# Patient Record
Sex: FEMALE | Race: WHITE | NOT HISPANIC OR LATINO | Employment: OTHER | URBAN - METROPOLITAN AREA
[De-identification: names, ages, dates, MRNs, and addresses within clinical notes are randomized per-mention and may not be internally consistent; named-entity substitution may affect disease eponyms.]

---

## 2017-01-03 ENCOUNTER — APPOINTMENT (OUTPATIENT)
Dept: CT IMAGING | Facility: HOSPITAL | Age: 82
End: 2017-01-03

## 2017-01-03 ENCOUNTER — HOSPITAL ENCOUNTER (OUTPATIENT)
Facility: HOSPITAL | Age: 82
Setting detail: OBSERVATION
LOS: 3 days | Discharge: HOME | End: 2017-01-06
Attending: EMERGENCY MEDICINE | Admitting: HOSPITALIST

## 2017-01-03 DIAGNOSIS — R53.1 GENERALIZED WEAKNESS: ICD-10-CM

## 2017-01-03 DIAGNOSIS — M62.81 MUSCLE WEAKNESS (GENERALIZED): ICD-10-CM

## 2017-01-03 DIAGNOSIS — N39.0 URINARY TRACT INFECTION IN FEMALE: Primary | ICD-10-CM

## 2017-01-03 LAB
ALBUMIN SERPL-MCNC: 3.7 G/DL (ref 3.5–5.2)
ALBUMIN/GLOB SERPL: 1.2 G/DL
ALP SERPL-CCNC: 56 U/L (ref 39–117)
ALT SERPL W P-5'-P-CCNC: 11 U/L (ref 1–33)
ANION GAP SERPL CALCULATED.3IONS-SCNC: 10.6 MMOL/L
AST SERPL-CCNC: 17 U/L (ref 1–32)
BACTERIA UR QL AUTO: ABNORMAL /HPF
BASOPHILS # BLD AUTO: 0.03 10*3/MM3 (ref 0–0.2)
BASOPHILS NFR BLD AUTO: 0.5 % (ref 0–1.5)
BILIRUB SERPL-MCNC: 0.3 MG/DL (ref 0.1–1.2)
BILIRUB UR QL STRIP: NEGATIVE
BUN BLD-MCNC: 18 MG/DL (ref 8–23)
BUN/CREAT SERPL: 20 (ref 7–25)
CALCIUM SPEC-SCNC: 9.2 MG/DL (ref 8.6–10.5)
CHLORIDE SERPL-SCNC: 97 MMOL/L (ref 98–107)
CLARITY UR: ABNORMAL
CO2 SERPL-SCNC: 26.4 MMOL/L (ref 22–29)
COLOR UR: YELLOW
CREAT BLD-MCNC: 0.9 MG/DL (ref 0.57–1)
D-LACTATE SERPL-SCNC: 1 MMOL/L (ref 0.5–2)
DEPRECATED RDW RBC AUTO: 47.3 FL (ref 37–54)
EOSINOPHIL # BLD AUTO: 0.18 10*3/MM3 (ref 0–0.7)
EOSINOPHIL NFR BLD AUTO: 2.9 % (ref 0.3–6.2)
ERYTHROCYTE [DISTWIDTH] IN BLOOD BY AUTOMATED COUNT: 12.3 % (ref 11.7–13)
GFR SERPL CREATININE-BSD FRML MDRD: 59 ML/MIN/1.73
GLOBULIN UR ELPH-MCNC: 3 GM/DL
GLUCOSE BLD-MCNC: 89 MG/DL (ref 65–99)
GLUCOSE UR STRIP-MCNC: NEGATIVE MG/DL
HCT VFR BLD AUTO: 37.5 % (ref 35.6–45.5)
HGB BLD-MCNC: 12 G/DL (ref 11.9–15.5)
HGB UR QL STRIP.AUTO: ABNORMAL
HOLD SPECIMEN: NORMAL
HYALINE CASTS UR QL AUTO: ABNORMAL /LPF
IMM GRANULOCYTES # BLD: 0 10*3/MM3 (ref 0–0.03)
IMM GRANULOCYTES NFR BLD: 0 % (ref 0–0.5)
KETONES UR QL STRIP: NEGATIVE
LEUKOCYTE ESTERASE UR QL STRIP.AUTO: ABNORMAL
LIPASE SERPL-CCNC: 43 U/L (ref 13–60)
LYMPHOCYTES # BLD AUTO: 1.9 10*3/MM3 (ref 0.9–4.8)
LYMPHOCYTES NFR BLD AUTO: 30.6 % (ref 19.6–45.3)
MCH RBC QN AUTO: 33.5 PG (ref 26.9–32)
MCHC RBC AUTO-ENTMCNC: 32 G/DL (ref 32.4–36.3)
MCV RBC AUTO: 104.7 FL (ref 80.5–98.2)
MONOCYTES # BLD AUTO: 0.48 10*3/MM3 (ref 0.2–1.2)
MONOCYTES NFR BLD AUTO: 7.7 % (ref 5–12)
NEUTROPHILS # BLD AUTO: 3.61 10*3/MM3 (ref 1.9–8.1)
NEUTROPHILS NFR BLD AUTO: 58.3 % (ref 42.7–76)
NITRITE UR QL STRIP: POSITIVE
PH UR STRIP.AUTO: 5.5 [PH] (ref 5–8)
PLATELET # BLD AUTO: 175 10*3/MM3 (ref 140–500)
PMV BLD AUTO: 9 FL (ref 6–12)
POTASSIUM BLD-SCNC: 4.2 MMOL/L (ref 3.5–5.2)
PROT SERPL-MCNC: 6.7 G/DL (ref 6–8.5)
PROT UR QL STRIP: NEGATIVE
RBC # BLD AUTO: 3.58 10*6/MM3 (ref 3.9–5.2)
RBC # UR: ABNORMAL /HPF
REF LAB TEST METHOD: ABNORMAL
SODIUM BLD-SCNC: 134 MMOL/L (ref 136–145)
SP GR UR STRIP: 1.02 (ref 1–1.03)
SQUAMOUS #/AREA URNS HPF: ABNORMAL /HPF
UROBILINOGEN UR QL STRIP: ABNORMAL
WBC NRBC COR # BLD: 6.2 10*3/MM3 (ref 4.5–10.7)
WBC UR QL AUTO: ABNORMAL /HPF
WHOLE BLOOD HOLD SPECIMEN: NORMAL

## 2017-01-03 PROCEDURE — 87186 SC STD MICRODIL/AGAR DIL: CPT | Performed by: EMERGENCY MEDICINE

## 2017-01-03 PROCEDURE — G0378 HOSPITAL OBSERVATION PER HR: HCPCS

## 2017-01-03 PROCEDURE — 80053 COMPREHEN METABOLIC PANEL: CPT | Performed by: EMERGENCY MEDICINE

## 2017-01-03 PROCEDURE — 25010000003 CEFTRIAXONE PER 250 MG: Performed by: PHYSICIAN ASSISTANT

## 2017-01-03 PROCEDURE — 81001 URINALYSIS AUTO W/SCOPE: CPT | Performed by: EMERGENCY MEDICINE

## 2017-01-03 PROCEDURE — 83690 ASSAY OF LIPASE: CPT | Performed by: EMERGENCY MEDICINE

## 2017-01-03 PROCEDURE — 99284 EMERGENCY DEPT VISIT MOD MDM: CPT

## 2017-01-03 PROCEDURE — 87086 URINE CULTURE/COLONY COUNT: CPT | Performed by: EMERGENCY MEDICINE

## 2017-01-03 PROCEDURE — 36415 COLL VENOUS BLD VENIPUNCTURE: CPT

## 2017-01-03 PROCEDURE — 83605 ASSAY OF LACTIC ACID: CPT | Performed by: PHYSICIAN ASSISTANT

## 2017-01-03 PROCEDURE — 85025 COMPLETE CBC W/AUTO DIFF WBC: CPT | Performed by: EMERGENCY MEDICINE

## 2017-01-03 PROCEDURE — 96365 THER/PROPH/DIAG IV INF INIT: CPT

## 2017-01-03 PROCEDURE — 87040 BLOOD CULTURE FOR BACTERIA: CPT | Performed by: PHYSICIAN ASSISTANT

## 2017-01-03 PROCEDURE — 70450 CT HEAD/BRAIN W/O DYE: CPT

## 2017-01-03 RX ORDER — SODIUM CHLORIDE 0.9 % (FLUSH) 0.9 %
10 SYRINGE (ML) INJECTION AS NEEDED
Status: DISCONTINUED | OUTPATIENT
Start: 2017-01-03 | End: 2017-01-06 | Stop reason: HOSPADM

## 2017-01-03 RX ORDER — LABETALOL HYDROCHLORIDE 5 MG/ML
10 INJECTION, SOLUTION INTRAVENOUS ONCE
Status: DISCONTINUED | OUTPATIENT
Start: 2017-01-03 | End: 2017-01-06 | Stop reason: HOSPADM

## 2017-01-03 RX ORDER — CEFTRIAXONE SODIUM 1 G/50ML
1 INJECTION, SOLUTION INTRAVENOUS ONCE
Status: COMPLETED | OUTPATIENT
Start: 2017-01-03 | End: 2017-01-03

## 2017-01-03 RX ADMIN — SODIUM CHLORIDE 500 ML: 9 INJECTION, SOLUTION INTRAVENOUS at 22:56

## 2017-01-03 RX ADMIN — CEFTRIAXONE SODIUM 1 G: 1 INJECTION, SOLUTION INTRAVENOUS at 22:04

## 2017-01-03 NOTE — IP AVS SNAPSHOT
AFTER VISIT SUMMARY             Kim Feldman           About your hospitalization     You were admitted on:  January 3, 2017 You last received care in the:  [address] [facility]       Procedures & Surgeries         Medications    If you or your caregiver advised us that you are currently taking a medication and that medication is marked below as “Resume”, this simply indicates that we have reviewed those medications to make sure our new therapy recommendations do not interfere.  If you have concerns about medications other than those new ones which we are prescribing today, please consult the physician who prescribed them (or your primary physician).  Our review of your home medications is not meant to indicate that we are directing their use.             Your Medications      START taking these medications     levoFLOXacin 250 MG tablet   Take 1 tablet by mouth Daily for 3 days.   Commonly known as:  LEVAQUIN             CHANGE how you take these medications     carvedilol 3.125 MG tablet   TAKE ONE TABLET BY MOUTH TWICE A DAY WITH MEALS   Last time this was given:  1/6/2017  8:26 AM   Commonly known as:  COREG   What changed:  See the new instructions.             CONTINUE taking these medications     ARTIFICIAL TEARS OP   Apply 2 drops to eye 4 (four) times a day as needed.           clopidogrel 75 MG tablet   TAKE ONE TABLET BY MOUTH DAILY   Last time this was given:  1/6/2017  8:26 AM   Commonly known as:  PLAVIX           clotrimazole-betamethasone 1-0.05 % cream   Apply  topically 2 (Two) Times a Day.   Last time this was given:  1/5/2017  9:30 AM   Commonly known as:  LOTRISONE           diclofenac 1 % gel gel   Apply 4 g topically 4 (four) times a day as needed.   Commonly known as:  VOLTAREN           gabapentin 300 MG capsule   TAKE TWO CAPSULES BY MOUTH THREE TIMES A DAY   Last time this was given:  1/6/2017  8:26 AM   Commonly known as:  NEURONTIN           lansoprazole 30 MG  capsule   TAKE ONE CAPSULE BY MOUTH EVERY MORNING   Commonly known as:  PREVACID           levETIRAcetam 500 MG tablet   Take 500 mg by mouth 2 (two) times a day.   Last time this was given:  1/6/2017  8:26 AM   Commonly known as:  KEPPRA           Loratadine 10 MG capsule   Take 1 capsule by mouth daily as needed.           methylcellulose (Laxative) 500 MG tablet tablet   Take 2 tablets by mouth daily as needed.   Commonly known as:  CITRUCEL           mirtazapine 15 MG tablet   Take 0.5 tablets by mouth Every Night.   Last time this was given:  1/5/2017  8:15 PM   Commonly known as:  REMERON           nystatin 476154 UNIT/GM cream   Apply  topically As Needed.   Commonly known as:  MYCOSTATIN           VIACTIV PO   Take  by mouth Daily With Lunch. HOLD PRIOR TO SURG           VITAMIN B COMPLEX PO   Take 1 tablet by mouth every morning. HOLD PRIOR TO SURG             STOP taking these medications     clindamycin 150 MG capsule   Commonly known as:  CLEOCIN           CRANBERRY PO                Where to Get Your Medications      These medications were sent to [id number] [place] [address] AT [facility] - [phone]  - [phone] [address] [address]     Phone:  [phone]     carvedilol 3.125 MG tablet    levoFLOXacin 250 MG tablet                  Your Medications      Your Medication List           Morning Noon Evening Bedtime As Needed    ARTIFICIAL TEARS OP   Apply 2 drops to eye 4 (four) times a day as needed.                                               carvedilol 3.125 MG tablet   TAKE ONE TABLET BY MOUTH TWICE A DAY WITH MEALS   Commonly known as:  COREG                                      clopidogrel 75 MG tablet   TAKE ONE TABLET BY MOUTH DAILY   Commonly known as:  PLAVIX                                   clotrimazole-betamethasone 1-0.05 % cream   Apply  topically 2 (Two) Times a Day.   Commonly known as:  LOTRISONE                                       diclofenac 1 % gel gel   Apply 4 g topically 4 (four) times a day as needed.   Commonly known as:  VOLTAREN                                               gabapentin 300 MG capsule   TAKE TWO CAPSULES BY MOUTH THREE TIMES A DAY   Commonly known as:  NEURONTIN                                         lansoprazole 30 MG capsule   TAKE ONE CAPSULE BY MOUTH EVERY MORNING   Commonly known as:  PREVACID                                   levETIRAcetam 500 MG tablet   Take 500 mg by mouth 2 (two) times a day.   Commonly known as:  KEPPRA                                      levoFLOXacin 250 MG tablet   Take 1 tablet by mouth Daily for 3 days.   Commonly known as:  LEVAQUIN                                   Loratadine 10 MG capsule   Take 1 capsule by mouth daily as needed.                                      methylcellulose (Laxative) 500 MG tablet tablet   Take 2 tablets by mouth daily as needed.   Commonly known as:  CITRUCEL                                      mirtazapine 15 MG tablet   Take 0.5 tablets by mouth Every Night.   Commonly known as:  REMERON                                   nystatin [id number] UNIT/GM cream   Apply  topically As Needed.   Commonly known as:  MYCOSTATIN                                   VIACTIV PO   Take  by mouth Daily With Lunch. HOLD PRIOR TO SURG                                   VITAMIN B COMPLEX PO   Take 1 tablet by mouth every morning. HOLD PRIOR TO SURG                                            Instructions for After Discharge        Activity Instructions     Activity as Tolerated                 Diet Instructions     Diet:       Diet Texture / Consistency:  Regular   Common Modifiers:   Cardiac  Consistent Carbohydrate                Discharge References/Attachments     URINARY TRACT INFECTION (ENGLISH)    URINARY TRACT INFECTION, EASY-TO-READ (ENGLISH)    LEVOFLOXACIN TABLETS (ENGLISH)    CYANOCOBALAMIN, VITAMIN B12 INJECTION (ENGLISH)        Follow-ups for After Discharge        Follow-up Information     Follow up with Sharad Salinas MD. Go on 1/16/2017.    Specialty:  Family Medicine    Why:  You have an appointment on Monday, January 16th at 2:30 pm. If you can not make this appointment for any reason please call and reschedule for as soon as possible.    Contact information:    [address] [facility] [address]  [facility] [address]  [phone]        Referrals and Follow-ups to Schedule     Call MD for problems / concerns.    As directed        Follow-Up    As directed    Sharad Salinas MD 1-2 weeks   Follow Up Details:  CBC next week follow-up platelets       CBC & Differential    Jan 11, 2017 (Approximate)    Manual Differential:  No             Scheduled Appointments     Jan 16, 2017  2:30 PM [facility]   Hospital Follow Up with Sharad Salinas MD   [facility] FAMILY AND INTERNAL MED (--)    [address]  [facility] [address]   [phone]            Apr 06, 2017 12:30 PM EDT   Follow Up with Arsalan Velez Jr., MD   [facility] NEUROLOGY (--)    [address]  [facility] [address] [phone]           Arrive 15 minutes prior to appointment.              [employer] Signup     Our records indicate that you have an active [facility] [employer] account.    You can view your After Visit Summary by going to [employer] and logging in with your [employer] username and password.  If you don't have a [employer] username and password but a parent or guardian has access to your record, the parent or guardian should login with their own [employer] [place] username and password and access your record to view the After Visit Summary.    If you have questions, you can email [email or web address] or call [phone] to talk to our [employer] staff.  Remember, [employer] is NOT to be used for urgent needs.  For medical emergencies, dial 911.           Summary of Your Hospitalization         Reason for Hospitalization     Your primary diagnosis was:  Urinary Tract Infection In Female    Your diagnoses also included:  High Blood Pressure, Pacemaker, Clonic Seizure Disorder, History Of Aspiration Pneumonitis, Decreased Platelet Count      Care Providers     Provider Service Role Specialty    Zain Crespo MD Internal Medicine Attending Provider Internal Medicine      Your Allergies  Date Reviewed: 1/4/2017    Allergen Reactions    Penicillins Hives      Pending Labs     Order Current Status    Blood Culture Preliminary result    Blood Culture Preliminary result      Patient Belongings Returned     Document Return of Belongings Flowsheet     Were the patient bedside belongings sent home?   Yes   Belongings Retrieved from Security & Sent Home   N/A    Belongings Sent to Safe   --   Medications Retrieved from Pharmacy & Sent Home   N/A              More Information      Urinary Tract Infection  Urinary tract infections (UTIs) can develop anywhere along your urinary tract. Your urinary tract is your body's drainage system for removing wastes and extra water. Your urinary tract includes two kidneys, two ureters, a bladder, and a urethra. Your kidneys are a pair of bean-shaped organs. Each kidney is about the size of your fist. They are located below your ribs, one on each side of your spine.  CAUSES  Infections are caused by microbes, which are microscopic organisms, including fungi, viruses, and bacteria. These organisms are so small that they can only be seen through a microscope. Bacteria are the microbes that most commonly cause UTIs.  SYMPTOMS   Symptoms of UTIs may vary by age and gender of the patient and by the location of the infection. Symptoms in young women typically include a frequent and intense urge to urinate and a painful, burning feeling in the bladder or urethra during urination. Older women and men are more likely to be tired, shaky, and weak and have muscle aches and abdominal pain. A  fever may mean the infection is in your kidneys. Other symptoms of a kidney infection include pain in your back or sides below the ribs, nausea, and vomiting.  DIAGNOSIS  To diagnose a UTI, your caregiver will ask you about your symptoms. Your caregiver will also ask you to provide a urine sample. The urine sample will be tested for bacteria and white blood cells. White blood cells are made by your body to help fight infection.  TREATMENT   Typically, UTIs can be treated with medication. Because most UTIs are caused by a bacterial infection, they usually can be treated with the use of antibiotics. The choice of antibiotic and length of treatment depend on your symptoms and the type of bacteria causing your infection.  HOME CARE INSTRUCTIONS  · If you were prescribed antibiotics, take them exactly as your caregiver instructs you. Finish the medication even if you feel better after you have only taken some of the medication.  · Drink enough water and fluids to keep your urine clear or pale yellow.  · Avoid caffeine, tea, and carbonated beverages. They tend to irritate your bladder.  · Empty your bladder often. Avoid holding urine for long periods of time.  · Empty your bladder before and after sexual intercourse.  · After a bowel movement, women should cleanse from front to back. Use each tissue only once.  SEEK MEDICAL CARE IF:   · You have back pain.  · You develop a fever.  · Your symptoms do not begin to resolve within 3 days.  SEEK IMMEDIATE MEDICAL CARE IF:   · You have severe back pain or lower abdominal pain.  · You develop chills.  · You have nausea or vomiting.  · You have continued burning or discomfort with urination.  MAKE SURE YOU:   · Understand these instructions.  · Will watch your condition.  · Will get help right away if you are not doing well or get worse.     This information is not intended to replace advice given to you by your health care provider. Make sure you discuss any questions you have  with your health care provider.     Document Released: 09/27/2006 Document Revised: 09/07/2016 Document Reviewed: 01/25/2013  [employer] Patient Education ©2016 Else[employer] Inc.          Urinary Tract Infection  A urinary tract infection (UTI) can occur any place along the urinary tract. The tract includes the kidneys, ureters, bladder, and urethra. A type of germ called bacteria often causes a UTI. UTIs are often helped with antibiotic medicine.   HOME CARE   · If given, take antibiotics as told by your doctor. Finish them even if you start to feel better.  · Drink enough fluids to keep your pee (urine) clear or pale yellow.  · Avoid tea, drinks with caffeine, and bubbly (carbonated) drinks.  · Pee often. Avoid holding your pee in for a long time.  · Pee before and after having sex (intercourse).  · Wipe from front to back after you poop (bowel movement) if you are a woman. Use each tissue only once.  GET HELP RIGHT AWAY IF:   · You have back pain.  · You have lower belly (abdominal) pain.  · You have chills.  · You feel sick to your stomach (nauseous).  · You throw up (vomit).  · Your burning or discomfort with peeing does not go away.  · You have a fever.  · Your symptoms are not better in 3 days.  MAKE SURE YOU:   · Understand these instructions.  · Will watch your condition.  · Will get help right away if you are not doing well or get worse.     This information is not intended to replace advice given to you by your health care provider. Make sure you discuss any questions you have with your health care provider.     Document Released: 06/05/2009 Document Revised: 01/08/2016 Document Reviewed: 07/18/2013  [employer] Patient Education ©2016 Else[employer] Inc.          Levofloxacin tablets  What is this medicine?  LEVOFLOXACIN (feng voe FLOX a sin) is a quinolone antibiotic. It is used to treat certain kinds of bacterial infections. It will not work for colds, flu, or other viral infections.  This  medicine may be used for other purposes; ask your health care provider or pharmacist if you have questions.  What should I tell my health care provider before I take this medicine?  They need to know if you have any of these conditions:  -bone problems  -cerebral disease  -history of low levels of potassium in the blood  -irregular heartbeat  -joint problems  -kidney disease  -myasthenia gravis  -seizures  -tendon problems  -tingling of the fingers or toes, or other nerve disorder  -an unusual or allergic reaction to levofloxacin, other quinolone antibiotics, foods, dyes, or preservatives  -pregnant or trying to get pregnant  -breast-feeding  How should I use this medicine?  Take this medicine by mouth with a full glass of water. Follow the directions on the prescription label. This medicine can be taken with or without food. Take your medicine at regular intervals. Do not take your medicine more often than directed. Do not skip doses or stop your medicine early even if you feel better. Do not stop taking except on your doctor's advice.  A special MedGuide will be given to you by the pharmacist with each prescription and refill. Be sure to read this information carefully each time.  Talk to your pediatrician regarding the use of this medicine in children. While this drug may be prescribed for children as young as 6 months for selected conditions, precautions do apply.  Overdosage: If you think you have taken too much of this medicine contact a poison control center or emergency room at once.  NOTE: This medicine is only for you. Do not share this medicine with others.  What if I miss a dose?  If you miss a dose, take it as soon as you remember. If it is almost time for your next dose, take only that dose. Do not take double or extra doses.  What may interact with this medicine?  Do not take this medicine with any of the following medications:  -arsenic trioxide  -chloroquine  -droperidol  -medicines for irregular  heart rhythm like amiodarone, disopyramide, dofetilide, flecainide, quinidine, procainamide, sotalol  -some medicines for depression or mental problems like phenothiazines, pimozide, and ziprasidone  This medicine may also interact with the following medications:  -amoxapine  -antacids  -birth control pills  -cisapride  -dairy products  -didanosine (ddI) buffered tablets or powder  -haloperidol  -multivitamins  -NSAIDS, medicines for pain and inflammation, like ibuprofen or naproxen  -retinoid products like tretinoin or isotretinoin  -risperidone  -some other antibiotics like clarithromycin or erythromycin  -sucralfate  -theophylline  -warfarin  This list may not describe all possible interactions. Give your health care provider a list of all the medicines, herbs, non-prescription drugs, or dietary supplements you use. Also tell them if you smoke, drink alcohol, or use illegal drugs. Some items may interact with your medicine.  What should I watch for while using this medicine?  Tell your doctor or health care professional if your symptoms do not improve or if they get worse. Drink several glasses of water a day and cut down on drinks that contain caffeine. You must not get dehydrated while taking this medicine.  You may get drowsy or dizzy. Do not drive, use machinery, or do anything that needs mental alertness until you know how this medicine affects you. Do not sit or stand up quickly, especially if you are an older patient. This reduces the risk of dizzy or fainting spells.  This medicine can make you more sensitive to the sun. Keep out of the sun. If you cannot avoid being in the sun, wear protective clothing and use a sunscreen. Do not use sun lamps or tanning beds/booths. Contact your doctor if you get a sunburn.  If you are a diabetic monitor your blood glucose carefully. If you get an unusual reading stop taking this medicine and call your doctor right away.  Do not treat diarrhea with over-the-counter  products. Contact your doctor if you have diarrhea that lasts more than 2 days or if the diarrhea is severe and watery.  Avoid antacids, calcium, iron, and zinc products for 2 hours before and 2 hours after taking a dose of this medicine.  What side effects may I notice from receiving this medicine?  Side effects that you should report to your doctor or health care professional as soon as possible:  -allergic reactions like skin rash or hives, swelling of the face, lips, or tongue  -anxious  -confusion  -depressed mood  -diarrhea  -fast, irregular heartbeat  -hallucination, loss of contact with reality  -joint, muscle, or tendon pain or swelling  -pain, tingling, numbness in the hands or feet  -suicidal thoughts or other mood changes  -sunburn  -unusually weak or tired  Side effects that usually do not require medical attention (report to your doctor or health care professional if they continue or are bothersome):  -dry mouth  -headache  -nausea  -trouble sleeping  This list may not describe all possible side effects. Call your doctor for medical advice about side effects. You may report side effects to FDA at 0-244-FDA-2684.  Where should I keep my medicine?  Keep out of the reach of children.  Store at room temperature between 15 and 30 degrees C (59 and 86 degrees F). Keep in a tightly closed container. Throw away any unused medicine after the expiration date.  NOTE: This sheet is a summary. It may not cover all possible information. If you have questions about this medicine, talk to your doctor, pharmacist, or health care provider.     © 2016, Elsevier/[employer]. (2016-07-28 12:40:18)          Cyanocobalamin, Vitamin B12 injection  What is this medicine?  CYANOCOBALAMIN (sye an oh koe BAL a min) is a man made form of vitamin B12. Vitamin B12 is used in the growth of healthy blood cells, nerve cells, and proteins in the body. It also helps with the metabolism of fats and carbohydrates. This medicine is used  to treat people who can not absorb vitamin B12.  This medicine may be used for other purposes; ask your health care provider or pharmacist if you have questions.  What should I tell my health care provider before I take this medicine?  They need to know if you have any of these conditions:  -kidney disease  -Butch's disease  -megaloblastic anemia  -an unusual or allergic reaction to cyanocobalamin, cobalt, other medicines, foods, dyes, or preservatives  -pregnant or trying to get pregnant  -breast-feeding  How should I use this medicine?  This medicine is injected into a muscle or deeply under the skin. It is usually given by a health care professional in a clinic or doctor's office. However, your doctor may teach you how to inject yourself. Follow all instructions.  Talk to your pediatrician regarding the use of this medicine in children. Special care may be needed.  Overdosage: If you think you have taken too much of this medicine contact a poison control center or emergency room at once.  NOTE: This medicine is only for you. Do not share this medicine with others.  What if I miss a dose?  If you are given your dose at a clinic or doctor's office, call to reschedule your appointment. If you give your own injections and you miss a dose, take it as soon as you can. If it is almost time for your next dose, take only that dose. Do not take double or extra doses.  What may interact with this medicine?  -colchicine  -heavy alcohol intake  This list may not describe all possible interactions. Give your health care provider a list of all the medicines, herbs, non-prescription drugs, or dietary supplements you use. Also tell them if you smoke, drink alcohol, or use illegal drugs. Some items may interact with your medicine.  What should I watch for while using this medicine?  Visit your doctor or health care professional regularly. You may need blood work done while you are taking this medicine.  You may need to follow a  special diet. Talk to your doctor. Limit your alcohol intake and avoid smoking to get the best benefit.  What side effects may I notice from receiving this medicine?  Side effects that you should report to your doctor or health care professional as soon as possible:  -allergic reactions like skin rash, itching or hives, swelling of the face, lips, or tongue  -blue tint to skin  -chest tightness, pain  -difficulty breathing, wheezing  -dizziness  -red, swollen painful area on the leg  Side effects that usually do not require medical attention (report to your doctor or health care professional if they continue or are bothersome):  -diarrhea  -headache  This list may not describe all possible side effects. Call your doctor for medical advice about side effects. You may report side effects to FDA at 0-495-FDA-8310.  Where should I keep my medicine?  Keep out of the reach of children.  Store at room temperature between 15 and 30 degrees C (59 and 85 degrees F). Protect from light. Throw away any unused medicine after the expiration date.  NOTE: This sheet is a summary. It may not cover all possible information. If you have questions about this medicine, talk to your doctor, pharmacist, or health care provider.     © 2016, Elsevier/Gold Standard. (2009-03-30 22:10:20)            SYMPTOMS OF A STROKE    Call 911 or have someone take you to the Emergency Department if you have any of the following:    · Sudden numbness or weakness of your face, arm or leg especially on one side of the body  · Sudden confusion, diffiiculty speaking or trouble understanding   · Changes in your vision or loss of sight in one eye  · Sudden severe headache with no known cause  · sudden dizziness, trouble walking, loss of balance or coordination    It is important to seek emergency care right away if you have further stroke symptoms. If you get emergency help quickly, the powerful clot-dissolving medicines can reduce the disabilities caused by a  stroke.     For more information:    American Stroke Association  4-934-4-STROKE  www.strokeassociation.org           IF YOU SMOKE OR USE TOBACCO PLEASE READ THE FOLLOWING:    Why is smoking bad for me?  Smoking increases the risk of heart disease, lung disease, vascular disease, stroke, and cancer.     If you smoke, STOP!    If you would like more information on quitting smoking, please visit the [employer] website: [email or web address]   This link will provide additional resources including the QUIT line and the Beat the Pack support groups.     For more information:    American Cancer Society  [phone]    American Heart Association  [phone]               YOU ARE THE MOST IMPORTANT FACTOR IN YOUR RECOVERY.     Follow all instructions carefully.     I have reviewed my discharge instructions with my nurse, including the following information, if applicable:     Information about my illness and diagnosis   Follow up appointments (including lab draws)   Wound Care   Equipment Needs   Medications (new and continuing) along with side effects   Preventative information such as vaccines and smoking cessations   Diet   Pain   I know when to contact my Doctor's office or seek emergency care      I want my nurse to describe the side effects of my medications: YES NO   If the answer is no, I understand the side effects of my medications: YES NO   My nurse described the side effects of my medications in a way that I could understand: YES NO   I have taken my personal belongings and my own medications with me at discharge: YES NO            I have received this information and my questions have been answered. I have discussed any concerns I see with this plan with the nurse or physician. I understand these instructions.    Signature of Patient or Responsible Person: _____________________________________    Date: _________________  Time: __________________    Signature  of Healthcare Provider: _______________________________________  Date: _________________  Time: __________________

## 2017-01-04 ENCOUNTER — APPOINTMENT (OUTPATIENT)
Dept: GENERAL RADIOLOGY | Facility: HOSPITAL | Age: 82
End: 2017-01-04
Attending: HOSPITALIST

## 2017-01-04 PROBLEM — G40.409: Status: ACTIVE | Noted: 2017-01-04

## 2017-01-04 PROBLEM — Z87.09 HISTORY OF ASPIRATION PNEUMONITIS: Status: ACTIVE | Noted: 2017-01-04

## 2017-01-04 PROCEDURE — 25010000003 CEFTRIAXONE PER 250 MG: Performed by: HOSPITALIST

## 2017-01-04 PROCEDURE — G8979 MOBILITY GOAL STATUS: HCPCS

## 2017-01-04 PROCEDURE — G8997 SWALLOW GOAL STATUS: HCPCS

## 2017-01-04 PROCEDURE — 97161 PT EVAL LOW COMPLEX 20 MIN: CPT

## 2017-01-04 PROCEDURE — 92610 EVALUATE SWALLOWING FUNCTION: CPT

## 2017-01-04 PROCEDURE — 96372 THER/PROPH/DIAG INJ SC/IM: CPT

## 2017-01-04 PROCEDURE — 71010 HC CHEST PA OR AP: CPT

## 2017-01-04 PROCEDURE — G8980 MOBILITY D/C STATUS: HCPCS

## 2017-01-04 PROCEDURE — G8978 MOBILITY CURRENT STATUS: HCPCS

## 2017-01-04 PROCEDURE — 25010000002 ENOXAPARIN PER 10 MG: Performed by: HOSPITALIST

## 2017-01-04 PROCEDURE — G8998 SWALLOW D/C STATUS: HCPCS

## 2017-01-04 PROCEDURE — G8996 SWALLOW CURRENT STATUS: HCPCS

## 2017-01-04 RX ORDER — CARVEDILOL 3.12 MG/1
3.12 TABLET ORAL 2 TIMES DAILY WITH MEALS
Status: DISCONTINUED | OUTPATIENT
Start: 2017-01-04 | End: 2017-01-06 | Stop reason: HOSPADM

## 2017-01-04 RX ORDER — CEFTRIAXONE SODIUM 1 G/50ML
1 INJECTION, SOLUTION INTRAVENOUS EVERY 24 HOURS
Status: DISCONTINUED | OUTPATIENT
Start: 2017-01-04 | End: 2017-01-06 | Stop reason: HOSPADM

## 2017-01-04 RX ORDER — GABAPENTIN 300 MG/1
600 CAPSULE ORAL EVERY 12 HOURS SCHEDULED
Status: DISCONTINUED | OUTPATIENT
Start: 2017-01-04 | End: 2017-01-06 | Stop reason: HOSPADM

## 2017-01-04 RX ORDER — NYSTATIN 100000 U/G
CREAM TOPICAL EVERY 12 HOURS PRN
Status: DISCONTINUED | OUTPATIENT
Start: 2017-01-04 | End: 2017-01-06 | Stop reason: HOSPADM

## 2017-01-04 RX ORDER — MIRTAZAPINE 15 MG/1
7.5 TABLET, FILM COATED ORAL NIGHTLY
Status: DISCONTINUED | OUTPATIENT
Start: 2017-01-04 | End: 2017-01-04

## 2017-01-04 RX ORDER — GABAPENTIN 300 MG/1
300 CAPSULE ORAL EVERY 8 HOURS SCHEDULED
Status: DISCONTINUED | OUTPATIENT
Start: 2017-01-04 | End: 2017-01-04

## 2017-01-04 RX ORDER — SODIUM CHLORIDE 9 MG/ML
75 INJECTION, SOLUTION INTRAVENOUS CONTINUOUS
Status: ACTIVE | OUTPATIENT
Start: 2017-01-04 | End: 2017-01-04

## 2017-01-04 RX ORDER — LEVETIRACETAM 500 MG/1
500 TABLET ORAL 2 TIMES DAILY
Status: DISCONTINUED | OUTPATIENT
Start: 2017-01-04 | End: 2017-01-06 | Stop reason: HOSPADM

## 2017-01-04 RX ORDER — MINERAL OIL AND PETROLATUM 150; 830 MG/G; MG/G
OINTMENT OPHTHALMIC
Status: DISCONTINUED | OUTPATIENT
Start: 2017-01-04 | End: 2017-01-06 | Stop reason: HOSPADM

## 2017-01-04 RX ORDER — CLOTRIMAZOLE AND BETAMETHASONE DIPROPIONATE 10; .64 MG/G; MG/G
CREAM TOPICAL 2 TIMES DAILY
Status: DISCONTINUED | OUTPATIENT
Start: 2017-01-04 | End: 2017-01-06 | Stop reason: HOSPADM

## 2017-01-04 RX ORDER — CLOPIDOGREL BISULFATE 75 MG/1
75 TABLET ORAL DAILY
Status: DISCONTINUED | OUTPATIENT
Start: 2017-01-04 | End: 2017-01-06 | Stop reason: HOSPADM

## 2017-01-04 RX ORDER — PANTOPRAZOLE SODIUM 40 MG/1
40 TABLET, DELAYED RELEASE ORAL
Status: DISCONTINUED | OUTPATIENT
Start: 2017-01-04 | End: 2017-01-06 | Stop reason: HOSPADM

## 2017-01-04 RX ORDER — SODIUM CHLORIDE 0.9 % (FLUSH) 0.9 %
1-10 SYRINGE (ML) INJECTION AS NEEDED
Status: DISCONTINUED | OUTPATIENT
Start: 2017-01-04 | End: 2017-01-06 | Stop reason: HOSPADM

## 2017-01-04 RX ORDER — MIRTAZAPINE 15 MG/1
15 TABLET, FILM COATED ORAL NIGHTLY
Status: DISCONTINUED | OUTPATIENT
Start: 2017-01-04 | End: 2017-01-06 | Stop reason: HOSPADM

## 2017-01-04 RX ADMIN — LEVETIRACETAM 500 MG: 500 TABLET, FILM COATED ORAL at 21:56

## 2017-01-04 RX ADMIN — CARVEDILOL 3.12 MG: 3.12 TABLET, FILM COATED ORAL at 12:05

## 2017-01-04 RX ADMIN — CLOPIDOGREL BISULFATE 75 MG: 75 TABLET, FILM COATED ORAL at 12:11

## 2017-01-04 RX ADMIN — LEVETIRACETAM 500 MG: 500 TABLET, FILM COATED ORAL at 12:05

## 2017-01-04 RX ADMIN — GABAPENTIN 600 MG: 300 CAPSULE ORAL at 13:08

## 2017-01-04 RX ADMIN — CLOTRIMAZOLE AND BETAMETHASONE DIPROPIONATE: 10; .5 CREAM TOPICAL at 12:13

## 2017-01-04 RX ADMIN — CARVEDILOL 3.12 MG: 3.12 TABLET, FILM COATED ORAL at 21:56

## 2017-01-04 RX ADMIN — ENOXAPARIN SODIUM 30 MG: 30 INJECTION SUBCUTANEOUS at 12:11

## 2017-01-04 RX ADMIN — SODIUM CHLORIDE 75 ML/HR: 9 INJECTION, SOLUTION INTRAVENOUS at 11:59

## 2017-01-04 RX ADMIN — PANTOPRAZOLE SODIUM 40 MG: 40 TABLET, DELAYED RELEASE ORAL at 12:11

## 2017-01-04 RX ADMIN — GABAPENTIN 600 MG: 300 CAPSULE ORAL at 21:56

## 2017-01-04 RX ADMIN — CEFTRIAXONE SODIUM 1 G: 1 INJECTION, SOLUTION INTRAVENOUS at 21:55

## 2017-01-04 RX ADMIN — MIRTAZAPINE 15 MG: 15 TABLET, FILM COATED ORAL at 21:56

## 2017-01-04 NOTE — H&P
[facility]    Patient Identification:  Name: Kim Feldman  Age: 86 y.o.  Sex: female  :  3/13/1930  MRN: [id number]         Primary Care Physician: Sharad Salinas MD    Chief complaint:  Shaking chills    Subjective   Patient is a 86 y.o. female was in her usual state of health until last night developed a couple episodes of shaking chills. She felt very cold and had a headache. She also reported some dysuria. The episode of chills last between 1-1/2 hours. She saw her primary care physician and was sent to the emergency room. She states she's been eating okay. Sometimes has occasional hard stools. No diarrhea. No chest pain, shortness of breath, fever. She states after her episodes of shaking chills she feels very weak. In the emergency room she was given Rocephin. She is asking if she will be able to go home later today. She does tell me that she has had a history of aspiration pneumonia and also urinary tract infections. She denies being on a modified diet.    Past Medical History   Diagnosis Date   • Anemia    • Bulging lumbar disc    • Chronic cough    • Chronic UTI    • Chronic venous insufficiency    • Clonic seizure disorder    • Dementia without behavioral disturbance      moderate   • Depression, endogenous    • Disc degeneration, lumbar    • Dysphagia    • GERD (gastroesophageal reflux disease)    • Hiatal hernia    • History of anxiety    • History of aspiration pneumonitis    • History of cerebral artery occlusion      CVA (following TIA), 10/10, treated with tPA   • History of herpes zoster    • History of ingrowing nail    • History of osteoporosis    • History of poliomyelitis      child   • History of sciatica    • History of sick sinus syndrome      s/p PPM   • History of transient cerebral ischemia      followed by stroke in 10/2010. BIBI was normal.   • HX: long term anticoagulant use    • Hyperlipidemia    • Hypertension    •  Hyponatremia    • Intertrigo    • Lumbar radiculopathy    • Morbid obesity    • Neuralgia      with diplopia secondary to facial shingles   • Nonepileptic episode    • Osteoarthritis of right knee    • Pacemaker    • Rash      ON ABDOMEN   • Seeing double      secondary to shingles on the face also with neuralgia   • Serum potassium elevated    • SIADH (syndrome of inappropriate ADH production)    • Vitamin D deficiency      Past Surgical History   Procedure Laterality Date   • Hand surgery Right    • Lumbar laminectomy       L-3 L4 L4 L5   • Cardiac pacemaker placement       secondary to SSS, placed in 2004, with generator change in 2014   • Tonsillectomy     • Laminectomy for implantation / placement neurostimulator electrodes     • Knee arthroplasty Left    • Cataract extraction     • Zenkers diverticulectomy N/A 9/27/2016     Procedure: ENDOSCOPIC REMOVAL OF ZENKERS DIVERTICULUM W/ WERDASCOPE;  Surgeon: Oswald Barth MD;  Location: [facility];  Service:      Current medications reviewed.    History reviewed. No pertinent family history.  Social History   Substance Use Topics   • Smoking status: Former Smoker     Years: 40.00     Types: Cigarettes   • Smokeless tobacco: Never Used      Comment: QUIT 1992   • Alcohol use No      Comment: VERY RARE     Review of Systems   Constitutional: Positive for chills and fatigue. Negative for fever.   HENT: Negative.    Eyes: Negative.    Respiratory: Negative.  Negative for chest tightness and shortness of breath.    Cardiovascular: Negative.  Negative for chest pain.   Gastrointestinal: Negative.  Negative for diarrhea, nausea and vomiting.   Endocrine: Negative.    Genitourinary: Positive for dysuria.   Musculoskeletal: Negative.    Skin: Negative.    Allergic/Immunologic: Negative.    Neurological: Negative.    Hematological: Negative.    Psychiatric/Behavioral: Negative.       Objective      Vital Signs  Temp:  [97.2 °F (36.2 °C)-97.6 °F (36.4 °C)] 97.3 °F (36.3  °C)  Heart Rate:  [70-83] 83  Resp:  [16-18] 18  BP: (129-189)/() 179/104  Body mass index is 36.77 kg/(m^2).    Physical Exam   Constitutional: She is oriented to person, place, and time. She appears well-developed and well-nourished.   HENT:   Head: Normocephalic and atraumatic.   Eyes: Conjunctivae and EOM are normal.   Neck: Neck supple. No tracheal deviation present.   Cardiovascular: Normal rate, regular rhythm and intact distal pulses.    Pulmonary/Chest: Effort normal and breath sounds normal. No respiratory distress. She has no wheezes. She has no rales.   Abdominal: Soft. She exhibits no distension. There is no tenderness. There is no rebound and no guarding.   Musculoskeletal: She exhibits no edema.   Lymphadenopathy:     She has no cervical adenopathy.   Neurological: She is alert and oriented to person, place, and time.   Oriented to self, year, situation, location. Appropriate   Skin: Skin is warm and dry.   Psychiatric: She has a normal mood and affect. Her behavior is normal.         Lab Results   Component Value Date    WBC 6.20 01/03/2017    HGB 12.0 01/03/2017    HCT 37.5 01/03/2017     01/03/2017     Lab Results   Component Value Date     (L) 01/03/2017    K 4.2 01/03/2017    CL 97 (L) 01/03/2017    CO2 26.4 01/03/2017    BUN 18 01/03/2017    CREATININE 0.90 01/03/2017    GLUCOSE 89 01/03/2017     Lab Results   Component Value Date    CALCIUM 9.2 01/03/2017     Lab Results   Component Value Date    AST 17 01/03/2017    ALT 11 01/03/2017    ALKPHOS 56 01/03/2017     No results found for: APTT, INR  Lab Results   Component Value Date    COLORU Yellow 01/03/2017    CLARITYU Cloudy (A) 01/03/2017    PHUR 5.5 01/03/2017    GLUCOSEU Negative 01/03/2017    KETONESU Negative 01/03/2017    BLOODU Trace (A) 01/03/2017    LEUKOCYTESUR Large (3+) (A) 01/03/2017    BILIRUBINUR Negative 01/03/2017    UROBILINOGEN 0.2 E.U./dL 01/03/2017    RBCUA 6-12 (A) 01/03/2017    WBCUA Too Numerous to  Count (A) 01/03/2017    BACTERIA 4+ (A) 01/03/2017     Lab Results   Component Value Date    URINECX Culture in progress 01/03/2017     CT head: No acute intracranial pathology    Assessment/Plan   Active Hospital Problems (** Indicates Principal Problem)    Diagnosis Date Noted   • **Urinary tract infection in female [N39.0] 01/03/2017   • 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     · 86 y.o. female admitted with urinary tract infection  · Continue Rocephin, await culture results  · Resume her blood pressure medication and monitor BP. She states she did not take it last night  · Continue seizure medication  · PT  · In July she saw speech and was recommended regular consistency with thin liquids and some speech recommendations. Check chest x-ray to rule out pneumonia as cause of chills  · Patient states her daughter Leandra would be her medical decision maker. She states she is DNR status  · Further management as patient's clinical course unfolds    Zain Crespo MD  01/04/17  8:52 AM

## 2017-01-04 NOTE — ED PROVIDER NOTES
" EMERGENCY DEPARTMENT ENCOUNTER    CHIEF COMPLAINT  Chief Complaint: Tremors  History given by: Patient, Family  History limited by:   Room Number: 2213/1  PMD: Sharad Salinas MD      HPI:  Pt is a 86 y.o. female who presents complaining of tremors that have been taking approximately 1/week over the past month, but have become more frequent and up to 2x today. She states the tremors are similar to being chilled, but denies feeling chilled. Pt also reports a posterior headache. She states some mild lower abd pain. Pt denies any CP, SOA, N/V/D, focal weakness, AMS. Family reports that pt has acted with more generalized weakness.     Duration: 1 month  Onset: gradual  Timing: intermittent  Location: generalized  Quality: \"shaking like I'm cold, but I'm not chilled.\"  Intensity/Severity: moderate  Progression: worsening  Associated Symptoms: headache, abd pain, generalized weakness  Aggravating Factors: none  Alleviating Factors: none  Previous Episodes: similar tremor episodes over the past month  Treatment before arrival: none    PAST MEDICAL HISTORY  Active Ambulatory Problems     Diagnosis Date Noted   • Anemia 02/05/2016   • Bulging lumbar disc 02/05/2016   • Depression, 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   • Dysphagia 02/05/2016   • Edema 02/05/2016   • Gastroesophageal reflux disease 02/05/2016   • Hyperlipidemia 02/05/2016   • Hypertension 02/05/2016   • Intermittent claudication 02/05/2016   • Neuralgia 02/05/2016   • Osteoarthritis of knee 02/05/2016   • Syndrome of inappropriate secretion of antidiuretic hormone 02/05/2016   • Vitamin D deficiency 02/05/2016   • History of sick sinus syndrome    • Intertrigo 03/25/2016   • 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   • Urine frequency 05/20/2016   • Change in bowel habits 05/20/2016   • Urinary tract infection 07/08/2016   • Pneumonia 07/08/2016   • Pacemaker 07/10/2016   • Zenker's diverticulum 07/11/2016   • Aspiration pneumonia 07/17/2016   • Acute URI 09/02/2016   • " 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   • History of anxiety 10/18/2016     Resolved Ambulatory Problems     Diagnosis Date Noted   • Hyponatremia 02/05/2016   • Pneumonia of right lower lobe due to infectious organism 02/05/2016   • Acute URI 05/27/2016     Past Medical History   Diagnosis Date   • Chronic cough    • Chronic UTI    • Chronic venous insufficiency    • Clonic seizure disorder    • Dementia without behavioral disturbance    • Disc degeneration, lumbar    • GERD (gastroesophageal reflux disease)    • Hiatal hernia    • History of aspiration pneumonitis    • History of cerebral artery occlusion    • History of herpes zoster    • History of ingrowing nail    • History of osteoporosis    • History of poliomyelitis    • History of sciatica    • History of transient cerebral ischemia    • HX: long term anticoagulant use    • Lumbar radiculopathy    • Morbid obesity    • Nonepileptic episode    • Osteoarthritis of right knee    • Rash    • Seeing double    • Serum potassium elevated    • SIADH (syndrome of inappropriate ADH production)        PAST SURGICAL HISTORY  Past Surgical History   Procedure Laterality Date   • Hand surgery Right    • Lumbar laminectomy       L-3 L4 L4 L5   • Cardiac pacemaker placement       secondary to SSS, placed in 2004, with generator change in 2014   • Tonsillectomy     • Laminectomy for implantation / placement neurostimulator electrodes     • Knee arthroplasty Left    • Cataract extraction     • Zenkers diverticulectomy N/A 9/27/2016     Procedure: ENDOSCOPIC REMOVAL OF ZENKERS DIVERTICULUM W/ WERDASCOPE;  Surgeon: Oswald Barth MD;  Location: [facility];  Service:        FAMILY HISTORY  History reviewed. No pertinent family history.    SOCIAL HISTORY  Social History     Social History   • Marital status:      Spouse name: N/A   • Number of children: 3   • Years of education: N/A     Occupational History   • Retired      Social History Main Topics   • Smoking status:  Former Smoker     Years: 40.00     Types: Cigarettes   • Smokeless tobacco: Never Used      Comment: QUIT 1992   • Alcohol use No      Comment: VERY RARE   • Drug use: No   • Sexual activity: Defer     Other Topics Concern   • Not on file     Social History Narrative       ALLERGIES  Penicillins    REVIEW OF SYSTEMS  Review of Systems   Constitutional: Negative for fever.   HENT: Negative for sore throat.    Eyes: Negative.    Respiratory: Negative for cough and shortness of breath.    Cardiovascular: Negative for chest pain.   Gastrointestinal: Positive for abdominal pain. Negative for diarrhea and vomiting.   Genitourinary: Negative for dysuria.   Musculoskeletal: Negative for neck pain.   Skin: Negative for rash.   Allergic/Immunologic: Negative.    Neurological: Positive for tremors, weakness ( generalized) and headaches. Negative for numbness.   Hematological: Negative.    Psychiatric/Behavioral: Negative.    All other systems reviewed and are negative.      PHYSICAL EXAM  ED Triage Vitals   Temp Heart Rate Resp BP SpO2   01/03/17 1707 01/03/17 1707 01/03/17 1707 01/03/17 1734 01/03/17 1707   97.6 °F (36.4 °C) 73 16 159/85 89 %      Temp src Heart Rate Source Patient Position BP Location FiO2 (%)   -- 01/03/17 2122 01/03/17 1734 01/03/17 1734 --    Monitor Sitting Right arm        Physical Exam   Constitutional: She is oriented to person, place, and time and well-developed, well-nourished, and in no distress. No distress.   HENT:   Head: Normocephalic and atraumatic.   Eyes: EOM are normal. Pupils are equal, round, and reactive to light.   Neck: Normal range of motion. Neck supple.   Cardiovascular: Normal rate, regular rhythm and normal heart sounds.    Pulmonary/Chest: Effort normal and breath sounds normal. No respiratory distress.   Abdominal: Soft. There is no tenderness. There is no rebound and no guarding.   Musculoskeletal: Normal range of motion. She exhibits no edema.   Neurological: She is alert and  oriented to person, place, and time. She has normal sensation and normal strength.   Skin: Skin is warm and dry. No rash noted.   Psychiatric: Mood and affect normal.   Nursing note and vitals reviewed.      LAB RESULTS  Lab Results (last 24 hours)     Procedure Component Value Units Date/Time    CBC & Differential [[id number]] Collected:  01/03/17 1752    Specimen:  Blood Updated:  01/03/17 1823    Narrative:       The following orders were created for panel order CBC & Differential.  Procedure                               Abnormality         Status                     ---------                               -----------         ------                     CBC Auto Differential[[id number]]         Abnormal            Final result                 Please view results for these tests on the individual orders.    Comprehensive Metabolic Panel [[id number]]  (Abnormal) Collected:  01/03/17 1752    Specimen:  Blood Updated:  01/03/17 1840     Glucose 89 mg/dL      BUN 18 mg/dL      Creatinine 0.90 mg/dL      Sodium 134 (L) mmol/L      Potassium 4.2 mmol/L      Chloride 97 (L) mmol/L      CO2 26.4 mmol/L      Calcium 9.2 mg/dL      Total Protein 6.7 g/dL      Albumin 3.70 g/dL      ALT (SGPT) 11 U/L      AST (SGOT) 17 U/L      Alkaline Phosphatase 56 U/L      Total Bilirubin 0.3 mg/dL      eGFR Non African Amer 59 (L) mL/min/1.73      Globulin 3.0 gm/dL      A/G Ratio 1.2 g/dL      BUN/Creatinine Ratio 20.0      Anion Gap 10.6 mmol/L     Narrative:       The MDRD GFR formula is only valid for adults with stable renal function between ages 18 and 70.    Lipase [[id number]]  (Normal) Collected:  01/03/17 1752    Specimen:  Blood Updated:  01/03/17 1840     Lipase 43 U/L     CBC Auto Differential [[id number]]  (Abnormal) Collected:  01/03/17 1752    Specimen:  Blood Updated:  01/03/17 1823     WBC 6.20 10*3/mm3      RBC 3.58 (L) 10*6/mm3      Hemoglobin 12.0 g/dL      Hematocrit 37.5 %      .7 (H) fL      MCH 33.5 (H) pg       MCHC 32.0 (L) g/dL      RDW 12.3 %      RDW-SD 47.3 fl      MPV 9.0 fL      Platelets 175 10*3/mm3      Neutrophil % 58.3 %      Lymphocyte % 30.6 %      Monocyte % 7.7 %      Eosinophil % 2.9 %      Basophil % 0.5 %      Immature Grans % 0.0 %      Neutrophils, Absolute 3.61 10*3/mm3      Lymphocytes, Absolute 1.90 10*3/mm3      Monocytes, Absolute 0.48 10*3/mm3      Eosinophils, Absolute 0.18 10*3/mm3      Basophils, Absolute 0.03 10*3/mm3      Immature Grans, Absolute 0.00 10*3/mm3     Urinalysis With / Culture If Indicated [[id number]]  (Abnormal) Collected:  01/03/17 2115    Specimen:  Urine from Urine, Clean Catch Updated:  01/03/17 2136     Color, UA Yellow      Appearance, UA Cloudy (A)      pH, UA 5.5      Specific Gravity, UA 1.017      Glucose, UA Negative      Ketones, UA Negative      Bilirubin, UA Negative      Blood, UA Trace (A)      Protein, UA Negative      Leuk Esterase, UA Large (3+) (A)      Nitrite, UA Positive (A)      Urobilinogen, UA 0.2 E.U./dL     Urinalysis, Microscopic Only [[id number]]  (Abnormal) Collected:  01/03/17 2115    Specimen:  Urine from Urine, Clean Catch Updated:  01/03/17 2137     RBC, UA 6-12 (A) /HPF      WBC, UA Too Numerous to Count (A) /HPF      Bacteria, UA 4+ (A) /HPF      Squamous Epithelial Cells, UA 0-2 /HPF      Hyaline Casts, UA 3-6 /LPF      Methodology Automated Microscopy     Urine Culture [[id number]] Collected:  01/03/17 2115    Specimen:  Urine from Urine, Clean Catch Updated:  01/03/17 2135    Blood Culture [[id number]] Collected:  01/03/17 2202    Specimen:  Blood from Arm, Right Updated:  01/03/17 2207    Lactic Acid, Plasma [[id number]]  (Normal) Collected:  01/03/17 2202    Specimen:  Blood from Arm, Right Updated:  01/03/17 2221     Lactate 1.0 mmol/L     Blood Culture [[id number]] Collected:  01/03/17 2316    Specimen:  Blood from Arm, Left Updated:  01/03/17 6223          I ordered the above labs and reviewed the results    RADIOLOGY  CT Head Without Contrast    Preliminary Result   No acute intracranial pathology.  Overall no significant change.                       I ordered the above noted radiological studies. Interpreted by radiologist. Reviewed by me in PACS.       PROCEDURES  Procedures      PROGRESS AND CONSULTS  ED Course   9:45 PM  Discussed with pt and family about plan to admit for further workup/evaluation. Pt/family understands and agrees with plan. All concerns addressed. Discussed pt with Dr. Tran. Dr. Tran has seen and evaluated pt and agrees with tx plan. Ordered Rocephin IV, Lopressor and Labetalol. Ordered CT head  Time 11:12 PM  Discussed case with Dr Bangura  Reviewed history, exam, results and treatments.  Discussed concerns and plan of care. Dr Bangura accepts pt to be admitted to telemetry.        MEDICAL DECISION MAKING      MDM  Number of Diagnoses or Management Options  Urinary tract infection in female:      Amount and/or Complexity of Data Reviewed  Clinical lab tests: ordered and reviewed (Blood work, UA)  Tests in the radiology section of CPT®: reviewed and ordered (CT head)           DIAGNOSIS  Final diagnoses:   Urinary tract infection in female   Generalized weakness       DISPOSITION  ADMISSION    Discussed treatment plan and reason for admission with pt/family and admitting physician.  Pt/family voiced understanding of the plan for admission for further testing/treatment as needed.         Latest Documented Vital Signs:  As of 2:30 AM  BP- 129/91 HR- 71 Temp- 97.3 °F (36.3 °C) (Oral) O2 sat- 96%    --  Documentation assistance provided by smita Ibrahim for Bertin Degroot PA-C.  Information recorded by the scribe was done at my direction and has been verified and validated by me.       Blaise Ibrahim  01/03/17 0960       Blaise Ibrahim  01/03/17 5900       LEVON Watts III  01/04/17 [id number]

## 2017-01-04 NOTE — PLAN OF CARE
Problem: Patient Care Overview (Adult)  Goal: Plan of Care Review  Outcome: Ongoing (interventions implemented as appropriate)    01/04/17 1450   Outcome Evaluation   Outcome Summary/Follow up Plan Alert and oriented x4. Voiding per BRP/BSC. OOB with assist of 1 and walker - tolerating well. Activity encouraged. Fluids encouraged.   Coping/Psychosocial Response Interventions   Plan Of Care Reviewed With patient;daughter   Patient Care Overview   Progress improving       Goal: Adult Individualization and Mutuality  Outcome: Ongoing (interventions implemented as appropriate)  Goal: Discharge Needs Assessment  Outcome: Ongoing (interventions implemented as appropriate)    Problem: Fall Risk (Adult)  Goal: Identify Related Risk Factors and Signs and Symptoms  Outcome: Ongoing (interventions implemented as appropriate)  Goal: Absence of Falls  Outcome: Ongoing (interventions implemented as appropriate)    Problem: Infection, Risk/Actual (Adult)  Goal: Identify Related Risk Factors and Signs and Symptoms  Outcome: Ongoing (interventions implemented as appropriate)  Goal: Infection Prevention/Resolution  Outcome: Ongoing (interventions implemented as appropriate)

## 2017-01-04 NOTE — THERAPY DISCHARGE NOTE
Acute Care - Physical Therapy Initial Eval/Discharge  [facility]     Patient Name: Kim Feldman  : 3/13/1930  MRN: [id number]  Today's Date: 2017   Onset of Illness/Injury or Date of Surgery Date: 17  Date of Referral to PT: 17  Referring Physician: Dr. Crespo      Admit Date: 1/3/2017    Visit Dx:    ICD-10-CM ICD-9-CM   1. Urinary tract infection in female N39.0 599.0   2. Generalized weakness R53.1 780.79   3. Muscle weakness (generalized) M62.81 728.87     Patient Active Problem List   Diagnosis   • Anemia   • Bulging lumbar disc   • Depression, endogenous   • Dysphagia   • Edema   • Gastroesophageal reflux disease   • Hyperlipidemia   • Hypertension   • Intermittent claudication   • Neuralgia   • Osteoarthritis of knee   • Syndrome of inappropriate secretion of antidiuretic hormone   • Vitamin D deficiency   • History of sick sinus syndrome   • Intertrigo   • Generalized convulsive seizures   • Urine frequency   • Change in bowel habits   • Urinary tract infection   • Pneumonia   • Pacemaker   • Zenker's diverticulum   • Aspiration pneumonia   • Acute URI   • Zenker diverticulum   • History of anxiety   • Urinary tract infection in female   • Clonic seizure disorder   • History of aspiration pneumonitis     Past Medical History   Diagnosis Date   • Anemia    • Bulging lumbar disc    • Chronic cough    • Chronic UTI    • Chronic venous insufficiency    • Clonic seizure disorder    • Dementia without behavioral disturbance      moderate   • Depression, endogenous    • Disc degeneration, lumbar    • Dysphagia    • GERD (gastroesophageal reflux disease)    • Hiatal hernia    • History of anxiety    • History of aspiration pneumonitis    • History of cerebral artery occlusion      CVA (following TIA), 10/10, treated with tPA   • History of herpes zoster    • History of ingrowing nail    • History of osteoporosis    • History of poliomyelitis      child   • History of sciatica    • History  of sick sinus syndrome      s/p PPM   • History of transient cerebral ischemia      followed by stroke in 10/2010. BIBI was normal.   • HX: long term anticoagulant use    • Hyperlipidemia    • Hypertension    • Hyponatremia    • Intertrigo    • Lumbar radiculopathy    • Morbid obesity    • Neuralgia      with diplopia secondary to facial shingles   • Nonepileptic episode    • Osteoarthritis of right knee    • Pacemaker    • Rash      ON ABDOMEN   • Seeing double      secondary to shingles on the face also with neuralgia   • Serum potassium elevated    • SIADH (syndrome of inappropriate ADH production)    • Vitamin D deficiency      Past Surgical History   Procedure Laterality Date   • Hand surgery Right    • Lumbar laminectomy       L-3 L4 L4 L5   • Cardiac pacemaker placement       secondary to SSS, placed in 2004, with generator change in 2014   • Tonsillectomy     • Laminectomy for implantation / placement neurostimulator electrodes     • Knee arthroplasty Left    • Cataract extraction     • Zenkers diverticulectomy N/A 9/27/2016     Procedure: ENDOSCOPIC REMOVAL OF ZENKERS DIVERTICULUM W/ WERDASCOPE;  Surgeon: Oswald Barth MD;  Location: [facility];  Service:           PT ASSESSMENT (last 72 hours)      PT Evaluation       01/04/17 1433 01/04/17 1356    Rehab Evaluation    Document Type evaluation  -LW evaluation  -NB    Subjective Information no complaints;agree to therapy  -LW     Patient Effort, Rehab Treatment good  -LW     Symptoms Noted During/After Treatment none  -LW none  -NB    General Information    Patient Profile Review yes  -LW     Onset of Illness/Injury or Date of Surgery Date 01/03/17  -LW     Referring Physician Dr. Crespo  -LW     General Observations seated EOB with nsg assist, no acute distress. daughter bedside  -LW     Pertinent History Of Current Problem UTI  -LW     Precautions/Limitations fall precautions  -LW     Prior Level of Function independent:  -LW     Equipment Currently  Used at Home walker, rolling   rollator  -LW     Plans/Goals Discussed With patient  -LW     Benefits Reviewed patient:  -LW     Living Environment    Lives With child(mary), adult   lives w. daughter  -LW     Living Arrangements condominium  -LW     Home Accessibility --   pt reports does not have to use stairs  -LW     Stair Railings at Home none  -LW     Clinical Impression    Date of Referral to PT 01/04/17  -LW     Functional Level At Time Of Evaluation baseline functional mobility  -LW     Criteria for Skilled Therapeutic Interventions Met no problems identified which require skilled intervention;current level of function same as previous level of function  -LW     Pain Assessment    Pain Assessment No/denies pain  -LW No/denies pain  -NB    Vision Assessment/Intervention    Visual Impairment WNL  -LW     Cognitive Assessment/Intervention    Current Cognitive/Communication Assessment functional  -LW functional  -NB    Orientation Status oriented x 4  -LW oriented x 4  -NB    Follows Commands/Answers Questions 100% of the time  -% of the time  -NB    Personal Safety WNL/WFL  -LW     ROM (Range of Motion)    General ROM no range of motion deficits identified  -LW     MMT (Manual Muscle Testing)    General MMT Assessment no strength deficits identified  -LW     Bed Mobility, Assessment/Treatment    Bed Mobility, Comment seated EOB  -LW     Transfer Assessment/Treatment    Transfers, Sit-Stand [place] stand by assist  -LW     Transfers, Stand-Sit [place] stand by assist  -LW     Transfers, Sit-Stand-Sit, Assist Device rolling walker   rollator  -LW     Gait Assessment/Treatment    Gait, [place] Level stand by assist  -LW     Gait, Assistive Device rolling walker   rollator  -LW     Gait, Distance (Feet) 40  -LW     Gait, Comment assisted pt to bathroom, ambulated in room  -LW     Therapy Exercises    Bilateral Lower Extremities 10 reps;AROM:;sitting;hip flexion;LAQ;ankle pumps/circles  -LW      BLE Resistance --   5x sit<->stand CGA  -LW     Positioning and Restraints    Pre-Treatment Position in bed  -LW     Post Treatment Position chair  -LW     In Chair reclined;notified nsg;call light within reach;encouraged to call for assist;with family/caregiver  -       01/04/17 0100       General Information    Equipment Currently Used at Home walker, rolling;shower chair;cane, quad;wheelchair  -LD     Living Environment    Lives With child(mary), adult  -LD     Living Arrangements condominium  -LD     Home Accessibility stairs to enter home  -LD     Number of Stairs to Enter Home 3  -LD     Stair Railings at Home inside, present on left side  -LD     Type of Financial/Environmental Concern none  -LD     Transportation Available car  -LD       User Key  (r) = Recorded By, (t) = Taken By, (c) = Cosigned By    Initials Name Provider Type    ZAID Sterling MS CCC-SLP Speech and Language Pathologist    ANISH Stiles, PT Physical Therapist    JOHNNIE Reyna, RN Registered Nurse          Physical Therapy Education     Title: PT OT SLP Therapies (Resolved)     Topic: Physical Therapy (Resolved)     Point: Mobility training (Resolved)    Learning Progress Summary    Learner Readiness Method Response Comment Documented by Status   Patient Acceptance E SNOW NAGY pt educated to cont ambulation with family/staff, pt and daughter agreeable. also educated to cont strengthening therex, agreeable.  01/04/17 1458 Done   Family Acceptance E LILO,SNOW pt educated to cont ambulation with family/staff, pt and daughter agreeable. also educated to cont strengthening therex, agreeable.  01/04/17 1458 Done               Point: Home exercise program (Resolved)    Learning Progress Summary    Learner Readiness Method Response Comment Documented by Status   Patient Acceptance E LILO,SNOW pt educated to cont ambulation with family/staff, pt and daughter agreeable. also educated to cont strengthening therex, agreeable.  01/04/17 1458 Done    Family Acceptance E VU,DU pt educated to cont ambulation with family/staff, pt and daughter agreeable. also educated to cont strengthening therex, agreeable. LW 01/04/17 1458 Done                      User Key     Initials Effective Dates Name Provider Type Discipline    LW 10/06/15 -  Jessica Stiles PT Physical Therapist PT                PT Recommendation and Plan  PT Frequency: evaluation only  Plan of Care Review  Plan Of Care Reviewed With: patient  Progress: improving  Outcome Summary/Follow up Plan: pt presents baseline w. functional mobility, safe household ambulator w. rollator and assist (rollator and daughter bedside). educated to cont ambulation and strengthening therex acutely, agreeable. pt reports probable d/c home tomorrow. will sign off.               Outcome Measures       01/04/17 1500          How much help from another person do you currently need...    Turning from your back to your side while in flat bed without using bedrails? 3  -LW      Moving from lying on back to sitting on the side of a flat bed without bedrails? 3  -LW      Moving to and from a bed to a chair (including a wheelchair)? 3  -LW      Standing up from a chair using your arms (e.g., wheelchair, bedside chair)? 3  -LW      Climbing 3-5 steps with a railing? 3  -LW      To walk in hospital room? 3  -LW      AM-PAC 6 Clicks Score 18  -LW      Functional Assessment    Outcome Measure Options AM-PAC 6 Clicks Basic Mobility (PT)  -LW        User Key  (r) = Recorded By, (t) = Taken By, (c) = Cosigned By    Initials Name Provider Type    LW Jessica Stiles PT Physical Therapist           Time Calculation:         PT Charges       01/04/17 1500          Time Calculation    Start Time 1245  -LW      Stop Time 1255  -LW      Time Calculation (min) 10 min  -LW      PT Received On 01/04/17  -LW        User Key  (r) = Recorded By, (t) = Taken By, (c) = Cosigned By    Initials Name Provider Type    ANISH Stiles PT Physical  Therapist          Therapy Charges for Today     Code Description Service Date Service Provider Modifiers Qty    87831258467 HC PT EVAL LOW COMPLEXITY 2 1/4/2017 Jessica Stiles, PT GP 1          PT G-Codes  Outcome Measure Options: AM-PAC 6 Clicks Basic Mobility (PT)         Jessica Stiles, PT  1/4/2017

## 2017-01-04 NOTE — PLAN OF CARE
Problem: Patient Care Overview (Adult)  Goal: Plan of Care Review    01/04/17 1451   Outcome Evaluation   Outcome Summary/Follow up Plan pt presents baseline w. functional mobility, safe household ambulator w. rollator and assist (rollator and daughter bedside). educated to cont ambulation and strengthening therex acutely, agreeable. pt reports probable d/c home tomorrow. will sign off.    Coping/Psychosocial Response Interventions   Plan Of Care Reviewed With patient

## 2017-01-04 NOTE — PROGRESS NOTES
Acute Care - Speech Language Pathology   Swallow Initial Evaluation [facility]     Patient Name: Kim Feldman  : 3/13/1930  MRN: [id number]  Today's Date: 2017               Admit Date: 1/3/2017    SPEECH-LANGUAGE PATHOLOGY EVALUATION - SWALLOW  Subjective: The patient was seen on this date for a Clinical Swallow evaluation.  Patient was alert and cooperative.    The patient was admitted w/ UTI.  Hx of dysphagia w/ asp pna greatly impacted by a diverticulum since .  Pt underwent surgical repair of the diverticulum in  and no longer coughs w/ PO or has suffered from pna. Most recent VFSS 16 showed transient shallow silent penetration of thin liquids which was consistent w/ VFSS in .  Pt had backflow on the VFSS due to the diverticulum.  The pt was recommended for regular & thin at that time.   Objective: Textures given included thin liquid, puree consistency, mechanical soft consistency and regular consistency.  Assessment: No overt s/s of aspiration w/ any PO intake. Mastication appeared WFL. Lungs are clear on CXR.   SLP Findings:  Patient presents with functional swallow, without esophageal component.   Recommendations: Diet Textures: thin liquid, regular consistency food.  Medications should be taken whole with thin liquids.   Recommended Strategies: Upright for PO and small bites and sips. Oral care before breakfast, after all meals and PRN.  Dysphagia therapy is not recommended. Rationale: swallow is at baseline.    Visit Dx:     ICD-10-CM ICD-9-CM   1. Urinary tract infection in female N39.0 599.0   2. Generalized weakness R53.1 780.79     Patient Active Problem List   Diagnosis   • Anemia   • Bulging lumbar disc   • Depression, endogenous   • Dysphagia   • Edema   • Gastroesophageal reflux disease   • Hyperlipidemia   • Hypertension   • Intermittent claudication   • Neuralgia   • Osteoarthritis of knee   • Syndrome of inappropriate secretion of antidiuretic hormone   • Vitamin  D deficiency   • History of sick sinus syndrome   • Intertrigo   • Generalized convulsive seizures   • Urine frequency   • Change in bowel habits   • Urinary tract infection   • Pneumonia   • Pacemaker   • Zenker's diverticulum   • Aspiration pneumonia   • Acute URI   • Zenker diverticulum   • History of anxiety   • Urinary tract infection in female   • Clonic seizure disorder   • History of aspiration pneumonitis     Past Medical History   Diagnosis Date   • Anemia    • Bulging lumbar disc    • Chronic cough    • Chronic UTI    • Chronic venous insufficiency    • Clonic seizure disorder    • Dementia without behavioral disturbance      moderate   • Depression, endogenous    • Disc degeneration, lumbar    • Dysphagia    • GERD (gastroesophageal reflux disease)    • Hiatal hernia    • History of anxiety    • History of aspiration pneumonitis    • History of cerebral artery occlusion      CVA (following TIA), 10/10, treated with tPA   • History of herpes zoster    • History of ingrowing nail    • History of osteoporosis    • History of poliomyelitis      child   • History of sciatica    • History of sick sinus syndrome      s/p PPM   • History of transient cerebral ischemia      followed by stroke in 10/2010. BIBI was normal.   • HX: long term anticoagulant use    • Hyperlipidemia    • Hypertension    • Hyponatremia    • Intertrigo    • Lumbar radiculopathy    • Morbid obesity    • Neuralgia      with diplopia secondary to facial shingles   • Nonepileptic episode    • Osteoarthritis of right knee    • Pacemaker    • Rash      ON ABDOMEN   • Seeing double      secondary to shingles on the face also with neuralgia   • Serum potassium elevated    • SIADH (syndrome of inappropriate ADH production)    • Vitamin D deficiency      Past Surgical History   Procedure Laterality Date   • Hand surgery Right    • Lumbar laminectomy       L-3 L4 L4 L5   • Cardiac pacemaker placement       secondary to SSS, placed in 2004, with  generator change in 2014   • Tonsillectomy     • Laminectomy for implantation / placement neurostimulator electrodes     • Knee arthroplasty Left    • Cataract extraction     • Zenkers diverticulectomy N/A 9/27/2016     Procedure: ENDOSCOPIC REMOVAL OF ZENKERS DIVERTICULUM W/ WERDASCOPE;  Surgeon: Oswald Barth MD;  Location: [facility] OR;  Service:           SWALLOW EVALUATION (last 72 hours)      Swallow Evaluation       01/04/17 1356                Rehab Evaluation    Document Type evaluation  -NB        Symptoms Noted During/After Treatment none  -NB        General Information    Patient Profile Review yes  -NB        Current Diet Limitations thin liquids;regular solid  -NB        Precautions/Limitations, Vision WFL  -NB        Precautions/Limitations, Hearing WFL  -NB        Prior Level of Function- Communication functional in all spheres  -NB        Prior Level of Function- Swallowing no diet consistency restrictions  -NB        Plans/Goals Discussed With patient and family;agreed upon  -NB        Barriers to Rehab none identified  -NB        Clinical Impression    Patient's Goals For Discharge patient did not state  -NB        Family Goals For Discharge family did not state  -NB        SLP Swallowing Diagnosis other (see comments)   WFL  -NB        Criteria for Skilled Therapeutic Interventions Met no problems identified which require skilled intervention  -NB        Therapy Frequency evaluation only  -NB        SLP Diet Recommendation regular textures;thin liquids  -NB        Recommended Feeding/Eating Techniques small sips/bites  -NB        SLP Rec. for Method of Medication Administration meds whole with thin liquid  -NB        Monitor For Signs Of Aspiration cough;gurgly voice;throat clearing  -NB        Anticipated Discharge Disposition home with assist  -NB        Pain Assessment    Pain Assessment No/denies pain  -NB        Cognitive Assessment/Intervention    Current Cognitive/Communication  Assessment functional  -NB        Orientation Status oriented x 4  -NB        Follows Commands/Answers Questions 100% of the time  -NB        Oral Motor Structure and Function    Oral Motor Anatomy and Physiology patient demonstrates anatomy that is WNL  -NB        Dentition Assessment present and adequate  -NB        Secretion Management WNL/WFL  -NB        Mucosal Quality moist, healthy  -NB        Velar Elevation WNL (within normal limits)  -NB        Volitional Swallow no difficulties initiating volitional swallow  -NB        Volitional Cough no difficulties initiating volitional cough  -NB        Oral Musculature General Assessment oral labial impairment;other (see comments)   slight left droop  -NB          User Key  (r) = Recorded By, (t) = Taken By, (c) = Cosigned By    Initials Name Effective Dates    NB Manuela Sterling MS [facility]-SLP 04/13/15 -         EDUCATION  The patient has been educated in the following areas:   Dysphagia (Swallowing Impairment).    SLP Recommendation and Plan  SLP Swallowing Diagnosis: other (see comments) (WFL)  SLP Diet Recommendation: regular textures, thin liquids  Recommended Feeding/Eating Techniques: small sips/bites  SLP Rec. for Method of Medication Administration: meds whole with thin liquid  Monitor For Signs Of Aspiration: cough, gurgly voice, throat clearing     Criteria for Skilled Therapeutic Interventions Met: no problems identified which require skilled intervention  Anticipated Discharge Disposition: home with assist     Therapy Frequency: evaluation only             Plan of Care Review  Plan Of Care Reviewed With: patient, daughter  Progress: improving  Outcome Summary/Follow up Plan: BSE: Functional swallow; no concern for aspiration.  SLP to s/o           SLP Outcome Measures (last 72 hours)      SLP Outcome Measures       01/04/17 4006          SLP Outcome Measures    Outcome Measure Used? Adult NOMS  -NB      FCM Scores    FCM Chosen Swallowing  -NB      Swallowing  FCM Score 7  -NB        User Key  (r) = Recorded By, (t) = Taken By, (c) = Cosigned By    Initials Name Effective Dates    ZAID Sterling MS CCC-SLP 04/13/15 -            Time Calculation:         Time Calculation- SLP       01/04/17 1358          Time Calculation- SLP    SLP Received On 01/04/17  -NB        User Key  (r) = Recorded By, (t) = Taken By, (c) = Cosigned By    Initials Name Provider Type    ZAID Sterling MS CCC-SLP Speech and Language Pathologist          Therapy Charges for Today     Code Description Service Date Service Provider Modifiers Qty    69402736632 HC ST EVAL ORAL PHARYNG SWALLOW 4 1/4/2017 Manuela Sterling MS CCC-SLP GN 1               MS MARANDA Barbosa  1/4/2017

## 2017-01-04 NOTE — PROGRESS NOTES
Discharge Planning Assessment  [facility]     Patient Name: Kim Feldman  MRN: [id number]  Today's Date: 1/4/2017    Admit Date: 1/3/2017          Discharge Needs Assessment       01/04/17 1843    Living Environment    Lives With child(mary), adult    Living Arrangements condominium    Provides Primary Care For no one, unable/limited ability to care for self    Quality Of Family Relationships supportive;helpful;involved    Able to Return to Prior Living Arrangements yes    Discharge Needs Assessment    Concerns To Be Addressed denies needs/concerns at this time    Equipment Currently Used at Home walker, rolling;wheelchair;grab bar   rolator walker    Transportation Available family or friend will provide;car    Discharge Disposition still a patient            Discharge Plan       01/04/17 1844    Case Management/Social Work Plan    Plan Home with dtr.  Denies d/c needs.     Patient/Family In Agreement With Plan yes    Additional Comments S/W Pt at bedside.  Introduced self and role of CCP.  Confirmed information on f/s.  Pt lives with dtr Leandra in her condo.  Pt uses a rolling walker in home and w/c when out.  Pt has been to [facility] and [facility] in the past.  Pt has used [facility] in past.  Pt dtr requested [place] [facility] [place] from [facility] stating she was looking for a new Md for Pt.  Dtr stated she would like a geriatric Md.  [facility] printed off the entire [facility] [place] and gave to dtr.  Pt and dtr deny d/c needs.  Pt plans to return home at d/c.          Discharge Placement     No information found                Demographic Summary       01/04/17 1842    Referral Information    Admission Type inpatient    Contact Information    Comments Permission granted to speak to dtadalid Mobley    Primary Care Physician Information    Name Sharad Salinas MD            Functional Status       01/04/17 1843    Functional Status Current    Ambulation 1-->assistive equipment    Transferring 1-->assistive equipment    Toileting  1-->assistive equipment    Bathing 2-->assistive person    Dressing 2-->assistive person    Eating 0-->independent    Communication 0-->understands/communicates without difficulty    Swallowing (if score 2 or more for any item, consult Rehab Services) 0-->swallows foods/liquids without difficulty    Functional Status Prior    Ambulation 1-->assistive equipment    Transferring 1-->assistive equipment    Toileting 2-->assistive person    Bathing 2-->assistive person    Dressing 2-->assistive person    Eating 0-->independent    Communication 0-->understands/communicates without difficulty    Swallowing 0-->swallows foods/liquids without difficulty            Psychosocial     None            Abuse/Neglect     None            Legal     None            Substance Abuse     None            Patient Forms     None          Lachelle Ann RN

## 2017-01-04 NOTE — PLAN OF CARE
Problem: Patient Care Overview (Adult)  Goal: Plan of Care Review    01/04/17 7929   Outcome Evaluation   Outcome Summary/Follow up Plan BSE: Functional swallow; no concern for aspiration. SLP to s/o   Coping/Psychosocial Response Interventions   Plan Of Care Reviewed With patient;daughter   Patient Care Overview   Progress improving

## 2017-01-04 NOTE — ED PROVIDER NOTES
Attending Note    History:   Pt arrives to the ED c/o worsening chills with shaking and HA. Pt states the episodes of chills last between 1 and 1.5 hours. Pt seen by PCP earlier today and sent to ED for UA. Pt denies fever, cough, vomiting, urinary urgency. Pt admits fatigue and weakness.    Exam:   Heart and lung are normal. Abdomen is benign. Cranial nerves are intact. Nml strength in all extremities. No neuro deficits. There are no other abnormal findings noted.    Course:   Check labs. Admit pt to hospitalist for treatment of UTI      Attestation:  I supervised care provided by the midlevel provider.    We have discussed this patient's history, physical exam, and treatment plan.   I have reviewed the note and personally saw and examined the patient and agree with the plan of care.  I agree with the midlevel provider's findings and plan.  I reviewed the midlevel providers note.    Documentation assistance provided by smita Callahan for Dr. Tran.  Information recorded by the scribe was done at my direction and has been verified and validated by me.         Lavell Callahan  01/03/17 9132       Torey Tran MD  01/04/17 7039

## 2017-01-04 NOTE — PLAN OF CARE
Problem: Patient Care Overview (Adult)  Goal: Plan of Care Review  Outcome: Ongoing (interventions implemented as appropriate)    01/04/17 0308   Outcome Evaluation   Outcome Summary/Follow up Plan VSS. No complaints. Will continue to monitor.       Goal: Adult Individualization and Mutuality  Outcome: Ongoing (interventions implemented as appropriate)  Goal: Discharge Needs Assessment  Outcome: Ongoing (interventions implemented as appropriate)    Problem: Fall Risk (Adult)  Goal: Identify Related Risk Factors and Signs and Symptoms  Outcome: Ongoing (interventions implemented as appropriate)  Goal: Absence of Falls  Outcome: Ongoing (interventions implemented as appropriate)

## 2017-01-05 PROBLEM — D69.6 THROMBOCYTOPENIA (HCC): Status: ACTIVE | Noted: 2017-01-05

## 2017-01-05 LAB
ANION GAP SERPL CALCULATED.3IONS-SCNC: 12.9 MMOL/L
BUN BLD-MCNC: 13 MG/DL (ref 8–23)
BUN/CREAT SERPL: 17.6 (ref 7–25)
CALCIUM SPEC-SCNC: 8.5 MG/DL (ref 8.6–10.5)
CHLORIDE SERPL-SCNC: 97 MMOL/L (ref 98–107)
CO2 SERPL-SCNC: 23.1 MMOL/L (ref 22–29)
CREAT BLD-MCNC: 0.74 MG/DL (ref 0.57–1)
DEPRECATED RDW RBC AUTO: 45 FL (ref 37–54)
ERYTHROCYTE [DISTWIDTH] IN BLOOD BY AUTOMATED COUNT: 12.2 % (ref 11.7–13)
FOLATE SERPL-MCNC: >20 NG/ML (ref 4.78–24.2)
GFR SERPL CREATININE-BSD FRML MDRD: 74 ML/MIN/1.73
GLUCOSE BLD-MCNC: 92 MG/DL (ref 65–99)
HCT VFR BLD AUTO: 32.5 % (ref 35.6–45.5)
HGB BLD-MCNC: 11 G/DL (ref 11.9–15.5)
MCH RBC QN AUTO: 34.4 PG (ref 26.9–32)
MCHC RBC AUTO-ENTMCNC: 33.8 G/DL (ref 32.4–36.3)
MCV RBC AUTO: 101.6 FL (ref 80.5–98.2)
PLATELET # BLD AUTO: 135 10*3/MM3 (ref 140–500)
PMV BLD AUTO: 9 FL (ref 6–12)
POTASSIUM BLD-SCNC: 4 MMOL/L (ref 3.5–5.2)
RBC # BLD AUTO: 3.2 10*6/MM3 (ref 3.9–5.2)
SODIUM BLD-SCNC: 133 MMOL/L (ref 136–145)
VIT B12 BLD-MCNC: 334 PG/ML (ref 211–946)
WBC NRBC COR # BLD: 4.94 10*3/MM3 (ref 4.5–10.7)

## 2017-01-05 PROCEDURE — 25010000002 ENOXAPARIN PER 10 MG: Performed by: HOSPITALIST

## 2017-01-05 PROCEDURE — 80048 BASIC METABOLIC PNL TOTAL CA: CPT | Performed by: HOSPITALIST

## 2017-01-05 PROCEDURE — 85027 COMPLETE CBC AUTOMATED: CPT | Performed by: HOSPITALIST

## 2017-01-05 PROCEDURE — 82607 VITAMIN B-12: CPT | Performed by: HOSPITALIST

## 2017-01-05 PROCEDURE — 25010000003 CEFTRIAXONE PER 250 MG: Performed by: HOSPITALIST

## 2017-01-05 PROCEDURE — 96372 THER/PROPH/DIAG INJ SC/IM: CPT

## 2017-01-05 PROCEDURE — 82746 ASSAY OF FOLIC ACID SERUM: CPT | Performed by: HOSPITALIST

## 2017-01-05 RX ORDER — CARVEDILOL 3.12 MG/1
TABLET ORAL
Qty: 60 TABLET | Refills: 2 | Status: SHIPPED | OUTPATIENT
Start: 2017-01-05 | End: 2017-03-23

## 2017-01-05 RX ADMIN — CARVEDILOL 3.12 MG: 3.12 TABLET, FILM COATED ORAL at 17:24

## 2017-01-05 RX ADMIN — MIRTAZAPINE 15 MG: 15 TABLET, FILM COATED ORAL at 20:15

## 2017-01-05 RX ADMIN — CLOPIDOGREL BISULFATE 75 MG: 75 TABLET, FILM COATED ORAL at 09:29

## 2017-01-05 RX ADMIN — PANTOPRAZOLE SODIUM 40 MG: 40 TABLET, DELAYED RELEASE ORAL at 07:13

## 2017-01-05 RX ADMIN — LEVETIRACETAM 500 MG: 500 TABLET, FILM COATED ORAL at 17:24

## 2017-01-05 RX ADMIN — LEVETIRACETAM 500 MG: 500 TABLET, FILM COATED ORAL at 09:29

## 2017-01-05 RX ADMIN — GABAPENTIN 600 MG: 300 CAPSULE ORAL at 09:30

## 2017-01-05 RX ADMIN — CEFTRIAXONE SODIUM 1 G: 1 INJECTION, SOLUTION INTRAVENOUS at 20:15

## 2017-01-05 RX ADMIN — CARVEDILOL 3.12 MG: 3.12 TABLET, FILM COATED ORAL at 09:30

## 2017-01-05 RX ADMIN — ENOXAPARIN SODIUM 30 MG: 30 INJECTION SUBCUTANEOUS at 12:10

## 2017-01-05 RX ADMIN — CLOTRIMAZOLE AND BETAMETHASONE DIPROPIONATE: 10; .5 CREAM TOPICAL at 09:30

## 2017-01-05 RX ADMIN — GABAPENTIN 600 MG: 300 CAPSULE ORAL at 20:15

## 2017-01-05 NOTE — PLAN OF CARE
Problem: Patient Care Overview (Adult)  Goal: Plan of Care Review  Outcome: Ongoing (interventions implemented as appropriate)    01/05/17 5338   Outcome Evaluation   Outcome Summary/Follow up Plan Tolerating activity well. Up with walker and assist of 1. No c/o.    Coping/Psychosocial Response Interventions   Plan Of Care Reviewed With patient   Patient Care Overview   Progress improving       Goal: Adult Individualization and Mutuality  Outcome: Ongoing (interventions implemented as appropriate)  Goal: Discharge Needs Assessment  Outcome: Ongoing (interventions implemented as appropriate)    Problem: Fall Risk (Adult)  Goal: Absence of Falls  Outcome: Ongoing (interventions implemented as appropriate)    Problem: Infection, Risk/Actual (Adult)  Goal: Infection Prevention/Resolution  Outcome: Ongoing (interventions implemented as appropriate)

## 2017-01-05 NOTE — PROGRESS NOTES
"[facility]               ASSOCIATES     LOS: 2 days     Name: Kim Feldman  Age: 86 y.o.  Sex: female  :  3/13/1930  MRN: [id number]         Primary Care Physician: Sharad Salinas MD    Subjective   She feels much better.    Objective   Temp:  [97.5 °F (36.4 °C)-98.3 °F (36.8 °C)] 97.6 °F (36.4 °C)  Heart Rate:  [66-76] 69  Resp:  [16-18] 16  BP: (134-176)/() 134/97  Body mass index is 36.77 kg/(m^2).  Diet Regular; Cardiac, Consistent Carbohydrate    Objective:  General Appearance:  Comfortable and in no acute distress.    Vital signs: (most recent): Blood pressure 134/97, pulse 69, temperature 97.6 °F (36.4 °C), resp. rate 16, height 64\" (162.6 cm), weight 214 lb 3.2 oz (97.2 kg), SpO2 96 %, not currently breastfeeding.    Lungs:  Normal respiratory rate and normal effort.  She is not in respiratory distress.  Breath sounds clear to auscultation.    Heart: Normal rate.  Regular rhythm.    Abdomen: Abdomen is soft.  There is no abdominal tenderness.  There is no rebound tenderness.  There is no guarding.     Extremities: There is no dependent edema.    Neurological: Patient is alert and oriented to person, place and time.    Skin:  Warm and dry.          Results Review:    Reviewed medications and new clinical results    carvedilol 3.125 mg Oral BID With Meals   ceftriaxone 1 g Intravenous Q24H   clopidogrel 75 mg Oral Daily   clotrimazole-betamethasone  Topical BID   diclofenac 4 g Topical 4x Daily   enoxaparin 30 mg Subcutaneous Q24H   gabapentin 600 mg Oral Q12H   labetalol 10 mg Intravenous Once   levETIRAcetam 500 mg Oral BID   mirtazapine 15 mg Oral Nightly   pantoprazole 40 mg Oral Q AM      Results from last 7 days  Lab Units 17  0511 17  1752   WBC 10*3/mm3 4.94 6.20   HEMOGLOBIN g/dL 11.0* 12.0   PLATELETS 10*3/mm3 135* 175     Results from last 7 days  Lab Units 17  0511 17  1752   SODIUM mmol/L 133* 134*   POTASSIUM mmol/L 4.0 4.2 "   CHLORIDE mmol/L 97* 97*   TOTAL CO2 mmol/L 23.1 26.4   BUN mg/dL 13 18   CREATININE mg/dL 0.74 0.90   CALCIUM mg/dL 8.5* 9.2   GLUCOSE mg/dL 92 89     Lab Results   Component Value Date    URINECX >100,000 CFU/mL Gram Negative Bacilli (A) 01/03/2017     Assessment/Plan   Active Hospital Problems (** Indicates Principal Problem)    Diagnosis Date Noted   • **Urinary tract infection in female [N39.0] 01/03/2017   • 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     · Continue antibiotics.  · Monitor platelets, could be Rocephin causing TCP  · Once sensitivities back can switch to by mouth antibiotics and discharge home (assuming platelets stable)    Zain Crespo MD   01/05/17  10:08 AM

## 2017-01-05 NOTE — PLAN OF CARE
Problem: Patient Care Overview (Adult)  Goal: Plan of Care Review  Outcome: Ongoing (interventions implemented as appropriate)    01/05/17 0552   Outcome Evaluation   Outcome Summary/Follow up Plan VSS. No complaints   Coping/Psychosocial Response Interventions   Plan Of Care Reviewed With patient   Patient Care Overview   Progress improving       Goal: Adult Individualization and Mutuality  Outcome: Ongoing (interventions implemented as appropriate)  Goal: Discharge Needs Assessment  Outcome: Ongoing (interventions implemented as appropriate)    Problem: Fall Risk (Adult)  Goal: Identify Related Risk Factors and Signs and Symptoms  Outcome: Ongoing (interventions implemented as appropriate)  Goal: Absence of Falls  Outcome: Ongoing (interventions implemented as appropriate)    Problem: Infection, Risk/Actual (Adult)  Goal: Identify Related Risk Factors and Signs and Symptoms  Outcome: Ongoing (interventions implemented as appropriate)  Goal: Infection Prevention/Resolution  Outcome: Ongoing (interventions implemented as appropriate)

## 2017-01-06 VITALS
BODY MASS INDEX: 36.57 KG/M2 | HEART RATE: 70 BPM | TEMPERATURE: 98 F | RESPIRATION RATE: 18 BRPM | OXYGEN SATURATION: 95 % | DIASTOLIC BLOOD PRESSURE: 72 MMHG | WEIGHT: 214.2 LBS | SYSTOLIC BLOOD PRESSURE: 132 MMHG | HEIGHT: 64 IN

## 2017-01-06 LAB
BACTERIA SPEC AEROBE CULT: ABNORMAL
DEPRECATED RDW RBC AUTO: 46.9 FL (ref 37–54)
ERYTHROCYTE [DISTWIDTH] IN BLOOD BY AUTOMATED COUNT: 12.2 % (ref 11.7–13)
HCT VFR BLD AUTO: 33.8 % (ref 35.6–45.5)
HGB BLD-MCNC: 10.8 G/DL (ref 11.9–15.5)
MCH RBC QN AUTO: 33.9 PG (ref 26.9–32)
MCHC RBC AUTO-ENTMCNC: 32 G/DL (ref 32.4–36.3)
MCV RBC AUTO: 106 FL (ref 80.5–98.2)
PLATELET # BLD AUTO: 130 10*3/MM3 (ref 140–500)
PMV BLD AUTO: 9.4 FL (ref 6–12)
RBC # BLD AUTO: 3.19 10*6/MM3 (ref 3.9–5.2)
WBC NRBC COR # BLD: 4.6 10*3/MM3 (ref 4.5–10.7)

## 2017-01-06 PROCEDURE — 83921 ORGANIC ACID SINGLE QUANT: CPT | Performed by: HOSPITALIST

## 2017-01-06 PROCEDURE — 85027 COMPLETE CBC AUTOMATED: CPT | Performed by: HOSPITALIST

## 2017-01-06 PROCEDURE — 25010000002 CYANOCOBALAMIN PER 1000 MCG: Performed by: HOSPITALIST

## 2017-01-06 PROCEDURE — 96372 THER/PROPH/DIAG INJ SC/IM: CPT

## 2017-01-06 PROCEDURE — G0378 HOSPITAL OBSERVATION PER HR: HCPCS

## 2017-01-06 RX ORDER — LEVOFLOXACIN 5 MG/ML
250 INJECTION, SOLUTION INTRAVENOUS EVERY 24 HOURS
Status: DISCONTINUED | OUTPATIENT
Start: 2017-01-06 | End: 2017-01-06

## 2017-01-06 RX ORDER — LEVOFLOXACIN 250 MG/1
250 TABLET ORAL DAILY
Qty: 3 TABLET | Refills: 0 | Status: SHIPPED | OUTPATIENT
Start: 2017-01-06 | End: 2017-01-09

## 2017-01-06 RX ORDER — CYANOCOBALAMIN 1000 UG/ML
1000 INJECTION, SOLUTION INTRAMUSCULAR; SUBCUTANEOUS ONCE
Status: COMPLETED | OUTPATIENT
Start: 2017-01-06 | End: 2017-01-06

## 2017-01-06 RX ADMIN — PANTOPRAZOLE SODIUM 40 MG: 40 TABLET, DELAYED RELEASE ORAL at 06:55

## 2017-01-06 RX ADMIN — CARVEDILOL 3.12 MG: 3.12 TABLET, FILM COATED ORAL at 08:26

## 2017-01-06 RX ADMIN — LEVETIRACETAM 500 MG: 500 TABLET, FILM COATED ORAL at 08:26

## 2017-01-06 RX ADMIN — CYANOCOBALAMIN 1000 MCG: 1000 INJECTION, SOLUTION INTRAMUSCULAR at 13:56

## 2017-01-06 RX ADMIN — CLOPIDOGREL BISULFATE 75 MG: 75 TABLET, FILM COATED ORAL at 08:26

## 2017-01-06 RX ADMIN — GABAPENTIN 600 MG: 300 CAPSULE ORAL at 08:26

## 2017-01-06 NOTE — PLAN OF CARE
Problem: Patient Care Overview (Adult)  Goal: Plan of Care Review  Outcome: Ongoing (interventions implemented as appropriate)    01/06/17 0626   Outcome Evaluation   Outcome Summary/Follow up Plan VSS. Patient ambulated in the frazier with walker and did great. States she feels much better. No c/o. IV Rocephin givenx1. Should d/c home today.    Coping/Psychosocial Response Interventions   Plan Of Care Reviewed With patient   Patient Care Overview   Progress improving

## 2017-01-06 NOTE — DISCHARGE SUMMARY
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      Hospital Course  Please see history and physical for details. Patient is a 86 y.o. female presented with shaking chills to the emergency room. She was found to have a urinary tract infection and cultures grew out Enterobacter susceptible to most antibiotics except cefazolin. She has had 2 doses of IV ceftriaxone and she states she feels \"better than normal\". She'll go home on a few more days of by mouth antibiotics.    She did have elevated MCV and low normal B12 levels. Methylmalonic acid level will be drawn and she will get a dose of B12 injection prior to discharge. She can follow-up with her primary care physician the methylmalonic acid level. She also had a mild drop in her platelets probably from antibiotics so we will switch to a different class on discharge. She will need follow-up CBC with her primary care physician in a week or 2.    Condition on Discharge: Improved    Vital Signs  Temp:  [97.5 °F (36.4 °C)-98 °F (36.7 °C)] 98 °F (36.7 °C)  Heart Rate:  [61-70] 70  Resp:  [16-18] 18  BP: (104-174)/(60-97) 132/72    Objective:  General Appearance:  Comfortable and in no acute distress.    Vital signs: (most recent): Blood pressure 132/72, pulse 70, temperature 98 °F (36.7 °C), temperature source Oral, resp. rate 18, height 64\" (162.6 cm), weight 214 lb 3.2 oz (97.2 kg), SpO2 95 %, not currently " breastfeeding.    Lungs:  Normal respiratory rate and normal effort.  She is not in respiratory distress.  Breath sounds clear to auscultation.    Heart: Normal rate.  Regular rhythm.    Abdomen: Abdomen is soft.  There is no abdominal tenderness.  There is no rebound tenderness.  There is no guarding.     Neurological: Patient is alert and oriented to person, place and time.    Skin:  Warm and dry.            Discharge Medications   Kim Feldman   Home Medication Instructions NASRA:[id number]    Printed on:01/06/17 1302   Medication Information                      B Complex Vitamins (VITAMIN B COMPLEX PO)  Take 1 tablet by mouth every morning. HOLD PRIOR TO SURG             Calcium-Vitamin D-Vitamin K (VIACTIV PO)  Take  by mouth Daily With Lunch. HOLD PRIOR TO SURG              carvedilol (COREG) 3.125 MG tablet  TAKE ONE TABLET BY MOUTH TWICE A DAY WITH MEALS             clopidogrel (PLAVIX) 75 MG tablet  TAKE ONE TABLET BY MOUTH DAILY             clotrimazole-betamethasone (LOTRISONE) 1-0.05 % cream  Apply  topically 2 (Two) Times a Day.             diclofenac (VOLTAREN) 1 % gel gel  Apply 4 g topically 4 (four) times a day as needed.             gabapentin (NEURONTIN) 300 MG capsule  TAKE TWO CAPSULES BY MOUTH THREE TIMES A DAY             Hypromellose (ARTIFICIAL TEARS OP)  Apply 2 drops to eye 4 (four) times a day as needed.             lansoprazole (PREVACID) 30 MG capsule  TAKE ONE CAPSULE BY MOUTH EVERY MORNING             levETIRAcetam (KEPPRA) 500 MG tablet  Take 500 mg by mouth 2 (two) times a day.             levoFLOXacin (LEVAQUIN) 250 MG tablet  Take 1 tablet by mouth Daily for 3 days.             Loratadine 10 MG capsule  Take 1 capsule by mouth daily as needed.             methylcellulose, Laxative, (CITRUCEL) 500 MG tablet tablet  Take 2 tablets by mouth daily as needed.             mirtazapine (REMERON) 15 MG tablet  Take 0.5 tablets by mouth Every Night.             nystatin (MYCOSTATIN)  [id number] UNIT/GM cream  Apply  topically As Needed.               Discharge Diet: Diet Regular; Cardiac, Consistent Carbohydrate    Activity at Discharge: As tolerated    Follow-up Appointments  · Sharad Salinas MD 1-2 weeks, follow-up results of methylmalonic acid level  · CBC next week follow-up platelets     Zain Crespo MD  01/06/17  1:01 PM    Discharge time spent greater than 30 minutes.

## 2017-01-07 ENCOUNTER — APPOINTMENT (OUTPATIENT)
Dept: GENERAL RADIOLOGY | Facility: HOSPITAL | Age: 82
End: 2017-01-07

## 2017-01-07 PROCEDURE — 73630 X-RAY EXAM OF FOOT: CPT | Performed by: FAMILY MEDICINE

## 2017-01-08 LAB — BACTERIA SPEC AEROBE CULT: NORMAL

## 2017-01-09 LAB — BACTERIA SPEC AEROBE CULT: NORMAL

## 2017-01-10 LAB — METHYLMALONATE SERPL-SCNC: 410 NMOL/L (ref 0–378)

## 2017-01-10 RX ORDER — LANSOPRAZOLE 30 MG/1
30 CAPSULE, DELAYED RELEASE ORAL DAILY
Qty: 30 CAPSULE | Refills: 2 | Status: SHIPPED | OUTPATIENT
Start: 2017-01-10 | End: 2017-01-14 | Stop reason: SDUPTHER

## 2017-01-16 ENCOUNTER — OFFICE VISIT (OUTPATIENT)
Dept: INTERNAL MEDICINE | Facility: CLINIC | Age: 82
End: 2017-01-16

## 2017-01-16 VITALS
OXYGEN SATURATION: 98 % | WEIGHT: 217 LBS | HEART RATE: 84 BPM | BODY MASS INDEX: 37.05 KG/M2 | DIASTOLIC BLOOD PRESSURE: 90 MMHG | TEMPERATURE: 97.6 F | SYSTOLIC BLOOD PRESSURE: 162 MMHG | HEIGHT: 64 IN

## 2017-01-16 DIAGNOSIS — M79.674 PAIN OF TOE OF RIGHT FOOT: ICD-10-CM

## 2017-01-16 DIAGNOSIS — N30.00 ACUTE CYSTITIS WITHOUT HEMATURIA: ICD-10-CM

## 2017-01-16 DIAGNOSIS — E53.8 B12 DEFICIENCY: Primary | ICD-10-CM

## 2017-01-16 LAB
BILIRUB BLD-MCNC: NEGATIVE MG/DL
COLOR UR: YELLOW
GLUCOSE UR STRIP-MCNC: NEGATIVE MG/DL
KETONES UR QL: NEGATIVE
LEUKOCYTE EST, POC: ABNORMAL
NITRITE UR-MCNC: NEGATIVE MG/ML
PH UR: 6 [PH] (ref 5–8)
PROT UR STRIP-MCNC: NEGATIVE MG/DL
RBC # UR STRIP: NEGATIVE /UL
SP GR UR: 1.02 (ref 1–1.03)
UROBILINOGEN UR QL: NORMAL

## 2017-01-16 PROCEDURE — 81003 URINALYSIS AUTO W/O SCOPE: CPT | Performed by: FAMILY MEDICINE

## 2017-01-16 PROCEDURE — 99214 OFFICE O/P EST MOD 30 MIN: CPT | Performed by: FAMILY MEDICINE

## 2017-01-16 RX ORDER — CYANOCOBALAMIN 1000 UG/ML
1000 INJECTION, SOLUTION INTRAMUSCULAR; SUBCUTANEOUS
Status: DISCONTINUED | OUTPATIENT
Start: 2017-01-16 | End: 2017-02-05 | Stop reason: HOSPADM

## 2017-01-16 RX ORDER — LANSOPRAZOLE 30 MG/1
CAPSULE, DELAYED RELEASE ORAL
Qty: 30 CAPSULE | Refills: 1 | Status: SHIPPED | OUTPATIENT
Start: 2017-01-16 | End: 2017-04-10 | Stop reason: SDUPTHER

## 2017-01-16 RX ORDER — GABAPENTIN 300 MG/1
600 CAPSULE ORAL 3 TIMES DAILY
Qty: 540 CAPSULE | Refills: 2 | Status: SHIPPED | OUTPATIENT
Start: 2017-01-16 | End: 2017-09-19 | Stop reason: SDUPTHER

## 2017-01-16 NOTE — PROGRESS NOTES
Subjective   Kim Feldman is a 86 y.o. female.     Chief Complaint   Patient presents with   • follo  up to [facility] for UTI   thrombocytopenia,12 deficiency neuropathy toe pain      History of Present Illness   She comes in for follow-up after being treated for urinary tract infection to [facility] should taken Levaquin and home for 3 days after injections in the hospital she still thinks that she does not urinate enough and may have recurrence of her infection she has no fever chills or sweats and no similar symptoms does when she was admitted she had a low platelet count on blood work drawn to hospitalize her for follow-up to determine that this is not a trending downward.    She was also given an injection of T12 and is started on oral therapyB complex because of vitamin D deficiency with a raised methylmalonic acid.  Right third toes involving us in this Hammer deformity with thickened skin dorsally rubbing against the shoe.  She alsohas throat congestion which he develops to the night and is prevalent in the morning she takes reflux medicine seems to help a little bit as well as Mucinex it seems to worsen when she eats ice cream and goes to bed.    The following portions of the patient's history were reviewed and updated as appropriate: allergies, current medications, past family history, past medical history, past social history, past surgical history and problem list.    Review of Systems   Constitutional: Negative.    HENT: Positive for congestion.    Eyes: Negative.    Respiratory: Negative.    Cardiovascular: Negative.    Gastrointestinal: Negative.    Endocrine: Negative.        Objective   Physical Exam   Constitutional: She is oriented to person, place, and time. She appears well-developed and well-nourished.   HENT:   Head: Normocephalic and atraumatic.   Eyes: EOM are normal. Pupils are equal, round, and reactive to light.   Neck: Normal range of motion. Neck supple.   Cardiovascular:  Normal rate, regular rhythm and normal heart sounds.    Pulmonary/Chest: Effort normal. No respiratory distress. She has no wheezes. She has no rales.   Abdominal: Soft. Bowel sounds are normal. She exhibits no distension. There is no tenderness.   Musculoskeletal: Normal range of motion.   Neurological: She is alert and oriented to person, place, and time. She has normal reflexes.   Skin: Skin is warm and dry.   Psychiatric: She has a normal mood and affect. Her behavior is normal. Judgment and thought content normal.   Nursing note and vitals reviewed.      Assessment/Plan   Kim was seen today for follo  up to [facility] for uti.    Diagnoses and all orders for this visit:    B12 deficiency  -     CBC & Differential  -     cyanocobalamin injection 1,000 mcg; Inject 1 mL into the shoulder, thigh, or buttocks Every 30 (Thirty) Days.    Acute cystitis without hematuria  -     POC Urinalysis Dipstick, Automated  -     Urine Culture (Clean Catch); Future    Pain of toe of right foot    Other orders  -     gabapentin (NEURONTIN) 300 MG capsule; Take 2 capsules by mouth 3 (Three) Times a Day.         Prevacid at suppertime instead of in the morning follow-up results of blood work and urine repeat B12 injection monthly.  Mucinex for her contraction does not drink or eat dairy before she goes to bed

## 2017-01-16 NOTE — MR AVS SNAPSHOT
Kim Feldman   1/16/2017 2:30 PM   Office Visit    Dept Phone:  [phone]   Encounter #:  [id number]    Provider:  Sharad Salinas MD   Department:  [facility]                Your Full Care Plan              Today's Medication Changes          These changes are accurate as of: 1/16/17  2:58 PM.  If you have any questions, ask your nurse or doctor.               Medication(s)that have changed:     gabapentin 300 MG capsule   Commonly known as:  NEURONTIN   Take 2 capsules by mouth 3 (Three) Times a Day.   What changed:  See the new instructions.   Changed by:  Sharad Salinas MD            Where to Get Your Medications      These medications were sent to [address] AT [facility] - [phone]  - [phone]   [address] [facility] [address]     Phone:  [phone]     gabapentin 300 MG capsule                  Your Updated Medication List          This list is accurate as of: 1/16/17  2:58 PM.  Always use your most recent med list.                ARTIFICIAL TEARS OP       carvedilol 3.125 MG tablet   Commonly known as:  COREG   TAKE ONE TABLET BY MOUTH TWICE A DAY WITH MEALS       clopidogrel 75 MG tablet   Commonly known as:  PLAVIX   TAKE ONE TABLET BY MOUTH DAILY       clotrimazole-betamethasone 1-0.05 % cream   Commonly known as:  LOTRISONE   Apply  topically 2 (Two) Times a Day.       diclofenac 1 % gel gel   Commonly known as:  VOLTAREN       gabapentin 300 MG capsule   Commonly known as:  NEURONTIN   Take 2 capsules by mouth 3 (Three) Times a Day.       lansoprazole 30 MG capsule   Commonly known as:  PREVACID   TAKE ONE CAPSULE BY MOUTH EVERY MORNING       levETIRAcetam 500 MG tablet   Commonly known as:  KEPPRA       Loratadine 10 MG capsule       methylcellulose (Laxative) 500 MG tablet tablet   Commonly known as:  CITRUCEL       mirtazapine 15 MG  tablet   Commonly known as:  REMERON   Take 0.5 tablets by mouth Every Night.       nystatin [id number] UNIT/GM cream   Commonly known as:  MYCOSTATIN       VIACTIV PO       VITAMIN B COMPLEX PO               We Performed the Following     CBC & Differential     POC Urinalysis Dipstick, Automated       You Were Diagnosed With        Codes Comments    B12 deficiency    -  Primary ICD-10-CM: E53.8  ICD-9-CM: 266.2     Acute cystitis without hematuria     ICD-10-CM: N30.00  ICD-9-CM: 595.0     Pain of toe of right foot     ICD-10-CM: M79.674  ICD-9-CM: 729.5       Medications to be Given to You by a Medical Professional     Due       Frequency    1/16/2017 cyanocobalamin injection 1,000 mcg  Every 30 Days      Instructions     None    Patient Instructions History      Upcoming Appointments     Visit Type Date Time Department    HOSPITAL FOLLOW UP 1/16/2017  2:30 PM [facility]    FOLLOW UP 4/6/2017 12:30 PM [facility] Signup     Our records indicate that you have an active [employer] account.    You can view your After Visit Summary by going to [employer] and logging in with your [employer] username and password.  If you don't have a [employer] username and password but a parent or guardian has access to your record, the parent or guardian should login with their own Valmarc username and password and access your record to view the After Visit Summary.    If you have questions, you can email [email or web address] or call [phone] to talk to our [employer] staff.  Remember, [employer] is NOT to be used for urgent needs.  For medical emergencies, dial 911.               Other Info from Your Visit           Your Appointments     Apr 06, 2017 12:30 PM EDT   Follow Up with Arsalan Velez Jr., MD   [facility] (--)    [address] [facility]. [address] [place] [address] [id number] [phone]           Arrive 15 minutes prior to appointment.             "  Allergies     Penicillins  Hives      Reason for Visit     follo  up to [facility] for UTI           Vital Signs     Blood Pressure Pulse Temperature Height Weight Oxygen Saturation    162/90 (BP Location: Left arm, Patient Position: Sitting, Cuff Size: Large Adult) 84 97.6 °F (36.4 °C) (Oral) 64\" (162.6 cm) 217 lb (98.4 kg) 98%    Breastfeeding? Body Mass Index Smoking Status             No 37.25 kg/m2 Former Smoker         Problems and Diagnoses Noted     Vitamin B12 deficiency    Pain in toe of right foot    Urinary tract infection      No Longer an Issue     Acute upper respiratory infection    Aspiration pneumonia    Pneumonia        "

## 2017-01-17 ENCOUNTER — TELEPHONE (OUTPATIENT)
Dept: INTERNAL MEDICINE | Facility: CLINIC | Age: 82
End: 2017-01-17

## 2017-01-17 LAB
BASOPHILS # BLD AUTO: 0.03 10*3/MM3 (ref 0–0.2)
BASOPHILS NFR BLD AUTO: 0.5 % (ref 0–1.5)
EOSINOPHIL # BLD AUTO: 0.21 10*3/MM3 (ref 0–0.7)
EOSINOPHIL NFR BLD AUTO: 3.4 % (ref 0.3–6.2)
ERYTHROCYTE [DISTWIDTH] IN BLOOD BY AUTOMATED COUNT: 12 % (ref 11.7–13)
HCT VFR BLD AUTO: 37.4 % (ref 35.6–45.5)
HGB BLD-MCNC: 12.3 G/DL (ref 11.9–15.5)
IMM GRANULOCYTES # BLD: 0 10*3/MM3 (ref 0–0.03)
IMM GRANULOCYTES NFR BLD: 0 % (ref 0–0.5)
LYMPHOCYTES # BLD AUTO: 2.16 10*3/MM3 (ref 0.9–4.8)
LYMPHOCYTES NFR BLD AUTO: 34.6 % (ref 19.6–45.3)
MCH RBC QN AUTO: 34.2 PG (ref 26.9–32)
MCHC RBC AUTO-ENTMCNC: 32.9 G/DL (ref 32.4–36.3)
MCV RBC AUTO: 103.9 FL (ref 80.5–98.2)
MONOCYTES # BLD AUTO: 0.57 10*3/MM3 (ref 0.2–1.2)
MONOCYTES NFR BLD AUTO: 9.1 % (ref 5–12)
NEUTROPHILS # BLD AUTO: 3.28 10*3/MM3 (ref 1.9–8.1)
NEUTROPHILS NFR BLD AUTO: 52.4 % (ref 42.7–76)
PLATELET # BLD AUTO: 199 10*3/MM3 (ref 140–500)
RBC # BLD AUTO: 3.6 10*6/MM3 (ref 3.9–5.2)
WBC # BLD AUTO: 6.25 10*3/MM3 (ref 4.5–10.7)

## 2017-01-18 LAB
BACTERIA UR CULT: NORMAL
BACTERIA UR CULT: NORMAL

## 2017-01-23 RX ORDER — CLOPIDOGREL BISULFATE 75 MG/1
TABLET ORAL
Qty: 90 TABLET | Refills: 0 | Status: SHIPPED | OUTPATIENT
Start: 2017-01-23 | End: 2017-04-10 | Stop reason: ALTCHOICE

## 2017-01-27 RX ORDER — MIRTAZAPINE 15 MG/1
15 TABLET, FILM COATED ORAL NIGHTLY
Qty: 30 TABLET | Refills: 2 | Status: SHIPPED | OUTPATIENT
Start: 2017-01-27 | End: 2017-08-02 | Stop reason: SDUPTHER

## 2017-02-02 ENCOUNTER — APPOINTMENT (OUTPATIENT)
Dept: CT IMAGING | Facility: HOSPITAL | Age: 82
End: 2017-02-02

## 2017-02-02 ENCOUNTER — HOSPITAL ENCOUNTER (INPATIENT)
Facility: HOSPITAL | Age: 82
LOS: 3 days | Discharge: HOME | End: 2017-02-05
Attending: EMERGENCY MEDICINE | Admitting: INTERNAL MEDICINE

## 2017-02-02 ENCOUNTER — APPOINTMENT (OUTPATIENT)
Dept: GENERAL RADIOLOGY | Facility: HOSPITAL | Age: 82
End: 2017-02-02

## 2017-02-02 DIAGNOSIS — R53.1 GENERALIZED WEAKNESS: ICD-10-CM

## 2017-02-02 DIAGNOSIS — J18.9 PNEUMONIA OF RIGHT LOWER LOBE DUE TO INFECTIOUS ORGANISM: Primary | ICD-10-CM

## 2017-02-02 DIAGNOSIS — M62.81 MUSCLE WEAKNESS (GENERALIZED): ICD-10-CM

## 2017-02-02 PROBLEM — A41.9 SEPSIS DUE TO PNEUMONIA (HCC): Status: ACTIVE | Noted: 2017-02-02

## 2017-02-02 LAB
ALBUMIN SERPL-MCNC: 4 G/DL (ref 3.5–5.2)
ALBUMIN/GLOB SERPL: 1.2 G/DL
ALP SERPL-CCNC: 53 U/L (ref 39–117)
ALT SERPL W P-5'-P-CCNC: 13 U/L (ref 1–33)
ANION GAP SERPL CALCULATED.3IONS-SCNC: 14.5 MMOL/L
AST SERPL-CCNC: 18 U/L (ref 1–32)
BASOPHILS # BLD AUTO: 0.01 10*3/MM3 (ref 0–0.2)
BASOPHILS NFR BLD AUTO: 0.1 % (ref 0–1.5)
BILIRUB SERPL-MCNC: 0.9 MG/DL (ref 0.1–1.2)
BILIRUB UR QL STRIP: NEGATIVE
BUN BLD-MCNC: 34 MG/DL (ref 8–23)
BUN/CREAT SERPL: 16.7 (ref 7–25)
CALCIUM SPEC-SCNC: 9.4 MG/DL (ref 8.6–10.5)
CHLORIDE SERPL-SCNC: 100 MMOL/L (ref 98–107)
CLARITY UR: ABNORMAL
CO2 SERPL-SCNC: 25.5 MMOL/L (ref 22–29)
COLOR UR: ABNORMAL
CREAT BLD-MCNC: 2.03 MG/DL (ref 0.57–1)
D-LACTATE SERPL-SCNC: 1.1 MMOL/L (ref 0.5–2)
DEPRECATED RDW RBC AUTO: 46.7 FL (ref 37–54)
EOSINOPHIL # BLD AUTO: 0.05 10*3/MM3 (ref 0–0.7)
EOSINOPHIL NFR BLD AUTO: 0.4 % (ref 0.3–6.2)
ERYTHROCYTE [DISTWIDTH] IN BLOOD BY AUTOMATED COUNT: 12.5 % (ref 11.7–13)
GFR SERPL CREATININE-BSD FRML MDRD: 23 ML/MIN/1.73
GLOBULIN UR ELPH-MCNC: 3.3 GM/DL
GLUCOSE BLD-MCNC: 115 MG/DL (ref 65–99)
GLUCOSE UR STRIP-MCNC: NEGATIVE MG/DL
HCT VFR BLD AUTO: 36.1 % (ref 35.6–45.5)
HGB BLD-MCNC: 11.8 G/DL (ref 11.9–15.5)
HGB UR QL STRIP.AUTO: NEGATIVE
IMM GRANULOCYTES # BLD: 0.04 10*3/MM3 (ref 0–0.03)
IMM GRANULOCYTES NFR BLD: 0.3 % (ref 0–0.5)
KETONES UR QL STRIP: NEGATIVE
LEUKOCYTE ESTERASE UR QL STRIP.AUTO: ABNORMAL
LYMPHOCYTES # BLD AUTO: 1.8 10*3/MM3 (ref 0.9–4.8)
LYMPHOCYTES NFR BLD AUTO: 14.5 % (ref 19.6–45.3)
MAGNESIUM SERPL-MCNC: 2.2 MG/DL (ref 1.6–2.4)
MCH RBC QN AUTO: 33.5 PG (ref 26.9–32)
MCHC RBC AUTO-ENTMCNC: 32.7 G/DL (ref 32.4–36.3)
MCV RBC AUTO: 102.6 FL (ref 80.5–98.2)
MONOCYTES # BLD AUTO: 0.62 10*3/MM3 (ref 0.2–1.2)
MONOCYTES NFR BLD AUTO: 5 % (ref 5–12)
NEUTROPHILS # BLD AUTO: 9.89 10*3/MM3 (ref 1.9–8.1)
NEUTROPHILS NFR BLD AUTO: 79.7 % (ref 42.7–76)
NITRITE UR QL STRIP: NEGATIVE
PH UR STRIP.AUTO: 5 [PH] (ref 5–8)
PLATELET # BLD AUTO: 150 10*3/MM3 (ref 140–500)
PMV BLD AUTO: 9.1 FL (ref 6–12)
POTASSIUM BLD-SCNC: 4.6 MMOL/L (ref 3.5–5.2)
PROT SERPL-MCNC: 7.3 G/DL (ref 6–8.5)
PROT UR QL STRIP: ABNORMAL
RBC # BLD AUTO: 3.52 10*6/MM3 (ref 3.9–5.2)
SODIUM BLD-SCNC: 140 MMOL/L (ref 136–145)
SP GR UR STRIP: 1.02 (ref 1–1.03)
TROPONIN T SERPL-MCNC: 0.02 NG/ML (ref 0–0.03)
UROBILINOGEN UR QL STRIP: ABNORMAL
WBC NRBC COR # BLD: 12.41 10*3/MM3 (ref 4.5–10.7)

## 2017-02-02 PROCEDURE — 71010 HC CHEST PA OR AP: CPT

## 2017-02-02 PROCEDURE — 87086 URINE CULTURE/COLONY COUNT: CPT | Performed by: EMERGENCY MEDICINE

## 2017-02-02 PROCEDURE — 99285 EMERGENCY DEPT VISIT HI MDM: CPT

## 2017-02-02 PROCEDURE — 83735 ASSAY OF MAGNESIUM: CPT | Performed by: EMERGENCY MEDICINE

## 2017-02-02 PROCEDURE — 87077 CULTURE AEROBIC IDENTIFY: CPT | Performed by: EMERGENCY MEDICINE

## 2017-02-02 PROCEDURE — 84484 ASSAY OF TROPONIN QUANT: CPT | Performed by: EMERGENCY MEDICINE

## 2017-02-02 PROCEDURE — 93005 ELECTROCARDIOGRAM TRACING: CPT | Performed by: EMERGENCY MEDICINE

## 2017-02-02 PROCEDURE — 25010000002 LEVOFLOXACIN PER 250 MG: Performed by: EMERGENCY MEDICINE

## 2017-02-02 PROCEDURE — P9612 CATHETERIZE FOR URINE SPEC: HCPCS

## 2017-02-02 PROCEDURE — 81001 URINALYSIS AUTO W/SCOPE: CPT | Performed by: EMERGENCY MEDICINE

## 2017-02-02 PROCEDURE — 70450 CT HEAD/BRAIN W/O DYE: CPT

## 2017-02-02 PROCEDURE — 85025 COMPLETE CBC W/AUTO DIFF WBC: CPT | Performed by: EMERGENCY MEDICINE

## 2017-02-02 PROCEDURE — 80053 COMPREHEN METABOLIC PANEL: CPT | Performed by: EMERGENCY MEDICINE

## 2017-02-02 PROCEDURE — 93010 ELECTROCARDIOGRAM REPORT: CPT | Performed by: INTERNAL MEDICINE

## 2017-02-02 PROCEDURE — 87186 SC STD MICRODIL/AGAR DIL: CPT | Performed by: EMERGENCY MEDICINE

## 2017-02-02 PROCEDURE — 36415 COLL VENOUS BLD VENIPUNCTURE: CPT

## 2017-02-02 PROCEDURE — 84145 PROCALCITONIN (PCT): CPT | Performed by: INTERNAL MEDICINE

## 2017-02-02 PROCEDURE — 83605 ASSAY OF LACTIC ACID: CPT | Performed by: EMERGENCY MEDICINE

## 2017-02-02 RX ORDER — LEVOFLOXACIN 5 MG/ML
750 INJECTION, SOLUTION INTRAVENOUS ONCE
Status: COMPLETED | OUTPATIENT
Start: 2017-02-02 | End: 2017-02-02

## 2017-02-02 RX ORDER — GUAIFENESIN, PSEUDOEPHEDRINE HYDROCHLORIDE 600; 60 MG/1; MG/1
1 TABLET, EXTENDED RELEASE ORAL NIGHTLY PRN
COMMUNITY
End: 2017-03-23

## 2017-02-02 RX ORDER — IPRATROPIUM BROMIDE AND ALBUTEROL SULFATE 2.5; .5 MG/3ML; MG/3ML
3 SOLUTION RESPIRATORY (INHALATION)
Status: DISCONTINUED | OUTPATIENT
Start: 2017-02-02 | End: 2017-02-05 | Stop reason: HOSPADM

## 2017-02-02 RX ORDER — ONDANSETRON 2 MG/ML
4 INJECTION INTRAMUSCULAR; INTRAVENOUS EVERY 6 HOURS PRN
Status: DISCONTINUED | OUTPATIENT
Start: 2017-02-02 | End: 2017-02-05 | Stop reason: HOSPADM

## 2017-02-02 RX ORDER — SODIUM CHLORIDE 0.9 % (FLUSH) 0.9 %
10 SYRINGE (ML) INJECTION AS NEEDED
Status: DISCONTINUED | OUTPATIENT
Start: 2017-02-02 | End: 2017-02-05 | Stop reason: HOSPADM

## 2017-02-02 RX ORDER — ACETAMINOPHEN 325 MG/1
650 TABLET ORAL EVERY 4 HOURS PRN
Status: DISCONTINUED | OUTPATIENT
Start: 2017-02-02 | End: 2017-02-05 | Stop reason: HOSPADM

## 2017-02-02 RX ORDER — ONDANSETRON 4 MG/1
4 TABLET, ORALLY DISINTEGRATING ORAL EVERY 6 HOURS PRN
Status: DISCONTINUED | OUTPATIENT
Start: 2017-02-02 | End: 2017-02-05 | Stop reason: HOSPADM

## 2017-02-02 RX ORDER — DOCUSATE SODIUM 100 MG/1
100 CAPSULE, LIQUID FILLED ORAL 2 TIMES DAILY
Status: DISCONTINUED | OUTPATIENT
Start: 2017-02-03 | End: 2017-02-05 | Stop reason: HOSPADM

## 2017-02-02 RX ORDER — SODIUM CHLORIDE 0.9 % (FLUSH) 0.9 %
1-10 SYRINGE (ML) INJECTION AS NEEDED
Status: DISCONTINUED | OUTPATIENT
Start: 2017-02-02 | End: 2017-02-05 | Stop reason: HOSPADM

## 2017-02-02 RX ORDER — SODIUM CHLORIDE 9 MG/ML
75 INJECTION, SOLUTION INTRAVENOUS CONTINUOUS
Status: ACTIVE | OUTPATIENT
Start: 2017-02-02 | End: 2017-02-03

## 2017-02-02 RX ORDER — ONDANSETRON 4 MG/1
4 TABLET, FILM COATED ORAL EVERY 6 HOURS PRN
Status: DISCONTINUED | OUTPATIENT
Start: 2017-02-02 | End: 2017-02-05 | Stop reason: HOSPADM

## 2017-02-02 RX ORDER — LEVOFLOXACIN 5 MG/ML
500 INJECTION, SOLUTION INTRAVENOUS EVERY 24 HOURS
Status: DISCONTINUED | OUTPATIENT
Start: 2017-02-04 | End: 2017-02-05 | Stop reason: HOSPADM

## 2017-02-02 RX ORDER — LEVOFLOXACIN 750 MG/1
750 TABLET ORAL ONCE
Status: DISCONTINUED | OUTPATIENT
Start: 2017-02-02 | End: 2017-02-02

## 2017-02-02 RX ORDER — HEPARIN SODIUM 5000 [USP'U]/ML
5000 INJECTION, SOLUTION INTRAVENOUS; SUBCUTANEOUS EVERY 12 HOURS SCHEDULED
Status: DISCONTINUED | OUTPATIENT
Start: 2017-02-02 | End: 2017-02-05 | Stop reason: HOSPADM

## 2017-02-02 RX ADMIN — LEVOFLOXACIN 750 MG: 5 INJECTION, SOLUTION INTRAVENOUS at 22:32

## 2017-02-02 RX ADMIN — SODIUM CHLORIDE 500 ML: 9 INJECTION, SOLUTION INTRAVENOUS at 22:55

## 2017-02-03 ENCOUNTER — APPOINTMENT (OUTPATIENT)
Dept: CARDIOLOGY | Facility: HOSPITAL | Age: 82
End: 2017-02-03
Attending: HOSPITALIST

## 2017-02-03 PROBLEM — R60.0 EDEMA OF LEFT LOWER EXTREMITY: Status: ACTIVE | Noted: 2017-02-03

## 2017-02-03 PROBLEM — R53.1 WEAKNESS: Status: ACTIVE | Noted: 2017-02-03

## 2017-02-03 LAB
ANION GAP SERPL CALCULATED.3IONS-SCNC: 13.4 MMOL/L
B PERT DNA SPEC QL NAA+PROBE: NOT DETECTED
BACTERIA UR QL AUTO: ABNORMAL /HPF
BASOPHILS # BLD AUTO: 0.01 10*3/MM3 (ref 0–0.2)
BASOPHILS NFR BLD AUTO: 0.1 % (ref 0–1.5)
BH CV LOWER VASCULAR LEFT COMMON FEMORAL AUGMENT: NORMAL
BH CV LOWER VASCULAR LEFT COMMON FEMORAL COMPETENT: NORMAL
BH CV LOWER VASCULAR LEFT COMMON FEMORAL COMPRESS: NORMAL
BH CV LOWER VASCULAR LEFT COMMON FEMORAL PHASIC: NORMAL
BH CV LOWER VASCULAR LEFT COMMON FEMORAL SPONT: NORMAL
BH CV LOWER VASCULAR LEFT DISTAL FEMORAL COMPRESS: NORMAL
BH CV LOWER VASCULAR LEFT GASTRONEMIUS COMPRESS: NORMAL
BH CV LOWER VASCULAR LEFT GREATER SAPH AK COMPRESS: NORMAL
BH CV LOWER VASCULAR LEFT GREATER SAPH BK COMPRESS: NORMAL
BH CV LOWER VASCULAR LEFT LESSER SAPH COMPRESS: NORMAL
BH CV LOWER VASCULAR LEFT MID FEMORAL AUGMENT: NORMAL
BH CV LOWER VASCULAR LEFT MID FEMORAL COMPETENT: NORMAL
BH CV LOWER VASCULAR LEFT MID FEMORAL COMPRESS: NORMAL
BH CV LOWER VASCULAR LEFT MID FEMORAL PHASIC: NORMAL
BH CV LOWER VASCULAR LEFT MID FEMORAL SPONT: NORMAL
BH CV LOWER VASCULAR LEFT PERONEAL COMPRESS: NORMAL
BH CV LOWER VASCULAR LEFT POPLITEAL AUGMENT: NORMAL
BH CV LOWER VASCULAR LEFT POPLITEAL COMPETENT: NORMAL
BH CV LOWER VASCULAR LEFT POPLITEAL COMPRESS: NORMAL
BH CV LOWER VASCULAR LEFT POPLITEAL PHASIC: NORMAL
BH CV LOWER VASCULAR LEFT POPLITEAL SPONT: NORMAL
BH CV LOWER VASCULAR LEFT POSTERIOR TIBIAL COMPRESS: NORMAL
BH CV LOWER VASCULAR LEFT PROXIMAL FEMORAL COMPRESS: NORMAL
BH CV LOWER VASCULAR LEFT SAPHENOFEMORAL JUNCTION AUGMENT: NORMAL
BH CV LOWER VASCULAR LEFT SAPHENOFEMORAL JUNCTION COMPETENT: NORMAL
BH CV LOWER VASCULAR LEFT SAPHENOFEMORAL JUNCTION COMPRESS: NORMAL
BH CV LOWER VASCULAR LEFT SAPHENOFEMORAL JUNCTION PHASIC: NORMAL
BH CV LOWER VASCULAR LEFT SAPHENOFEMORAL JUNCTION SPONT: NORMAL
BH CV LOWER VASCULAR RIGHT COMMON FEMORAL AUGMENT: NORMAL
BH CV LOWER VASCULAR RIGHT COMMON FEMORAL COMPETENT: NORMAL
BH CV LOWER VASCULAR RIGHT COMMON FEMORAL COMPRESS: NORMAL
BH CV LOWER VASCULAR RIGHT COMMON FEMORAL PHASIC: NORMAL
BH CV LOWER VASCULAR RIGHT COMMON FEMORAL SPONT: NORMAL
BUN BLD-MCNC: 35 MG/DL (ref 8–23)
BUN/CREAT SERPL: 23.5 (ref 7–25)
C PNEUM DNA NPH QL NAA+NON-PROBE: NOT DETECTED
CALCIUM SPEC-SCNC: 8.6 MG/DL (ref 8.6–10.5)
CHLORIDE SERPL-SCNC: 103 MMOL/L (ref 98–107)
CO2 SERPL-SCNC: 23.6 MMOL/L (ref 22–29)
COD CRY URNS QL: ABNORMAL /HPF
CREAT BLD-MCNC: 1.49 MG/DL (ref 0.57–1)
DEPRECATED RDW RBC AUTO: 46.8 FL (ref 37–54)
EOSINOPHIL # BLD AUTO: 0.13 10*3/MM3 (ref 0–0.7)
EOSINOPHIL NFR BLD AUTO: 1.4 % (ref 0.3–6.2)
ERYTHROCYTE [DISTWIDTH] IN BLOOD BY AUTOMATED COUNT: 12.5 % (ref 11.7–13)
FLUAV H1 2009 PAND RNA NPH QL NAA+PROBE: NOT DETECTED
FLUAV H1 HA GENE NPH QL NAA+PROBE: NOT DETECTED
FLUAV H3 RNA NPH QL NAA+PROBE: NOT DETECTED
FLUAV SUBTYP SPEC NAA+PROBE: NOT DETECTED
FLUBV RNA ISLT QL NAA+PROBE: NOT DETECTED
GFR SERPL CREATININE-BSD FRML MDRD: 33 ML/MIN/1.73
GLUCOSE BLD-MCNC: 100 MG/DL (ref 65–99)
GLUCOSE BLDC GLUCOMTR-MCNC: 112 MG/DL (ref 70–130)
HADV DNA SPEC NAA+PROBE: NOT DETECTED
HCOV 229E RNA SPEC QL NAA+PROBE: NOT DETECTED
HCOV HKU1 RNA SPEC QL NAA+PROBE: NOT DETECTED
HCOV NL63 RNA SPEC QL NAA+PROBE: NOT DETECTED
HCOV OC43 RNA SPEC QL NAA+PROBE: NOT DETECTED
HCT VFR BLD AUTO: 31.7 % (ref 35.6–45.5)
HGB BLD-MCNC: 10.6 G/DL (ref 11.9–15.5)
HMPV RNA NPH QL NAA+NON-PROBE: NOT DETECTED
HOLD SPECIMEN: NORMAL
HOLD SPECIMEN: NORMAL
HPIV1 RNA SPEC QL NAA+PROBE: NOT DETECTED
HPIV2 RNA SPEC QL NAA+PROBE: NOT DETECTED
HPIV3 RNA NPH QL NAA+PROBE: NOT DETECTED
HPIV4 P GENE NPH QL NAA+PROBE: NOT DETECTED
HYALINE CASTS UR QL AUTO: ABNORMAL /LPF
IMM GRANULOCYTES # BLD: 0 10*3/MM3 (ref 0–0.03)
IMM GRANULOCYTES NFR BLD: 0 % (ref 0–0.5)
L PNEUMO1 AG UR QL IA: NEGATIVE
LYMPHOCYTES # BLD AUTO: 2.49 10*3/MM3 (ref 0.9–4.8)
LYMPHOCYTES NFR BLD AUTO: 26.1 % (ref 19.6–45.3)
M PNEUMO IGG SER IA-ACNC: NOT DETECTED
MCH RBC QN AUTO: 34.5 PG (ref 26.9–32)
MCHC RBC AUTO-ENTMCNC: 33.4 G/DL (ref 32.4–36.3)
MCV RBC AUTO: 103.3 FL (ref 80.5–98.2)
MONOCYTES # BLD AUTO: 0.57 10*3/MM3 (ref 0.2–1.2)
MONOCYTES NFR BLD AUTO: 6 % (ref 5–12)
MUCOUS THREADS URNS QL MICRO: ABNORMAL /HPF
NEUTROPHILS # BLD AUTO: 6.33 10*3/MM3 (ref 1.9–8.1)
NEUTROPHILS NFR BLD AUTO: 66.4 % (ref 42.7–76)
PLATELET # BLD AUTO: 127 10*3/MM3 (ref 140–500)
PMV BLD AUTO: 9.4 FL (ref 6–12)
POTASSIUM BLD-SCNC: 4.2 MMOL/L (ref 3.5–5.2)
PROCALCITONIN SERPL-MCNC: 0.83 NG/ML (ref 0.1–0.25)
RBC # BLD AUTO: 3.07 10*6/MM3 (ref 3.9–5.2)
RBC # UR: ABNORMAL /HPF
REF LAB TEST METHOD: ABNORMAL
RHINOVIRUS RNA SPEC NAA+PROBE: NOT DETECTED
RSV RNA NPH QL NAA+NON-PROBE: NOT DETECTED
S PNEUM AG SPEC QL LA: NEGATIVE
SODIUM BLD-SCNC: 140 MMOL/L (ref 136–145)
SQUAMOUS #/AREA URNS HPF: ABNORMAL /HPF
WBC NRBC COR # BLD: 9.53 10*3/MM3 (ref 4.5–10.7)
WBC UR QL AUTO: ABNORMAL /HPF
WHOLE BLOOD HOLD SPECIMEN: NORMAL
WHOLE BLOOD HOLD SPECIMEN: NORMAL

## 2017-02-03 PROCEDURE — 36415 COLL VENOUS BLD VENIPUNCTURE: CPT | Performed by: INTERNAL MEDICINE

## 2017-02-03 PROCEDURE — 97110 THERAPEUTIC EXERCISES: CPT

## 2017-02-03 PROCEDURE — 82962 GLUCOSE BLOOD TEST: CPT

## 2017-02-03 PROCEDURE — 94799 UNLISTED PULMONARY SVC/PX: CPT

## 2017-02-03 PROCEDURE — 87899 AGENT NOS ASSAY W/OPTIC: CPT | Performed by: INTERNAL MEDICINE

## 2017-02-03 PROCEDURE — 80048 BASIC METABOLIC PNL TOTAL CA: CPT | Performed by: INTERNAL MEDICINE

## 2017-02-03 PROCEDURE — 87040 BLOOD CULTURE FOR BACTERIA: CPT | Performed by: INTERNAL MEDICINE

## 2017-02-03 PROCEDURE — 87798 DETECT AGENT NOS DNA AMP: CPT | Performed by: INTERNAL MEDICINE

## 2017-02-03 PROCEDURE — 87633 RESP VIRUS 12-25 TARGETS: CPT | Performed by: INTERNAL MEDICINE

## 2017-02-03 PROCEDURE — 87081 CULTURE SCREEN ONLY: CPT | Performed by: INTERNAL MEDICINE

## 2017-02-03 PROCEDURE — 85025 COMPLETE CBC W/AUTO DIFF WBC: CPT | Performed by: INTERNAL MEDICINE

## 2017-02-03 PROCEDURE — 25010000002 VANCOMYCIN: Performed by: INTERNAL MEDICINE

## 2017-02-03 PROCEDURE — 94640 AIRWAY INHALATION TREATMENT: CPT

## 2017-02-03 PROCEDURE — 87486 CHLMYD PNEUM DNA AMP PROBE: CPT | Performed by: INTERNAL MEDICINE

## 2017-02-03 PROCEDURE — 87449 NOS EACH ORGANISM AG IA: CPT | Performed by: INTERNAL MEDICINE

## 2017-02-03 PROCEDURE — 87581 M.PNEUMON DNA AMP PROBE: CPT | Performed by: INTERNAL MEDICINE

## 2017-02-03 PROCEDURE — 93971 EXTREMITY STUDY: CPT

## 2017-02-03 PROCEDURE — 25010000002 HEPARIN (PORCINE) PER 1000 UNITS: Performed by: INTERNAL MEDICINE

## 2017-02-03 PROCEDURE — 97162 PT EVAL MOD COMPLEX 30 MIN: CPT

## 2017-02-03 RX ORDER — CLOPIDOGREL BISULFATE 75 MG/1
75 TABLET ORAL DAILY
Status: DISCONTINUED | OUTPATIENT
Start: 2017-02-03 | End: 2017-02-05 | Stop reason: HOSPADM

## 2017-02-03 RX ORDER — MINERAL OIL AND PETROLATUM 150; 830 MG/G; MG/G
OINTMENT OPHTHALMIC
Status: DISCONTINUED | OUTPATIENT
Start: 2017-02-03 | End: 2017-02-05 | Stop reason: HOSPADM

## 2017-02-03 RX ORDER — GABAPENTIN 300 MG/1
300 CAPSULE ORAL 3 TIMES DAILY
Status: DISCONTINUED | OUTPATIENT
Start: 2017-02-03 | End: 2017-02-05 | Stop reason: HOSPADM

## 2017-02-03 RX ORDER — PANTOPRAZOLE SODIUM 40 MG/1
40 TABLET, DELAYED RELEASE ORAL
Status: DISCONTINUED | OUTPATIENT
Start: 2017-02-03 | End: 2017-02-05 | Stop reason: HOSPADM

## 2017-02-03 RX ORDER — LEVETIRACETAM 500 MG/1
500 TABLET ORAL 2 TIMES DAILY
Status: DISCONTINUED | OUTPATIENT
Start: 2017-02-03 | End: 2017-02-05 | Stop reason: HOSPADM

## 2017-02-03 RX ORDER — CARVEDILOL 3.12 MG/1
3.12 TABLET ORAL EVERY 12 HOURS SCHEDULED
Status: DISCONTINUED | OUTPATIENT
Start: 2017-02-03 | End: 2017-02-05 | Stop reason: HOSPADM

## 2017-02-03 RX ORDER — MIRTAZAPINE 15 MG/1
15 TABLET, FILM COATED ORAL NIGHTLY
Status: DISCONTINUED | OUTPATIENT
Start: 2017-02-03 | End: 2017-02-05 | Stop reason: HOSPADM

## 2017-02-03 RX ORDER — CETIRIZINE HYDROCHLORIDE 10 MG/1
5 TABLET ORAL DAILY
Status: DISCONTINUED | OUTPATIENT
Start: 2017-02-03 | End: 2017-02-05 | Stop reason: HOSPADM

## 2017-02-03 RX ADMIN — SODIUM CHLORIDE 75 ML/HR: 9 INJECTION, SOLUTION INTRAVENOUS at 01:59

## 2017-02-03 RX ADMIN — IPRATROPIUM BROMIDE AND ALBUTEROL SULFATE 3 ML: .5; 3 SOLUTION RESPIRATORY (INHALATION) at 12:59

## 2017-02-03 RX ADMIN — GABAPENTIN 300 MG: 300 CAPSULE ORAL at 15:53

## 2017-02-03 RX ADMIN — GABAPENTIN 300 MG: 300 CAPSULE ORAL at 20:48

## 2017-02-03 RX ADMIN — VANCOMYCIN HYDROCHLORIDE 1500 MG: 1 INJECTION, POWDER, LYOPHILIZED, FOR SOLUTION INTRAVENOUS at 14:36

## 2017-02-03 RX ADMIN — CLOPIDOGREL 75 MG: 75 TABLET, FILM COATED ORAL at 10:21

## 2017-02-03 RX ADMIN — HEPARIN SODIUM 5000 UNITS: 5000 INJECTION, SOLUTION INTRAVENOUS; SUBCUTANEOUS at 20:48

## 2017-02-03 RX ADMIN — IPRATROPIUM BROMIDE AND ALBUTEROL SULFATE 3 ML: .5; 3 SOLUTION RESPIRATORY (INHALATION) at 19:18

## 2017-02-03 RX ADMIN — ACETAMINOPHEN 650 MG: 325 TABLET ORAL at 00:17

## 2017-02-03 RX ADMIN — HEPARIN SODIUM 5000 UNITS: 5000 INJECTION, SOLUTION INTRAVENOUS; SUBCUTANEOUS at 10:22

## 2017-02-03 RX ADMIN — CARVEDILOL 3.12 MG: 3.12 TABLET, FILM COATED ORAL at 20:46

## 2017-02-03 RX ADMIN — CARVEDILOL 3.12 MG: 3.12 TABLET, FILM COATED ORAL at 10:21

## 2017-02-03 RX ADMIN — DOCUSATE SODIUM 100 MG: 100 CAPSULE, LIQUID FILLED ORAL at 10:22

## 2017-02-03 RX ADMIN — AZTREONAM 2 G: 2 INJECTION, POWDER, FOR SOLUTION INTRAMUSCULAR; INTRAVENOUS at 04:22

## 2017-02-03 RX ADMIN — AZTREONAM 2 G: 2 INJECTION, POWDER, FOR SOLUTION INTRAMUSCULAR; INTRAVENOUS at 21:35

## 2017-02-03 RX ADMIN — PANTOPRAZOLE SODIUM 40 MG: 40 TABLET, DELAYED RELEASE ORAL at 07:46

## 2017-02-03 RX ADMIN — LEVETIRACETAM 500 MG: 500 TABLET, FILM COATED ORAL at 17:16

## 2017-02-03 RX ADMIN — IPRATROPIUM BROMIDE AND ALBUTEROL SULFATE 3 ML: .5; 3 SOLUTION RESPIRATORY (INHALATION) at 08:06

## 2017-02-03 RX ADMIN — MIRTAZAPINE 15 MG: 15 TABLET, FILM COATED ORAL at 20:46

## 2017-02-03 RX ADMIN — LEVETIRACETAM 500 MG: 500 TABLET, FILM COATED ORAL at 10:21

## 2017-02-03 RX ADMIN — CETIRIZINE HYDROCHLORIDE 5 MG: 10 TABLET, FILM COATED ORAL at 10:21

## 2017-02-03 RX ADMIN — DOCUSATE SODIUM 100 MG: 100 CAPSULE, LIQUID FILLED ORAL at 17:16

## 2017-02-03 RX ADMIN — AZTREONAM 2 G: 2 INJECTION, POWDER, FOR SOLUTION INTRAMUSCULAR; INTRAVENOUS at 13:20

## 2017-02-03 RX ADMIN — IPRATROPIUM BROMIDE AND ALBUTEROL SULFATE 3 ML: .5; 3 SOLUTION RESPIRATORY (INHALATION) at 15:26

## 2017-02-03 RX ADMIN — GABAPENTIN 300 MG: 300 CAPSULE ORAL at 10:22

## 2017-02-03 NOTE — PLAN OF CARE
Problem: Patient Care Overview (Adult)  Goal: Plan of Care Review    02/03/17 0852   Outcome Evaluation   Outcome Summary/Follow up Plan Pt. will benefit from skilled inpt. P.T. to address her functional deficits and to assist pt. in regaining her independence with functional mobility.   Coping/Psychosocial Response Interventions   Plan Of Care Reviewed With patient         Problem: Inpatient Physical Therapy  Goal: Bed Mobility Goal LTG- PT    02/03/17 0852   Bed Mobility PT LTG   Bed Mobility PT LTG, Date Established 02/03/17   Bed Mobility PT LTG, Time to Achieve 5 days   Bed Mobility PT LTG, Activity Type all bed mobility   Bed Mobility PT LTG, Gem Level independent       Goal: Transfer Training Goal 1 LTG- PT    02/03/17 0852   Transfer Training PT LTG   Transfer Training PT LTG, Date Established 02/03/17   Transfer Training PT LTG, Time to Achieve 5 days   Transfer Training PT LTG, Activity Type all transfers   Transfer Training PT LTG, Gem Level independent   Transfer Training PT LTG, Assist Device walker, rolling       Goal: Gait Training Goal LTG- PT    02/03/17 0852   Gait Training PT LTG   Gait Training Goal PT LTG, Date Established 02/03/17   Gait Training Goal PT LTG, Time to Achieve 5 days   Gait Training Goal PT LTG, Gem Level independent   Gait Training Goal PT LTG, Assist Device walker, rolling   Gait Training Goal PT LTG, Distance to Achieve 150 feet

## 2017-02-03 NOTE — PROGRESS NOTES
Vascular Lab    LLE Venous doppler completed    Preliminary exam is Negative for DVT    Results to CATRINA Ellis RVT

## 2017-02-03 NOTE — PROGRESS NOTES
Discharge Planning Assessment  [facility]     Patient Name: Kim Feldman  MRN: [id number]  Today's Date: 2/3/2017    Admit Date: 2/2/2017          Discharge Needs Assessment       02/03/17 1130    Living Environment    Lives With child(mary), adult    Living Arrangements condominium    Provides Primary Care For no one    Primary Care Provided By child(mary) (specify)    Quality Of Family Relationships helpful;involved;supportive    Able to Return to Prior Living Arrangements yes    Discharge Needs Assessment    Concerns To Be Addressed no discharge needs identified    Anticipated Changes Related to Illness none    Equipment Currently Used at Home walker, rolling;wheelchair    Equipment Needed After Discharge none    Transportation Available family or friend will provide;car            Discharge Plan       02/03/17 1131    Case Management/Social Work Plan    Plan Home no needs    Additional Comments IMM 02/02  Spoke with patient and daughter in room.  I introduced myself and explained CCP role.  Verified face sheet and confirmed that patient obtains her medications from [employer] in [place] .  Pt is living with her daughter in a one story condo.  Pt uses a rolling walker to ambulate.  She has no difficulty getting around in their house.  She has history of [facility] and has had rehab at [facility] and [facility].  She is normally independent.  Pt plans home with no needs.  May consider HH if needed at [place].  CCP will follow.        Discharge Placement     No information found                Demographic Summary     None            Functional Status       02/03/17 1117    Functional Status Current    Ambulation 3-->assistive equipment and person    Transferring 3-->assistive equipment and person    Toileting 1-->assistive equipment    Bathing 2-->assistive person    Dressing 2-->assistive person    Eating 0-->independent    Communication 0-->understands/communicates without difficulty    Swallowing (if  score 2 or more for any item, consult Rehab Services) 0-->swallows foods/liquids without difficulty    Change in Functional Status Since Onset of Current Illness/Injury yes    Functional Status Prior    Ambulation 1-->assistive equipment    Transferring 1-->assistive equipment    Toileting 1-->assistive equipment    Bathing 0-->independent    Dressing 0-->independent    Eating 0-->independent    Communication 0-->understands/communicates without difficulty    Swallowing 0-->swallows foods/liquids without difficulty    Cognitive/Perceptual/Developmental    Current Mental Status/Cognitive Functioning no deficits noted    Recent Changes in Mental Status/Cognitive Functioning no changes            Psychosocial     None            Abuse/Neglect     None            Legal     None            Substance Abuse     None            Patient Forms     None          Graciela Borrego, RN

## 2017-02-03 NOTE — PROGRESS NOTES
Acute Care - Physical Therapy Initial Evaluation  [facility]     Patient Name: Kim Feldman  : 3/13/1930  MRN: [id number]  Today's Date: 2/3/2017   Onset of Illness/Injury or Date of Surgery Date: 17  Date of Referral to PT: 17  Referring Physician: Luis Felipe Heath      Admit Date: 2017     Visit Dx:    ICD-10-CM ICD-9-CM   1. Pneumonia of right lower lobe due to infectious organism J18.9 483.8   2. Generalized weakness R53.1 780.79   3. Muscle weakness (generalized) M62.81 728.87     Patient Active Problem List   Diagnosis   • Anemia   • Bulging lumbar disc   • Depression, endogenous   • Dysphagia   • Edema   • Gastroesophageal reflux disease   • Hyperlipidemia   • Hypertension   • Intermittent claudication   • Neuralgia   • Osteoarthritis of knee   • Syndrome of inappropriate secretion of antidiuretic hormone   • Vitamin D deficiency   • Pneumonia of right lower lobe due to infectious organism   • History of sick sinus syndrome   • Intertrigo   • Generalized convulsive seizures   • Urine frequency   • Change in bowel habits   • Urinary tract infection   • Pacemaker   • Zenker's diverticulum   • Zenker diverticulum   • History of anxiety   • Urinary tract infection in female   • Clonic seizure disorder   • History of aspiration pneumonitis   • Thrombocytopenia   • B12 deficiency   • Pain of toe of right foot   • Right lower lobe pneumonia   • Sepsis due to pneumonia   • Weakness   • Edema of left lower extremity     Past Medical History   Diagnosis Date   • Anemia    • Bulging lumbar disc    • Chronic cough    • Chronic UTI    • Chronic venous insufficiency    • Clonic seizure disorder    • Dementia without behavioral disturbance      moderate   • Depression, endogenous    • Disc degeneration, lumbar    • Dysphagia    • GERD (gastroesophageal reflux disease)    • Hiatal hernia    • History of anxiety    • History of aspiration pneumonitis    • History of cerebral artery occlusion       "CVA (following TIA), 10/10, treated with tPA   • History of herpes zoster    • History of ingrowing nail    • History of osteoporosis    • History of poliomyelitis      child   • History of sciatica    • History of sick sinus syndrome      s/p PPM   • History of transient cerebral ischemia      followed by stroke in 10/2010. BIBI was normal.   • HX: long term anticoagulant use    • Hyperlipidemia    • Hypertension    • Hyponatremia    • Intertrigo    • Lumbar radiculopathy    • Morbid obesity    • Neuralgia      with diplopia secondary to facial shingles   • Nonepileptic episode    • Osteoarthritis of right knee    • Pacemaker    • Rash      ON ABDOMEN   • Seeing double      secondary to shingles on the face also with neuralgia   • Serum potassium elevated    • SIADH (syndrome of inappropriate ADH production)    • Vitamin D deficiency      Past Surgical History   Procedure Laterality Date   • Hand surgery Right    • Lumbar laminectomy       L-3 L4 L4 L5   • Cardiac pacemaker placement       secondary to SSS, placed in 2004, with generator change in 2014   • Tonsillectomy     • Laminectomy for implantation / placement neurostimulator electrodes     • Knee arthroplasty Left    • Cataract extraction     • Zenkers diverticulectomy N/A 9/27/2016     Procedure: ENDOSCOPIC REMOVAL OF ZENKERS DIVERTICULUM W/ WERDASCOPE;  Surgeon: Oswald Barth MD;  Location: [facility] OR;  Service:           PT ASSESSMENT (last 72 hours)      PT Evaluation       02/03/17 0846 02/02/17 1069    Rehab Evaluation    Document Type evaluation  -MS     Subjective Information agree to therapy;complains of;weakness;fatigue;numbness  -MS     Patient Effort, Rehab Treatment adequate  -MS     Symptoms Noted Comment Pt. reports feeling very weak and fatigued this day but \"better\" overall compared to yesterday.  Pt. also reports chronic Left L.E. numbness/tingling  -MS     General Information    Onset of Illness/Injury or Date of Surgery Date " 02/02/17  -MS     Referring Physician Luis Felipe Heath  -MS     Pertinent History Of Current Problem Pt. admitted with general weakness; Right Lower Lobe PNA  -MS     Precautions/Limitations fall precautions   Exit Alarm  -MS     Prior Level of Function independent:  -MS     Equipment Currently Used at Home none  -MS     Plans/Goals Discussed With patient;agreed upon  -MS     Risks Reviewed patient:  -MS     Benefits Reviewed patient:  -MS     Barriers to Rehab none identified  -MS     Living Environment    Lives With  child(mary), adult  -EO    Living Arrangements  condominium  -EO    Home Accessibility  no concerns  -EO    Stair Railings at Home  none  -EO    Type of Financial/Environmental Concern  none  -EO    Transportation Available  family or friend will provide;car  -EO    Clinical Impression    Date of Referral to PT 02/03/17  -MS     Criteria for Skilled Therapeutic Interventions Met treatment indicated  -MS     Rehab Potential good, to achieve stated therapy goals  -MS     Pain Assessment    Pain Assessment No/denies pain  -MS     Cognitive Assessment/Intervention    Current Cognitive/Communication Assessment functional  -MS     Orientation Status oriented x 4  -MS     Follows Commands/Answers Questions 100% of the time  -MS     Personal Safety WNL/WFL  -MS     Personal Safety Interventions fall prevention program maintained;gait belt;nonskid shoes/slippers when out of bed;supervised activity  -MS     ROM (Range of Motion)    General ROM no range of motion deficits identified  -MS     MMT (Manual Muscle Testing)    General MMT Assessment --   BUE/LE MMT (3+/5)  -MS     Bed Mobility, Assessment/Treatment    Bed Mob, Supine to Sit, [place] contact guard assist  -MS     Transfer Assessment/Treatment    Transfers, Sit-Stand [place] contact guard assist  -MS     Transfers, Stand-Sit [place] contact guard assist  -MS     Transfers, Sit-Stand-Sit, Assist Device rolling walker  -MS     Gait  Assessment/Treatment    Gait, [place] Level contact guard assist  -MS     Gait, Assistive Device rolling walker  -MS     Gait, Distance (Feet) 12  -MS     Gait, Gait Deviations jose decreased;forward flexed posture;step length decreased  -MS     Gait, Safety Issues sequencing ability decreased;step length decreased  -MS     Gait, Comment Pt. limited by general weakness and fatigue.  Verbal/tactile cues for posture correction and RWX guidance.  -MS     Balance Skills Training    Sitting-Level of Assistance Independent  -MS     Sitting-Balance Support Feet supported  -MS     Therapy Exercises    Bilateral Lower Extremities AROM:;10 reps;standing;heel raises;mini squats  -MS     Positioning and Restraints    Pre-Treatment Position in bed  -MS     Post Treatment Position chair  -MS     In Chair notified nsg;sitting;call light within reach;encouraged to call for assist;exit alarm on   All lines intact. V.S.S.  -MS       02/02/17 3300       General Information    Equipment Currently Used at Home walker, rolling;grab bar;cane, straight;shower chair  -EO       User Key  (r) = Recorded By, (t) = Taken By, (c) = Cosigned By    Initials Name Provider Type    EO Susannah Almanzar, RN Registered Nurse    MS Luis Felipe Walker, PT Physical Therapist          Physical Therapy Education     Title: PT OT SLP Therapies (Done)     Topic: Physical Therapy (Done)     Point: Mobility training (Done)    Learning Progress Summary    Learner Readiness Method Response Comment Documented by Status   Patient Acceptance MEREDITH CAMP NR  MS 02/03/17 0852 Done               Point: Home exercise program (Done)    Learning Progress Summary    Learner Readiness Method Response Comment Documented by Status   Patient Acceptance EMREDITH CAMP NR  MS 02/03/17 0852 Done               Point: Body mechanics (Done)    Learning Progress Summary    Learner Readiness Method Response Comment Documented by Status   Patient Acceptance MEREDITH CAMP NR  MS 02/03/17 0852 Done                Point: Precautions (Done)    Learning Progress Summary    Learner Readiness Method Response Comment Documented by Status   Patient Acceptance E,D LILO,NR  MS 02/03/17 0852 Done                      User Key     Initials Effective Dates Name Provider Type Discipline    MS 12/01/15 -  Luis Felipe Walker, PT Physical Therapist PT                PT Recommendation and Plan  Anticipated Equipment Needs At Discharge:  (Pt. owns a Rollator for home/community ambulation.)  Anticipated Discharge Disposition: home with assist, home with home health  Planned Therapy Interventions: balance training, bed mobility training, gait training, patient/family education, postural re-education, strengthening, transfer training  PT Frequency: daily  Plan of Care Review  Plan Of Care Reviewed With: patient  Outcome Summary/Follow up Plan: Pt. will benefit from skilled inpt. P.T. to address her functional deficits and to assist pt. in regaining her independence with functional mobility.          IP PT Goals       02/03/17 0852          Bed Mobility PT LTG    Bed Mobility PT LTG, Date Established 02/03/17  -MS      Bed Mobility PT LTG, Time to Achieve 5 days  -MS      Bed Mobility PT LTG, Activity Type all bed mobility  -MS      Bed Mobility PT LTG, [place] Level independent  -MS      Transfer Training PT LTG    Transfer Training PT LTG, Date Established 02/03/17  -MS      Transfer Training PT LTG, Time to Achieve 5 days  -MS      Transfer Training PT LTG, Activity Type all transfers  -MS      Transfer Training PT LTG, [place] Level independent  -MS      Transfer Training PT LTG, Assist Device walker, rolling  -MS      Gait Training PT LTG    Gait Training Goal PT LTG, Date Established 02/03/17  -MS      Gait Training Goal PT LTG, Time to Achieve 5 days  -MS      Gait Training Goal PT LTG, [place] Level independent  -MS      Gait Training Goal PT LTG, Assist Device walker, rolling  -MS      Gait Training Goal PT LTG,  Distance to Achieve 150 feet  -MS        User Key  (r) = Recorded By, (t) = Taken By, (c) = Cosigned By    Initials Name Provider Type    MS Vyasbrit EDOUARD Denise PT Physical Therapist                Outcome Measures       02/03/17 0800          How much help from another person do you currently need...    Turning from your back to your side while in flat bed without using bedrails? 3  -MS      Moving from lying on back to sitting on the side of a flat bed without bedrails? 3  -MS      Moving to and from a bed to a chair (including a wheelchair)? 3  -MS      Standing up from a chair using your arms (e.g., wheelchair, bedside chair)? 3  -MS      Climbing 3-5 steps with a railing? 3  -MS      To walk in hospital room? 3  -MS      AM-PAC 6 Clicks Score 18  -MS      Functional Assessment    Outcome Measure Options AM-PAC 6 Clicks Basic Mobility (PT)  -MS        User Key  (r) = Recorded By, (t) = Taken By, (c) = Cosigned By    Initials Name Provider Type    MS Luis Felipe Walker PT Physical Therapist           Time Calculation:         PT Charges       02/03/17 0854          Time Calculation    Start Time 0825  -MS      Stop Time 0840  -MS      Time Calculation (min) 15 min  -MS      PT Received On 02/03/17  -MS      PT - Next Appointment 02/04/17  -MS      PT Goal Re-Cert Due Date 02/08/17  -MS        User Key  (r) = Recorded By, (t) = Taken By, (c) = Cosigned By    Initials Name Provider Type    MS Luis Felipe Walker PT Physical Therapist          Therapy Charges for Today     Code Description Service Date Service Provider Modifiers Qty    82858805461 HC PT EVAL MOD COMPLEXITY 2 2/3/2017 Luis Felipe Walker, PT GP 1    46264440653 HC PT THER PROC EA 15 MIN 2/3/2017 Luis Felipe Walker, PT GP 1          PT G-Codes  Outcome Measure Options: AM-PAC 6 Clicks Basic Mobility (PT)      Luis Felipe Walker PT  2/3/2017

## 2017-02-03 NOTE — ED NOTES
Patient states that she has had some weakness for awhile but the night before last the weakness got worse. Patient reports that due to the weakness she fell. Was walking from the foot of the bed to the head of the bed when her legs gave out. States that she did not hit her head and denies any pain.      Gracy Zamudio RN  02/02/17 2033

## 2017-02-03 NOTE — PROGRESS NOTES
"Pharmacokinetic Consult - Vancomycin Dosing (Initial Note)    Kim Feldman has been consulted for pharmacy to dose vancomycin for sepsis secondary to RLL HCAP per Dr. Elam' request. Goal trough: 15-20 mg/L.     Other antimicrobials:   Levofloxacin 500 mg IV q24h  Aztreonam 2 g IV q8h    Relevant clinical data and objective history reviewed:  86 y.o. female 64\" (162.6 cm) 211 lb (95.7 kg)  Recently hospitalized with enterobacter UTI. CXR confirmed diagnosis of PNA with infiltrate present in right lung base.     CREATININE   Date Value Ref Range Status   02/03/2017 1.49 (H) 0.57 - 1.00 mg/dL Final   02/02/2017 2.03 (H) 0.57 - 1.00 mg/dL Final     BUN   Date Value Ref Range Status   02/03/2017 35 (H) 8 - 23 mg/dL Final     Estimated Creatinine Clearance: 30.4 mL/min (by C-G formula based on Cr of 1.49).    Lab Results   Component Value Date    WBC 9.53 02/03/2017     Temp Readings from Last 3 Encounters:   02/03/17 97.3 °F (36.3 °C) (Oral)      Baseline culture/source/susceptibility:   2/2: Urine culture-in process  2/3: Blood cultures x 2-in process  2/3: Respiratory panel-nothing detected  2/3 MRSA panel-in process    Assessment/Plan    1. Patient received a loading dose of 1500 mg (~16 mg/kg) IV x1 in the ED @ 0518 on 2/3. Will start a maintenance dose of 1250 mg (13 mg/kg) IV q24h twelve hours after loading dose at 1700 today.     Will monitor serum creatinine every 24 hours for the first 3 days then at least every 48 hours per dosing recommendations. SCr is 1.49 today/ Vancomycin level will be due before the 4th dose when vancomycin is at steady state (not ordered yet).     Pharmacy will continue to follow daily while on vancomycin and adjust as needed.     Thank you for the consult,  Miya Mcgee, Pharm.D., BCPS    Loading dose was scanned at 0518, but was never started was the overnight nurse. Day nurse noticed this, and gave loading dose at 1430. Will start maintenance dose at 0400 tomorrow " morning.    Miya Mcgee, Pharm.D., BCPS

## 2017-02-03 NOTE — H&P
"    Name: Kim Feldman ADMIT: 2017   : 3/13/1930  PCP: Sharad Salinas MD    MRN: [id number] LOS: 1 days   AGE/SEX: 86 y.o. female  ROOM: [facility]/1     Chief Complaint   Patient presents with   • Weakness - Generalized     going on for that past 24 hours   • Fall   • Speech Problem     last known normal speech last night       Subjective   Patient is a 86 y.o. female who presents with generalized weakness for the past few days, stating that her legs \"just give out\".  She has had a couple mechanical falls over this time but no significant trauma.  During this time, she has also had a cough productive of clear-yellow sputum.  She denies sick contacts.  She was recently hospitalized at this facility with an Enterobacter UTI treated with levofloxacin.     History of Present Illness    Past Medical History   Diagnosis Date   • Anemia    • Bulging lumbar disc    • Chronic cough    • Chronic UTI    • Chronic venous insufficiency    • Clonic seizure disorder    • Dementia without behavioral disturbance      moderate   • Depression, endogenous    • Disc degeneration, lumbar    • Dysphagia    • GERD (gastroesophageal reflux disease)    • Hiatal hernia    • History of anxiety    • History of aspiration pneumonitis    • History of cerebral artery occlusion      CVA (following TIA), 10/10, treated with tPA   • History of herpes zoster    • History of ingrowing nail    • History of osteoporosis    • History of poliomyelitis      child   • History of sciatica    • History of sick sinus syndrome      s/p PPM   • History of transient cerebral ischemia      followed by stroke in 10/2010. BIBI was normal.   • HX: long term anticoagulant use    • Hyperlipidemia    • Hypertension    • Hyponatremia    • Intertrigo    • Lumbar radiculopathy    • Morbid obesity    • Neuralgia      with diplopia secondary to facial shingles   • Nonepileptic episode    • Osteoarthritis of right knee    • Pacemaker    • Rash      ON ABDOMEN   • Seeing " double      secondary to shingles on the face also with neuralgia   • Serum potassium elevated    • SIADH (syndrome of inappropriate ADH production)    • Vitamin D deficiency      Past Surgical History   Procedure Laterality Date   • Hand surgery Right    • Lumbar laminectomy       L-3 L4 L4 L5   • Cardiac pacemaker placement       secondary to SSS, placed in 2004, with generator change in 2014   • Tonsillectomy     • Laminectomy for implantation / placement neurostimulator electrodes     • Knee arthroplasty Left    • Cataract extraction     • Zenkers diverticulectomy N/A 9/27/2016     Procedure: ENDOSCOPIC REMOVAL OF ZENKERS DIVERTICULUM W/ WERDASCOPE;  Surgeon: Oswald Barth MD;  Location: [facility] OR;  Service:      History reviewed. No pertinent family history.  Social History   Substance Use Topics   • Smoking status: Former Smoker     Years: 40.00     Types: Cigarettes   • Smokeless tobacco: Never Used      Comment: QUIT 1992   • Alcohol use No      Comment: VERY RARE     Facility-Administered Medications Prior to Admission   Medication Dose Route Frequency Provider Last Rate Last Dose   • cyanocobalamin injection 1,000 mcg  1,000 mcg Intramuscular Q30 Days Sharad Salinas MD         Prescriptions Prior to Admission   Medication Sig Dispense Refill Last Dose   • B Complex Vitamins (VITAMIN B COMPLEX PO) Take 1 tablet by mouth every morning. HOLD PRIOR TO SURG   Taking   • Calcium-Vitamin D-Vitamin K (VIACTIV PO) Take 500 mg by mouth Every Night. HOLD PRIOR TO SURG    Taking   • carvedilol (COREG) 3.125 MG tablet TAKE ONE TABLET BY MOUTH TWICE A DAY WITH MEALS 60 tablet 2 Taking   • clopidogrel (PLAVIX) 75 MG tablet TAKE ONE TABLET BY MOUTH DAILY 90 tablet 0    • clotrimazole-betamethasone (LOTRISONE) 1-0.05 % cream Apply  topically 2 (Two) Times a Day. (Patient taking differently: Apply  topically 2 (Two) Times a Day As Needed.) 45 g 0 Taking   • diclofenac (VOLTAREN) 1 % gel gel Apply 4 g topically 4  (four) times a day as needed.   Taking   • gabapentin (NEURONTIN) 300 MG capsule Take 2 capsules by mouth 3 (Three) Times a Day. 540 capsule 2    • Hypromellose (ARTIFICIAL TEARS OP) Apply 2 drops to eye 4 (four) times a day as needed.   Taking   • lansoprazole (PREVACID) 30 MG capsule TAKE ONE CAPSULE BY MOUTH EVERY MORNING (Patient taking differently: take one capsule by mouth every evening) 30 capsule 1 Taking   • levETIRAcetam (KEPPRA) 500 MG tablet Take 500 mg by mouth 2 (two) times a day.   Taking   • Loratadine 10 MG capsule Take 1 capsule by mouth daily as needed.   Taking   • methylcellulose, Laxative, (CITRUCEL) 500 MG tablet tablet Take 2 tablets by mouth Every Morning.   Taking   • mirtazapine (REMERON) 15 MG tablet Take 1 tablet by mouth Every Night. 30 tablet 2    • nystatin (MYCOSTATIN) 234299 UNIT/GM cream Apply  topically As Needed.   Taking   • pseudoephedrine-guaifenesin (MUCINEX D)  MG per 12 hr tablet Take 1 tablet by mouth At Night As Needed for allergies.        Allergies:  Penicillins    Review of Systems   Constitutional: Positive for fatigue. Negative for chills and fever.   HENT: Negative for congestion, sore throat and trouble swallowing.    Eyes: Negative for photophobia and visual disturbance.   Respiratory: Positive for cough and chest tightness. Negative for choking and shortness of breath.    Cardiovascular: Positive for leg swelling. Negative for chest pain and palpitations.   Gastrointestinal: Negative for abdominal pain, diarrhea, nausea and vomiting.   Endocrine: Negative for cold intolerance and heat intolerance.   Genitourinary: Negative for decreased urine volume, difficulty urinating and dysuria.   Musculoskeletal: Negative for arthralgias and myalgias.   Skin: Negative for pallor and rash.   Neurological: Positive for weakness. Negative for syncope and headaches.   Hematological: Negative for adenopathy. Does not bruise/bleed easily.   Psychiatric/Behavioral: Negative  for confusion and decreased concentration.        Objective    Vital Signs  Temp:  [97.9 °F (36.6 °C)-98.4 °F (36.9 °C)] 97.9 °F (36.6 °C)  Heart Rate:  [72-96] 77  Resp:  [16-18] 18  BP: ()/(53-88) 151/88  SpO2:  [94 %-98 %] 97 %  on   ;   O2 Device: room air  Body mass index is 36.22 kg/(m^2).    Physical Exam   Constitutional: She is oriented to person, place, and time. No distress.   HENT:   Head: Normocephalic and atraumatic.   Mouth/Throat: Oropharynx is clear and moist.   Eyes: Conjunctivae and EOM are normal. Pupils are equal, round, and reactive to light.   Neck: Normal range of motion. Neck supple. No JVD present.   Cardiovascular: Normal rate, regular rhythm and intact distal pulses.    Pulmonary/Chest: Effort normal. She has rhonchi.   Abdominal: Soft. Bowel sounds are normal.   Musculoskeletal: She exhibits edema and tenderness.   Left lower extremity edema and tenderness   Neurological: She is alert and oriented to person, place, and time. She has normal strength. No cranial nerve deficit. She exhibits normal muscle tone.   Skin: Skin is warm and dry. She is not diaphoretic.   Chronic venous stasis of LLE  Left knee with well-healed surgical scar   Psychiatric: She has a normal mood and affect. Her behavior is normal.   Nursing note and vitals reviewed.      Results Review:   I reviewed the patient's new clinical results.  Imaging Results (last 24 hours)     Procedure Component Value Units Date/Time    XR Chest 1 View [[id number]] Collected:  02/02/17 2030     Updated:  02/02/17 2034    Narrative:       XR CHEST 1 VW-     Clinical: Acute mental status change     COMPARISON 01/04/2017     FINDINGS: Transvenous pacemaker is in position. Cardiomediastinal  silhouette is stable. There is ectasia of the thoracic aorta. Cardiac  size upper normal. There is a new patchy infiltrate demonstrated at the  right lung base. Left lung is clear. No pulmonary edema or gross  effusion is demonstrated.      CONCLUSION: Right base infiltrate.     This report was finalized on 2/2/2017 8:31 PM by Dr. Baron Tran MD.       CT Head Without Contrast [[id number]] Collected:  02/02/17 2137     Updated:  02/02/17 2138    Narrative:       CT SCAN OF THE BRAIN WITHOUT CONTRAST.     TECHNIQUE: Multiple axial images of the brain were obtained from the  vertex to the base of the brain.     HISTORY:  Weakness.     COMPARISON:  1/3/2017.     FINDINGS:   Midline structures are within normal limits, there is no hydrocephalus.   Gray-white matter differentiation is maintained.  Chronic changes of  small vessel ischemic disease and cortical atrophy.     Orbits are within normal limits. No significant mucosal disease of the  para-nasal sinuses. Mastoid air cells are well aerated.          Impression:       No acute intracranial pathology.                        Assessment/Plan     Principal Problem:    Sepsis due to pneumonia  Active Problems:    Hypertension    Right lower lobe pneumonia    Weakness    Edema of left lower extremity      Assessment & Plan  Sepsis due to HCAP  -WBC>12,000, HR>90 on admission; prcal 0.8, lactate 1.1  -start on vanc/aztreonam/levofloxacin  -cultures, antigens, MRSA screen  -got IVF in ER, will continue gentle IVF with endpoint  -pulm toilet    General weakness  -likely from above, has chronic neuropathy and spinal stenosis, no neuro deficits  -PT, monitor on above treatment    Left lower extremity edema  -chronic, likely from previous knee replacement  -check venous doppler    DVT prophylaxis  -heparin    Code status  -discussed with the patient and she is DNR    I discussed the patients findings and my recommendations with patient and nursing staff.          Luis Felipe Elam MD  [facility]  02/03/2017  3:28AM

## 2017-02-03 NOTE — ED PROVIDER NOTES
EMERGENCY DEPARTMENT ENCOUNTER       CHIEF COMPLAINT  Chief Complaint: Generalized Weakness  History given by: Patient, Daughter  History limited by: N/A  Room Number: 18/18  PMD: Sharad Salinas MD      HPI:  HPI Comments: Pt c/o generalized weakness onset several weeks ago, which has been gradually worsening over the past 2-3 days. Pt states that she has also had falls secondary to the generalized weakness (but has not sustained any significant injuries). Pt denies focal weakness/numbness, visual difficulties, diarrhea, and CP, but reports that she has had a productive cough and decreased appetite. At baseline, pt uses walker to ambulate. Per daughter, pt was admitted in early 01/2017 for a UTI. Daughter states that pt's sx today are similar to when pt has UTIs.  There are no other complaints at this time.     Patient is a 86 y.o. female presenting with weakness.   Weakness - Generalized   Severity:  Moderate  Onset quality:  Gradual  Duration: onset several weeks ago.  Timing:  Constant  Progression:  Worsening (gradually worsening over the past 2-3 days)  Chronicity:  Recurrent (sx similar to when pt has UTIs)  Context: not stress    Relieved by:  Nothing  Worsened by:  Nothing  Associated symptoms: cough (productive), difficulty walking (secondary to generalized weakness) and falls    Associated symptoms: no abdominal pain, no arthralgias, no chest pain, no diarrhea, no dizziness, no dysuria, no frequency, no headaches, no myalgias, no nausea, no shortness of breath and no vomiting          PAST MEDICAL HISTORY  Active Ambulatory Problems     Diagnosis Date Noted   • Anemia 02/05/2016   • Bulging lumbar disc 02/05/2016   • Depression, 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   • Dysphagia 02/05/2016   • Edema 02/05/2016   • Gastroesophageal reflux disease 02/05/2016   • Hyperlipidemia 02/05/2016   • Hypertension 02/05/2016   • Intermittent claudication 02/05/2016   • Neuralgia 02/05/2016   • Osteoarthritis of knee 02/05/2016   •  Syndrome of inappropriate secretion of antidiuretic hormone 02/05/2016   • Vitamin D deficiency 02/05/2016   • History of sick sinus syndrome    • Intertrigo 03/25/2016   • 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   • Urine frequency 05/20/2016   • Change in bowel habits 05/20/2016   • Urinary tract infection 07/08/2016   • Pacemaker 07/10/2016   • Zenker's diverticulum 07/11/2016   • 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   • History of anxiety 10/18/2016   • Urinary tract infection in female 01/03/2017   • 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   • History of aspiration pneumonitis 01/04/2017   • Thrombocytopenia 01/05/2017   • B12 deficiency 01/16/2017   • Pain of toe of right foot 01/16/2017     Resolved Ambulatory Problems     Diagnosis Date Noted   • Hyponatremia 02/05/2016   • Pneumonia of right lower lobe due to infectious organism 02/05/2016   • Acute URI 05/27/2016   • Pneumonia 07/08/2016   • Aspiration pneumonia 07/17/2016   • Acute URI 09/02/2016     Past Medical History   Diagnosis Date   • Chronic cough    • Chronic UTI    • Chronic venous insufficiency    • Dementia without behavioral disturbance    • Disc degeneration, lumbar    • GERD (gastroesophageal reflux disease)    • Hiatal hernia    • History of cerebral artery occlusion    • History of herpes zoster    • History of ingrowing nail    • History of osteoporosis    • History of poliomyelitis    • History of sciatica    • History of transient cerebral ischemia    • HX: long term anticoagulant use    • Lumbar radiculopathy    • Morbid obesity    • Nonepileptic episode    • Osteoarthritis of right knee    • Rash    • Seeing double    • Serum potassium elevated    • SIADH (syndrome of inappropriate ADH production)          PAST SURGICAL HISTORY  Past Surgical History   Procedure Laterality Date   • Hand surgery Right    • Lumbar laminectomy       L-3 L4 L4 L5   • Cardiac pacemaker placement       secondary to SSS, placed in 2004, with generator change in  2014   • Tonsillectomy     • Laminectomy for implantation / placement neurostimulator electrodes     • Knee arthroplasty Left    • Cataract extraction     • Zenkers diverticulectomy N/A 9/27/2016     Procedure: ENDOSCOPIC REMOVAL OF ZENKERS DIVERTICULUM W/ WERDASCOPE;  Surgeon: Oswald Barth MD;  Location: [facility] OR;  Service:          FAMILY HISTORY  History reviewed. No pertinent family history.      SOCIAL HISTORY  Social History     Social History   • Marital status:      Spouse name: N/A   • Number of children: 3   • Years of education: N/A     Occupational History   • Retired      Social History Main Topics   • Smoking status: Former Smoker     Years: 40.00     Types: Cigarettes   • Smokeless tobacco: Never Used      Comment: QUIT 1992   • Alcohol use No      Comment: VERY RARE   • Drug use: No   • Sexual activity: Defer     Other Topics Concern   • Not on file     Social History Narrative         ALLERGIES  Penicillins        REVIEW OF SYSTEMS  Review of Systems   Constitutional: Positive for appetite change (decreased). Negative for chills and fatigue.   HENT: Negative for congestion, rhinorrhea and sore throat.    Eyes: Negative for pain.   Respiratory: Positive for cough (productive). Negative for shortness of breath and wheezing.    Cardiovascular: Negative for chest pain, palpitations and leg swelling.   Gastrointestinal: Negative for abdominal pain, diarrhea, nausea and vomiting.   Genitourinary: Negative for difficulty urinating, dysuria, flank pain and frequency.   Musculoskeletal: Positive for falls. Negative for arthralgias, myalgias, neck pain and neck stiffness.   Skin: Negative for rash.   Neurological: Positive for weakness (generalized; not focal). Negative for dizziness, light-headedness, numbness and headaches.   Psychiatric/Behavioral: Negative.    All other systems reviewed and are negative.           PHYSICAL EXAM  ED Triage Vitals   Temp Heart Rate Resp BP SpO2   02/02/17  2007 02/02/17 2007 02/02/17 2007 02/02/17 2007 02/02/17 2007   98.4 °F (36.9 °C) 96 16 95/53 98 % WNL      Temp src Heart Rate Source Patient Position BP Location FiO2 (%)   02/02/17 2007 -- -- -- --   Tympanic           Physical Exam   Constitutional: She is oriented to person, place, and time. No distress.   HENT:   Head: Normocephalic.   Mouth/Throat: Mucous membranes are normal.   Eyes: EOM are normal. Pupils are equal, round, and reactive to light.   Neck: Normal range of motion. Neck supple.   Cardiovascular: Normal rate, regular rhythm and normal heart sounds.    Pulmonary/Chest: Effort normal and breath sounds normal. No respiratory distress. She has no decreased breath sounds. She has no wheezes. She has no rhonchi. She has no rales.   Abdominal: Soft. There is no tenderness. There is no rebound and no guarding.   Musculoskeletal: Normal range of motion.   Neurological: She is alert and oriented to person, place, and time. She has normal sensation. She displays weakness (There is weakness to the proximal muscles of the lower extremities bilaterally. ).   Skin: Skin is warm and dry.   Psychiatric: Mood and affect normal.   Nursing note and vitals reviewed.          LAB RESULTS  Recent Results (from the past 24 hour(s))   Comprehensive Metabolic Panel    Collection Time: 02/02/17  8:38 PM   Result Value Ref Range    Glucose 115 (H) 65 - 99 mg/dL    BUN 34 (H) 8 - 23 mg/dL    Creatinine 2.03 (H) 0.57 - 1.00 mg/dL    Sodium 140 136 - 145 mmol/L    Potassium 4.6 3.5 - 5.2 mmol/L    Chloride 100 98 - 107 mmol/L    CO2 25.5 22.0 - 29.0 mmol/L    Calcium 9.4 8.6 - 10.5 mg/dL    Total Protein 7.3 6.0 - 8.5 g/dL    Albumin 4.00 3.50 - 5.20 g/dL    ALT (SGPT) 13 1 - 33 U/L    AST (SGOT) 18 1 - 32 U/L    Alkaline Phosphatase 53 39 - 117 U/L    Total Bilirubin 0.9 0.1 - 1.2 mg/dL    eGFR Non African Amer 23 (L) >60 mL/min/1.73    Globulin 3.3 gm/dL    A/G Ratio 1.2 g/dL    BUN/Creatinine Ratio 16.7 7.0 - 25.0    Anion  Gap 14.5 mmol/L   Troponin    Collection Time: 02/02/17  8:38 PM   Result Value Ref Range    Troponin T 0.021 0.000 - 0.030 ng/mL   Magnesium    Collection Time: 02/02/17  8:38 PM   Result Value Ref Range    Magnesium 2.2 1.6 - 2.4 mg/dL   CBC Auto Differential    Collection Time: 02/02/17  8:38 PM   Result Value Ref Range    WBC 12.41 (H) 4.50 - 10.70 10*3/mm3    RBC 3.52 (L) 3.90 - 5.20 10*6/mm3    Hemoglobin 11.8 (L) 11.9 - 15.5 g/dL    Hematocrit 36.1 35.6 - 45.5 %    .6 (H) 80.5 - 98.2 fL    MCH 33.5 (H) 26.9 - 32.0 pg    MCHC 32.7 32.4 - 36.3 g/dL    RDW 12.5 11.7 - 13.0 %    RDW-SD 46.7 37.0 - 54.0 fl    MPV 9.1 6.0 - 12.0 fL    Platelets 150 140 - 500 10*3/mm3    Neutrophil % 79.7 (H) 42.7 - 76.0 %    Lymphocyte % 14.5 (L) 19.6 - 45.3 %    Monocyte % 5.0 5.0 - 12.0 %    Eosinophil % 0.4 0.3 - 6.2 %    Basophil % 0.1 0.0 - 1.5 %    Immature Grans % 0.3 0.0 - 0.5 %    Neutrophils, Absolute 9.89 (H) 1.90 - 8.10 10*3/mm3    Lymphocytes, Absolute 1.80 0.90 - 4.80 10*3/mm3    Monocytes, Absolute 0.62 0.20 - 1.20 10*3/mm3    Eosinophils, Absolute 0.05 0.00 - 0.70 10*3/mm3    Basophils, Absolute 0.01 0.00 - 0.20 10*3/mm3    Immature Grans, Absolute 0.04 (H) 0.00 - 0.03 10*3/mm3   Urinalysis With / Culture If Indicated    Collection Time: 02/02/17 10:24 PM   Result Value Ref Range    Color, UA Dark Yellow (A) Yellow, Straw    Appearance, UA Cloudy (A) Clear    pH, UA 5.0 5.0 - 8.0    Specific Gravity, UA 1.023 1.005 - 1.030    Glucose, UA Negative Negative    Ketones, UA Negative Negative    Bilirubin, UA Negative Negative    Blood, UA Negative Negative    Protein, UA 30 mg/dL (1+) (A) Negative    Leuk Esterase, UA Small (1+) (A) Negative    Nitrite, UA Negative Negative    Urobilinogen, UA 0.2 E.U./dL 0.2 - 1.0 E.U./dL       Ordered the above labs and reviewed the results.        RADIOLOGY  CT Head Without Contrast   Preliminary Result   No acute intracranial pathology. Interpreted by radiologist.  Independently viewed by me.                   XR Chest 1 View   Final Result    FINDINGS: Transvenous pacemaker is in position. Cardiomediastinal  silhouette is stable. There is ectasia of the thoracic aorta. Cardiac  size upper normal. There is a new patchy infiltrate demonstrated at the  right lung base. Left lung is clear. No pulmonary edema or gross  effusion is demonstrated.    Interpreted by radiologist. Independently viewed by me.                Ordered the above noted radiological studies. Reviewed by me in PACS.       PROCEDURES  Procedures    EKG:           EKG time: 21:01  Rhythm/Rate: NSR rate 77  1st degree AV block.   Ventricular trigeminy.   QT interval is slightly prolonged.     Interpreted Contemporaneously by me, independently viewed  changed compared to prior 09/23/16 (trigeminy and prolonged QT are new)      PROGRESS AND CONSULTS  ED Course     8:40 PM: Ordered CT Head for further evaluation.     10:23 PM: Pt's CXR shows a right base infiltrate. Ordered Levaquin to treat for pneumonia. Placed call to A for admission. Decision time to admit: now.     10:41 PM: Discussed case with Dr. Elam, hospitalist. He will admit pt to a med/surg bed. Ordered lactic acid for further evaluation.               MEDICAL DECISION MAKING      MDM  Number of Diagnoses or Management Options  Generalized weakness:   Pneumonia of right lower lobe due to infectious organism:      Amount and/or Complexity of Data Reviewed  Clinical lab tests: ordered and reviewed (WBC is 12.41.)  Tests in the radiology section of CPT®: ordered and reviewed (CXR shows a right base infiltrate. )  Tests in the medicine section of CPT®: reviewed and ordered (EKG interpreted.   )  Discuss the patient with other providers: yes (Case d/w Dr. Elam, hospitalist, who will admit pt to a med/surg bed.   )    Patient Progress  Patient progress: stable             DIAGNOSIS  Final diagnoses:   Pneumonia of right lower lobe due to infectious  organism   Generalized weakness         DISPOSITION  Pt admitted to med/surg.      ADMISSION    Discussed treatment plan and reason for admission with pt/family and admitting physician.  Pt/family voiced understanding of the plan for admission for further testing/treatment as needed.       Latest Documented Vital Signs:  As of 11:08 PM  BP- 131/70 HR- 72 Temp- 98.2 °F (36.8 °C) (Tympanic) O2 sat- 94%        --  Documentation assistance provided by smita Stoddard for Dr. Gisselle MD.  Information recorded by the scribe was done at my direction and has been verified and validated by me.            Amado Stoddard  02/02/17 2579       Eloy Pitts MD  02/03/17 5870

## 2017-02-03 NOTE — PLAN OF CARE
Problem: Patient Care Overview (Adult)  Goal: Plan of Care Review  Outcome: Ongoing (interventions implemented as appropriate)    02/02/17 2337 02/03/17 0111   Patient Care Overview   Progress --  no change   Outcome Evaluation   Outcome Summary/Follow up Plan --  Pt admitted to  for RLL PNA. IV Levaquin and IVF ordered. Afebrile, VSS. Fall at home, falls precautions in place. Implement new orders as they come in. Need a sputum and urine sample. Continue to monitor.    Coping/Psychosocial Response Interventions   Plan Of Care Reviewed With patient --        Goal: Adult Individualization and Mutuality  Outcome: Ongoing (interventions implemented as appropriate)    Problem: Pneumonia (Adult)  Goal: Signs and Symptoms of Listed Potential Problems Will be Absent or Manageable (Pneumonia)  Outcome: Ongoing (interventions implemented as appropriate)    02/03/17 0111   Pneumonia   Problems Assessed (Pneumonia) all   Problems Present (Pneumonia) fluid/electrolyte imbalance         Problem: Fall Risk (Adult)  Goal: Identify Related Risk Factors and Signs and Symptoms  Outcome: Ongoing (interventions implemented as appropriate)    02/03/17 0111   Fall Risk   Fall Risk: Related Risk Factors age-related changes;history of falls;slipper/uneven surfaces;environment unfamiliar   Fall Risk: Signs and Symptoms presence of risk factors       Goal: Absence of Falls  Outcome: Ongoing (interventions implemented as appropriate)    02/03/17 0111   Fall Risk (Adult)   Absence of Falls making progress toward outcome  (SAFETY MAINTAINED-BED ALARM ON)

## 2017-02-03 NOTE — ED NOTES
Pt has left sided facial droop and ems report that daughter reports its normal     Marga Cooper, RN  02/02/17 2009

## 2017-02-03 NOTE — PLAN OF CARE
Problem: Patient Care Overview (Adult)  Goal: Plan of Care Review  Outcome: Ongoing (interventions implemented as appropriate)    02/03/17 1266   Patient Care Overview   Progress no change   Outcome Evaluation   Outcome Summary/Follow up Plan Up in chair most of day. Uses bedside commode frequently. Monitor output. IV antibiotics as ordered.   Coping/Psychosocial Response Interventions   Plan Of Care Reviewed With patient;daughter       Goal: Adult Individualization and Mutuality  Outcome: Ongoing (interventions implemented as appropriate)  Goal: Discharge Needs Assessment  Outcome: Ongoing (interventions implemented as appropriate)    Problem: Pneumonia (Adult)  Goal: Signs and Symptoms of Listed Potential Problems Will be Absent or Manageable (Pneumonia)  Outcome: Ongoing (interventions implemented as appropriate)    Problem: Fall Risk (Adult)  Goal: Identify Related Risk Factors and Signs and Symptoms  Outcome: Outcome(s) achieved Date Met:  02/03/17  Goal: Absence of Falls  Outcome: Ongoing (interventions implemented as appropriate)

## 2017-02-04 LAB
ANION GAP SERPL CALCULATED.3IONS-SCNC: 7.8 MMOL/L
BASOPHILS # BLD AUTO: 0.02 10*3/MM3 (ref 0–0.2)
BASOPHILS NFR BLD AUTO: 0.3 % (ref 0–1.5)
BUN BLD-MCNC: 20 MG/DL (ref 8–23)
BUN/CREAT SERPL: 20.6 (ref 7–25)
CALCIUM SPEC-SCNC: 8.8 MG/DL (ref 8.6–10.5)
CHLORIDE SERPL-SCNC: 104 MMOL/L (ref 98–107)
CO2 SERPL-SCNC: 26.2 MMOL/L (ref 22–29)
CREAT BLD-MCNC: 0.97 MG/DL (ref 0.57–1)
DEPRECATED RDW RBC AUTO: 46.8 FL (ref 37–54)
EOSINOPHIL # BLD AUTO: 0.19 10*3/MM3 (ref 0–0.7)
EOSINOPHIL NFR BLD AUTO: 3.2 % (ref 0.3–6.2)
ERYTHROCYTE [DISTWIDTH] IN BLOOD BY AUTOMATED COUNT: 12.2 % (ref 11.7–13)
GFR SERPL CREATININE-BSD FRML MDRD: 54 ML/MIN/1.73
GLUCOSE BLD-MCNC: 96 MG/DL (ref 65–99)
HCT VFR BLD AUTO: 33.2 % (ref 35.6–45.5)
HGB BLD-MCNC: 10.9 G/DL (ref 11.9–15.5)
IMM GRANULOCYTES # BLD: 0 10*3/MM3 (ref 0–0.03)
IMM GRANULOCYTES NFR BLD: 0 % (ref 0–0.5)
LYMPHOCYTES # BLD AUTO: 1.89 10*3/MM3 (ref 0.9–4.8)
LYMPHOCYTES NFR BLD AUTO: 31.9 % (ref 19.6–45.3)
MCH RBC QN AUTO: 34.4 PG (ref 26.9–32)
MCHC RBC AUTO-ENTMCNC: 32.8 G/DL (ref 32.4–36.3)
MCV RBC AUTO: 104.7 FL (ref 80.5–98.2)
MONOCYTES # BLD AUTO: 0.41 10*3/MM3 (ref 0.2–1.2)
MONOCYTES NFR BLD AUTO: 6.9 % (ref 5–12)
NEUTROPHILS # BLD AUTO: 3.41 10*3/MM3 (ref 1.9–8.1)
NEUTROPHILS NFR BLD AUTO: 57.7 % (ref 42.7–76)
PLATELET # BLD AUTO: 138 10*3/MM3 (ref 140–500)
PMV BLD AUTO: 9.6 FL (ref 6–12)
POTASSIUM BLD-SCNC: 4.6 MMOL/L (ref 3.5–5.2)
RBC # BLD AUTO: 3.17 10*6/MM3 (ref 3.9–5.2)
SODIUM BLD-SCNC: 138 MMOL/L (ref 136–145)
WBC NRBC COR # BLD: 5.92 10*3/MM3 (ref 4.5–10.7)

## 2017-02-04 PROCEDURE — 25010000002 VANCOMYCIN: Performed by: INTERNAL MEDICINE

## 2017-02-04 PROCEDURE — 25010000002 HEPARIN (PORCINE) PER 1000 UNITS: Performed by: INTERNAL MEDICINE

## 2017-02-04 PROCEDURE — 92610 EVALUATE SWALLOWING FUNCTION: CPT

## 2017-02-04 PROCEDURE — 25010000002 LEVOFLOXACIN PER 250 MG: Performed by: INTERNAL MEDICINE

## 2017-02-04 PROCEDURE — 85025 COMPLETE CBC W/AUTO DIFF WBC: CPT | Performed by: INTERNAL MEDICINE

## 2017-02-04 PROCEDURE — 80048 BASIC METABOLIC PNL TOTAL CA: CPT | Performed by: INTERNAL MEDICINE

## 2017-02-04 PROCEDURE — 97110 THERAPEUTIC EXERCISES: CPT

## 2017-02-04 PROCEDURE — 94640 AIRWAY INHALATION TREATMENT: CPT

## 2017-02-04 RX ADMIN — CLOPIDOGREL 75 MG: 75 TABLET, FILM COATED ORAL at 08:49

## 2017-02-04 RX ADMIN — LEVOFLOXACIN 500 MG: 5 INJECTION, SOLUTION INTRAVENOUS at 12:44

## 2017-02-04 RX ADMIN — IPRATROPIUM BROMIDE AND ALBUTEROL SULFATE 3 ML: .5; 3 SOLUTION RESPIRATORY (INHALATION) at 07:11

## 2017-02-04 RX ADMIN — PANTOPRAZOLE SODIUM 40 MG: 40 TABLET, DELAYED RELEASE ORAL at 06:10

## 2017-02-04 RX ADMIN — LEVETIRACETAM 500 MG: 500 TABLET, FILM COATED ORAL at 17:41

## 2017-02-04 RX ADMIN — IPRATROPIUM BROMIDE AND ALBUTEROL SULFATE 3 ML: .5; 3 SOLUTION RESPIRATORY (INHALATION) at 19:20

## 2017-02-04 RX ADMIN — HEPARIN SODIUM 5000 UNITS: 5000 INJECTION, SOLUTION INTRAVENOUS; SUBCUTANEOUS at 20:22

## 2017-02-04 RX ADMIN — GABAPENTIN 300 MG: 300 CAPSULE ORAL at 23:32

## 2017-02-04 RX ADMIN — MIRTAZAPINE 15 MG: 15 TABLET, FILM COATED ORAL at 20:23

## 2017-02-04 RX ADMIN — IPRATROPIUM BROMIDE AND ALBUTEROL SULFATE 3 ML: .5; 3 SOLUTION RESPIRATORY (INHALATION) at 03:54

## 2017-02-04 RX ADMIN — CETIRIZINE HYDROCHLORIDE 5 MG: 10 TABLET, FILM COATED ORAL at 08:50

## 2017-02-04 RX ADMIN — CARVEDILOL 3.12 MG: 3.12 TABLET, FILM COATED ORAL at 20:23

## 2017-02-04 RX ADMIN — GABAPENTIN 300 MG: 300 CAPSULE ORAL at 17:41

## 2017-02-04 RX ADMIN — LEVETIRACETAM 500 MG: 500 TABLET, FILM COATED ORAL at 08:49

## 2017-02-04 RX ADMIN — IPRATROPIUM BROMIDE AND ALBUTEROL SULFATE 3 ML: .5; 3 SOLUTION RESPIRATORY (INHALATION) at 10:48

## 2017-02-04 RX ADMIN — CARVEDILOL 3.12 MG: 3.12 TABLET, FILM COATED ORAL at 08:49

## 2017-02-04 RX ADMIN — HEPARIN SODIUM 5000 UNITS: 5000 INJECTION, SOLUTION INTRAVENOUS; SUBCUTANEOUS at 08:50

## 2017-02-04 RX ADMIN — VANCOMYCIN HYDROCHLORIDE 1250 MG: 1 INJECTION, POWDER, LYOPHILIZED, FOR SOLUTION INTRAVENOUS at 03:45

## 2017-02-04 RX ADMIN — IPRATROPIUM BROMIDE AND ALBUTEROL SULFATE 3 ML: .5; 3 SOLUTION RESPIRATORY (INHALATION) at 15:27

## 2017-02-04 RX ADMIN — AZTREONAM 2 G: 2 INJECTION, POWDER, FOR SOLUTION INTRAMUSCULAR; INTRAVENOUS at 05:55

## 2017-02-04 RX ADMIN — GABAPENTIN 300 MG: 300 CAPSULE ORAL at 08:50

## 2017-02-04 NOTE — PLAN OF CARE
Problem: Patient Care Overview (Adult)  Goal: Plan of Care Review    02/04/17 1310   Patient Care Overview   Progress no change   Outcome Evaluation   Outcome Summary/Follow up Plan Bedside swallow completed. No overt s/s of asp. Will proceed with instrumental next week d/t hx of dysphagia and current dx of RLL pna.    Coping/Psychosocial Response Interventions   Plan Of Care Reviewed With patient         Problem: Inpatient SLP  Goal: Dysphagia- Patient will safely consume diet as per recommendation with no signs/symptoms of aspiration    02/04/17 1310   Safely Consume Diet   Safely Consume Diet- SLP, Date Established 02/04/17   Safely Consume Diet- SLP, Time to Achieve by discharge

## 2017-02-04 NOTE — PROGRESS NOTES
"DAILY PROGRESS NOTE  [facility]    Patient Identification:  Name: Kim Feldman  Age: 86 y.o.  Sex: female  :  3/13/1930  MRN: [id number]         Primary Care Physician: Sharad Salinas MD      Subjective  No new c/o.  Overall feeling better, stronger but not back to baseline.      C/o feeling cold all the time.     Objective:  General Appearance:  Comfortable, well-appearing, in no acute distress and not in pain.    Vital signs: (most recent): Blood pressure 138/76, pulse 82, temperature 97.4 °F (36.3 °C), temperature source Oral, resp. rate 20, height 64\" (162.6 cm), weight 211 lb (95.7 kg), SpO2 97 %, not currently breastfeeding.    Lungs:  Normal respiratory rate and normal effort.  She is not in respiratory distress.  No stridor.  There are rales (Rt base. ).  No wheezes, rhonchi or decreased breath sounds.    Heart: Normal rate.  Regular rhythm.    Abdomen: Abdomen is soft.  Bowel sounds are normal.     Extremities: There is dependent edema (1+ partially pitting. ).    Neurological: Patient is alert and oriented to person, place and time.    Skin:  Warm and dry.                Vital signs in last 24 hours:  Temp:  [97.4 °F (36.3 °C)-98.1 °F (36.7 °C)] 97.4 °F (36.3 °C)  Heart Rate:  [67-82] 82  Resp:  [16-20] 20  BP: (102-138)/(48-76) 138/76    Intake/Output:    Intake/Output Summary (Last 24 hours) at 17 0933  Last data filed at 17 0805   Gross per 24 hour   Intake    860 ml   Output    650 ml   Net    210 ml         Exam:    Physical Exam   Cardiovascular: Normal rate and regular rhythm.    Pulmonary/Chest: Effort normal. No stridor. No respiratory distress. She has no decreased breath sounds. She has no wheezes. She has no rhonchi. She has rales (Rt base. ).   Abdominal: Soft. Bowel sounds are normal.   Neurological: She is alert.   Skin: Skin is warm and dry.         Results from last 7 days  Lab Units 17  0459 17  0433 178   WBC 10*3/mm3 5.92 " 9.53 12.41*   HEMOGLOBIN g/dL 10.9* 10.6* 11.8*   PLATELETS 10*3/mm3 138* 127* 150     Results from last 7 days  Lab Units 02/04/17 0459 02/03/17 0432 02/02/17 2038   SODIUM mmol/L 138 140 140   POTASSIUM mmol/L 4.6 4.2 4.6   CHLORIDE mmol/L 104 103 100   TOTAL CO2 mmol/L 26.2 23.6 25.5   BUN mg/dL 20 35* 34*   CREATININE mg/dL 0.97 1.49* 2.03*   GLUCOSE mg/dL 96 100* 115*   Estimated Creatinine Clearance: 46.7 mL/min (by C-G formula based on Cr of 0.97).  Results from last 7 days  Lab Units 02/04/17 0459 02/03/17 0432 02/02/17 2038   CALCIUM mg/dL 8.8 8.6 9.4   ALBUMIN g/dL  --   --  4.00   MAGNESIUM mg/dL  --   --  2.2     Results from last 7 days  Lab Units 02/02/17 2038   ALBUMIN g/dL 4.00   BILIRUBIN mg/dL 0.9   ALK PHOS U/L 53   AST (SGOT) U/L 18   ALT (SGPT) U/L 13       Assessment:  Sepsis:  Pneumonia (HCAP):  Edema LLE - venous stasis:  Macrocytic anemia:  Check B12, folate, TSH...  Generalized weakness:  Hx/o SIADH:  Dysphagia:  Pt presently denying hx.  Will ask Speech to re-eval.    SSS, PPM:  Sz disorder:  DNR:      Plan:  Improving overall.  See above. De-escalate antibiotics.  Monitor closely.     Babar Grajeda MD  2/4/2017  9:33 AM

## 2017-02-04 NOTE — PROGRESS NOTES
Acute Care - Speech Language Pathology   Swallow Initial Evaluation [facility]     Patient Name: Kim Feldman  : 3/13/1930  MRN: [id number]  Today's Date: 2017  Onset of Illness/Injury or Date of Surgery Date: 17            Admit Date: 2017    SPEECH-LANGUAGE PATHOLOGY EVALUATION - SWALLOW  Subjective: The patient was seen on this date for a Clinical Swallow evaluation.  Patient was alert and cooperative.    The patient's history is significant for esophageal dysphagia. VFSS 16 revealed inconsistent silent penetration before and during the swallow without aspiration. Weak pharyngeal swallow, but patient cleared residue with second swallow per note. SLP recommended regular and thins at that time. Pt c/o voice change. Pt with reduced phonation time. Mild harsh quality noted.    Objective: Textures given included thin liquid, puree consistency, mechanical soft consistency and regular consistency.  Assessment: Difficulties were noted with regular consistency, characterized by increased effort during the swallow. Pt reported missing tooth is causing her to masticate on left.   SLP Findings:  Patient presents with mild pharyngeal dysphagia, with esophageal component.   Recommendations: Diet Textures: thin liquid, regular consistency food.  Medications should be taken whole with puree.   Recommended Strategies: Upright for PO and small bites and sips. Oral care before breakfast, after all meals and PRN.  Other Recommended Evaluations: VFSS vs FEES d/t current dx of RLL pna.   Dysphagia therapy is recommended. Rationale: to further evaluate swallow function.    Visit Dx:     ICD-10-CM ICD-9-CM   1. Pneumonia of right lower lobe due to infectious organism J18.9 483.8   2. Generalized weakness R53.1 780.79   3. Muscle weakness (generalized) M62.81 728.87     Patient Active Problem List   Diagnosis   • Anemia   • Bulging lumbar disc   • Depression, endogenous   • Dysphagia   • Edema   •  Gastroesophageal reflux disease   • Hyperlipidemia   • Hypertension   • Intermittent claudication   • Neuralgia   • Osteoarthritis of knee   • Syndrome of inappropriate secretion of antidiuretic hormone   • Vitamin D deficiency   • Pneumonia of right lower lobe due to infectious organism   • History of sick sinus syndrome   • Intertrigo   • Generalized convulsive seizures   • Urine frequency   • Change in bowel habits   • Urinary tract infection   • Pacemaker   • Zenker's diverticulum   • Zenker diverticulum   • History of anxiety   • Urinary tract infection in female   • Clonic seizure disorder   • History of aspiration pneumonitis   • Thrombocytopenia   • B12 deficiency   • Pain of toe of right foot   • Right lower lobe pneumonia   • Sepsis due to pneumonia   • Weakness   • Edema of left lower extremity     Past Medical History   Diagnosis Date   • Anemia    • Bulging lumbar disc    • Chronic cough    • Chronic UTI    • Chronic venous insufficiency    • Clonic seizure disorder    • Dementia without behavioral disturbance      moderate   • Depression, endogenous    • Disc degeneration, lumbar    • Dysphagia    • GERD (gastroesophageal reflux disease)    • Hiatal hernia    • History of anxiety    • History of aspiration pneumonitis    • History of cerebral artery occlusion      CVA (following TIA), 10/10, treated with tPA   • History of herpes zoster    • History of ingrowing nail    • History of osteoporosis    • History of poliomyelitis      child   • History of sciatica    • History of sick sinus syndrome      s/p PPM   • History of transient cerebral ischemia      followed by stroke in 10/2010. BIBI was normal.   • History of Zenker's diverticulum removal 2016   • HX: long term anticoagulant use    • Hyperlipidemia    • Hypertension    • Hyponatremia    • Intertrigo    • Lumbar radiculopathy    • Morbid obesity    • Neuralgia      with diplopia secondary to facial shingles   • Nonepileptic episode    •  Osteoarthritis of right knee    • Pacemaker    • Rash      ON ABDOMEN   • Seeing double      secondary to shingles on the face also with neuralgia   • Serum potassium elevated    • SIADH (syndrome of inappropriate ADH production)    • Vitamin D deficiency      Past Surgical History   Procedure Laterality Date   • Hand surgery Right    • Lumbar laminectomy       L-3 L4 L4 L5   • Cardiac pacemaker placement       secondary to SSS, placed in 2004, with generator change in 2014   • Tonsillectomy     • Laminectomy for implantation / placement neurostimulator electrodes     • Knee arthroplasty Left    • Cataract extraction     • Zenkers diverticulectomy N/A 9/27/2016     Procedure: ENDOSCOPIC REMOVAL OF ZENKERS DIVERTICULUM W/ WERDASCOPE;  Surgeon: Oswald Barth MD;  Location: [facility] OR;  Service:           SWALLOW EVALUATION (last 72 hours)      Swallow Evaluation       02/04/17 1308                Rehab Evaluation    Document Type evaluation  -SA        Subjective Information no complaints;agree to therapy  -SA        Patient Effort, Rehab Treatment excellent  -SA        General Information    Patient Profile Review yes  -SA        Subjective Patient Observations Pt quick to fatigue  -SA        Pertinent History Of Current Problem dysphagia hx, now with RLL pna  -SA        Current Diet Limitations thin liquids;regular solid  -SA        Prior Level of Function- Swallowing esophageal concerns;compensations (maneuvers, postures)  -SA        Plans/Goals Discussed With patient  -SA        Barriers to Rehab none identified  -SA        Clinical Impression    Patient's Goals For Discharge patient did not state  -SA        SLP Swallowing Diagnosis mild dysphagia  -SA        Rehab Potential/Prognosis, Swallowing good, to achieve stated therapy goals  -SA        Criteria for Skilled Therapeutic Interventions Met skilled criteria for dysphagia intervention met  -SA        FCM, Swallowing 6-->Level 6  -SA        Therapy  Frequency PRN  -        Predicted Duration Therapy Interv (days) until discharge  -        SLP Diet Recommendation regular textures;thin liquids  -        Recommended Diagnostics VFSS (MBS);FEES  -        Recommended Feeding/Eating Techniques maintain upright posture during/after eating for 30 mins;small sips/bites  -        SLP Rec. for Method of Medication Administration meds whole in pudding/applesauce  -        Monitor For Signs Of Aspiration cough;throat clearing  -        Anticipated Discharge Disposition other (see comments)   to be determined  -        Pain Assessment    Pain Assessment No/denies pain  -        Oral Motor Structure and Function    Oral Motor Anatomy and Physiology patient demonstrates anatomy that is WNL  -        Oral Motor Assessment Comment Pt c/o voice change  -          User Key  (r) = Recorded By, (t) = Taken By, (c) = Cosigned By    Initials Name Effective Dates     Joann Elizalde 12/11/15 -         EDUCATION  The patient has been educated in the following areas:   Dysphagia (Swallowing Impairment).    SLP Recommendation and Plan  SLP Swallowing Diagnosis: mild dysphagia  SLP Diet Recommendation: regular textures, thin liquids  Recommended Feeding/Eating Techniques: maintain upright posture during/after eating for 30 mins, small sips/bites  SLP Rec. for Method of Medication Administration: meds whole in pudding/applesauce  Monitor For Signs Of Aspiration: cough, throat clearing  Recommended Diagnostics: VFSS (MBS), FEES  Criteria for Skilled Therapeutic Interventions Met: skilled criteria for dysphagia intervention met  Anticipated Discharge Disposition: other (see comments) (to be determined)  Rehab Potential/Prognosis, Swallowing: good, to achieve stated therapy goals  Therapy Frequency: PRN             Plan of Care Review  Plan Of Care Reviewed With: patient  Progress: no change  Outcome Summary/Follow up Plan: Bedside swallow completed. No overt s/s  of asp. Will proceed with instrumental next week d/t hx of dysphagia and current dx of RLL pna.           IP SLP Goals       02/04/17 1310          Safely Consume Diet    Safely Consume Diet- SLP, Date Established 02/04/17  -SA      Safely Consume Diet- SLP, Time to Achieve by discharge  -        User Key  (r) = Recorded By, (t) = Taken By, (c) = Cosigned By    Initials Name Provider Type    SA Joann Elizalde Speech and Language Pathologist             SLP Outcome Measures (last 72 hours)      SLP Outcome Measures       02/04/17 1300          SLP Outcome Measures    Outcome Measure Used? Adult NOMS  -      FCM Scores    FCM Chosen Swallowing  -      Swallowing FCM Score 6  -        User Key  (r) = Recorded By, (t) = Taken By, (c) = Cosigned By    Initials Name Effective Dates     Joann Elizalde 12/11/15 -            Time Calculation:         Time Calculation- SLP       02/04/17 1311          Time Calculation- SLP    SLP Goal Re-Cert Due Date 02/04/17  -        User Key  (r) = Recorded By, (t) = Taken By, (c) = Cosigned By    Initials Name Provider Type     Joann Elizalde Speech and Language Pathologist          Therapy Charges for Today     Code Description Service Date Service Provider Modifiers Qty    88701158995 HC ST EVAL ORAL PHARYNG SWALLOW 2 2/4/2017 Joann Elizalde GN 1               Joann Elizalde  2/4/2017

## 2017-02-04 NOTE — PLAN OF CARE
Problem: Patient Care Overview (Adult)  Goal: Plan of Care Review  Outcome: Ongoing (interventions implemented as appropriate)    02/04/17 0224   Patient Care Overview   Progress no change   Outcome Evaluation   Outcome Summary/Follow up Plan Cont to monitor.   Coping/Psychosocial Response Interventions   Plan Of Care Reviewed With patient       Goal: Adult Individualization and Mutuality  Outcome: Ongoing (interventions implemented as appropriate)  Goal: Discharge Needs Assessment  Outcome: Ongoing (interventions implemented as appropriate)    02/04/17 0224   Discharge Needs Assessment   Concerns To Be Addressed no discharge needs identified   Discharge Disposition still a patient         Problem: Pneumonia (Adult)  Goal: Signs and Symptoms of Listed Potential Problems Will be Absent or Manageable (Pneumonia)  Outcome: Ongoing (interventions implemented as appropriate)    02/04/17 0224   Pneumonia   Problems Assessed (Pneumonia) all   Problems Present (Pneumonia) fluid/electrolyte imbalance;progression of infection         Problem: Fall Risk (Adult)  Goal: Absence of Falls  Outcome: Ongoing (interventions implemented as appropriate)    02/04/17 0224   Fall Risk (Adult)   Absence of Falls achieves outcome

## 2017-02-04 NOTE — PROGRESS NOTES
Acute Care - Physical Therapy Treatment Note  [facility]     Patient Name: Kim Feldman  : 3/13/1930  MRN: [id number]  Today's Date: 2017  Onset of Illness/Injury or Date of Surgery Date: 17  Date of Referral to PT: 17  Referring Physician: Luis Felipe Heath    Admit Date: 2017    Visit Dx:    ICD-10-CM ICD-9-CM   1. Pneumonia of right lower lobe due to infectious organism J18.9 483.8   2. Generalized weakness R53.1 780.79   3. Muscle weakness (generalized) M62.81 728.87     Patient Active Problem List   Diagnosis   • Anemia   • Bulging lumbar disc   • Depression, endogenous   • Dysphagia   • Edema   • Gastroesophageal reflux disease   • Hyperlipidemia   • Hypertension   • Intermittent claudication   • Neuralgia   • Osteoarthritis of knee   • Syndrome of inappropriate secretion of antidiuretic hormone   • Vitamin D deficiency   • Pneumonia of right lower lobe due to infectious organism   • History of sick sinus syndrome   • Intertrigo   • Generalized convulsive seizures   • Urine frequency   • Change in bowel habits   • Urinary tract infection   • Pacemaker   • Zenker's diverticulum   • Zenker diverticulum   • History of anxiety   • Urinary tract infection in female   • Clonic seizure disorder   • History of aspiration pneumonitis   • Thrombocytopenia   • B12 deficiency   • Pain of toe of right foot   • Right lower lobe pneumonia   • Sepsis due to pneumonia   • Weakness   • Edema of left lower extremity               Adult Rehabilitation Note       17 1324          Rehab Assessment/Intervention    Discipline physical therapy assistant  -      Document Type therapy note (daily note)  -      Subjective Information agree to therapy;complains of;weakness;fatigue  -RH      Patient Effort, Rehab Treatment good  -RH      Symptoms Noted During/After Treatment shortness of breath  -RH      Precautions/Limitations fall precautions  -RH      Precautions/Limitations, Vision WFL with  corrective lenses  -RH      Precautions/Limitations, Hearing WFL  -RH      Recorded by [RH] Clark Rodriguez PTA      Pain Assessment    Pain Assessment No/denies pain  -RH      Recorded by [] Clark Rodriguez PTA      Cognitive Assessment/Intervention    Current Cognitive/Communication Assessment functional  -RH      Orientation Status oriented x 4  -RH      Follows Commands/Answers Questions 100% of the time  -      Personal Safety WNL/WFL  -RH      Personal Safety Interventions muscle strengthening facilitated  -RH      Recorded by [RH] Clark Rodriguez PTA      Bed Mobility, Assessment/Treatment    Bed Mob, Supine to Sit, [place] supervision required  -RH      Bed Mob, Sit to Supine, [place] supervision required  -RH      Recorded by [RH] Clark Rodriguez PTA      Transfer Assessment/Treatment    Transfers, Sit-Stand [place] supervision required  -RH      Transfers, Stand-Sit [place] supervision required  -RH      Transfers, Sit-Stand-Sit, Assist Device rolling walker  -RH      Recorded by [RH] Clark Rodriguez PTA      Gait Assessment/Treatment    Gait, [place] Level stand by assist  -RH      Gait, Assistive Device rolling walker  -RH      Gait, Distance (Feet) 30  -RH      Gait, Gait Deviations jose decreased;forward flexed posture  -RH      Gait, Safety Issues sequencing ability decreased  -RH      Recorded by [RH] Clark Rodriguez PTA      Balance Skills Training    Sitting-Level of Assistance Independent  -RH      Sitting-Balance Support Feet supported  -RH      Standing-Level of Assistance Close supervision  -      Static Standing Balance Support assistive device  -RH      Standing-Balance Activities --   static  -RH      Standing Balance # of Minutes 5  -RH      Recorded by [RH] Clark Rodriguez PTA      Therapy Exercises    Bilateral Lower Extremities AROM:;15 reps;sitting;ankle pumps/circles;hip abduction/adduction;hip flexion;LAQ  -RH      Bilateral Upper  Extremity AROM:;15 reps;sitting;shoulder abduction/adduction;shoulder extension/flexion;shoulder ER/IR;shoulder rolls/shrugs  -RH      Recorded by [RH] Clark Rodriguez PTA      Positioning and Restraints    Pre-Treatment Position in bed  -RH      Post Treatment Position bed  -RH      In Bed fowlers;call light within reach  -RH      Recorded by [RH] Clark Rodriguez PTA        User Key  (r) = Recorded By, (t) = Taken By, (c) = Cosigned By    Initials Name Effective Dates     Clark Rodriguez PTA 02/18/16 -                 IP PT Goals       02/03/17 0852          Bed Mobility PT LTG    Bed Mobility PT LTG, Date Established 02/03/17  -MS      Bed Mobility PT LTG, Time to Achieve 5 days  -MS      Bed Mobility PT LTG, Activity Type all bed mobility  -MS      Bed Mobility PT LTG, [place] Level independent  -MS      Transfer Training PT LTG    Transfer Training PT LTG, Date Established 02/03/17  -MS      Transfer Training PT LTG, Time to Achieve 5 days  -MS      Transfer Training PT LTG, Activity Type all transfers  -MS      Transfer Training PT LTG, [place] Level independent  -MS      Transfer Training PT LTG, Assist Device walker, rolling  -MS      Gait Training PT LTG    Gait Training Goal PT LTG, Date Established 02/03/17  -MS      Gait Training Goal PT LTG, Time to Achieve 5 days  -MS      Gait Training Goal PT LTG, [place] Level independent  -MS      Gait Training Goal PT LTG, Assist Device walker, rolling  -MS      Gait Training Goal PT LTG, Distance to Achieve 150 feet  -MS        User Key  (r) = Recorded By, (t) = Taken By, (c) = Cosigned By    Initials Name Provider Type    MS Luis Felipe Walker, PT Physical Therapist          Physical Therapy Education     Title: PT OT SLP Therapies (Done)     Topic: Physical Therapy (Done)     Point: Mobility training (Done)    Learning Progress Summary    Learner Readiness Method Response Comment Documented by Status   Patient Acceptance E,MEREDITH NAGY,NR  MS  02/03/17 0852 Done               Point: Home exercise program (Done)    Learning Progress Summary    Learner Readiness Method Response Comment Documented by Status   Patient Acceptance MEREDITH CAMP,NR  MS 02/03/17 0852 Done               Point: Body mechanics (Done)    Learning Progress Summary    Learner Readiness Method Response Comment Documented by Status   Patient Acceptance MEREDITH CAMP NR  MS 02/03/17 0852 Done               Point: Precautions (Done)    Learning Progress Summary    Learner Readiness Method Response Comment Documented by Status   Patient Acceptance MEREDITH CAMP,NR  MS 02/03/17 0852 Done                      User Key     Initials Effective Dates Name Provider Type Discipline    MS 12/01/15 -  Luis Felipe Walker, PT Physical Therapist PT                    PT Recommendation and Plan  Anticipated Equipment Needs At Discharge:  (Pt. owns a Rollator for home/community ambulation.)  Anticipated Discharge Disposition: home with assist, home with home health  Planned Therapy Interventions: balance training, bed mobility training, gait training, patient/family education, postural re-education, strengthening, transfer training  PT Frequency: daily  Plan of Care Review  Plan Of Care Reviewed With: patient  Progress: improving          Outcome Measures       02/04/17 1300 02/03/17 0800       How much help from another person do you currently need...    Turning from your back to your side while in flat bed without using bedrails? 3  -RH 3  -MS     Moving from lying on back to sitting on the side of a flat bed without bedrails? 3  -RH 3  -MS     Moving to and from a bed to a chair (including a wheelchair)? 3  -RH 3  -MS     Standing up from a chair using your arms (e.g., wheelchair, bedside chair)? 3  -RH 3  -MS     Climbing 3-5 steps with a railing? 3  -RH 3  -MS     To walk in hospital room? 3  -RH 3  -MS     AM-PAC 6 Clicks Score 18  -RH 18  -MS     Functional Assessment    Outcome Measure Options  AM-PAC 6 Clicks Basic  Mobility (PT)  -MS       User Key  (r) = Recorded By, (t) = Taken By, (c) = Cosigned By    Initials Name Provider Type    MS Luis Felipe JAVAN Walker, PT Physical Therapist     Calrk Rodriguez PTA Physical Therapy Assistant           Time Calculation:         PT Charges       02/04/17 1337          Time Calculation    Start Time 1318  -      Stop Time 1338  -RH      Time Calculation (min) 20 min  -      PT Received On 02/04/17  -      PT - Next Appointment 02/05/17  -        User Key  (r) = Recorded By, (t) = Taken By, (c) = Cosigned By    Initials Name Provider Type     Clark Rodriguez PTA Physical Therapy Assistant          Therapy Charges for Today     Code Description Service Date Service Provider Modifiers Qty    97506810516 HC PT THER PROC EA 15 MIN 2/4/2017 Clark Rodriguez PTA GP 1          PT G-Codes  Outcome Measure Options: AM-PAC 6 Clicks Basic Mobility (PT)    Clark Rodriguez PTA  2/4/2017

## 2017-02-05 VITALS
WEIGHT: 211 LBS | HEART RATE: 74 BPM | RESPIRATION RATE: 16 BRPM | SYSTOLIC BLOOD PRESSURE: 91 MMHG | OXYGEN SATURATION: 97 % | TEMPERATURE: 98.7 F | BODY MASS INDEX: 36.02 KG/M2 | DIASTOLIC BLOOD PRESSURE: 56 MMHG | HEIGHT: 64 IN

## 2017-02-05 LAB
BACTERIA SPEC AEROBE CULT: ABNORMAL
MRSA SPEC QL CULT: NORMAL
TSH SERPL DL<=0.05 MIU/L-ACNC: 4.75 MIU/ML (ref 0.27–4.2)
VIT B12 BLD-MCNC: 410 PG/ML (ref 211–946)

## 2017-02-05 PROCEDURE — 25010000002 HEPARIN (PORCINE) PER 1000 UNITS: Performed by: INTERNAL MEDICINE

## 2017-02-05 PROCEDURE — 82607 VITAMIN B-12: CPT | Performed by: HOSPITALIST

## 2017-02-05 PROCEDURE — 94640 AIRWAY INHALATION TREATMENT: CPT

## 2017-02-05 PROCEDURE — 84443 ASSAY THYROID STIM HORMONE: CPT | Performed by: HOSPITALIST

## 2017-02-05 PROCEDURE — 25010000002 LEVOFLOXACIN PER 250 MG: Performed by: INTERNAL MEDICINE

## 2017-02-05 RX ORDER — LEVOFLOXACIN 750 MG/1
750 TABLET ORAL DAILY
Qty: 3 TABLET | Refills: 0 | Status: SHIPPED | OUTPATIENT
Start: 2017-02-05 | End: 2017-02-10

## 2017-02-05 RX ADMIN — LEVETIRACETAM 500 MG: 500 TABLET, FILM COATED ORAL at 09:52

## 2017-02-05 RX ADMIN — CETIRIZINE HYDROCHLORIDE 5 MG: 10 TABLET, FILM COATED ORAL at 09:51

## 2017-02-05 RX ADMIN — CARVEDILOL 3.12 MG: 3.12 TABLET, FILM COATED ORAL at 09:51

## 2017-02-05 RX ADMIN — GABAPENTIN 300 MG: 300 CAPSULE ORAL at 14:45

## 2017-02-05 RX ADMIN — LEVOFLOXACIN 500 MG: 5 INJECTION, SOLUTION INTRAVENOUS at 11:22

## 2017-02-05 RX ADMIN — CLOPIDOGREL 75 MG: 75 TABLET, FILM COATED ORAL at 09:51

## 2017-02-05 RX ADMIN — IPRATROPIUM BROMIDE AND ALBUTEROL SULFATE 3 ML: .5; 3 SOLUTION RESPIRATORY (INHALATION) at 11:05

## 2017-02-05 RX ADMIN — IPRATROPIUM BROMIDE AND ALBUTEROL SULFATE 3 ML: .5; 3 SOLUTION RESPIRATORY (INHALATION) at 07:14

## 2017-02-05 RX ADMIN — GABAPENTIN 300 MG: 300 CAPSULE ORAL at 09:52

## 2017-02-05 RX ADMIN — HEPARIN SODIUM 5000 UNITS: 5000 INJECTION, SOLUTION INTRAVENOUS; SUBCUTANEOUS at 09:50

## 2017-02-05 RX ADMIN — PANTOPRAZOLE SODIUM 40 MG: 40 TABLET, DELAYED RELEASE ORAL at 07:36

## 2017-02-05 NOTE — DISCHARGE SUMMARY
PHYSICIAN DISCHARGE SUMMARY                                                                        [facility]    Patient Identification:  Name: Kim Feldman  Age: 86 y.o.  Sex: female  :  3/13/1930  MRN: [id number]  Primary Care Physician: Sharad Salinas MD    Admit date: 2017  Discharge date and time: 2017     Discharged Condition: good    Discharge Diagnoses:  Sepsis:  Pneumonia (HCAP):  ANDREW:  Resolved.  Adjust meds to improved renal function.   Edema LLE - venous stasis:  Macrocytic anemia:   Generalized weakness:  Hx/o SIADH:  Hx/o Dysphagia:    SSS, PPM:  Sz disorder:  DNR:      Hospital Course:  Pleasant 86-year-old female who is living with her daughter presents with generalized weakness and having sustained a fall.  Please H&P for full details.  Workup did reveal a right basilar infiltrate, leukocytosis and an elevated pro-calcitonin level.  The patient was cultured admitted and started on broad-spectrum antibiotics.  She responded quite readily and we were able to de-escalate her antibiotics.  She does have a past history of dysphagia and so did ask speech therapy to evaluate her again and she did fairly well with evaluation.  The routine dysphagia precautions are recommended of course no modification of diet was needed.  The patient does admit that she likes to eat lying back and we did discuss that this would increase her risk for aspiration.  In addition above she did have an elevated BUN/creatinine of presentation which is now normalized with hydration.  She does also have issues with lower extremity edema.  Venous Dopplers to show evidence of venous valvular incompetence.  We discussed keeping the legs elevated and use support hose.  I would avoid the use of diuretics.  Clinically she is markedly improved.  Leukocytosis is resolved.  She is now walking the halls with her walker.  Although she lives with  her daughter she does have concerns about the findings and her daughter's house.  We did discuss services such as helping hands etc.  The daughter will be looking into these.  Presently she can be switched oral antibiotics and complete her treatment as an outpatient.      Consults:     Consults     Date and Time Order Name Status Description    2/2/2017 0870 [facility] (on-call MD unless specified) Completed             Discharge Exam:  Physical Exam   Afebrile vital signs stable.  Well-developed well-nourished female in no apparent distress.  Lungs clear to auscultation good air movement.  Heart regular rate and rhythm.  Alert oriented conversant cooperative.  1+ partially pitting edema pretibially bilaterally.         Disposition:  Home    Patient Instructions:    AbaKim   Home Medication Instructions NASRA:[id number]    Printed on:02/05/17 134   Medication Information                      B Complex Vitamins (VITAMIN B COMPLEX PO)  Take 1 tablet by mouth every morning. HOLD PRIOR TO SURG             Calcium-Vitamin D-Vitamin K (VIACTIV PO)  Take 500 mg by mouth Every Night. HOLD PRIOR TO SURG              carvedilol (COREG) 3.125 MG tablet  TAKE ONE TABLET BY MOUTH TWICE A DAY WITH MEALS             clopidogrel (PLAVIX) 75 MG tablet  TAKE ONE TABLET BY MOUTH DAILY             clotrimazole-betamethasone (LOTRISONE) 1-0.05 % cream  Apply  topically 2 (Two) Times a Day.             diclofenac (VOLTAREN) 1 % gel gel  Apply 4 g topically 4 (four) times a day as needed.             gabapentin (NEURONTIN) 300 MG capsule  Take 2 capsules by mouth 3 (Three) Times a Day.             Hypromellose (ARTIFICIAL TEARS OP)  Apply 2 drops to eye 4 (four) times a day as needed.             lansoprazole (PREVACID) 30 MG capsule  TAKE ONE CAPSULE BY MOUTH EVERY MORNING             levETIRAcetam (KEPPRA) 500 MG tablet  Take 500 mg by mouth 2 (two) times a day.             levoFLOXacin (LEVAQUIN) 750 MG tablet  Take 1 tablet by  mouth Daily.             Loratadine 10 MG capsule  Take 1 capsule by mouth daily as needed.             methylcellulose, Laxative, (CITRUCEL) 500 MG tablet tablet  Take 2 tablets by mouth Every Morning.             mirtazapine (REMERON) 15 MG tablet  Take 1 tablet by mouth Every Night.             nystatin (MYCOSTATIN) 333989 UNIT/GM cream  Apply  topically As Needed.             pseudoephedrine-guaifenesin (MUCINEX D)  MG per 12 hr tablet  Take 1 tablet by mouth At Night As Needed for allergies.               Future Appointments  Date Time Provider Department Center   3/8/2017 1:15 PM Bandar Ruiz MD [facility] None   4/6/2017 12:30 PM Arsalan Velez Jr., MD [facility] None     Referrals and Follow-ups to Schedule     Follow-Up    As directed    Follow Up Details:  PCP in 1 week             Follow-up Information     Follow up with Sharad Salinas MD .    Specialty:  Family Medicine    Contact information:    [address] [address]  [phone]          Discharge Order     Start     Ordered    02/05/17 1340  Discharge patient  Once     Expected Discharge Date:  02/05/17    Discharge Disposition:  Home or Self Care        02/05/17 1342            Total time spent discharging patient including evaluation,post hospitalization follow up,  medication and post hospitalization instructions and education total time exceeds 30 minutes.    Signed:  Babar Grajeda MD  2/5/2017  1:42 PM

## 2017-02-05 NOTE — PLAN OF CARE
Problem: Inpatient Physical Therapy  Goal: Bed Mobility Goal LTG- PT  Outcome: Unable to achieve outcome(s) by discharge Date Met:  02/05/17 02/05/17 1422   Bed Mobility PT LTG   Bed Mobility PT LTG, Date Goal Reviewed 02/05/17   Bed Mobility PT LTG, Outcome goal partially met   Bed Mobility PT LTG, Reason Goal Not Met discharged from facility       Goal: Transfer Training Goal 1 LTG- PT  Outcome: Outcome(s) achieved Date Met:  02/05/17 02/05/17 1422   Transfer Training PT LTG   Transfer Training PT LTG, Date Goal Reviewed 02/05/17   Transfer Training PT LTG, Outcome goal partially met   Transfer Training PT LTG, Reason Goal Not Met discharged from facility       Goal: Gait Training Goal LTG- PT  Outcome: Unable to achieve outcome(s) by discharge Date Met:  02/05/17 02/05/17 1422   Gait Training PT LTG   Gait Training Goal PT LTG, Date Goal Reviewed 02/05/17   Gait Training Goal PT LTG, Outcome goal partially met   Gait Training Goal PT LTG, Reason Goal Not Met discharged from facility

## 2017-02-05 NOTE — THERAPY DISCHARGE NOTE
Acute Care - Physical Therapy Discharge Summary  [facility]       Patient Name: Kim Feldman  : 3/13/1930  MRN: [id number]    Today's Date: 2017  Onset of Illness/Injury or Date of Surgery Date: 17    Date of Referral to PT: 17  Referring Physician: Luis Felipe Heath      Admit Date: 2017      PT Recommendation and Plan    Visit Dx:    ICD-10-CM ICD-9-CM   1. Pneumonia of right lower lobe due to infectious organism J18.9 483.8   2. Generalized weakness R53.1 780.79   3. Muscle weakness (generalized) M62.81 728.87             Outcome Measures       17 1300 17 0800       How much help from another person do you currently need...    Turning from your back to your side while in flat bed without using bedrails? 3  -RH 3  -MS     Moving from lying on back to sitting on the side of a flat bed without bedrails? 3  -RH 3  -MS     Moving to and from a bed to a chair (including a wheelchair)? 3  -RH 3  -MS     Standing up from a chair using your arms (e.g., wheelchair, bedside chair)? 3  -RH 3  -MS     Climbing 3-5 steps with a railing? 3  -RH 3  -MS     To walk in hospital room? 3  -RH 3  -MS     AM-PAC 6 Clicks Score 18  -RH 18  -MS     Functional Assessment    Outcome Measure Options  AM-PAC 6 Clicks Basic Mobility (PT)  -MS       User Key  (r) = Recorded By, (t) = Taken By, (c) = Cosigned By    Initials Name Provider Type    MS Vyasew JAVAN Walker, PT Physical Therapist     Clark Rodriguez, PTA Physical Therapy Assistant                PT Charges       17 1343          Time Calculation    PT - Next Appointment 17  -AR        User Key  (r) = Recorded By, (t) = Taken By, (c) = Cosigned By    Initials Name Provider Type    TAYLOR Rodas, PT Physical Therapist                  IP PT Goals       17 1422 17 0852       Bed Mobility PT LTG    Bed Mobility PT LTG, Date Established  17  -MS     Bed Mobility PT LTG, Time to Achieve  5 days  -MS     Bed  Mobility PT LTG, Activity Type  all bed mobility  -MS     Bed Mobility PT LTG, [place] Level  independent  -MS     Bed Mobility PT LTG, Date Goal Reviewed 02/05/17  -MACIE      Bed Mobility PT LTG, Outcome goal partially met  -MACIE      Bed Mobility PT LTG, Reason Goal Not Met discharged from facility  -MACIE      Transfer Training PT LTG    Transfer Training PT LTG, Date Established  02/03/17  -MS     Transfer Training PT LTG, Time to Achieve  5 days  -MS     Transfer Training PT LTG, Activity Type  all transfers  -MS     Transfer Training PT LTG, [place] Level  independent  -MS     Transfer Training PT LTG, Assist Device  walker, rolling  -MS     Transfer Training PT  LTG, Date Goal Reviewed 02/05/17  -MACIE      Transfer Training PT LTG, Outcome goal partially met  -MACIE      Transfer Training PT LTG, Reason Goal Not Met discharged from facility  -MACIE      Gait Training PT LTG    Gait Training Goal PT LTG, Date Established  02/03/17  -MS     Gait Training Goal PT LTG, Time to Achieve  5 days  -MS     Gait Training Goal PT LTG, [place] Level  independent  -MS     Gait Training Goal PT LTG, Assist Device  walker, rolling  -MS     Gait Training Goal PT LTG, Distance to Achieve  150 feet  -MS     Gait Training Goal PT LTG, Date Goal Reviewed 02/05/17  -MACIE      Gait Training Goal PT LTG, Outcome goal partially met  -MACIE      Gait Training Goal PT LTG, Reason Goal Not Met discharged from facility  -MACIE        User Key  (r) = Recorded By, (t) = Taken By, (c) = Cosigned By    Initials Name Provider Type    MS Luis Felipe EDOUARD Denise, PT Physical Therapist    MACIE Barker, PTA Physical Therapy Assistant              PT Discharge Summary  Reason for Discharge: Discharge from facility  Outcomes Achieved: Patient able to partially acheive established goals  Discharge Destination: Home with assist, Home with home health      Marcin Barker PTA   2/5/2017

## 2017-02-05 NOTE — PLAN OF CARE
Problem: Patient Care Overview (Adult)  Goal: Plan of Care Review  Outcome: Ongoing (interventions implemented as appropriate)    02/05/17 0516   Patient Care Overview   Progress improving   Outcome Evaluation   Outcome Summary/Follow up Plan cont to monitor   Coping/Psychosocial Response Interventions   Plan Of Care Reviewed With patient       Goal: Adult Individualization and Mutuality  Outcome: Ongoing (interventions implemented as appropriate)  Goal: Discharge Needs Assessment  Outcome: Ongoing (interventions implemented as appropriate)    02/05/17 0516   Discharge Needs Assessment   Concerns To Be Addressed no discharge needs identified         Problem: Pneumonia (Adult)  Goal: Signs and Symptoms of Listed Potential Problems Will be Absent or Manageable (Pneumonia)  Outcome: Ongoing (interventions implemented as appropriate)    02/05/17 0516   Pneumonia   Problems Assessed (Pneumonia) all   Problems Present (Pneumonia) fluid/electrolyte imbalance;progression of infection         Problem: Fall Risk (Adult)  Intervention: Monitor/Assist with Self Care    02/05/17 0516   Activity   Activity Type activity adjusted per tolerance         Goal: Absence of Falls  Outcome: Ongoing (interventions implemented as appropriate)    02/05/17 0516   Fall Risk (Adult)   Absence of Falls achieves outcome

## 2017-02-08 LAB
BACTERIA SPEC AEROBE CULT: NORMAL
BACTERIA SPEC AEROBE CULT: NORMAL

## 2017-02-10 ENCOUNTER — OFFICE VISIT (OUTPATIENT)
Dept: INTERNAL MEDICINE | Facility: CLINIC | Age: 82
End: 2017-02-10

## 2017-02-10 VITALS
WEIGHT: 216.4 LBS | SYSTOLIC BLOOD PRESSURE: 128 MMHG | HEIGHT: 64 IN | DIASTOLIC BLOOD PRESSURE: 88 MMHG | BODY MASS INDEX: 36.95 KG/M2 | TEMPERATURE: 97.6 F | HEART RATE: 80 BPM | OXYGEN SATURATION: 98 %

## 2017-02-10 DIAGNOSIS — J18.9 PNEUMONIA OF RIGHT LOWER LOBE DUE TO INFECTIOUS ORGANISM: Primary | ICD-10-CM

## 2017-02-10 DIAGNOSIS — I10 ESSENTIAL HYPERTENSION: ICD-10-CM

## 2017-02-10 DIAGNOSIS — E53.8 B12 DEFICIENCY: ICD-10-CM

## 2017-02-10 PROCEDURE — 96372 THER/PROPH/DIAG INJ SC/IM: CPT | Performed by: FAMILY MEDICINE

## 2017-02-10 PROCEDURE — 99214 OFFICE O/P EST MOD 30 MIN: CPT | Performed by: FAMILY MEDICINE

## 2017-02-10 RX ORDER — CYANOCOBALAMIN 1000 UG/ML
1000 INJECTION, SOLUTION INTRAMUSCULAR; SUBCUTANEOUS
Status: DISCONTINUED | OUTPATIENT
Start: 2017-02-10 | End: 2018-11-21 | Stop reason: HOSPADM

## 2017-02-10 RX ADMIN — CYANOCOBALAMIN 1000 MCG: 1000 INJECTION, SOLUTION INTRAMUSCULAR; SUBCUTANEOUS at 16:31

## 2017-02-10 NOTE — PROGRESS NOTES
Subjective   Kim Feldman is a 86 y.o. female.     Chief Complaint   Patient presents with   • hospital follow up     diagnosed with pneumonia 02/02/17    B12 deficiency hypertension      History of Present Illness patient was [facility] diagnosable right lower lobe pneumonia improved throughout hospitalization and feels much better now she says rather sudden onset of symptoms of fatigue and fever difficult to determine if this was aspiration related or not his gravity choking episodes or difficulty swallowing food she had Zenker's diverticulum or removed in the past topping to alleviate any aspiration issues in the future    The following portions of the patient's history were reviewed and updated as appropriate: allergies, current medications, past family history, past medical history, past social history, past surgical history and problem list.    Review of Systems   Constitutional: Positive for fatigue. Negative for activity change and unexpected weight change.   HENT: Negative for congestion, ear pain, postnasal drip and sore throat.    Eyes: Negative for pain and discharge.   Respiratory: Negative for cough, chest tightness, shortness of breath and wheezing.    Cardiovascular: Negative for chest pain and palpitations.   Gastrointestinal: Negative for abdominal pain, diarrhea and vomiting.   Endocrine: Negative.    Genitourinary: Negative.    Musculoskeletal: Negative for joint swelling.   Skin: Negative for color change, rash and wound.   Allergic/Immunologic: Negative.    Neurological: Positive for weakness. Negative for seizures, syncope and light-headedness.   Psychiatric/Behavioral: Negative.        Objective   Physical Exam   Constitutional: She is oriented to person, place, and time. She appears well-developed and well-nourished.   HENT:   Head: Normocephalic and atraumatic.   Eyes: EOM are normal. Pupils are equal, round, and reactive to light.   Neck: Normal range of motion. Neck  supple.   Cardiovascular: Normal rate, regular rhythm and normal heart sounds.    Pulmonary/Chest: Effort normal and breath sounds normal. No respiratory distress. She has no wheezes. She has no rales.   Abdominal: Soft. Bowel sounds are normal. She exhibits no distension. There is no tenderness.   Musculoskeletal: Normal range of motion.   Neurological: She is alert and oriented to person, place, and time. She has normal reflexes.   Skin: Skin is warm and dry.   Psychiatric: She has a normal mood and affect. Her behavior is normal. Judgment and thought content normal.   Nursing note and vitals reviewed.      Assessment/Plan   Kim was seen today for hospital follow up.    Diagnoses and all orders for this visit:    Pneumonia of right lower lobe due to infectious organism  -     XR Chest 2 View; Future    B12 deficiency  -     cyanocobalamin injection 1,000 mcg; Inject 1 mL into the shoulder, thigh, or buttocks Every 28 (Twenty-Eight) Days.    Essential hypertension      He presents management of chronic hypertension follow-up results of x-ray in the future.  She has had booster dose of pneumonia vaccines

## 2017-03-03 ENCOUNTER — TELEPHONE (OUTPATIENT)
Dept: INTERNAL MEDICINE | Facility: CLINIC | Age: 82
End: 2017-03-03

## 2017-03-03 NOTE — TELEPHONE ENCOUNTER
Patient daughter notified- Daughter stated that patient already has an appointment on 3/8/17 and a chest xray appointment 3/09/17.

## 2017-03-03 NOTE — TELEPHONE ENCOUNTER
Patient's daughter, Cherry Yousif [phone], called stating that she is looking for some guidance.  She stated that her Mother is shivering, burning up, and is weak.  She said that this seems to be pattern for her - every four weeks this happens.  The last time it was a UTI, pneumonia before that.  Please advise.

## 2017-03-08 ENCOUNTER — HOSPITAL ENCOUNTER (OUTPATIENT)
Dept: GENERAL RADIOLOGY | Facility: HOSPITAL | Age: 82
Discharge: HOME | End: 2017-03-08
Admitting: FAMILY MEDICINE

## 2017-03-08 ENCOUNTER — CLINICAL SUPPORT NO REQUIREMENTS (OUTPATIENT)
Dept: CARDIOLOGY | Facility: CLINIC | Age: 82
End: 2017-03-08

## 2017-03-08 ENCOUNTER — OFFICE VISIT (OUTPATIENT)
Dept: CARDIOLOGY | Facility: CLINIC | Age: 82
End: 2017-03-08

## 2017-03-08 VITALS
DIASTOLIC BLOOD PRESSURE: 62 MMHG | BODY MASS INDEX: 36.7 KG/M2 | SYSTOLIC BLOOD PRESSURE: 94 MMHG | HEART RATE: 66 BPM | WEIGHT: 215 LBS | HEIGHT: 64 IN

## 2017-03-08 DIAGNOSIS — J18.9 PNEUMONIA OF RIGHT LOWER LOBE DUE TO INFECTIOUS ORGANISM: ICD-10-CM

## 2017-03-08 DIAGNOSIS — I49.5 SICK SINUS SYNDROME (HCC): Primary | ICD-10-CM

## 2017-03-08 DIAGNOSIS — Z86.79 HISTORY OF SICK SINUS SYNDROME: Primary | ICD-10-CM

## 2017-03-08 DIAGNOSIS — I87.2 CHRONIC VENOUS INSUFFICIENCY: ICD-10-CM

## 2017-03-08 DIAGNOSIS — Z95.0 CARDIAC PACEMAKER IN SITU: ICD-10-CM

## 2017-03-08 DIAGNOSIS — Z86.79 HISTORY OF HYPERTENSION: ICD-10-CM

## 2017-03-08 PROBLEM — A41.9 SEPSIS DUE TO PNEUMONIA (HCC): Status: RESOLVED | Noted: 2017-02-02 | Resolved: 2017-03-08

## 2017-03-08 PROCEDURE — 93280 PM DEVICE PROGR EVAL DUAL: CPT | Performed by: INTERNAL MEDICINE

## 2017-03-08 PROCEDURE — 71020 HC CHEST PA AND LATERAL: CPT

## 2017-03-08 PROCEDURE — 99214 OFFICE O/P EST MOD 30 MIN: CPT | Performed by: INTERNAL MEDICINE

## 2017-03-08 NOTE — PROGRESS NOTES
Date of Office Visit: 2017  Encounter Provider: Bandar Ruiz MD  Place of Service: [facility] CARDIOLOGY  Patient Name: Kim Feldman  :3/13/1930    Chief Complaint   Patient presents with   • Hypertension   :     HPI: Kim Feldman is a 86 y.o. female who presents today to follow-up. She has a history of sick sinus syndrome and has a permanent pacemaker. She has a history of hypertension but has had her medications slowly decreased due to improvement in her BP.  She has been admitted several times over the last two years with pneumonia and UTI's.  She has been diagnosed with aspiration.      She has chronic lower extremity edema, which is unchanged.  She has not had syncope, orthopnea, PND, palpitations, or chest pain.      Past Medical History   Diagnosis Date   • Anemia    • Bulging lumbar disc    • Chronic cough    • Chronic UTI    • Chronic venous insufficiency    • Clonic seizure disorder    • Dementia without behavioral disturbance      moderate   • Depression, endogenous    • Disc degeneration, lumbar    • Dysphagia    • GERD (gastroesophageal reflux disease)    • Hiatal hernia    • History of anxiety    • History of aspiration pneumonitis    • History of cerebral artery occlusion      CVA (following TIA), 10/10, treated with tPA   • History of herpes zoster    • History of ingrowing nail    • History of osteoporosis    • History of poliomyelitis      child   • History of sciatica    • History of sick sinus syndrome      s/p PPM   • History of transient cerebral ischemia      followed by stroke in 10/2010. BIBI was normal.   • History of Zenker's diverticulum removal    • HX: long term anticoagulant use    • Hyperlipidemia    • Hypertension    • Hyponatremia    • Intertrigo    • Lumbar radiculopathy    • Morbid obesity    • Neuralgia      with diplopia secondary to facial shingles   • Nonepileptic episode    • Osteoarthritis of right knee    • Pacemaker    •  Seeing double      secondary to shingles on the face also with neuralgia   • SIADH (syndrome of inappropriate ADH production)    • Vitamin D deficiency        Past Surgical History   Procedure Laterality Date   • Hand surgery Right    • Lumbar laminectomy       L-3 L4 L4 L5   • Cardiac pacemaker placement       secondary to SSS, placed in 2004, with generator change in 2014   • Tonsillectomy     • Laminectomy for implantation / placement neurostimulator electrodes     • Knee arthroplasty Left    • Cataract extraction     • Zenkers diverticulectomy N/A 9/27/2016     Procedure: ENDOSCOPIC REMOVAL OF ZENKERS DIVERTICULUM W/ WERDASCOPE;  Surgeon: Oswald Barth MD;  Location: [facility] MAIN OR;  Service:        Social History     Social History   • Marital status:      Spouse name: N/A   • Number of children: 3   • Years of education: N/A     Occupational History   • Retired      Social History Main Topics   • Smoking status: Former Smoker     Years: 40.00     Types: Cigarettes   • Smokeless tobacco: Never Used      Comment: QUIT 1992   • Alcohol use No      Comment: VERY RARE / caffeine use   • Drug use: No   • Sexual activity: Defer     Other Topics Concern   • Not on file     Social History Narrative       No family history on file.    Review of Systems   Constitution: Positive for malaise/fatigue.   Eyes: Positive for double vision.   Cardiovascular: Positive for leg swelling.   Respiratory: Positive for wheezing.    Endocrine: Positive for cold intolerance.   Musculoskeletal: Positive for joint pain.   Neurological: Positive for excessive daytime sleepiness.   Psychiatric/Behavioral: Positive for depression.   All other systems reviewed and are negative.      Allergies   Allergen Reactions   • Penicillins Hives         Current Outpatient Prescriptions:   •  B Complex Vitamins (VITAMIN B COMPLEX PO), Take 1 tablet by mouth every morning. HOLD PRIOR TO SURG, Disp: , Rfl:   •  Calcium-Vitamin D-Vitamin K  "(VIACTIV PO), Take 500 mg by mouth Every Night. HOLD PRIOR TO SURG , Disp: , Rfl:   •  carvedilol (COREG) 3.125 MG tablet, TAKE ONE TABLET BY MOUTH TWICE A DAY WITH MEALS, Disp: 60 tablet, Rfl: 2  •  clopidogrel (PLAVIX) 75 MG tablet, TAKE ONE TABLET BY MOUTH DAILY, Disp: 90 tablet, Rfl: 0  •  clotrimazole-betamethasone (LOTRISONE) 1-0.05 % cream, Apply  topically 2 (Two) Times a Day. (Patient taking differently: Apply  topically 2 (Two) Times a Day As Needed.), Disp: 45 g, Rfl: 0  •  diclofenac (VOLTAREN) 1 % gel gel, Apply 4 g topically 4 (four) times a day as needed., Disp: , Rfl:   •  gabapentin (NEURONTIN) 300 MG capsule, Take 2 capsules by mouth 3 (Three) Times a Day., Disp: 540 capsule, Rfl: 2  •  Hypromellose (ARTIFICIAL TEARS OP), Apply 2 drops to eye 4 (four) times a day as needed., Disp: , Rfl:   •  lansoprazole (PREVACID) 30 MG capsule, TAKE ONE CAPSULE BY MOUTH EVERY MORNING (Patient taking differently: take one capsule by mouth every evening), Disp: 30 capsule, Rfl: 1  •  levETIRAcetam (KEPPRA) 500 MG tablet, Take 500 mg by mouth 2 (two) times a day., Disp: , Rfl:   •  Loratadine 10 MG capsule, Take 1 capsule by mouth daily as needed., Disp: , Rfl:   •  methylcellulose, Laxative, (CITRUCEL) 500 MG tablet tablet, Take 2 tablets by mouth Every Morning., Disp: , Rfl:   •  mirtazapine (REMERON) 15 MG tablet, Take 1 tablet by mouth Every Night., Disp: 30 tablet, Rfl: 2  •  nystatin (MYCOSTATIN) 521033 UNIT/GM cream, Apply  topically As Needed., Disp: , Rfl:   •  pseudoephedrine-guaifenesin (MUCINEX D)  MG per 12 hr tablet, Take 1 tablet by mouth At Night As Needed for allergies., Disp: , Rfl:     Current Facility-Administered Medications:   •  cyanocobalamin injection 1,000 mcg, 1,000 mcg, Intramuscular, Q28 Days, Sharad Salinas MD, 1,000 mcg at 02/10/17 1631     Objective:     Vitals:    03/08/17 1345   BP: 94/62   Pulse: 66   Weight: 215 lb (97.5 kg)   Height: 64\" (162.6 cm)     Body mass index is " 36.9 kg/(m^2).    Physical Exam   Constitutional: She is oriented to person, place, and time.   Obese, in a wheelchair     HENT:   Head: Normocephalic.   Nose: Nose normal.   Mouth/Throat: Oropharynx is clear and moist.   Eyes: Conjunctivae and EOM are normal. Pupils are equal, round, and reactive to light.   Neck: Normal range of motion.   Cannot assess for JVD due to habitus   Cardiovascular: Normal rate, regular rhythm, normal heart sounds and intact distal pulses.    No murmur heard.  Pulmonary/Chest: Effort normal.   Abdominal: Soft.   Obesity limits abdominal exam   Musculoskeletal: Normal range of motion. She exhibits edema (doughy nonpitting edema).   Neurological: She is alert and oriented to person, place, and time. No cranial nerve deficit.   Skin: Skin is warm and dry. No rash noted.   Psychiatric: She has a normal mood and affect. Her behavior is normal. Judgment and thought content normal.   Vitals reviewed.      Procedures      Assessment:       Diagnosis Plan   1. History of sick sinus syndrome     2. Cardiac pacemaker in situ     3. History of hypertension     4. Chronic venous insufficiency            Plan:       1/2.  She has SSS s/p PPM.  It was checked today; no episodes have been noted.      3.  She has a history of HTN but her BP has steadily declined.  I stopped her carvedilol today.      4.  She has chronic edema due to venous insufficiency.        Sincerely,       Bandar Ruiz MD

## 2017-03-09 ENCOUNTER — OFFICE VISIT (OUTPATIENT)
Dept: INTERNAL MEDICINE | Facility: CLINIC | Age: 82
End: 2017-03-09

## 2017-03-09 VITALS
WEIGHT: 215.8 LBS | DIASTOLIC BLOOD PRESSURE: 92 MMHG | HEIGHT: 64 IN | BODY MASS INDEX: 36.84 KG/M2 | TEMPERATURE: 97.6 F | SYSTOLIC BLOOD PRESSURE: 130 MMHG | OXYGEN SATURATION: 95 % | HEART RATE: 82 BPM

## 2017-03-09 DIAGNOSIS — G89.29 CHRONIC BILATERAL LOW BACK PAIN WITHOUT SCIATICA: ICD-10-CM

## 2017-03-09 DIAGNOSIS — F33.2 DEPRESSION, ENDOGENOUS (HCC): ICD-10-CM

## 2017-03-09 DIAGNOSIS — R53.1 WEAKNESS: ICD-10-CM

## 2017-03-09 DIAGNOSIS — G62.9 NEUROPATHY: ICD-10-CM

## 2017-03-09 DIAGNOSIS — I10 ESSENTIAL HYPERTENSION: ICD-10-CM

## 2017-03-09 DIAGNOSIS — I87.2 CHRONIC VENOUS INSUFFICIENCY: ICD-10-CM

## 2017-03-09 DIAGNOSIS — Z95.0 CARDIAC PACEMAKER IN SITU: Primary | ICD-10-CM

## 2017-03-09 DIAGNOSIS — M54.50 CHRONIC BILATERAL LOW BACK PAIN WITHOUT SCIATICA: ICD-10-CM

## 2017-03-09 DIAGNOSIS — M19.90 ARTHRITIS: ICD-10-CM

## 2017-03-09 DIAGNOSIS — I73.9 INTERMITTENT CLAUDICATION (HCC): ICD-10-CM

## 2017-03-09 DIAGNOSIS — E53.8 B12 DEFICIENCY: ICD-10-CM

## 2017-03-09 PROBLEM — Z96.659 S/P KNEE REPLACEMENT: Status: ACTIVE | Noted: 2017-03-09

## 2017-03-09 PROCEDURE — 99214 OFFICE O/P EST MOD 30 MIN: CPT | Performed by: FAMILY MEDICINE

## 2017-03-09 PROCEDURE — 96372 THER/PROPH/DIAG INJ SC/IM: CPT | Performed by: FAMILY MEDICINE

## 2017-03-09 RX ADMIN — CYANOCOBALAMIN 1000 MCG: 1000 INJECTION, SOLUTION INTRAMUSCULAR; SUBCUTANEOUS at 18:11

## 2017-03-09 NOTE — PROGRESS NOTES
Subjective   Kim Feldman is a 86 y.o. female.     Chief Complaint   Patient presents with   • patient in for face to face visit for a transport chair         History of Present Illness    Patient comes in for face-to-face evaluation regarding need for a transport chair she is presently using a wheeled walker and can walk no more than approximately 50 feet without stopping without a walker she can only walk about 3 or 4 steps she has chronic lower extremity edema and weakness secondary to venous insufficiency and intermittent claudication with neuropathy she has prolonged activities of daily living because of the necessity to get around the house with help with a walker she's going to use a wheeled transport for greater distances  The following portions of the patient's history were reviewed and updated as appropriate: allergies, current medications, past medical history, past social history, past surgical history and problem list.    Review of Systems    Objective   Physical Exam   Constitutional: She is oriented to person, place, and time. She appears well-developed and well-nourished.   HENT:   Head: Normocephalic and atraumatic.   Cardiovascular: Normal rate and regular rhythm.    Pulmonary/Chest: Effort normal and breath sounds normal.   Musculoskeletal: She exhibits edema.   Lower extremity weakness bilaterally left knee replacement no erythema minimal distal edema trace bilaterally   Neurological: She is alert and oriented to person, place, and time. She displays abnormal reflex. She exhibits abnormal muscle tone.   Skin: Skin is warm and dry.   Psychiatric: She has a normal mood and affect. Her behavior is normal. Thought content normal.   Nursing note and vitals reviewed.      Assessment/Plan   Kim was seen today for patient in for face to face visit for a transport chair.    Diagnoses and all orders for this visit:    Cardiac pacemaker in situ    Intermittent claudication  -     [place]  Chair    Depression, endogenous    Weakness  -     [place] Chair    Arthritis  -     [place] Chair    Neuropathy  -     [place] Chair    Chronic bilateral low back pain without sciatica  -     [place] Chair    Chronic venous insufficiency    B12 deficiency    Essential hypertension        Her blood pressure goals was 140/90 patient will call this office adjust on occasion as needed since her Coreg has been stopped by cardiology

## 2017-03-10 ENCOUNTER — TELEPHONE (OUTPATIENT)
Dept: INTERNAL MEDICINE | Facility: CLINIC | Age: 82
End: 2017-03-10

## 2017-03-10 NOTE — TELEPHONE ENCOUNTER
----- Message from Sharad Salinas MD sent at 3/10/2017  4:38 PM EST -----  Chest x-ray no active disease

## 2017-03-14 ENCOUNTER — APPOINTMENT (OUTPATIENT)
Dept: GENERAL RADIOLOGY | Facility: HOSPITAL | Age: 82
End: 2017-03-14

## 2017-03-14 ENCOUNTER — HOSPITAL ENCOUNTER (INPATIENT)
Facility: HOSPITAL | Age: 82
LOS: 6 days | Discharge: HOME HEALTH | End: 2017-03-20
Attending: EMERGENCY MEDICINE | Admitting: HOSPITALIST

## 2017-03-14 DIAGNOSIS — R13.10 DYSPHAGIA, UNSPECIFIED TYPE: ICD-10-CM

## 2017-03-14 DIAGNOSIS — R53.1 GENERALIZED WEAKNESS: ICD-10-CM

## 2017-03-14 DIAGNOSIS — J18.9 CAP (COMMUNITY ACQUIRED PNEUMONIA): Primary | ICD-10-CM

## 2017-03-14 LAB
ALBUMIN SERPL-MCNC: 4 G/DL (ref 3.5–5.2)
ALBUMIN/GLOB SERPL: 1.2 G/DL
ALP SERPL-CCNC: 55 U/L (ref 39–117)
ALT SERPL W P-5'-P-CCNC: 13 U/L (ref 1–33)
ANION GAP SERPL CALCULATED.3IONS-SCNC: 9.1 MMOL/L
AST SERPL-CCNC: 19 U/L (ref 1–32)
BACTERIA UR QL AUTO: ABNORMAL /HPF
BASOPHILS # BLD AUTO: 0.02 10*3/MM3 (ref 0–0.2)
BASOPHILS NFR BLD AUTO: 0.1 % (ref 0–1.5)
BILIRUB SERPL-MCNC: 0.7 MG/DL (ref 0.1–1.2)
BILIRUB UR QL STRIP: NEGATIVE
BUN BLD-MCNC: 23 MG/DL (ref 8–23)
BUN/CREAT SERPL: 14 (ref 7–25)
CALCIUM SPEC-SCNC: 9.2 MG/DL (ref 8.6–10.5)
CHLORIDE SERPL-SCNC: 101 MMOL/L (ref 98–107)
CLARITY UR: ABNORMAL
CO2 SERPL-SCNC: 28.9 MMOL/L (ref 22–29)
COLOR UR: YELLOW
CREAT BLD-MCNC: 1.64 MG/DL (ref 0.57–1)
D-LACTATE SERPL-SCNC: 1.3 MMOL/L (ref 0.5–2)
DEPRECATED RDW RBC AUTO: 46.8 FL (ref 37–54)
EOSINOPHIL # BLD AUTO: 0.06 10*3/MM3 (ref 0–0.7)
EOSINOPHIL NFR BLD AUTO: 0.4 % (ref 0.3–6.2)
ERYTHROCYTE [DISTWIDTH] IN BLOOD BY AUTOMATED COUNT: 12.4 % (ref 11.7–13)
GFR SERPL CREATININE-BSD FRML MDRD: 30 ML/MIN/1.73
GLOBULIN UR ELPH-MCNC: 3.3 GM/DL
GLUCOSE BLD-MCNC: 113 MG/DL (ref 65–99)
GLUCOSE UR STRIP-MCNC: NEGATIVE MG/DL
HCT VFR BLD AUTO: 37.1 % (ref 35.6–45.5)
HGB BLD-MCNC: 12.1 G/DL (ref 11.9–15.5)
HGB UR QL STRIP.AUTO: NEGATIVE
HOLD SPECIMEN: NORMAL
HYALINE CASTS UR QL AUTO: ABNORMAL /LPF
IMM GRANULOCYTES # BLD: 0.04 10*3/MM3 (ref 0–0.03)
IMM GRANULOCYTES NFR BLD: 0.2 % (ref 0–0.5)
KETONES UR QL STRIP: ABNORMAL
LEUKOCYTE ESTERASE UR QL STRIP.AUTO: ABNORMAL
LYMPHOCYTES # BLD AUTO: 2.15 10*3/MM3 (ref 0.9–4.8)
LYMPHOCYTES NFR BLD AUTO: 12.7 % (ref 19.6–45.3)
MAGNESIUM SERPL-MCNC: 2.2 MG/DL (ref 1.6–2.4)
MCH RBC QN AUTO: 33.6 PG (ref 26.9–32)
MCHC RBC AUTO-ENTMCNC: 32.6 G/DL (ref 32.4–36.3)
MCV RBC AUTO: 103.1 FL (ref 80.5–98.2)
MONOCYTES # BLD AUTO: 0.9 10*3/MM3 (ref 0.2–1.2)
MONOCYTES NFR BLD AUTO: 5.3 % (ref 5–12)
NEUTROPHILS # BLD AUTO: 13.71 10*3/MM3 (ref 1.9–8.1)
NEUTROPHILS NFR BLD AUTO: 81.3 % (ref 42.7–76)
NITRITE UR QL STRIP: NEGATIVE
PH UR STRIP.AUTO: 5.5 [PH] (ref 5–8)
PLATELET # BLD AUTO: 178 10*3/MM3 (ref 140–500)
PMV BLD AUTO: 9.9 FL (ref 6–12)
POTASSIUM BLD-SCNC: 4.7 MMOL/L (ref 3.5–5.2)
PROT SERPL-MCNC: 7.3 G/DL (ref 6–8.5)
PROT UR QL STRIP: ABNORMAL
RBC # BLD AUTO: 3.6 10*6/MM3 (ref 3.9–5.2)
RBC # UR: ABNORMAL /HPF
REF LAB TEST METHOD: ABNORMAL
SODIUM BLD-SCNC: 139 MMOL/L (ref 136–145)
SP GR UR STRIP: 1.02 (ref 1–1.03)
SQUAMOUS #/AREA URNS HPF: ABNORMAL /HPF
TROPONIN T SERPL-MCNC: <0.01 NG/ML (ref 0–0.03)
UROBILINOGEN UR QL STRIP: ABNORMAL
WBC CLUMPS # UR AUTO: ABNORMAL /HPF
WBC NRBC COR # BLD: 16.88 10*3/MM3 (ref 4.5–10.7)
WBC UR QL AUTO: ABNORMAL /HPF
WHOLE BLOOD HOLD SPECIMEN: NORMAL

## 2017-03-14 PROCEDURE — 36415 COLL VENOUS BLD VENIPUNCTURE: CPT | Performed by: EMERGENCY MEDICINE

## 2017-03-14 PROCEDURE — 84484 ASSAY OF TROPONIN QUANT: CPT | Performed by: EMERGENCY MEDICINE

## 2017-03-14 PROCEDURE — 85025 COMPLETE CBC W/AUTO DIFF WBC: CPT | Performed by: EMERGENCY MEDICINE

## 2017-03-14 PROCEDURE — 71020 HC CHEST PA AND LATERAL: CPT

## 2017-03-14 PROCEDURE — 93010 ELECTROCARDIOGRAM REPORT: CPT | Performed by: INTERNAL MEDICINE

## 2017-03-14 PROCEDURE — 87040 BLOOD CULTURE FOR BACTERIA: CPT | Performed by: EMERGENCY MEDICINE

## 2017-03-14 PROCEDURE — 80053 COMPREHEN METABOLIC PANEL: CPT | Performed by: EMERGENCY MEDICINE

## 2017-03-14 PROCEDURE — 87086 URINE CULTURE/COLONY COUNT: CPT | Performed by: EMERGENCY MEDICINE

## 2017-03-14 PROCEDURE — 25010000003 CEFTRIAXONE PER 250 MG: Performed by: EMERGENCY MEDICINE

## 2017-03-14 PROCEDURE — 99284 EMERGENCY DEPT VISIT MOD MDM: CPT

## 2017-03-14 PROCEDURE — 87186 SC STD MICRODIL/AGAR DIL: CPT | Performed by: EMERGENCY MEDICINE

## 2017-03-14 PROCEDURE — 83605 ASSAY OF LACTIC ACID: CPT | Performed by: EMERGENCY MEDICINE

## 2017-03-14 PROCEDURE — 81001 URINALYSIS AUTO W/SCOPE: CPT | Performed by: EMERGENCY MEDICINE

## 2017-03-14 PROCEDURE — 83735 ASSAY OF MAGNESIUM: CPT | Performed by: EMERGENCY MEDICINE

## 2017-03-14 PROCEDURE — 93005 ELECTROCARDIOGRAM TRACING: CPT | Performed by: EMERGENCY MEDICINE

## 2017-03-14 RX ORDER — SODIUM CHLORIDE 9 MG/ML
125 INJECTION, SOLUTION INTRAVENOUS CONTINUOUS
Status: DISCONTINUED | OUTPATIENT
Start: 2017-03-14 | End: 2017-03-16

## 2017-03-14 RX ORDER — CEFTRIAXONE SODIUM 1 G/50ML
1 INJECTION, SOLUTION INTRAVENOUS ONCE
Status: COMPLETED | OUTPATIENT
Start: 2017-03-14 | End: 2017-03-14

## 2017-03-14 RX ORDER — SODIUM CHLORIDE 0.9 % (FLUSH) 0.9 %
10 SYRINGE (ML) INJECTION AS NEEDED
Status: DISCONTINUED | OUTPATIENT
Start: 2017-03-14 | End: 2017-03-20 | Stop reason: HOSPADM

## 2017-03-14 RX ADMIN — AZITHROMYCIN DIHYDRATE 500 MG: 500 INJECTION, POWDER, LYOPHILIZED, FOR SOLUTION INTRAVENOUS at 22:47

## 2017-03-14 RX ADMIN — SODIUM CHLORIDE 125 ML/HR: 9 INJECTION, SOLUTION INTRAVENOUS at 21:18

## 2017-03-14 RX ADMIN — SODIUM CHLORIDE 500 ML: 9 INJECTION, SOLUTION INTRAVENOUS at 21:17

## 2017-03-14 RX ADMIN — CEFTRIAXONE SODIUM 1 G: 1 INJECTION, SOLUTION INTRAVENOUS at 21:19

## 2017-03-14 NOTE — ED NOTES
Pt reports increased sleepiness and weakness starting yesterday. Pt also reports sinus congestion and denies fever. Resp even and unlabored. Skin PWD. Pt in NAD.     Sosa Ojeda RN  03/14/17 1170

## 2017-03-15 PROBLEM — N39.0 UTI (URINARY TRACT INFECTION): Status: ACTIVE | Noted: 2017-03-15

## 2017-03-15 LAB
ANION GAP SERPL CALCULATED.3IONS-SCNC: 11.5 MMOL/L
BASOPHILS # BLD AUTO: 0.02 10*3/MM3 (ref 0–0.2)
BASOPHILS NFR BLD AUTO: 0.2 % (ref 0–1.5)
BUN BLD-MCNC: 24 MG/DL (ref 8–23)
BUN/CREAT SERPL: 19.8 (ref 7–25)
CALCIUM SPEC-SCNC: 8.4 MG/DL (ref 8.6–10.5)
CHLORIDE SERPL-SCNC: 105 MMOL/L (ref 98–107)
CO2 SERPL-SCNC: 24.5 MMOL/L (ref 22–29)
CREAT BLD-MCNC: 1.21 MG/DL (ref 0.57–1)
DEPRECATED RDW RBC AUTO: 47.9 FL (ref 37–54)
EOSINOPHIL # BLD AUTO: 0.19 10*3/MM3 (ref 0–0.7)
EOSINOPHIL NFR BLD AUTO: 1.6 % (ref 0.3–6.2)
ERYTHROCYTE [DISTWIDTH] IN BLOOD BY AUTOMATED COUNT: 12.7 % (ref 11.7–13)
GFR SERPL CREATININE-BSD FRML MDRD: 42 ML/MIN/1.73
GLUCOSE BLD-MCNC: 96 MG/DL (ref 65–99)
HCT VFR BLD AUTO: 32.5 % (ref 35.6–45.5)
HGB BLD-MCNC: 10.7 G/DL (ref 11.9–15.5)
IMM GRANULOCYTES # BLD: 0.03 10*3/MM3 (ref 0–0.03)
IMM GRANULOCYTES NFR BLD: 0.2 % (ref 0–0.5)
LYMPHOCYTES # BLD AUTO: 2.54 10*3/MM3 (ref 0.9–4.8)
LYMPHOCYTES NFR BLD AUTO: 20.8 % (ref 19.6–45.3)
MCH RBC QN AUTO: 34.1 PG (ref 26.9–32)
MCHC RBC AUTO-ENTMCNC: 32.9 G/DL (ref 32.4–36.3)
MCV RBC AUTO: 103.5 FL (ref 80.5–98.2)
MONOCYTES # BLD AUTO: 0.86 10*3/MM3 (ref 0.2–1.2)
MONOCYTES NFR BLD AUTO: 7 % (ref 5–12)
NEUTROPHILS # BLD AUTO: 8.57 10*3/MM3 (ref 1.9–8.1)
NEUTROPHILS NFR BLD AUTO: 70.2 % (ref 42.7–76)
PLATELET # BLD AUTO: 122 10*3/MM3 (ref 140–500)
PMV BLD AUTO: 10.1 FL (ref 6–12)
POTASSIUM BLD-SCNC: 4.3 MMOL/L (ref 3.5–5.2)
RBC # BLD AUTO: 3.14 10*6/MM3 (ref 3.9–5.2)
SODIUM BLD-SCNC: 141 MMOL/L (ref 136–145)
WBC NRBC COR # BLD: 12.21 10*3/MM3 (ref 4.5–10.7)

## 2017-03-15 PROCEDURE — 85025 COMPLETE CBC W/AUTO DIFF WBC: CPT | Performed by: HOSPITALIST

## 2017-03-15 PROCEDURE — 80048 BASIC METABOLIC PNL TOTAL CA: CPT | Performed by: HOSPITALIST

## 2017-03-15 PROCEDURE — 97110 THERAPEUTIC EXERCISES: CPT

## 2017-03-15 PROCEDURE — 25010000002 ENOXAPARIN PER 10 MG: Performed by: HOSPITALIST

## 2017-03-15 PROCEDURE — 25010000003 CEFTRIAXONE PER 250 MG: Performed by: HOSPITALIST

## 2017-03-15 PROCEDURE — 94799 UNLISTED PULMONARY SVC/PX: CPT

## 2017-03-15 PROCEDURE — 97162 PT EVAL MOD COMPLEX 30 MIN: CPT

## 2017-03-15 RX ORDER — GUAIFENESIN 600 MG/1
600 TABLET, EXTENDED RELEASE ORAL 2 TIMES DAILY
Status: DISCONTINUED | OUTPATIENT
Start: 2017-03-15 | End: 2017-03-16

## 2017-03-15 RX ORDER — MINERAL OIL AND PETROLATUM 150; 830 MG/G; MG/G
OINTMENT OPHTHALMIC 4 TIMES DAILY PRN
Status: DISCONTINUED | OUTPATIENT
Start: 2017-03-15 | End: 2017-03-20 | Stop reason: HOSPADM

## 2017-03-15 RX ORDER — CLOPIDOGREL BISULFATE 75 MG/1
75 TABLET ORAL DAILY
Status: DISCONTINUED | OUTPATIENT
Start: 2017-03-15 | End: 2017-03-20 | Stop reason: HOSPADM

## 2017-03-15 RX ORDER — GABAPENTIN 300 MG/1
600 CAPSULE ORAL 3 TIMES DAILY
Status: DISCONTINUED | OUTPATIENT
Start: 2017-03-15 | End: 2017-03-20 | Stop reason: HOSPADM

## 2017-03-15 RX ORDER — SODIUM CHLORIDE 9 MG/ML
75 INJECTION, SOLUTION INTRAVENOUS CONTINUOUS
Status: DISCONTINUED | OUTPATIENT
Start: 2017-03-15 | End: 2017-03-16

## 2017-03-15 RX ORDER — MIRTAZAPINE 15 MG/1
15 TABLET, FILM COATED ORAL NIGHTLY
Status: DISCONTINUED | OUTPATIENT
Start: 2017-03-15 | End: 2017-03-20 | Stop reason: HOSPADM

## 2017-03-15 RX ORDER — CEFTRIAXONE SODIUM 1 G/50ML
1 INJECTION, SOLUTION INTRAVENOUS EVERY 24 HOURS
Status: DISCONTINUED | OUTPATIENT
Start: 2017-03-15 | End: 2017-03-16

## 2017-03-15 RX ORDER — PANTOPRAZOLE SODIUM 40 MG/1
40 TABLET, DELAYED RELEASE ORAL
Status: DISCONTINUED | OUTPATIENT
Start: 2017-03-15 | End: 2017-03-20 | Stop reason: HOSPADM

## 2017-03-15 RX ORDER — IPRATROPIUM BROMIDE AND ALBUTEROL SULFATE 2.5; .5 MG/3ML; MG/3ML
3 SOLUTION RESPIRATORY (INHALATION)
Status: DISCONTINUED | OUTPATIENT
Start: 2017-03-15 | End: 2017-03-20 | Stop reason: HOSPADM

## 2017-03-15 RX ORDER — ACETAMINOPHEN 325 MG/1
650 TABLET ORAL EVERY 6 HOURS PRN
Status: DISCONTINUED | OUTPATIENT
Start: 2017-03-15 | End: 2017-03-20 | Stop reason: HOSPADM

## 2017-03-15 RX ORDER — SODIUM CHLORIDE 0.9 % (FLUSH) 0.9 %
1-10 SYRINGE (ML) INJECTION AS NEEDED
Status: DISCONTINUED | OUTPATIENT
Start: 2017-03-15 | End: 2017-03-20 | Stop reason: HOSPADM

## 2017-03-15 RX ORDER — LEVETIRACETAM 500 MG/1
500 TABLET ORAL 2 TIMES DAILY
Status: DISCONTINUED | OUTPATIENT
Start: 2017-03-15 | End: 2017-03-20 | Stop reason: HOSPADM

## 2017-03-15 RX ORDER — ONDANSETRON 2 MG/ML
4 INJECTION INTRAMUSCULAR; INTRAVENOUS EVERY 6 HOURS PRN
Status: DISCONTINUED | OUTPATIENT
Start: 2017-03-15 | End: 2017-03-20 | Stop reason: HOSPADM

## 2017-03-15 RX ADMIN — GUAIFENESIN 600 MG: 600 TABLET, EXTENDED RELEASE ORAL at 17:10

## 2017-03-15 RX ADMIN — ENOXAPARIN SODIUM 30 MG: 30 INJECTION SUBCUTANEOUS at 08:00

## 2017-03-15 RX ADMIN — AZITHROMYCIN DIHYDRATE 500 MG: 500 INJECTION, POWDER, LYOPHILIZED, FOR SOLUTION INTRAVENOUS at 22:37

## 2017-03-15 RX ADMIN — CLOPIDOGREL 75 MG: 75 TABLET, FILM COATED ORAL at 08:00

## 2017-03-15 RX ADMIN — PANTOPRAZOLE SODIUM 40 MG: 40 TABLET, DELAYED RELEASE ORAL at 07:59

## 2017-03-15 RX ADMIN — SODIUM CHLORIDE 75 ML/HR: 9 INJECTION, SOLUTION INTRAVENOUS at 02:12

## 2017-03-15 RX ADMIN — GABAPENTIN 600 MG: 300 CAPSULE ORAL at 15:57

## 2017-03-15 RX ADMIN — MIRTAZAPINE 15 MG: 15 TABLET, FILM COATED ORAL at 20:03

## 2017-03-15 RX ADMIN — SODIUM CHLORIDE 75 ML/HR: 9 INJECTION, SOLUTION INTRAVENOUS at 09:31

## 2017-03-15 RX ADMIN — GABAPENTIN 600 MG: 300 CAPSULE ORAL at 08:00

## 2017-03-15 RX ADMIN — LEVETIRACETAM 500 MG: 500 TABLET, FILM COATED ORAL at 08:00

## 2017-03-15 RX ADMIN — LEVETIRACETAM 500 MG: 500 TABLET, FILM COATED ORAL at 17:10

## 2017-03-15 RX ADMIN — CEFTRIAXONE SODIUM 1 G: 1 INJECTION, SOLUTION INTRAVENOUS at 20:03

## 2017-03-15 RX ADMIN — GABAPENTIN 600 MG: 300 CAPSULE ORAL at 20:03

## 2017-03-15 RX ADMIN — ACETAMINOPHEN 650 MG: 325 TABLET ORAL at 02:12

## 2017-03-15 RX ADMIN — GUAIFENESIN 600 MG: 600 TABLET, EXTENDED RELEASE ORAL at 08:00

## 2017-03-15 NOTE — PLAN OF CARE
Problem: Patient Care Overview (Adult)  Goal: Plan of Care Review  Outcome: Ongoing (interventions implemented as appropriate)    03/15/17 1105   Coping/Psychosocial Response Interventions   Plan Of Care Reviewed With patient   Outcome Evaluation   Outcome Summary/Follow up Plan Pt with multiple comorbidities presents with BLE weakness, impaired balance, decreased functional activity tolerance, and decreased safety and level of independence with functional mobility. Pt would benefit from skilled PT services at this time to address these functional deficits and get pt back to PLOF.         Problem: Inpatient Physical Therapy  Goal: Bed Mobility Goal LTG- PT  Outcome: Ongoing (interventions implemented as appropriate)    03/15/17 1105   Bed Mobility PT LTG   Bed Mobility PT LTG, Date Established 03/15/17   Bed Mobility PT LTG, Time to Achieve 1 wk   Bed Mobility PT LTG, Activity Type all bed mobility   Bed Mobility PT LTG, [place] Level independent       Goal: Transfer Training Goal 1 LTG- PT  Outcome: Ongoing (interventions implemented as appropriate)    03/15/17 1105   Transfer Training PT LTG   Transfer Training PT LTG, Date Established 03/15/17   Transfer Training PT LTG, Time to Achieve 1 wk   Transfer Training PT LTG, Activity Type all transfers   Transfer Training PT LTG, [place] Level supervision required   Transfer Training PT LTG, Assist Device other (see comments)  (rollator)       Goal: Gait Training Goal LTG- PT  Outcome: Ongoing (interventions implemented as appropriate)    03/15/17 1105   Gait Training PT LTG   Gait Training Goal PT LTG, Date Established 03/15/17   Gait Training Goal PT LTG, Time to Achieve 1 wk   Gait Training Goal PT LTG, [place] Level supervision required   Gait Training Goal PT LTG, Assist Device other (see comments)  (rollator)   Gait Training Goal PT LTG, Distance to Achieve 200       Goal: Stair Training Goal LTG- PT  Outcome: Ongoing (interventions implemented as  appropriate)    03/15/17 1105   Stair Training PT LTG   Stair Training Goal PT LTG, Date Established 03/15/17   Stair Training Goal PT LTG, Time to Achieve 1 wk   Stair Training Goal PT LTG, Number of Steps 2   Stair Training Goal PT LTG, [place] Level contact guard assist   Stair Training Goal PT LTG, Assist Device 1 handrail

## 2017-03-15 NOTE — H&P
HISTORY AND PHYSICAL   [facility]        Patient Identification:  Name: Kim Feldman  Age: 87 y.o.  Sex: female  :  3/13/1930  MRN: [id number]                     Primary Care Physician: Sharad Salinas MD    Chief Complaint:  Weak and cough    History of Present Illness:       The patient is an 87-year-old white female with history of seizure disorder, dementia without behavioral problems, depression,, degenerative disc disease, GERD, hiatal hernia and history of anxiety who is admitted with a couple day history of cough with clear mucus and some shortness of air.  She denied having any fever or chills.  She had been very weak and dizzy.  She denied having any nausea or vomiting.  She was evaluated in the emergency room and found to have pneumonia.  She is started on IV antibiotics and admitted for further treatment.  Her urinalysis also showed changes consistent with urinary tract infection.    Past Medical History:  Past Medical History   Diagnosis Date   • Anemia    • Bulging lumbar disc    • Chronic cough    • Chronic UTI    • Chronic venous insufficiency    • Clonic seizure disorder    • Dementia without behavioral disturbance      moderate   • Depression, endogenous    • Disc degeneration, lumbar    • Dysphagia    • GERD (gastroesophageal reflux disease)    • Hiatal hernia    • History of anxiety    • History of aspiration pneumonitis    • History of cerebral artery occlusion      CVA (following TIA), 10/10, treated with tPA   • History of herpes zoster    • History of ingrowing nail    • History of osteoporosis    • History of poliomyelitis      child   • History of sciatica    • History of sick sinus syndrome      s/p PPM   • History of transient cerebral ischemia      followed by stroke in 10/2010. BIBI was normal.   • History of Zenker's diverticulum removal    • HX: long term anticoagulant use    • Hyperlipidemia    • Hypertension    • Hyponatremia    • Intertrigo    • Lumbar  radiculopathy    • Morbid obesity    • Neuralgia      with diplopia secondary to facial shingles   • Nonepileptic episode    • Osteoarthritis of right knee    • Pacemaker    • Pneumonia    • Seeing double      secondary to shingles on the face also with neuralgia   • SIADH (syndrome of inappropriate ADH production)    • Vitamin D deficiency      Past Surgical History:  Past Surgical History   Procedure Laterality Date   • Hand surgery Right    • Lumbar laminectomy       L-3 L4 L4 L5   • Cardiac pacemaker placement       secondary to SSS, placed in 2004, with generator change in 2014   • Tonsillectomy     • Laminectomy for implantation / placement neurostimulator electrodes     • Knee arthroplasty Left    • Cataract extraction     • Zenkers diverticulectomy N/A 9/27/2016     Procedure: ENDOSCOPIC REMOVAL OF ZENKERS DIVERTICULUM W/ WERDASCOPE;  Surgeon: Oswald Barth MD;  Location: [facility];  Service:       Home Meds:  Facility-Administered Medications Prior to Admission   Medication Dose Route Frequency Provider Last Rate Last Dose   • cyanocobalamin injection 1,000 mcg  1,000 mcg Intramuscular Q28 Days Sharad Salinas MD   1,000 mcg at 03/09/17 1811     Prescriptions Prior to Admission   Medication Sig Dispense Refill Last Dose   • B Complex Vitamins (VITAMIN B COMPLEX PO) Take 1 tablet by mouth every morning. HOLD PRIOR TO SURG   Taking   • Calcium-Vitamin D-Vitamin K (VIACTIV PO) Take 500 mg by mouth Every Night. HOLD PRIOR TO SURG    Taking   • clopidogrel (PLAVIX) 75 MG tablet TAKE ONE TABLET BY MOUTH DAILY 90 tablet 0 Taking   • clotrimazole-betamethasone (LOTRISONE) 1-0.05 % cream Apply  topically 2 (Two) Times a Day. (Patient taking differently: Apply  topically 2 (Two) Times a Day As Needed.) 45 g 0 Taking   • diclofenac (VOLTAREN) 1 % gel gel Apply 4 g topically 4 (four) times a day as needed.   Taking   • gabapentin (NEURONTIN) 300 MG capsule Take 2 capsules by mouth 3 (Three) Times a Day. 540  capsule 2 Taking   • Hypromellose (ARTIFICIAL TEARS OP) Apply 2 drops to eye 4 (four) times a day as needed.   Taking   • lansoprazole (PREVACID) 30 MG capsule TAKE ONE CAPSULE BY MOUTH EVERY MORNING (Patient taking differently: take one capsule by mouth every evening) 30 capsule 1 Taking   • levETIRAcetam (KEPPRA) 500 MG tablet Take 500 mg by mouth 2 (two) times a day.   Taking   • methylcellulose, Laxative, (CITRUCEL) 500 MG tablet tablet Take 2 tablets by mouth Every Morning.   Taking   • mirtazapine (REMERON) 15 MG tablet Take 1 tablet by mouth Every Night. 30 tablet 2 Taking   • nystatin (MYCOSTATIN) 586451 UNIT/GM cream Apply  topically As Needed.   Taking   • pseudoephedrine-guaifenesin (MUCINEX D)  MG per 12 hr tablet Take 1 tablet by mouth At Night As Needed for allergies.   Taking   • carvedilol (COREG) 3.125 MG tablet TAKE ONE TABLET BY MOUTH TWICE A DAY WITH MEALS 60 tablet 2 Not Taking   • Loratadine 10 MG capsule Take 1 capsule by mouth daily as needed.   Taking       Allergies:  Allergies   Allergen Reactions   • Penicillins Hives     Immunizations:  Immunization History   Administered Date(s) Administered   • Influenza (IM) Preservative Free 09/28/2016   • Influenza, Quadrivalent 10/05/2015     Social History:   Social History     Social History Narrative     Social History     Social History   • Marital status:      Spouse name: N/A   • Number of children: 3   • Years of education: N/A     Occupational History   • Retired      Social History Main Topics   • Smoking status: Former Smoker     Years: 40.00     Types: Cigarettes   • Smokeless tobacco: Never Used      Comment: QUIT 1992   • Alcohol use No      Comment: VERY RARE / caffeine use   • Drug use: No   • Sexual activity: Defer     Other Topics Concern   • Not on file     Social History Narrative       Family History:  Family History   Problem Relation Age of Onset   • Cancer Daughter         Review of Systems  See history of  "present illness and past medical history.  Patient denies headache, dizziness, syncope, falls, trauma, change in vision, change in hearing, change in taste, changes in weight, changes in appetite, focal weakness, numbness, or paresthesia.  Patient denies chest pain, palpitations,  orthopnea, PND,  sinus pressure, rhinorrhea, epistaxis, hemoptysis, nausea, vomiting,hematemesis, diarrhea, constipation or hematchezia.  Denies cold or heat intolerance, polydipsia, polyuria, polyphagia. Denies hematuria, pyuria, dysuria, hesitancy, frequency or urgency.   Denies fever, chills, sweats, night sweats.   Remainder of ROS is negative.    Objective:  tMax 24 hrs: Temp (24hrs), Av.6 °F (37 °C), Min:97.7 °F (36.5 °C), Max:99.5 °F (37.5 °C)    Vitals Ranges:   Temp:  [97.7 °F (36.5 °C)-99.5 °F (37.5 °C)] 97.7 °F (36.5 °C)  Heart Rate:  [74-89] 78  Resp:  [12-18] 18  BP: (114-124)/(61-83) 118/75      Exam:  Visit Vitals   • /75 (BP Location: Left arm, Patient Position: Lying)   • Pulse 78   • Temp 97.7 °F (36.5 °C) (Oral)   • Resp 18   • Ht 64\" (162.6 cm)   • Wt 213 lb 1.6 oz (96.7 kg)   • SpO2 96%   • BMI 36.58 kg/m2       General Appearance:    Alert, cooperative, no distress, appears stated age   Head:    Normocephalic, without obvious abnormality, atraumatic   Eyes:    PERRL, conjunctiva/corneas clear, EOM's intact, both eyes   Ears:    Normal external ear canals, both ears   Nose:   Nares normal, septum midline, mucosa normal, no drainage    or sinus tenderness   Throat:   Lips, mucosa, and tongue normal   Neck:   Supple, symmetrical, trachea midline, no adenopathy;     thyroid:  no enlargement/tenderness/nodules; no carotid    bruit or JVD   Back:     Symmetric, no curvature, ROM normal, no CVA tenderness   Lungs:     crackles and occasional wheeze bilaterally, respirations unlabored   Chest Wall:    No tenderness or deformity    Heart:    Regular rate and rhythm, S1 and S2 normal, no murmur, rub   or gallop "   Abdomen:     Soft, non-tender, bowel sounds active all four quadrants,     no masses, no hepatomegaly, no splenomegaly   Extremities:   Extremities normal, atraumatic, no cyanosis or edema   Pulses:   2+ and symmetric all extremities   Skin:   Skin color, texture, turgor normal, no rashes or lesions   Lymph nodes:   Cervical, supraclavicular, and axillary nodes normal   Neurologic:   CNII-XII intact, normal strength, sensation intact throughout      .    Data Review:  Lab Results (last 72 hours)     Procedure Component Value Units Date/Time    CBC & Differential [[id number]] Collected:  03/14/17 1831    Specimen:  Blood Updated:  03/14/17 1907    Narrative:       The following orders were created for panel order CBC & Differential.  Procedure                               Abnormality         Status                     ---------                               -----------         ------                     CBC Auto Differential[[id number]]         Abnormal            Final result                 Please view results for these tests on the individual orders.    CBC Auto Differential [[id number]]  (Abnormal) Collected:  03/14/17 1831    Specimen:  Blood Updated:  03/14/17 1907     WBC 16.88 (H) 10*3/mm3      RBC 3.60 (L) 10*6/mm3      Hemoglobin 12.1 g/dL      Hematocrit 37.1 %      .1 (H) fL      MCH 33.6 (H) pg      MCHC 32.6 g/dL      RDW 12.4 %      RDW-SD 46.8 fl      MPV 9.9 fL      Platelets 178 10*3/mm3      Neutrophil % 81.3 (H) %      Lymphocyte % 12.7 (L) %      Monocyte % 5.3 %      Eosinophil % 0.4 %      Basophil % 0.1 %      Immature Grans % 0.2 %      Neutrophils, Absolute 13.71 (H) 10*3/mm3      Lymphocytes, Absolute 2.15 10*3/mm3      Monocytes, Absolute 0.90 10*3/mm3      Eosinophils, Absolute 0.06 10*3/mm3      Basophils, Absolute 0.02 10*3/mm3      Immature Grans, Absolute 0.04 (H) 10*3/mm3     Comprehensive Metabolic Panel [[id number]]  (Abnormal) Collected:  03/14/17 1831    Specimen:  Blood  Updated:  03/14/17 1919     Glucose 113 (H) mg/dL      BUN 23 mg/dL      Creatinine 1.64 (H) mg/dL      Sodium 139 mmol/L      Potassium 4.7 mmol/L      Chloride 101 mmol/L      CO2 28.9 mmol/L      Calcium 9.2 mg/dL      Total Protein 7.3 g/dL      Albumin 4.00 g/dL      ALT (SGPT) 13 U/L      AST (SGOT) 19 U/L      Alkaline Phosphatase 55 U/L      Total Bilirubin 0.7 mg/dL      eGFR Non African Amer 30 (L) mL/min/1.73      Globulin 3.3 gm/dL      A/G Ratio 1.2 g/dL      BUN/Creatinine Ratio 14.0      Anion Gap 9.1 mmol/L     Narrative:       The MDRD GFR formula is only valid for adults with stable renal function between ages 18 and 70.    Troponin [[id number]]  (Normal) Collected:  03/14/17 1831    Specimen:  Blood Updated:  03/14/17 1919     Troponin T <0.010 ng/mL     Narrative:       Troponin T Reference Ranges:  Less than 0.03 ng/mL:    Negative for AMI  0.03 to 0.09 ng/mL:      Indeterminant for AMI  Greater than 0.09 ng/mL: Positive for AMI    Magnesium [[id number]]  (Normal) Collected:  03/14/17 1831    Specimen:  Blood Updated:  03/14/17 1919     Magnesium 2.2 mg/dL     Blood Culture [[id number]] Collected:  03/14/17 2111    Specimen:  Blood from Arm, Left Updated:  03/14/17 2115    Urine Culture [[id number]] Collected:  03/14/17 2110    Specimen:  Urine from Urine, Catheter Updated:  03/14/17 2123    Urinalysis With / Culture If Indicated [[id number]]  (Abnormal) Collected:  03/14/17 2110    Specimen:  Urine from Urine, Catheter Updated:  03/14/17 2127     Color, UA Yellow      Appearance, UA Cloudy (A)      pH, UA 5.5      Specific Gravity, UA 1.017      Glucose, UA Negative      Ketones, UA Trace (A)      Bilirubin, UA Negative      Blood, UA Negative      Protein, UA Trace (A)      Leuk Esterase, UA Large (3+) (A)      Nitrite, UA Negative      Urobilinogen, UA 0.2 E.U./dL     Lactic Acid, Plasma [[id number]]  (Normal) Collected:  03/14/17 2111    Specimen:  Blood Updated:  03/14/17 2134     Lactate 1.3 mmol/L      Urinalysis, Microscopic Only [[id number]]  (Abnormal) Collected:  03/14/17 2110    Specimen:  Urine from Urine, Catheter Updated:  03/14/17 2137     RBC, UA None Seen /HPF      WBC, UA Too Numerous to Count (A) /HPF      Bacteria, UA 3+ (A) /HPF      Squamous Epithelial Cells, UA None Seen /HPF      Hyaline Casts, UA 3-6 /LPF      WBC Clumps, UA Moderate/2+ /HPF      Methodology Manual Light Microscopy     [place] Draw [[id number]] Collected:  03/14/17 1831    Specimen:  Blood Updated:  03/14/17 2301    Narrative:       The following orders were created for panel order [place] Draw.  Procedure                               Abnormality         Status                     ---------                               -----------         ------                     Light Blue Top[[id number]]                                    Final result               Green Top (Gel)[[id number]]                                                              Lavender Top[[id number]]                                                                 Gold Top - SST[[id number]]                                    Final result                 Please view results for these tests on the individual orders.    Light Blue Top [[id number]] Collected:  03/14/17 1831    Specimen:  Blood Updated:  03/14/17 2301     Extra Tube hold for add-on       Auto resulted       Gold Top - SST [[id number]] Collected:  03/14/17 1831    Specimen:  Blood Updated:  03/14/17 2301     Extra Tube Hold for add-ons.       Auto resulted.                      Imaging Results (all)     Procedure Component Value Units Date/Time    XR Chest 2 View [[id number]] Collected:  03/14/17 1856     Updated:  03/14/17 1900    Narrative:       XR CHEST 2 VW-     Clinical: Productive, acute mental status change     COMPARISON 03/08/2017     FINDINGS: There is a patchy infiltrate demonstrated at the right lung  base, new compared to the previous examination. Transvenous pacemaker in  position. The left lung is  clear. Heart size within normal limits.  Stable mediastinum and mike.     CONCLUSION: New right base infiltrate.     This report was finalized on 3/14/2017 6:57 PM by Dr. Baron Tran MD.           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   • Vitamin D deficiency E55.9   • History of sick sinus syndrome Z86.79   • Intertrigo L30.4   • Generalized convulsive seizures R56.9   • Urine frequency R35.0   • Change in bowel habits R19.4   • Urinary tract infection N39.0   • 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   • History of anxiety Z86.59   • Urinary tract infection in female N39.0   • Clonic seizure disorder G40.309   • History of aspiration pneumonitis Z87.09   • Thrombocytopenia D69.6   • B12 deficiency E53.8   • Pain of toe of right foot M79.674   • Weakness R53.1   • Cardiac pacemaker in situ Z95.0   • History of hypertension Z86.79   • Chronic venous insufficiency I87.2   • Arthritis M19.90   • S/P knee replacement Z96.659   • Chronic bilateral low back pain without sciatica M54.5, G89.29   • Essential hypertension I10   • CAP (community acquired pneumonia) J18.9   • UTI (urinary tract infection) N39.0       Assessment:  Principal Problem:    CAP (community acquired pneumonia)  Active Problems:    Gastroesophageal reflux disease    Hyperlipidemia    Neuropathy    Osteoarthritis of knee    Generalized convulsive seizures    Clonic seizure disorder    History of aspiration pneumonitis    Cardiac pacemaker in situ    History of hypertension    Arthritis    UTI (urinary tract infection)      Plan:  The patient's admitted to hospital started on broad-spectrum IV antibiotics for community acquired pneumonia and UTI.  We'll  await results of cultures.    Chang Wakefield MD  3/15/2017  1:47 AM

## 2017-03-15 NOTE — PROGRESS NOTES
Discharge Planning Assessment  [facility]     Patient Name: Kim Feldman  MRN: [id number]  Today's Date: 3/15/2017    Admit Date: 3/14/2017          Discharge Needs Assessment       03/15/17 1338    Living Environment    Lives With child(mary), adult    Living Arrangements condominium    Provides Primary Care For no one, unable/limited ability to care for self    Primary Care Provided By child(mary) (specify)    Quality Of Family Relationships supportive    Able to Return to Prior Living Arrangements other (see comments)   would like LTC    Discharge Needs Assessment    Concerns To Be Addressed discharge planning concerns    Readmission Within The Last 30 Days previous discharge plan unsuccessful    Outpatient/Agency/Support Group Needs --   has used [facility] in the past, She has been to[facility], [facility], and [facility], she would not go back to [facility]    Anticipated Changes Related to Illness inability to care for self    Equipment Currently Used at Home cane, straight;grab bar;shower chair;walker, rolling;wheelchair    Equipment Needed After Discharge none            Discharge Plan       03/15/17 1433    Case Management/Social Work Plan    Additional Comments Call from [facility]la, she has discussed with dtr. Both facilities will require 2yrs up front, dtr states they will not be able to pay that. [facility] suggest that dtr look at her policy, [facility] policies will provide skilled care in home. Dtr is going to look at her policy to see what coverage is. Will follow up with pt and dtr tomorrow.....[facility]      03/15/17 1343    Case Management/Social Work Plan    Plan undetermined    Patient/Family In Agreement With Plan yes    Additional Comments IMM verified, Met at Bedside with pt and her dtr Leandra [phone]. Pt lives with dtr in her condo. She has a walker, cane, shower gini, and transport chair. She has used [facility] in the past. She has been to [facility], [facility] and [facility]. She would not go back to [facility] but would use  [facility] if needed. Rx is [facility] in [place], she denies difficulty obtaining or affording medication. Per pt and dtr pt has been her 3 times in the last 3 months and they feel she is no longer able to stay at home. They are interested in LTC and per both she has a LTC policy but will need 100 skilled days before coverage. Explained that her [facility] policy may not approve skilled care due to PT eval of ambulation of 100ft. States they would like placement at [facility] or [facility]. Call to Kayla with referral. Demographic information verified. [facility]CP will follow to assist  with DC plans.......dr        Discharge Placement     Facility/Agency Request Status Selected? Address Phone Number Fax Number    [facility] Pending - Request Sent     [address] [address] [phone]    [facility] Pending - Request Sent     [address] [facility] [address] [phone]                Demographic Summary       03/15/17 1336    Referral Information    Record Reviewed clinical discipline documentation;history and physical    Contact Information    Permission Granted to Share Information With family/designee   dtr Leandra [phone]    Primary Care Physician Information    Name Sharad Salinas      03/15/17 1327    Referral Information    Admission Type inpatient    Arrived From home or self-care    Referral Source admission list    Reason For Consult discharge planning            Functional Status       03/15/17 1337    Functional Status Current    Ambulation 3-->assistive equipment and person    Transferring 3-->assistive equipment and person    Toileting 3-->assistive equipment and person    Bathing 2-->assistive person    Dressing 2-->assistive person    Eating 2-->assistive person    Communication 0-->understands/communicates without difficulty    Functional Status Prior    Ambulation 1-->assistive equipment    Transferring 1-->assistive equipment    Toileting  1-->assistive equipment    Bathing 2-->assistive person    Dressing 2-->assistive person    Eating 2-->assistive person    Communication 0-->understands/communicates without difficulty            Psychosocial     None            Abuse/Neglect     None            Legal     None            Substance Abuse     None            Patient Forms     None          Ally Woods RN

## 2017-03-15 NOTE — ED PROVIDER NOTES
EMERGENCY DEPARTMENT ENCOUNTER    CHIEF COMPLAINT  Chief Complaint: generalized weakness   History given by: pt,pt's family  History limited by: none  Room Number: 23/23  PMD: Sharad Salinas MD      HPI:  Pt is a 87 y.o. female who presents complaining of generalized weakness onset yesterday. Family report lethargy and pt sleeping more than normal. Pt c/o fatigue and productive cough. No fever, or other complaints at this time. Hx of pna.     Duration:  2 days   Onset: gradual   Timing: constant   Location: generalized   Radiation: none  Intensity/Severity: moderate   Progression: worsening    Associated Symptoms: lethargy, fatigue, cough  Aggravating Factors: none  Alleviating Factors: none  Previous Episodes: Hx of pneumonia.   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   • Urine frequency 05/20/2016   • Change in bowel habits 05/20/2016   • Urinary tract infection 07/08/2016   • 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   • History of anxiety 10/18/2016   • Urinary tract infection in female 01/03/2017   • 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   • History of aspiration pneumonitis 01/04/2017   • Thrombocytopenia 01/05/2017   • B12 deficiency 01/16/2017   • Pain of toe of right foot 01/16/2017   • Weakness 02/03/2017   • Cardiac pacemaker in situ 03/08/2017   • History of hypertension 03/08/2017   • Chronic venous insufficiency 03/09/2017   • Arthritis 03/09/2017   • S/P  knee replacement 03/09/2017   • Chronic bilateral low back pain without sciatica 03/09/2017   • Essential hypertension 03/09/2017     Resolved Ambulatory Problems     Diagnosis Date Noted   • Hyponatremia 02/05/2016   • Pneumonia 07/08/2016   • Aspiration pneumonia 07/17/2016   • Right lower lobe pneumonia 02/02/2017   • Sepsis due to pneumonia 02/02/2017     Past Medical History   Diagnosis Date   • Chronic cough    • Chronic UTI    • Dementia without behavioral disturbance    • Disc degeneration, lumbar    • Dysphagia    • GERD (gastroesophageal reflux disease)    • Hiatal hernia    • History of cerebral artery occlusion    • History of herpes zoster    • History of ingrowing nail    • History of osteoporosis    • History of poliomyelitis    • History of sciatica    • History of transient cerebral ischemia    • History of Zenker's diverticulum removal 2016   • HX: long term anticoagulant use    • Hypertension    • Lumbar radiculopathy    • Morbid obesity    • Neuralgia    • Nonepileptic episode    • Osteoarthritis of right knee    • Pacemaker    • Seeing double    • SIADH (syndrome of inappropriate ADH production)        PAST SURGICAL HISTORY  Past Surgical History   Procedure Laterality Date   • Hand surgery Right    • Lumbar laminectomy       L-3 L4 L4 L5   • Cardiac pacemaker placement       secondary to SSS, placed in 2004, with generator change in 2014   • Tonsillectomy     • Laminectomy for implantation / placement neurostimulator electrodes     • Knee arthroplasty Left    • Cataract extraction     • Zenkers diverticulectomy N/A 9/27/2016     Procedure: ENDOSCOPIC REMOVAL OF ZENKERS DIVERTICULUM W/ WERDASCOPE;  Surgeon: Oswald Barth MD;  Location: [facility];  Service:        FAMILY HISTORY  History reviewed. No pertinent family history.    SOCIAL HISTORY  Social History     Social History   • Marital status:      Spouse name: N/A   • Number of children: 3   • Years of education: N/A      Occupational History   • Retired      Social History Main Topics   • Smoking status: Former Smoker     Years: 40.00     Types: Cigarettes   • Smokeless tobacco: Never Used      Comment: QUIT 1992   • Alcohol use No      Comment: VERY RARE / caffeine use   • Drug use: No   • Sexual activity: Defer     Other Topics Concern   • Not on file     Social History Narrative   • No narrative on file       ALLERGIES  Penicillins    REVIEW OF SYSTEMS  Review of Systems   Constitutional: Positive for fever.   HENT: Negative for sore throat.    Eyes: Negative.    Respiratory: Positive for cough. Negative for shortness of breath.    Cardiovascular: Negative for chest pain.   Gastrointestinal: Negative for abdominal pain, diarrhea and vomiting.   Genitourinary: Negative for dysuria.   Musculoskeletal: Negative for neck pain.   Skin: Negative for rash.   Allergic/Immunologic: Negative.    Neurological: Positive for weakness (generalized). Negative for numbness and headaches.   Hematological: Negative.    Psychiatric/Behavioral: Negative.    All other systems reviewed and are negative.      PHYSICAL EXAM  ED Triage Vitals   Temp Heart Rate Resp BP SpO2   03/14/17 1702 03/14/17 1702 03/14/17 1706 03/14/17 1707 03/14/17 1702   99.5 °F (37.5 °C) 89 12 124/73 91 %      Temp src Heart Rate Source Patient Position BP Location FiO2 (%)   -- -- 03/14/17 1707 03/14/17 1707 --     Sitting Left arm        Physical Exam   Constitutional: She is oriented to person, place, and time and well-developed, well-nourished, and in no distress. No distress.   Somnolent but easily aroused.     HENT:   Head: Normocephalic and atraumatic.   Mouth/Throat: Mucous membranes are dry.   Eyes: EOM are normal. Pupils are equal, round, and reactive to light.   Neck: Normal range of motion. Neck supple.   Cardiovascular: Normal rate, regular rhythm and normal heart sounds.    Pulmonary/Chest: Effort normal. No respiratory distress. She has rhonchi (in R base).    Abdominal: Soft. There is no tenderness. There is no rebound and no guarding.   Musculoskeletal: Normal range of motion. She exhibits no edema.   Neurological: She is alert and oriented to person, place, and time. She has normal sensation and normal strength.    Answers questions appropriately.  .       Skin: Skin is warm and dry. No rash noted.   Psychiatric: Mood and affect normal.   Nursing note and vitals reviewed.      LAB RESULTS  Lab Results (last 24 hours)     Procedure Component Value Units Date/Time    CBC & Differential [[id number]] Collected:  03/14/17 1831    Specimen:  Blood Updated:  03/14/17 1907    Narrative:       The following orders were created for panel order CBC & Differential.  Procedure                               Abnormality         Status                     ---------                               -----------         ------                     CBC Auto Differential[[id number]]         Abnormal            Final result                 Please view results for these tests on the individual orders.    Comprehensive Metabolic Panel [[id number]]  (Abnormal) Collected:  03/14/17 1831    Specimen:  Blood Updated:  03/14/17 1919     Glucose 113 (H) mg/dL      BUN 23 mg/dL      Creatinine 1.64 (H) mg/dL      Sodium 139 mmol/L      Potassium 4.7 mmol/L      Chloride 101 mmol/L      CO2 28.9 mmol/L      Calcium 9.2 mg/dL      Total Protein 7.3 g/dL      Albumin 4.00 g/dL      ALT (SGPT) 13 U/L      AST (SGOT) 19 U/L      Alkaline Phosphatase 55 U/L      Total Bilirubin 0.7 mg/dL      eGFR Non African Amer 30 (L) mL/min/1.73      Globulin 3.3 gm/dL      A/G Ratio 1.2 g/dL      BUN/Creatinine Ratio 14.0      Anion Gap 9.1 mmol/L     Narrative:       The MDRD GFR formula is only valid for adults with stable renal function between ages 18 and 70.    Troponin [[id number]]  (Normal) Collected:  03/14/17 1831    Specimen:  Blood Updated:  03/14/17 1919     Troponin T <0.010 ng/mL     Narrative:       Troponin T  Reference Ranges:  Less than 0.03 ng/mL:    Negative for AMI  0.03 to 0.09 ng/mL:      Indeterminant for AMI  Greater than 0.09 ng/mL: Positive for AMI    Magnesium [[id number]]  (Normal) Collected:  03/14/17 1831    Specimen:  Blood Updated:  03/14/17 1919     Magnesium 2.2 mg/dL     CBC Auto Differential [[id number]]  (Abnormal) Collected:  03/14/17 1831    Specimen:  Blood Updated:  03/14/17 1907     WBC 16.88 (H) 10*3/mm3      RBC 3.60 (L) 10*6/mm3      Hemoglobin 12.1 g/dL      Hematocrit 37.1 %      .1 (H) fL      MCH 33.6 (H) pg      MCHC 32.6 g/dL      RDW 12.4 %      RDW-SD 46.8 fl      MPV 9.9 fL      Platelets 178 10*3/mm3      Neutrophil % 81.3 (H) %      Lymphocyte % 12.7 (L) %      Monocyte % 5.3 %      Eosinophil % 0.4 %      Basophil % 0.1 %      Immature Grans % 0.2 %      Neutrophils, Absolute 13.71 (H) 10*3/mm3      Lymphocytes, Absolute 2.15 10*3/mm3      Monocytes, Absolute 0.90 10*3/mm3      Eosinophils, Absolute 0.06 10*3/mm3      Basophils, Absolute 0.02 10*3/mm3      Immature Grans, Absolute 0.04 (H) 10*3/mm3     Urinalysis With / Culture If Indicated [[id number]]  (Abnormal) Collected:  03/14/17 2110    Specimen:  Urine from Urine, Catheter Updated:  03/14/17 2127     Color, UA Yellow      Appearance, UA Cloudy (A)      pH, UA 5.5      Specific Gravity, UA 1.017      Glucose, UA Negative      Ketones, UA Trace (A)      Bilirubin, UA Negative      Blood, UA Negative      Protein, UA Trace (A)      Leuk Esterase, UA Large (3+) (A)      Nitrite, UA Negative      Urobilinogen, UA 0.2 E.U./dL     Urinalysis, Microscopic Only [[id number]]  (Abnormal) Collected:  03/14/17 2110    Specimen:  Urine from Urine, Catheter Updated:  03/14/17 2137     RBC, UA None Seen /HPF      WBC, UA Too Numerous to Count (A) /HPF      Bacteria, UA 3+ (A) /HPF      Squamous Epithelial Cells, UA None Seen /HPF      Hyaline Casts, UA 3-6 /LPF      WBC Clumps, UA Moderate/2+ /HPF      Methodology Manual Light Microscopy      Urine Culture [[id number]] Collected:  03/14/17 2110    Specimen:  Urine from Urine, Catheter Updated:  03/14/17 2123    Lactic Acid, Plasma [[id number]]  (Normal) Collected:  03/14/17 2111    Specimen:  Blood Updated:  03/14/17 2134     Lactate 1.3 mmol/L     Blood Culture [[id number]] Collected:  03/14/17 2111    Specimen:  Blood from Arm, Left Updated:  03/14/17 2115          I ordered the above labs and reviewed the results    RADIOLOGY  XR Chest 2 View   Final Result       CXR - There is a patchy infiltrate demonstrated at the right lung  base, new compared to the previous examination. Transvenous pacemaker in  position. The left lung is clear. Heart size within normal limits.  Stable mediastinum and mike.    I ordered the above noted radiological studies. Interpreted by radiologist.  Reviewed by me in PACS.       PROCEDURES  Procedures  EKG           EKG time: 8:34 PM  Rhythm/Rate: NSR, at 74  1st degree AV block   Normal ST segment       Interpreted Contemporaneously by me, independently viewed  unchanged compared to prior from 2/2/17      PROGRESS AND CONSULTS  ED Course         20:46 Ordered blood cultures and lactic acid for further evaluation. Ordered Rocephin, Zithromax and IVF bolus for pna.     Discussed CXR which showed pna and labs which show elevated WBC with pt and family. Informed of plan to admit. They agree with course of care. Addressed all questions.     21:35 Discussed case with Dr. Wakefield ([facility]) who will admit pt.       MEDICAL DECISION MAKING  Results were reviewed/discussed with the patient and they were also made aware of online access. Pt also made aware that some labs, such as cultures, will not be resulted during ER visit and follow up with PMD is necessary.     MDM  Number of Diagnoses or Management Options  CAP (community acquired pneumonia):      Amount and/or Complexity of Data Reviewed  Clinical lab tests: reviewed and ordered (WBC - 16)  Tests in the radiology section of CPT®:  reviewed (CXR - pneumonia )  Tests in the medicine section of CPT®: reviewed (EKG)  Decide to obtain previous medical records or to obtain history from someone other than the patient: yes  Review and summarize past medical records: yes (She was admitted 2/2-2/5/17 for right lower lobe pna and sepsis.       )  Discuss the patient with other providers: yes (D/w Dr. Wakefield ([facility]))           DIAGNOSIS  Final diagnoses:   CAP (community acquired pneumonia)       DISPOSITION  ADMISSION    Discussed treatment plan and reason for admission with pt/family and admitting physician.  Pt/family voiced understanding of the plan for admission for further testing/treatment as needed.         Latest Documented Vital Signs:  As of 10:53 PM  BP- 122/81 HR- 76 Temp- 99.5 °F (37.5 °C) O2 sat- 95%    --  Documentation assistance provided by smita Castro for Dr. Sarmiento.  Information recorded by the scribe was done at my direction and has been verified and validated by me.          Tiny Castro  03/14/17 1642       Michele Sarmiento MD  03/15/17 0028

## 2017-03-15 NOTE — PROGRESS NOTES
Acute Care - Physical Therapy Initial Evaluation  [facility]     Patient Name: Kim Feldman  : 3/13/1930  MRN: [id number]  Today's Date: 3/15/2017   Onset of Illness/Injury or Date of Surgery Date: 17  Date of Referral to PT: 17  Referring Physician: Twyla      Admit Date: 3/14/2017     Visit Dx:    ICD-10-CM ICD-9-CM   1. CAP (community acquired pneumonia) J18.9 486   2. Generalized weakness R53.1 780.79     Patient Active Problem List   Diagnosis   • Anemia   • Bulging lumbar disc   • Depression, endogenous   • Gastroesophageal reflux disease   • Hyperlipidemia   • Intermittent claudication   • Neuropathy   • Osteoarthritis of knee   • Syndrome of inappropriate secretion of antidiuretic hormone   • Vitamin D deficiency   • History of sick sinus syndrome   • Intertrigo   • Generalized convulsive seizures   • Urine frequency   • Change in bowel habits   • Urinary tract infection   • Zenker diverticulum   • History of anxiety   • Urinary tract infection in female   • Clonic seizure disorder   • History of aspiration pneumonitis   • Thrombocytopenia   • B12 deficiency   • Pain of toe of right foot   • Weakness   • Cardiac pacemaker in situ   • History of hypertension   • Chronic venous insufficiency   • Arthritis   • S/P knee replacement   • Chronic bilateral low back pain without sciatica   • Essential hypertension   • CAP (community acquired pneumonia)   • UTI (urinary tract infection)     Past Medical History   Diagnosis Date   • Anemia    • Bulging lumbar disc    • Chronic cough    • Chronic UTI    • Chronic venous insufficiency    • Clonic seizure disorder    • Dementia without behavioral disturbance      moderate   • Depression, endogenous    • Disc degeneration, lumbar    • Dysphagia    • GERD (gastroesophageal reflux disease)    • Hiatal hernia    • History of anxiety    • History of aspiration pneumonitis    • History of cerebral artery occlusion      CVA (following TIA), 10/10, treated  with tPA   • History of herpes zoster    • History of ingrowing nail    • History of osteoporosis    • History of poliomyelitis      child   • History of sciatica    • History of sick sinus syndrome      s/p PPM   • History of transient cerebral ischemia      followed by stroke in 10/2010. BIBI was normal.   • History of Zenker's diverticulum removal 2016   • HX: long term anticoagulant use    • Hyperlipidemia    • Hypertension    • Hyponatremia    • Intertrigo    • Lumbar radiculopathy    • Morbid obesity    • Neuralgia      with diplopia secondary to facial shingles   • Nonepileptic episode    • Osteoarthritis of right knee    • Pacemaker    • Pneumonia    • Seeing double      secondary to shingles on the face also with neuralgia   • SIADH (syndrome of inappropriate ADH production)    • Vitamin D deficiency      Past Surgical History   Procedure Laterality Date   • Hand surgery Right    • Lumbar laminectomy       L-3 L4 L4 L5   • Cardiac pacemaker placement       secondary to SSS, placed in 2004, with generator change in 2014   • Tonsillectomy     • Laminectomy for implantation / placement neurostimulator electrodes     • Knee arthroplasty Left    • Cataract extraction     • Zenkers diverticulectomy N/A 9/27/2016     Procedure: ENDOSCOPIC REMOVAL OF ZENKERS DIVERTICULUM W/ WERDASCOPE;  Surgeon: Oswald Barth MD;  Location: [facility];  Service:           PT ASSESSMENT (last 72 hours)      PT Evaluation       03/15/17 1045 03/15/17 0023    Rehab Evaluation    Document Type evaluation  -MQ     Subjective Information agree to therapy;complains of;weakness  -MQ     Patient Effort, Rehab Treatment good  -MQ     Symptoms Noted During/After Treatment fatigue  -MQ     General Information    Patient Profile Review yes  -MQ     Onset of Illness/Injury or Date of Surgery Date 03/14/17  -MQ     Referring Physician [place]  -MQ     General Observations Pt sitting in chair with daughter in room, no acute distress.  -MQ      Pertinent History Of Current Problem Pt presented from home with cough, SOA, and weakness, and was admitted for pneumonia.   -MQ     Precautions/Limitations fall precautions;oxygen therapy device and L/min   1 L/min O2 per NC  -MQ     Prior Level of Function independent:;all household mobility   transport w/c for community mobility  -MQ     Equipment Currently Used at Home wheelchair;other (see comments)   rollator, transport w/c  -MQ     Plans/Goals Discussed With patient and family;agreed upon  -     Barriers to Rehab none identified  -MQ     Living Environment    Lives With child(mary), adult  -MQ child(mary), adult  -MR    Living Arrangements other (see comments)   condo  -MQ house  -MR    Home Accessibility bed and bath on same level;stairs to enter home  -MQ no concerns  -MR    Number of Stairs to Enter Home 2  -MQ     Stair Railings at Home outside, present on left side  -MQ none  -MR    Type of Financial/Environmental Concern  none  -MR    Transportation Available  car  -MR    Clinical Impression    Date of Referral to PT 03/14/17  -     Patient/Family Goals Statement Pt unsure if she wants to discharge home or to [facility]     Criteria for Skilled Therapeutic Interventions Met treatment indicated  -MQ     Impairments Found (describe specific impairments) aerobic capacity/endurance;ergonomics and body mechanics;gait, locomotion, and balance;sensory Integrity   peripheral neuropathy  -     Rehab Potential good, to achieve stated therapy goals  -MQ     Vital Signs    Pretreatment Heart Rate (beats/min) 72  -MQ     Intratreatment Heart Rate (beats/min) 86  -MQ     Posttreatment Heart Rate (beats/min) 82  -MQ     Pre SpO2 (%) 94  -MQ     O2 Delivery Pre Treatment supplemental O2  -MQ     Intra SpO2 (%) 93  -MQ     O2 Delivery Intra Treatment room air  -MQ     Post SpO2 (%) 95  -MQ     O2 Delivery Post Treatment supplemental O2  -MQ     Pre Patient Position Sitting  -MQ     Intra Patient Position Standing  -MQ      Post Patient Position Sitting  -     Pain Assessment    Pain Assessment No/denies pain  -MQ     Vision Assessment/Intervention    Visual Impairment WFL with corrective lenses  -MQ     Cognitive Assessment/Intervention    Current Cognitive/Communication Assessment functional  -MQ     Orientation Status oriented x 4  -MQ     Follows Commands/Answers Questions able to follow multi-step instructions;100% of the time  -MQ     Personal Safety WNL/WFL  -MQ     Personal Safety Interventions fall prevention program maintained;gait belt;nonskid shoes/slippers when out of bed;supervised activity;muscle strengthening facilitated  -MQ     ROM (Range of Motion)    General ROM no range of motion deficits identified  -MQ     MMT (Manual Muscle Testing)    General MMT Assessment lower extremity strength deficits identified  -MQ     General MMT Assessment Detail BLEs grossly 3+/5  -MQ     Bed Mobility, Assessment/Treatment    Bed Mobility, Comment deferred; pt up in chair  -MQ     Transfer Assessment/Treatment    Transfers, Sit-Stand [place] contact guard assist;verbal cues required  -MQ     Transfers, Stand-Sit [place] contact guard assist;verbal cues required  -MQ     Transfers, Sit-Stand-Sit, Assist Device other (see comments)   rollator  -MQ     Transfer, Safety Issues balance decreased during turns  -MQ     Transfer, Impairments strength decreased;impaired balance  -MQ     Gait Assessment/Treatment    Gait, [place] Level contact guard assist;verbal cues required  -MQ     Gait, Assistive Device other (see comments)   rollator  -MQ     Gait, Distance (Feet) 150  -MQ     Gait, Gait Deviations jose decreased;forward flexed posture;bilateral:;decreased heel strike;step length decreased  -MQ     Gait, Safety Issues balance decreased during turns;step length decreased  -     Gait, Impairments strength decreased;impaired balance  -     Balance Skills Training    Sitting-Level of Assistance Distant supervision   -     Sitting-Balance Support Feet supported  -     Standing-Level of Assistance Contact guard  -     Static Standing Balance Support assistive device  -     Positioning and Restraints    Pre-Treatment Position sitting in chair/recliner  -     Post Treatment Position chair  -     In Chair sitting;call light within reach;encouraged to call for assist;with family/caregiver  -       03/15/17 0016       General Information    Equipment Currently Used at Home wheelchair;walker, rolling  -       User Key  (r) = Recorded By, (t) = Taken By, (c) = Cosigned By    Initials Name Provider Type    MR Rocio Crockett, RN Registered Nurse     Marilyn Givens, JENNIFER Physical Therapist          Physical Therapy Education     Title: PT OT SLP Therapies (Done)     Topic: Physical Therapy (Done)     Point: Mobility training (Done)    Learning Progress Summary    Learner Readiness Method Response Comment Documented by Status   Patient Acceptance E,TB,MEREDITH NAGY,SNOW   03/15/17 1105 Done               Point: Home exercise program (Done)    Learning Progress Summary    Learner Readiness Method Response Comment Documented by Status   Patient Acceptance E,TB,MEREDITH NAGY,SNOW   03/15/17 1105 Done               Point: Body mechanics (Done)    Learning Progress Summary    Learner Readiness Method Response Comment Documented by Status   Patient Acceptance E,TB,D LILO,SNOW   03/15/17 1105 Done               Point: Precautions (Done)    Learning Progress Summary    Learner Readiness Method Response Comment Documented by Status   Patient Acceptance E,TB,MEREDITH NAGY,SNOW   03/15/17 1105 Done                      User Key     Initials Effective Dates Name Provider Type Discipline     12/11/15 -  Marilyn Givens PT Physical Therapist PT                PT Recommendation and Plan  Anticipated Equipment Needs At Discharge:  (none)  Anticipated Discharge Disposition: home with assist, home with home health  Planned Therapy Interventions: balance training, bed  mobility training, gait training, home exercise program, patient/family education, stair training, strengthening, transfer training  PT Frequency: daily  Plan of Care Review  Plan Of Care Reviewed With: patient  Outcome Summary/Follow up Plan: Pt with multiple comorbidities presents with BLE weakness, impaired balance, decreased functional activity tolerance, and decreased safety and level of independence with functional mobility. Pt would benefit from skilled PT services at this time to address these functional deficits and get pt back to PLOF.          IP PT Goals       03/15/17 1105          Bed Mobility PT LTG    Bed Mobility PT LTG, Date Established 03/15/17  -MQ      Bed Mobility PT LTG, Time to Achieve 1 wk  -MQ      Bed Mobility PT LTG, Activity Type all bed mobility  -MQ      Bed Mobility PT LTG, Lambert Level independent  -MQ      Transfer Training PT LTG    Transfer Training PT LTG, Date Established 03/15/17  -MQ      Transfer Training PT LTG, Time to Achieve 1 wk  -MQ      Transfer Training PT LTG, Activity Type all transfers  -MQ      Transfer Training PT LTG, Lambert Level supervision required  -MQ      Transfer Training PT LTG, Assist Device other (see comments)   rollator  -MQ      Gait Training PT LTG    Gait Training Goal PT LTG, Date Established 03/15/17  -MQ      Gait Training Goal PT LTG, Time to Achieve 1 wk  -MQ      Gait Training Goal PT LTG, Lambert Level supervision required  -MQ      Gait Training Goal PT LTG, Assist Device other (see comments)   rollator  -MQ      Gait Training Goal PT LTG, Distance to Achieve 200  -MQ      Stair Training PT LTG    Stair Training Goal PT LTG, Date Established 03/15/17  -MQ      Stair Training Goal PT LTG, Time to Achieve 1 wk  -MQ      Stair Training Goal PT LTG, Number of Steps 2  -MQ      Stair Training Goal PT LTG, Lambert Level contact guard assist  -MQ      Stair Training Goal PT LTG, Assist Device 1 handrail  -MQ        User Key   (r) = Recorded By, (t) = Taken By, (c) = Cosigned By    Initials Name Provider Type     Marilyn Givens PT Physical Therapist                Outcome Measures       03/15/17 1057          How much help from another person do you currently need...    Turning from your back to your side while in flat bed without using bedrails? 3  -MQ      Moving from lying on back to sitting on the side of a flat bed without bedrails? 3  -MQ      Moving to and from a bed to a chair (including a wheelchair)? 3  -MQ      Standing up from a chair using your arms (e.g., wheelchair, bedside chair)? 3  -MQ      Climbing 3-5 steps with a railing? 2  -MQ      To walk in hospital room? 3  -MQ      AM-PAC 6 Clicks Score 17  -MQ      Functional Assessment    Outcome Measure Options AM-PAC 6 Clicks Basic Mobility (PT)  -        User Key  (r) = Recorded By, (t) = Taken By, (c) = Cosigned By    Initials Name Provider Type     Marilyn Givens PT Physical Therapist           Time Calculation:         PT Charges       03/15/17 1108          Time Calculation    Start Time 1043  -      Stop Time 1100  -      Time Calculation (min) 17 min  -      PT Received On 03/15/17  -      PT - Next Appointment 03/16/17  -      PT Goal Re-Cert Due Date 03/22/17  -        User Key  (r) = Recorded By, (t) = Taken By, (c) = Cosigned By    Initials Name Provider Type     Marilyn Givens PT Physical Therapist          Therapy Charges for Today     Code Description Service Date Service Provider Modifiers Qty    02271294427  PT EVAL MOD COMPLEXITY 2 3/15/2017 Marilyn Givens, PT GP 1    [id number]  PT THER PROC EA 15 MIN 3/15/2017 Marilyn Givens, PT GP 1          PT G-Codes  Outcome Measure Options: AM-PAC 6 Clicks Basic Mobility (PT)      Marilyn Givens PT  3/15/2017

## 2017-03-16 PROBLEM — K44.9 HIATAL HERNIA: Status: ACTIVE | Noted: 2017-03-16

## 2017-03-16 PROCEDURE — 25010000002 VANCOMYCIN: Performed by: INTERNAL MEDICINE

## 2017-03-16 PROCEDURE — 25010000003 CEFTRIAXONE PER 250 MG: Performed by: HOSPITALIST

## 2017-03-16 PROCEDURE — 97110 THERAPEUTIC EXERCISES: CPT

## 2017-03-16 PROCEDURE — 25010000002 ENOXAPARIN PER 10 MG: Performed by: HOSPITALIST

## 2017-03-16 RX ORDER — GUAIFENESIN 600 MG/1
1200 TABLET, EXTENDED RELEASE ORAL 2 TIMES DAILY
Status: DISCONTINUED | OUTPATIENT
Start: 2017-03-17 | End: 2017-03-20 | Stop reason: HOSPADM

## 2017-03-16 RX ADMIN — LEVETIRACETAM 500 MG: 500 TABLET, FILM COATED ORAL at 17:10

## 2017-03-16 RX ADMIN — GUAIFENESIN 600 MG: 600 TABLET, EXTENDED RELEASE ORAL at 17:10

## 2017-03-16 RX ADMIN — CEFTRIAXONE SODIUM 1 G: 1 INJECTION, SOLUTION INTRAVENOUS at 20:00

## 2017-03-16 RX ADMIN — LEVETIRACETAM 500 MG: 500 TABLET, FILM COATED ORAL at 08:00

## 2017-03-16 RX ADMIN — ENOXAPARIN SODIUM 30 MG: 30 INJECTION SUBCUTANEOUS at 08:00

## 2017-03-16 RX ADMIN — SODIUM CHLORIDE 75 ML/HR: 9 INJECTION, SOLUTION INTRAVENOUS at 00:15

## 2017-03-16 RX ADMIN — MIRTAZAPINE 15 MG: 15 TABLET, FILM COATED ORAL at 20:00

## 2017-03-16 RX ADMIN — GABAPENTIN 600 MG: 300 CAPSULE ORAL at 20:00

## 2017-03-16 RX ADMIN — SODIUM CHLORIDE 75 ML/HR: 9 INJECTION, SOLUTION INTRAVENOUS at 13:03

## 2017-03-16 RX ADMIN — GABAPENTIN 600 MG: 300 CAPSULE ORAL at 08:00

## 2017-03-16 RX ADMIN — VANCOMYCIN HYDROCHLORIDE 2000 MG: 1 INJECTION, POWDER, LYOPHILIZED, FOR SOLUTION INTRAVENOUS at 21:24

## 2017-03-16 RX ADMIN — CLOPIDOGREL 75 MG: 75 TABLET, FILM COATED ORAL at 08:01

## 2017-03-16 RX ADMIN — PANTOPRAZOLE SODIUM 40 MG: 40 TABLET, DELAYED RELEASE ORAL at 07:06

## 2017-03-16 RX ADMIN — GABAPENTIN 600 MG: 300 CAPSULE ORAL at 16:05

## 2017-03-16 RX ADMIN — GUAIFENESIN 600 MG: 600 TABLET, EXTENDED RELEASE ORAL at 08:01

## 2017-03-16 NOTE — PLAN OF CARE
Problem: Patient Care Overview (Adult)  Goal: Plan of Care Review    03/16/17 1533   Patient Care Overview   Progress progress toward functional goals is gradual

## 2017-03-16 NOTE — PLAN OF CARE
Problem: Patient Care Overview (Adult)  Goal: Plan of Care Review  Outcome: Ongoing (interventions implemented as appropriate)    03/15/17 1434 03/16/17 0323   Coping/Psychosocial Response Interventions   Plan Of Care Reviewed With patient;daughter --    Patient Care Overview   Progress no change --    Outcome Evaluation   Outcome Summary/Follow up Plan --  VSS. IV abx continue. Will continue to monitor.        Goal: Adult Individualization and Mutuality  Outcome: Ongoing (interventions implemented as appropriate)  Goal: Discharge Needs Assessment  Outcome: Ongoing (interventions implemented as appropriate)    Problem: Pain, Acute (Adult)  Goal: Identify Related Risk Factors and Signs and Symptoms  Outcome: Ongoing (interventions implemented as appropriate)  Goal: Acceptable Pain Control/Comfort Level  Outcome: Ongoing (interventions implemented as appropriate)    Problem: Fall Risk (Adult)  Goal: Identify Related Risk Factors and Signs and Symptoms  Outcome: Ongoing (interventions implemented as appropriate)  Goal: Absence of Falls  Outcome: Ongoing (interventions implemented as appropriate)    Problem: Pneumonia (Adult)  Goal: Signs and Symptoms of Listed Potential Problems Will be Absent or Manageable (Pneumonia)  Outcome: Ongoing (interventions implemented as appropriate)

## 2017-03-16 NOTE — PROGRESS NOTES
"      Name: Kim Feldman ADMIT: 3/14/2017   : 3/13/1930  PCP: Sharad Salinas MD    MRN: [id number] LOS: 2 days   AGE/SEX: 87 y.o. female  ROOM: [facility]     Subjective   Doesn't feel as good today as yesterday.  Nonproductive cough.    Objective   Vital Signs  Temp:  [97.3 °F (36.3 °C)-98.1 °F (36.7 °C)] 97.8 °F (36.6 °C)  Heart Rate:  [68-81] 73  Resp:  [16-18] 18  BP: (133-151)/(59-94) 134/70  SpO2:  [94 %-96 %] 95 %  on   ;   O2 Device: room air  Body mass index is 36.58 kg/(m^2).    Objective:  General Appearance:  Comfortable and in no acute distress.    Vital signs: (most recent): Blood pressure 134/70, pulse 73, temperature 97.8 °F (36.6 °C), temperature source Oral, resp. rate 18, height 64\" (162.6 cm), weight 213 lb 1.6 oz (96.7 kg), SpO2 95 %, not currently breastfeeding.    Lungs:  Normal effort.  Breath sounds clear to auscultation.  There are rales (right base).    Heart: Normal rate.  Regular rhythm.    Abdomen: Abdomen is soft and non-distended.  Bowel sounds are normal.   There is no abdominal tenderness.     Extremities: There is no dependent edema.    Neurological: Patient is alert and oriented to person, place and time.    Skin:  Warm and dry.        Results Review:       I reviewed the patient's new clinical results.    Results from last 7 days  Lab Units 03/15/17  0542 17  1831   WBC 10*3/mm3 12.21* 16.88*   HEMOGLOBIN g/dL 10.7* 12.1   PLATELETS 10*3/mm3 122* 178       Results from last 7 days  Lab Units 03/15/17  0542 17  1831   SODIUM mmol/L 141 139   POTASSIUM mmol/L 4.3 4.7   CHLORIDE mmol/L 105 101   TOTAL CO2 mmol/L 24.5 28.9   BUN mg/dL 24* 23   CREATININE mg/dL 1.21* 1.64*   GLUCOSE mg/dL 96 113*   Estimated Creatinine Clearance: 37 mL/min (by C-G formula based on Cr of 1.21).    Results from last 7 days  Lab Units 03/15/17  0542 17  1831   CALCIUM mg/dL 8.4* 9.2   ALBUMIN g/dL  --  4.00   MAGNESIUM mg/dL  --  2.2       Results from last 7 days  Lab Units " 03/14/17 2111   LACTATE mmol/L 1.3     Lab Results   Component Value Date    STREPPNEUAG Negative 02/03/2017    LEGANTIGENUR Negative 02/03/2017            Results from last 7 days  Lab Units 03/14/17 2111 03/14/17 2110   BLOODCX  No growth at 24 hours  --    URINECX   --  >100,000 CFU/mL Escherichia coli*     XR CHEST 2 VW  CONCLUSION: New right base infiltrate.      azithromycin 500 mg Intravenous Q24H   ceftriaxone 1 g Intravenous Q24H   clopidogrel 75 mg Oral Daily   enoxaparin 30 mg Subcutaneous Daily   gabapentin 600 mg Oral TID   guaiFENesin 600 mg Oral BID   ipratropium-albuterol 3 mL Nebulization 4x Daily - RT   levETIRAcetam 500 mg Oral BID   mirtazapine 15 mg Oral Nightly   pantoprazole 40 mg Oral Q AM       sodium chloride 125 mL/hr Last Rate: Stopped (03/15/17 0213)   sodium chloride 75 mL/hr Last Rate: 75 mL/hr (03/16/17 1303)   Diet Regular      Assessment/Plan   Assessment:     Active Hospital Problems (** Indicates Principal Problem)    Diagnosis Date Noted   • **CAP (community acquired pneumonia) [J18.9] 03/14/2017   • Hiatal hernia [K44.9] 03/16/2017   • UTI (urinary tract infection) [N39.0] 03/15/2017   • Essential hypertension [I10] 03/09/2017   • Cardiac pacemaker in situ [Z95.0] 03/08/2017   • History of aspiration pneumonitis [Z87.09] 01/04/2017   • Zenker diverticulum [K22.5] 09/27/2016   • Generalized convulsive seizures [R56.9] 04/07/2016   • Gastroesophageal reflux disease [K21.9] 02/05/2016   • Neuropathy [G62.9] 02/05/2016      Resolved Hospital Problems    Diagnosis Date Noted Date Resolved   No resolved problems to display.       Plan:   - Admitted to monitored unit  - Continue AZITHROMYCIN + ROCEPHIN day 2.    - IVFs @ 75 cc/hr.  Taking adequate PO.  Will SLIV.  - Blood cultures x2 done in ED negative thus far.    - Sputum for gram stain and culture pending.  - VRP pending.  - Supplemental oxygen to keep SaO2 > 92%  - Patient concerned about recurrent nature of pneumonia.  History  of questionable aspiration.  Last pulmonary to reevaluate and get speech therapy consult.  Aspiration precautions.  Consider changing antibiotics.  - Physical therapy consult.  May need SNU.      Disposition  TBD.  Lives w/ daughter.  Will need precert if goes to SNU.      Kenn Clemens MD  [facility]  03/16/17  6:09 PM

## 2017-03-16 NOTE — PROGRESS NOTES
Continued Stay Note  [facility]     Patient Name: Kim Feldman  MRN: [id number]  Today's Date: 3/16/2017    Admit Date: 3/14/2017          Discharge Plan       03/16/17 1036    Case Management/Social Work Plan    Additional Comments Met with pt bedside. She reports daughter is at work and won't be in until around 4 pm. Pt is alert and oriented. Pt reports she wants to go to a nursing home long term. Pt agreeable for CSW to make referrals to any facility that possibly has a long term bed. Pt reports she would like to stay in [facility], explained to pt that can't be guaranteed. Pt brought in long term care policy and explained to pt she and her daughter will need to call and find out what benefits the policy has. CSW will reach out to all area reps to see if there is a long term bed available. ALEX Villasenor              Discharge Codes     None            Paty Dimas

## 2017-03-16 NOTE — PLAN OF CARE
Problem: Patient Care Overview (Adult)  Goal: Plan of Care Review  Outcome: Ongoing (interventions implemented as appropriate)    03/16/17 1459   Coping/Psychosocial Response Interventions   Plan Of Care Reviewed With patient   Patient Care Overview   Progress no change   Outcome Evaluation   Outcome Summary/Follow up Plan no acute changes this shift, IV abx, vss, will continue to monitor        Goal: Adult Individualization and Mutuality  Outcome: Ongoing (interventions implemented as appropriate)  Goal: Discharge Needs Assessment  Outcome: Ongoing (interventions implemented as appropriate)    03/15/17 1434 03/16/17 1459   Discharge Needs Assessment   Concerns To Be Addressed --  discharge planning concerns   Readmission Within The Last 30 Days --  previous discharge plan unsuccessful   Equipment Needed After Discharge --  none   Discharge Disposition --  still a patient   Current Health   Anticipated Changes Related to Illness --  inability to care for self   Self-Care   Equipment Currently Used at Home cane, straight;grab bar;shower chair;walker, rolling;wheelchair --    Living Environment   Transportation Available --  car;family or friend will provide         Problem: Pain, Acute (Adult)  Goal: Identify Related Risk Factors and Signs and Symptoms  Outcome: Ongoing (interventions implemented as appropriate)  Goal: Acceptable Pain Control/Comfort Level  Outcome: Ongoing (interventions implemented as appropriate)    Problem: Fall Risk (Adult)  Goal: Identify Related Risk Factors and Signs and Symptoms  Outcome: Ongoing (interventions implemented as appropriate)  Goal: Absence of Falls  Outcome: Ongoing (interventions implemented as appropriate)    Problem: Pneumonia (Adult)  Goal: Signs and Symptoms of Listed Potential Problems Will be Absent or Manageable (Pneumonia)  Outcome: Ongoing (interventions implemented as appropriate)

## 2017-03-16 NOTE — PROGRESS NOTES
Continued Stay Note  [facility]     Patient Name: Kim Feldman  MRN: [id number]  Today's Date: 3/16/2017    Admit Date: 3/14/2017          Discharge Plan       03/16/17 6453    Case Management/Social Work Plan    Additional Comments Dtr now here and asked to speak with CCP. Informed of the referrals that have been made and awaiting approval at facility. Once approved they will attempt short term rehab precert. Informed her at that time she will need to contact her LTC policy and see what coverage she has. She states that she will need to stay in rehab 100days. Explained that pt will not qualify for 100days. Explained that once pt is ambulating 200ft today,. She became agitiated and stated she knows she walks 200ft that swallowing is her problem. Informed her that ST alone wll not qualify for skilled rehab ant that once pt plateaus [place] would not apporve additional days, it is not a long term policy. She became tearful and stated she has been here every month so I guess it is ok with you that she is here every month. Again explained that CCP will find placement but pt will not be approp for long term care, Will more that likely need PC level which her policy may cover, but she will need to check, otherwise these are all prvt paid . Unsure if she is understanding, Several facilities are attempting to call dtr and discuss options. CCP will follow and assist with DC plan....[facility]              Discharge Codes     None            Ally Woods RN

## 2017-03-16 NOTE — PROGRESS NOTES
Continued Stay Note  [facility]     Patient Name: Kim Feldman  MRN: [id number]  Today's Date: 3/16/2017    Admit Date: 3/14/2017          Discharge Plan       03/16/17 1333    Case Management/Social Work Plan    Additional Comments [facility]'t have any beds at this time. Per Hattie with [facility] and Kacie  with [facility], pt is too high functioning for LTC but could look at for short term rehab pending pre-cert. Marilyn with [facility], Yumiko with [facility] and Yessica with [facility] will eval. CCP to talk with daughter re: long term care policy. Daughter will need to call and see if policy covers personal care and how much it will cover. Explained to pt and VM left for daughter.               Discharge Codes     None            Paty Dimas

## 2017-03-16 NOTE — PROGRESS NOTES
Acute Care - Physical Therapy Treatment Note  [facility]     Patient Name: Kim Feldman  : 3/13/1930  MRN: [id number]  Today's Date: 3/16/2017  Onset of Illness/Injury or Date of Surgery Date: 17  Date of Referral to PT: 17  Referring Physician: Twyla    Admit Date: 3/14/2017    Visit Dx:    ICD-10-CM ICD-9-CM   1. CAP (community acquired pneumonia) J18.9 486   2. Generalized weakness R53.1 780.79     Patient Active Problem List   Diagnosis   • Anemia   • Bulging lumbar disc   • Depression, endogenous   • Gastroesophageal reflux disease   • Hyperlipidemia   • Intermittent claudication   • Neuropathy   • Osteoarthritis of knee   • Syndrome of inappropriate secretion of antidiuretic hormone   • Vitamin D deficiency   • History of sick sinus syndrome   • Intertrigo   • Generalized convulsive seizures   • Urine frequency   • Change in bowel habits   • Urinary tract infection   • Zenker diverticulum   • History of anxiety   • Urinary tract infection in female   • Clonic seizure disorder   • History of aspiration pneumonitis   • Thrombocytopenia   • B12 deficiency   • Pain of toe of right foot   • Weakness   • Cardiac pacemaker in situ   • History of hypertension   • Chronic venous insufficiency   • Arthritis   • S/P knee replacement   • Chronic bilateral low back pain without sciatica   • Essential hypertension   • CAP (community acquired pneumonia)   • UTI (urinary tract infection)               Adult Rehabilitation Note       17 1500          Rehab Assessment/Intervention    Discipline physical therapy assistant  -SEBLE      Document Type therapy note (daily note)  -SEBLE      Subjective Information agree to therapy  -SEBLE      Precautions/Limitations fall precautions  -JW      Recorded by [JW] Hilary Gardner PTA      Pain Assessment    Pain Assessment No/denies pain  -JW      Recorded by [SEBLE] Hilary Gardner PTA      Cognitive Assessment/Intervention    Current Cognitive/Communication  Assessment functional  -JW      Personal Safety Interventions fall prevention program maintained  -JW      Recorded by [SEBLE] Hilary Gardner PTA      Bed Mobility, Assessment/Treatment    Bed Mobility, Comment On side of bed  -JW      Recorded by [SEBLE] Hilary Gardner PTA      Transfer Assessment/Treatment    Transfers, Sit-Stand [place] stand by assist;contact guard assist  -JW      Transfers, Stand-Sit [place] contact guard assist  -JW      Transfers, Sit-Stand-Sit, Assist Device rolling walker  -JW      Toilet Transfer, [place] minimum assist (75% patient effort)  -JW      Recorded by [SEBLE] Hilary Gardner PTA      Gait Assessment/Treatment    Gait, [place] Level stand by assist;contact guard assist  -JW      Gait, Assistive Device rollator  -JW      Gait, Distance (Feet) 150  -JW      Gait, Gait Deviations jose decreased;forward flexed posture;step length decreased  -JW      Recorded by [SEBLE] Hilary Gardner PTA      Positioning and Restraints    Pre-Treatment Position in bed  -JW      Post Treatment Position chair  -JW      In Chair reclined;call light within reach;encouraged to call for assist  -JW      Recorded by [SEBLE] Hilary Gardner PTA        User Key  (r) = Recorded By, (t) = Taken By, (c) = Cosigned By    Initials Name Effective Dates     Hilary Gardner PTA 02/18/16 -                 IP PT Goals       03/15/17 1105          Bed Mobility PT LTG    Bed Mobility PT LTG, Date Established 03/15/17  -MQ      Bed Mobility PT LTG, Time to Achieve 1 wk  -MQ      Bed Mobility PT LTG, Activity Type all bed mobility  -MQ      Bed Mobility PT LTG, [place] Level independent  -MQ      Transfer Training PT LTG    Transfer Training PT LTG, Date Established 03/15/17  -MQ      Transfer Training PT LTG, Time to Achieve 1 wk  -MQ      Transfer Training PT LTG, Activity Type all transfers  -MQ      Transfer Training PT LTG, [place] Level supervision required  -MQ      Transfer  Training PT LTG, Assist Device other (see comments)   rollator  -MQ      Gait Training PT LTG    Gait Training Goal PT LTG, Date Established 03/15/17  -MQ      Gait Training Goal PT LTG, Time to Achieve 1 wk  -MQ      Gait Training Goal PT LTG, [place] Level supervision required  -MQ      Gait Training Goal PT LTG, Assist Device other (see comments)   rollator  -MQ      Gait Training Goal PT LTG, Distance to Achieve 200  -MQ      Stair Training PT LTG    Stair Training Goal PT LTG, Date Established 03/15/17  -MQ      Stair Training Goal PT LTG, Time to Achieve 1 wk  -MQ      Stair Training Goal PT LTG, Number of Steps 2  -MQ      Stair Training Goal PT LTG, [place] Level contact guard assist  -MQ      Stair Training Goal PT LTG, Assist Device 1 handrail  -        User Key  (r) = Recorded By, (t) = Taken By, (c) = Cosigned By    Initials Name Provider Type     Marilyn Givens, PT Physical Therapist          Physical Therapy Education     Title: PT OT SLP Therapies (Done)     Topic: Physical Therapy (Done)     Point: Mobility training (Done)    Learning Progress Summary    Learner Readiness Method Response Comment Documented by Status   Patient Acceptance E VU  JW 03/16/17 1532 Done    Acceptance TB,E VU  LM 03/16/17 0324 Done    Acceptance E,TB,D VU,DU  MQ 03/15/17 1105 Done               Point: Home exercise program (Done)    Learning Progress Summary    Learner Readiness Method Response Comment Documented by Status   Patient Acceptance TB,E VU  LM 03/16/17 0324 Done    Acceptance E,TB,D VU,DU  MQ 03/15/17 1105 Done               Point: Body mechanics (Done)    Learning Progress Summary    Learner Readiness Method Response Comment Documented by Status   Patient Acceptance TB,E VU  LM 03/16/17 0324 Done    Acceptance E,TB,D VU,DU  MQ 03/15/17 1105 Done               Point: Precautions (Done)    Learning Progress Summary    Learner Readiness Method Response Comment Documented by Status   Patient  Acceptance TB,E VU   03/16/17 0324 Done    Acceptance E,TB,D VU,DU   03/15/17 1105 Done                      User Key     Initials Effective Dates Name Provider Type Discipline     06/16/16 -  Adry Cope RN Registered Nurse Nurse     02/18/16 -  Hilary Gardner PTA Physical Therapy Assistant PT     12/11/15 -  Marilyn Givens, PT Physical Therapist PT                    PT Recommendation and Plan  Anticipated Equipment Needs At Discharge:  (none)  Anticipated Discharge Disposition: home with assist, home with home health  Planned Therapy Interventions: balance training, bed mobility training, gait training, home exercise program, patient/family education, stair training, strengthening, transfer training  PT Frequency: daily  Plan of Care Review  Progress: progress toward functional goals is gradual          Outcome Measures       03/16/17 1500 03/15/17 1057       How much help from another person do you currently need...    Turning from your back to your side while in flat bed without using bedrails? 3  - 3  -MQ     Moving from lying on back to sitting on the side of a flat bed without bedrails? 3  - 3  -MQ     Moving to and from a bed to a chair (including a wheelchair)? 3  - 3  -MQ     Standing up from a chair using your arms (e.g., wheelchair, bedside chair)? 3  - 3  -MQ     Climbing 3-5 steps with a railing? 2  -JW 2  -MQ     To walk in hospital room? 3  - 3  -MQ     AM-PAC 6 Clicks Score 17  - 17  -MQ     Functional Assessment    Outcome Measure Options  AM-PAC 6 Clicks Basic Mobility (PT)  -MQ       User Key  (r) = Recorded By, (t) = Taken By, (c) = Cosigned By    Initials Name Provider Type     Hilary Gardner PTA Physical Therapy Assistant     Marilyn Givens, PT Physical Therapist           Time Calculation:         PT Charges       03/16/17 1443          Time Calculation    Start Time 1443  -      Stop Time 1458  -      Time Calculation (min) 15 min  -      PT  Received On 03/16/17  -SEBLE      PT - Next Appointment 03/17/17  -SEBLE        User Key  (r) = Recorded By, (t) = Taken By, (c) = Cosigned By    Initials Name Provider Type    SEBLE Gardner PTA Physical Therapy Assistant          Therapy Charges for Today     Code Description Service Date Service Provider Modifiers Qty    [id number] HC PT THER PROC EA 15 MIN 3/16/2017 Hilary Gardner PTA GP 1          PT G-Codes  Outcome Measure Options: AM-PAC 6 Clicks Basic Mobility (PT)    Hilary Gardner PTA  3/16/2017

## 2017-03-17 PROBLEM — N17.9 ACUTE KIDNEY INJURY (HCC): Status: RESOLVED | Noted: 2017-03-17 | Resolved: 2017-03-17

## 2017-03-17 PROBLEM — N17.9 ACUTE KIDNEY INJURY (HCC): Status: ACTIVE | Noted: 2017-03-17

## 2017-03-17 LAB
ANION GAP SERPL CALCULATED.3IONS-SCNC: 12.2 MMOL/L
BACTERIA SPEC AEROBE CULT: ABNORMAL
BUN BLD-MCNC: 12 MG/DL (ref 8–23)
BUN/CREAT SERPL: 15.8 (ref 7–25)
CALCIUM SPEC-SCNC: 8.3 MG/DL (ref 8.6–10.5)
CHLORIDE SERPL-SCNC: 107 MMOL/L (ref 98–107)
CO2 SERPL-SCNC: 20.8 MMOL/L (ref 22–29)
CREAT BLD-MCNC: 0.76 MG/DL (ref 0.57–1)
DEPRECATED RDW RBC AUTO: 46 FL (ref 37–54)
ERYTHROCYTE [DISTWIDTH] IN BLOOD BY AUTOMATED COUNT: 12.2 % (ref 11.7–13)
GFR SERPL CREATININE-BSD FRML MDRD: 72 ML/MIN/1.73
GLUCOSE BLD-MCNC: 92 MG/DL (ref 65–99)
HCT VFR BLD AUTO: 30.7 % (ref 35.6–45.5)
HGB BLD-MCNC: 10 G/DL (ref 11.9–15.5)
MCH RBC QN AUTO: 34 PG (ref 26.9–32)
MCHC RBC AUTO-ENTMCNC: 32.6 G/DL (ref 32.4–36.3)
MCV RBC AUTO: 104.4 FL (ref 80.5–98.2)
PLATELET # BLD AUTO: 156 10*3/MM3 (ref 140–500)
PMV BLD AUTO: 10 FL (ref 6–12)
POTASSIUM BLD-SCNC: 3.9 MMOL/L (ref 3.5–5.2)
RBC # BLD AUTO: 2.94 10*6/MM3 (ref 3.9–5.2)
SODIUM BLD-SCNC: 140 MMOL/L (ref 136–145)
WBC NRBC COR # BLD: 5.47 10*3/MM3 (ref 4.5–10.7)

## 2017-03-17 PROCEDURE — 25010000002 MEROPENEM: Performed by: INTERNAL MEDICINE

## 2017-03-17 PROCEDURE — 85027 COMPLETE CBC AUTOMATED: CPT | Performed by: HOSPITALIST

## 2017-03-17 PROCEDURE — 92610 EVALUATE SWALLOWING FUNCTION: CPT

## 2017-03-17 PROCEDURE — 80048 BASIC METABOLIC PNL TOTAL CA: CPT | Performed by: HOSPITALIST

## 2017-03-17 PROCEDURE — 87081 CULTURE SCREEN ONLY: CPT | Performed by: INTERNAL MEDICINE

## 2017-03-17 PROCEDURE — 25010000002 VANCOMYCIN PER 500 MG: Performed by: INTERNAL MEDICINE

## 2017-03-17 PROCEDURE — 25010000002 ENOXAPARIN PER 10 MG: Performed by: HOSPITALIST

## 2017-03-17 RX ORDER — VANCOMYCIN HYDROCHLORIDE 1 G/200ML
1000 INJECTION, SOLUTION INTRAVENOUS EVERY 12 HOURS
Status: DISCONTINUED | OUTPATIENT
Start: 2017-03-17 | End: 2017-03-18

## 2017-03-17 RX ADMIN — PANTOPRAZOLE SODIUM 40 MG: 40 TABLET, DELAYED RELEASE ORAL at 06:35

## 2017-03-17 RX ADMIN — VANCOMYCIN HYDROCHLORIDE 1000 MG: 1 INJECTION, SOLUTION INTRAVENOUS at 14:14

## 2017-03-17 RX ADMIN — ENOXAPARIN SODIUM 30 MG: 30 INJECTION SUBCUTANEOUS at 08:54

## 2017-03-17 RX ADMIN — GUAIFENESIN 1200 MG: 600 TABLET, EXTENDED RELEASE ORAL at 18:08

## 2017-03-17 RX ADMIN — LEVETIRACETAM 500 MG: 500 TABLET, FILM COATED ORAL at 18:08

## 2017-03-17 RX ADMIN — LEVETIRACETAM 500 MG: 500 TABLET, FILM COATED ORAL at 08:54

## 2017-03-17 RX ADMIN — GABAPENTIN 600 MG: 300 CAPSULE ORAL at 21:08

## 2017-03-17 RX ADMIN — MEROPENEM 500 MG: 500 INJECTION, POWDER, FOR SOLUTION INTRAVENOUS at 06:35

## 2017-03-17 RX ADMIN — MIRTAZAPINE 15 MG: 15 TABLET, FILM COATED ORAL at 21:08

## 2017-03-17 RX ADMIN — GABAPENTIN 600 MG: 300 CAPSULE ORAL at 18:08

## 2017-03-17 RX ADMIN — CLOPIDOGREL 75 MG: 75 TABLET, FILM COATED ORAL at 08:53

## 2017-03-17 RX ADMIN — VANCOMYCIN HYDROCHLORIDE 1000 MG: 1 INJECTION, SOLUTION INTRAVENOUS at 22:48

## 2017-03-17 RX ADMIN — GABAPENTIN 600 MG: 300 CAPSULE ORAL at 08:54

## 2017-03-17 RX ADMIN — GUAIFENESIN 1200 MG: 600 TABLET, EXTENDED RELEASE ORAL at 08:53

## 2017-03-17 RX ADMIN — MEROPENEM 500 MG: 500 INJECTION, POWDER, FOR SOLUTION INTRAVENOUS at 21:07

## 2017-03-17 RX ADMIN — MEROPENEM 500 MG: 500 INJECTION, POWDER, FOR SOLUTION INTRAVENOUS at 12:36

## 2017-03-17 NOTE — PROGRESS NOTES
"[facility] PULMONARY CARE         Dr Brewer Sayied   LOS: 3 days   Patient Care Team:  Sharad Salinas MD as PCP - General  Sharad Salinas MD as PCP - Family Medicine  Bandar Ruiz MD as Consulting Physician (Cardiology)    Chief Complaint: Healthcare associated pneumonia with possible dysphagia    Interval History: Sitting up in a chair and  feel better.  Denies any specific complaints.  Still clearing her throat several times when we talked.    REVIEW OF SYSTEMS:   CARDIOVASCULAR: No chest pain, chest pressure or chest discomfort. No palpitations or edema.   RESPIRATORY: Shortness of breath is better.  Mild cough and congestion  GASTROINTESTINAL: No anorexia, nausea, vomiting or diarrhea. No abdominal pain or blood.   HEMATOLOGIC: No bleeding or bruising.     Ventilator/Non-Invasive Ventilation Settings     None            Vital Signs  Temp:  [97.5 °F (36.4 °C)-98.1 °F (36.7 °C)] 97.8 °F (36.6 °C)  Heart Rate:  [72-79] 74  Resp:  [18] 18  BP: (149-177)/(70-96) 157/95    Intake/Output Summary (Last 24 hours) at 03/17/17 1311  Last data filed at 03/17/17 1041   Gross per 24 hour   Intake    500 ml   Output    800 ml   Net   -300 ml     Flowsheet Rows         First Filed Value    Admission Height  64\" (162.6 cm) Documented at 03/14/2017 1706    Admission Weight  215 lb (97.5 kg) Documented at 03/14/2017 1706          Physical Exam:   General Appearance:    Alert, cooperative, in no acute distress   Lungs:    diminished breath sounds bilaterally with rales on the right base     Heart:    Regular rhythm and normal rate, normal S1 and S2, no            murmur, no gallop, no rub, no click   Chest Wall:    No abnormalities observed   Abdomen:     Normal bowel sounds, no masses, no organomegaly, soft        non-tender, non-distended, no guarding, no rebound                tenderness   Extremities:   Moves all extremities well, 1+ edema, no cyanosis, no             redness     Results Review:          Results from " last 7 days  Lab Units 03/17/17  0452 03/15/17  0542 03/14/17  1831   SODIUM mmol/L 140 141 139   POTASSIUM mmol/L 3.9 4.3 4.7   CHLORIDE mmol/L 107 105 101   TOTAL CO2 mmol/L 20.8* 24.5 28.9   BUN mg/dL 12 24* 23   CREATININE mg/dL 0.76 1.21* 1.64*   GLUCOSE mg/dL 92 96 113*   CALCIUM mg/dL 8.3* 8.4* 9.2       Results from last 7 days  Lab Units 03/14/17  1831   TROPONIN T ng/mL <0.010       Results from last 7 days  Lab Units 03/17/17  0452 03/15/17  0542 03/14/17  1831   WBC 10*3/mm3 5.47 12.21* 16.88*   HEMOGLOBIN g/dL 10.0* 10.7* 12.1   HEMATOCRIT % 30.7* 32.5* 37.1   PLATELETS 10*3/mm3 156 122* 178               Results from last 7 days  Lab Units 03/14/17  1831   MAGNESIUM mg/dL 2.2               I reviewed the patient's new clinical results.  I personally viewed and interpreted the patient's CXR        Medication Review:     clopidogrel 75 mg Oral Daily   enoxaparin 30 mg Subcutaneous Daily   gabapentin 600 mg Oral TID   guaiFENesin 1,200 mg Oral BID   ipratropium-albuterol 3 mL Nebulization 4x Daily - RT   levETIRAcetam 500 mg Oral BID   meropenem 500 mg Intravenous Q8H   mirtazapine 15 mg Oral Nightly   pantoprazole 40 mg Oral Q AM   vancomycin 1,000 mg Intravenous Q12H         Pharmacy to dose vancomycin        ASSESSMENT:   Healthcare associated pneumonia possible aspiration  UTI  Dysphagia  Thrombocytopenia  History of CVA/seizure and dementia  Morbid obesity  Lower extremity edema      PLAN:  Continue current antibiotics  De-escalate based on cultures  Swallow evaluation noted  Ambulate  Wean oxygen to baseline  Discussed with patient in detail            Daniella Romero MD  03/17/17  1:11 PM

## 2017-03-17 NOTE — PROGRESS NOTES
"Pharmacy to dose Vancomycin    Day 2  Consult for Dr Barney  Treating: PNA    Current Vancomycin dose 1750mg Q24h    IV Anti-Infectives     Ordered     Dose/Rate Route Frequency Start Stop    03/17/17 0126  vancomycin 1750 mg/500 mL 0.9% NS IVPB ([facility])     Ordering Provider:  Eder Barney MD    1,750 mg Intravenous Every 24 Hours 03/17/17 2100      03/16/17 2102  vancomycin 2000 mg/500 mL 0.9% NS IVPB ([facility])     Ordering Provider:  Eder Barney MD    20 mg/kg × 96.7 kg Intravenous Once 03/16/17 2145 03/16/17 2124 03/16/17 2057  meropenem (MERREM) 500mg/100 mL 0.9% NS IVPB ([facility])     Ordering Provider:  Eder Barney MD    500 mg  over 30 Minutes Intravenous Every 8 Hours 03/16/17 2130 03/16/17 2057  Pharmacy to dose vancomycin     Ordering Provider:  Eder Barney MD     Does not apply Continuous PRN 03/16/17 2056             Relevant clinical data and objective history reviewed:  87 y.o. female 64\" (162.6 cm) 213 lb 1.6 oz (96.7 kg)    Lab Results   Component Value Date    CREATININE 0.76 03/17/2017    CREATININE 1.21 (H) 03/15/2017    CREATININE 1.64 (H) 03/14/2017     Estimated Creatinine Clearance: 55.9 mL/min (by C-G formula based on Cr of 0.76).    Lab Results   Component Value Date    WBC 5.47 03/17/2017    WBC 12.21 (H) 03/15/2017    WBC 16.88 (H) 03/14/2017     Temp Readings from Last 3 Encounters:   03/17/17 97.8 °F (36.6 °C) (Oral)   03/09/17 97.6 °F (36.4 °C) (Oral)       Baseline cultures:  3/14 Urine > 100K E Coli  3/14 blood pending  3/17 MRSA screen pending    PLAN: ABX adjusted for HCAP until cx are final.  Will adjust dose to 1gm Q12hr.  Will plan to check trough with 10am dose on Sunday.  Will follow and adjust as needed.     Amanda Mohr PharmD, [facility]  03/17/17 8:28 AM            "

## 2017-03-17 NOTE — CONSULTS
Patient Care Team:  Sharad Salinas MD as PCP - General  Sharad Salinas MD as PCP - Family Medicine  Bandar Ruiz MD as Consulting Physician (Cardiology)    Chief complaint weakness and cough    Subjective     Patient is a 87 y.o. female.  See regarding recurrent pneumonias.  Patient has been hospitalized 3 times this year for pneumonia and once late last year.  These have frequently been accompanied by urinary tract infection.  The patient does not know of any history of lung disease although she was a smoker for about 35 year she quit about 25 years ago.  She has some cough producing a lot of white mucus in Monday night she had a terrible cough and even had an episode of emesis.  No hematemesis.  She has some history of dysphagia and she notes that sometimes when she eats she does feel like some food gets down and she coughs.  She also has to clear her throat very frequently throughout the day so she has mucus back there that she just can't get up.  She does have some nasal congestion and drainage.  She does have a history of gastroesophageal reflux disease and reports that in the morning she has a very dry mouth.  She has a history of hiatal hernia and a history of Zenker's diverticulum that was apparently surgically resected.      Review of Systems  Constitution: She is not aware of any fevers or chills  Eyes:  ENT: Again she has had some nasal congestion and drainage and she does have the difficulty swallowing  Respiratory: As above  Cardiovascular: No history of heart disease she does have some hypertension  Gastrointestinal: No history of liver disease or hepatitis no melena hematochezia  Genitourinary: Frequent urinary tract infections no chronic kidney disease  Integument: No new skin rashes or lesions noted although she does have chronic swelling of her lower extremities she doesn't know why did review her records she apparently has a history of venous insufficiency  Hematologic/Lymphatic:  No history of DVT PE or easy bleeding  Musculoskeletal: She has some mild lumbar disc disease  Neurological: History of chronic seizures and prior seizures and a history of dementia  Behavioral/Psych: Former smoker doesn't drink or use illicit drugs  Endocrine: No history of diabetes or thyroid problems   Allergies/Immunologic: Allergic to penicillin.    History  Past Medical History   Diagnosis Date   • Anemia    • Bulging lumbar disc    • Chronic cough    • Chronic UTI    • Chronic venous insufficiency    • Clonic seizure disorder    • Dementia without behavioral disturbance      moderate   • Depression, endogenous    • Disc degeneration, lumbar    • Dysphagia    • GERD (gastroesophageal reflux disease)    • Hiatal hernia    • History of anxiety    • History of aspiration pneumonitis    • History of cerebral artery occlusion      CVA (following TIA), 10/10, treated with tPA   • History of herpes zoster    • History of ingrowing nail    • History of osteoporosis    • History of poliomyelitis      child   • History of sciatica    • History of sick sinus syndrome      s/p PPM   • History of transient cerebral ischemia      followed by stroke in 10/2010. BIBI was normal.   • History of Zenker's diverticulum removal 2016   • HX: long term anticoagulant use    • Hyperlipidemia    • Hypertension    • Hyponatremia    • Intertrigo    • Lumbar radiculopathy    • Morbid obesity    • Neuralgia      with diplopia secondary to facial shingles   • Nonepileptic episode    • Osteoarthritis of right knee    • Pacemaker    • Pneumonia    • Seeing double      secondary to shingles on the face also with neuralgia   • SIADH (syndrome of inappropriate ADH production)    • Vitamin D deficiency      Past Surgical History   Procedure Laterality Date   • Hand surgery Right    • Lumbar laminectomy       L-3 L4 L4 L5   • Cardiac pacemaker placement       secondary to SSS, placed in 2004, with generator change in 2014   • Tonsillectomy     •  Laminectomy for implantation / placement neurostimulator electrodes     • Knee arthroplasty Left    • Cataract extraction     • Zenkers diverticulectomy N/A 9/27/2016     Procedure: ENDOSCOPIC REMOVAL OF ZENKERS DIVERTICULUM W/ WERDASCOPE;  Surgeon: Oswald Barth MD;  Location: [facility] OR;  Service:      Family History   Problem Relation Age of Onset   • Cancer Daughter      Social History     Social History   • Marital status:      Spouse name: N/A   • Number of children: 3   • Years of education: N/A     Occupational History   • Retired      Social History Main Topics   • Smoking status: Former Smoker     Years: 40.00     Types: Cigarettes   • Smokeless tobacco: Never Used      Comment: QUIT 1992   • Alcohol use No      Comment: VERY RARE / caffeine use   • Drug use: No   • Sexual activity: Defer     Other Topics Concern   • None     Social History Narrative       Allergies:  Penicillins    Medications:  Prior to Admission medications    Medication Sig Start Date End Date Taking? Authorizing Provider   B Complex Vitamins (VITAMIN B COMPLEX PO) Take 1 tablet by mouth every morning. HOLD PRIOR TO SURG   Yes Historical Provider, MD   Calcium-Vitamin D-Vitamin K (VIACTIV PO) Take 500 mg by mouth Every Night. HOLD PRIOR TO SURG    Yes Historical Provider, MD   clopidogrel (PLAVIX) 75 MG tablet TAKE ONE TABLET BY MOUTH DAILY 1/23/17  Yes Sharad Salinas MD   clotrimazole-betamethasone (LOTRISONE) 1-0.05 % cream Apply  topically 2 (Two) Times a Day.  Patient taking differently: Apply  topically 2 (Two) Times a Day As Needed. 10/18/16  Yes Sharad Salinas MD   diclofenac (VOLTAREN) 1 % gel gel Apply 4 g topically 4 (four) times a day as needed.   Yes Historical Provider, MD   gabapentin (NEURONTIN) 300 MG capsule Take 2 capsules by mouth 3 (Three) Times a Day. 1/16/17  Yes Sharad Salinas MD   Hypromellose (ARTIFICIAL TEARS OP) Apply 2 drops to eye 4 (four) times a day as needed.   Yes Historical Provider,  "MD   lansoprazole (PREVACID) 30 MG capsule TAKE ONE CAPSULE BY MOUTH EVERY MORNING  Patient taking differently: take one capsule by mouth every evening 1/16/17  Yes Sharad Salinas MD   levETIRAcetam (KEPPRA) 500 MG tablet Take 500 mg by mouth 2 (two) times a day. 3/30/15  Yes Arsalan Velez Jr., MD   methylcellulose, Laxative, (CITRUCEL) 500 MG tablet tablet Take 2 tablets by mouth Every Morning.   Yes Historical Provider, MD   mirtazapine (REMERON) 15 MG tablet Take 1 tablet by mouth Every Night. 1/27/17  Yes Sharad Salinas MD   nystatin (MYCOSTATIN) 274730 UNIT/GM cream Apply  topically As Needed.   Yes Historical Provider, MD   pseudoephedrine-guaifenesin (MUCINEX D)  MG per 12 hr tablet Take 1 tablet by mouth At Night As Needed for allergies.   Yes Historical Provider, MD   carvedilol (COREG) 3.125 MG tablet TAKE ONE TABLET BY MOUTH TWICE A DAY WITH MEALS 1/5/17   Sharad Salinas MD   Loratadine 10 MG capsule Take 1 capsule by mouth daily as needed. 1/14/15   Historical Provider, MD       clopidogrel 75 mg Oral Daily   enoxaparin 30 mg Subcutaneous Daily   gabapentin 600 mg Oral TID   [START ON 3/17/2017] guaiFENesin 1,200 mg Oral BID   ipratropium-albuterol 3 mL Nebulization 4x Daily - RT   levETIRAcetam 500 mg Oral BID   meropenem 500 mg Intravenous Q8H   mirtazapine 15 mg Oral Nightly   pantoprazole 40 mg Oral Q AM       Pharmacy to dose vancomycin        Objective     Vital Signs  Temp:  [97.3 °F (36.3 °C)-98.1 °F (36.7 °C)] 98.1 °F (36.7 °C)  Heart Rate:  [68-81] 72  Resp:  [16-18] 18  BP: (133-151)/(59-78) 149/70  Body mass index is 36.58 kg/(m^2).    Intake/Output Summary (Last 24 hours) at 03/16/17 2058  Last data filed at 03/16/17 0500   Gross per 24 hour   Intake   1200 ml   Output   1400 ml   Net   -200 ml          Flowsheet Rows         First Filed Value    Admission Height  64\" (162.6 cm) Documented at 03/14/2017 1706    Admission Weight  215 lb (97.5 kg) Documented at 03/14/2017 1706    "        Physical Exam:  General Appearance: Well-developed morbidly obese white female resting comfortably in a chair does not appear in any acute distress  Eyes: Conjunctiva are clear and anicteric pupils are equal  ENT: Mucous membranes are moist no erythema or exudates she has a Mallampati 2 almost 3 airway  Neck: Obese trachea midline no palpable adenopathy or thyromegaly no jugular venous distention  Lungs: She has crackles in the right lung base no dullness symmetric nonlabored expansion  Cardiac: Regular rate and rhythm no murmur  Abdomen: Soft no palpable organomegaly or masses, obese  : Not examined  Musc/Skel: Marked lower extremity edema probably 3+ bilaterally pitting  Skin: No jaundice no rashes no petechiae noted  Neuro: He is alert and oriented ×3 she is cooperative she has a little bit of memory problems and she is a little bit hard of hearing she is following commands she is a little weak but she moves all extremities to command and against gravity.  Extremities/P Vascular: No clubbing or cyanosis palpable radial dorsalis pedis pulses  MSE: She seems to be in pretty good spirits      Labs:    Results from last 7 days  Lab Units 03/15/17  0542 03/14/17  1831   WBC 10*3/mm3 12.21* 16.88*   HEMOGLOBIN g/dL 10.7* 12.1   PLATELETS 10*3/mm3 122* 178         Radiographic Imaging:  Imaging Results (last 24 hours)     ** No results found for the last 24 hours. **          I have reviewed the chest x-ray and she does have a new right lower lobe infiltrate have reviewed multiple prior x-rays over the past year and she does have recurrent infiltrates look like mostly in the bases right or left or both    Assessment/Plan     Impression #1 healthcare associated pneumonia I do suspect aspiration.  She does have to clear her throat frequently she has a history of reflux and hiatal hernia that also increased the risk of aspiration.  Fact the night before she came in she had an episode of emesis.  If that could've  even been the aspiration related event.  As it was associated with a terrible coughing fit.  It looks like she's had several swallow studies I agree with rechecking them this may be difficult to treat she probably should sleep with the head of her bed elevated 30-45°.  Her eating or drinking for 2 hours prior to lying down at least.  None of this will necessarily prevent but should reduce the risk of aspiration.  I think we'll probably do need to expand her antibiotic coverage out until we get some cultures I will go ahead and add vancomycin for MRSA and meropenem since she is penicillin allergic for gram-negative enteric organisms including Pseudomonas.  7 MRSA nasal swab if that's negative we can probably discontinue the vancomycin  #2: UTI the Merrem above should more than cover this  #3 dysphagia she certainly does a lot of throat clearing whether this is related to reflux, postnasal drainage dysphagia or some combination she seems to have all 3  #4 thrombocytopenia platelets drop pretty significantly from admit recommend follow-up  #5 history of CVA, seizures and dementia.  Dementia seems to be pretty mild but sometimes swallowing as well as the first things to be effected.  #6 hypertension  #7 morbid obesity  #8 lower extremity edema with history of recent workup and a finding of venous insufficiency elevate legs and compression hose as tolerated      Eder Barney MD  03/16/17  8:58 PM    Time:

## 2017-03-17 NOTE — PROGRESS NOTES
"      Name: Kim Feldman ADMIT: 3/14/2017   : 3/13/1930  PCP: Sharad Salinas MD    MRN: [id number] LOS: 3 days   AGE/SEX: 87 y.o. female  ROOM: [facility]     Subjective   Overall about same.  No new issues.    Objective   Vital Signs  Temp:  [97.5 °F (36.4 °C)-98.1 °F (36.7 °C)] 97.8 °F (36.6 °C)  Heart Rate:  [69-81] 74  Resp:  [18] 18  BP: (134-177)/(70-96) 177/96  SpO2:  [91 %-95 %] 95 %  on   ;   O2 Device: room air  Body mass index is 36.58 kg/(m^2).    Objective:  General Appearance:  Comfortable and in no acute distress.    Vital signs: (most recent): Blood pressure 177/96, pulse 74, temperature 97.8 °F (36.6 °C), temperature source Oral, resp. rate 18, height 64\" (162.6 cm), weight 213 lb 1.6 oz (96.7 kg), SpO2 95 %, not currently breastfeeding.    Lungs:  Normal effort.  Breath sounds clear to auscultation.  There are rales (right base).    Heart: Normal rate.  Regular rhythm.    Abdomen: Abdomen is soft and non-distended.  Bowel sounds are normal.   There is no abdominal tenderness.     Extremities: There is no dependent edema.    Neurological: Patient is alert and oriented to person, place and time.    Skin:  Warm and dry.          Results Review:       I reviewed the patient's new clinical results.    Results from last 7 days  Lab Units 17  0452 03/15/17  0542 17  1831   WBC 10*3/mm3 5.47 12.21* 16.88*   HEMOGLOBIN g/dL 10.0* 10.7* 12.1   PLATELETS 10*3/mm3 156 122* 178       Results from last 7 days  Lab Units 17  0452 03/15/17  0542 17  1831   SODIUM mmol/L 140 141 139   POTASSIUM mmol/L 3.9 4.3 4.7   CHLORIDE mmol/L 107 105 101   TOTAL CO2 mmol/L 20.8* 24.5 28.9   BUN mg/dL 12 24* 23   CREATININE mg/dL 0.76 1.21* 1.64*   GLUCOSE mg/dL 92 96 113*   Estimated Creatinine Clearance: 55.9 mL/min (by C-G formula based on Cr of 0.76).    Results from last 7 days  Lab Units 17  0452 03/15/17  0542 17  1831   CALCIUM mg/dL 8.3* 8.4* 9.2   ALBUMIN g/dL  --   --  4.00 "   MAGNESIUM mg/dL  --   --  2.2       Results from last 7 days  Lab Units 03/14/17 2111   LACTATE mmol/L 1.3     Lab Results   Component Value Date    STREPPNEUAG Negative 02/03/2017    LEGANTIGENUR Negative 02/03/2017            Results from last 7 days  Lab Units 03/14/17  2111 03/14/17 2110   BLOODCX  No growth at 2 days  --    URINECX   --  >100,000 CFU/mL Escherichia coli*     XR CHEST 2 VW  CONCLUSION: New right base infiltrate.      clopidogrel 75 mg Oral Daily   enoxaparin 30 mg Subcutaneous Daily   gabapentin 600 mg Oral TID   guaiFENesin 1,200 mg Oral BID   ipratropium-albuterol 3 mL Nebulization 4x Daily - RT   levETIRAcetam 500 mg Oral BID   meropenem 500 mg Intravenous Q8H   mirtazapine 15 mg Oral Nightly   pantoprazole 40 mg Oral Q AM   vancomycin 1,750 mg Intravenous Q24H       Pharmacy to dose vancomycin    Diet Regular      Assessment/Plan   Assessment:     Active Hospital Problems (** Indicates Principal Problem)    Diagnosis Date Noted   • **CAP (community acquired pneumonia) [J18.9] 03/14/2017   • Hiatal hernia [K44.9] 03/16/2017   • UTI (urinary tract infection) [N39.0] 03/15/2017   • Essential hypertension [I10] 03/09/2017   • Cardiac pacemaker in situ [Z95.0] 03/08/2017   • History of aspiration pneumonitis [Z87.09] 01/04/2017   • Zenker diverticulum [K22.5] 09/27/2016   • Generalized convulsive seizures [R56.9] 04/07/2016   • Gastroesophageal reflux disease [K21.9] 02/05/2016   • Neuropathy [G62.9] 02/05/2016      Resolved Hospital Problems    Diagnosis Date Noted Date Resolved   No resolved problems to display.       Plan:   - Admitted to monitored unit  - Continue MEROPENEM and VANCOMYCIN per pulmonology.  Covering nosocomial exposures plus aspiration pneumonia.  - UCx w/ MDR Ecoli but sensitive to ceftriaxone.  - Blood cultures x2 done in ED negative thus far.    - Sputum for gram stain and culture pending.  VRP pending.  - Supplemental oxygen to keep SaO2 > 92%  - Appreciate pulmonary  assistance.  Continue to evaluate for possible aspiration pneumonia.  Speech therapy evaluating.  Continue aspiration precautions.  - ANDREW was present on admission now resolved with creatinine down to 0.76 from 1.64 on admission.  - Physical therapy consulted.      Disposition  TBD.  Lives w/ daughter.  Will need precert if goes to SNU.  May not meet.      Kenn Clemens MD  [facility]  03/17/17  8:32 AM

## 2017-03-17 NOTE — PROGRESS NOTES
Continued Stay Note  [facility]     Patient Name: Kim Feldman  MRN: [id number]  Today's Date: 3/17/2017    Admit Date: 3/14/2017          Discharge Plan       03/17/17 0948    Case Management/Social Work Plan    Additional Comments All facilities except [facility], [facility] and [facility] have declined based on level of care. [facility]; [facility] and [facility] to see if they can accept for Short term rehab. Spoke with daughter who reports she is going to be at the hosptial in 30 min and would like to talk with CSW re: Placement. Daughter reports she understands pt won't meet for long term care and is open to personal care if her long term policy will pay. Daughter reports her policy will pay $259 day after 100 days in a facilitiy. Daughter doesn't give permission for any faclilities to run pre-cert until she talks with CSW in person..               Discharge Codes     None            Paty Dimas

## 2017-03-17 NOTE — PLAN OF CARE
Problem: Patient Care Overview (Adult)  Goal: Plan of Care Review  Outcome: Ongoing (interventions implemented as appropriate)    03/17/17 1528   Coping/Psychosocial Response Interventions   Plan Of Care Reviewed With patient   Patient Care Overview   Progress progress toward functional goals is gradual   Outcome Evaluation   Outcome Summary/Follow up Plan Pt VSS, denies pain. Will have VFSS tomorrow most likely, continuing on regular/thins for now. Pt up in chair majority of day, will continue to monitor.       Goal: Adult Individualization and Mutuality  Outcome: Ongoing (interventions implemented as appropriate)  Goal: Discharge Needs Assessment  Outcome: Ongoing (interventions implemented as appropriate)    Problem: Pain, Acute (Adult)  Goal: Identify Related Risk Factors and Signs and Symptoms  Outcome: Ongoing (interventions implemented as appropriate)  Goal: Acceptable Pain Control/Comfort Level  Outcome: Ongoing (interventions implemented as appropriate)    Problem: Fall Risk (Adult)  Goal: Identify Related Risk Factors and Signs and Symptoms  Outcome: Ongoing (interventions implemented as appropriate)  Goal: Absence of Falls  Outcome: Ongoing (interventions implemented as appropriate)    Problem: Pneumonia (Adult)  Goal: Signs and Symptoms of Listed Potential Problems Will be Absent or Manageable (Pneumonia)  Outcome: Ongoing (interventions implemented as appropriate)

## 2017-03-17 NOTE — PLAN OF CARE
Problem: Patient Care Overview (Adult)  Goal: Plan of Care Review    03/17/17 1000   Coping/Psychosocial Response Interventions   Plan Of Care Reviewed With patient   Outcome Evaluation   Outcome Summary/Follow up Plan Patient swallow appears WFL at Bedside; however she has hx of dysphagia & pneumonia. VFSS warranted to rule out silent aspiration

## 2017-03-17 NOTE — PROGRESS NOTES
Continued Stay Note  [facility]     Patient Name: Kim Feldman  MRN: [id number]  Today's Date: 3/17/2017    Admit Date: 3/14/2017          Discharge Plan       03/17/17 1040    Case Management/Social Work Plan    Additional Comments Also gave daughter Criss Wasserman information with [facility] to assist with Assisted living and/or personal care at [facility] d[facility]      03/17/17 1038    Case Management/Social Work Plan    Plan [facility] pending [employer] pre-cert    Patient/Family In Agreement With Plan yes    Additional Comments Met with daughter and pt in room. Daughter reports she got a phone call from [facility] and is agreeable for [facility] to start pre-cert. Spoke with [facility], she reports they can accpept pending [employer] Pre-cert. Yumiko reports if pt is denied the cost is $278 a day and 30 days up front. CCP to follow for [employer] Pre-cert….[facility]      03/17/17 1033    Case Management/Social Work Plan    Additional Comments Met with daughter and pt in room. Daughter reports she got a phone call from [facility] and is agreeable for [facility] to start pre-cert.  left for [facility] to see if [facility] can accept pending pre-cert and if so to go on and start the pre-cert. Waiting to hear back from [facility]. CCP to follow….[facility]      03/17/17 0948    Case Management/Social Work Plan    Additional Comments All facilities except [facility], [facility] and [facility] have declined based on level of care.  left for [facility]; [facility] and [facility] to see if they can accept for Short term rehab. Spoke with daughter who reports she is going to be at the hosptial in 30 min and would like to talk with CSW re: Placement. Daughter reports she understands pt won't meet for long term care and is open to personal care if her long term policy will pay. Daughter reports her policy will pay $259 day after 100 days in  a facilitiy. Daughter doesn't give permission for any faclilities to run pre-cert until she talks with CSW in person..               Discharge Codes     None        Expected Discharge Date and Time     Expected Discharge Date Expected Discharge Time    Mar 20, 2017             Paty Dimas

## 2017-03-17 NOTE — PROGRESS NOTES
Acute Care - Speech Language Pathology   Swallow Initial Evaluation [facility]     Patient Name: Kim Feldman  : 3/13/1930  MRN: [id number]  Today's Date: 3/17/2017  Onset of Illness/Injury or Date of Surgery Date: 17            Admit Date: 3/14/2017    SPEECH-LANGUAGE PATHOLOGY EVALUATION - SWALLOW  Subjective: The patient was seen on this date for a Clinical Swallow evaluation.  Patient was alert and cooperative.    The patient's history is significant for Pneumonia.  She had lingual deviation to the left on protrusion, but no other obvious weakness other than mild generalized weakness. She has a history of Zenker's diverticulum. She has been evaluated several times for dysphagia since . Initial evaluations were WFL (both Clinical and MBS) and patient was suspected to have dysphagia due to esophageal issues.  However, she was evaluated by MBS in 2016, and the patient was felt to have mild oropharyngeal dysphagia characterized by transient penetration of thin liquids by cup or straw before the swallow, weak pharyngeal swallow, and backflow to the pyriforms from the Zenker's diverticulum. The Zenker's diverticulum was repaired in 2016. In 2017, a Clinical Swallow Evaluation was completed.  Mild inconsistent s/s were noted, and a VFSS was recommended. That study was not done prior to discharge.  Objective: Textures given included thin liquid, puree consistency, mechanical soft consistency and regular consistency.  Assessment: Difficulties were noted with none of the above consistencies; however one instance of slight wet hoarseness was noted after the patient drank sequential swallows of thin.  SLP Findings:  Patient presents with minimal dysphagia, with esophageal component.   Recommendations: Diet Textures: thin liquid, regular consistency food.  Medications should be taken whole with thin liquids.   Recommended Strategies: Upright for PO and small bites and sips.  Oral care before breakfast, after all meals and PRN.  Other Recommended Evaluations: VFSS    Dysphagia therapy is not recommended. Rationale: Pending results of VFSS.    Visit Dx:     ICD-10-CM ICD-9-CM   1. CAP (community acquired pneumonia) J18.9 486   2. Generalized weakness R53.1 780.79   3. Dysphagia, unspecified type R13.10 787.20     Patient Active Problem List   Diagnosis   • Anemia   • Bulging lumbar disc   • Depression, endogenous   • Gastroesophageal reflux disease   • Hyperlipidemia   • Intermittent claudication   • Neuropathy   • Osteoarthritis of knee   • Syndrome of inappropriate secretion of antidiuretic hormone   • Vitamin D deficiency   • History of sick sinus syndrome   • Intertrigo   • Generalized convulsive seizures   • Urine frequency   • Change in bowel habits   • Urinary tract infection   • Zenker diverticulum   • History of anxiety   • Urinary tract infection in female   • Clonic seizure disorder   • History of aspiration pneumonitis   • Thrombocytopenia   • B12 deficiency   • Pain of toe of right foot   • Weakness   • Cardiac pacemaker in situ   • Chronic venous insufficiency   • Arthritis   • S/P knee replacement   • Chronic bilateral low back pain without sciatica   • Essential hypertension   • CAP (community acquired pneumonia)   • UTI (urinary tract infection)   • Hiatal hernia     Past Medical History   Diagnosis Date   • Anemia    • Bulging lumbar disc    • Chronic cough    • Chronic UTI    • Chronic venous insufficiency    • Clonic seizure disorder    • Dementia without behavioral disturbance      moderate   • Depression, endogenous    • Disc degeneration, lumbar    • Dysphagia    • GERD (gastroesophageal reflux disease)    • Hiatal hernia    • History of anxiety    • History of aspiration pneumonitis    • History of cerebral artery occlusion      CVA (following TIA), 10/10, treated with tPA   • History of herpes zoster    • History of ingrowing nail    • History of osteoporosis    •  History of poliomyelitis      child   • History of sciatica    • History of sick sinus syndrome      s/p PPM   • History of transient cerebral ischemia      followed by stroke in 10/2010. BIBI was normal.   • History of Zenker's diverticulum removal 2016   • HX: long term anticoagulant use    • Hyperlipidemia    • Hypertension    • Hyponatremia    • Intertrigo    • Lumbar radiculopathy    • Morbid obesity    • Neuralgia      with diplopia secondary to facial shingles   • Nonepileptic episode    • Osteoarthritis of right knee    • Pacemaker    • Pneumonia    • Seeing double      secondary to shingles on the face also with neuralgia   • SIADH (syndrome of inappropriate ADH production)    • Vitamin D deficiency      Past Surgical History   Procedure Laterality Date   • Hand surgery Right    • Lumbar laminectomy       L-3 L4 L4 L5   • Cardiac pacemaker placement       secondary to SSS, placed in 2004, with generator change in 2014   • Tonsillectomy     • Laminectomy for implantation / placement neurostimulator electrodes     • Knee arthroplasty Left    • Cataract extraction     • Zenkers diverticulectomy N/A 9/27/2016     Procedure: ENDOSCOPIC REMOVAL OF ZENKERS DIVERTICULUM W/ WERDASCOPE;  Surgeon: Oswald Barth MD;  Location: [facility];  Service:           SWALLOW EVALUATION (last 72 hours)      Swallow Evaluation       03/17/17 0930                Rehab Evaluation    Document Type evaluation  -MT        Subjective Information no complaints;agree to therapy  -MT        Patient Effort, Rehab Treatment good  -MT        General Information    Patient Profile Review yes  -MT        Current Diet Limitations regular solid;thin liquids  -MT        Prior Level of Function- Swallowing no diet consistency restrictions;other (comment)   several Clinical Bedside evals & 1 VFSS in past  -MT        Plans/Goals Discussed With patient  -MT        Barriers to Rehab none identified  -MT        Clinical Impression    Patient's  Goals For Discharge return home;take care of myself at home  -MT        SLP Swallowing Diagnosis --   pending  -MT        Rehab Potential/Prognosis, Swallowing good, to achieve stated therapy goals  -MT        Criteria for Skilled Therapeutic Interventions Met skilled criteria for dysphagia intervention met  -MT        SLP Diet Recommendation regular textures;thin liquids  -MT        Recommended Diagnostics VFSS (MBS)  -MT        SLP Rec. for Method of Medication Administration meds whole with thin liquid  -MT        Cognitive Assessment/Intervention    Current Cognitive/Communication Assessment functional  -MT        Oral Motor Structure and Function    Oral Motor Anatomy and Physiology patient demonstrates anatomy that is WNL  -MT        Dentition Assessment present and adequate;missing teeth  -MT        Secretion Management WNL/WFL  -MT        Mucosal Quality moist, healthy  -MT        Volitional Swallow no difficulties initiating volitional swallow  -MT        Oral Musculature General Assessment lingual impairment  -MT        Lingual Strength and Mobility reduced strength left   mild deviation to left  -MT        Clinical Swallow Exam    Summary of Clinical Exam Appears WFL but suspect mild impairment  -MT          User Key  (r) = Recorded By, (t) = Taken By, (c) = Cosigned By    Initials Name Effective Dates    MT Ijeoma Eastman MS CCC-SLP 04/13/15 -         EDUCATION  The patient has been educated in the following areas:   Dysphagia (Swallowing Impairment).    SLP Recommendation and Plan  SLP Swallowing Diagnosis:  (pending)  SLP Diet Recommendation: regular textures, thin liquids     SLP Rec. for Method of Medication Administration: meds whole with thin liquid     Recommended Diagnostics: VFSS (MBS)  Criteria for Skilled Therapeutic Interventions Met: skilled criteria for dysphagia intervention met     Rehab Potential/Prognosis, Swallowing: good, to achieve stated therapy goals                Plan of Care  Review  Plan Of Care Reviewed With: patient  Outcome Summary/Follow up Plan: Patient swallow appears WFL at Bedside; however she has hx of dysphagia & pneumonia.  VFSS warranted to rule out silent aspiration           SLP Outcome Measures (last 72 hours)      SLP Outcome Measures       03/17/17 1000          SLP Outcome Measures    Outcome Measure Used? Adult NOMS  -MT      FCM Scores    FCM Chosen Swallowing  -MT      Swallowing FCM Score 7  -MT        User Key  (r) = Recorded By, (t) = Taken By, (c) = Cosigned By    Initials Name Effective Dates    SHIRLEY Eastman MS CCC-SLP 04/13/15 -            Time Calculation:         Time Calculation- SLP       03/17/17 1002          Time Calculation- SLP    SLP Start Time 0840  -MT      SLP Stop Time 0930  -MT      SLP Time Calculation (min) 50 min  -MT      SLP Received On 03/17/17  -MT        User Key  (r) = Recorded By, (t) = Taken By, (c) = Cosigned By    Initials Name Provider Type    SHIRLEY Eastman MS CCC-SLP Speech and Language Pathologist          Therapy Charges for Today     Code Description Service Date Service Provider Modifiers Qty    [id number] HC ST EVAL ORAL PHARYNG SWALLOW 3 3/17/2017 Ijeoma Eastman MS CCC-SLP GN 1               Ijeoma Eastman MS CCC-SLP  3/17/2017

## 2017-03-17 NOTE — PROGRESS NOTES
Continued Stay Note  [facility]     Patient Name: Kim Feldman  MRN: [id number]  Today's Date: 3/17/2017    Admit Date: 3/14/2017          Discharge Plan       03/17/17 1038    Case Management/Social Work Plan    Plan [facility] pending [employer] pre-cert    Patient/Family In Agreement With Plan yes    Additional Comments Met with daughter and pt in room. Daughter reports she got a phone call from [facility] and is agreeable for [facility] to start pre-cert. Spoke with [facility], she reports they can accpept pending [employer] Pre-cert. Rhinama reports if pt is denied the cost is $278 a day and 30 days up front. CCP to follow for [employer] Pre-cert….ALEX Villasenor      03/17/17 1033    Case Management/Social Work Plan    Additional Comments Met with daughter and pt in room. Daughter reports she got a phone call from [facility] and is agreeable for [facility] to start pre-cert.  left for [facility] to see if [facility] can accept pending pre-cert and if so to go on and start the pre-cert. Waiting to hear back from [facility]. CCP to follow….ALEX Villasenor      03/17/17 0948    Case Management/Social Work Plan    Additional Comments All facilities except [facility], [facility] and [facility] have declined based on level of care.  left for Marilyn[facility]; [facility] and [facility]ac[facility] to see if they can accept for Short term rehab. Spoke with daughter who reports she is going to be at the hosptial in 30 min and would like to talk with CSW re: Placement. Daughter reports she understands pt won't meet for long term care and is open to personal care if her long term policy will pay. Daughter reports her policy [facility] Daughter doesn't give permission for any faclilities to run pre-cert until she talks with CSW in person..               Discharge Codes     None        Expected Discharge Date and Time     Expected Discharge Date Expected Discharge  Time    Mar 20, 2017             Paty Dimas

## 2017-03-17 NOTE — PLAN OF CARE
Problem: Patient Care Overview (Adult)  Goal: Plan of Care Review  Outcome: Ongoing (interventions implemented as appropriate)    03/16/17 1533 03/16/17 1958 03/17/17 0631   Coping/Psychosocial Response Interventions   Plan Of Care Reviewed With --  patient --    Patient Care Overview   Progress progress toward functional goals is gradual --  --    Outcome Evaluation   Outcome Summary/Follow up Plan --  --  VSS. Iv abx changed to Vanc and merem. Speech to see and awaiting pacement. Will continue to monitor.        Goal: Adult Individualization and Mutuality  Outcome: Ongoing (interventions implemented as appropriate)  Goal: Discharge Needs Assessment  Outcome: Ongoing (interventions implemented as appropriate)    Problem: Pain, Acute (Adult)  Goal: Identify Related Risk Factors and Signs and Symptoms  Outcome: Ongoing (interventions implemented as appropriate)  Goal: Acceptable Pain Control/Comfort Level  Outcome: Ongoing (interventions implemented as appropriate)    Problem: Fall Risk (Adult)  Goal: Identify Related Risk Factors and Signs and Symptoms  Outcome: Ongoing (interventions implemented as appropriate)  Goal: Absence of Falls  Outcome: Ongoing (interventions implemented as appropriate)    Problem: Pneumonia (Adult)  Goal: Signs and Symptoms of Listed Potential Problems Will be Absent or Manageable (Pneumonia)  Outcome: Ongoing (interventions implemented as appropriate)

## 2017-03-17 NOTE — PROGRESS NOTES
"Pharmacokinetic Consult - Vancomycin Dosing (Initial Note)    Kim Fedlman has been consulted for pharmacy to dose vancomycin for pneumonia.  Pharmacy dosing vancomycin per Dr Barney's request.   Goal trough: 15-20 mg/L   Other antimicrobials: Merrem    Relevant clinical data and objective history reviewed:  87 y.o. female 64\" (162.6 cm) 213 lb 1.6 oz (96.7 kg)    Past Medical History   Diagnosis Date   • Anemia    • Bulging lumbar disc    • Chronic cough    • Chronic UTI    • Chronic venous insufficiency    • Clonic seizure disorder    • Dementia without behavioral disturbance      moderate   • Depression, endogenous    • Disc degeneration, lumbar    • Dysphagia    • GERD (gastroesophageal reflux disease)    • Hiatal hernia    • History of anxiety    • History of aspiration pneumonitis    • History of cerebral artery occlusion      CVA (following TIA), 10/10, treated with tPA   • History of herpes zoster    • History of ingrowing nail    • History of osteoporosis    • History of poliomyelitis      child   • History of sciatica    • History of sick sinus syndrome      s/p PPM   • History of transient cerebral ischemia      followed by stroke in 10/2010. BIBI was normal.   • History of Zenker's diverticulum removal 2016   • HX: long term anticoagulant use    • Hyperlipidemia    • Hypertension    • Hyponatremia    • Intertrigo    • Lumbar radiculopathy    • Morbid obesity    • Neuralgia      with diplopia secondary to facial shingles   • Nonepileptic episode    • Osteoarthritis of right knee    • Pacemaker    • Pneumonia    • Seeing double      secondary to shingles on the face also with neuralgia   • SIADH (syndrome of inappropriate ADH production)    • Vitamin D deficiency      CREATININE   Date Value Ref Range Status   03/15/2017 1.21 (H) 0.57 - 1.00 mg/dL Final   03/14/2017 1.64 (H) 0.57 - 1.00 mg/dL Final     BUN   Date Value Ref Range Status   03/15/2017 24 (H) 8 - 23 mg/dL Final     Estimated Creatinine " Clearance: 37 mL/min (by C-G formula based on Cr of 1.21).    Lab Results   Component Value Date    WBC 12.21 (H) 03/15/2017     Temp Readings from Last 3 Encounters:   03/16/17 97.5 °F (36.4 °C) (Oral)   03/09/17 97.6 °F (36.4 °C) (Oral)   02/10/17 97.6 °F (36.4 °C) (Oral)           Assessment/Plan  Will start vancomycin 1,750 mg IV Q24h (LD 2,000mg). Will monitor serum creatinine every 24 hours for the first 3 days then at least every 48 hours per dosing recommendations. Vancomycin level prior to 6th dose. Pharmacy will continue to follow daily while on vancomycin and adjust as needed.     Flo Camarena RPH

## 2017-03-18 ENCOUNTER — APPOINTMENT (OUTPATIENT)
Dept: GENERAL RADIOLOGY | Facility: HOSPITAL | Age: 82
End: 2017-03-18

## 2017-03-18 LAB
B PERT DNA SPEC QL NAA+PROBE: NOT DETECTED
C PNEUM DNA NPH QL NAA+NON-PROBE: NOT DETECTED
CREAT BLD-MCNC: 0.73 MG/DL (ref 0.57–1)
FLUAV H1 2009 PAND RNA NPH QL NAA+PROBE: NOT DETECTED
FLUAV H1 HA GENE NPH QL NAA+PROBE: NOT DETECTED
FLUAV H3 RNA NPH QL NAA+PROBE: NOT DETECTED
FLUAV SUBTYP SPEC NAA+PROBE: NOT DETECTED
FLUBV RNA ISLT QL NAA+PROBE: NOT DETECTED
GFR SERPL CREATININE-BSD FRML MDRD: 75 ML/MIN/1.73
HADV DNA SPEC NAA+PROBE: NOT DETECTED
HCOV 229E RNA SPEC QL NAA+PROBE: NOT DETECTED
HCOV HKU1 RNA SPEC QL NAA+PROBE: NOT DETECTED
HCOV NL63 RNA SPEC QL NAA+PROBE: NOT DETECTED
HCOV OC43 RNA SPEC QL NAA+PROBE: NOT DETECTED
HMPV RNA NPH QL NAA+NON-PROBE: NOT DETECTED
HPIV1 RNA SPEC QL NAA+PROBE: NOT DETECTED
HPIV2 RNA SPEC QL NAA+PROBE: NOT DETECTED
HPIV3 RNA NPH QL NAA+PROBE: NOT DETECTED
HPIV4 P GENE NPH QL NAA+PROBE: NOT DETECTED
M PNEUMO IGG SER IA-ACNC: NOT DETECTED
RHINOVIRUS RNA SPEC NAA+PROBE: NOT DETECTED
RSV RNA NPH QL NAA+NON-PROBE: NOT DETECTED

## 2017-03-18 PROCEDURE — 92611 MOTION FLUOROSCOPY/SWALLOW: CPT | Performed by: SPEECH-LANGUAGE PATHOLOGIST

## 2017-03-18 PROCEDURE — 25010000002 MEROPENEM: Performed by: INTERNAL MEDICINE

## 2017-03-18 PROCEDURE — 82565 ASSAY OF CREATININE: CPT | Performed by: INTERNAL MEDICINE

## 2017-03-18 PROCEDURE — 99204 OFFICE O/P NEW MOD 45 MIN: CPT | Performed by: INTERNAL MEDICINE

## 2017-03-18 PROCEDURE — 87798 DETECT AGENT NOS DNA AMP: CPT | Performed by: HOSPITALIST

## 2017-03-18 PROCEDURE — 94799 UNLISTED PULMONARY SVC/PX: CPT

## 2017-03-18 PROCEDURE — 25010000002 ENOXAPARIN PER 10 MG: Performed by: HOSPITALIST

## 2017-03-18 PROCEDURE — 87581 M.PNEUMON DNA AMP PROBE: CPT | Performed by: HOSPITALIST

## 2017-03-18 PROCEDURE — 87486 CHLMYD PNEUM DNA AMP PROBE: CPT | Performed by: HOSPITALIST

## 2017-03-18 PROCEDURE — 87633 RESP VIRUS 12-25 TARGETS: CPT | Performed by: HOSPITALIST

## 2017-03-18 PROCEDURE — 74230 X-RAY XM SWLNG FUNCJ C+: CPT

## 2017-03-18 RX ADMIN — CLOPIDOGREL 75 MG: 75 TABLET, FILM COATED ORAL at 10:18

## 2017-03-18 RX ADMIN — ENOXAPARIN SODIUM 30 MG: 30 INJECTION SUBCUTANEOUS at 10:18

## 2017-03-18 RX ADMIN — LEVETIRACETAM 500 MG: 500 TABLET, FILM COATED ORAL at 10:17

## 2017-03-18 RX ADMIN — LEVETIRACETAM 500 MG: 500 TABLET, FILM COATED ORAL at 17:05

## 2017-03-18 RX ADMIN — MIRTAZAPINE 15 MG: 15 TABLET, FILM COATED ORAL at 20:35

## 2017-03-18 RX ADMIN — PANTOPRAZOLE SODIUM 40 MG: 40 TABLET, DELAYED RELEASE ORAL at 06:28

## 2017-03-18 RX ADMIN — GABAPENTIN 600 MG: 300 CAPSULE ORAL at 20:35

## 2017-03-18 RX ADMIN — GABAPENTIN 600 MG: 300 CAPSULE ORAL at 10:18

## 2017-03-18 RX ADMIN — GUAIFENESIN 1200 MG: 600 TABLET, EXTENDED RELEASE ORAL at 17:05

## 2017-03-18 RX ADMIN — GUAIFENESIN 1200 MG: 600 TABLET, EXTENDED RELEASE ORAL at 10:18

## 2017-03-18 RX ADMIN — MEROPENEM 500 MG: 500 INJECTION, POWDER, FOR SOLUTION INTRAVENOUS at 17:05

## 2017-03-18 RX ADMIN — MEROPENEM 500 MG: 500 INJECTION, POWDER, FOR SOLUTION INTRAVENOUS at 10:18

## 2017-03-18 RX ADMIN — GABAPENTIN 600 MG: 300 CAPSULE ORAL at 16:08

## 2017-03-18 NOTE — NURSING NOTE
Dr Allison called for ENT consult, went over speech eval with him. He said there isn't anything they would do while she in inpatient while she is being treated for infections. The patient will need to follow up with Dr Tovar, [phone], he is one in that practice that does Zenker procedures, but Dr Mayfield felt due to age that Dr Tovar might want to go more of a conservative route.

## 2017-03-18 NOTE — CONSULTS
[facility]  Initial Inpatient Consult Note    Referring Provider: Dr. ZAINAB Clemens    Reason for Consultation: h/o recurrent pneumonia with a persistent Zenker's diverticulum found on Video Swallow    Subjective     History of present illness:  This 87-year-old white female was admitted 3/14/2017 with recurrent pneumonia.  She states that for 24 hours prior to admission she slept.  Her daughter brought her to the emergency room and she was found to have RLL pneumonia.  The patient states that she's been admitted now for times for pneumonia in the last year.  The patient feels better now.  She does have a dry cough.  She has no abdominal pain or chest pain.  She has no nausea or vomiting.  She has no dysphagia.  No fever or chills.  She has no diarrhea.  She has some constipation but no rectal bleeding or melena.  She's never had an EGD.  She had a colonoscopy years ago that was reportedly okay.  Her weight is been stable.  She states that in 6-12 months ago Dr. Timmy Barth performed an ENT procedure that involved fixing a Zenker's diverticulum.  The patient had a swallowing evaluation with speech therapy today and they felt that the patient had a functional pharyngeal swallow however a possible Zenker's was putting the patient at risk for aspiration.    Past Medical History:  Past Medical History   Diagnosis Date   • Anemia    • Bulging lumbar disc    • Chronic cough    • Chronic UTI    • Chronic venous insufficiency    • Clonic seizure disorder    • Dementia without behavioral disturbance      moderate   • Depression, endogenous    • Disc degeneration, lumbar    • Dysphagia    • GERD (gastroesophageal reflux disease)    • Hiatal hernia    • History of anxiety    • History of aspiration pneumonitis    • History of cerebral artery occlusion      CVA (following TIA), 10/10, treated with tPA   • History of herpes zoster    • History of ingrowing nail    • History of osteoporosis    • History of  poliomyelitis      child   • History of sciatica    • History of sick sinus syndrome      s/p PPM   • History of transient cerebral ischemia      followed by stroke in 10/2010. BIBI was normal.   • History of Zenker's diverticulum removal 2016   • HX: long term anticoagulant use    • Hyperlipidemia    • Hypertension    • Hyponatremia    • Intertrigo    • Lumbar radiculopathy    • Morbid obesity    • Neuralgia      with diplopia secondary to facial shingles   • Nonepileptic episode    • Osteoarthritis of right knee    • Pacemaker    • Pneumonia    • Seeing double      secondary to shingles on the face also with neuralgia   • SIADH (syndrome of inappropriate ADH production)    • Vitamin D deficiency        Past Surgical History:  Past Surgical History   Procedure Laterality Date   • Hand surgery Right    • Lumbar laminectomy       L-3 L4 L4 L5   • Cardiac pacemaker placement       secondary to SSS, placed in 2004, with generator change in 2014   • Tonsillectomy     • Laminectomy for implantation / placement neurostimulator electrodes     • Knee arthroplasty Left    • Cataract extraction     • Zenkers diverticulectomy N/A 9/27/2016     Procedure: ENDOSCOPIC REMOVAL OF ZENKERS DIVERTICULUM W/ WERDASCOPE;  Surgeon: Oswald Barth MD;  Location: [facility];  Service:         Social History:   Social History   Substance Use Topics   • Smoking status: Former Smoker     Years: 40.00     Types: Cigarettes   • Smokeless tobacco: Never Used      Comment: QUIT 1992   • Alcohol use No      Comment: VERY RARE / caffeine use        Family History:  Family History   Problem Relation Age of Onset   • Cancer Daughter        Home Meds:  Facility-Administered Medications Prior to Admission   Medication Dose Route Frequency Provider Last Rate Last Dose   • cyanocobalamin injection 1,000 mcg  1,000 mcg Intramuscular Q28 Days Sharad Salinas MD   1,000 mcg at 03/09/17 1811     Prescriptions Prior to Admission   Medication Sig Dispense  Refill Last Dose   • B Complex Vitamins (VITAMIN B COMPLEX PO) Take 1 tablet by mouth every morning. HOLD PRIOR TO SURG   Taking   • Calcium-Vitamin D-Vitamin K (VIACTIV PO) Take 500 mg by mouth Every Night. HOLD PRIOR TO SURG    Taking   • clopidogrel (PLAVIX) 75 MG tablet TAKE ONE TABLET BY MOUTH DAILY 90 tablet 0 Taking   • clotrimazole-betamethasone (LOTRISONE) 1-0.05 % cream Apply  topically 2 (Two) Times a Day. (Patient taking differently: Apply  topically 2 (Two) Times a Day As Needed.) 45 g 0 Taking   • diclofenac (VOLTAREN) 1 % gel gel Apply 4 g topically 4 (four) times a day as needed.   Taking   • gabapentin (NEURONTIN) 300 MG capsule Take 2 capsules by mouth 3 (Three) Times a Day. 540 capsule 2 Taking   • Hypromellose (ARTIFICIAL TEARS OP) Apply 2 drops to eye 4 (four) times a day as needed.   Taking   • lansoprazole (PREVACID) 30 MG capsule TAKE ONE CAPSULE BY MOUTH EVERY MORNING (Patient taking differently: take one capsule by mouth every evening) 30 capsule 1 Taking   • levETIRAcetam (KEPPRA) 500 MG tablet Take 500 mg by mouth 2 (two) times a day.   Taking   • methylcellulose, Laxative, (CITRUCEL) 500 MG tablet tablet Take 2 tablets by mouth Every Morning.   Taking   • mirtazapine (REMERON) 15 MG tablet Take 1 tablet by mouth Every Night. 30 tablet 2 Taking   • nystatin (MYCOSTATIN) 902091 UNIT/GM cream Apply  topically As Needed.   Taking   • pseudoephedrine-guaifenesin (MUCINEX D)  MG per 12 hr tablet Take 1 tablet by mouth At Night As Needed for allergies.   Taking   • carvedilol (COREG) 3.125 MG tablet TAKE ONE TABLET BY MOUTH TWICE A DAY WITH MEALS 60 tablet 2 Not Taking   • Loratadine 10 MG capsule Take 1 capsule by mouth daily as needed.   Taking       Current Meds:     clopidogrel 75 mg Oral Daily   enoxaparin 30 mg Subcutaneous Daily   gabapentin 600 mg Oral TID   guaiFENesin 1,200 mg Oral BID   ipratropium-albuterol 3 mL Nebulization 4x Daily - RT   levETIRAcetam 500 mg Oral BID    meropenem 500 mg Intravenous Q8H   mirtazapine 15 mg Oral Nightly   pantoprazole 40 mg Oral Q AM       Allergies:  Allergies   Allergen Reactions   • Penicillins Hives       Review of Systems  Pertinent items are noted in HPI     Objective     Vital Signs  Temp:  [97.2 °F (36.2 °C)-98.3 °F (36.8 °C)] 97.9 °F (36.6 °C)  Heart Rate:  [74-80] 74  Resp:  [16-18] 16  BP: (120-160)/(68-82) 125/82    Physical Exam:  General Appearance:    Alert, cooperative, in no acute distress   Head:    Normocephalic, without obvious abnormality, atraumatic   Eyes:            Lids and lashes normal, conjunctivae and sclerae normal, no   icterus   Throat:   No oral lesions, no thrush, oral mucosa moist   Neck:   No adenopathy, supple, trachea midline, no thyromegaly, no   carotid bruit, no JVD   Lungs:     Clear to auscultation,respirations regular, even and                   unlabored    Heart:    Regular rhythm and normal rate, normal S1 and S2, no            murmur, no gallop, no rub, no click   Chest Wall:    No abnormalities observed   Abdomen:     Normal bowel sounds, no masses, no organomegaly, soft        non-tender, non-distended, no guarding, no rebound                 tenderness   Rectal:     Deferred   Extremities:   no edema, no cyanosis, no redness   Skin:   No bleeding, bruising or rash   Lymph nodes:   No palpable adenopathy   Psychiatric:  Judgement and insight: normal   Orientation to person place and time: normal   Mood and affect: normal     Results Review:   I reviewed the patient's new clinical results.      Results from last 7 days  Lab Units 03/17/17  0452 03/15/17  0542 03/14/17  1831   WBC 10*3/mm3 5.47 12.21* 16.88*   HEMOGLOBIN g/dL 10.0* 10.7* 12.1   HEMATOCRIT % 30.7* 32.5* 37.1   PLATELETS 10*3/mm3 156 122* 178         Results from last 7 days  Lab Units 03/18/17  0649 03/17/17  0452 03/15/17  0542 03/14/17  1831   SODIUM mmol/L  --  140 141 139   POTASSIUM mmol/L  --  3.9 4.3 4.7   CHLORIDE mmol/L  --   107 105 101   TOTAL CO2 mmol/L  --  20.8* 24.5 28.9   BUN mg/dL  --  12 24* 23   CREATININE mg/dL 0.73 0.76 1.21* 1.64*   CALCIUM mg/dL  --  8.3* 8.4* 9.2   BILIRUBIN mg/dL  --   --   --  0.7   ALK PHOS U/L  --   --   --  55   ALT (SGPT) U/L  --   --   --  13   AST (SGOT) U/L  --   --   --  19   GLUCOSE mg/dL  --  92 96 113*               0  Lab Value Date/Time   LIPASE 43 01/03/2017 1752       Radiology:  Imaging Results (last 72 hours)     Procedure Component Value Units Date/Time    FL Video Swallow With Speech [[id number]] Collected:  03/18/17 1027     Updated:  03/18/17 1031    Narrative:       BARIUM SWALLOW WITH VIDEO     CLINICAL HISTORY:   Female who is 87 years-old, with dysphagia.     TECHNIQUE: The swallowing mechanism was evaluated with real-time  fluoroscopic imaging, captured on digital disk and performed in  conjunction with speech pathologist (please see report).     FINDINGS: Esophageal dysfunction without penetration or aspiration with  any consistency tested.       Impression:       1. No significant increased risk of aspiration.  2. Esophageal dysmotility, dedicated esophageal evaluation suggested.     FLUOROSCOPY TIME: 2 minutes 7 seconds, 2 images      This report was finalized on 3/18/2017 10:28 AM by Dr. Dino Lugo MD.             Assessment/Plan     Principal Problem:    CAP (community acquired pneumonia)  Active Problems:    Gastroesophageal reflux disease    Neuropathy    Generalized convulsive seizures    Zenker diverticulum    History of aspiration pneumonitis    Cardiac pacemaker in situ    Essential hypertension    UTI (urinary tract infection)    Hiatal hernia      Impression:  1.  This 87-year-old female has recurrent pneumonia.  Her video swallow today showed a possible recurrent Zenker's diverticulum.  The patient has no dysphagia.  Patient had some type of surgical procedure by Dr. Barth 6-12 months ago involving a Zenker's diverticulum reportedly.    Recommendations:  1.  I  would recommend that Dr. Timmy Barth see the patient's in consultation.  2.  I will order a barium swallow.    I discussed the patients findings and my recommendations with patient and nursing staff

## 2017-03-18 NOTE — PROGRESS NOTES
"  PROGRESS NOTE   LOS: 4 days   Patient Care Team:  Sharad Salinas MD as PCP - General  Sharad Salinas MD as PCP - Family Medicine  Bandar Ruiz MD as Consulting Physician (Cardiology)    Chief Complaint: Healthcare associated pneumonia, dysphagia, UTI    Interval History: Patient was admitted on 3/14/17 with community-acquired pneumonia possibly from aspiration with dysphagia.  Has been treated with vancomycin and meropenem.  He was found to have UTI with Escherichia coli.  Blood cultures have been normal.  MRSA screen is negative.  leukocytosis has improved    REVIEW OF SYSTEMS:   CARDIOVASCULAR: No chest pain, chest pressure or chest discomfort. No palpitations or edema.   RESPIRATORY: No shortness of breath, cough or sputum.   GASTROINTESTINAL: No anorexia, nausea, vomiting or diarrhea. No abdominal pain or blood.   HEMATOLOGIC: No bleeding or bruising.     Vital Signs  Temp:  [97.2 °F (36.2 °C)-98.3 °F (36.8 °C)] 97.9 °F (36.6 °C)  Heart Rate:  [75-80] 77  Resp:  [16-18] 16  BP: (120-160)/(68-82) 125/82  SpO2:  [95 %-96 %] 96 %  on    O2 Device: room air    Intake/Output Summary (Last 24 hours) at 03/18/17 1431  Last data filed at 03/18/17 1216   Gross per 24 hour   Intake   1382 ml   Output    350 ml   Net   1032 ml     Flowsheet Rows         First Filed Value    Admission Height  64\" (162.6 cm) Documented at 03/14/2017 1706    Admission Weight  215 lb (97.5 kg) Documented at 03/14/2017 1706          Physical Exam:  GEN:  No acute distress, alert, cooperative, well developed   EYES:   Sclera clear. No icterus. PERRL. Normal EOM  ENT:   External ears/nose normal, no oral lesions, no thrush, mucous membranes moist  NECK:  Supple, midline trachea, no JVD  LUNGS: Normal chest on inspection, no wheezes. No rhonchi.  Diminished bases with crackles on right lower lobe. Respirations regular, even and unlabored.   CV:  Regular rhythm and rate. Normal S1/S2. No murmurs, gallops, or rubs noted.  ABD:  Soft, non-tender " and non-distended. Normal bowel sounds. No guarding  EXT:  Moves all extremities well. No cyanosis. No redness.  1+ bilateral lower extremity edema   Skin: dry, intact, no bleeding    Results Review:        Results from last 7 days  Lab Units 03/18/17  0649 03/17/17  0452 03/15/17  0542 03/14/17  1831   SODIUM mmol/L  --  140 141 139   POTASSIUM mmol/L  --  3.9 4.3 4.7   CHLORIDE mmol/L  --  107 105 101   TOTAL CO2 mmol/L  --  20.8* 24.5 28.9   BUN mg/dL  --  12 24* 23   CREATININE mg/dL 0.73 0.76 1.21* 1.64*   CALCIUM mg/dL  --  8.3* 8.4* 9.2   AST (SGOT) U/L  --   --   --  19   ALT (SGPT) U/L  --   --   --  13       Results from last 7 days  Lab Units 03/14/17  1831   TROPONIN T ng/mL <0.010       Results from last 7 days  Lab Units 03/17/17  0452 03/15/17  0542 03/14/17  1831   WBC 10*3/mm3 5.47 12.21* 16.88*   HEMOGLOBIN g/dL 10.0* 10.7* 12.1   HEMATOCRIT % 30.7* 32.5* 37.1   PLATELETS 10*3/mm3 156 122* 178   NEUTROPHIL % %  --  70.2 81.3*   LYMPHOCYTE % %  --  20.8 12.7*   MONOCYTES % %  --  7.0 5.3   EOSINOPHIL % %  --  1.6 0.4   BASOPHIL % %  --  0.2 0.1   IMM GRAN % %  --  0.2 0.2           Results from last 7 days  Lab Units 03/14/17  1831   MAGNESIUM mg/dL 2.2                 No results found for: POCGLU    Results from last 7 days  Lab Units 03/14/17  2111   LACTATE mmol/L 1.3           Results from last 7 days  Lab Units 03/17/17  0639 03/14/17 2111 03/14/17  2110   BLOODCX   --  No growth at 3 days  --    URINECX   --   --  >100,000 CFU/mL Escherichia coli*   MRSA SCREEN CX  No Methicillin Resistant Staphylococcus aureus Isolated at 24 hours  --   --        Results from last 7 days  Lab Units 03/14/17  2110   NITRITE UA  Negative   WBC UA /HPF Too Numerous to Count*   BACTERIA UA /HPF 3+*   SQUAM EPITHEL UA /HPF None Seen   URINECX  >100,000 CFU/mL Escherichia coli*       Results from last 7 days  Lab Units 03/18/17  1205   ADENOVIRUS DETECTION BY PCR  Not Detected   CORONAVIRUS 229E  Not Detected    CORONAVIRUS HKU1  Not Detected   CORONAVIRUS NL63  Not Detected   CORONAVIRUS OC43  Not Detected   HUMAN METAPNEUMOVIRUS  Not Detected   HUMAN RHINOVIRUS/ENTEROVIRUS  Not Detected   INFLUENZA B PCR  Not Detected   PARAINFLUENZA 1  Not Detected   PARAINFLUENZA VIRUS 2  Not Detected   PARAINFLUENZA VIRUS 3  Not Detected   PARAINFLUENZA VIRUS 4  Not Detected   BORDETELLA PERTUSSIS PCR  Not Detected   INFLUENZA 2009 H1N1 BY PCR  Not Detected   CHLAMYDOPHILA PNEUMONIAE PCR  Not Detected   MYCOPLAMA PNEUMO PCR  Not Detected   INFLUENZA A PCR  Not Detected   INFLUENZA A H3  Not Detected   INFLUENZA A H1  Not Detected   RSV, PCR  Not Detected         Imaging Results (all)     Procedure Component Value Units Date/Time    XR Chest 2 View [[id number]] Collected:  03/14/17 1856     Updated:  03/14/17 1900    Narrative:       XR CHEST 2 VW-     Clinical: Productive, acute mental status change     COMPARISON 03/08/2017     FINDINGS: There is a patchy infiltrate demonstrated at the right lung  base, new compared to the previous examination. Transvenous pacemaker in  position. The left lung is clear. Heart size within normal limits.  Stable mediastinum and mike.     CONCLUSION: New right base infiltrate.     This report was finalized on 3/14/2017 6:57 PM by Dr. Baron Tran MD.       FL Video Swallow With Speech [[id number]] Collected:  03/18/17 1027     Updated:  03/18/17 1031    Narrative:       BARIUM SWALLOW WITH VIDEO     CLINICAL HISTORY:   Female who is 87 years-old, with dysphagia.     TECHNIQUE: The swallowing mechanism was evaluated with real-time  fluoroscopic imaging, captured on digital disk and performed in  conjunction with speech pathologist (please see report).     FINDINGS: Esophageal dysfunction without penetration or aspiration with  any consistency tested.       Impression:       1. No significant increased risk of aspiration.  2. Esophageal dysmotility, dedicated esophageal evaluation suggested.      FLUOROSCOPY TIME: 2 minutes 7 seconds, 2 images      This report was finalized on 3/18/2017 10:28 AM by Dr. Dino Luog MD.             I reviewed the patient's new clinical results.  I personally viewed and interpreted the patient's   CXR 3/14/17 compared to 3/8/17 shows a new right lower lobe infiltrate.  No cardiomegaly no effusion.  Pacemaker with good position.        Medication Review:     clopidogrel 75 mg Oral Daily   enoxaparin 30 mg Subcutaneous Daily   gabapentin 600 mg Oral TID   guaiFENesin 1,200 mg Oral BID   ipratropium-albuterol 3 mL Nebulization 4x Daily - RT   levETIRAcetam 500 mg Oral BID   meropenem 500 mg Intravenous Q8H   mirtazapine 15 mg Oral Nightly   pantoprazole 40 mg Oral Q AM            ASSESSMENT:   1. Community-acquired pneumonia RLL.  Possibly from aspiration due to GERD and swallowing difficulties.  On vancomycin and meropenem.  2. Acute UTI positive for Escherichia coli  3. Dysphagia with no sign of aspiration  4. Esophageal dysmotility  5. Thrombocytopenia  6. Morbid obesity  7. Lower extremity edema  8. History of CVA/seizure and dementia  9. GERD  10. Hiatal hernia  11. Zenker diverticulum  12. Acute kidney injury- resolved    PLAN:    Discontinue vancomycin as MRSA screen was negative and normal blood cultures so far  Try to obtain respiratory culture and viral panel as previously ordered  Continue meropenem  Consider GI evaluation with history of Zenker diverticulum, GERD, hiatal hernia that could be causing some issue with silent aspiration  Wean oxygen to keep O2 sats greater than 92%  Ambulate  Work on pulmonary toileting with incentive spirometer    Nolvia Wilburn MD  03/18/17  2:31 PM           EMR Dragon/Transcription disclaimer:   Much of this encounter note is an electronic transcription/translation of spoken language to printed text. The electronic translation of spoken language may permit erroneous, or at times, nonsensical words or phrases to be inadvertently  transcribed; Although I have reviewed the note for such errors, some may still exist.

## 2017-03-18 NOTE — PLAN OF CARE
Problem: Patient Care Overview (Adult)  Goal: Plan of Care Review  Outcome: Ongoing (interventions implemented as appropriate)    03/18/17 0830   Coping/Psychosocial Response Interventions   Plan Of Care Reviewed With patient   Patient Care Overview   Progress improving   Outcome Evaluation   Outcome Summary/Follow up Plan No acute changes with pt overnight, VS stable, no new complaints, pt appeared to rest well last night, still on IV merrem and vancomycin       Goal: Adult Individualization and Mutuality  Outcome: Ongoing (interventions implemented as appropriate)  Goal: Discharge Needs Assessment  Outcome: Ongoing (interventions implemented as appropriate)    Problem: Pain, Acute (Adult)  Goal: Identify Related Risk Factors and Signs and Symptoms  Outcome: Outcome(s) achieved Date Met:  03/18/17  Goal: Acceptable Pain Control/Comfort Level  Outcome: Ongoing (interventions implemented as appropriate)    Problem: Fall Risk (Adult)  Goal: Identify Related Risk Factors and Signs and Symptoms  Outcome: Outcome(s) achieved Date Met:  03/18/17  Goal: Absence of Falls  Outcome: Ongoing (interventions implemented as appropriate)    Problem: Pneumonia (Adult)  Goal: Signs and Symptoms of Listed Potential Problems Will be Absent or Manageable (Pneumonia)  Outcome: Ongoing (interventions implemented as appropriate)

## 2017-03-18 NOTE — PLAN OF CARE
Problem: Patient Care Overview (Adult)  Goal: Plan of Care Review  Outcome: Ongoing (interventions implemented as appropriate)    03/18/17 1612   Coping/Psychosocial Response Interventions   Plan Of Care Reviewed With patient   Patient Care Overview   Progress improving   Outcome Evaluation   Outcome Summary/Follow up Plan VSS. patient ambulating in hallway. IV Vanc dc'd today, to call pulmonary if she worsens and needs to be placed back on it. ENT and GI both consulted and called.       Goal: Adult Individualization and Mutuality  Outcome: Ongoing (interventions implemented as appropriate)  Goal: Discharge Needs Assessment  Outcome: Ongoing (interventions implemented as appropriate)    Problem: Pain, Acute (Adult)  Goal: Acceptable Pain Control/Comfort Level  Outcome: Ongoing (interventions implemented as appropriate)    Problem: Fall Risk (Adult)  Goal: Absence of Falls  Outcome: Ongoing (interventions implemented as appropriate)    Problem: Pneumonia (Adult)  Goal: Signs and Symptoms of Listed Potential Problems Will be Absent or Manageable (Pneumonia)  Outcome: Ongoing (interventions implemented as appropriate)

## 2017-03-18 NOTE — PROGRESS NOTES
"      Name: Kim Feldman ADMIT: 3/14/2017   : 3/13/1930  PCP: Sharad Salinas MD    MRN: [id number] LOS: 4 days   AGE/SEX: 87 y.o. female  ROOM: [facility]     Subjective   Seems to be feeling better.    Objective   Vital Signs  Temp:  [97.2 °F (36.2 °C)-98.3 °F (36.8 °C)] 97.2 °F (36.2 °C)  Heart Rate:  [73-80] 75  Resp:  [16-18] 16  BP: (149-160)/(68-88) 149/68  SpO2:  [95 %] 95 %  on   ;   O2 Device: room air  Body mass index is 36.58 kg/(m^2).    Objective:  General Appearance:  Comfortable and in no acute distress.    Vital signs: (most recent): Blood pressure 149/68, pulse 75, temperature 97.2 °F (36.2 °C), temperature source Oral, resp. rate 16, height 64\" (162.6 cm), weight 213 lb 1.6 oz (96.7 kg), SpO2 95 %, not currently breastfeeding.    Lungs:  Normal effort.  Breath sounds clear to auscultation.    Heart: Normal rate.  Regular rhythm.    Abdomen: Abdomen is soft and non-distended.  Bowel sounds are normal.   There is no abdominal tenderness.     Extremities: There is no dependent edema.    Neurological: Patient is alert and oriented to person, place and time.    Skin:  Warm and dry.        Results Review:       I reviewed the patient's new clinical results.    Results from last 7 days  Lab Units 17  0452 03/15/17  0542 17  1831   WBC 10*3/mm3 5.47 12.21* 16.88*   HEMOGLOBIN g/dL 10.0* 10.7* 12.1   PLATELETS 10*3/mm3 156 122* 178       Results from last 7 days  Lab Units 17  0649 17  0452 03/15/17  0542 17  1831   SODIUM mmol/L  --  140 141 139   POTASSIUM mmol/L  --  3.9 4.3 4.7   CHLORIDE mmol/L  --  107 105 101   TOTAL CO2 mmol/L  --  20.8* 24.5 28.9   BUN mg/dL  --  12 24* 23   CREATININE mg/dL 0.73 0.76 1.21* 1.64*   GLUCOSE mg/dL  --  92 96 113*   Estimated Creatinine Clearance: 55.9 mL/min (by C-G formula based on Cr of 0.73).    Results from last 7 days  Lab Units 17  0452 03/15/17  0542 17  1831   CALCIUM mg/dL 8.3* 8.4* 9.2   ALBUMIN g/dL  --   --  " 4.00   MAGNESIUM mg/dL  --   --  2.2       Results from last 7 days  Lab Units 03/14/17  2111   LACTATE mmol/L 1.3     Lab Results   Component Value Date    STREPPNEUAG Negative 02/03/2017    LEGANTIGENUR Negative 02/03/2017            Results from last 7 days  Lab Units 03/17/17  0639 03/14/17  2111 03/14/17 2110   BLOODCX   --  No growth at 3 days  --    URINECX   --   --  >100,000 CFU/mL Escherichia coli*   MRSA SCREEN CX  No Methicillin Resistant Staphylococcus aureus Isolated at 24 hours  --   --      XR CHEST 2 VW  CONCLUSION:   New right base infiltrate.      clopidogrel 75 mg Oral Daily   enoxaparin 30 mg Subcutaneous Daily   gabapentin 600 mg Oral TID   guaiFENesin 1,200 mg Oral BID   ipratropium-albuterol 3 mL Nebulization 4x Daily - RT   levETIRAcetam 500 mg Oral BID   meropenem 500 mg Intravenous Q8H   mirtazapine 15 mg Oral Nightly   pantoprazole 40 mg Oral Q AM   vancomycin 1,000 mg Intravenous Q12H       Pharmacy to dose vancomycin    Diet Regular      Assessment/Plan   Assessment:     Active Hospital Problems (** Indicates Principal Problem)    Diagnosis Date Noted   • **CAP (community acquired pneumonia) [J18.9] 03/14/2017   • Hiatal hernia [K44.9] 03/16/2017   • UTI (urinary tract infection) [N39.0] 03/15/2017   • Essential hypertension [I10] 03/09/2017   • Cardiac pacemaker in situ [Z95.0] 03/08/2017   • History of aspiration pneumonitis [Z87.09] 01/04/2017   • 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  Covering nosocomial exposures plus aspiration pneumonia.  - UCx w/ MDR Ecoli but sensitive to merrem.  - MRSA screen negative.  ?DC  vanc    ** Appreciate pulmonary assistance.  Continue to evaluate for possible aspiration pneumonia.  VFSS showed swallow okay but per ST food appears to still be collecting in a pocket at the top of the esophagus putting patient at risk to aspirate.  She underwent endoscopic Zenker's diverticulectomy by Dr. Oswald Barth in September.  Will ask him to evaluate.  Continue aspiration precautions.  UPDATE - The ENT covering for Dr. Barth not willing to see her inpatient.  Suggested outpatient follow-up with Dr. Barth.  Discussed with Dr. Wilburn.  Will ask GI to evaluate and give opinion on whether this could be contributing to her aspiration or not.      Disposition  TBD.  Lives w/ daughter.  Will need precert if goes to SNU.  May not meet.  PT following.      Kenn Clemens MD  [facility]  03/18/17  10:22 AM

## 2017-03-18 NOTE — SIGNIFICANT NOTE
03/18/17 1621   Rehab Treatment   Treatment Not Performed other (see comments)  (Pt. just back in room from ambulating with family/staff in the hallways.  Pt. reports fatigue currently and would like to rest.  Will follow up tomorrow.)   Recommendation   PT - Next Appointment 03/19/17

## 2017-03-18 NOTE — PROGRESS NOTES
Acute Care - Speech Language Pathology   Swallow Initial Evaluation [facility]     Patient Name: Kim Feldman  : 3/13/1930  MRN: [id number]  Today's Date: 3/18/2017  Onset of Illness/Injury or Date of Surgery Date: 17            Admit Date: 3/14/2017    Visit Dx:     ICD-10-CM ICD-9-CM   1. CAP (community acquired pneumonia) J18.9 486   2. Generalized weakness R53.1 780.79   3. Dysphagia, unspecified type R13.10 787.20     Patient Active Problem List   Diagnosis   • Anemia   • Bulging lumbar disc   • Depression, endogenous   • Gastroesophageal reflux disease   • Hyperlipidemia   • Intermittent claudication   • Neuropathy   • Osteoarthritis of knee   • Syndrome of inappropriate secretion of antidiuretic hormone   • Vitamin D deficiency   • History of sick sinus syndrome   • Intertrigo   • Generalized convulsive seizures   • Urine frequency   • Change in bowel habits   • Urinary tract infection   • Zenker diverticulum   • History of anxiety   • Urinary tract infection in female   • Clonic seizure disorder   • History of aspiration pneumonitis   • Thrombocytopenia   • B12 deficiency   • Pain of toe of right foot   • Weakness   • Cardiac pacemaker in situ   • Chronic venous insufficiency   • Arthritis   • S/P knee replacement   • Chronic bilateral low back pain without sciatica   • Essential hypertension   • CAP (community acquired pneumonia)   • UTI (urinary tract infection)   • Hiatal hernia     Past Medical History   Diagnosis Date   • Anemia    • Bulging lumbar disc    • Chronic cough    • Chronic UTI    • Chronic venous insufficiency    • Clonic seizure disorder    • Dementia without behavioral disturbance      moderate   • Depression, endogenous    • Disc degeneration, lumbar    • Dysphagia    • GERD (gastroesophageal reflux disease)    • Hiatal hernia    • History of anxiety    • History of aspiration pneumonitis    • History of cerebral artery occlusion      CVA (following TIA), 10/10,  treated with tPA   • History of herpes zoster    • History of ingrowing nail    • History of osteoporosis    • History of poliomyelitis      child   • History of sciatica    • History of sick sinus syndrome      s/p PPM   • History of transient cerebral ischemia      followed by stroke in 10/2010. BIBI was normal.   • History of Zenker's diverticulum removal 2016   • HX: long term anticoagulant use    • Hyperlipidemia    • Hypertension    • Hyponatremia    • Intertrigo    • Lumbar radiculopathy    • Morbid obesity    • Neuralgia      with diplopia secondary to facial shingles   • Nonepileptic episode    • Osteoarthritis of right knee    • Pacemaker    • Pneumonia    • Seeing double      secondary to shingles on the face also with neuralgia   • SIADH (syndrome of inappropriate ADH production)    • Vitamin D deficiency      Past Surgical History   Procedure Laterality Date   • Hand surgery Right    • Lumbar laminectomy       L-3 L4 L4 L5   • Cardiac pacemaker placement       secondary to SSS, placed in 2004, with generator change in 2014   • Tonsillectomy     • Laminectomy for implantation / placement neurostimulator electrodes     • Knee arthroplasty Left    • Cataract extraction     • Zenkers diverticulectomy N/A 9/27/2016     Procedure: ENDOSCOPIC REMOVAL OF ZENKERS DIVERTICULUM W/ WERDASCOPE;  Surgeon: Oswald Barth MD;  Location: [facility];  Service:    SPEECH-LANGUAGE PATHOLOGY EVALUTION - VFSS  Subjective: The patient was seen on this date for a VFSS(Videofluoroscopic Swallowing Study).  Patient was alert and cooperative.    The patient's history is significant for pneumonia and surgical repair of Zenker's in Sept. 2016.    Objective: Risks/benefits were reviewed with the patient, and consent was obtained. The study was completed with SLP present and Radiologist review. The patient was seen in lateral view(s). Textures given included thin liquid, puree consistency, mechanical soft consistency and regular  consistency.  Assessment: Difficulties were noted with none of the above consistencies, characterized by no penetration or aspiration noted with any consistency tested. Esophageal screen was performed. Solids were noted to fill a pocket at the top of the esophagus, possible Zenker's diverticulum. These solids stayed until patient took a drink of thin liquids. The thin liquids did reduce/almost eliminate pocketed material. Pt was not noted to penetrate or aspirate anything from this pocketed material but she is at risk to do so given the course of an entire meal.  SLP Findings: Patient presents with a  functional pharyngeal swallow. However, possible Zenker's is putting patient at risk to aspirate   Comments: No penetration or aspiration observed. All trials fell to the vall before swallow was triggered. Trace to mod oral residue noted with all trials which spilled to the vall but patient cleared easily with next swallow.  Recommendations: Diet Textures: thin liquid, regular consistency food. Medications should be taken whole with thin liquids. May have water and Ice between meals after oral care, under staff or family supervision and with the recommended strategies for safe swallowing.  Recommended Strategies: Alternate liquids and solids, Upright for PO and small bites and sips. Oral care before breakfast, after all meals and PRN.  Other Recommended Evaluations: Referral to GI MD for re-evaluation of upper esophagus. Pt had a surgical repair of her Zenker's in Sept. of 2016 but food appears to still be collecting in a pocket at the top of the esophagus putting patient at risk to aspirate. Pt reported that she has had pneumonia multiple times.    Dysphagia therapy is not recommended. Rationale: Pharyngeal swallow is within normal limits.     SWALLOW EVALUATION (last 72 hours)      Swallow Evaluation       03/18/17 0900 03/17/17 0930             Rehab Evaluation    Document Type evaluation;other (see comments)   VFSS   -JJ evaluation  -MT       Subjective Information no complaints;agree to therapy  - no complaints;agree to therapy  -MT       Patient Effort, Rehab Treatment excellent  -JJ good  -MT       Symptoms Noted During/After Treatment none  -J        General Information    Patient Profile Review yes  -JJ yes  -MT       Subjective Patient Observations Pleasant and cooperative  -        Pertinent History Of Current Problem Recurrent pneumonia, hx of Zenker's   Zenker's was surgically repaired in Sept. 2016  -J        Current Diet Limitations no diet restrictions  - regular solid;thin liquids  -MT       Prior Level of Function- Communication functional in all spheres  -J        Prior Level of Function- Swallowing no diet consistency restrictions  - no diet consistency restrictions;other (comment)   several Clinical Bedside evals & 1 VFSS in past  -MT       Plans/Goals Discussed With patient  - patient  -MT       Barriers to Rehab none identified  - none identified  -MT       Clinical Impression    Patient's Goals For Discharge return to all previous roles/activities  - return home;take care of myself at home  -MT       Family Goals For Discharge family did not state  -        SLP Swallowing Diagnosis esophageal dysfunction  -JJ --   pending  -MT       Rehab Potential/Prognosis, Swallowing good, to achieve stated therapy goals  - good, to achieve stated therapy goals  -MT       Criteria for Skilled Therapeutic Interventions Met skilled criteria for dysphagia intervention met  - skilled criteria for dysphagia intervention met  -MT       Therapy Frequency other (see comments)   No ST intervention is needed.  -J        Predicted Duration Therapy Interv (days) other (see comments)   N/A  -JJ        SLP Diet Recommendation regular textures;thin liquids  -JJ regular textures;thin liquids  -MT       Recommended Diagnostics other (see comments)   Possible GI consult to re-evaluate possible Zenker's  -J VFSS  (MBS)  -MT       Recommended Feeding/Eating Techniques alternate between small bites and sips of food/liquid  -JJ        SLP Rec. for Method of Medication Administration meds whole with thin liquid  -JJ meds whole with thin liquid  -MT       Monitor For Signs Of Aspiration cough;gurgly voice;throat clearing;pneumonia  -JJ        Anticipated Discharge Disposition home with assist  -JJ        Demonstrates Need for Referral to Another Service gastroenterology;other (see comments)   To re-evaluate for possible Zenker's  -JJ        Cognitive Assessment/Intervention    Current Cognitive/Communication Assessment functional  -JJ functional  -MT       Oral Motor Structure and Function    Oral Motor Anatomy and Physiology patient demonstrates anatomy that is WNL  -JJ patient demonstrates anatomy that is WNL  -MT       Dentition Assessment present and adequate;missing teeth  -JJ present and adequate;missing teeth  -MT       Secretion Management WNL/WFL  -JJ WNL/WFL  -MT       Mucosal Quality moist, healthy  -JJ moist, healthy  -MT       Volitional Swallow no difficulties initiating volitional swallow  -JJ no difficulties initiating volitional swallow  -MT       Oral Musculature General Assessment  lingual impairment  -MT       Lingual Strength and Mobility  reduced strength left   mild deviation to left  -MT       Clinical Swallow Exam    Summary of Clinical Exam  Appears WFL but suspect mild impairment  -MT       SLP Communication to Radiology    Severity Level of Dysphagia esophageal dysfunction  -JJ        Consistencies Aspirated/Penetrated --   No penetration or aspiration observed  -JJ        Summary Statement Pt given thins via spoon/cup/straw,puree,mech soft,regular. No penetration or aspiration was observed with any consistency tested. All trials fell to the vall before swallow was tirggered. Trace to mod oral residue noted with all trials which then fell to the vall before next swallow was triggered. Pt able to clear  all oral residue. Food was noted to fill up a pocket noted at the top of the esophagus,possible Zenker's.Pt had Zenker's repair 9/2016. No aspiration or penetration noted from pocketed material but patient is at risk to aspirate given the amount of pocketed material. A liquid wash did help clear out the residue noted in the esophagus.  -          User Key  (r) = Recorded By, (t) = Taken By, (c) = Cosigned By    Initials Name Effective Dates     Dori Roy, MS CCC-SLP 04/13/15 -     MT Ijeoma Eastman, MS CCC-SLP 04/13/15 -         EDUCATION  The patient has been educated in the following areas:   Dysphagia (Swallowing Impairment).    SLP Recommendation and Plan  SLP Swallowing Diagnosis: esophageal dysfunction  SLP Diet Recommendation: regular textures, thin liquids  Recommended Feeding/Eating Techniques: alternate between small bites and sips of food/liquid  SLP Rec. for Method of Medication Administration: meds whole with thin liquid  Monitor For Signs Of Aspiration: cough, gurgly voice, throat clearing, pneumonia  Recommended Diagnostics: other (see comments) (Possible GI consult to re-evaluate possible Zenker's)  Criteria for Skilled Therapeutic Interventions Met: skilled criteria for dysphagia intervention met  Anticipated Discharge Disposition: home with assist  Rehab Potential/Prognosis, Swallowing: good, to achieve stated therapy goals  Therapy Frequency: other (see comments) (No ST intervention is needed.)        Demonstrates Need for Referral to Another Service: gastroenterology, other (see comments) (To re-evaluate for possible Zenker's)                SLP Outcome Measures (last 72 hours)      SLP Outcome Measures       03/18/17 0900 03/17/17 1000       SLP Outcome Measures    Outcome Measure Used? Adult NOMS  -JJ Adult NOMS  -MT     FCM Scores    FCM Chosen Swallowing  -JJ Swallowing  -MT     Swallowing FCM Score 7  - 7  -MT       User Key  (r) = Recorded By, (t) = Taken By, (c) =  Cosigned By    Initials Name Effective Dates    MARCELINO Roy MS CCC-SLP 04/13/15 -     SHIRLEY Eastman MS CCC-SLP 04/13/15 -            Time Calculation:         Time Calculation- SLP       03/18/17 0959          Time Calculation- SLP    SLP Received On 03/18/17  -MARCELINO        User Key  (r) = Recorded By, (t) = Taken By, (c) = Cosigned By    Initials Name Provider Type    MARCELINO Roy MS CCC-SLP Speech and Language Pathologist          Therapy Charges for Today     Code Description Service Date Service Provider Modifiers Qty    [id number] HC ST MOTION FLUORO EVAL SWALLOW 3 3/18/2017 Dori Roy MS CCC-SLP GN 1               Dori Roy MS CCC-SLP  3/18/2017

## 2017-03-18 NOTE — SIGNIFICANT NOTE
03/18/17 1552   Rehab Treatment   Discipline physical therapist   Treatment Not Performed patient unavailable for treatment  (off the floor; will check tomorrow)   Recommendation   PT - Next Appointment 03/19/17

## 2017-03-19 ENCOUNTER — APPOINTMENT (OUTPATIENT)
Dept: GENERAL RADIOLOGY | Facility: HOSPITAL | Age: 82
End: 2017-03-19

## 2017-03-19 LAB
BACTERIA SPEC AEROBE CULT: NORMAL
MRSA SPEC QL CULT: NORMAL

## 2017-03-19 PROCEDURE — 99232 SBSQ HOSP IP/OBS MODERATE 35: CPT | Performed by: INTERNAL MEDICINE

## 2017-03-19 PROCEDURE — 94640 AIRWAY INHALATION TREATMENT: CPT

## 2017-03-19 PROCEDURE — 94799 UNLISTED PULMONARY SVC/PX: CPT

## 2017-03-19 PROCEDURE — 25010000002 MEROPENEM: Performed by: INTERNAL MEDICINE

## 2017-03-19 PROCEDURE — 25010000002 ENOXAPARIN PER 10 MG: Performed by: HOSPITALIST

## 2017-03-19 PROCEDURE — 97110 THERAPEUTIC EXERCISES: CPT

## 2017-03-19 PROCEDURE — 74220 X-RAY XM ESOPHAGUS 1CNTRST: CPT

## 2017-03-19 RX ADMIN — MEROPENEM 500 MG: 500 INJECTION, POWDER, FOR SOLUTION INTRAVENOUS at 17:55

## 2017-03-19 RX ADMIN — GUAIFENESIN 1200 MG: 600 TABLET, EXTENDED RELEASE ORAL at 09:39

## 2017-03-19 RX ADMIN — MEROPENEM 500 MG: 500 INJECTION, POWDER, FOR SOLUTION INTRAVENOUS at 09:39

## 2017-03-19 RX ADMIN — GABAPENTIN 600 MG: 300 CAPSULE ORAL at 09:38

## 2017-03-19 RX ADMIN — IPRATROPIUM BROMIDE AND ALBUTEROL SULFATE 3 ML: .5; 3 SOLUTION RESPIRATORY (INHALATION) at 16:12

## 2017-03-19 RX ADMIN — MEROPENEM 500 MG: 500 INJECTION, POWDER, FOR SOLUTION INTRAVENOUS at 02:32

## 2017-03-19 RX ADMIN — PANTOPRAZOLE SODIUM 40 MG: 40 TABLET, DELAYED RELEASE ORAL at 06:19

## 2017-03-19 RX ADMIN — GABAPENTIN 600 MG: 300 CAPSULE ORAL at 17:54

## 2017-03-19 RX ADMIN — GUAIFENESIN 1200 MG: 600 TABLET, EXTENDED RELEASE ORAL at 17:54

## 2017-03-19 RX ADMIN — GABAPENTIN 600 MG: 300 CAPSULE ORAL at 20:51

## 2017-03-19 RX ADMIN — IPRATROPIUM BROMIDE AND ALBUTEROL SULFATE 3 ML: .5; 3 SOLUTION RESPIRATORY (INHALATION) at 21:03

## 2017-03-19 RX ADMIN — MIRTAZAPINE 15 MG: 15 TABLET, FILM COATED ORAL at 20:51

## 2017-03-19 RX ADMIN — ENOXAPARIN SODIUM 30 MG: 30 INJECTION SUBCUTANEOUS at 09:39

## 2017-03-19 RX ADMIN — IPRATROPIUM BROMIDE AND ALBUTEROL SULFATE 3 ML: .5; 3 SOLUTION RESPIRATORY (INHALATION) at 08:31

## 2017-03-19 RX ADMIN — LEVETIRACETAM 500 MG: 500 TABLET, FILM COATED ORAL at 09:38

## 2017-03-19 RX ADMIN — CLOPIDOGREL 75 MG: 75 TABLET, FILM COATED ORAL at 09:38

## 2017-03-19 RX ADMIN — LEVETIRACETAM 500 MG: 500 TABLET, FILM COATED ORAL at 17:54

## 2017-03-19 NOTE — PROGRESS NOTES
"  PROGRESS NOTE   LOS: 5 days   Patient Care Team:  Sharad Salinas MD as PCP - General  Sharad Salinas MD as PCP - Family Medicine  Bandar Ruiz MD as Consulting Physician (Cardiology)    Chief Complaint: Healthcare associated pneumonia, dysphagia, UTI    Interval History: Patient was admitted on 3/14/17 with community-acquired pneumonia possibly from aspiration with dysphagia.  Has been treated with vancomycin and meropenem.  He was found to have UTI with Escherichia coli.  Blood cultures have been normal.  MRSA screen is negative.  leukocytosis has improved.  Vancomycin was discontinued on March 18, 2017 and patient was evaluated with esophagogram after being seen by GI and the recommendation is to follow with ENT after discharge was no need for any emergent procedure at this point.  Patient continues to improve clinically and subjectively on the current regimen    REVIEW OF SYSTEMS:   CARDIOVASCULAR: No chest pain, chest pressure or chest discomfort. No palpitations or edema.   RESPIRATORY: No shortness of breath, cough or sputum.   GASTROINTESTINAL: No anorexia, nausea, vomiting or diarrhea. No abdominal pain or blood.   HEMATOLOGIC: No bleeding or bruising.     Vital Signs  Temp:  [97.3 °F (36.3 °C)-98.6 °F (37 °C)] 98 °F (36.7 °C)  Heart Rate:  [74-80] 80  Resp:  [16-18] 18  BP: (131-167)/(64-99) 131/64  SpO2:  [92 %-96 %] 94 %  on    O2 Device: room air    Intake/Output Summary (Last 24 hours) at 03/19/17 1746  Last data filed at 03/19/17 0938   Gross per 24 hour   Intake    410 ml   Output      0 ml   Net    410 ml     Flowsheet Rows         First Filed Value    Admission Height  64\" (162.6 cm) Documented at 03/14/2017 1706    Admission Weight  215 lb (97.5 kg) Documented at 03/14/2017 1706          Physical Exam:  GEN:  No acute distress, alert, cooperative, well developed   EYES:   Sclera clear. No icterus. PERRL. Normal EOM  ENT:   External ears/nose normal, no oral lesions, no thrush, mucous membranes " moist  NECK:  Supple, midline trachea, no JVD  LUNGS: Normal chest on inspection, no wheezes. No rhonchi.  Diminished bases with minimal crackles on right lower lobe. Respirations regular, even and unlabored.   CV:  Regular rhythm and rate. Normal S1/S2. No murmurs, gallops, or rubs noted.  ABD:  Soft, non-tender and non-distended. Normal bowel sounds. No guarding  EXT:  Moves all extremities well. No cyanosis. No redness.  1+ bilateral lower extremity edema   Skin: dry, intact, no bleeding    Results Review:        Results from last 7 days  Lab Units 03/18/17  0649 03/17/17  0452 03/15/17  0542 03/14/17  1831   SODIUM mmol/L  --  140 141 139   POTASSIUM mmol/L  --  3.9 4.3 4.7   CHLORIDE mmol/L  --  107 105 101   TOTAL CO2 mmol/L  --  20.8* 24.5 28.9   BUN mg/dL  --  12 24* 23   CREATININE mg/dL 0.73 0.76 1.21* 1.64*   CALCIUM mg/dL  --  8.3* 8.4* 9.2   AST (SGOT) U/L  --   --   --  19   ALT (SGPT) U/L  --   --   --  13       Results from last 7 days  Lab Units 03/14/17  1831   TROPONIN T ng/mL <0.010       Results from last 7 days  Lab Units 03/17/17  0452 03/15/17  0542 03/14/17  1831   WBC 10*3/mm3 5.47 12.21* 16.88*   HEMOGLOBIN g/dL 10.0* 10.7* 12.1   HEMATOCRIT % 30.7* 32.5* 37.1   PLATELETS 10*3/mm3 156 122* 178   NEUTROPHIL % %  --  70.2 81.3*   LYMPHOCYTE % %  --  20.8 12.7*   MONOCYTES % %  --  7.0 5.3   EOSINOPHIL % %  --  1.6 0.4   BASOPHIL % %  --  0.2 0.1   IMM GRAN % %  --  0.2 0.2           Results from last 7 days  Lab Units 03/14/17  1831   MAGNESIUM mg/dL 2.2                 No results found for: POCGLU    Results from last 7 days  Lab Units 03/14/17  2111   LACTATE mmol/L 1.3           Results from last 7 days  Lab Units 03/17/17  0639 03/14/17 2111 03/14/17  2110   BLOODCX   --  No growth at 4 days  --    URINECX   --   --  >100,000 CFU/mL Escherichia coli*   MRSA SCREEN CX  No Methicillin Resistant Staphylococcus aureus isolated  --   --        Results from last 7 days  Lab Units  03/14/17  2110   NITRITE UA  Negative   WBC UA /HPF Too Numerous to Count*   BACTERIA UA /HPF 3+*   SQUAM EPITHEL UA /HPF None Seen   URINECX  >100,000 CFU/mL Escherichia coli*       Results from last 7 days  Lab Units 03/18/17  1205   ADENOVIRUS DETECTION BY PCR  Not Detected   CORONAVIRUS 229E  Not Detected   CORONAVIRUS HKU1  Not Detected   CORONAVIRUS NL63  Not Detected   CORONAVIRUS OC43  Not Detected   HUMAN METAPNEUMOVIRUS  Not Detected   HUMAN RHINOVIRUS/ENTEROVIRUS  Not Detected   INFLUENZA B PCR  Not Detected   PARAINFLUENZA 1  Not Detected   PARAINFLUENZA VIRUS 2  Not Detected   PARAINFLUENZA VIRUS 3  Not Detected   PARAINFLUENZA VIRUS 4  Not Detected   BORDETELLA PERTUSSIS PCR  Not Detected   INFLUENZA 2009 H1N1 BY PCR  Not Detected   CHLAMYDOPHILA PNEUMONIAE PCR  Not Detected   MYCOPLAMA PNEUMO PCR  Not Detected   INFLUENZA A PCR  Not Detected   INFLUENZA A H3  Not Detected   INFLUENZA A H1  Not Detected   RSV, PCR  Not Detected         Imaging Results (all)     Procedure Component Value Units Date/Time    XR Chest 2 View [[id number]] Collected:  03/14/17 1856     Updated:  03/14/17 1900    Narrative:       XR CHEST 2 VW-     Clinical: Productive, acute mental status change     COMPARISON 03/08/2017     FINDINGS: There is a patchy infiltrate demonstrated at the right lung  base, new compared to the previous examination. Transvenous pacemaker in  position. The left lung is clear. Heart size within normal limits.  Stable mediastinum and mike.     CONCLUSION: New right base infiltrate.     This report was finalized on 3/14/2017 6:57 PM by Dr. Baron Tran MD.       FL Video Swallow With Speech [[id number]] Collected:  03/18/17 1027     Updated:  03/18/17 1031    Narrative:       BARIUM SWALLOW WITH VIDEO     CLINICAL HISTORY:   Female who is 87 years-old, with dysphagia.     TECHNIQUE: The swallowing mechanism was evaluated with real-time  fluoroscopic imaging, captured on digital disk and performed  in  conjunction with speech pathologist (please see report).     FINDINGS: Esophageal dysfunction without penetration or aspiration with  any consistency tested.       Impression:       1. No significant increased risk of aspiration.  2. Esophageal dysmotility, dedicated esophageal evaluation suggested.     FLUOROSCOPY TIME: 2 minutes 7 seconds, 2 images      This report was finalized on 3/18/2017 10:28 AM by Dr. Dino Lugo MD.             I reviewed the patient's new clinical results.  I personally viewed and interpreted the patient's   CXR 3/14/17 compared to 3/8/17 shows a new right lower lobe infiltrate.  No cardiomegaly no effusion.  Pacemaker with good position.        Medication Review:     clopidogrel 75 mg Oral Daily   enoxaparin 30 mg Subcutaneous Daily   gabapentin 600 mg Oral TID   guaiFENesin 1,200 mg Oral BID   ipratropium-albuterol 3 mL Nebulization 4x Daily - RT   levETIRAcetam 500 mg Oral BID   meropenem 500 mg Intravenous Q8H   mirtazapine 15 mg Oral Nightly   pantoprazole 40 mg Oral Q AM            ASSESSMENT:   1. Community-acquired pneumonia RLL.  Possibly from aspiration due to GERD and swallowing difficulties.  On  meropenem.  2. Acute UTI positive for Escherichia coli  3. Dysphagia with no sign of aspiration  4. Esophageal dysmotility  5. Thrombocytopenia  6. Morbid obesity  7. Lower extremity edema  8. History of CVA/seizure and dementia  9. GERD  10. Hiatal hernia  11. Zenker diverticulum  12. Acute kidney injury- resolved    PLAN:  Patient is doing better clinically and will transition to oral antibiotic and a.m.  Patient is to follow with Dr. Barth for further evaluation on the Zenker's diverticulum  The case was discussed with Dr. Mccrary, he see no need for any emergent procedure at this point  Continue with the oxygen titration and wean and continue with the pulmonary toilet and the incentive spirometer use  Continue with physical therapy and ambulation as tolerated      Nolvia MARAVILLA  MD Cosmo  03/19/17  5:46 PM           EMR Dragon/Transcription disclaimer:   Much of this encounter note is an electronic transcription/translation of spoken language to printed text. The electronic translation of spoken language may permit erroneous, or at times, nonsensical words or phrases to be inadvertently transcribed; Although I have reviewed the note for such errors, some may still exist.

## 2017-03-19 NOTE — PROGRESS NOTES
Acute Care - Physical Therapy Treatment Note  [facility]     Patient Name: Kim Feldman  : 3/13/1930  MRN: [id number]  Today's Date: 3/19/2017  Onset of Illness/Injury or Date of Surgery Date: 17  Date of Referral to PT: 17  Referring Physician: Twyla    Admit Date: 3/14/2017    Visit Dx:    ICD-10-CM ICD-9-CM   1. CAP (community acquired pneumonia) J18.9 486   2. Generalized weakness R53.1 780.79   3. Dysphagia, unspecified type R13.10 787.20     Patient Active Problem List   Diagnosis   • Anemia   • Bulging lumbar disc   • Depression, endogenous   • Gastroesophageal reflux disease   • Hyperlipidemia   • Intermittent claudication   • Neuropathy   • Osteoarthritis of knee   • Syndrome of inappropriate secretion of antidiuretic hormone   • Vitamin D deficiency   • History of sick sinus syndrome   • Intertrigo   • Generalized convulsive seizures   • Urine frequency   • Change in bowel habits   • Urinary tract infection   • Zenker diverticulum   • History of anxiety   • Urinary tract infection in female   • Clonic seizure disorder   • History of aspiration pneumonitis   • Thrombocytopenia   • B12 deficiency   • Pain of toe of right foot   • Weakness   • Cardiac pacemaker in situ   • Chronic venous insufficiency   • Arthritis   • S/P knee replacement   • Chronic bilateral low back pain without sciatica   • Essential hypertension   • CAP (community acquired pneumonia)   • UTI (urinary tract infection)   • Hiatal hernia               Adult Rehabilitation Note       17 1100 17 1500       Rehab Assessment/Intervention    Discipline physical therapy assistant  - physical therapy assistant  -     Document Type therapy note (daily note)  - therapy note (daily note)  -     Subjective Information agree to therapy;fatigue  - agree to therapy  -JW     Patient Effort, Rehab Treatment good  -RH      Symptoms Noted During/After Treatment fatigue  -RH      Precautions/Limitations oxygen  therapy device and L/min  -RH fall precautions  -JW     Precautions/Limitations, Vision WFL with corrective lenses  -RH      Precautions/Limitations, Hearing WFL  -RH      Recorded by [RH] Clark Rodriguez PTA [JW] Hilary Gardner PTA     Pain Assessment    Pain Assessment No/denies pain  -RH No/denies pain  -JW     Recorded by [RH] Clark Rodriguez PTA [JW] Hilary Gardner PTA     Cognitive Assessment/Intervention    Current Cognitive/Communication Assessment functional  -RH functional  -JW     Orientation Status oriented x 4  -RH      Follows Commands/Answers Questions 100% of the time  -      Personal Safety WNL/WFL  -RH      Personal Safety Interventions nonskid shoes/slippers when out of bed  - fall prevention program maintained  -JW     Recorded by [RH] Clark Rodriguez PTA [JW] Hilary Gardner PTA     Bed Mobility, Assessment/Treatment    Bed Mobility, Comment in chair; states she is (I) with bed mobility   -RH On side of bed  -JW     Recorded by [RH] Clark Rodriguez PTA [JW] Hilary Gardner PTA     Transfer Assessment/Treatment    Transfers, Bed-Chair [place] conditional independence  -RH      Transfers, Chair-Bed [place] conditional independence  -RH      Transfers, Bed-Chair-Bed, Assist Device rolling walker  -RH      Transfers, Sit-Stand [place] conditional independence  -RH stand by assist;contact guard assist  -     Transfers, Stand-Sit [place] conditional independence  -RH contact guard assist  -     Transfers, Sit-Stand-Sit, Assist Device rolling walker  -RH rolling walker  -JW     Toilet Transfer, [place]  minimum assist (75% patient effort)  -JW     Recorded by [RH] Clark Rodriguez PTA [JW] Hilary Gardner PTA     Gait Assessment/Treatment    Gait, [place] Level conditional independence  -RH stand by assist;contact guard assist  -JW     Gait, Assistive Device rolling walker  -RH rollator  -JW     Gait, Distance (Feet) 300  -  -JW      Gait, Gait Deviations forward flexed posture  - jose decreased;forward flexed posture;step length decreased  -     Gait, Impairments strength decreased  -RH      Recorded by [] Clark Rodriguez PTA [JW] Hilary Gardner PTA     Functional Mobility    Functional Mobility- Ind. Level conditional independence  -RH      Recorded by [RH] Clark Rodriguez PTA      Balance Skills Training    Sitting-Level of Assistance Independent  -RH      Sitting-Balance Support Feet supported  -RH      Standing-Level of Assistance Independent  -RH      Static Standing Balance Support assistive device  -RH      Recorded by [RH] Clark Rodriguez PTA      Positioning and Restraints    Pre-Treatment Position sitting in chair/recliner  -RH in bed  -JW     Post Treatment Position chair  -RH chair  -JW     In Chair sitting;call light within reach;exit alarm on  -RH reclined;call light within reach;encouraged to call for assist  -JW     Recorded by [RH] Clark Rodriguez PTA [JW] Hilary Gardner PTA       User Key  (r) = Recorded By, (t) = Taken By, (c) = Cosigned By    Initials Name Effective Dates     Hilary Gardner PTA 02/18/16 -     RH Clark Rodriguez PTA 02/18/16 -                 IP PT Goals       03/15/17 1105          Bed Mobility PT LTG    Bed Mobility PT LTG, Date Established 03/15/17  -MQ      Bed Mobility PT LTG, Time to Achieve 1 wk  -MQ      Bed Mobility PT LTG, Activity Type all bed mobility  -MQ      Bed Mobility PT LTG, [place] Level independent  -MQ      Transfer Training PT LTG    Transfer Training PT LTG, Date Established 03/15/17  -MQ      Transfer Training PT LTG, Time to Achieve 1 wk  -MQ      Transfer Training PT LTG, Activity Type all transfers  -MQ      Transfer Training PT LTG, [place] Level supervision required  -MQ      Transfer Training PT LTG, Assist Device other (see comments)   rollator  -MQ      Gait Training PT LTG    Gait Training Goal PT LTG, Date Established  03/15/17  -MQ      Gait Training Goal PT LTG, Time to Achieve 1 wk  -MQ      Gait Training Goal PT LTG, [place] Level supervision required  -MQ      Gait Training Goal PT LTG, Assist Device other (see comments)   rollator  -MQ      Gait Training Goal PT LTG, Distance to Achieve 200  -MQ      Stair Training PT LTG    Stair Training Goal PT LTG, Date Established 03/15/17  -MQ      Stair Training Goal PT LTG, Time to Achieve 1 wk  -MQ      Stair Training Goal PT LTG, Number of Steps 2  -MQ      Stair Training Goal PT LTG, Reading Level contact guard assist  -MQ      Stair Training Goal PT LTG, Assist Device 1 handrail  -MQ        User Key  (r) = Recorded By, (t) = Taken By, (c) = Cosigned By    Initials Name Provider Type     Marilyn Givens, PT Physical Therapist          Physical Therapy Education     Title: PT OT SLP Therapies (Done)     Topic: Physical Therapy (Done)     Point: Mobility training (Done)    Learning Progress Summary    Learner Readiness Method Response Comment Documented by Status   Patient Acceptance E,TB VU  LM 03/17/17 0633 Done    Acceptance E VU  JW 03/16/17 1532 Done    Acceptance TB,E VU  LM 03/16/17 0324 Done    Acceptance E,TB,D VU,DU  MQ 03/15/17 1105 Done               Point: Home exercise program (Done)    Learning Progress Summary    Learner Readiness Method Response Comment Documented by Status   Patient Acceptance E,TB VU  LM 03/17/17 0633 Done    Acceptance TB,E VU  LM 03/16/17 0324 Done    Acceptance E,TB,D VU,DU  MQ 03/15/17 1105 Done               Point: Body mechanics (Done)    Learning Progress Summary    Learner Readiness Method Response Comment Documented by Status   Patient Acceptance E,TB VU  LM 03/17/17 0633 Done    Acceptance TB,E VU  LM 03/16/17 0324 Done    Acceptance E,TB,D VU,DU  MQ 03/15/17 1105 Done               Point: Precautions (Done)    Learning Progress Summary    Learner Readiness Method Response Comment Documented by Status   Patient Acceptance E,TB  VU   03/17/17 0633 Done    Acceptance TB,E VU  LM 03/16/17 0324 Done    Acceptance E,TB,D VU,DU  MQ 03/15/17 1105 Done                      User Key     Initials Effective Dates Name Provider Type Discipline     06/16/16 -  Adry Cope RN Registered Nurse Nurse     02/18/16 -  Hilary Gardner PTA Physical Therapy Assistant PT     12/11/15 -  Marilyn Givens PT Physical Therapist PT                    PT Recommendation and Plan  Anticipated Equipment Needs At Discharge:  (none)  Anticipated Discharge Disposition: home with assist, home with home health  Planned Therapy Interventions: balance training, bed mobility training, gait training, home exercise program, patient/family education, stair training, strengthening, transfer training  PT Frequency: daily  Plan of Care Review  Plan Of Care Reviewed With: patient  Progress: improving          Outcome Measures       03/19/17 1100 03/16/17 1500       How much help from another person do you currently need...    Turning from your back to your side while in flat bed without using bedrails? 4  -RH 3  -JW     Moving from lying on back to sitting on the side of a flat bed without bedrails? 4  -RH 3  -JW     Moving to and from a bed to a chair (including a wheelchair)? 4  -RH 3  -JW     Standing up from a chair using your arms (e.g., wheelchair, bedside chair)? 4  -RH 3  -JW     Climbing 3-5 steps with a railing? 3  -RH 2  -JW     To walk in hospital room? 4  -RH 3  -JW     AM-PAC 6 Clicks Score 23  -RH 17  -JW       User Key  (r) = Recorded By, (t) = Taken By, (c) = Cosigned By    Initials Name Provider Type     Hilary Gardner PTA Physical Therapy Assistant     Clark Rodriguez PTA Physical Therapy Assistant           Time Calculation:         PT Charges       03/19/17 1152          Time Calculation    Start Time 1140  -RH      Stop Time 1155  -RH      Time Calculation (min) 15 min  -RH      PT Received On 03/19/17  -      PT - Next Appointment  03/20/17  -        User Key  (r) = Recorded By, (t) = Taken By, (c) = Cosigned By    Initials Name Provider Type     Clark Rodriguez PTA Physical Therapy Assistant          Therapy Charges for Today     Code Description Service Date Service Provider Modifiers Qty    29781229475 HC PT THER PROC EA 15 MIN 3/19/2017 Clark Rodriguez PTA GP 1          PT G-Codes  Outcome Measure Options: AM-PAC 6 Clicks Basic Mobility (PT)    Clark Rodriguez PTA  3/19/2017

## 2017-03-19 NOTE — PLAN OF CARE
Problem: Patient Care Overview (Adult)  Goal: Plan of Care Review  Outcome: Ongoing (interventions implemented as appropriate)    03/19/17 1425 03/19/17 1802   Coping/Psychosocial Response Interventions   Plan Of Care Reviewed With patient --    Patient Care Overview   Progress --  no change   Outcome Evaluation   Outcome Summary/Follow up Plan --  Pt had barium swallow today which showed probable Zenker diverticulum still present, will attempt to get Dr Barth to see acosta, GI has signed off. Continue to monitor for now       Goal: Adult Individualization and Mutuality  Outcome: Ongoing (interventions implemented as appropriate)  Goal: Discharge Needs Assessment  Outcome: Ongoing (interventions implemented as appropriate)    Problem: Pain, Acute (Adult)  Goal: Acceptable Pain Control/Comfort Level  Outcome: Ongoing (interventions implemented as appropriate)    Problem: Fall Risk (Adult)  Goal: Absence of Falls  Outcome: Ongoing (interventions implemented as appropriate)    Problem: Pneumonia (Adult)  Goal: Signs and Symptoms of Listed Potential Problems Will be Absent or Manageable (Pneumonia)  Outcome: Ongoing (interventions implemented as appropriate)

## 2017-03-19 NOTE — PROGRESS NOTES
[facility]  Inpatient Progress Note    Reason for Follow Up:  h/o recurrent pneumonia with a persistent Zenker's diverticulum found on Video Swallow    Subjective     Interval History:   No c/o. We went over the results of the barium swallow    Current Facility-Administered Medications:   •  acetaminophen (TYLENOL) tablet 650 mg, 650 mg, Oral, Q6H PRN, Chang Wakefield MD, 650 mg at 03/15/17 0212  •  artificial tears 83-15 % ophthalmic ointment, , Both Eyes, 4x Daily PRN, Chang Wakefield MD  •  clopidogrel (PLAVIX) tablet 75 mg, 75 mg, Oral, Daily, Chang Wakefield MD, 75 mg at 03/19/17 0938  •  enoxaparin (LOVENOX) syringe 30 mg, 30 mg, Subcutaneous, Daily, Chang Wakefield MD, 30 mg at 03/19/17 0939  •  gabapentin (NEURONTIN) capsule 600 mg, 600 mg, Oral, TID, Chang Wakefield MD, 600 mg at 03/19/17 0938  •  guaiFENesin (MUCINEX) 12 hr tablet 1,200 mg, 1,200 mg, Oral, BID, Kenn Clemens MD, 1,200 mg at 03/19/17 0939  •  ipratropium-albuterol (DUO-NEB) nebulizer solution 3 mL, 3 mL, Nebulization, 4x Daily - RT, Chang Wakefield MD, 3 mL at 03/19/17 1612  •  levETIRAcetam (KEPPRA) tablet 500 mg, 500 mg, Oral, BID, Chang Wakefield MD, 500 mg at 03/19/17 0938  •  meropenem (MERREM) 500mg/100 mL 0.9% NS IVPB (mbp), 500 mg, Intravenous, Q8H, Eder Barney MD, 500 mg at 03/19/17 0939  •  mirtazapine (REMERON) tablet 15 mg, 15 mg, Oral, Nightly, Chang Wakefield MD, 15 mg at 03/18/17 2035  •  ondansetron (ZOFRAN) injection 4 mg, 4 mg, Intravenous, Q6H PRN, Chang Wakefield MD  •  pantoprazole (PROTONIX) EC tablet 40 mg, 40 mg, Oral, Q AM, Chang Wakefield MD, 40 mg at 03/19/17 0619  •  sodium chloride 0.9 % flush 1-10 mL, 1-10 mL, Intravenous, PRN, Chang Wakefield MD  •  sodium chloride 0.9 % flush 10 mL, 10 mL, Intravenous, PRN, Michele Sarmiento MD  Review of Systems:    The following systems were reviewed and negative;  respiratory, cardiovascular, musculoskeletal and neurological    Objective     Vital Signs  Temp:   [97.3 °F (36.3 °C)-98.6 °F (37 °C)] 98 °F (36.7 °C)  Heart Rate:  [74-80] 80  Resp:  [16-18] 18  BP: (131-167)/(64-99) 131/64  Body mass index is 36.58 kg/(m^2).    Intake/Output Summary (Last 24 hours) at 03/19/17 1740  Last data filed at 03/19/17 0938   Gross per 24 hour   Intake    410 ml   Output      0 ml   Net    410 ml     I/O this shift:  In: 310 [P.O.:210; IV Piggyback:100]  Out: -        Physical Exam:   General: patient awake, alert and cooperative   Eyes: Normal lids and lashes, no scleral icterus   Neck: supple, normal ROM   Skin: warm and dry, not jaundiced   Cardiovascular: regular rhythm and rate, no murmurs auscultated   Pulm: clear to auscultation bilaterally, regular and unlabored   Abdomen: soft, nontender, nondistended; normal bowel sounds   Rectal: deferred   Extremities: no rash or edema   Psychiatric: Normal mood and behavior; memory intact     Results Review:     I reviewed the patient's new clinical results.      Results from last 7 days  Lab Units 03/17/17  0452 03/15/17  0542 03/14/17  1831   WBC 10*3/mm3 5.47 12.21* 16.88*   HEMOGLOBIN g/dL 10.0* 10.7* 12.1   HEMATOCRIT % 30.7* 32.5* 37.1   PLATELETS 10*3/mm3 156 122* 178         Results from last 7 days  Lab Units 03/18/17  0649 03/17/17  0452 03/15/17  0542 03/14/17  1831   SODIUM mmol/L  --  140 141 139   POTASSIUM mmol/L  --  3.9 4.3 4.7   CHLORIDE mmol/L  --  107 105 101   TOTAL CO2 mmol/L  --  20.8* 24.5 28.9   BUN mg/dL  --  12 24* 23   CREATININE mg/dL 0.73 0.76 1.21* 1.64*   CALCIUM mg/dL  --  8.3* 8.4* 9.2   BILIRUBIN mg/dL  --   --   --  0.7   ALK PHOS U/L  --   --   --  55   ALT (SGPT) U/L  --   --   --  13   AST (SGOT) U/L  --   --   --  19   GLUCOSE mg/dL  --  92 96 113*               0  Lab Value Date/Time   LIPASE 43 01/03/2017 1752       Radiology:    Imaging Results (last 24 hours)     Procedure Component Value Units Date/Time    FL Esophagram Complete [[id number]] Collected:  03/19/17 1343     Updated:  03/19/17 9673     Narrative:       FL ESOPHAGRAM COMPLETE-     INDICATIONS: Recurrent pneumonia     TECHNIQUE: ESOPHAGRAM     COMPARISON: Modified barium study from 03/18/2017     FINDINGS:     A  image was obtained revealing normal heart size, tortuous aorta,  left-sided pacemaker with cardiac leads, spinal stimulator, small  bilateral basilar likely atelectasis or infiltrate, follow-up chest  x-ray suggested.     Double and single contrast evaluation of the esophagus was performed,  under intermittent fluoroscopic observation, with spot views obtained.  Assessment was limited by difficulty with patient positioning.     Esophageal peristalsis was moderately irregular. At the level of the  thoracic inlet, posterior esophageal diverticulum was demonstrated, with  a base of about 2 cm, overall size of as much as about 4.8 cm. The  mucosa of the esophagus appears unremarkable, with no persistent  intrinsic or extrinsic defect or ulceration identified.  No tight  stenosis is demonstrated.     Mild-to-moderate slightly hiatal hernia was noted. Only minimal  gastroesophageal reflux occurred during the exam.             Impression:             1. Esophageal diverticulum at the level of the thoracic inlet. Moderate  presbyesophagus. Mild to moderate sliding hiatal hernia.  2. Small bilateral basilar likely atelectasis or infiltrate on the   image, follow-up chest x-ray suggested. Tortuous aorta.     This report was finalized on 3/19/2017 1:56 PM by Dr. Jose Marquez MD.               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   • Vitamin D deficiency E55.9   • History of sick sinus syndrome Z86.79   •  Intertrigo L30.4   • Generalized convulsive seizures R56.9   • Urine frequency R35.0   • Change in bowel habits R19.4   • Urinary tract infection N39.0   • 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   • History of anxiety Z86.59   • Urinary tract infection in female N39.0   • Clonic seizure disorder G40.309   • History of aspiration pneumonitis Z87.09   • Thrombocytopenia D69.6   • B12 deficiency E53.8   • Pain of toe of right foot M79.674   • Weakness R53.1   • Cardiac pacemaker in situ Z95.0   • Chronic venous insufficiency I87.2   • Arthritis M19.90   • S/P knee replacement Z96.659   • Chronic bilateral low back pain without sciatica M54.5, G89.29   • Essential hypertension I10   • CAP (community acquired pneumonia) J18.9   • UTI (urinary tract infection) N39.0   • Hiatal hernia K44.9     Impression/Recommendations:  1. This 87-year-old female has recurrent pneumonia. Her video swallow showed a possible recurrent Zenker's diverticulum. The patient has no dysphagia. Patient had some type of surgical procedure by Dr. Barth 6-12 months ago involving a Zenker's diverticulum reportedly. The barium swallow has, what looks like a Zenker's diverticulum though there is no Radiologist to review this with. She could be discharged and f/u with Dr. Barth as an outpatient.  I would have Dr. Barth (ENT) see patient and give his opinion. I will sign off and see prn.     Fransisco Mccrary M.D.  [phone] - cell

## 2017-03-19 NOTE — PROGRESS NOTES
"      Name: Kim Feldman ADMIT: 3/14/2017   : 3/13/1930  PCP: Sharad Salinas MD    MRN: [id number] LOS: 5 days   AGE/SEX: 87 y.o. female  ROOM: [facility]     Subjective   Seems to be feeling better.    Objective   Vital Signs  Temp:  [97.3 °F (36.3 °C)-98.6 °F (37 °C)] 98 °F (36.7 °C)  Heart Rate:  [74-80] 80  Resp:  [16-18] 16  BP: (131-167)/(64-99) 131/64  SpO2:  [92 %-96 %] 94 %  on   ;   O2 Device: room air  Body mass index is 36.58 kg/(m^2).    Objective:  General Appearance:  Comfortable and in no acute distress.    Vital signs: (most recent): Blood pressure 131/64, pulse 80, temperature 98 °F (36.7 °C), temperature source Oral, resp. rate 16, height 64\" (162.6 cm), weight 213 lb 1.6 oz (96.7 kg), SpO2 94 %, not currently breastfeeding.    Lungs:  Normal effort.  Breath sounds clear to auscultation.    Heart: Normal rate.  Regular rhythm.    Abdomen: Abdomen is soft and non-distended.  Bowel sounds are normal.   There is no abdominal tenderness.     Extremities: There is no dependent edema.    Neurological: Patient is alert and oriented to person, place and time.    Skin:  Warm and dry.          Results Review:       I reviewed the patient's new clinical results.    Results from last 7 days  Lab Units 17  0452 03/15/17  0542 17  1831   WBC 10*3/mm3 5.47 12.21* 16.88*   HEMOGLOBIN g/dL 10.0* 10.7* 12.1   PLATELETS 10*3/mm3 156 122* 178       Results from last 7 days  Lab Units 17  0649 17  0452 03/15/17  0542 17  1831   SODIUM mmol/L  --  140 141 139   POTASSIUM mmol/L  --  3.9 4.3 4.7   CHLORIDE mmol/L  --  107 105 101   TOTAL CO2 mmol/L  --  20.8* 24.5 28.9   BUN mg/dL  --  12 24* 23   CREATININE mg/dL 0.73 0.76 1.21* 1.64*   GLUCOSE mg/dL  --  92 96 113*   Estimated Creatinine Clearance: 55.9 mL/min (by C-G formula based on Cr of 0.73).    Results from last 7 days  Lab Units 17  0452 03/15/17  0542 17  1831   CALCIUM mg/dL 8.3* 8.4* 9.2   ALBUMIN g/dL  --   --  " 4.00   MAGNESIUM mg/dL  --   --  2.2       Results from last 7 days  Lab Units 03/14/17  2111   LACTATE mmol/L 1.3     Lab Results   Component Value Date    STREPPNEUAG Negative 02/03/2017    LEGANTIGENUR Negative 02/03/2017       Results from last 7 days  Lab Units 03/18/17  1205   ADENOVIRUS DETECTION BY PCR  Not Detected   CORONAVIRUS 229E  Not Detected   CORONAVIRUS HKU1  Not Detected   CORONAVIRUS NL63  Not Detected   CORONAVIRUS OC43  Not Detected   HUMAN METAPNEUMOVIRUS  Not Detected   HUMAN RHINOVIRUS/ENTEROVIRUS  Not Detected   INFLUENZA B PCR  Not Detected   PARAINFLUENZA 1  Not Detected   PARAINFLUENZA VIRUS 2  Not Detected   PARAINFLUENZA VIRUS 3  Not Detected   PARAINFLUENZA VIRUS 4  Not Detected   BORDETELLA PERTUSSIS PCR  Not Detected   INFLUENZA 2009 H1N1 BY PCR  Not Detected   CHLAMYDOPHILA PNEUMONIAE PCR  Not Detected   MYCOPLAMA PNEUMO PCR  Not Detected   INFLUENZA A PCR  Not Detected   INFLUENZA A H3  Not Detected   INFLUENZA A H1  Not Detected   RSV, PCR  Not Detected          Results from last 7 days  Lab Units 03/17/17  0639 03/14/17  2111 03/14/17  2110   BLOODCX   --  No growth at 4 days  --    URINECX   --   --  >100,000 CFU/mL Escherichia coli*   MRSA SCREEN CX  No Methicillin Resistant Staphylococcus aureus isolated  --   --      XR CHEST 2 VW  CONCLUSION:   New right base infiltrate.      clopidogrel 75 mg Oral Daily   enoxaparin 30 mg Subcutaneous Daily   gabapentin 600 mg Oral TID   guaiFENesin 1,200 mg Oral BID   ipratropium-albuterol 3 mL Nebulization 4x Daily - RT   levETIRAcetam 500 mg Oral BID   meropenem 500 mg Intravenous Q8H   mirtazapine 15 mg Oral Nightly   pantoprazole 40 mg Oral Q AM      Diet Regular      Assessment/Plan   Assessment:     Active Hospital Problems (** Indicates Principal Problem)    Diagnosis Date Noted   • **CAP (community acquired pneumonia) [J18.9] 03/14/2017   • Hiatal hernia [K44.9] 03/16/2017   • UTI (urinary tract infection) [N39.0] 03/15/2017   •  Essential hypertension [I10] 03/09/2017   • Cardiac pacemaker in situ [Z95.0] 03/08/2017   • History of aspiration pneumonitis [Z87.09] 01/04/2017   • Zenker diverticulum [K22.5] 09/27/2016   • Generalized convulsive seizures [R56.9] 04/07/2016   • Gastroesophageal reflux disease [K21.9] 02/05/2016   • Neuropathy [G62.9] 02/05/2016      Resolved Hospital Problems    Diagnosis Date Noted Date Resolved   • Acute kidney injury [N17.9] 03/17/2017 03/17/2017       Plan:   - Admitted to monitored unit  - Continue MEROPENEM pulmonology.  Covering aspiration pneumonia.  - UCx w/ MDR Ecoli but sensitive to merrem.  - Barium esophagram reveals esophageal diverticulum.  Unclear if this related to her recurrent pneumonias.  Operated on by Dr. Barth in the past for Zenker's diverticulum.  Will ask again on Monday if he can evaluate.  Otherwise anticipate discharge soon.      Disposition  Willis Deluna pending pre-cert      Knen Clemens MD  [facility]  03/19/17  2:10 PM

## 2017-03-19 NOTE — PLAN OF CARE
Problem: Patient Care Overview (Adult)  Goal: Plan of Care Review  Outcome: Ongoing (interventions implemented as appropriate)    03/19/17 0615   Coping/Psychosocial Response Interventions   Plan Of Care Reviewed With patient   Patient Care Overview   Progress improving   Outcome Evaluation   Outcome Summary/Follow up Plan No major changes with pt overnight, VS stable, no new complaints, pt appeared to rest well during night          Goal: Adult Individualization and Mutuality  Outcome: Ongoing (interventions implemented as appropriate)    Problem: Pain, Acute (Adult)  Goal: Acceptable Pain Control/Comfort Level  Outcome: Ongoing (interventions implemented as appropriate)    Problem: Fall Risk (Adult)  Goal: Absence of Falls  Outcome: Ongoing (interventions implemented as appropriate)    Problem: Pneumonia (Adult)  Goal: Signs and Symptoms of Listed Potential Problems Will be Absent or Manageable (Pneumonia)  Outcome: Ongoing (interventions implemented as appropriate)

## 2017-03-20 VITALS
HEIGHT: 64 IN | DIASTOLIC BLOOD PRESSURE: 98 MMHG | BODY MASS INDEX: 36.38 KG/M2 | RESPIRATION RATE: 16 BRPM | WEIGHT: 213.1 LBS | SYSTOLIC BLOOD PRESSURE: 153 MMHG | HEART RATE: 87 BPM | TEMPERATURE: 97.8 F | OXYGEN SATURATION: 98 %

## 2017-03-20 PROBLEM — Z87.09 HISTORY OF ASPIRATION PNEUMONITIS: Status: RESOLVED | Noted: 2017-01-04 | Resolved: 2017-03-20

## 2017-03-20 PROBLEM — N39.0 UTI (URINARY TRACT INFECTION): Status: RESOLVED | Noted: 2017-03-15 | Resolved: 2017-03-20

## 2017-03-20 PROCEDURE — 97110 THERAPEUTIC EXERCISES: CPT

## 2017-03-20 PROCEDURE — 94799 UNLISTED PULMONARY SVC/PX: CPT

## 2017-03-20 PROCEDURE — 25010000002 ENOXAPARIN PER 10 MG: Performed by: HOSPITALIST

## 2017-03-20 PROCEDURE — 25010000002 MEROPENEM: Performed by: INTERNAL MEDICINE

## 2017-03-20 RX ORDER — CLINDAMYCIN HYDROCHLORIDE 150 MG/1
450 CAPSULE ORAL 3 TIMES DAILY
Qty: 63 CAPSULE | Refills: 0 | Status: SHIPPED | OUTPATIENT
Start: 2017-03-20 | End: 2017-03-27

## 2017-03-20 RX ORDER — SACCHAROMYCES BOULARDII 250 MG
250 CAPSULE ORAL 2 TIMES DAILY
Qty: 60 CAPSULE | Refills: 0 | Status: SHIPPED | OUTPATIENT
Start: 2017-03-20 | End: 2017-04-19

## 2017-03-20 RX ADMIN — MEROPENEM 500 MG: 500 INJECTION, POWDER, FOR SOLUTION INTRAVENOUS at 01:52

## 2017-03-20 RX ADMIN — GABAPENTIN 600 MG: 300 CAPSULE ORAL at 15:58

## 2017-03-20 RX ADMIN — LEVETIRACETAM 500 MG: 500 TABLET, FILM COATED ORAL at 08:23

## 2017-03-20 RX ADMIN — IPRATROPIUM BROMIDE AND ALBUTEROL SULFATE 3 ML: .5; 3 SOLUTION RESPIRATORY (INHALATION) at 08:22

## 2017-03-20 RX ADMIN — MEROPENEM 500 MG: 500 INJECTION, POWDER, FOR SOLUTION INTRAVENOUS at 10:30

## 2017-03-20 RX ADMIN — GABAPENTIN 600 MG: 300 CAPSULE ORAL at 08:23

## 2017-03-20 RX ADMIN — IPRATROPIUM BROMIDE AND ALBUTEROL SULFATE 3 ML: .5; 3 SOLUTION RESPIRATORY (INHALATION) at 16:15

## 2017-03-20 RX ADMIN — IPRATROPIUM BROMIDE AND ALBUTEROL SULFATE 3 ML: .5; 3 SOLUTION RESPIRATORY (INHALATION) at 12:09

## 2017-03-20 RX ADMIN — ENOXAPARIN SODIUM 30 MG: 30 INJECTION SUBCUTANEOUS at 08:23

## 2017-03-20 RX ADMIN — CLOPIDOGREL 75 MG: 75 TABLET, FILM COATED ORAL at 08:23

## 2017-03-20 RX ADMIN — PANTOPRAZOLE SODIUM 40 MG: 40 TABLET, DELAYED RELEASE ORAL at 05:32

## 2017-03-20 RX ADMIN — GUAIFENESIN 1200 MG: 600 TABLET, EXTENDED RELEASE ORAL at 08:23

## 2017-03-20 NOTE — PROGRESS NOTES
"  PROGRESS NOTE   LOS: 6 days   Patient Care Team:  Sharad Salinas MD as PCP - General  Sharad Salinas MD as PCP - Family Medicine  Bandar Ruiz MD as Consulting Physician (Cardiology)    Chief Complaint: Healthcare associated pneumonia, dysphagia, UTI    Interval History: Patient was admitted on 3/14/17 with community-acquired pneumonia possibly from aspiration with dysphagia.  Has been treated with vancomycin and meropenem.  He was found to have UTI with Escherichia coli.  Blood cultures have been normal.  MRSA screen is negative.  leukocytosis has improved.  Vancomycin was discontinued on March 18, 2017 and patient was evaluated with esophagogram after being seen by GI and the recommendation is to follow with ENT after discharge was no need for any emergent procedure at this point.  Patient continues to improve clinically and subjectively on the current regimen    REVIEW OF SYSTEMS:   CARDIOVASCULAR: No chest pain, chest pressure or chest discomfort. No palpitations or edema.   RESPIRATORY: No shortness of breath, cough or sputum.   GASTROINTESTINAL: No anorexia, nausea, vomiting or diarrhea. No abdominal pain or blood.   HEMATOLOGIC: No bleeding or bruising.   Was discharged by physical therapy as she is able to walk around the nurses station without any trouble.  She had more of a cough today, but after she was able to cough up a small amount of thick yellow sputum and she says that her cough has improved    Vital Signs  Temp:  [97.4 °F (36.3 °C)-98.2 °F (36.8 °C)] 97.8 °F (36.6 °C)  Heart Rate:  [83-95] 87  Resp:  [16-20] 16  BP: (112-183)/(55-98) 153/98  SpO2:  [92 %-98 %] 98 %  on  Flow (L/min):  [2] 2 O2 Device: room air    Intake/Output Summary (Last 24 hours) at 03/20/17 5583  Last data filed at 03/20/17 1719   Gross per 24 hour   Intake   1020 ml   Output   1000 ml   Net     20 ml     Flowsheet Rows         First Filed Value    Admission Height  64\" (162.6 cm) Documented at 03/14/2017 1706    " Admission Weight  215 lb (97.5 kg) Documented at 03/14/2017 1706          Physical Exam:  GEN:  No acute distress, alert, cooperative, well developed   EYES:   Sclera clear. No icterus. PERRL. Normal EOM  ENT:   External ears/nose normal, no oral lesions, no thrush, mucous membranes moist  NECK:  Supple, midline trachea, no JVD  LUNGS: Normal chest on inspection, no wheezes. No rhonchi.  Diminished bases with minimal crackles on right lower lobe. Respirations regular, even and unlabored.   CV:  Regular rhythm and rate. Normal S1/S2. No murmurs, gallops, or rubs noted.  ABD:  Soft, non-tender and non-distended. Normal bowel sounds. No guarding  EXT:  Moves all extremities well. No cyanosis. No redness.trace  bilateral lower extremity edema   Skin: dry, intact, no bleeding    Results Review:        Results from last 7 days  Lab Units 03/18/17  0649 03/17/17  0452 03/15/17  0542 03/14/17  1831   SODIUM mmol/L  --  140 141 139   POTASSIUM mmol/L  --  3.9 4.3 4.7   CHLORIDE mmol/L  --  107 105 101   TOTAL CO2 mmol/L  --  20.8* 24.5 28.9   BUN mg/dL  --  12 24* 23   CREATININE mg/dL 0.73 0.76 1.21* 1.64*   CALCIUM mg/dL  --  8.3* 8.4* 9.2   AST (SGOT) U/L  --   --   --  19   ALT (SGPT) U/L  --   --   --  13       Results from last 7 days  Lab Units 03/14/17  1831   TROPONIN T ng/mL <0.010       Results from last 7 days  Lab Units 03/17/17  0452 03/15/17  0542 03/14/17  1831   WBC 10*3/mm3 5.47 12.21* 16.88*   HEMOGLOBIN g/dL 10.0* 10.7* 12.1   HEMATOCRIT % 30.7* 32.5* 37.1   PLATELETS 10*3/mm3 156 122* 178   NEUTROPHIL % %  --  70.2 81.3*   LYMPHOCYTE % %  --  20.8 12.7*   MONOCYTES % %  --  7.0 5.3   EOSINOPHIL % %  --  1.6 0.4   BASOPHIL % %  --  0.2 0.1   IMM GRAN % %  --  0.2 0.2           Results from last 7 days  Lab Units 03/14/17  1831   MAGNESIUM mg/dL 2.2                 No results found for: POCGLU    Results from last 7 days  Lab Units 03/14/17  2111   LACTATE mmol/L 1.3           Results from last 7 days  Lab  Units 03/17/17  0639 03/14/17  2111 03/14/17  2110   BLOODCX   --  No growth at 5 days  --    URINECX   --   --  >100,000 CFU/mL Escherichia coli*   MRSA SCREEN CX  No Methicillin Resistant Staphylococcus aureus isolated  --   --        Results from last 7 days  Lab Units 03/14/17  2110   NITRITE UA  Negative   WBC UA /HPF Too Numerous to Count*   BACTERIA UA /HPF 3+*   SQUAM EPITHEL UA /HPF None Seen   URINECX  >100,000 CFU/mL Escherichia coli*       Results from last 7 days  Lab Units 03/18/17  1205   ADENOVIRUS DETECTION BY PCR  Not Detected   CORONAVIRUS 229E  Not Detected   CORONAVIRUS HKU1  Not Detected   CORONAVIRUS NL63  Not Detected   CORONAVIRUS OC43  Not Detected   HUMAN METAPNEUMOVIRUS  Not Detected   HUMAN RHINOVIRUS/ENTEROVIRUS  Not Detected   INFLUENZA B PCR  Not Detected   PARAINFLUENZA 1  Not Detected   PARAINFLUENZA VIRUS 2  Not Detected   PARAINFLUENZA VIRUS 3  Not Detected   PARAINFLUENZA VIRUS 4  Not Detected   BORDETELLA PERTUSSIS PCR  Not Detected   INFLUENZA 2009 H1N1 BY PCR  Not Detected   CHLAMYDOPHILA PNEUMONIAE PCR  Not Detected   MYCOPLAMA PNEUMO PCR  Not Detected   INFLUENZA A PCR  Not Detected   INFLUENZA A H3  Not Detected   INFLUENZA A H1  Not Detected   RSV, PCR  Not Detected         Imaging Results (all)     Procedure Component Value Units Date/Time    XR Chest 2 View [[id number]] Collected:  03/14/17 1856     Updated:  03/14/17 1900    Narrative:       XR CHEST 2 VW-     Clinical: Productive, acute mental status change     COMPARISON 03/08/2017     FINDINGS: There is a patchy infiltrate demonstrated at the right lung  base, new compared to the previous examination. Transvenous pacemaker in  position. The left lung is clear. Heart size within normal limits.  Stable mediastinum and mike.     CONCLUSION: New right base infiltrate.     This report was finalized on 3/14/2017 6:57 PM by Dr. Baron Tran MD.       FL Video Swallow With Speech [[id number]] Collected:  03/18/17 1027      Updated:  03/18/17 1031    Narrative:       BARIUM SWALLOW WITH VIDEO     CLINICAL HISTORY:   Female who is 87 years-old, with dysphagia.     TECHNIQUE: The swallowing mechanism was evaluated with real-time  fluoroscopic imaging, captured on digital disk and performed in  conjunction with speech pathologist (please see report).     FINDINGS: Esophageal dysfunction without penetration or aspiration with  any consistency tested.       Impression:       1. No significant increased risk of aspiration.  2. Esophageal dysmotility, dedicated esophageal evaluation suggested.     FLUOROSCOPY TIME: 2 minutes 7 seconds, 2 images      This report was finalized on 3/18/2017 10:28 AM by Dr. Dino Lugo MD.             I reviewed the patient's new clinical results.  I personally viewed and interpreted the patient's   CXR 3/14/17 compared to 3/8/17 shows a new right lower lobe infiltrate.  No cardiomegaly no effusion.  Pacemaker with good position.        Medication Review:     clindamycin 450 mg Oral Q8H   clopidogrel 75 mg Oral Daily   enoxaparin 30 mg Subcutaneous Daily   gabapentin 600 mg Oral TID   guaiFENesin 1,200 mg Oral BID   ipratropium-albuterol 3 mL Nebulization 4x Daily - RT   levETIRAcetam 500 mg Oral BID   mirtazapine 15 mg Oral Nightly   pantoprazole 40 mg Oral Q AM            ASSESSMENT:   1. Community-acquired pneumonia RLL.  Possibly from aspiration due to GERD and swallowing difficulties.  On  meropenem.  2. Acute UTI positive for Escherichia coli  3. Dysphagia with no sign of aspiration  4. Esophageal dysmotility  5. Thrombocytopenia  6. Morbid obesity  7. Lower extremity edema  8. History of CVA/seizure and dementia  9. GERD  10. Hiatal hernia  11. Zenker diverticulum  12. Acute kidney injury- resolved    PLAN:  Patient is doing better clinically and will transition to oral clindamycin 450 mg Q8H to cover for aspirational pneumonia.  Ok to discharge   Patient is to follow with Dr. Barth for further  evaluation on the Zenker's diverticulum  The case was discussed with Dr. Mccrary, he see no need for any emergent procedure at this point  Continue with the oxygen titration and wean  continue with the pulmonary toilet and the incentive spirometer use  Continue with physical therapy and ambulation as tolerated  Follow up with me in 6 weeks with follow up CXR. Can be seen sooner if symptoms worsen    Nolvia Wilburn MD  03/20/17  5:43 PM    EMR Dragon/Transcription disclaimer:   Much of this encounter note is an electronic transcription/translation of spoken language to printed text. The electronic translation of spoken language may permit erroneous, or at times, nonsensical words or phrases to be inadvertently transcribed; Although I have reviewed the note for such errors, some may still exist.

## 2017-03-20 NOTE — PROGRESS NOTES
Continued Stay Note  [facility]     Patient Name: Kim Feldman  MRN: [id number]  Today's Date: 3/20/2017    Admit Date: 3/14/2017          Discharge Plan       03/20/17 1538    Final Note    Final Note Confirmed with Jessica [facility] is in network, plan is home with [facility].....[facility]      03/20/17 1340    Case Management/Social Work Plan    Plan Home with     Patient/Family In Agreement With Plan yes    Additional Comments DW Dr Clemens, Pt ready for DC today if pulmonary agrees. He request that nurse also check with Dr Barth to see if he would like to see while here as weekend covering MD did not want to come in . [facility]. She agrees to home with , Given list of providers. She would like to use [facility]. Call to Jessica who is checking insurance, there are some [employer] that [facility] does not accept. Call to pts dtr Leandra and also notified of pending DC and denial by [employer], informed to appeal she could call the number on pts card for customer service. Also informed that we can arrange HH and that MD plans to DC later today if approved by Pulmonary.  She stated that she will be in later to  pt. Plan at this time is home with [facility] unless not in network. CCP will follow....[facility]      03/20/17 1337    Case Management/Social Work Plan    Plan Home              Discharge Codes     None        Expected Discharge Date and Time     Expected Discharge Date Expected Discharge Time    Mar 20, 2017             Ally Woods RN

## 2017-03-20 NOTE — NURSING NOTE
Call to Dr. Barth and he doesn't need to see patient in hospital. He recommended following up outpatient at his office in 1 week, an appointment was made.

## 2017-03-20 NOTE — PLAN OF CARE
Problem: Patient Care Overview (Adult)  Goal: Plan of Care Review    03/20/17 1316   Coping/Psychosocial Response Interventions   Plan Of Care Reviewed With patient   Outcome Evaluation   Outcome Summary/Follow up Plan Ambulating in frazier using walker and able to practice steps, no safety concerns. DC PT, pt and family aware of PT POC and activity recommendations.          Problem: Inpatient Physical Therapy  Goal: Bed Mobility Goal LTG- PT  Outcome: Outcome(s) achieved Date Met:  03/20/17 03/20/17 1316   Bed Mobility PT LTG   Bed Mobility PT LTG, Date Goal Reviewed 03/20/17   Bed Mobility PT LTG, Outcome goal met       Goal: Transfer Training Goal 1 LTG- PT  Outcome: Outcome(s) achieved Date Met:  03/20/17 03/20/17 1316   Transfer Training PT LTG   Transfer Training PT LTG, Date Goal Reviewed 03/20/17   Transfer Training PT LTG, Outcome goal met       Goal: Gait Training Goal LTG- PT  Outcome: Outcome(s) achieved Date Met:  03/20/17 03/20/17 1316   Gait Training PT LTG   Gait Training Goal PT LTG, Date Goal Reviewed 03/20/17   Gait Training Goal PT LTG, Outcome goal met       Goal: Stair Training Goal LTG- PT  Outcome: Outcome(s) achieved Date Met:  03/20/17 03/20/17 1316   Stair Training PT LTG   Stair Training Goal PT LTG, Date Goal Reviewed 03/20/17   Stair Training Goal PT LTG, Outcome goal met

## 2017-03-20 NOTE — PLAN OF CARE
Problem: Patient Care Overview (Adult)  Goal: Plan of Care Review  Outcome: Ongoing (interventions implemented as appropriate)    03/20/17 0040   Coping/Psychosocial Response Interventions   Plan Of Care Reviewed With patient   Patient Care Overview   Progress improving   Outcome Evaluation   Outcome Summary/Follow up Plan Pt vitals stable. O2 applied for sleep due to desat. Awaiting attending and ENT to discuss plan for today. Pt hopeful she can be discharged.

## 2017-03-20 NOTE — THERAPY DISCHARGE NOTE
Acute Care - Physical Therapy Treatment Note/Discharge  [facility]     Patient Name: Kim Feldman  : 3/13/1930  MRN: [id number]  Today's Date: 3/20/2017  Onset of Illness/Injury or Date of Surgery Date: 17  Date of Referral to PT: 17  Referring Physician: Twyla    Admit Date: 3/14/2017    Visit Dx:    ICD-10-CM ICD-9-CM   1. CAP (community acquired pneumonia) J18.9 486   2. Generalized weakness R53.1 780.79   3. Dysphagia, unspecified type R13.10 787.20     Patient Active Problem List   Diagnosis   • Anemia   • Bulging lumbar disc   • Depression, endogenous   • Gastroesophageal reflux disease   • Hyperlipidemia   • Intermittent claudication   • Neuropathy   • Osteoarthritis of knee   • Syndrome of inappropriate secretion of antidiuretic hormone   • Vitamin D deficiency   • History of sick sinus syndrome   • Intertrigo   • Generalized convulsive seizures   • Urine frequency   • Change in bowel habits   • Urinary tract infection   • Zenker diverticulum   • History of anxiety   • Urinary tract infection in female   • Clonic seizure disorder   • History of aspiration pneumonitis   • Thrombocytopenia   • B12 deficiency   • Pain of toe of right foot   • Weakness   • Cardiac pacemaker in situ   • Chronic venous insufficiency   • Arthritis   • S/P knee replacement   • Chronic bilateral low back pain without sciatica   • Essential hypertension   • CAP (community acquired pneumonia)   • UTI (urinary tract infection)   • Hiatal hernia       Physical Therapy Education     Title: PT OT SLP Therapies (Resolved)     Topic: Physical Therapy (Resolved)     Point: Mobility training (Resolved)    Learning Progress Summary    Learner Readiness Method Response Comment Documented by Status   Patient Acceptance E VU  AR 17 1324 Done    Acceptance ETB LILO  LM 17 0633 Done    Acceptance E VU  JW 17 1532 Done    Acceptance TBZOË  LM 17 0324 Done    Acceptance E,TB,D SNOW NAGY   03/15/17 1105  Done               Point: Home exercise program (Resolved)    Learning Progress Summary    Learner Readiness Method Response Comment Documented by Status   Patient Acceptance E VU  AR 03/20/17 1324 Done    Acceptance E,TB VU  LM 03/17/17 0633 Done    Acceptance TB,E VU  LM 03/16/17 0324 Done    Acceptance E,TB,D VU,DU  MQ 03/15/17 1105 Done               Point: Body mechanics (Resolved)    Learning Progress Summary    Learner Readiness Method Response Comment Documented by Status   Patient Acceptance E VU  AR 03/20/17 1324 Done    Acceptance E,TB VU  LM 03/17/17 0633 Done    Acceptance TB,E VU  LM 03/16/17 0324 Done    Acceptance E,TB,D VU,DU  MQ 03/15/17 1105 Done               Point: Precautions (Resolved)    Learning Progress Summary    Learner Readiness Method Response Comment Documented by Status   Patient Acceptance E VU  AR 03/20/17 1324 Done    Acceptance E,TB VU  LM 03/17/17 0633 Done    Acceptance TB,E VU  LM 03/16/17 0324 Done    Acceptance E,TB,D VU,DU  MQ 03/15/17 1105 Done                      User Key     Initials Effective Dates Name Provider Type Discipline     06/16/16 -  Adry Cope, RN Registered Nurse Nurse     02/18/16 -  Hilary Gardner, PTA Physical Therapy Assistant PT    AR 06/27/16 -  Amina Rodas, PT Physical Therapist PT     12/11/15 -  Marilyn Givens, PT Physical Therapist PT                    IP PT Goals       03/20/17 1316 03/15/17 1105       Bed Mobility PT LTG    Bed Mobility PT LTG, Date Established  03/15/17  -MQ     Bed Mobility PT LTG, Time to Achieve  1 wk  -MQ     Bed Mobility PT LTG, Activity Type  all bed mobility  -MQ     Bed Mobility PT LTG, [place] Level  independent  -MQ     Bed Mobility PT LTG, Date Goal Reviewed 03/20/17  -AR      Bed Mobility PT LTG, Outcome goal met  -AR      Transfer Training PT LTG    Transfer Training PT LTG, Date Established  03/15/17  -MQ     Transfer Training PT LTG, Time to Achieve  1 wk  -MQ     Transfer Training PT LTG,  Activity Type  all transfers  -MQ     Transfer Training PT LTG, [place] Level  supervision required  -MQ     Transfer Training PT LTG, Assist Device  other (see comments)   rollator  -MQ     Transfer Training PT  LTG, Date Goal Reviewed 03/20/17  -AR      Transfer Training PT LTG, Outcome goal met  -AR      Gait Training PT LTG    Gait Training Goal PT LTG, Date Established  03/15/17  -MQ     Gait Training Goal PT LTG, Time to Achieve  1 wk  -MQ     Gait Training Goal PT LTG, [place] Level  supervision required  -MQ     Gait Training Goal PT LTG, Assist Device  other (see comments)   rollator  -MQ     Gait Training Goal PT LTG, Distance to Achieve  200  -MQ     Gait Training Goal PT LTG, Date Goal Reviewed 03/20/17  -AR      Gait Training Goal PT LTG, Outcome goal met  -AR      Stair Training PT LTG    Stair Training Goal PT LTG, Date Established  03/15/17  -MQ     Stair Training Goal PT LTG, Time to Achieve  1 wk  -MQ     Stair Training Goal PT LTG, Number of Steps  2  -MQ     Stair Training Goal PT LTG, [place] Level  contact guard assist  -MQ     Stair Training Goal PT LTG, Assist Device  1 handrail  -MQ     Stair Training Goal PT LTG, Date Goal Reviewed 03/20/17  -AR      Stair Training Goal PT LTG, Outcome goal met  -AR        User Key  (r) = Recorded By, (t) = Taken By, (c) = Cosigned By    Initials Name Provider Type    TAYLOR Rodas, PT Physical Therapist     Marilyn Givens, PT Physical Therapist              Adult Rehabilitation Note       03/20/17 1130 03/19/17 1100       Rehab Assessment/Intervention    Discipline physical therapist  -AR physical therapy assistant  -     Document Type therapy note (daily note);discharge summary  -AR therapy note (daily note)  -RH     Subjective Information agree to therapy  -AR agree to therapy;fatigue  -RH     Patient Effort, Rehab Treatment good  -AR good  -RH     Symptoms Noted During/After Treatment none  -AR fatigue  -RH      Precautions/Limitations  oxygen therapy device and L/min  -RH     Precautions/Limitations, Vision  WFL with corrective lenses  -RH     Precautions/Limitations, Hearing  WFL  -RH     Recorded by [AR] Amina Rodas, PT [RH] Clark Rodriguez, YOBANY     Pain Assessment    Pain Assessment No/denies pain  -AR No/denies pain  -RH     Recorded by [AR] Amina Rodas PT [RH] Clark Rodriguez, YOBANY     Cognitive Assessment/Intervention    Current Cognitive/Communication Assessment functional  -AR functional  -RH     Orientation Status oriented x 4  -AR oriented x 4  -RH     Follows Commands/Answers Questions 100% of the time  -% of the time  -RH     Personal Safety  WNL/WFL  -RH     Personal Safety Interventions  nonskid shoes/slippers when out of bed  -RH     Recorded by [AR] Amina Rodas, PT [RH] Clark Rodriguez, YOBANY     Bed Mobility, Assessment/Treatment    Bed Mob, Supine to Sit, [place] not tested  -AR      Bed Mob, Sit to Supine, [place] not tested  -AR      Bed Mobility, Comment  in chair; states she is (I) with bed mobility   -RH     Recorded by [AR] Amina Rodas, PT [RH] Clark Rodriguez, YOBANY     Transfer Assessment/Treatment    Transfers, Bed-Chair [place]  conditional independence  -RH     Transfers, Chair-Bed [place]  conditional independence  -RH     Transfers, Bed-Chair-Bed, Assist Device  rolling walker  -RH     Transfers, Sit-Stand [place] conditional independence  -AR conditional independence  -RH     Transfers, Stand-Sit [place] conditional independence  -AR conditional independence  -RH     Transfers, Sit-Stand-Sit, Assist Device rolling walker  -AR rolling walker  -RH     Recorded by [AR] Amina Rodas, PT [RH] Clark Rodriguez, YOBANY     Gait Assessment/Treatment    Gait, [place] Level conditional independence  -AR conditional independence  -RH     Gait, Assistive Device rolling walker  -AR rolling walker  -RH     Gait, Distance (Feet) 150  -   -RH     Gait, Gait Deviations jose decreased  -AR forward flexed posture  -RH     Gait, Impairments strength decreased  -AR strength decreased  -RH     Recorded by [AR] Amina Rodas PT [RH] Clark Rodriguez PTA     Stairs Assessment/Treatment    Number of Stairs 2  -AR      Stairs, Handrail Location left side (ascending)  -AR      Stairs, [place] Level contact guard assist  -AR      Stairs, Technique Used step to step (ascending);step to step (descending)  -AR      Recorded by [AR] Amina Rodas PT      Functional Mobility    Functional Mobility- Ind. Level  conditional independence  -RH     Recorded by  [RH] Clark Rodriguez PTA     Balance Skills Training    Sitting-Level of Assistance  Independent  -RH     Sitting-Balance Support  Feet supported  -RH     Standing-Level of Assistance  Independent  -RH     Static Standing Balance Support  assistive device  -RH     Recorded by  [RH] Clark Rodriguez PTA     Positioning and Restraints    Pre-Treatment Position sitting in chair/recliner  -AR sitting in chair/recliner  -RH     Post Treatment Position chair  -AR chair  -RH     In Chair reclined;sitting;call light within reach;encouraged to call for assist;with family/caregiver  -AR sitting;call light within reach;exit alarm on  -RH     Recorded by [AR] Amina Rodas PT [RH] Clark Rodriguez PTA       User Key  (r) = Recorded By, (t) = Taken By, (c) = Cosigned By    Initials Name Effective Dates    AR Amina Rodas PT 06/27/16 -     RH Clark Rodriguez PTA 02/18/16 -           PT Recommendation and Plan  Anticipated Equipment Needs At Discharge:  (none)  Anticipated Discharge Disposition: home with assist, home with home health  Planned Therapy Interventions: balance training, bed mobility training, gait training, home exercise program, patient/family education, stair training, strengthening, transfer training  PT Frequency: daily  Plan of Care Review  Plan Of Care Reviewed With:  patient  Outcome Summary/Follow up Plan: Ambulating in frazier using walker and able to practice steps, no safety concerns.  DC PT, pt and family aware of PT POC and activity recommendations.           Outcome Measures       03/20/17 1300 03/19/17 1100       How much help from another person do you currently need...    Turning from your back to your side while in flat bed without using bedrails? 4  -AR 4  -RH     Moving from lying on back to sitting on the side of a flat bed without bedrails? 4  -AR 4  -RH     Moving to and from a bed to a chair (including a wheelchair)? 4  -AR 4  -RH     Standing up from a chair using your arms (e.g., wheelchair, bedside chair)? 4  -AR 4  -RH     Climbing 3-5 steps with a railing? 3  -AR 3  -RH     To walk in hospital room? 4  -AR 4  -RH     AM-PAC 6 Clicks Score 23  -AR 23  -RH       User Key  (r) = Recorded By, (t) = Taken By, (c) = Cosigned By    Initials Name Provider Type    AR Amina Rodas PT Physical Therapist     Clark Rodriguez, PTA Physical Therapy Assistant           Time Calculation:         PT Charges       03/20/17 1325          Time Calculation    Start Time 1130  -AR      Stop Time 1143  -AR      Time Calculation (min) 13 min  -AR      PT Received On 03/20/17  -AR      PT - Next Appointment 03/15/17  -AR      PT Goal Re-Cert Due Date 03/15/17  -AR        User Key  (r) = Recorded By, (t) = Taken By, (c) = Cosigned By    Initials Name Provider Type    AR Amina Rodas PT Physical Therapist          Therapy Charges for Today     Code Description Service Date Service Provider Modifiers Qty    44172087661  PT THER PROC EA 15 MIN 3/20/2017 Amina Rodas PT GP 1    97344693370 HC PT THER SUPP EA 15 MIN 3/20/2017 Amina Rodas PT GP 1          PT G-Codes  Outcome Measure Options: AM-PAC 6 Clicks Basic Mobility (PT)    PT Discharge Summary  Anticipated Discharge Disposition: home with assist, home with home health  Reason for Discharge: Maximum functional  potential achieved  Outcomes Achieved: Able to achieve all goals within established timeline  Discharge Destination: Home with assist, Home with home health    Amina Rodas, PT  3/20/2017

## 2017-03-20 NOTE — PROGRESS NOTES
Continued Stay Note  [facility]     Patient Name: Kim Feldman  MRN: [id number]  Today's Date: 3/20/2017    Admit Date: 3/14/2017          Discharge Plan       03/20/17 1134    Case Management/Social Work Plan    Plan undetermined    Patient/Family In Agreement With Plan no    Additional Comments Received a call from  with Regine ROBERTSON that pt has been denied for skilled services. Per Daija LOVELACE has 48 hours to do Peer to Peer appeal [phone]. Per Daija pt and family can not pursue appeal until after the 48 hour alycia. Will discuss with Dr Clemens. Veronica has notiffied MD off denial.....[facility]              Discharge Codes     None        Expected Discharge Date and Time     Expected Discharge Date Expected Discharge Time    Mar 20, 2017             Ally Woods RN

## 2017-03-20 NOTE — DISCHARGE SUMMARY
NAME: Kim Feldman ADMIT: 3/14/2017   : 3/13/1930  PCP: Sharad Salinas MD    MRN: [id number] LOS: 6 days   AGE/SEX: 87 y.o. female  ROOM: [facility]     Date of Admission: 3/14/2017  Date of Discharge:  3/20/2017    PCP: Sharad Salinas MD    CHIEF COMPLAINT  Weakness - Generalized and Cough (productive cough onset yesterday)      DISCHARGE DIAGNOSIS  Active Hospital Problems (** Indicates Principal Problem)    Diagnosis Date Noted   • **Aspiration pneumonia [J69.0] 2016   • Hiatal hernia [K44.9] 2017   • UTI (urinary tract infection) [N39.0] 03/15/2017   • Essential hypertension [I10] 2017   • Cardiac pacemaker in situ [Z95.0] 2017   • History of aspiration pneumonitis [Z87.09] 2017   • Zenker diverticulum [K22.5] 2016   • Generalized convulsive seizures [R56.9] 2016   • Gastroesophageal reflux disease [K21.9] 2016   • Neuropathy [G62.9] 2016      Resolved Hospital Problems    Diagnosis Date Noted Date Resolved   • Acute kidney injury [N17.9] 2017       SECONDARY DIAGNOSES  Past Medical History   Diagnosis Date   • Anemia    • Bulging lumbar disc    • Chronic cough    • Chronic UTI    • Chronic venous insufficiency    • Clonic seizure disorder    • Dementia without behavioral disturbance      moderate   • Depression, endogenous    • Disc degeneration, lumbar    • Dysphagia    • GERD (gastroesophageal reflux disease)    • Hiatal hernia    • History of anxiety    • History of aspiration pneumonitis    • History of cerebral artery occlusion      CVA (following TIA), 10/10, treated with tPA   • History of herpes zoster    • History of ingrowing nail    • History of osteoporosis    • History of poliomyelitis      child   • History of sciatica    • History of sick sinus syndrome      s/p PPM   • History of transient cerebral ischemia      followed by stroke in 10/2010. BIBI was normal.   • History of Zenker's diverticulum removal    •  HX: long term anticoagulant use    • Hyperlipidemia    • Hypertension    • Hyponatremia    • Intertrigo    • Lumbar radiculopathy    • Morbid obesity    • Neuralgia      with diplopia secondary to facial shingles   • Nonepileptic episode    • Osteoarthritis of right knee    • Pacemaker    • Pneumonia    • Seeing double      secondary to shingles on the face also with neuralgia   • SIADH (syndrome of inappropriate ADH production)    • Vitamin D deficiency        CONSULTS   Consults     Date and Time Order Name Status Description    3/18/2017 1534 Inpatient Consult to Gastroenterology Completed     3/18/2017 1029 Inpatient Consult to ENT      3/16/2017 1810 Inpatient Consult to Pulmonology      3/14/2017 2138 [facility] (on-call MD unless specified) Completed           PROCEDURES PERFORMED  FL ESOPHAGRAM COMPLETE  1. Esophageal diverticulum at the level of the thoracic inlet. Moderate  presbyesophagus. Mild to moderate sliding hiatal hernia.  2. Small bilateral basilar likely atelectasis or infiltrate on the   image, follow-up chest x-ray suggested. Tortuous aorta.        HOSPITAL COURSE  Patient is a 87 y.o. female presented to [facility] complaining of weakness and cough.  Please see the admitting history and physical for further details.  Due to the recurrent nature of her pneumonia we had her seen by pulmonology.  It was felt likely she had aspiration pneumonia but she did not demonstrate any problems swallowing on her speech therapy evaluation.  She has a history of a Zenker's diverticulum that was operated on the end of 2016 by Dr. Oswald Barth.  Barium esophagram again demonstrated presence of a diverticulum which was felt to be possibly contributing to her recurrent pneumonia.  Dr. Barth was unable to see her while inpatient but appointment has been made for 3/23/17 for follow-up.  She's done well in regards to her pneumonia.  She has progressed with physical therapy to the point she does not  "qualify for skilled nursing placement.  Arrangements have been made for her to go home with home health and close outpatient follow-up.  Will prescribe 7 additional days of clindamycin to treat recurrent aspiration pneumonia.  Also prescribed a month long course of Florastor.        VITAL SIGNS  Visit Vitals   • /98 (BP Location: Right arm, Patient Position: Sitting)   • Pulse 87   • Temp 97.8 °F (36.6 °C) (Oral)   • Resp 16   • Ht 64\" (162.6 cm)   • Wt 213 lb 1.6 oz (96.7 kg)   • SpO2 98%   • BMI 36.58 kg/m2     Objective:  General Appearance:  Comfortable and in no acute distress.    Vital signs: (most recent): Blood pressure 153/98, pulse 87, temperature 97.8 °F (36.6 °C), temperature source Oral, resp. rate 16, height 64\" (162.6 cm), weight 213 lb 1.6 oz (96.7 kg), SpO2 98 %, not currently breastfeeding.    Lungs:  Normal effort.  Breath sounds clear to auscultation.    Heart: Normal rate.  Regular rhythm.    Abdomen: Abdomen is soft and non-distended.  Bowel sounds are normal.   There is no abdominal tenderness.     Extremities: There is no dependent edema.    Neurological: Patient is alert and oriented to person, place and time.    Skin:  Warm and dry.            CONDITION ON DISCHARGE  Stable.      DISCHARGE DISPOSITION   Home or Self Care      DISCHARGE MEDICATIONS   Kim Feldman   Home Medication Instructions NASRA:[id number]    Printed on:03/20/17 0408   Medication Information                      B Complex Vitamins (VITAMIN B COMPLEX PO)  Take 1 tablet by mouth every morning. HOLD PRIOR TO SURG             Calcium-Vitamin D-Vitamin K (VIACTIV PO)  Take 500 mg by mouth Every Night. HOLD PRIOR TO SURG              carvedilol (COREG) 3.125 MG tablet  TAKE ONE TABLET BY MOUTH TWICE A DAY WITH MEALS             clindamycin (CLEOCIN) 150 MG capsule  Take 3 capsules by mouth 3 (Three) Times a Day for 7 days.             clopidogrel (PLAVIX) 75 MG tablet  TAKE ONE TABLET BY MOUTH DAILY           "   clotrimazole-betamethasone (LOTRISONE) 1-0.05 % cream  Apply  topically 2 (Two) Times a Day.             diclofenac (VOLTAREN) 1 % gel gel  Apply 4 g topically 4 (four) times a day as needed.             gabapentin (NEURONTIN) 300 MG capsule  Take 2 capsules by mouth 3 (Three) Times a Day.             Hypromellose (ARTIFICIAL TEARS OP)  Apply 2 drops to eye 4 (four) times a day as needed.             lansoprazole (PREVACID) 30 MG capsule  TAKE ONE CAPSULE BY MOUTH EVERY MORNING             levETIRAcetam (KEPPRA) 500 MG tablet  Take 500 mg by mouth 2 (two) times a day.             Loratadine 10 MG capsule  Take 1 capsule by mouth daily as needed.             methylcellulose, Laxative, (CITRUCEL) 500 MG tablet tablet  Take 2 tablets by mouth Every Morning.             mirtazapine (REMERON) 15 MG tablet  Take 1 tablet by mouth Every Night.             nystatin (MYCOSTATIN) 897706 UNIT/GM cream  Apply  topically As Needed.             pseudoephedrine-guaifenesin (MUCINEX D)  MG per 12 hr tablet  Take 1 tablet by mouth At Night As Needed for allergies.             saccharomyces boulardii (FLORASTOR) 250 MG capsule  Take 1 capsule by mouth 2 (Two) Times a Day for 30 days.               Diet Instructions     Diet:       Diet Texture / Consistency:  Regular   Common Modifiers:  Cardiac                Activity Instructions     Activity as Tolerated                   Future Appointments  Date Time Provider Department Center   4/3/2017 10:30 AM MD RYLEE QuinteroK [facility] None   4/3/2017 11:30 AM Arsalan Velez Jr., MD [facility] None   6/16/2017 12:00 AM GABRIELLE ZAIDI [facility] None   3/8/2018 1:15 PM Bandar Ruiz MD [facility] None     Additional Instructions for the Follow-ups that You Need to Schedule     Ambulatory Referral to Home Health    As directed    Face to Face Visit Date:  3/20/2017   Follow-up Provider for Plan of Care?:  I treated the patient in an acute care facility and will  not continue treatment after discharge.   Follow-up Provider:  SHARAD DESAI   Reason/Clinical Findings:  recurrent pneumonia   Describe mobility limitations that make leaving home difficult:  Requires assistance of another to leave the home   Nursing/Therapeutic Services Requested:  Physical Therapy   PT orders:   Gait Training  Transfer training  Strengthening  Therapeutic exercise      Weight Bearing Status:  As Tolerated   Frequency:  1 Week 1             Follow-up Information     Follow up with Oswald Barth MD. Go on 3/22/2017.    Specialty:  Otolaryngology    Why:  Go to your appointment with Dr. Barth on Wednesday March 22nd at 7:40am.    Contact information:    [address]  [address]  [place] [id number] [phone]          Follow up with [facility] .    Specialty:  Home Health Services    Contact information:    [address]  [place] [address] [phone]        Follow up with Sharad Desai MD Follow up in 1 week(s).    Specialty:  Family Medicine    Contact information:    [address]  [address]  [place] [id number] [phone]              Kenn Clemens MD  [facility]  03/20/17  5:01 PM      Time: greater than 30 minutes.      Dragon disclaimer:  Much of this encounter note is an electronic transcription/translation of spoken language to printed text. The electronic translation of spoken language may permit erroneous, or at times, nonsensical words or phrases to be inadvertently transcribed; Although I have reviewed the note for such errors, some may still exist.

## 2017-03-20 NOTE — PROGRESS NOTES
Continued Stay Note  [facility]     Patient Name: Kim Feldman  MRN: [id number]  Today's Date: 3/20/2017    Admit Date: 3/14/2017          Discharge Plan       03/20/17 1340    Case Management/Social Work Plan    Plan Home with     Patient/Family In Agreement With Plan yes    Additional Comments DW Dr Clemens, Pt ready for DC today if pulmonary agrees. He request that nurse also check with Dr Barth to see if he would like to see while here as weekend covering MD did not want to come in . Notified pt that [facility] has denied skilled services. She agrees to home with , Given list of providers. She would like to use [facility]. Call to Jessica who is checking insurance, there are some [employer] that [facility] does not accept. Call to pts dtr Leandra and also notified of pending DC and denial by [facility], informed to appeal she could call the number on pts card for customer service. Also informed that we can arrange HH and that MD plans to DC later today if approved by Pulmonary.  She stated that she will be in later to  pt. Plan at this time is home with [facility] unless not in network. CCP will follow....drc      03/20/17 1337    Case Management/Social Work Plan    Plan Home      03/20/17 1134    Case Management/Social Work Plan    Plan undetermined    Patient/Family In Agreement With Plan no    Additional Comments Received a call from  with [facility] that pt has been denied for skilled services. Per Daija LOVELACE has 48 hours to do Peer to Peer appeal [phone]. Per Daija pt and family can not pursue appeal until after the 48 hour alycia. Will discuss with Dr Clemens. Veronica has notiffied MD off denial.....[facility]              Discharge Codes     None        Expected Discharge Date and Time     Expected Discharge Date Expected Discharge Time    Mar 20, 2017             Ally Woods RN

## 2017-03-23 ENCOUNTER — OFFICE VISIT (OUTPATIENT)
Dept: INTERNAL MEDICINE | Facility: CLINIC | Age: 82
End: 2017-03-23

## 2017-03-23 VITALS
TEMPERATURE: 97.6 F | HEIGHT: 64 IN | SYSTOLIC BLOOD PRESSURE: 130 MMHG | HEART RATE: 87 BPM | WEIGHT: 219 LBS | DIASTOLIC BLOOD PRESSURE: 68 MMHG | BODY MASS INDEX: 37.39 KG/M2 | OXYGEN SATURATION: 98 %

## 2017-03-23 DIAGNOSIS — K22.5 ZENKER DIVERTICULUM: ICD-10-CM

## 2017-03-23 DIAGNOSIS — N76.0 ACUTE VAGINITIS: ICD-10-CM

## 2017-03-23 DIAGNOSIS — J69.0 ASPIRATION PNEUMONIA OF RIGHT LOWER LOBE, UNSPECIFIED ASPIRATION PNEUMONIA TYPE (HCC): ICD-10-CM

## 2017-03-23 DIAGNOSIS — I10 ESSENTIAL HYPERTENSION: Primary | ICD-10-CM

## 2017-03-23 PROCEDURE — 99214 OFFICE O/P EST MOD 30 MIN: CPT | Performed by: FAMILY MEDICINE

## 2017-03-23 RX ORDER — FLUCONAZOLE 150 MG/1
TABLET ORAL
Qty: 2 TABLET | Refills: 0 | Status: SHIPPED | OUTPATIENT
Start: 2017-03-23 | End: 2017-04-04

## 2017-03-23 RX ORDER — CLOTRIMAZOLE AND BETAMETHASONE DIPROPIONATE 10; .64 MG/G; MG/G
CREAM TOPICAL
Qty: 45 G | Refills: 0 | Status: SHIPPED | OUTPATIENT
Start: 2017-03-23 | End: 2017-04-10 | Stop reason: SDUPTHER

## 2017-03-23 NOTE — PROGRESS NOTES
Subjective   Kim Feldman is a 87 y.o. female.     Chief Complaint   Patient presents with   • following up pneumonia   • following up hypertension         History of Present Illness patient comes in for follow-up of hospitalization for pneumonia records are reviewed and she is in the midst of workup for definitive treatment of Zenker's diverticulum that could be the cause of recurrent aspiration pneumonia and she is on her antibiotic now of clindamycin which has been her third episode this year she has no specific fever and is actually feeling better there is no mention of a prodromal period before then pneumonia affects her with fever and somnolence she feels stable at this point she does have symptoms treatable to vaginitis secondary to recurrent antibiotic use she is taking 4 start probiotics    The following portions of the patient's history were reviewed and updated as appropriate: allergies, current medications, past family history, past medical history, past social history, past surgical history and problem list.    Review of Systems   Constitutional: Negative.    HENT: Negative.    Respiratory: Negative.    Cardiovascular: Negative.    Endocrine: Negative.    Genitourinary: Positive for vaginal discharge.   Neurological: Negative.    Hematological: Negative.    Psychiatric/Behavioral: Negative.        Objective   Physical Exam   Constitutional: She is oriented to person, place, and time. She appears well-developed and well-nourished.   HENT:   Head: Normocephalic and atraumatic.   Eyes: Conjunctivae are normal. Pupils are equal, round, and reactive to light.   Cardiovascular: Normal rate and regular rhythm.    Pulmonary/Chest: Effort normal and breath sounds normal.   Musculoskeletal: She exhibits edema.   Lower extremity weakness bilaterally left knee replacement no erythema minimal distal edema trace bilaterally   Neurological: She is alert and oriented to person, place, and time.   Skin: Skin is  warm and dry.   Psychiatric: She has a normal mood and affect. Her behavior is normal. Thought content normal.   Nursing note and vitals reviewed.      Assessment/Plan   Kim was seen today for following up pneumonia and following up hypertension.    Diagnoses and all orders for this visit:    Essential hypertension    Zenker diverticulum    Aspiration pneumonia of right lower lobe, unspecified aspiration pneumonia type    Acute vaginitis    Other orders  -     fluconazole (DIFLUCAN) 150 MG tablet; One now and one in a week     follow-up as needed patient is recommended to have the surgery she'll hold her end plate of therapy before the surgery

## 2017-03-30 ENCOUNTER — HOSPITAL ENCOUNTER (OUTPATIENT)
Facility: HOSPITAL | Age: 82
Setting detail: SURGERY ADMIT
End: 2017-03-30
Attending: OTOLARYNGOLOGY | Admitting: OTOLARYNGOLOGY

## 2017-04-03 RX ORDER — CARVEDILOL 3.12 MG/1
TABLET ORAL
Qty: 60 TABLET | Refills: 1 | OUTPATIENT
Start: 2017-04-03

## 2017-04-04 ENCOUNTER — OFFICE VISIT (OUTPATIENT)
Dept: NEUROLOGY | Facility: CLINIC | Age: 82
End: 2017-04-04

## 2017-04-04 VITALS
DIASTOLIC BLOOD PRESSURE: 80 MMHG | BODY MASS INDEX: 35.85 KG/M2 | SYSTOLIC BLOOD PRESSURE: 120 MMHG | HEIGHT: 64 IN | WEIGHT: 210 LBS

## 2017-04-04 DIAGNOSIS — R56.9 GENERALIZED CONVULSIVE SEIZURES (HCC): Primary | ICD-10-CM

## 2017-04-04 PROCEDURE — 99213 OFFICE O/P EST LOW 20 MIN: CPT | Performed by: PSYCHIATRY & NEUROLOGY

## 2017-04-04 RX ORDER — LEVETIRACETAM 500 MG/1
500 TABLET ORAL 2 TIMES DAILY
Qty: 60 TABLET | Refills: 11 | Status: SHIPPED | OUTPATIENT
Start: 2017-04-04 | End: 2018-10-18 | Stop reason: SDUPTHER

## 2017-04-04 NOTE — PROGRESS NOTES
Subjective:     Patient ID: Kim Feldman is a 87 y.o. female.    Seizures    Pertinent negatives include no headaches and no speech difficulty.       Patient is had no seizures for 5 and half years.  She is on Keppra generic 500 mg twice daily.  She's having no side effects from the medication.  She was here with her daughter who also provided history.  The following portions of the patient's history were reviewed and updated as appropriate: allergies, current medications, past family history, past medical history, past social history, past surgical history and problem list.      Current Outpatient Prescriptions:   •  B Complex Vitamins (VITAMIN B COMPLEX PO), Take 1 tablet by mouth every morning. HOLD PRIOR TO SURG, Disp: , Rfl:   •  Calcium-Vitamin D-Vitamin K (VIACTIV PO), Take 500 mg by mouth Every Night. HOLD PRIOR TO SURG , Disp: , Rfl:   •  clopidogrel (PLAVIX) 75 MG tablet, TAKE ONE TABLET BY MOUTH DAILY, Disp: 90 tablet, Rfl: 0  •  clotrimazole-betamethasone (LOTRISONE) 1-0.05 % cream, APPLY TO AFFECTED AREA(S) TWO TIMES A DAY (Patient taking differently: APPLY TO AFFECTED AREA(S) TWO TIMES A DAY prn), Disp: 45 g, Rfl: 0  •  diclofenac (VOLTAREN) 1 % gel gel, Apply 4 g topically 4 (four) times a day as needed., Disp: , Rfl:   •  gabapentin (NEURONTIN) 300 MG capsule, Take 2 capsules by mouth 3 (Three) Times a Day., Disp: 540 capsule, Rfl: 2  •  Hypromellose (ARTIFICIAL TEARS OP), Apply 2 drops to eye 4 (four) times a day as needed., Disp: , Rfl:   •  lansoprazole (PREVACID) 30 MG capsule, TAKE ONE CAPSULE BY MOUTH EVERY MORNING (Patient taking differently: take one capsule by mouth every evening), Disp: 30 capsule, Rfl: 1  •  levETIRAcetam (KEPPRA) 500 MG tablet, Take 1 tablet by mouth 2 (Two) Times a Day., Disp: 60 tablet, Rfl: 11  •  Loratadine 10 MG capsule, Take 1 capsule by mouth daily as needed., Disp: , Rfl:   •  mirtazapine (REMERON) 15 MG tablet, Take 1 tablet by mouth Every Night., Disp: 30  tablet, Rfl: 2  •  nystatin (MYCOSTATIN) 584352 UNIT/GM cream, Apply  topically As Needed., Disp: , Rfl:   •  saccharomyces boulardii (FLORASTOR) 250 MG capsule, Take 1 capsule by mouth 2 (Two) Times a Day for 30 days., Disp: 60 capsule, Rfl: 0  •  methylcellulose, Laxative, (CITRUCEL) 500 MG tablet tablet, Take 2 tablets by mouth Every Morning., Disp: , Rfl:     Current Facility-Administered Medications:   •  cyanocobalamin injection 1,000 mcg, 1,000 mcg, Intramuscular, Q28 Days, Sharad Salinas MD, 1,000 mcg at 03/09/17 1811      Review of Systems   Constitutional: Positive for fatigue. Negative for activity change, appetite change, chills, fever and unexpected weight change.   Neurological: Negative for dizziness, tremors, seizures, syncope, facial asymmetry, speech difficulty, weakness, light-headedness, numbness and headaches.   Psychiatric/Behavioral: Negative.         Objective:    Neurologic Exam  Mental status examination was appropriate.  Funduscopy, visual fields, eye movements and pupillary reflexes were normal.  No facial weakness was noted.  She was in a wheelchair.  No pattern of focal weakness was noted.  Physical Exam    Assessment/Plan:     Kim was seen today for seizures.    Diagnoses and all orders for this visit:    Generalized convulsive seizures    Other orders  -     levETIRAcetam (KEPPRA) 500 MG tablet; Take 1 tablet by mouth 2 (Two) Times a Day.       Prescription drug management - Keppra as above.    Follow-up in the office in one year.Thank you for allowing me to share in the care of this patient.  Arsalan Velez M.D.

## 2017-04-10 ENCOUNTER — APPOINTMENT (OUTPATIENT)
Dept: PREADMISSION TESTING | Facility: HOSPITAL | Age: 82
End: 2017-04-10

## 2017-04-10 VITALS
HEIGHT: 64 IN | SYSTOLIC BLOOD PRESSURE: 127 MMHG | OXYGEN SATURATION: 99 % | DIASTOLIC BLOOD PRESSURE: 78 MMHG | RESPIRATION RATE: 16 BRPM | BODY MASS INDEX: 36.19 KG/M2 | HEART RATE: 88 BPM | WEIGHT: 212 LBS | TEMPERATURE: 97 F

## 2017-04-10 LAB
ANION GAP SERPL CALCULATED.3IONS-SCNC: 12.9 MMOL/L
BUN BLD-MCNC: 14 MG/DL (ref 8–23)
BUN/CREAT SERPL: 16.1 (ref 7–25)
CALCIUM SPEC-SCNC: 9.4 MG/DL (ref 8.6–10.5)
CHLORIDE SERPL-SCNC: 92 MMOL/L (ref 98–107)
CO2 SERPL-SCNC: 25.1 MMOL/L (ref 22–29)
CREAT BLD-MCNC: 0.87 MG/DL (ref 0.57–1)
DEPRECATED RDW RBC AUTO: 44.8 FL (ref 37–54)
ERYTHROCYTE [DISTWIDTH] IN BLOOD BY AUTOMATED COUNT: 12.1 % (ref 11.7–13)
GFR SERPL CREATININE-BSD FRML MDRD: 62 ML/MIN/1.73
GLUCOSE BLD-MCNC: 147 MG/DL (ref 65–99)
HCT VFR BLD AUTO: 36.5 % (ref 35.6–45.5)
HGB BLD-MCNC: 12.1 G/DL (ref 11.9–15.5)
MCH RBC QN AUTO: 33.6 PG (ref 26.9–32)
MCHC RBC AUTO-ENTMCNC: 33.2 G/DL (ref 32.4–36.3)
MCV RBC AUTO: 101.4 FL (ref 80.5–98.2)
PLATELET # BLD AUTO: 155 10*3/MM3 (ref 140–500)
PMV BLD AUTO: 9.5 FL (ref 6–12)
POTASSIUM BLD-SCNC: 4.7 MMOL/L (ref 3.5–5.2)
RBC # BLD AUTO: 3.6 10*6/MM3 (ref 3.9–5.2)
SODIUM BLD-SCNC: 130 MMOL/L (ref 136–145)
WBC NRBC COR # BLD: 6 10*3/MM3 (ref 4.5–10.7)

## 2017-04-10 PROCEDURE — 36415 COLL VENOUS BLD VENIPUNCTURE: CPT

## 2017-04-10 PROCEDURE — 80048 BASIC METABOLIC PNL TOTAL CA: CPT

## 2017-04-10 PROCEDURE — 85027 COMPLETE CBC AUTOMATED: CPT

## 2017-04-10 RX ORDER — CLOTRIMAZOLE AND BETAMETHASONE DIPROPIONATE 10; .64 MG/G; MG/G
CREAM TOPICAL 2 TIMES DAILY PRN
COMMUNITY
End: 2017-05-19

## 2017-04-10 RX ORDER — LANSOPRAZOLE 30 MG/1
30 CAPSULE, DELAYED RELEASE ORAL NIGHTLY
COMMUNITY
End: 2017-06-10 | Stop reason: SDUPTHER

## 2017-04-10 RX ORDER — ACETAMINOPHEN 500 MG
1000 TABLET ORAL EVERY 6 HOURS PRN
COMMUNITY
End: 2018-11-13

## 2017-04-10 RX ORDER — CLOPIDOGREL BISULFATE 75 MG/1
75 TABLET ORAL DAILY
COMMUNITY
End: 2017-04-22 | Stop reason: SDUPTHER

## 2017-04-10 NOTE — DISCHARGE INSTRUCTIONS
Take the following medications the morning of surgery with a small sip of water:      GABAPENTIN  LEVETIRACETAM    General Instructions:  • Do not eat solid food after midnight the night before surgery.  • You may drink clear liquids the day of surgery prior to leaving your house for the hospital.  Do not have anything once you have left your house for the hospital.  • You are to have nothing to drink two hours before surgery.  • Clear liquids are liquids you can see through. Nothing red in color.  Plain water    Sports drinks  Sodas     Gelatin (Jell-O)  Fruit juices without pulp such as white grape juice and apple juice  Popsicles that contain no fruit or yogurt  Tea or coffee (no cream or milk added)    • It is beneficial for you to have a clear drink that contains carbohydrates just before you leave your house and before your fasting time begins.  We suggest a 20 ounce bottle of Gatorade or Powerade for non-diabetic patients or a 20 ounce bottle of G2 or Powerade Zero for diabetic patients.    • Patients who avoid smoking, chewing tobacco and alcohol for 4 weeks prior to surgery have a reduced risk of post-operative complications.  Quit smoking as many days before surgery as you can.  • Do not smoke, use chewing tobacco or drink alcohol the day of surgery.   • If applicable bring your C-PAP/ BI-PAP machine.  • Bring any papers given to you in the doctor’s office.  • Wear clean comfortable clothes and socks.  • Do not wear contact lenses or make-up.  Bring a case for your glasses.   • Bring crutches or walker if applicable.  • Leave all other valuables and jewelry at home.  • The Pre-Admission Testing nurse will instruct you to bring medications if unable to obtain an accurate list in Pre-Admission Testing.        Preventing a Surgical Site Infection:  • For 2 to 3 days before surgery, avoid shaving with a razor because the razor can irritate skin and make it easier to develop an infection.  • The night prior to  surgery sleep in a clean bed with clean clothing.  Do not allow pets to sleep with you.  • Shower on the morning of surgery using a fresh bar of anti-bacterial soap (such as Dial) and clean washcloth.  Dry with a clean towel and dress in clean clothing.  • Ask your surgeon if you will be receiving antibiotics prior to surgery.  • Make sure you, your family, and all healthcare providers clean their hands with soap and water or an alcohol based hand  before caring for you or your wound.    Day of surgery: 04- (YOU WILL BE CALLED WITH AN ARRIVAL TIME)REPORT TO THE MAIN OR @ GIVEN TIME  Upon arrival, a Pre-op nurse and Anesthesiologist will review your health history, obtain vital signs, and answer questions you may have.  The only belongings needed at this time will be your home medication.  If you are staying overnight your family can leave the rest of your belongings in the car and bring them to your room later.  A Pre-op nurse will start an IV and you may receive medication in preparation for surgery, including something to help you relax.  Your family will be able to see you in the Pre-op area.  While you are in surgery your family should notify the waiting room  if they leave the waiting room area and provide a contact phone number.    Please be aware that surgery does come with discomfort.  We want to make every effort to control your discomfort so please discuss any uncontrolled symptoms with your nurse.   Your doctor will most likely have prescribed pain medications.      If you are going home after surgery you will receive individualized written care instructions before being discharged.  A responsible adult must drive you to and from the hospital on the day of your surgery and stay with you for 24 hours.    If you are staying overnight following surgery, you will be transported to your hospital room following the recovery period.  [facility] has all private  rooms.    If you have any questions please call Pre-Admission Testing at [phone].  Deductibles and co-payments are collected on the day of service. Please be prepared to pay the required co-pay, deductible or deposit on the day of service as defined by your plan.

## 2017-04-13 ENCOUNTER — HOSPITAL ENCOUNTER (EMERGENCY)
Facility: HOSPITAL | Age: 82
Discharge: HOME | End: 2017-04-13
Attending: OTOLARYNGOLOGY | Admitting: EMERGENCY MEDICINE

## 2017-04-13 ENCOUNTER — APPOINTMENT (OUTPATIENT)
Dept: GENERAL RADIOLOGY | Facility: HOSPITAL | Age: 82
End: 2017-04-13

## 2017-04-13 VITALS
HEIGHT: 64 IN | HEART RATE: 82 BPM | RESPIRATION RATE: 18 BRPM | SYSTOLIC BLOOD PRESSURE: 161 MMHG | DIASTOLIC BLOOD PRESSURE: 96 MMHG | BODY MASS INDEX: 35.85 KG/M2 | TEMPERATURE: 98.6 F | OXYGEN SATURATION: 96 % | WEIGHT: 210 LBS

## 2017-04-13 DIAGNOSIS — R68.83 CHILLS (WITHOUT FEVER): Primary | ICD-10-CM

## 2017-04-13 LAB
ALBUMIN SERPL-MCNC: 4 G/DL (ref 3.5–5.2)
ALBUMIN/GLOB SERPL: 1.3 G/DL
ALP SERPL-CCNC: 57 U/L (ref 39–117)
ALT SERPL W P-5'-P-CCNC: 11 U/L (ref 1–33)
ANION GAP SERPL CALCULATED.3IONS-SCNC: 10.2 MMOL/L
AST SERPL-CCNC: 17 U/L (ref 1–32)
BASOPHILS # BLD AUTO: 0.04 10*3/MM3 (ref 0–0.2)
BASOPHILS NFR BLD AUTO: 0.7 % (ref 0–1.5)
BILIRUB SERPL-MCNC: 0.4 MG/DL (ref 0.1–1.2)
BILIRUB UR QL STRIP: NEGATIVE
BUN BLD-MCNC: 14 MG/DL (ref 8–23)
BUN/CREAT SERPL: 17.3 (ref 7–25)
CALCIUM SPEC-SCNC: 9.2 MG/DL (ref 8.6–10.5)
CHLORIDE SERPL-SCNC: 92 MMOL/L (ref 98–107)
CLARITY UR: CLEAR
CO2 SERPL-SCNC: 26.8 MMOL/L (ref 22–29)
COLOR UR: YELLOW
CREAT BLD-MCNC: 0.81 MG/DL (ref 0.57–1)
D-LACTATE SERPL-SCNC: 0.9 MMOL/L (ref 0.5–2)
DEPRECATED RDW RBC AUTO: 42.9 FL (ref 37–54)
EOSINOPHIL # BLD AUTO: 0.22 10*3/MM3 (ref 0–0.7)
EOSINOPHIL NFR BLD AUTO: 3.7 % (ref 0.3–6.2)
ERYTHROCYTE [DISTWIDTH] IN BLOOD BY AUTOMATED COUNT: 11.9 % (ref 11.7–13)
GFR SERPL CREATININE-BSD FRML MDRD: 67 ML/MIN/1.73
GLOBULIN UR ELPH-MCNC: 3.2 GM/DL
GLUCOSE BLD-MCNC: 94 MG/DL (ref 65–99)
GLUCOSE UR STRIP-MCNC: NEGATIVE MG/DL
HCT VFR BLD AUTO: 35.2 % (ref 35.6–45.5)
HGB BLD-MCNC: 11.9 G/DL (ref 11.9–15.5)
HGB UR QL STRIP.AUTO: NEGATIVE
IMM GRANULOCYTES # BLD: 0 10*3/MM3 (ref 0–0.03)
IMM GRANULOCYTES NFR BLD: 0 % (ref 0–0.5)
KETONES UR QL STRIP: NEGATIVE
LEUKOCYTE ESTERASE UR QL STRIP.AUTO: NEGATIVE
LYMPHOCYTES # BLD AUTO: 2.09 10*3/MM3 (ref 0.9–4.8)
LYMPHOCYTES NFR BLD AUTO: 35.5 % (ref 19.6–45.3)
MCH RBC QN AUTO: 33.7 PG (ref 26.9–32)
MCHC RBC AUTO-ENTMCNC: 33.8 G/DL (ref 32.4–36.3)
MCV RBC AUTO: 99.7 FL (ref 80.5–98.2)
MONOCYTES # BLD AUTO: 0.61 10*3/MM3 (ref 0.2–1.2)
MONOCYTES NFR BLD AUTO: 10.4 % (ref 5–12)
NEUTROPHILS # BLD AUTO: 2.93 10*3/MM3 (ref 1.9–8.1)
NEUTROPHILS NFR BLD AUTO: 49.7 % (ref 42.7–76)
NITRITE UR QL STRIP: NEGATIVE
PH UR STRIP.AUTO: 7.5 [PH] (ref 5–8)
PLATELET # BLD AUTO: 159 10*3/MM3 (ref 140–500)
PMV BLD AUTO: 9.6 FL (ref 6–12)
POTASSIUM BLD-SCNC: 4.5 MMOL/L (ref 3.5–5.2)
PROT SERPL-MCNC: 7.2 G/DL (ref 6–8.5)
PROT UR QL STRIP: NEGATIVE
RBC # BLD AUTO: 3.53 10*6/MM3 (ref 3.9–5.2)
SODIUM BLD-SCNC: 129 MMOL/L (ref 136–145)
SP GR UR STRIP: 1.01 (ref 1–1.03)
UROBILINOGEN UR QL STRIP: NORMAL
WBC NRBC COR # BLD: 5.89 10*3/MM3 (ref 4.5–10.7)

## 2017-04-13 PROCEDURE — 87040 BLOOD CULTURE FOR BACTERIA: CPT | Performed by: EMERGENCY MEDICINE

## 2017-04-13 PROCEDURE — 71020 HC CHEST PA AND LATERAL: CPT

## 2017-04-13 PROCEDURE — 83605 ASSAY OF LACTIC ACID: CPT | Performed by: EMERGENCY MEDICINE

## 2017-04-13 PROCEDURE — 99283 EMERGENCY DEPT VISIT LOW MDM: CPT

## 2017-04-13 PROCEDURE — 81003 URINALYSIS AUTO W/O SCOPE: CPT | Performed by: EMERGENCY MEDICINE

## 2017-04-13 PROCEDURE — 80053 COMPREHEN METABOLIC PANEL: CPT | Performed by: EMERGENCY MEDICINE

## 2017-04-13 PROCEDURE — 96360 HYDRATION IV INFUSION INIT: CPT

## 2017-04-13 PROCEDURE — 85025 COMPLETE CBC W/AUTO DIFF WBC: CPT | Performed by: EMERGENCY MEDICINE

## 2017-04-13 RX ORDER — SODIUM CHLORIDE 0.9 % (FLUSH) 0.9 %
10 SYRINGE (ML) INJECTION AS NEEDED
Status: DISCONTINUED | OUTPATIENT
Start: 2017-04-13 | End: 2017-04-13 | Stop reason: HOSPADM

## 2017-04-13 RX ADMIN — SODIUM CHLORIDE 1000 ML: 9 INJECTION, SOLUTION INTRAVENOUS at 15:00

## 2017-04-13 NOTE — DISCHARGE INSTRUCTIONS
Continue home medications.  It is safe for you to have surgery tomorrow.  Please return to the ED if symptoms worsen with temperature 100.4 or higher.

## 2017-04-13 NOTE — ED PROVIDER NOTES
EMERGENCY DEPARTMENT ENCOUNTER    CHIEF COMPLAINT  Chief Complaint: Chills, fatigue  History given by: Pt, family  History limited by: N/A  Room Number: 29/29  PMD: Sharad Salinas MD      HPI:  Pt is a 87 y.o. female who presents complaining of intermittent chills and fatigue onset yesterday. Family reports several similar episodes this year when the pt had UTI, pneumonia, and sepsis. She also c/o night sweats and dry cough. Pt and family deny recent fever, nausea, vomiting, myalgia, earache, sore throat, abd pain, and diarrhea. She is scheduled for Zenkers diverticulum surgery tomorrow. Pt has a history of seizures, with the most recent episode occurring 5.5 years ago. She is taking Keppra. Pt does not smoke, but occasionally drinks wine.    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   • Gastroesophageal reflux disease 02/05/2016   • Hyperlipidemia 02/05/2016   • Intermittent claudication 02/05/2016   • Neuropathy 02/05/2016   • Osteoarthritis of knee 02/05/2016   • Syndrome of inappropriate secretion of antidiuretic hormone 02/05/2016   • Vitamin D deficiency 02/05/2016   • History of sick sinus syndrome    • Intertrigo 03/25/2016   • 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   • Urine frequency 05/20/2016   • Change in bowel habits 05/20/2016   • Urinary tract infection 07/08/2016   • 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   • History of anxiety 10/18/2016   • Urinary tract infection in female 01/03/2017   • 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   • Thrombocytopenia  01/05/2017   • B12 deficiency 01/16/2017   • Pain of toe of right foot 01/16/2017   • Weakness 02/03/2017   • Cardiac pacemaker in situ 03/08/2017   • Chronic venous insufficiency 03/09/2017   • Arthritis 03/09/2017   • S/P knee replacement 03/09/2017   • Chronic bilateral low back pain without sciatica 03/09/2017   • Essential hypertension 03/09/2017   • Hiatal hernia 03/16/2017   • Acute vaginitis 03/23/2017     Resolved Ambulatory Problems     Diagnosis Date Noted   • Hyponatremia 02/05/2016   • Pneumonia 07/08/2016   • Aspiration pneumonia 07/17/2016   • History of aspiration pneumonitis 01/04/2017   • Right lower lobe pneumonia 02/02/2017   • Sepsis due to pneumonia 02/02/2017   • UTI (urinary tract infection) 03/15/2017   • Acute kidney injury 03/17/2017     Past Medical History:   Diagnosis Date   • Anemia    • Bulging lumbar disc    • Chronic cough    • Chronic UTI    • Chronic venous insufficiency    • Clonic seizure disorder    • DDD (degenerative disc disease), lumbosacral    • Dementia without behavioral disturbance    • Depression, endogenous    • Disc degeneration, lumbar    • Dysphagia    • GERD (gastroesophageal reflux disease)    • Hiatal hernia    • History of anxiety    • History of aspiration pneumonitis    • History of cerebral artery occlusion    • History of herpes zoster    • History of ingrowing nail    • History of osteoporosis    • History of poliomyelitis    • History of sciatica    • History of sick sinus syndrome    • History of transient cerebral ischemia    • History of Zenker's diverticulum removal 2016   • HX: long term anticoagulant use    • Hyperlipidemia    • Hypertension    • Hyponatremia    • Intertrigo    • Lumbar radiculopathy    • Morbid obesity    • Neuralgia    • Nonepileptic episode    • Osteoarthritis of right knee    • Pacemaker    • Pneumonia    • Seeing double    • SIADH (syndrome of inappropriate ADH production)    • Vitamin D deficiency    • Zenker's diverticulum         PAST SURGICAL HISTORY  Past Surgical History:   Procedure Laterality Date   • CARDIAC PACEMAKER PLACEMENT      secondary to SSS, placed in 2004, with generator change in 2014   • CATARACT EXTRACTION W/ INTRAOCULAR LENS IMPLANT Bilateral    • COLONOSCOPY     • HAND SURGERY Right    • KNEE ARTHROPLASTY Left    • LAMINECTOMY FOR IMPLANTATION / PLACEMENT NEUROSTIMULATOR ELECTRODES     • LUMBAR LAMINECTOMY      L-3 L4 L4 L5   • TONSILLECTOMY     • ZENKERS DIVERTICULECTOMY N/A 9/27/2016    Procedure: ENDOSCOPIC REMOVAL OF ZENKERS DIVERTICULUM W/ WERDASCOPE;  Surgeon: Oswald Barth MD;  Location: [facility] MAIN OR;  Service:        FAMILY HISTORY  Family History   Problem Relation Age of Onset   • Cancer Daughter        SOCIAL HISTORY  Social History     Social History   • Marital status:      Spouse name: N/A   • Number of children: 3   • Years of education: N/A     Occupational History   • Retired      Social History Main Topics   • Smoking status: Former Smoker     Packs/day: 1.00     Years: 40.00     Types: Cigarettes     Quit date: 1992   • Smokeless tobacco: Never Used      Comment: QUIT 1992   • Alcohol use Yes      Comment: 1-2 DRINKS EVERY SIX MONTHS HOLIDAY DRINKER   • Drug use: No   • Sexual activity: Defer     Other Topics Concern   • Not on file     Social History Narrative       ALLERGIES  Penicillins    REVIEW OF SYSTEMS  Review of Systems   Constitutional: Positive for chills, diaphoresis (at night) and fatigue. Negative for fever.   HENT: Negative for sore throat and trouble swallowing.    Eyes: Negative for visual disturbance.   Respiratory: Positive for cough (dry). Negative for shortness of breath.    Cardiovascular: Negative for chest pain, palpitations and leg swelling.   Gastrointestinal: Negative for abdominal pain, diarrhea and vomiting.   Endocrine: Negative.    Genitourinary: Negative for decreased urine volume, dysuria and frequency.   Musculoskeletal: Negative for neck pain.    Skin: Negative for rash.   Allergic/Immunologic: Negative.    Neurological: Negative for syncope, weakness, numbness and headaches.   Hematological: Negative.    Psychiatric/Behavioral: Negative.    All other systems reviewed and are negative.      PHYSICAL EXAM  ED Triage Vitals   Temp Heart Rate Resp BP SpO2   04/13/17 1318 04/13/17 1318 04/13/17 1333 04/13/17 1333 04/13/17 1318   98.6 °F (37 °C) 81 20 143/98 96 %      Temp src Heart Rate Source Patient Position BP Location FiO2 (%)   -- -- 04/13/17 1333 -- --     Sitting         Physical Exam   Constitutional: She is well-developed, well-nourished, and in no distress. No distress.   HENT:   Head: Normocephalic and atraumatic.   Mouth/Throat: Oropharynx is clear and moist.   Eyes: EOM are normal. Pupils are equal, round, and reactive to light.   Neck: Normal range of motion. Neck supple.   Cardiovascular: Normal rate, regular rhythm and normal heart sounds.    No murmur heard.  Pulmonary/Chest: Effort normal. No respiratory distress. She has rhonchi in the right lower field and the left lower field.   Abdominal: Soft. Bowel sounds are normal. There is no tenderness. There is no rebound and no guarding.   Musculoskeletal: Normal range of motion. She exhibits no edema.   Neurological: She is alert. She has normal sensation and normal strength.   Skin: Skin is warm and dry. No rash noted.   Psychiatric: Mood and affect normal.   Nursing note and vitals reviewed.      LAB RESULTS  Lab Results (last 24 hours)     Procedure Component Value Units Date/Time    CBC & Differential [[id number]] Collected:  04/13/17 1438    Specimen:  Blood Updated:  04/13/17 1509    Narrative:       The following orders were created for panel order CBC & Differential.  Procedure                               Abnormality         Status                     ---------                               -----------         ------                     CBC Auto Differential[[id number]]         Abnormal             Final result                 Please view results for these tests on the individual orders.    Comprehensive Metabolic Panel [[id number]]  (Abnormal) Collected:  04/13/17 1438    Specimen:  Blood from Arm, Left Updated:  04/13/17 1518     Glucose 94 mg/dL      BUN 14 mg/dL      Creatinine 0.81 mg/dL      Sodium 129 (L) mmol/L      Potassium 4.5 mmol/L      Chloride 92 (L) mmol/L      CO2 26.8 mmol/L      Calcium 9.2 mg/dL      Total Protein 7.2 g/dL      Albumin 4.00 g/dL      ALT (SGPT) 11 U/L      AST (SGOT) 17 U/L      Alkaline Phosphatase 57 U/L      Total Bilirubin 0.4 mg/dL      eGFR Non African Amer 67 mL/min/1.73      Globulin 3.2 gm/dL      A/G Ratio 1.3 g/dL      BUN/Creatinine Ratio 17.3     Anion Gap 10.2 mmol/L     Narrative:       The MDRD GFR formula is only valid for adults with stable renal function between ages 18 and 70.    Blood Culture [[id number]] Collected:  04/13/17 1438    Specimen:  Blood from Arm, Left Updated:  04/13/17 1445    Lactic Acid, Plasma [[id number]]  (Normal) Collected:  04/13/17 1438    Specimen:  Blood from Arm, Left Updated:  04/13/17 1521     Lactate 0.9 mmol/L     CBC Auto Differential [[id number]]  (Abnormal) Collected:  04/13/17 1438    Specimen:  Blood from Arm, Left Updated:  04/13/17 1509     WBC 5.89 10*3/mm3      RBC 3.53 (L) 10*6/mm3      Hemoglobin 11.9 g/dL      Hematocrit 35.2 (L) %      MCV 99.7 (H) fL      MCH 33.7 (H) pg      MCHC 33.8 g/dL      RDW 11.9 %      RDW-SD 42.9 fl      MPV 9.6 fL      Platelets 159 10*3/mm3      Neutrophil % 49.7 %      Lymphocyte % 35.5 %      Monocyte % 10.4 %      Eosinophil % 3.7 %      Basophil % 0.7 %      Immature Grans % 0.0 %      Neutrophils, Absolute 2.93 10*3/mm3      Lymphocytes, Absolute 2.09 10*3/mm3      Monocytes, Absolute 0.61 10*3/mm3      Eosinophils, Absolute 0.22 10*3/mm3      Basophils, Absolute 0.04 10*3/mm3      Immature Grans, Absolute 0.00 10*3/mm3     Urinalysis With / Culture If Indicated [[id number]]   (Normal) Collected:  04/13/17 1517    Specimen:  Urine from Urine, Catheter Updated:  04/13/17 1540     Color, UA Yellow     Appearance, UA Clear     pH, UA 7.5     Specific Gravity, UA 1.013     Glucose, UA Negative     Ketones, UA Negative     Bilirubin, UA Negative     Blood, UA Negative     Protein, UA Negative     Leuk Esterase, UA Negative     Nitrite, UA Negative     Urobilinogen, UA 0.2 E.U./dL    Narrative:       Urine microscopic not indicated.          I ordered the above labs and reviewed the results    RADIOLOGY  XR Chest 2 View   Preliminary Result   No active disease is seen in the chest with no change when   compared to prior chest x-ray 03/14/2017. There is a left subclavian   transvenous pacer in place. There are spinal stimulator leads coursing   up the thoracic spines superior tip at the approximately T7 thoracic   level and old compression fracture at the thoracolumbar junction.          CXR:  Negative acute , pacemaker in place    I ordered the above noted radiological studies. Interpreted by radiologist. Discussed with radiologist. Reviewed by me in PACS.       PROCEDURES  Procedures      PROGRESS AND CONSULTS  ED Course   2:12 PM:  Vitals: BP: 143/98 HR: 81 Temp: 98.6 °F (37 °C) O2 sat: 96%  D/w pt plan for labs and CXR for further evaluation. Ordered IVF for hydration. Pt understands and agrees with the plan, all questions answered.    3:58 PM:  Vitals: BP: 135/83 HR: 75 Temp: 98.6 °F (37 °C) O2 sat: 94%  Rechecked pt. Pt is resting comfortably and is in no distress. Discussed results of labs and imaging, which were unremarkable, and the plan for discharge. Pt understands and agrees with the plan, all questions answered.    MEDICAL DECISION MAKING  Results were reviewed/discussed with the patient and they were also made aware of online access. Pt also made aware that some labs, such as cultures, will not be resulted during ER visit and follow up with PMD is necessary.     MDM  Number of  Diagnoses or Management Options  Chills (without fever):      Amount and/or Complexity of Data Reviewed  Clinical lab tests: reviewed and ordered (Sodium 129, WBC 5.89, Lactic Acid 0.9)  Tests in the radiology section of CPT®: reviewed and ordered (CXR: negative acute, pacemaker in place)  Discussion of test results with the performing providers: yes  Decide to obtain previous medical records or to obtain history from someone other than the patient: yes  Obtain history from someone other than the patient: yes (Family)  Independent visualization of images, tracings, or specimens: yes    Patient Progress  Patient progress: stable         DIAGNOSIS  Final diagnoses:   Chills (without fever)       DISPOSITION  DISCHARGE    Patient discharged in stable condition.    Reviewed implications of results, diagnosis, meds, responsibility to follow up, warning signs and symptoms of possible worsening, potential complications and reasons to return to ER, including any new or worsening symptoms.    Patient/Family voiced understanding of above instructions.    Discussed plan for discharge, as there is no emergent indication for admission.  Pt/family is agreeable and understands need for follow up and repeat testing.  Pt is aware that discharge does not mean that nothing is wrong but it indicates no emergency is present that requires admission and they must continue care with follow-up as given below or physician of their choice.     FOLLOW-UP  Sharad Salinas MD  [address] [address]  [phone]    Schedule an appointment as soon as possible for a visit           Medication List      Notice     No changes were made to your prescriptions during this visit.        Latest Documented Vital Signs:  As of 6:25 PM  BP- 161/96 HR- 82 Temp- 98.6 °F (37 °C) O2 sat- 96%    --  Documentation assistance provided by smita Baum for Dr. Evans.  Information recorded by the smita was done at my direction  and has been verified and validated by me.     Michael Baum  04/13/17 1603       Ravindra Evans MD  04/13/17 5228

## 2017-04-14 ENCOUNTER — TELEPHONE (OUTPATIENT)
Dept: SOCIAL WORK | Facility: HOSPITAL | Age: 82
End: 2017-04-14

## 2017-04-14 ENCOUNTER — ANESTHESIA (OUTPATIENT)
Dept: PERIOP | Facility: HOSPITAL | Age: 82
End: 2017-04-14

## 2017-04-14 ENCOUNTER — ANESTHESIA EVENT (OUTPATIENT)
Dept: PERIOP | Facility: HOSPITAL | Age: 82
End: 2017-04-14

## 2017-04-14 ENCOUNTER — HOSPITAL ENCOUNTER (OUTPATIENT)
Facility: HOSPITAL | Age: 82
Setting detail: OBSERVATION
Discharge: HOME | End: 2017-04-14
Attending: OTOLARYNGOLOGY | Admitting: OTOLARYNGOLOGY

## 2017-04-14 VITALS
DIASTOLIC BLOOD PRESSURE: 72 MMHG | HEIGHT: 64 IN | BODY MASS INDEX: 36.38 KG/M2 | RESPIRATION RATE: 16 BRPM | SYSTOLIC BLOOD PRESSURE: 123 MMHG | WEIGHT: 213.1 LBS | HEART RATE: 86 BPM | TEMPERATURE: 97.5 F | OXYGEN SATURATION: 97 %

## 2017-04-14 PROBLEM — K22.5 ZENKER'S DIVERTICULUM: Status: ACTIVE | Noted: 2017-04-14

## 2017-04-14 PROCEDURE — 25010000002 HYDRALAZINE PER 20 MG: Performed by: NURSE ANESTHETIST, CERTIFIED REGISTERED

## 2017-04-14 PROCEDURE — 25010000002 FENTANYL CITRATE (PF) 100 MCG/2ML SOLUTION: Performed by: NURSE ANESTHETIST, CERTIFIED REGISTERED

## 2017-04-14 PROCEDURE — 25010000002 PROPOFOL 10 MG/ML EMULSION: Performed by: NURSE ANESTHETIST, CERTIFIED REGISTERED

## 2017-04-14 PROCEDURE — 25010000002 NEOSTIGMINE 10 MG/10ML SOLUTION: Performed by: NURSE ANESTHETIST, CERTIFIED REGISTERED

## 2017-04-14 PROCEDURE — 25010000002 ONDANSETRON PER 1 MG: Performed by: NURSE ANESTHETIST, CERTIFIED REGISTERED

## 2017-04-14 PROCEDURE — G0378 HOSPITAL OBSERVATION PER HR: HCPCS

## 2017-04-14 PROCEDURE — 25010000002 KETOROLAC TROMETHAMINE PER 15 MG: Performed by: ANESTHESIOLOGY

## 2017-04-14 PROCEDURE — 25010000002 MIDAZOLAM PER 1 MG: Performed by: ANESTHESIOLOGY

## 2017-04-14 RX ORDER — LIDOCAINE HYDROCHLORIDE 20 MG/ML
INJECTION, SOLUTION INFILTRATION; PERINEURAL AS NEEDED
Status: DISCONTINUED | OUTPATIENT
Start: 2017-04-14 | End: 2017-04-14 | Stop reason: SURG

## 2017-04-14 RX ORDER — ROCURONIUM BROMIDE 10 MG/ML
INJECTION, SOLUTION INTRAVENOUS AS NEEDED
Status: DISCONTINUED | OUTPATIENT
Start: 2017-04-14 | End: 2017-04-14 | Stop reason: SURG

## 2017-04-14 RX ORDER — GLYCOPYRROLATE 0.2 MG/ML
INJECTION INTRAMUSCULAR; INTRAVENOUS AS NEEDED
Status: DISCONTINUED | OUTPATIENT
Start: 2017-04-14 | End: 2017-04-14 | Stop reason: SURG

## 2017-04-14 RX ORDER — CLINDAMYCIN PHOSPHATE 600 MG/50ML
600 INJECTION INTRAVENOUS EVERY 8 HOURS SCHEDULED
Status: DISCONTINUED | OUTPATIENT
Start: 2017-04-14 | End: 2017-04-14 | Stop reason: HOSPADM

## 2017-04-14 RX ORDER — SODIUM CHLORIDE 0.9 % (FLUSH) 0.9 %
1-10 SYRINGE (ML) INJECTION AS NEEDED
Status: DISCONTINUED | OUTPATIENT
Start: 2017-04-14 | End: 2017-04-14 | Stop reason: HOSPADM

## 2017-04-14 RX ORDER — NALOXONE HCL 0.4 MG/ML
0.2 VIAL (ML) INJECTION AS NEEDED
Status: DISCONTINUED | OUTPATIENT
Start: 2017-04-14 | End: 2017-04-14 | Stop reason: HOSPADM

## 2017-04-14 RX ORDER — FENTANYL CITRATE 50 UG/ML
INJECTION, SOLUTION INTRAMUSCULAR; INTRAVENOUS AS NEEDED
Status: DISCONTINUED | OUTPATIENT
Start: 2017-04-14 | End: 2017-04-14 | Stop reason: SURG

## 2017-04-14 RX ORDER — HYDRALAZINE HYDROCHLORIDE 20 MG/ML
INJECTION INTRAMUSCULAR; INTRAVENOUS AS NEEDED
Status: DISCONTINUED | OUTPATIENT
Start: 2017-04-14 | End: 2017-04-14 | Stop reason: SURG

## 2017-04-14 RX ORDER — NEOSTIGMINE METHYLSULFATE 1 MG/ML
INJECTION, SOLUTION INTRAVENOUS AS NEEDED
Status: DISCONTINUED | OUTPATIENT
Start: 2017-04-14 | End: 2017-04-14 | Stop reason: SURG

## 2017-04-14 RX ORDER — PROMETHAZINE HYDROCHLORIDE 25 MG/ML
12.5 INJECTION, SOLUTION INTRAMUSCULAR; INTRAVENOUS ONCE AS NEEDED
Status: DISCONTINUED | OUTPATIENT
Start: 2017-04-14 | End: 2017-04-14 | Stop reason: HOSPADM

## 2017-04-14 RX ORDER — ONDANSETRON 4 MG/1
4 TABLET, FILM COATED ORAL ONCE AS NEEDED
Status: DISCONTINUED | OUTPATIENT
Start: 2017-04-14 | End: 2017-04-14 | Stop reason: HOSPADM

## 2017-04-14 RX ORDER — FLUMAZENIL 0.1 MG/ML
0.2 INJECTION INTRAVENOUS AS NEEDED
Status: DISCONTINUED | OUTPATIENT
Start: 2017-04-14 | End: 2017-04-14 | Stop reason: HOSPADM

## 2017-04-14 RX ORDER — FENTANYL CITRATE 50 UG/ML
50 INJECTION, SOLUTION INTRAMUSCULAR; INTRAVENOUS
Status: DISCONTINUED | OUTPATIENT
Start: 2017-04-14 | End: 2017-04-14 | Stop reason: HOSPADM

## 2017-04-14 RX ORDER — ONDANSETRON 2 MG/ML
4 INJECTION INTRAMUSCULAR; INTRAVENOUS ONCE AS NEEDED
Status: DISCONTINUED | OUTPATIENT
Start: 2017-04-14 | End: 2017-04-14 | Stop reason: HOSPADM

## 2017-04-14 RX ORDER — KETOROLAC TROMETHAMINE 15 MG/ML
15 INJECTION, SOLUTION INTRAMUSCULAR; INTRAVENOUS ONCE
Status: COMPLETED | OUTPATIENT
Start: 2017-04-14 | End: 2017-04-14

## 2017-04-14 RX ORDER — PROMETHAZINE HYDROCHLORIDE 25 MG/1
25 TABLET ORAL ONCE AS NEEDED
Status: DISCONTINUED | OUTPATIENT
Start: 2017-04-14 | End: 2017-04-14 | Stop reason: HOSPADM

## 2017-04-14 RX ORDER — LABETALOL HYDROCHLORIDE 5 MG/ML
5 INJECTION, SOLUTION INTRAVENOUS
Status: DISCONTINUED | OUTPATIENT
Start: 2017-04-14 | End: 2017-04-14 | Stop reason: HOSPADM

## 2017-04-14 RX ORDER — MIDAZOLAM HYDROCHLORIDE 1 MG/ML
1 INJECTION INTRAMUSCULAR; INTRAVENOUS
Status: DISCONTINUED | OUTPATIENT
Start: 2017-04-14 | End: 2017-04-14 | Stop reason: HOSPADM

## 2017-04-14 RX ORDER — MIDAZOLAM HYDROCHLORIDE 1 MG/ML
2 INJECTION INTRAMUSCULAR; INTRAVENOUS
Status: DISCONTINUED | OUTPATIENT
Start: 2017-04-14 | End: 2017-04-14 | Stop reason: HOSPADM

## 2017-04-14 RX ORDER — PROMETHAZINE HYDROCHLORIDE 25 MG/1
25 SUPPOSITORY RECTAL ONCE AS NEEDED
Status: DISCONTINUED | OUTPATIENT
Start: 2017-04-14 | End: 2017-04-14 | Stop reason: HOSPADM

## 2017-04-14 RX ORDER — FAMOTIDINE 10 MG/ML
20 INJECTION, SOLUTION INTRAVENOUS ONCE
Status: COMPLETED | OUTPATIENT
Start: 2017-04-14 | End: 2017-04-14

## 2017-04-14 RX ORDER — PROPOFOL 10 MG/ML
VIAL (ML) INTRAVENOUS AS NEEDED
Status: DISCONTINUED | OUTPATIENT
Start: 2017-04-14 | End: 2017-04-14 | Stop reason: SURG

## 2017-04-14 RX ORDER — DIPHENHYDRAMINE HYDROCHLORIDE 50 MG/ML
12.5 INJECTION INTRAMUSCULAR; INTRAVENOUS
Status: DISCONTINUED | OUTPATIENT
Start: 2017-04-14 | End: 2017-04-14 | Stop reason: HOSPADM

## 2017-04-14 RX ORDER — HYDRALAZINE HYDROCHLORIDE 20 MG/ML
5 INJECTION INTRAMUSCULAR; INTRAVENOUS
Status: DISCONTINUED | OUTPATIENT
Start: 2017-04-14 | End: 2017-04-14 | Stop reason: HOSPADM

## 2017-04-14 RX ORDER — SODIUM CHLORIDE, SODIUM LACTATE, POTASSIUM CHLORIDE, CALCIUM CHLORIDE 600; 310; 30; 20 MG/100ML; MG/100ML; MG/100ML; MG/100ML
9 INJECTION, SOLUTION INTRAVENOUS CONTINUOUS
Status: DISCONTINUED | OUTPATIENT
Start: 2017-04-14 | End: 2017-04-14 | Stop reason: HOSPADM

## 2017-04-14 RX ORDER — ONDANSETRON 2 MG/ML
INJECTION INTRAMUSCULAR; INTRAVENOUS AS NEEDED
Status: DISCONTINUED | OUTPATIENT
Start: 2017-04-14 | End: 2017-04-14 | Stop reason: SURG

## 2017-04-14 RX ADMIN — PROPOFOL 150 MG: 10 INJECTION, EMULSION INTRAVENOUS at 16:01

## 2017-04-14 RX ADMIN — SODIUM CHLORIDE, POTASSIUM CHLORIDE, SODIUM LACTATE AND CALCIUM CHLORIDE 9 ML/HR: 600; 310; 30; 20 INJECTION, SOLUTION INTRAVENOUS at 13:38

## 2017-04-14 RX ADMIN — KETOROLAC TROMETHAMINE 15 MG: 30 INJECTION, SOLUTION INTRAMUSCULAR; INTRAVENOUS at 19:01

## 2017-04-14 RX ADMIN — NEOSTIGMINE METHYLSULFATE 3 MG: 1 INJECTION INTRAVENOUS at 16:37

## 2017-04-14 RX ADMIN — ROCURONIUM BROMIDE 30 MG: 10 INJECTION INTRAVENOUS at 16:01

## 2017-04-14 RX ADMIN — HYDRALAZINE HYDROCHLORIDE 10 MG: 20 INJECTION INTRAMUSCULAR; INTRAVENOUS at 16:31

## 2017-04-14 RX ADMIN — MIDAZOLAM 1 MG: 1 INJECTION INTRAMUSCULAR; INTRAVENOUS at 15:04

## 2017-04-14 RX ADMIN — MIDAZOLAM 1 MG: 1 INJECTION INTRAMUSCULAR; INTRAVENOUS at 13:38

## 2017-04-14 RX ADMIN — GLYCOPYRROLATE 0.4 MG: 0.2 INJECTION INTRAMUSCULAR; INTRAVENOUS at 16:37

## 2017-04-14 RX ADMIN — ONDANSETRON 4 MG: 2 INJECTION INTRAMUSCULAR; INTRAVENOUS at 16:28

## 2017-04-14 RX ADMIN — CLINDAMYCIN PHOSPHATE 600 MG: 12 INJECTION, SOLUTION INTRAMUSCULAR; INTRAVENOUS at 15:04

## 2017-04-14 RX ADMIN — LIDOCAINE HYDROCHLORIDE 60 MG: 20 INJECTION, SOLUTION INFILTRATION; PERINEURAL at 16:01

## 2017-04-14 RX ADMIN — FENTANYL CITRATE 100 MCG: 50 INJECTION INTRAMUSCULAR; INTRAVENOUS at 16:01

## 2017-04-14 RX ADMIN — CLINDAMYCIN PHOSPHATE 600 MG: 12 INJECTION, SOLUTION INTRAMUSCULAR; INTRAVENOUS at 15:57

## 2017-04-14 RX ADMIN — FAMOTIDINE 20 MG: 10 INJECTION, SOLUTION INTRAVENOUS at 13:38

## 2017-04-14 RX ADMIN — FENTANYL CITRATE 50 MCG: 50 INJECTION INTRAMUSCULAR; INTRAVENOUS at 17:43

## 2017-04-14 NOTE — PERIOPERATIVE NURSING NOTE
INTO SEE PT AND ORDER RECEIVED TO START PREOP ANTIBIOTIC NOW PRIOR TO GOING TO OR:  [facility]  HUNG AND STARTED

## 2017-04-14 NOTE — ANESTHESIA PREPROCEDURE EVALUATION
Anesthesia Evaluation     Patient summary reviewed and Nursing notes reviewed   NPO Status: > 8 hours   Airway   Mallampati: II  TM distance: >3 FB  Neck ROM: limited  possible difficult intubation  Dental - normal exam     Pulmonary - normal exam   (+) pneumonia (History of aspiration pneumonitis) resolved ,   Cardiovascular - normal exam    ECG reviewed    (+) pacemaker (History of sick sinus syndrome) pacemaker, hypertension, PVD,   (-) angina, orthopnea, PND, KNOTT      Neuro/Psych  (+) seizures (Stable on meds) well controlled, TIA, CVA, weakness (History of poliomyelitis. Weakness in left leg.), numbness, psychiatric history Depression and Anxiety, dementia,    GI/Hepatic/Renal/Endo    (+) obesity,  hiatal hernia, GERD,     Musculoskeletal     Abdominal  - normal exam   Substance History - negative use     OB/GYN          Other   (+) arthritis     ROS/Med Hx Other: SIADH (syndrome of inappropriate ADH production                            Anesthesia Plan    ASA 3     general     intravenous induction   Anesthetic plan and risks discussed with patient.

## 2017-04-14 NOTE — PLAN OF CARE
Problem: Patient Care Overview (Adult)  Goal: Plan of Care Review  Outcome: Outcome(s) achieved Date Met:  04/14/17 04/14/17 1956   Coping/Psychosocial Response Interventions   Plan Of Care Reviewed With patient;daughter   Patient Care Overview   Progress improving   Outcome Evaluation   Outcome Summary/Follow up Plan Ready for discharge       Goal: Adult Individualization and Mutuality  Outcome: Outcome(s) achieved Date Met:  04/14/17  Goal: Discharge Needs Assessment  Outcome: Outcome(s) achieved Date Met:  04/14/17    Problem: Perioperative Period (Adult)  Goal: Signs and Symptoms of Listed Potential Problems Will be Absent or Manageable (Perioperative Period)  Outcome: Outcome(s) achieved Date Met:  04/14/17

## 2017-04-14 NOTE — ANESTHESIA POSTPROCEDURE EVALUATION
Patient: Kim Feldman    Procedure Summary     Date Anesthesia Start Anesthesia Stop Room / Location    04/14/17 2372 2819  [facility] / [facility]       Procedure Diagnosis Surgeon Provider    ESOPHAGOSCOPY (N/A ) No diagnosis on file. MD Michele Rondon MD          Anesthesia Type: general  Last vitals  BP      Temp      Pulse 82 (04/14/17 1910)   Resp 16 (04/14/17 1910)    SpO2 97 % (04/14/17 1910)      Post Anesthesia Care and Evaluation    Patient location during evaluation: PACU  Patient participation: complete - patient participated  Level of consciousness: awake and alert  Pain management: adequate  Airway patency: patent  Anesthetic complications: No anesthetic complications    Cardiovascular status: acceptable  Respiratory status: acceptable  Hydration status: acceptable

## 2017-04-14 NOTE — TELEPHONE ENCOUNTER
Spoke with pt today in f/u and she states she is fine and she is due to have surgery here at the hospital in about 30 minutes. Pt had no other questions or concerns at this time. Malgorzata JACOB

## 2017-04-14 NOTE — ANESTHESIA PROCEDURE NOTES
Airway  Urgency: elective    Date/Time: 4/14/2017 4:07 PM  Airway not difficult    General Information and Staff    Patient location during procedure: OR  Anesthesiologist: MARGARITA FOREMAN  CRNA: KAYLEEN ZAMAN    Indications and Patient Condition  Indications for airway management: airway protection    Preoxygenated: yes  MILS maintained throughout  Mask difficulty assessment: 1 - vent by mask    Final Airway Details  Final airway type: endotracheal airway      Successful airway: ETT  Cuffed: yes   Successful intubation technique: direct laryngoscopy  Endotracheal tube insertion site: oral  Blade: Meyers  Blade size: #2  ETT size: 7.0 mm  Cormack-Lehane Classification: grade I - full view of glottis  Placement verified by: chest auscultation and capnometry   Measured from: lips  ETT to lips (cm): 22  Number of attempts at approach: 1    Additional Comments  Tissues and teeth unchanged post direct laryngoscopy

## 2017-04-14 NOTE — PLAN OF CARE
Problem: Patient Care Overview (Adult)  Goal: Plan of Care Review  Outcome: Ongoing (interventions implemented as appropriate)    04/14/17 1328   Coping/Psychosocial Response Interventions   Plan Of Care Reviewed With patient   Patient Care Overview   Progress no change       Goal: Adult Individualization and Mutuality  Outcome: Ongoing (interventions implemented as appropriate)    04/14/17 1328   Individualization   Patient Specific Preferences call pt Kim   Patient Specific Goals No infection after surgery.   Patient Specific Interventions Teach good hand hygiene.       Goal: Discharge Needs Assessment  Outcome: Ongoing (interventions implemented as appropriate)    04/14/17 1328   Discharge Needs Assessment   Concerns To Be Addressed denies needs/concerns at this time   Current Health   Anticipated Changes Related to Illness none   Self-Care   Equipment Currently Used at Home none         Problem: Perioperative Period (Adult)  Goal: Signs and Symptoms of Listed Potential Problems Will be Absent or Manageable (Perioperative Period)  Outcome: Ongoing (interventions implemented as appropriate)    04/14/17 1328   Perioperative Period   Problems Assessed (Perioperative Period) pain   Problems Present (Perioperative Period) pain

## 2017-04-15 NOTE — PERIOPERATIVE NURSING NOTE
Pt reported episodes of feeling short of air.  Pt's oxygen saturation remained 94-97% during reported episodes of shortness of air.  Vital signs are stable.  Lungs were clear bilaterally upon auscultation.  Dr. Kimball called to bedside to assess.  Pt complained of headache with  Dr. Kimball at bedside.  Orders received to give IV Toradol and reassess in 30 minutes.  Dr. Kimball states that pt is okay for discharge clinically from anesthesia standpoint.

## 2017-04-15 NOTE — OP NOTE
Date of Procedure:  04/14/2017    PREOPERATIVE DIAGNOSIS: Zenker diverticulum.     POSTOPERATIVE DIAGNOSIS: Zenker diverticulum.      PROCEDURE PERFORMED: Esophagoscopy.      SURGEON: Oswald Barth MD     FINDINGS: I was able to insert the Weerda diverticuloscope to the level of the esophageal introitus, but I could not get it positioned after 5 or 10 attempts in a way that exposed the diverticular pouch and the esophagus at the same time. Therefore, it was not appropriate to introduce the stapling device blindly.      DESCRIPTION OF PROCEDURE: The patient was taken to the operating room and placed in the supine position. Satisfactory general endotracheal anesthesia was administered. The table was turned. Routine prep and drape was accomplished. The Weerda diverticuloscope was then carefully inserted with a bite guard to protect the teeth. It was introduced. The laryngeal complex was visualized and the scope was introduced in the retrocricoid area. I tried to advance it into the esophageal introitus and got right to the edge of it. I then opened it and placed it on suspension. I used an Eschmann catheter to assess the location of the esophagus and the diverticulum, and I was able with blind introduction and palpation to discern the 2. However, I could never see the common party wall between the esophagus and the diverticulum which is what is essential in the introduction of the stapling device for successful treatment. I removed and replaced the Weerda scope, entering from the right and the left numerous times and attempted to visualize the hypopharynx and cervical opening enough to perform the endoscopic Zenker repair, but this was not possible. The procedure was then terminated. I explained the situation to her daughter and they wanted to avoid an open procedure. I did tell them about the possibility of doing it with a flexible laser procedure and they will plan on seeing a specialist. I recommended in that  regard. The patient tolerated the procedure well and there was no evidence of significant trauma to the larynx or hypopharynx. She was taken to the PACU in stable condition.          TERE Arreguin:angely  D:   04/14/2017 17:22:00  T:   04/14/2017 19:20:14  Job ID:   [id number]  Document ID:   [id number]  cc:     (dos)

## 2017-04-18 LAB — BACTERIA SPEC AEROBE CULT: NORMAL

## 2017-04-19 ENCOUNTER — TELEPHONE (OUTPATIENT)
Dept: INTERNAL MEDICINE | Facility: CLINIC | Age: 82
End: 2017-04-19

## 2017-04-19 NOTE — TELEPHONE ENCOUNTER
"Patient's daughter, Leandra, called stating that patient was to have surgery on Friday by Dr. Barth to redo the \"zinkers\".  He had her in surgery but was unable to do the surgery because he was unable to get the tool in position to do this.  Dr. Barth is referring patient to Dr. Linda Falcon, who only does surgery (gastroenterologist).  He does not see patients in an office.  They will not be able to meet Dr. Linda Falcon ahead of the surgery and they are very uncomfortable with not being able to meet him. The surgery will be done by a flexible laser. At this point they are reviewing the options : 1) do nothing and patient will get pneumonia all the time since patient will not do anything that the doctor recommends that she do to prevent her from getting pneumonia.  2) have Dr. Linda Falcon do the surgery  3) go back to ENT to have a surgery through her neck that is very invasive.  She is not sure where to go from here.  Please advise.  "

## 2017-04-21 NOTE — TELEPHONE ENCOUNTER
Patient's daughter notified.  She will call Dr. Falcon's office to schedule an appointment to see him.  She is to then call and schedule an appointment to come in to see Dr. Salinas, if needed, to go over all options.

## 2017-04-24 RX ORDER — CLOPIDOGREL BISULFATE 75 MG/1
TABLET ORAL
Qty: 90 TABLET | Refills: 0 | Status: SHIPPED | OUTPATIENT
Start: 2017-04-24 | End: 2017-07-22 | Stop reason: SDUPTHER

## 2017-05-19 ENCOUNTER — TELEPHONE (OUTPATIENT)
Dept: INTERNAL MEDICINE | Facility: CLINIC | Age: 82
End: 2017-05-19

## 2017-05-19 ENCOUNTER — HOSPITAL ENCOUNTER (OUTPATIENT)
Dept: GENERAL RADIOLOGY | Facility: HOSPITAL | Age: 82
Discharge: HOME | End: 2017-05-19
Admitting: FAMILY MEDICINE

## 2017-05-19 ENCOUNTER — OFFICE VISIT (OUTPATIENT)
Dept: INTERNAL MEDICINE | Facility: CLINIC | Age: 82
End: 2017-05-19

## 2017-05-19 VITALS
TEMPERATURE: 98.4 F | WEIGHT: 210.3 LBS | SYSTOLIC BLOOD PRESSURE: 132 MMHG | DIASTOLIC BLOOD PRESSURE: 84 MMHG | HEIGHT: 64 IN | HEART RATE: 85 BPM | OXYGEN SATURATION: 98 % | BODY MASS INDEX: 35.9 KG/M2

## 2017-05-19 DIAGNOSIS — E53.8 B12 DEFICIENCY: ICD-10-CM

## 2017-05-19 DIAGNOSIS — I10 ESSENTIAL HYPERTENSION: ICD-10-CM

## 2017-05-19 DIAGNOSIS — L89.302: ICD-10-CM

## 2017-05-19 DIAGNOSIS — L30.4 INTERTRIGO: Primary | ICD-10-CM

## 2017-05-19 DIAGNOSIS — M25.562 ACUTE PAIN OF LEFT KNEE: ICD-10-CM

## 2017-05-19 PROBLEM — L89.309 PRESSURE SORE ON BUTTOCKS: Status: ACTIVE | Noted: 2017-05-19

## 2017-05-19 PROBLEM — K59.04 CHRONIC IDIOPATHIC CONSTIPATION: Status: ACTIVE | Noted: 2017-05-19

## 2017-05-19 PROCEDURE — 96372 THER/PROPH/DIAG INJ SC/IM: CPT | Performed by: FAMILY MEDICINE

## 2017-05-19 PROCEDURE — 99214 OFFICE O/P EST MOD 30 MIN: CPT | Performed by: FAMILY MEDICINE

## 2017-05-19 PROCEDURE — 73560 X-RAY EXAM OF KNEE 1 OR 2: CPT

## 2017-05-19 RX ORDER — FLUCONAZOLE 150 MG/1
150 TABLET ORAL ONCE
Qty: 14 TABLET | Refills: 0 | Status: SHIPPED | OUTPATIENT
Start: 2017-05-19 | End: 2017-05-19 | Stop reason: SDUPTHER

## 2017-05-19 RX ORDER — FLUCONAZOLE 150 MG/1
TABLET ORAL
Qty: 14 TABLET | Refills: 0 | Status: SHIPPED | OUTPATIENT
Start: 2017-05-19 | End: 2017-08-10

## 2017-05-19 RX ADMIN — CYANOCOBALAMIN 1000 MCG: 1000 INJECTION, SOLUTION INTRAMUSCULAR; SUBCUTANEOUS at 11:09

## 2017-05-22 ENCOUNTER — TELEPHONE (OUTPATIENT)
Dept: INTERNAL MEDICINE | Facility: CLINIC | Age: 82
End: 2017-05-22

## 2017-05-22 DIAGNOSIS — M25.562 LEFT KNEE PAIN, UNSPECIFIED CHRONICITY: Primary | ICD-10-CM

## 2017-06-02 ENCOUNTER — OFFICE VISIT (OUTPATIENT)
Dept: ORTHOPEDIC SURGERY | Facility: CLINIC | Age: 82
End: 2017-06-02

## 2017-06-02 VITALS — TEMPERATURE: 97.6 F | WEIGHT: 210 LBS | HEIGHT: 64 IN | BODY MASS INDEX: 35.85 KG/M2

## 2017-06-02 DIAGNOSIS — M25.562 ACUTE PAIN OF LEFT KNEE: Primary | ICD-10-CM

## 2017-06-02 PROCEDURE — 20610 DRAIN/INJ JOINT/BURSA W/O US: CPT | Performed by: NURSE PRACTITIONER

## 2017-06-02 PROCEDURE — 99213 OFFICE O/P EST LOW 20 MIN: CPT | Performed by: NURSE PRACTITIONER

## 2017-06-02 RX ORDER — METHYLPREDNISOLONE ACETATE 80 MG/ML
80 INJECTION, SUSPENSION INTRA-ARTICULAR; INTRALESIONAL; INTRAMUSCULAR; SOFT TISSUE
Status: COMPLETED | OUTPATIENT
Start: 2017-06-02 | End: 2017-06-02

## 2017-06-02 RX ORDER — DOCUSATE SODIUM 100 MG/1
250 CAPSULE, LIQUID FILLED ORAL NIGHTLY
COMMUNITY
End: 2018-11-13

## 2017-06-02 RX ORDER — KETOCONAZOLE 20 MG/ML
SHAMPOO TOPICAL
COMMUNITY
Start: 2017-06-01 | End: 2017-09-22

## 2017-06-02 RX ORDER — BUPIVACAINE HYDROCHLORIDE 2.5 MG/ML
2 INJECTION, SOLUTION INFILTRATION; PERINEURAL
Status: COMPLETED | OUTPATIENT
Start: 2017-06-02 | End: 2017-06-02

## 2017-06-02 RX ORDER — HYDROCORTISONE 25 MG/ML
LOTION TOPICAL
COMMUNITY
Start: 2017-06-01 | End: 2018-03-08

## 2017-06-02 RX ORDER — LIDOCAINE HYDROCHLORIDE 10 MG/ML
2 INJECTION, SOLUTION INFILTRATION; PERINEURAL
Status: COMPLETED | OUTPATIENT
Start: 2017-06-02 | End: 2017-06-02

## 2017-06-02 RX ORDER — KETOCONAZOLE 20 MG/G
1 CREAM TOPICAL 2 TIMES DAILY
COMMUNITY
Start: 2017-06-01 | End: 2018-11-21 | Stop reason: HOSPADM

## 2017-06-02 RX ADMIN — LIDOCAINE HYDROCHLORIDE 2 ML: 10 INJECTION, SOLUTION INFILTRATION; PERINEURAL at 17:33

## 2017-06-02 RX ADMIN — BUPIVACAINE HYDROCHLORIDE 2 ML: 2.5 INJECTION, SOLUTION INFILTRATION; PERINEURAL at 17:33

## 2017-06-02 RX ADMIN — METHYLPREDNISOLONE ACETATE 80 MG: 80 INJECTION, SUSPENSION INTRA-ARTICULAR; INTRALESIONAL; INTRAMUSCULAR; SOFT TISSUE at 17:33

## 2017-06-02 NOTE — PROGRESS NOTES
Patient: Kim Feldman  YOB: 1930 87 y.o. female  Medical Record Number: [id number]    Chief Complaints:   Chief Complaint   Patient presents with   • Left Knee - Follow-up       History of Present Illness:Kim Feldman is a 87 y.o. female who presents With complaints of left anterior knee pain.  She has had previous total knee replacement and reports that she started with some anterior knee pain a few weeks ago.  Denies any injury.  She has been seen previously for pedis anserine bursitis and had an injection a few years ago which seemed to help.  She describes the knee pain as a dull constant ache.    Allergies:   Allergies   Allergen Reactions   • Penicillins Hives     Has previously tolerated ceftriaxone       Medications:   Current Outpatient Prescriptions   Medication Sig Dispense Refill   • acetaminophen (TYLENOL) 500 MG tablet Take 1,000 mg by mouth Every 6 (Six) Hours As Needed for Mild Pain (1-3).     • B Complex Vitamins (VITAMIN B COMPLEX PO) Take 1 tablet by mouth every morning. HOLD PRIOR TO SURG     • Calcium-Vitamin D-Vitamin K (VIACTIV PO) Take 500 mg by mouth Every Night. HOLD PRIOR TO SURG      • clopidogrel (PLAVIX) 75 MG tablet TAKE ONE TABLET BY MOUTH DAILY 90 tablet 0   • docusate sodium (COLACE) 100 MG capsule Take 100 mg by mouth Every Night.     • fluconazole (DIFLUCAN) 150 MG tablet To take 1 tablet every other day until gone 14 tablet 0   • gabapentin (NEURONTIN) 300 MG capsule Take 2 capsules by mouth 3 (Three) Times a Day. 540 capsule 2   • hydrocortisone 2.5 % lotion      • ketoconazole (NIZORAL) 2 % cream      • ketoconazole (NIZORAL) 2 % shampoo      • lansoprazole (PREVACID) 30 MG capsule Take 30 mg by mouth Every Night.     • levETIRAcetam (KEPPRA) 500 MG tablet Take 1 tablet by mouth 2 (Two) Times a Day. 60 tablet 11   • methylcellulose, Laxative, (CITRUCEL) 500 MG tablet tablet Take 2 tablets by mouth Every Morning.     • mirtazapine (REMERON) 15 MG tablet  "Take 1 tablet by mouth Every Night. 30 tablet 2   • diclofenac (VOLTAREN) 1 % gel gel Apply 4 g topically 4 (Four) Times a Day As Needed. HOLD PRIOR TO SURGERY     • Hypromellose (ARTIFICIAL TEARS OP) Apply 2 drops to eye 4 (four) times a day as needed.     • Loratadine 10 MG capsule Take 1 capsule by mouth daily as needed.       Current Facility-Administered Medications   Medication Dose Route Frequency Provider Last Rate Last Dose   • cyanocobalamin injection 1,000 mcg  1,000 mcg Intramuscular Q28 Days Sharad Salinas MD   1,000 mcg at 05/19/17 1109         The following portions of the patient's history were reviewed and updated as appropriate: allergies, current medications, past family history, past medical history, past social history, past surgical history and problem list.    Review of Systems:   A 14 point review of systems was performed. All systems negative except pertinent positives/negative listed in HPI above    Physical Exam:   Vitals:    06/02/17 1433   Temp: 97.6 °F (36.4 °C)   TempSrc: Temporal Artery    Weight: 210 lb (95.3 kg)   Height: 64\" (162.6 cm)       General: A and O x 3, ASA, NAD    SCLERA:    Normal    DENTITION:   Normal  Skin clear no unusual lesions noted  Left knee patient has good range of motion with no instability she is very tender over the pedis anserine bursa.  Calf is soft and nontender    Radiology:  Xrays 3views (ap,lateral, sunrise) left knee were ordered and reviewed today secondary to pain and show well-placed well-positioned left total knee replacement.  Comparative views are unchanged     Assessment/Plan:  Left knee pes anserine bursitis    We will proceed with bursa injection, and the patient will resume Voltaren gel.  She will let us know if the pain does not resolve and [facility] office in 2 months for follow-up with Dr. Betancur is still having issues    Large Joint Arthrocentesis  Date/Time: 6/2/2017 5:33 PM  Consent given by: patient  Site marked: site marked  Timeout: " Immediately prior to procedure a time out was called to verify the correct patient, procedure, equipment, support staff and site/side marked as required   Supporting Documentation  Indications: joint swelling and pain   Procedure Details  Location: knee - L knee  Preparation: Patient was prepped and draped in the usual sterile fashion  Needle size: 18 G  Approach: medial  Medications administered: 2 mL lidocaine 1 %; 80 mg methylPREDNISolone acetate 80 MG/ML; 2 mL bupivacaine

## 2017-06-12 RX ORDER — LANSOPRAZOLE 30 MG/1
CAPSULE, DELAYED RELEASE ORAL
Qty: 30 CAPSULE | Refills: 0 | Status: SHIPPED | OUTPATIENT
Start: 2017-06-12 | End: 2017-07-10 | Stop reason: SDUPTHER

## 2017-06-16 ENCOUNTER — CLINICAL SUPPORT NO REQUIREMENTS (OUTPATIENT)
Dept: CARDIOLOGY | Facility: CLINIC | Age: 82
End: 2017-06-16

## 2017-06-16 DIAGNOSIS — I49.5 SICK SINUS SYNDROME (HCC): Primary | ICD-10-CM

## 2017-06-16 PROCEDURE — 93294 REM INTERROG EVL PM/LDLS PM: CPT | Performed by: INTERNAL MEDICINE

## 2017-06-16 PROCEDURE — 93296 REM INTERROG EVL PM/IDS: CPT | Performed by: INTERNAL MEDICINE

## 2017-07-07 ENCOUNTER — CLINICAL SUPPORT (OUTPATIENT)
Dept: INTERNAL MEDICINE | Facility: CLINIC | Age: 82
End: 2017-07-07

## 2017-07-07 DIAGNOSIS — E53.8 B12 DEFICIENCY: ICD-10-CM

## 2017-07-07 PROCEDURE — 96372 THER/PROPH/DIAG INJ SC/IM: CPT | Performed by: FAMILY MEDICINE

## 2017-07-07 RX ADMIN — CYANOCOBALAMIN 1000 MCG: 1000 INJECTION, SOLUTION INTRAMUSCULAR; SUBCUTANEOUS at 13:05

## 2017-07-10 RX ORDER — LANSOPRAZOLE 30 MG/1
CAPSULE, DELAYED RELEASE ORAL
Qty: 30 CAPSULE | Refills: 2 | Status: SHIPPED | OUTPATIENT
Start: 2017-07-10 | End: 2017-10-07 | Stop reason: SDUPTHER

## 2017-07-10 NOTE — TELEPHONE ENCOUNTER
Kelly from Dr. Linda Falcon' office called stating that the patient is currently taking Plavix 75 mg daily and is scheduled to have an EDG performed. The patient needs to be off the Plavix for five days prior to the procedure and the office is requesting you approval. Kelly also stated that she was faxing an authorization request. Please advise.

## 2017-07-12 ENCOUNTER — TELEPHONE (OUTPATIENT)
Dept: INTERNAL MEDICINE | Facility: CLINIC | Age: 82
End: 2017-07-12

## 2017-07-12 NOTE — TELEPHONE ENCOUNTER
Patient's daughter called and was inquiring about a document that was supposed to be sent over from Dr. Linda Falcon' office regarding a discontinuation of Plavix for five days prior to a procedure. I told patient's daughter that we had still not received anything from the office but that I had spoken with Kelly from the office twice and given our fax number

## 2017-07-14 ENCOUNTER — TELEPHONE (OUTPATIENT)
Dept: INTERNAL MEDICINE | Facility: CLINIC | Age: 82
End: 2017-07-14

## 2017-07-14 RX ORDER — SULFAMETHOXAZOLE AND TRIMETHOPRIM 800; 160 MG/1; MG/1
1 TABLET ORAL 2 TIMES DAILY
Qty: 6 TABLET | Refills: 0 | Status: SHIPPED | OUTPATIENT
Start: 2017-07-14 | End: 2017-08-10

## 2017-07-14 NOTE — TELEPHONE ENCOUNTER
Patient's daughter, Leandra, called stating that patient has a UTI - she has pain when she urinates.  A prescription for Bactrim DS #6 -  1 BID was sent to [facility] per Dr. Salinas.

## 2017-07-16 ENCOUNTER — APPOINTMENT (OUTPATIENT)
Dept: GENERAL RADIOLOGY | Facility: HOSPITAL | Age: 82
End: 2017-07-16

## 2017-07-16 ENCOUNTER — HOSPITAL ENCOUNTER (EMERGENCY)
Facility: HOSPITAL | Age: 82
Discharge: HOME | End: 2017-07-16
Attending: FAMILY MEDICINE | Admitting: FAMILY MEDICINE

## 2017-07-16 ENCOUNTER — APPOINTMENT (OUTPATIENT)
Dept: CT IMAGING | Facility: HOSPITAL | Age: 82
End: 2017-07-16

## 2017-07-16 VITALS
OXYGEN SATURATION: 96 % | HEIGHT: 64 IN | HEART RATE: 92 BPM | BODY MASS INDEX: 34.15 KG/M2 | RESPIRATION RATE: 20 BRPM | DIASTOLIC BLOOD PRESSURE: 75 MMHG | TEMPERATURE: 96.7 F | SYSTOLIC BLOOD PRESSURE: 134 MMHG | WEIGHT: 200 LBS

## 2017-07-16 DIAGNOSIS — E87.1 HYPONATREMIA: ICD-10-CM
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
## 2017-07-16 LAB
ALBUMIN SERPL-MCNC: 3.8 G/DL (ref 3.5–5.2)
ALBUMIN/GLOB SERPL: 1.2 G/DL
ALP SERPL-CCNC: 53 U/L (ref 39–117)
ALT SERPL W P-5'-P-CCNC: 11 U/L (ref 1–33)
ANION GAP SERPL CALCULATED.3IONS-SCNC: 11.6 MMOL/L
AST SERPL-CCNC: 18 U/L (ref 1–32)
BACTERIA UR QL AUTO: ABNORMAL /HPF
BASOPHILS # BLD AUTO: 0.03 10*3/MM3 (ref 0–0.2)
BASOPHILS NFR BLD AUTO: 0.5 % (ref 0–1.5)
BILIRUB SERPL-MCNC: 0.3 MG/DL (ref 0.1–1.2)
BILIRUB UR QL STRIP: NEGATIVE
BUN BLD-MCNC: 14 MG/DL (ref 8–23)
BUN/CREAT SERPL: 13.9 (ref 7–25)
CALCIUM SPEC-SCNC: 9.1 MG/DL (ref 8.6–10.5)
CHLORIDE SERPL-SCNC: 90 MMOL/L (ref 98–107)
CLARITY UR: CLEAR
CO2 SERPL-SCNC: 25.4 MMOL/L (ref 22–29)
COLOR UR: YELLOW
CREAT BLD-MCNC: 1.01 MG/DL (ref 0.57–1)
DEPRECATED RDW RBC AUTO: 47.8 FL (ref 37–54)
EOSINOPHIL # BLD AUTO: 0.26 10*3/MM3 (ref 0–0.7)
EOSINOPHIL NFR BLD AUTO: 4 % (ref 0.3–6.2)
ERYTHROCYTE [DISTWIDTH] IN BLOOD BY AUTOMATED COUNT: 12.9 % (ref 11.7–13)
GFR SERPL CREATININE-BSD FRML MDRD: 52 ML/MIN/1.73
GLOBULIN UR ELPH-MCNC: 3.3 GM/DL
GLUCOSE BLD-MCNC: 93 MG/DL (ref 65–99)
GLUCOSE UR STRIP-MCNC: NEGATIVE MG/DL
HCT VFR BLD AUTO: 36 % (ref 35.6–45.5)
HGB BLD-MCNC: 12.1 G/DL (ref 11.9–15.5)
HGB UR QL STRIP.AUTO: NEGATIVE
HYALINE CASTS UR QL AUTO: ABNORMAL /LPF
IMM GRANULOCYTES # BLD: 0.02 10*3/MM3 (ref 0–0.03)
IMM GRANULOCYTES NFR BLD: 0.3 % (ref 0–0.5)
KETONES UR QL STRIP: NEGATIVE
LEUKOCYTE ESTERASE UR QL STRIP.AUTO: ABNORMAL
LYMPHOCYTES # BLD AUTO: 1.57 10*3/MM3 (ref 0.9–4.8)
LYMPHOCYTES NFR BLD AUTO: 24.3 % (ref 19.6–45.3)
MCH RBC QN AUTO: 34.1 PG (ref 26.9–32)
MCHC RBC AUTO-ENTMCNC: 33.6 G/DL (ref 32.4–36.3)
MCV RBC AUTO: 101.4 FL (ref 80.5–98.2)
MONOCYTES # BLD AUTO: 0.64 10*3/MM3 (ref 0.2–1.2)
MONOCYTES NFR BLD AUTO: 9.9 % (ref 5–12)
NEUTROPHILS # BLD AUTO: 3.95 10*3/MM3 (ref 1.9–8.1)
NEUTROPHILS NFR BLD AUTO: 61 % (ref 42.7–76)
NITRITE UR QL STRIP: NEGATIVE
PH UR STRIP.AUTO: 6.5 [PH] (ref 5–8)
PLATELET # BLD AUTO: 140 10*3/MM3 (ref 140–500)
PMV BLD AUTO: 9.2 FL (ref 6–12)
POTASSIUM BLD-SCNC: 4.4 MMOL/L (ref 3.5–5.2)
PROT SERPL-MCNC: 7.1 G/DL (ref 6–8.5)
PROT UR QL STRIP: NEGATIVE
RBC # BLD AUTO: 3.55 10*6/MM3 (ref 3.9–5.2)
RBC # UR: ABNORMAL /HPF
REF LAB TEST METHOD: ABNORMAL
SODIUM BLD-SCNC: 127 MMOL/L (ref 136–145)
SP GR UR STRIP: 1.01 (ref 1–1.03)
SQUAMOUS #/AREA URNS HPF: ABNORMAL /HPF
TROPONIN T SERPL-MCNC: 0.01 NG/ML (ref 0–0.03)
UROBILINOGEN UR QL STRIP: ABNORMAL
WBC NRBC COR # BLD: 6.47 10*3/MM3 (ref 4.5–10.7)
WBC UR QL AUTO: ABNORMAL /HPF

## 2017-07-16 PROCEDURE — 84484 ASSAY OF TROPONIN QUANT: CPT | Performed by: FAMILY MEDICINE

## 2017-07-16 PROCEDURE — 72170 X-RAY EXAM OF PELVIS: CPT

## 2017-07-16 PROCEDURE — 71010 HC CHEST PA OR AP: CPT

## 2017-07-16 PROCEDURE — 93010 ELECTROCARDIOGRAM REPORT: CPT | Performed by: INTERNAL MEDICINE

## 2017-07-16 PROCEDURE — 72125 CT NECK SPINE W/O DYE: CPT

## 2017-07-16 PROCEDURE — 85025 COMPLETE CBC W/AUTO DIFF WBC: CPT | Performed by: FAMILY MEDICINE

## 2017-07-16 PROCEDURE — 80053 COMPREHEN METABOLIC PANEL: CPT | Performed by: FAMILY MEDICINE

## 2017-07-16 PROCEDURE — 99283 EMERGENCY DEPT VISIT LOW MDM: CPT

## 2017-07-16 PROCEDURE — 70450 CT HEAD/BRAIN W/O DYE: CPT

## 2017-07-16 PROCEDURE — 93005 ELECTROCARDIOGRAM TRACING: CPT | Performed by: FAMILY MEDICINE

## 2017-07-16 PROCEDURE — 87086 URINE CULTURE/COLONY COUNT: CPT | Performed by: FAMILY MEDICINE

## 2017-07-16 PROCEDURE — 73560 X-RAY EXAM OF KNEE 1 OR 2: CPT

## 2017-07-16 PROCEDURE — 81001 URINALYSIS AUTO W/SCOPE: CPT | Performed by: FAMILY MEDICINE

## 2017-07-16 NOTE — ED NOTES
Skin tear noted to RFA.   Bruising to L knee noted with minimum swelling and pain.   Pt also c/o L hip pain.  Pt states she fallen going to the bathroom.      Marilyn Ness RN  07/16/17 7908

## 2017-07-16 NOTE — ED PROVIDER NOTES
EMERGENCY DEPARTMENT ENCOUNTER    CHIEF COMPLAINT  Chief Complaint: Fall  History given by: Patient  History limited by: Nothing  Room Number: 10/10  PMD: Sharad Salinas MD      HPI:  Pt is a 87 y.o. female on Plavix who presents to the ED via EMS s/p mechanical fall around 12:00AM complaining of left knee pain and she also sustained scattered abrasions to her extremities. The patient reports that she woke up to go to the bathroom and she fell while using her cane. Patient notes that she hit her head upon impact and she has subsequently developed a mild dull HA. She denies sustaining any LOC upon impact. Patient was found to have UTI by her PCP and she started antibiotics yesterday. She also notes that she's had increased amount of congestion and generalized weakness for the past month but she denies any fever, chills, abdominal pain, chest pain or SOA. No other complaints at this time.      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   • Gastroesophageal reflux disease 02/05/2016   • Hyperlipidemia 02/05/2016   • Intermittent claudication 02/05/2016   • Neuropathy 02/05/2016   • Osteoarthritis of knee 02/05/2016   • Syndrome of inappropriate secretion of antidiuretic hormone 02/05/2016   • Vitamin D deficiency 02/05/2016   • History of sick sinus syndrome    • Intertrigo 03/25/2016   • 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   • Urine frequency 05/20/2016   • Change in bowel habits 05/20/2016   • Urinary  tract infection 07/08/2016   • 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   • History of anxiety 10/18/2016   • Urinary tract infection in female 01/03/2017   • 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   • Thrombocytopenia 01/05/2017   • B12 deficiency 01/16/2017   • Pain of toe of right foot 01/16/2017   • Weakness 02/03/2017   • Cardiac pacemaker in situ 03/08/2017   • Chronic venous insufficiency 03/09/2017   • Arthritis 03/09/2017   • S/P knee replacement 03/09/2017   • Chronic bilateral low back pain without sciatica 03/09/2017   • Essential hypertension 03/09/2017   • Hiatal hernia 03/16/2017   • Acute vaginitis 03/23/2017   • Zenker's diverticulum 04/14/2017   • Acute pain of left knee 05/19/2017   • Chronic idiopathic constipation 05/19/2017   • Pressure sore on buttocks 05/19/2017     Resolved Ambulatory Problems     Diagnosis Date Noted   • Hyponatremia 02/05/2016   • Pneumonia 07/08/2016   • Aspiration pneumonia 07/17/2016   • History of aspiration pneumonitis 01/04/2017   • Right lower lobe pneumonia 02/02/2017   • Sepsis due to pneumonia 02/02/2017   • UTI (urinary tract infection) 03/15/2017   • Acute kidney injury 03/17/2017     Past Medical History:   Diagnosis Date   • Anemia    • Bulging lumbar disc    • Chronic cough    • Chronic UTI    • Chronic venous insufficiency    • Clonic seizure disorder    • DDD (degenerative disc disease), lumbosacral    • Dementia without behavioral disturbance    • Depression, endogenous    • Disc degeneration, lumbar    • Dysphagia    • GERD (gastroesophageal reflux disease)    • Hiatal hernia    • History of anxiety    • History of aspiration pneumonitis    • History of cerebral artery occlusion    • History of herpes zoster    • History of ingrowing nail    • History of osteoporosis    • History of poliomyelitis    • History of sciatica    • History of sick sinus syndrome    • History of transient cerebral ischemia    • History of Zenker's diverticulum removal 2016   • HX: long term  anticoagulant use    • Hyperlipidemia    • Hypertension    • Hyponatremia    • Intertrigo    • Lumbar radiculopathy    • Morbid obesity    • Neuralgia    • Nonepileptic episode    • Osteoarthritis of right knee    • Pacemaker    • Pneumonia    • Seeing double    • SIADH (syndrome of inappropriate ADH production)    • Vitamin D deficiency    • Zenker's diverticulum        PAST SURGICAL HISTORY  Past Surgical History:   Procedure Laterality Date   • CARDIAC PACEMAKER PLACEMENT      secondary to SSS, placed in 2004, with generator change in 2014   • CATARACT EXTRACTION W/ INTRAOCULAR LENS IMPLANT Bilateral    • COLONOSCOPY     • HAND SURGERY Right    • KNEE ARTHROPLASTY Left    • LAMINECTOMY FOR IMPLANTATION / PLACEMENT NEUROSTIMULATOR ELECTRODES     • LUMBAR LAMINECTOMY      L-3 L4 L4 L5   • TONSILLECTOMY     • ZENKERS DIVERTICULECTOMY N/A 9/27/2016    Procedure: ENDOSCOPIC REMOVAL OF ZENKERS DIVERTICULUM W/ WERDASCOPE;  Surgeon: Oswald Barth MD;  Location: [facility];  Service:    • ZENKERS DIVERTICULECTOMY N/A 4/14/2017    Procedure: ESOPHAGOSCOPY;  Surgeon: Oswald Barth MD;  Location: [facility];  Service:        FAMILY HISTORY  Family History   Problem Relation Age of Onset   • Cancer Daughter        SOCIAL HISTORY  Social History     Social History   • Marital status:      Spouse name: N/A   • Number of children: 3   • Years of education: N/A     Occupational History   • Retired      Social History Main Topics   • Smoking status: Former Smoker     Packs/day: 1.00     Years: 40.00     Types: Cigarettes     Quit date: 1992   • Smokeless tobacco: Never Used      Comment: QUIT 1992   • Alcohol use Yes      Comment: 1-2 DRINKS EVERY SIX MONTHS HOLIDAY DRINKER   • Drug use: No   • Sexual activity: Defer     Other Topics Concern   • Not on file     Social History Narrative       ALLERGIES  Penicillins    REVIEW OF SYSTEMS  Review of Systems   Constitutional: Negative for chills.   HENT: Positive  for congestion. Negative for rhinorrhea and sore throat.    Eyes: Negative for pain.   Respiratory: Positive for cough. Negative for shortness of breath.    Cardiovascular: Negative for chest pain, palpitations and leg swelling.   Gastrointestinal: Negative for abdominal pain, diarrhea, nausea and vomiting.   Genitourinary: Positive for dysuria and frequency. Negative for difficulty urinating and flank pain.   Musculoskeletal: Positive for arthralgias (Left knee) and gait problem. Negative for myalgias, neck pain and neck stiffness.   Skin: Negative for rash.   Neurological: Negative for dizziness, speech difficulty, weakness, light-headedness, numbness and headaches.   Psychiatric/Behavioral: Negative.    All other systems reviewed and are negative.      PHYSICAL EXAM  ED Triage Vitals   Temp Heart Rate Resp BP SpO2   07/16/17 0121 07/16/17 0121 07/16/17 0121 07/16/17 0121 07/16/17 0121   96.7 °F (35.9 °C) 92 20 134/75 96 %      Temp src Heart Rate Source Patient Position BP Location FiO2 (%)   07/16/17 0121 07/16/17 0121 -- -- --   Tympanic Monitor          Physical Exam   Constitutional: She is oriented to person, place, and time and well-developed, well-nourished, and in no distress. No distress.   HENT:   Head: Normocephalic.   Eyes: EOM are normal. Pupils are equal, round, and reactive to light.   Neck: Normal range of motion.   Cardiovascular: Normal rate and regular rhythm.    No murmur heard.  Pulmonary/Chest: Effort normal and breath sounds normal. No respiratory distress.   Abdominal: Soft. There is no tenderness. There is no rebound and no guarding.   Musculoskeletal: Normal range of motion. She exhibits no edema.        Left knee: Tenderness found.   Left knee bruise to anterior aspect.    Neurological: She is alert and oriented to person, place, and time.   Skin: Skin is warm and dry. No rash noted.   Multiple scattered bruises extremities    Psychiatric: Mood and affect normal.   Nursing note and  vitals reviewed.      LAB RESULTS  Lab Results (last 24 hours)     Procedure Component Value Units Date/Time    CBC & Differential [[id number]] Collected:  07/16/17 0220    Specimen:  Blood Updated:  07/16/17 0229    Narrative:       The following orders were created for panel order CBC & Differential.  Procedure                               Abnormality         Status                     ---------                               -----------         ------                     CBC Auto Differential[[id number]]        Abnormal            Final result                 Please view results for these tests on the individual orders.    Comprehensive Metabolic Panel [[id number]]  (Abnormal) Collected:  07/16/17 0220    Specimen:  Blood Updated:  07/16/17 0251     Glucose 93 mg/dL      BUN 14 mg/dL      Creatinine 1.01 (H) mg/dL      Sodium 127 (L) mmol/L      Potassium 4.4 mmol/L      Chloride 90 (L) mmol/L      CO2 25.4 mmol/L      Calcium 9.1 mg/dL      Total Protein 7.1 g/dL      Albumin 3.80 g/dL      ALT (SGPT) 11 U/L      AST (SGOT) 18 U/L      Alkaline Phosphatase 53 U/L      Total Bilirubin 0.3 mg/dL      eGFR Non African Amer 52 (L) mL/min/1.73      Globulin 3.3 gm/dL      A/G Ratio 1.2 g/dL      BUN/Creatinine Ratio 13.9     Anion Gap 11.6 mmol/L     Narrative:       The MDRD GFR formula is only valid for adults with stable renal function between ages 18 and 70.    Troponin [[id number]]  (Normal) Collected:  07/16/17 0220    Specimen:  Blood Updated:  07/16/17 0416     Troponin T 0.015 ng/mL     Narrative:       Troponin T Reference Ranges:  Less than 0.03 ng/mL:    Negative for AMI  0.03 to 0.09 ng/mL:      Indeterminant for AMI  Greater than 0.09 ng/mL: Positive for AMI    CBC Auto Differential [[id number]]  (Abnormal) Collected:  07/16/17 0220    Specimen:  Blood Updated:  07/16/17 0229     WBC 6.47 10*3/mm3      RBC 3.55 (L) 10*6/mm3      Hemoglobin 12.1 g/dL      Hematocrit 36.0 %      .4 (H) fL      MCH 34.1 (H)  pg      MCHC 33.6 g/dL      RDW 12.9 %      RDW-SD 47.8 fl      MPV 9.2 fL      Platelets 140 10*3/mm3      Neutrophil % 61.0 %      Lymphocyte % 24.3 %      Monocyte % 9.9 %      Eosinophil % 4.0 %      Basophil % 0.5 %      Immature Grans % 0.3 %      Neutrophils, Absolute 3.95 10*3/mm3      Lymphocytes, Absolute 1.57 10*3/mm3      Monocytes, Absolute 0.64 10*3/mm3      Eosinophils, Absolute 0.26 10*3/mm3      Basophils, Absolute 0.03 10*3/mm3      Immature Grans, Absolute 0.02 10*3/mm3     Urinalysis With / Culture If Indicated [[id number]]  (Abnormal) Collected:  07/16/17 0248    Specimen:  Urine from Urine, Catheter Updated:  07/16/17 0305     Color, UA Yellow     Appearance, UA Clear     pH, UA 6.5     Specific Gravity, UA 1.011     Glucose, UA Negative     Ketones, UA Negative     Bilirubin, UA Negative     Blood, UA Negative     Protein, UA Negative     Leuk Esterase, UA Small (1+) (A)     Nitrite, UA Negative     Urobilinogen, UA 0.2 E.U./dL    Urinalysis, Microscopic Only [[id number]]  (Abnormal) Collected:  07/16/17 0248    Specimen:  Urine from Urine, Catheter Updated:  07/16/17 0312     RBC, UA 0-2 /HPF      WBC, UA 13-20 (A) /HPF      Bacteria, UA None Seen /HPF      Squamous Epithelial Cells, UA 0-2 /HPF      Hyaline Casts, UA None Seen /LPF      Methodology Automated Microscopy    Urine Culture [[id number]] Collected:  07/16/17 0248    Specimen:  Urine from Urine, Catheter Updated:  07/16/17 0312          I ordered the above labs and reviewed the results    RADIOLOGY  XR Pelvis 1 or 2 View   Final Result   No acute osseous finding.                       TWO-VIEW LEFT KNEE       HISTORY: FALL       FINDINGS: 2 views of the left knee were submitted. There is no fracture   or dislocation. Arthroplasty device is in place with anatomic alignment   and no evidence of hardware complication. Soft tissues are unremarkable.   No joint effusion.       IMPRESSION:   1. No acute osseous abnormality.       This report  was finalized on 7/16/2017 2:54 AM by Luis Felipe Davila MD.          XR Knee 1 or 2 View Left   Final Result   No acute osseous finding.                       TWO-VIEW LEFT KNEE       HISTORY: FALL       FINDINGS: 2 views of the left knee were submitted. There is no fracture   or dislocation. Arthroplasty device is in place with anatomic alignment   and no evidence of hardware complication. Soft tissues are unremarkable.   No joint effusion.       IMPRESSION:   1. No acute osseous abnormality.       This report was finalized on 7/16/2017 2:54 AM by Luis Felipe Davila MD.          XR Chest 1 View   Final Result   Mild under aeration but no active disease by portable   imaging       This report was finalized on 7/16/2017 2:52 AM by Luis Felipe Davila MD.          CT Cervical Spine Without Contrast   Final Result   1. Advanced multilevel degenerative and arthritic changes, no acute   osseous finding however       This report was finalized on 7/16/2017 2:50 AM by Luis Felipe Davila MD.          CT Head Without Contrast   Final Result   1. Stable atrophy and chronic-appearing parenchymal changes.                           This study was performed with techniques to keep radiation doses as low   as reasonably achievable (ALARA). Individualized dose reduction   techniques using automated exposure control or adjustment of mA and/or   kV according to the patient size were employed.        This report was finalized on 7/16/2017 2:47 AM by Luis Felipe Davila MD.             I ordered the above noted radiological studies. Interpreted by radiologist. Reviewed by me in PACS.       PROCEDURES  Procedures    EKG           EKG time: 02:02  Rhythm/Rate: NSR 75  P waves and AL: First degree AV block  QRS, axis: LAD   ST and T waves: Normal     Interpreted Contemporaneously by me, independently viewed  Unchanged compared to prior 3/14/17      PROGRESS AND CONSULTS  ED Course     01:54  Ordered CT head, CT C spine, EKG, labs, CXR, XR L Knee, and XR Pelvis  for further evaluation.     16:27  Rechecked the patient and she is resting. Patient notes that she's been drinking fluid more recently due to her UTI. Discussed pertinent lab and imaging results, including her radiological images show nothing acute although I explained the patient is hyponatremic.  She has been pushing fluids for an apparent UTI and I advised her to limit her PO fluids to thirst. I also explained that there were 13-20 WBC seen on her UA and a culture will be sent but to continue the antibiotics she was started on for now. Discussed the plan to discharge the patient home for quick follow up if her symptoms don't resolve quickly in the next 24hrs or so. Patient agrees with plan and all questions were addressed.     Latest vital signs   BP- 134/75 HR- 92 Temp- 96.7 °F (35.9 °C) (Tympanic) O2 sat- 96%    04:41  Per ER Tech, patient was ambulatory in the ED with her walker.       MEDICAL DECISION MAKING  Results were reviewed/discussed with the patient and they were also made aware of online access. Pt also made aware that some labs, such as cultures, will not be resulted during ER visit and follow up with PMD is necessary.     MDM  Number of Diagnoses or Management Options  Fall, initial encounter:   Hyponatremia:      Amount and/or Complexity of Data Reviewed  Clinical lab tests: reviewed and ordered (Na 127)  Tests in the radiology section of CPT®: reviewed and ordered (CT Head and C spine - nothing acute)  Tests in the medicine section of CPT®: ordered and reviewed (See EKG)  Discussion of test results with the performing providers: yes  Decide to obtain previous medical records or to obtain history from someone other than the patient: yes    Patient Progress  Patient progress: stable         DIAGNOSIS  Final diagnoses:   Fall, initial encounter   Hyponatremia       DISPOSITION  DISCHARGE    Patient discharged in stable condition.    Reviewed implications of results, diagnosis, meds, responsibility  to follow up, warning signs and symptoms of possible worsening, potential complications and reasons to return to ER, including any further falls.     Patient/Family voiced understanding of above instructions.    Discussed plan for discharge, as there is no emergent indication for admission.  Pt/family is agreeable and understands need for follow up and repeat testing.  Pt is aware that discharge does not mean that nothing is wrong but it indicates no emergency is present that requires admission and they must continue care with follow-up as given below or physician of their choice.     FOLLOW-UP  Sharad Salinas MD  [address] [facility] [address]  [place] [id number] [phone]      Recheck with your PMD this week if your symptoms persist. A urine Culture is pending.  We should call you if you still have a UTI.         Medication List      Notice     No changes were made to your prescriptions during this visit.            Latest Documented Vital Signs:  As of 4:57 AM  BP- 134/75 HR- 92 Temp- 96.7 °F (35.9 °C) (Tympanic) O2 sat- 96%    --  Documentation assistance provided by smita Callahan for .  Information recorded by the smita was done at my direction and has been verified and validated by me.           Edin Callahan  07/16/17 2398       Alex Dumont MD  07/16/17 0964       Alex Dumont MD  07/16/17 [id number]

## 2017-07-16 NOTE — ED TRIAGE NOTES
Pt to ED per [facility] s/p fall while getting out of chair.  Pt c/o left knee pain.  Pt ambulating without difficulty per EMS.

## 2017-07-17 LAB — BACTERIA SPEC AEROBE CULT: NO GROWTH

## 2017-07-19 ENCOUNTER — TELEPHONE (OUTPATIENT)
Dept: SOCIAL WORK | Facility: HOSPITAL | Age: 82
End: 2017-07-19

## 2017-07-19 NOTE — TELEPHONE ENCOUNTER
ER F/U phone call:   Pt states that she is feeling better. Denies the need to see her PCP. No further difficulties with urination. No other questions or concerns voiced at this time. Kayla Hart RN

## 2017-07-24 RX ORDER — CLOPIDOGREL BISULFATE 75 MG/1
TABLET ORAL
Qty: 90 TABLET | Refills: 0 | Status: SHIPPED | OUTPATIENT
Start: 2017-07-24 | End: 2018-11-13

## 2017-08-02 RX ORDER — MIRTAZAPINE 15 MG/1
TABLET, FILM COATED ORAL
Qty: 30 TABLET | Refills: 2 | Status: SHIPPED | OUTPATIENT
Start: 2017-08-02 | End: 2018-11-13

## 2017-08-10 ENCOUNTER — OFFICE VISIT (OUTPATIENT)
Dept: ORTHOPEDIC SURGERY | Facility: CLINIC | Age: 82
End: 2017-08-10

## 2017-08-10 VITALS — TEMPERATURE: 97.8 F | HEIGHT: 64 IN | BODY MASS INDEX: 35.85 KG/M2 | WEIGHT: 210 LBS

## 2017-08-10 DIAGNOSIS — Z96.652 STATUS POST TOTAL LEFT KNEE REPLACEMENT: Primary | ICD-10-CM

## 2017-08-10 PROCEDURE — 99213 OFFICE O/P EST LOW 20 MIN: CPT | Performed by: ORTHOPAEDIC SURGERY

## 2017-08-10 NOTE — PROGRESS NOTES
Patient: Kim Feldman  YOB: 1930 87 y.o. female  Medical Record Number: [id number]    Chief Complaints:   Chief Complaint   Patient presents with   • Left Knee - Follow-up, Pain       History of Present Illness:Kim Feldman is a 87 y.o. female who presents for follow-up of  Left total knee replacement overall is doing reasonably well with the exception of some mild soreness about the anterior aspect of the left knee.  Few weeks ago she sustained a fall and has had some increasing swelling and soreness since the fall.  She was actually seen in the emergency department and then presents here for follow-up she describes an aching pain worse with any activity or pressure.    Allergies:   Allergies   Allergen Reactions   • Penicillins Hives     Has previously tolerated ceftriaxone       Medications:   Current Outpatient Prescriptions   Medication Sig Dispense Refill   • acetaminophen (TYLENOL) 500 MG tablet Take 1,000 mg by mouth Every 6 (Six) Hours As Needed for Mild Pain (1-3).     • B Complex Vitamins (VITAMIN B COMPLEX PO) Take 1 tablet by mouth every morning. HOLD PRIOR TO SURG     • Calcium-Vitamin D-Vitamin K (VIACTIV PO) Take 500 mg by mouth Every Night. HOLD PRIOR TO SURG      • clopidogrel (PLAVIX) 75 MG tablet TAKE ONE TABLET BY MOUTH DAILY 90 tablet 0   • diclofenac (VOLTAREN) 1 % gel gel Apply 4 g topically 4 (Four) Times a Day As Needed. HOLD PRIOR TO SURGERY     • docusate sodium (COLACE) 100 MG capsule Take 100 mg by mouth Every Night.     • gabapentin (NEURONTIN) 300 MG capsule Take 2 capsules by mouth 3 (Three) Times a Day. 540 capsule 2   • hydrocortisone 2.5 % lotion      • Hypromellose (ARTIFICIAL TEARS OP) Apply 2 drops to eye 4 (four) times a day as needed.     • ketoconazole (NIZORAL) 2 % cream      • ketoconazole (NIZORAL) 2 % shampoo      • lansoprazole (PREVACID) 30 MG capsule TAKE ONE CAPSULE BY MOUTH EVERY MORNING 30 capsule 2   • levETIRAcetam (KEPPRA) 500 MG tablet  "Take 1 tablet by mouth 2 (Two) Times a Day. 60 tablet 11   • Loratadine 10 MG capsule Take 1 capsule by mouth daily as needed.     • methylcellulose, Laxative, (CITRUCEL) 500 MG tablet tablet Take 2 tablets by mouth Every Morning.     • mirtazapine (REMERON) 15 MG tablet TAKE ONE TABLET BY MOUTH EVERY NIGHT AT BEDTIME 30 tablet 2     Current Facility-Administered Medications   Medication Dose Route Frequency Provider Last Rate Last Dose   • cyanocobalamin injection 1,000 mcg  1,000 mcg Intramuscular Q28 Days Sharad Salinas MD   1,000 mcg at 07/07/17 1305         The following portions of the patient's history were reviewed and updated as appropriate: allergies, current medications, past family history, past medical history, past social history, past surgical history and problem list.    Review of Systems:   A 14 point review of systems was performed. All systems negative except pertinent positives/negative listed in HPI above    Physical Exam:   Vitals:    08/10/17 1439   Temp: 97.8 °F (36.6 °C)   TempSrc: Temporal Artery    Weight: 210 lb (95.3 kg)   Height: 64\" (162.6 cm)       General: A and O x 3, ASA, NAD    SCLERA:    Normal    DENTITION:   Normal  Good range of motion 0-120 there is no instability in flexion or extension.  The skin shows some resolving ecchymosis    Radiology:  Xrays 3views left knee (ap,lateral, sunrise) recently taken at outside institution were reviewed demonstratinga well positioned knee replacement without evidence of wear, loosening or osteolysis  todays xrays were compared to previous xrays and demonstrate no change    Assessment/Plan:  Left knee contusion of recommended ice and range of motion exercises.  She recommended that she go see a physical therapist but she is against the idea.  I will see her back on an as-needed basis.  "

## 2017-08-14 ENCOUNTER — TELEPHONE (OUTPATIENT)
Dept: INTERNAL MEDICINE | Facility: CLINIC | Age: 82
End: 2017-08-14

## 2017-08-14 DIAGNOSIS — E87.1 HYPONATREMIA: Primary | ICD-10-CM

## 2017-08-14 NOTE — TELEPHONE ENCOUNTER
Patient's daughter, Leandra. Called stating that when patient was last seen in the ER about 3-4 weeks ago her sodium level was low.  She wanted to know if this could be rechecked.  Please advise.

## 2017-08-17 ENCOUNTER — TELEPHONE (OUTPATIENT)
Dept: INTERNAL MEDICINE | Facility: CLINIC | Age: 82
End: 2017-08-17

## 2017-08-17 LAB
BUN SERPL-MCNC: 16 MG/DL (ref 8–23)
BUN/CREAT SERPL: 18.2 (ref 7–25)
CALCIUM SERPL-MCNC: 9.4 MG/DL (ref 8.6–10.5)
CHLORIDE SERPL-SCNC: 94 MMOL/L (ref 98–107)
CO2 SERPL-SCNC: 27.5 MMOL/L (ref 22–29)
CREAT SERPL-MCNC: 0.88 MG/DL (ref 0.57–1)
GLUCOSE SERPL-MCNC: 97 MG/DL (ref 65–99)
POTASSIUM SERPL-SCNC: 5 MMOL/L (ref 3.5–5.2)
SODIUM SERPL-SCNC: 133 MMOL/L (ref 136–145)

## 2017-08-17 NOTE — TELEPHONE ENCOUNTER
Patient's daughter, Leandra, notified.  She said that patient's chloride was down and wanted to know what this would effect.  Please advise.

## 2017-08-18 NOTE — TELEPHONE ENCOUNTER
LMA - patient's daughter, Leandra [phone], notified that it is due to the lansoprazole per Dr. Salinas.

## 2017-08-29 ENCOUNTER — TRANSCRIBE ORDERS (OUTPATIENT)
Dept: ADMINISTRATIVE | Facility: HOSPITAL | Age: 82
End: 2017-08-29

## 2017-08-29 DIAGNOSIS — K21.9 CHRONIC GERD: Primary | ICD-10-CM

## 2017-08-29 DIAGNOSIS — K22.5 ZENKER'S (HYPOPHARYNGEAL) DIVERTICULUM: ICD-10-CM

## 2017-09-07 ENCOUNTER — HOSPITAL ENCOUNTER (OUTPATIENT)
Dept: GENERAL RADIOLOGY | Facility: HOSPITAL | Age: 82
Discharge: HOME | End: 2017-09-07
Admitting: INTERNAL MEDICINE

## 2017-09-07 DIAGNOSIS — K22.5 ZENKER'S (HYPOPHARYNGEAL) DIVERTICULUM: ICD-10-CM

## 2017-09-07 DIAGNOSIS — K21.9 CHRONIC GERD: ICD-10-CM

## 2017-09-07 PROCEDURE — 74220 X-RAY XM ESOPHAGUS 1CNTRST: CPT

## 2017-09-08 ENCOUNTER — CLINICAL SUPPORT (OUTPATIENT)
Dept: INTERNAL MEDICINE | Facility: CLINIC | Age: 82
End: 2017-09-08

## 2017-09-08 DIAGNOSIS — E53.8 B12 DEFICIENCY: ICD-10-CM

## 2017-09-08 PROCEDURE — 96372 THER/PROPH/DIAG INJ SC/IM: CPT | Performed by: FAMILY MEDICINE

## 2017-09-08 RX ADMIN — CYANOCOBALAMIN 1000 MCG: 1000 INJECTION, SOLUTION INTRAMUSCULAR; SUBCUTANEOUS at 13:18

## 2017-09-15 ENCOUNTER — APPOINTMENT (OUTPATIENT)
Dept: GENERAL RADIOLOGY | Facility: HOSPITAL | Age: 82
End: 2017-09-15

## 2017-09-15 ENCOUNTER — HOSPITAL ENCOUNTER (EMERGENCY)
Facility: HOSPITAL | Age: 82
Discharge: HOME | End: 2017-09-15
Attending: FAMILY MEDICINE | Admitting: FAMILY MEDICINE

## 2017-09-15 VITALS
WEIGHT: 210 LBS | OXYGEN SATURATION: 98 % | BODY MASS INDEX: 35.85 KG/M2 | SYSTOLIC BLOOD PRESSURE: 176 MMHG | DIASTOLIC BLOOD PRESSURE: 97 MMHG | RESPIRATION RATE: 18 BRPM | HEIGHT: 64 IN | TEMPERATURE: 98.3 F | HEART RATE: 79 BPM

## 2017-09-15 DIAGNOSIS — R20.0 NUMBNESS AND TINGLING: ICD-10-CM

## 2017-09-15 DIAGNOSIS — R20.2 NUMBNESS AND TINGLING: ICD-10-CM

## 2017-09-15 DIAGNOSIS — M54.9 CVA TENDERNESS: Primary | ICD-10-CM

## 2017-09-15 LAB
ALBUMIN SERPL-MCNC: 3.9 G/DL (ref 3.5–5.2)
ALBUMIN/GLOB SERPL: 1.2 G/DL
ALP SERPL-CCNC: 52 U/L (ref 39–117)
ALT SERPL W P-5'-P-CCNC: 12 U/L (ref 1–33)
ANION GAP SERPL CALCULATED.3IONS-SCNC: 14.4 MMOL/L
AST SERPL-CCNC: 18 U/L (ref 1–32)
BASOPHILS # BLD AUTO: 0.04 10*3/MM3 (ref 0–0.2)
BASOPHILS NFR BLD AUTO: 0.7 % (ref 0–1.5)
BILIRUB SERPL-MCNC: 0.5 MG/DL (ref 0.1–1.2)
BILIRUB UR QL STRIP: NEGATIVE
BUN BLD-MCNC: 14 MG/DL (ref 8–23)
BUN/CREAT SERPL: 17.9 (ref 7–25)
CALCIUM SPEC-SCNC: 9.5 MG/DL (ref 8.6–10.5)
CHLORIDE SERPL-SCNC: 96 MMOL/L (ref 98–107)
CLARITY UR: CLEAR
CO2 SERPL-SCNC: 25.6 MMOL/L (ref 22–29)
COLOR UR: YELLOW
CREAT BLD-MCNC: 0.78 MG/DL (ref 0.57–1)
DEPRECATED RDW RBC AUTO: 45.4 FL (ref 37–54)
EOSINOPHIL # BLD AUTO: 0.16 10*3/MM3 (ref 0–0.7)
EOSINOPHIL NFR BLD AUTO: 2.7 % (ref 0.3–6.2)
ERYTHROCYTE [DISTWIDTH] IN BLOOD BY AUTOMATED COUNT: 12 % (ref 11.7–13)
GFR SERPL CREATININE-BSD FRML MDRD: 70 ML/MIN/1.73
GLOBULIN UR ELPH-MCNC: 3.2 GM/DL
GLUCOSE BLD-MCNC: 92 MG/DL (ref 65–99)
GLUCOSE UR STRIP-MCNC: NEGATIVE MG/DL
HCT VFR BLD AUTO: 36 % (ref 35.6–45.5)
HGB BLD-MCNC: 11.9 G/DL (ref 11.9–15.5)
HGB UR QL STRIP.AUTO: NEGATIVE
IMM GRANULOCYTES # BLD: 0 10*3/MM3 (ref 0–0.03)
IMM GRANULOCYTES NFR BLD: 0 % (ref 0–0.5)
KETONES UR QL STRIP: NEGATIVE
LEUKOCYTE ESTERASE UR QL STRIP.AUTO: NEGATIVE
LYMPHOCYTES # BLD AUTO: 1.82 10*3/MM3 (ref 0.9–4.8)
LYMPHOCYTES NFR BLD AUTO: 30.2 % (ref 19.6–45.3)
MCH RBC QN AUTO: 34.3 PG (ref 26.9–32)
MCHC RBC AUTO-ENTMCNC: 33.1 G/DL (ref 32.4–36.3)
MCV RBC AUTO: 103.7 FL (ref 80.5–98.2)
MONOCYTES # BLD AUTO: 0.55 10*3/MM3 (ref 0.2–1.2)
MONOCYTES NFR BLD AUTO: 9.1 % (ref 5–12)
NEUTROPHILS # BLD AUTO: 3.46 10*3/MM3 (ref 1.9–8.1)
NEUTROPHILS NFR BLD AUTO: 57.3 % (ref 42.7–76)
NITRITE UR QL STRIP: NEGATIVE
PH UR STRIP.AUTO: 7.5 [PH] (ref 5–8)
PLATELET # BLD AUTO: 176 10*3/MM3 (ref 140–500)
PMV BLD AUTO: 8.9 FL (ref 6–12)
POTASSIUM BLD-SCNC: 4.2 MMOL/L (ref 3.5–5.2)
PROT SERPL-MCNC: 7.1 G/DL (ref 6–8.5)
PROT UR QL STRIP: NEGATIVE
RBC # BLD AUTO: 3.47 10*6/MM3 (ref 3.9–5.2)
SODIUM BLD-SCNC: 136 MMOL/L (ref 136–145)
SP GR UR STRIP: 1.01 (ref 1–1.03)
UROBILINOGEN UR QL STRIP: NORMAL
WBC NRBC COR # BLD: 6.03 10*3/MM3 (ref 4.5–10.7)

## 2017-09-15 PROCEDURE — 80053 COMPREHEN METABOLIC PANEL: CPT | Performed by: FAMILY MEDICINE

## 2017-09-15 PROCEDURE — 81003 URINALYSIS AUTO W/O SCOPE: CPT | Performed by: FAMILY MEDICINE

## 2017-09-15 PROCEDURE — 71020 HC CHEST PA AND LATERAL: CPT

## 2017-09-15 PROCEDURE — 85025 COMPLETE CBC W/AUTO DIFF WBC: CPT | Performed by: FAMILY MEDICINE

## 2017-09-15 PROCEDURE — 99284 EMERGENCY DEPT VISIT MOD MDM: CPT

## 2017-09-15 NOTE — ED PROVIDER NOTES
" EMERGENCY DEPARTMENT ENCOUNTER    CHIEF COMPLAINT  Chief Complaint: Numbness / tingling  History given by: Patient / Daughter  History limited by: Nothing  Room Number: 27/27  PMD: Sharad Salinas MD      HPI:  Pt is a 87 y.o. female who presents to the ED after the patient developed diffuse numbness / tingling she descries as \"needles and pins\" and subjective fever which both began around 02:30AM this morning though it is somewhat vague.  She denies a history of panic.  Patient explains that she has chronic waxing and waning numbness, that is residual in her LLE. The patient's Daughter is at bedside and helps aid with the history. She explains that the patient has a chronic cough, although she's noticed an increased cough over the past 48 hours without sputum production. Patient denies any recent nausea, vomiting, diarrhea or dysuria since onset. Patient explains that she's experienced similar symptoms in the past and she was either found to have a UTI or pneumonia. Her most recent episode of numbness / tingling was in March of 2017.  recently performed cauterization of Zenker's diverticulum and she has no other complaints at this time. She had a recent swallowing study that did not confirm aspiration, thought there was some laryngeal penetration.      Duration:  9 hours  Onset: Sudden  Timing: Constant  Location: Diffuse  Radiation: None  Quality: No pain  Intensity/Severity: Moderate  Progression: No Change  Associated Symptoms: Numbness / tingling, subjective fever, cough  Aggravating Factors: Unknown  Alleviating Factors: None  Previous Episodes: Yes, patient found to have UTI or pneumonia   Treatment before arrival: None mentioned    PAST MEDICAL HISTORY  Active Ambulatory Problems     Diagnosis Date Noted   • Anemia 02/05/2016   • Bulging lumbar disc 02/05/2016   • Depression, 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   • Gastroesophageal reflux disease 02/05/2016   • Hyperlipidemia 02/05/2016   • Intermittent " claudication 02/05/2016   • Neuropathy 02/05/2016   • Osteoarthritis of knee 02/05/2016   • Syndrome of inappropriate secretion of antidiuretic hormone 02/05/2016   • Vitamin D deficiency 02/05/2016   • History of sick sinus syndrome    • Intertrigo 03/25/2016   • 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   • Urine frequency 05/20/2016   • Change in bowel habits 05/20/2016   • Urinary tract infection 07/08/2016   • 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   • History of anxiety 10/18/2016   • Urinary tract infection in female 01/03/2017   • 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   • Thrombocytopenia 01/05/2017   • B12 deficiency 01/16/2017   • Pain of toe of right foot 01/16/2017   • Weakness 02/03/2017   • Cardiac pacemaker in situ 03/08/2017   • Chronic venous insufficiency 03/09/2017   • Arthritis 03/09/2017   • S/P knee replacement 03/09/2017   • Chronic bilateral low back pain without sciatica 03/09/2017   • Essential hypertension 03/09/2017   • Hiatal hernia 03/16/2017   • Acute vaginitis 03/23/2017   • Zenker's diverticulum 04/14/2017   • Acute pain of left knee 05/19/2017   • Chronic idiopathic constipation 05/19/2017   • Pressure sore on buttocks 05/19/2017     Resolved Ambulatory Problems     Diagnosis Date Noted   • Hyponatremia 02/05/2016   • Pneumonia 07/08/2016   • Aspiration pneumonia 07/17/2016   • History of aspiration pneumonitis 01/04/2017   • Right lower lobe pneumonia 02/02/2017   • Sepsis due to pneumonia 02/02/2017   • UTI (urinary tract infection) 03/15/2017   • Acute kidney injury 03/17/2017     Past Medical History:   Diagnosis Date   • Anemia    • Bulging lumbar disc    • Chronic cough    • Chronic UTI    • Chronic venous insufficiency    • Clonic seizure disorder    • DDD (degenerative disc disease), lumbosacral    • Dementia without behavioral disturbance    • Depression, endogenous    • Disc degeneration, lumbar    • Dysphagia    • GERD (gastroesophageal reflux disease)    • Hiatal hernia    •  History of anxiety    • History of aspiration pneumonitis    • History of cerebral artery occlusion    • History of herpes zoster    • History of ingrowing nail    • History of osteoporosis    • History of poliomyelitis    • History of sciatica    • History of sick sinus syndrome    • History of transient cerebral ischemia    • History of Zenker's diverticulum removal 2016   • HX: long term anticoagulant use    • Hyperlipidemia    • Hypertension    • Hyponatremia    • Intertrigo    • Lumbar radiculopathy    • Morbid obesity    • Neuralgia    • Nonepileptic episode    • Osteoarthritis of right knee    • Pacemaker    • Pneumonia    • Seeing double    • SIADH (syndrome of inappropriate ADH production)    • Vitamin D deficiency    • Zenker's diverticulum        PAST SURGICAL HISTORY  Past Surgical History:   Procedure Laterality Date   • CARDIAC PACEMAKER PLACEMENT      secondary to SSS, placed in 2004, with generator change in 2014   • CATARACT EXTRACTION W/ INTRAOCULAR LENS IMPLANT Bilateral    • COLONOSCOPY     • HAND SURGERY Right    • KNEE ARTHROPLASTY Left    • LAMINECTOMY FOR IMPLANTATION / PLACEMENT NEUROSTIMULATOR ELECTRODES     • LUMBAR LAMINECTOMY      L-3 L4 L4 L5   • TONSILLECTOMY     • ZENKERS DIVERTICULECTOMY N/A 9/27/2016    Procedure: ENDOSCOPIC REMOVAL OF ZENKERS DIVERTICULUM W/ WERDASCOPE;  Surgeon: Oswald Barth MD;  Location: [facility];  Service:    • ZENKERS DIVERTICULECTOMY N/A 4/14/2017    Procedure: ESOPHAGOSCOPY;  Surgeon: Oswald Barth MD;  Location: [facility] OR;  Service:        FAMILY HISTORY  Family History   Problem Relation Age of Onset   • Cancer Daughter        SOCIAL HISTORY  Social History     Social History   • Marital status:      Spouse name: N/A   • Number of children: 3   • Years of education: N/A     Occupational History   • Retired      Social History Main Topics   • Smoking status: Former Smoker     Packs/day: 1.00     Years: 40.00     Types: Cigarettes      Quit date: 1992   • Smokeless tobacco: Never Used      Comment: QUIT 1992   • Alcohol use Yes      Comment: 1-2 DRINKS EVERY SIX MONTHS HOLIDAY DRINKER   • Drug use: No   • Sexual activity: Defer     Other Topics Concern   • Not on file     Social History Narrative       ALLERGIES  Penicillins    REVIEW OF SYSTEMS  Review of Systems   Constitutional: Positive for fever (subjective).   HENT: Negative.  Negative for sore throat.    Eyes: Negative.    Respiratory: Positive for cough.    Cardiovascular: Negative.  Negative for chest pain.   Gastrointestinal: Negative.    Genitourinary: Negative.  Negative for dysuria.   Musculoskeletal: Negative.  Negative for back pain.   Skin: Negative.  Negative for rash.   Neurological: Positive for numbness. Negative for headaches.       PHYSICAL EXAM  ED Triage Vitals   Temp Heart Rate Resp BP SpO2   09/15/17 1051 09/15/17 1051 09/15/17 1051 09/15/17 1058 09/15/17 1051   98.6 °F (37 °C) 84 18 169/94 96 %      Temp src Heart Rate Source Patient Position BP Location FiO2 (%)   09/15/17 1051 09/15/17 1051 09/15/17 1058 09/15/17 1058 --   Tympanic Monitor Sitting Right arm        Physical Exam   Constitutional: She is oriented to person, place, and time. No distress.   HENT:   Head: Normocephalic and atraumatic.   Mouth/Throat: Oropharynx is clear and moist.   Cardiovascular: Normal rate and regular rhythm.    Pulmonary/Chest: Breath sounds normal. No respiratory distress.   Abdominal: Soft. There is no tenderness. There is CVA tenderness (L).   Musculoskeletal: She exhibits no edema or tenderness.   Neurological: She is alert and oriented to person, place, and time.   Skin: Skin is warm. No rash noted.   Mild pedal edema   Nursing note and vitals reviewed.      LAB RESULTS  Lab Results (last 24 hours)     Procedure Component Value Units Date/Time    CBC & Differential [[id number]] Collected:  09/15/17 1342    Specimen:  Blood Updated:  09/15/17 1352    Narrative:       The following  orders were created for panel order CBC & Differential.  Procedure                               Abnormality         Status                     ---------                               -----------         ------                     CBC Auto Differential[[id number]]        Abnormal            Final result                 Please view results for these tests on the individual orders.    Comprehensive Metabolic Panel [[id number]]  (Abnormal) Collected:  09/15/17 1342    Specimen:  Blood Updated:  09/15/17 1417     Glucose 92 mg/dL      BUN 14 mg/dL      Creatinine 0.78 mg/dL      Sodium 136 mmol/L      Potassium 4.2 mmol/L      Chloride 96 (L) mmol/L      CO2 25.6 mmol/L      Calcium 9.5 mg/dL      Total Protein 7.1 g/dL      Albumin 3.90 g/dL      ALT (SGPT) 12 U/L      AST (SGOT) 18 U/L      Alkaline Phosphatase 52 U/L      Total Bilirubin 0.5 mg/dL      eGFR Non African Amer 70 mL/min/1.73      Globulin 3.2 gm/dL      A/G Ratio 1.2 g/dL      BUN/Creatinine Ratio 17.9     Anion Gap 14.4 mmol/L     Narrative:       The MDRD GFR formula is only valid for adults with stable renal function between ages 18 and 70.    CBC Auto Differential [[id number]]  (Abnormal) Collected:  09/15/17 1342    Specimen:  Blood Updated:  09/15/17 1352     WBC 6.03 10*3/mm3      RBC 3.47 (L) 10*6/mm3      Hemoglobin 11.9 g/dL      Hematocrit 36.0 %      .7 (H) fL      MCH 34.3 (H) pg      MCHC 33.1 g/dL      RDW 12.0 %      RDW-SD 45.4 fl      MPV 8.9 fL      Platelets 176 10*3/mm3      Neutrophil % 57.3 %      Lymphocyte % 30.2 %      Monocyte % 9.1 %      Eosinophil % 2.7 %      Basophil % 0.7 %      Immature Grans % 0.0 %      Neutrophils, Absolute 3.46 10*3/mm3      Lymphocytes, Absolute 1.82 10*3/mm3      Monocytes, Absolute 0.55 10*3/mm3      Eosinophils, Absolute 0.16 10*3/mm3      Basophils, Absolute 0.04 10*3/mm3      Immature Grans, Absolute 0.00 10*3/mm3     Urinalysis With / Culture If Indicated [[id number]]  (Normal) Collected:   09/15/17 1347    Specimen:  Urine from Urine, Catheter Updated:  09/15/17 1403     Color, UA Yellow     Appearance, UA Clear     pH, UA 7.5     Specific Gravity, UA 1.014     Glucose, UA Negative     Ketones, UA Negative     Bilirubin, UA Negative     Blood, UA Negative     Protein, UA Negative     Leuk Esterase, UA Negative     Nitrite, UA Negative     Urobilinogen, UA 0.2 E.U./dL    Narrative:       Urine microscopic not indicated.          I ordered the above labs and reviewed the results    RADIOLOGY  XR Chest 2 View   Final Result   1. Stable negative acute chest       This report was finalized on 9/15/2017 2:32 PM by Dr. Dino Lugo MD.             I ordered the above noted radiological studies. Interpreted by radiologist. Reviewed by me in PACS.       PROCEDURES  Procedures      PROGRESS AND CONSULTS  ED Course     1327  Ordered Labs and CXR for further evaluation.     1444  Rechecked the patient and she is resting comfortably and sipping on coffee. Discussed pertinent lab and imaging results, including her CXR and UA are both unremarkable. Patient is handling liquids without difficulty so I discussed the plan to have the RN re-check the patient's temperature and likely discharge the patient home. She reiterates that she was worried about her symptoms since she developed a pneumonia or UTI the last time she experienced similar symptoms. I recommended that the patient follow up with her PCP as directed. Patient agrees with plan and all questions were addressed.     Latest vital signs   BP- 175/90 HR- 77 Temp- 97.8 °F (36.6 °C) (Axillary) O2 sat- 95%    1452  RN states that the patient is afebrile and she will be discharged home.       MEDICAL DECISION MAKING  Results were reviewed/discussed with the patient and they were also made aware of online access. Pt also made aware that some labs, such as cultures, will not be resulted during ER visit and follow up with PMD is necessary.     MDM  Number of Diagnoses  or Management Options     Amount and/or Complexity of Data Reviewed  Clinical lab tests: reviewed and ordered (Unremarkable)  Tests in the radiology section of CPT®: reviewed and ordered (CXR - stable negative acute)  Decide to obtain previous medical records or to obtain history from someone other than the patient: yes  Review and summarize past medical records: yes (Cauterization of zenker's diverticulum. Swallow study 8/29 which showed mild laryngeal penetration without aspiration. )    Patient Progress  Patient progress: stable         DIAGNOSIS  Final diagnoses:   CVA tenderness   Numbness and tingling       DISPOSITION  DISCHARGE    Patient discharged in stable condition.    Reviewed implications of results, diagnosis, meds, responsibility to follow up, warning signs and symptoms of possible worsening, potential complications and reasons to return to ER, including worsening back pain or development of fever.     Patient/Family voiced understanding of above instructions.    Discussed plan for discharge, as there is no emergent indication for admission.  Pt/family is agreeable and understands need for follow up and repeat testing.  Pt is aware that discharge does not mean that nothing is wrong but it indicates no emergency is present that requires admission and they must continue care with follow-up as given below or physician of their choice.     FOLLOW-UP  Sharad Salinas MD  [id number] [address]  [phone]      Call for a recheck later this week if any persistent symptoms         Medication List      Notice     No changes were made to your prescriptions during this visit.          Latest Documented Vital Signs:  As of 2:53 PM  BP- 176/97 HR- 79 Temp- 98.3 °F (36.8 °C) O2 sat- 98%  --  Documentation assistance provided by smita Callahan for .  Information recorded by the smita was done at my direction and has been verified and validated by me.        Edin  Sindy  09/15/17 1455       Alex Dumont MD  09/15/17 [id number]

## 2017-09-18 ENCOUNTER — TELEPHONE (OUTPATIENT)
Dept: SOCIAL WORK | Facility: HOSPITAL | Age: 82
End: 2017-09-18

## 2017-09-19 RX ORDER — GABAPENTIN 300 MG/1
600 CAPSULE ORAL 3 TIMES DAILY
Qty: 540 CAPSULE | Refills: 0 | Status: ON HOLD | OUTPATIENT
Start: 2017-09-19 | End: 2018-11-21 | Stop reason: SDUPTHER

## 2017-09-20 ENCOUNTER — CLINICAL SUPPORT NO REQUIREMENTS (OUTPATIENT)
Dept: CARDIOLOGY | Facility: CLINIC | Age: 82
End: 2017-09-20

## 2017-09-20 DIAGNOSIS — I49.5 SICK SINUS SYNDROME (HCC): Primary | ICD-10-CM

## 2017-09-20 PROCEDURE — 93280 PM DEVICE PROGR EVAL DUAL: CPT | Performed by: INTERNAL MEDICINE

## 2017-09-22 ENCOUNTER — OFFICE VISIT (OUTPATIENT)
Dept: INTERNAL MEDICINE | Facility: CLINIC | Age: 82
End: 2017-09-22

## 2017-09-22 VITALS
DIASTOLIC BLOOD PRESSURE: 82 MMHG | HEART RATE: 89 BPM | SYSTOLIC BLOOD PRESSURE: 128 MMHG | BODY MASS INDEX: 36.19 KG/M2 | OXYGEN SATURATION: 98 % | TEMPERATURE: 98.2 F | WEIGHT: 212 LBS | HEIGHT: 64 IN

## 2017-09-22 DIAGNOSIS — M25.562 ACUTE PAIN OF LEFT KNEE: ICD-10-CM

## 2017-09-22 DIAGNOSIS — M19.90 ARTHRITIS: ICD-10-CM

## 2017-09-22 DIAGNOSIS — I10 ESSENTIAL HYPERTENSION: Primary | ICD-10-CM

## 2017-09-22 PROCEDURE — 99213 OFFICE O/P EST LOW 20 MIN: CPT | Performed by: FAMILY MEDICINE

## 2017-09-28 ENCOUNTER — APPOINTMENT (OUTPATIENT)
Dept: GENERAL RADIOLOGY | Facility: HOSPITAL | Age: 82
End: 2017-09-28

## 2017-09-28 ENCOUNTER — HOSPITAL ENCOUNTER (EMERGENCY)
Facility: HOSPITAL | Age: 82
Discharge: HOME | End: 2017-09-28
Attending: EMERGENCY MEDICINE | Admitting: EMERGENCY MEDICINE

## 2017-09-28 VITALS
SYSTOLIC BLOOD PRESSURE: 150 MMHG | DIASTOLIC BLOOD PRESSURE: 86 MMHG | HEIGHT: 64 IN | TEMPERATURE: 97.7 F | WEIGHT: 212 LBS | RESPIRATION RATE: 18 BRPM | OXYGEN SATURATION: 96 % | HEART RATE: 75 BPM | BODY MASS INDEX: 36.19 KG/M2

## 2017-09-28 DIAGNOSIS — R07.89 ATYPICAL CHEST PAIN: Primary | ICD-10-CM

## 2017-09-28 DIAGNOSIS — R53.1 GENERALIZED WEAKNESS: ICD-10-CM

## 2017-09-28 LAB
ALBUMIN SERPL-MCNC: 3.9 G/DL (ref 3.5–5.2)
ALBUMIN/GLOB SERPL: 1.1 G/DL
ALP SERPL-CCNC: 52 U/L (ref 39–117)
ALT SERPL W P-5'-P-CCNC: 12 U/L (ref 1–33)
ANION GAP SERPL CALCULATED.3IONS-SCNC: 13.2 MMOL/L
AST SERPL-CCNC: 18 U/L (ref 1–32)
BASOPHILS # BLD AUTO: 0.04 10*3/MM3 (ref 0–0.2)
BASOPHILS NFR BLD AUTO: 0.5 % (ref 0–1.5)
BILIRUB SERPL-MCNC: 0.5 MG/DL (ref 0.1–1.2)
BUN BLD-MCNC: 15 MG/DL (ref 8–23)
BUN/CREAT SERPL: 19 (ref 7–25)
CALCIUM SPEC-SCNC: 9.6 MG/DL (ref 8.6–10.5)
CHLORIDE SERPL-SCNC: 97 MMOL/L (ref 98–107)
CO2 SERPL-SCNC: 24.8 MMOL/L (ref 22–29)
CREAT BLD-MCNC: 0.79 MG/DL (ref 0.57–1)
DEPRECATED RDW RBC AUTO: 45.6 FL (ref 37–54)
EOSINOPHIL # BLD AUTO: 0.16 10*3/MM3 (ref 0–0.7)
EOSINOPHIL NFR BLD AUTO: 2.1 % (ref 0.3–6.2)
ERYTHROCYTE [DISTWIDTH] IN BLOOD BY AUTOMATED COUNT: 12.1 % (ref 11.7–13)
GFR SERPL CREATININE-BSD FRML MDRD: 69 ML/MIN/1.73
GLOBULIN UR ELPH-MCNC: 3.4 GM/DL
GLUCOSE BLD-MCNC: 94 MG/DL (ref 65–99)
HCT VFR BLD AUTO: 36.7 % (ref 35.6–45.5)
HGB BLD-MCNC: 12.2 G/DL (ref 11.9–15.5)
HOLD SPECIMEN: NORMAL
HOLD SPECIMEN: NORMAL
IMM GRANULOCYTES # BLD: 0 10*3/MM3 (ref 0–0.03)
IMM GRANULOCYTES NFR BLD: 0 % (ref 0–0.5)
LYMPHOCYTES # BLD AUTO: 1.81 10*3/MM3 (ref 0.9–4.8)
LYMPHOCYTES NFR BLD AUTO: 24.1 % (ref 19.6–45.3)
MCH RBC QN AUTO: 34.4 PG (ref 26.9–32)
MCHC RBC AUTO-ENTMCNC: 33.2 G/DL (ref 32.4–36.3)
MCV RBC AUTO: 103.4 FL (ref 80.5–98.2)
MONOCYTES # BLD AUTO: 0.69 10*3/MM3 (ref 0.2–1.2)
MONOCYTES NFR BLD AUTO: 9.2 % (ref 5–12)
NEUTROPHILS # BLD AUTO: 4.8 10*3/MM3 (ref 1.9–8.1)
NEUTROPHILS NFR BLD AUTO: 64.1 % (ref 42.7–76)
PLATELET # BLD AUTO: 185 10*3/MM3 (ref 140–500)
PMV BLD AUTO: 9 FL (ref 6–12)
POTASSIUM BLD-SCNC: 4.4 MMOL/L (ref 3.5–5.2)
PROT SERPL-MCNC: 7.3 G/DL (ref 6–8.5)
RBC # BLD AUTO: 3.55 10*6/MM3 (ref 3.9–5.2)
SODIUM BLD-SCNC: 135 MMOL/L (ref 136–145)
TROPONIN T SERPL-MCNC: <0.01 NG/ML (ref 0–0.03)
TROPONIN T SERPL-MCNC: <0.01 NG/ML (ref 0–0.03)
WBC NRBC COR # BLD: 7.5 10*3/MM3 (ref 4.5–10.7)
WHOLE BLOOD HOLD SPECIMEN: NORMAL
WHOLE BLOOD HOLD SPECIMEN: NORMAL

## 2017-09-28 PROCEDURE — 36415 COLL VENOUS BLD VENIPUNCTURE: CPT | Performed by: EMERGENCY MEDICINE

## 2017-09-28 PROCEDURE — 71020 HC CHEST PA AND LATERAL: CPT

## 2017-09-28 PROCEDURE — 93010 ELECTROCARDIOGRAM REPORT: CPT | Performed by: INTERNAL MEDICINE

## 2017-09-28 PROCEDURE — 84484 ASSAY OF TROPONIN QUANT: CPT | Performed by: EMERGENCY MEDICINE

## 2017-09-28 PROCEDURE — 85025 COMPLETE CBC W/AUTO DIFF WBC: CPT | Performed by: EMERGENCY MEDICINE

## 2017-09-28 PROCEDURE — 99283 EMERGENCY DEPT VISIT LOW MDM: CPT

## 2017-09-28 PROCEDURE — 80053 COMPREHEN METABOLIC PANEL: CPT | Performed by: EMERGENCY MEDICINE

## 2017-09-28 PROCEDURE — 93005 ELECTROCARDIOGRAM TRACING: CPT

## 2017-09-28 RX ORDER — ASPIRIN 325 MG
325 TABLET ORAL ONCE
Status: DISCONTINUED | OUTPATIENT
Start: 2017-09-28 | End: 2017-09-29 | Stop reason: HOSPADM

## 2017-09-28 RX ORDER — SODIUM CHLORIDE 0.9 % (FLUSH) 0.9 %
10 SYRINGE (ML) INJECTION AS NEEDED
Status: DISCONTINUED | OUTPATIENT
Start: 2017-09-28 | End: 2017-09-29 | Stop reason: HOSPADM

## 2017-09-29 NOTE — PROGRESS NOTES
"Kim Feldman is a 87 y.o. female.  Seen 09/22/2017    Assessment/Plan   Problem List Items Addressed This Visit        Cardiovascular and Mediastinum    Essential hypertension - Primary       Musculoskeletal and Integument    Arthritis    Acute pain of left knee           [place] management of chronic medical problems follow-up as scheduled in 3 months    Subjective     Chief Complaint   Patient presents with   • [facility] Follow Up     patient was seen on 9/15 for back pain, patient's daughter has FMLA paper work to be filled out   • left leg pain     patient states that after she sits for a while and tries to get up and walk she can't. Patient has tried voltaren gel. Patient stated she feels tingling in her foot at all times     Social History   Substance Use Topics   • Smoking status: Former Smoker     Packs/day: 1.00     Years: 40.00     Types: Cigarettes     Quit date: 1992   • Smokeless tobacco: Never Used      Comment: QUIT 1992   • Alcohol use Yes      Comment: 1-2 DRINKS EVERY SIX MONTHS HOLIDAY DRINKER       History of Present Illness   Patient comes in for follow-up of emergency department visit for back pain and leg pain is getting improved with nonsteroidal anti-inflammatories she is using walker a little bit but mostly sedentary she feels things are improving daughter like form filled out for ongoing FMLA to provide services to the patient for driving to restart his appointments  The following portions of the patient's history were reviewed and updated as appropriate:PMHroutine: Social history , Past Medical History, Allergies, Current Medications, Active Problem List and Health Maintenance    Review of Systems    Objective   Vitals:    09/22/17 1433   BP: 128/82   BP Location: Left arm   Patient Position: Sitting   Cuff Size: Adult   Pulse: 89   Temp: 98.2 °F (36.8 °C)   TempSrc: Oral   SpO2: 98%   Weight: 212 lb (96.2 kg)   Height: 64\" (162.6 cm)     Body mass index is 36.39 " kg/(m^2).  Physical Exam  Reviewed Data:  Admission on 09/15/2017, Discharged on 09/15/2017   Component Date Value Ref Range Status   • Glucose 09/15/2017 92  65 - 99 mg/dL Final   • BUN 09/15/2017 14  8 - 23 mg/dL Final   • Creatinine 09/15/2017 0.78  0.57 - 1.00 mg/dL Final   • Sodium 09/15/2017 136  136 - 145 mmol/L Final   • Potassium 09/15/2017 4.2  3.5 - 5.2 mmol/L Final   • Chloride 09/15/2017 96* 98 - 107 mmol/L Final   • CO2 09/15/2017 25.6  22.0 - 29.0 mmol/L Final   • Calcium 09/15/2017 9.5  8.6 - 10.5 mg/dL Final   • Total Protein 09/15/2017 7.1  6.0 - 8.5 g/dL Final   • Albumin 09/15/2017 3.90  3.50 - 5.20 g/dL Final   • ALT (SGPT) 09/15/2017 12  1 - 33 U/L Final   • AST (SGOT) 09/15/2017 18  1 - 32 U/L Final   • Alkaline Phosphatase 09/15/2017 52  39 - 117 U/L Final   • Total Bilirubin 09/15/2017 0.5  0.1 - 1.2 mg/dL Final   • eGFR Non African Amer 09/15/2017 70  >60 mL/min/1.73 Final   • Globulin 09/15/2017 3.2  gm/dL Final   • A/G Ratio 09/15/2017 1.2  g/dL Final   • BUN/Creatinine Ratio 09/15/2017 17.9  7.0 - 25.0 Final   • Anion Gap 09/15/2017 14.4  mmol/L Final   • Color, UA 09/15/2017 Yellow  Yellow, Straw Final   • Appearance, UA 09/15/2017 Clear  Clear Final   • pH, UA 09/15/2017 7.5  5.0 - 8.0 Final   • Specific Gravity, UA 09/15/2017 1.014  1.005 - 1.030 Final   • Glucose, UA 09/15/2017 Negative  Negative Final   • Ketones, UA 09/15/2017 Negative  Negative Final   • Bilirubin, UA 09/15/2017 Negative  Negative Final   • Blood, UA 09/15/2017 Negative  Negative Final   • Protein, UA 09/15/2017 Negative  Negative Final   • Leuk Esterase, UA 09/15/2017 Negative  Negative Final   • Nitrite, UA 09/15/2017 Negative  Negative Final   • Urobilinogen, UA 09/15/2017 0.2 E.U./dL  0.2 - 1.0 E.U./dL Final   • WBC 09/15/2017 6.03  4.50 - 10.70 10*3/mm3 Final   • RBC 09/15/2017 3.47* 3.90 - 5.20 10*6/mm3 Final   • Hemoglobin 09/15/2017 11.9  11.9 - 15.5 g/dL Final   • Hematocrit 09/15/2017 36.0  35.6 - 45.5 %  Final   • MCV 09/15/2017 103.7* 80.5 - 98.2 fL Final   • MCH 09/15/2017 34.3* 26.9 - 32.0 pg Final   • MCHC 09/15/2017 33.1  32.4 - 36.3 g/dL Final   • RDW 09/15/2017 12.0  11.7 - 13.0 % Final   • RDW-SD 09/15/2017 45.4  37.0 - 54.0 fl Final   • MPV 09/15/2017 8.9  6.0 - 12.0 fL Final   • Platelets 09/15/2017 176  140 - 500 10*3/mm3 Final   • Neutrophil % 09/15/2017 57.3  42.7 - 76.0 % Final   • Lymphocyte % 09/15/2017 30.2  19.6 - 45.3 % Final   • Monocyte % 09/15/2017 9.1  5.0 - 12.0 % Final   • Eosinophil % 09/15/2017 2.7  0.3 - 6.2 % Final   • Basophil % 09/15/2017 0.7  0.0 - 1.5 % Final   • Immature Grans % 09/15/2017 0.0  0.0 - 0.5 % Final   • Neutrophils, Absolute 09/15/2017 3.46  1.90 - 8.10 10*3/mm3 Final   • Lymphocytes, Absolute 09/15/2017 1.82  0.90 - 4.80 10*3/mm3 Final   • Monocytes, Absolute 09/15/2017 0.55  0.20 - 1.20 10*3/mm3 Final   • Eosinophils, Absolute 09/15/2017 0.16  0.00 - 0.70 10*3/mm3 Final   • Basophils, Absolute 09/15/2017 0.04  0.00 - 0.20 10*3/mm3 Final   • Immature Grans, Absolute 09/15/2017 0.00  0.00 - 0.03 10*3/mm3 Final

## 2017-10-06 ENCOUNTER — TELEPHONE (OUTPATIENT)
Dept: SOCIAL WORK | Facility: HOSPITAL | Age: 82
End: 2017-10-06

## 2017-10-06 NOTE — TELEPHONE ENCOUNTER
ER F/U phone call:   Pt states that she is feeling better. Intends on keeping her scheduled appointment with her cardiologist. No other questions or concerns voiced at this time. Kayla Hart RN

## 2017-10-09 ENCOUNTER — CLINICAL SUPPORT (OUTPATIENT)
Dept: INTERNAL MEDICINE | Facility: CLINIC | Age: 82
End: 2017-10-09

## 2017-10-09 DIAGNOSIS — E53.8 B12 DEFICIENCY: ICD-10-CM

## 2017-10-09 PROCEDURE — 96372 THER/PROPH/DIAG INJ SC/IM: CPT | Performed by: FAMILY MEDICINE

## 2017-10-09 PROCEDURE — 90471 IMMUNIZATION ADMIN: CPT | Performed by: FAMILY MEDICINE

## 2017-10-09 PROCEDURE — 90662 IIV NO PRSV INCREASED AG IM: CPT | Performed by: FAMILY MEDICINE

## 2017-10-09 RX ORDER — LANSOPRAZOLE 30 MG/1
CAPSULE, DELAYED RELEASE ORAL
Qty: 30 CAPSULE | Refills: 2 | Status: SHIPPED | OUTPATIENT
Start: 2017-10-09 | End: 2018-11-13

## 2017-10-09 RX ADMIN — CYANOCOBALAMIN 1000 MCG: 1000 INJECTION, SOLUTION INTRAMUSCULAR; SUBCUTANEOUS at 11:43

## 2017-12-13 ENCOUNTER — APPOINTMENT (OUTPATIENT)
Dept: GENERAL RADIOLOGY | Facility: HOSPITAL | Age: 82
End: 2017-12-13

## 2017-12-13 ENCOUNTER — HOSPITAL ENCOUNTER (OUTPATIENT)
Facility: HOSPITAL | Age: 82
Setting detail: OBSERVATION
Discharge: HOME | End: 2017-12-16
Attending: EMERGENCY MEDICINE | Admitting: HOSPITALIST

## 2017-12-13 ENCOUNTER — APPOINTMENT (OUTPATIENT)
Dept: CT IMAGING | Facility: HOSPITAL | Age: 82
End: 2017-12-13

## 2017-12-13 DIAGNOSIS — N39.0 CHRONIC UTI: ICD-10-CM

## 2017-12-13 DIAGNOSIS — R40.0 SOMNOLENCE: Primary | ICD-10-CM

## 2017-12-13 DIAGNOSIS — I63.9 LEFT PONTINE CVA (HCC): ICD-10-CM

## 2017-12-13 DIAGNOSIS — Z74.09 IMPAIRED FUNCTIONAL MOBILITY, BALANCE, GAIT, AND ENDURANCE: ICD-10-CM

## 2017-12-13 LAB
ALBUMIN SERPL-MCNC: 3.2 G/DL (ref 3.5–5.2)
ALBUMIN/GLOB SERPL: 0.9 G/DL
ALP SERPL-CCNC: 54 U/L (ref 39–117)
ALT SERPL W P-5'-P-CCNC: 12 U/L (ref 1–33)
ANION GAP SERPL CALCULATED.3IONS-SCNC: 10.9 MMOL/L
ANION GAP SERPL CALCULATED.3IONS-SCNC: 9.5 MMOL/L
APTT PPP: 34.8 SECONDS (ref 22.7–35.4)
AST SERPL-CCNC: 16 U/L (ref 1–32)
BASOPHILS # BLD AUTO: 0.02 10*3/MM3 (ref 0–0.2)
BASOPHILS NFR BLD AUTO: 0.2 % (ref 0–1.5)
BILIRUB SERPL-MCNC: 0.5 MG/DL (ref 0.1–1.2)
BILIRUB UR QL STRIP: NEGATIVE
BUN BLD-MCNC: 24 MG/DL (ref 8–23)
BUN BLD-MCNC: 25 MG/DL (ref 8–23)
BUN/CREAT SERPL: 18.5 (ref 7–25)
BUN/CREAT SERPL: 19.2 (ref 7–25)
CALCIUM SPEC-SCNC: 8.7 MG/DL (ref 8.6–10.5)
CALCIUM SPEC-SCNC: 8.9 MG/DL (ref 8.6–10.5)
CHLORIDE SERPL-SCNC: 97 MMOL/L (ref 98–107)
CHLORIDE SERPL-SCNC: 98 MMOL/L (ref 98–107)
CLARITY UR: ABNORMAL
CO2 SERPL-SCNC: 27.1 MMOL/L (ref 22–29)
CO2 SERPL-SCNC: 27.5 MMOL/L (ref 22–29)
COLOR UR: YELLOW
CREAT BLD-MCNC: 1.25 MG/DL (ref 0.57–1)
CREAT BLD-MCNC: 1.35 MG/DL (ref 0.57–1)
DEPRECATED RDW RBC AUTO: 48.9 FL (ref 37–54)
EOSINOPHIL # BLD AUTO: 0.13 10*3/MM3 (ref 0–0.7)
EOSINOPHIL NFR BLD AUTO: 1.1 % (ref 0.3–6.2)
ERYTHROCYTE [DISTWIDTH] IN BLOOD BY AUTOMATED COUNT: 13 % (ref 11.7–13)
GFR SERPL CREATININE-BSD FRML MDRD: 37 ML/MIN/1.73
GFR SERPL CREATININE-BSD FRML MDRD: 41 ML/MIN/1.73
GLOBULIN UR ELPH-MCNC: 3.5 GM/DL
GLUCOSE BLD-MCNC: 109 MG/DL (ref 65–99)
GLUCOSE BLD-MCNC: 111 MG/DL (ref 65–99)
GLUCOSE UR STRIP-MCNC: NEGATIVE MG/DL
HCT VFR BLD AUTO: 33.3 % (ref 35.6–45.5)
HGB BLD-MCNC: 11 G/DL (ref 11.9–15.5)
HGB UR QL STRIP.AUTO: ABNORMAL
IMM GRANULOCYTES # BLD: 0.03 10*3/MM3 (ref 0–0.03)
IMM GRANULOCYTES NFR BLD: 0.3 % (ref 0–0.5)
INR PPP: 1.05 (ref 0.9–1.1)
KETONES UR QL STRIP: NEGATIVE
LEUKOCYTE ESTERASE UR QL STRIP.AUTO: ABNORMAL
LYMPHOCYTES # BLD AUTO: 2.53 10*3/MM3 (ref 0.9–4.8)
LYMPHOCYTES NFR BLD AUTO: 21.9 % (ref 19.6–45.3)
MCH RBC QN AUTO: 33.8 PG (ref 26.9–32)
MCHC RBC AUTO-ENTMCNC: 33 G/DL (ref 32.4–36.3)
MCV RBC AUTO: 102.5 FL (ref 80.5–98.2)
MONOCYTES # BLD AUTO: 0.79 10*3/MM3 (ref 0.2–1.2)
MONOCYTES NFR BLD AUTO: 6.8 % (ref 5–12)
NEUTROPHILS # BLD AUTO: 8.06 10*3/MM3 (ref 1.9–8.1)
NEUTROPHILS NFR BLD AUTO: 69.7 % (ref 42.7–76)
NITRITE UR QL STRIP: POSITIVE
PH UR STRIP.AUTO: 6 [PH] (ref 5–8)
PLATELET # BLD AUTO: 154 10*3/MM3 (ref 140–500)
PMV BLD AUTO: 9 FL (ref 6–12)
POTASSIUM BLD-SCNC: 4.5 MMOL/L (ref 3.5–5.2)
POTASSIUM BLD-SCNC: 4.7 MMOL/L (ref 3.5–5.2)
PROT SERPL-MCNC: 6.7 G/DL (ref 6–8.5)
PROT UR QL STRIP: ABNORMAL
PROTHROMBIN TIME: 13.3 SECONDS (ref 11.7–14.2)
RBC # BLD AUTO: 3.25 10*6/MM3 (ref 3.9–5.2)
SODIUM BLD-SCNC: 134 MMOL/L (ref 136–145)
SODIUM BLD-SCNC: 136 MMOL/L (ref 136–145)
SP GR UR STRIP: 1.02 (ref 1–1.03)
TROPONIN T SERPL-MCNC: <0.01 NG/ML (ref 0–0.03)
UROBILINOGEN UR QL STRIP: ABNORMAL
WBC NRBC COR # BLD: 11.56 10*3/MM3 (ref 4.5–10.7)

## 2017-12-13 PROCEDURE — 25010000002 LEVOFLOXACIN PER 250 MG: Performed by: EMERGENCY MEDICINE

## 2017-12-13 PROCEDURE — 70450 CT HEAD/BRAIN W/O DYE: CPT

## 2017-12-13 PROCEDURE — G0378 HOSPITAL OBSERVATION PER HR: HCPCS

## 2017-12-13 PROCEDURE — 93010 ELECTROCARDIOGRAM REPORT: CPT | Performed by: INTERNAL MEDICINE

## 2017-12-13 PROCEDURE — 85610 PROTHROMBIN TIME: CPT | Performed by: PHYSICIAN ASSISTANT

## 2017-12-13 PROCEDURE — 99284 EMERGENCY DEPT VISIT MOD MDM: CPT

## 2017-12-13 PROCEDURE — 36415 COLL VENOUS BLD VENIPUNCTURE: CPT | Performed by: PHYSICIAN ASSISTANT

## 2017-12-13 PROCEDURE — 71010 HC CHEST PA OR AP: CPT

## 2017-12-13 PROCEDURE — 80053 COMPREHEN METABOLIC PANEL: CPT | Performed by: PHYSICIAN ASSISTANT

## 2017-12-13 PROCEDURE — 81001 URINALYSIS AUTO W/SCOPE: CPT | Performed by: PHYSICIAN ASSISTANT

## 2017-12-13 PROCEDURE — 87040 BLOOD CULTURE FOR BACTERIA: CPT | Performed by: EMERGENCY MEDICINE

## 2017-12-13 PROCEDURE — 99285 EMERGENCY DEPT VISIT HI MDM: CPT

## 2017-12-13 PROCEDURE — 84484 ASSAY OF TROPONIN QUANT: CPT | Performed by: PHYSICIAN ASSISTANT

## 2017-12-13 PROCEDURE — 85730 THROMBOPLASTIN TIME PARTIAL: CPT | Performed by: PHYSICIAN ASSISTANT

## 2017-12-13 PROCEDURE — 87086 URINE CULTURE/COLONY COUNT: CPT | Performed by: PHYSICIAN ASSISTANT

## 2017-12-13 PROCEDURE — 93005 ELECTROCARDIOGRAM TRACING: CPT | Performed by: PHYSICIAN ASSISTANT

## 2017-12-13 PROCEDURE — 73502 X-RAY EXAM HIP UNI 2-3 VIEWS: CPT

## 2017-12-13 PROCEDURE — 85025 COMPLETE CBC W/AUTO DIFF WBC: CPT | Performed by: PHYSICIAN ASSISTANT

## 2017-12-13 PROCEDURE — 87186 SC STD MICRODIL/AGAR DIL: CPT | Performed by: PHYSICIAN ASSISTANT

## 2017-12-13 RX ORDER — LEVOFLOXACIN 5 MG/ML
750 INJECTION, SOLUTION INTRAVENOUS ONCE
Status: COMPLETED | OUTPATIENT
Start: 2017-12-13 | End: 2017-12-13

## 2017-12-13 RX ORDER — ASPIRIN 300 MG/1
300 SUPPOSITORY RECTAL ONCE
Status: COMPLETED | OUTPATIENT
Start: 2017-12-13 | End: 2017-12-13

## 2017-12-13 RX ORDER — SODIUM CHLORIDE 0.9 % (FLUSH) 0.9 %
10 SYRINGE (ML) INJECTION AS NEEDED
Status: DISCONTINUED | OUTPATIENT
Start: 2017-12-13 | End: 2017-12-16 | Stop reason: HOSPADM

## 2017-12-13 RX ADMIN — LEVOFLOXACIN 750 MG: 5 INJECTION, SOLUTION INTRAVENOUS at 23:47

## 2017-12-13 RX ADMIN — ASPIRIN 300 MG: 300 SUPPOSITORY RECTAL at 23:39

## 2017-12-14 ENCOUNTER — APPOINTMENT (OUTPATIENT)
Dept: CARDIOLOGY | Facility: HOSPITAL | Age: 82
End: 2017-12-14
Attending: HOSPITALIST

## 2017-12-14 ENCOUNTER — APPOINTMENT (OUTPATIENT)
Dept: CT IMAGING | Facility: HOSPITAL | Age: 82
End: 2017-12-14

## 2017-12-14 LAB
BACTERIA UR QL AUTO: ABNORMAL /HPF
BH CV ECHO MEAS - ACS: 1.8 CM
BH CV ECHO MEAS - AO MAX PG: 5 MMHG
BH CV ECHO MEAS - AO MEAN PG (FULL): 0 MMHG
BH CV ECHO MEAS - AO MEAN PG: 3 MMHG
BH CV ECHO MEAS - AO ROOT AREA (BSA CORRECTED): 1.5
BH CV ECHO MEAS - AO ROOT AREA: 6.6 CM^2
BH CV ECHO MEAS - AO ROOT DIAM: 2.9 CM
BH CV ECHO MEAS - AO V2 MAX: 112 CM/SEC
BH CV ECHO MEAS - AO V2 MEAN: 85.8 CM/SEC
BH CV ECHO MEAS - AO V2 VTI: 27.4 CM
BH CV ECHO MEAS - AVA(I,A): 3.2 CM^2
BH CV ECHO MEAS - AVA(I,D): 3.2 CM^2
BH CV ECHO MEAS - BSA(HAYCOCK): 2.1 M^2
BH CV ECHO MEAS - BSA: 2 M^2
BH CV ECHO MEAS - BZI_BMI: 37.2 KILOGRAMS/M^2
BH CV ECHO MEAS - BZI_METRIC_HEIGHT: 160 CM
BH CV ECHO MEAS - BZI_METRIC_WEIGHT: 95.3 KG
BH CV ECHO MEAS - CONTRAST EF (2CH): 65.2 ML/M^2
BH CV ECHO MEAS - CONTRAST EF 4CH: 68.6 ML/M^2
BH CV ECHO MEAS - EDV(CUBED): 110.6 ML
BH CV ECHO MEAS - EDV(MOD-SP2): 115 ML
BH CV ECHO MEAS - EDV(MOD-SP4): 118 ML
BH CV ECHO MEAS - EDV(TEICH): 107.5 ML
BH CV ECHO MEAS - EF(CUBED): 77.9 %
BH CV ECHO MEAS - EF(MOD-SP2): 65.2 %
BH CV ECHO MEAS - EF(MOD-SP4): 68.6 %
BH CV ECHO MEAS - EF(TEICH): 70 %
BH CV ECHO MEAS - ESV(CUBED): 24.4 ML
BH CV ECHO MEAS - ESV(MOD-SP2): 40 ML
BH CV ECHO MEAS - ESV(MOD-SP4): 37 ML
BH CV ECHO MEAS - ESV(TEICH): 32.2 ML
BH CV ECHO MEAS - FS: 39.6 %
BH CV ECHO MEAS - IVS/LVPW: 1
BH CV ECHO MEAS - IVSD: 1.2 CM
BH CV ECHO MEAS - LAT PEAK E' VEL: 7 CM/SEC
BH CV ECHO MEAS - LV DIASTOLIC VOL/BSA (35-75): 59.8 ML/M^2
BH CV ECHO MEAS - LV MASS(C)D: 219.1 GRAMS
BH CV ECHO MEAS - LV MASS(C)DI: 111 GRAMS/M^2
BH CV ECHO MEAS - LV MEAN PG: 3 MMHG
BH CV ECHO MEAS - LV SYSTOLIC VOL/BSA (12-30): 18.7 ML/M^2
BH CV ECHO MEAS - LV V1 MEAN: 83 CM/SEC
BH CV ECHO MEAS - LV V1 VTI: 28.1 CM
BH CV ECHO MEAS - LVIDD: 4.8 CM
BH CV ECHO MEAS - LVIDS: 2.9 CM
BH CV ECHO MEAS - LVLD AP2: 7.5 CM
BH CV ECHO MEAS - LVLD AP4: 7.6 CM
BH CV ECHO MEAS - LVLS AP2: 6.3 CM
BH CV ECHO MEAS - LVLS AP4: 6.2 CM
BH CV ECHO MEAS - LVOT AREA (M): 3.1 CM^2
BH CV ECHO MEAS - LVOT AREA: 3.1 CM^2
BH CV ECHO MEAS - LVOT DIAM: 2 CM
BH CV ECHO MEAS - LVPWD: 1.2 CM
BH CV ECHO MEAS - MED PEAK E' VEL: 4 CM/SEC
BH CV ECHO MEAS - MR MAX PG: 80.6 MMHG
BH CV ECHO MEAS - MR MAX VEL: 449 CM/SEC
BH CV ECHO MEAS - MV A DUR: 150 SEC
BH CV ECHO MEAS - MV A MAX VEL: 163 CM/SEC
BH CV ECHO MEAS - MV DEC SLOPE: 364 CM/SEC^2
BH CV ECHO MEAS - MV DEC TIME: 111 SEC
BH CV ECHO MEAS - MV E MAX VEL: 132 CM/SEC
BH CV ECHO MEAS - MV E/A: 0.81
BH CV ECHO MEAS - MV MEAN PG: 6 MMHG
BH CV ECHO MEAS - MV P1/2T MAX VEL: 138 CM/SEC
BH CV ECHO MEAS - MV P1/2T: 111 MSEC
BH CV ECHO MEAS - MV V2 MEAN: 116 CM/SEC
BH CV ECHO MEAS - MV V2 VTI: 32 CM
BH CV ECHO MEAS - MVA P1/2T LCG: 1.6 CM^2
BH CV ECHO MEAS - MVA(P1/2T): 2 CM^2
BH CV ECHO MEAS - MVA(VTI): 2.8 CM^2
BH CV ECHO MEAS - PA ACC SLOPE: 757 CM/SEC^2
BH CV ECHO MEAS - PA ACC TIME: 0.09 SEC
BH CV ECHO MEAS - PA MAX PG (FULL): 0.27 MMHG
BH CV ECHO MEAS - PA MAX PG: 1.6 MMHG
BH CV ECHO MEAS - PA PR(ACCEL): 40.8 MMHG
BH CV ECHO MEAS - PA V2 MAX: 62.7 CM/SEC
BH CV ECHO MEAS - PULM A REVS DUR: 106 SEC
BH CV ECHO MEAS - PULM A REVS VEL: 46.4 CM/SEC
BH CV ECHO MEAS - PULM DIAS VEL: 82.4 CM/SEC
BH CV ECHO MEAS - PULM S/D: 1.1
BH CV ECHO MEAS - PULM SYS VEL: 92.8 CM/SEC
BH CV ECHO MEAS - PVA(V,A): 3.5 CM^2
BH CV ECHO MEAS - PVA(V,D): 3.5 CM^2
BH CV ECHO MEAS - QP/QS: 0.71
BH CV ECHO MEAS - RAP SYSTOLE: 3 MMHG
BH CV ECHO MEAS - RV MAX PG: 1.3 MMHG
BH CV ECHO MEAS - RV MEAN PG: 1 MMHG
BH CV ECHO MEAS - RV V1 MAX: 57.1 CM/SEC
BH CV ECHO MEAS - RV V1 MEAN: 39.2 CM/SEC
BH CV ECHO MEAS - RV V1 VTI: 16.4 CM
BH CV ECHO MEAS - RVOT AREA: 3.8 CM^2
BH CV ECHO MEAS - RVOT DIAM: 2.2 CM
BH CV ECHO MEAS - RVSP: 36.2 MMHG
BH CV ECHO MEAS - SI(AO): 91.7 ML/M^2
BH CV ECHO MEAS - SI(CUBED): 43.7 ML/M^2
BH CV ECHO MEAS - SI(LVOT): 44.7 ML/M^2
BH CV ECHO MEAS - SI(MOD-SP2): 38 ML/M^2
BH CV ECHO MEAS - SI(MOD-SP4): 41 ML/M^2
BH CV ECHO MEAS - SI(TEICH): 38.1 ML/M^2
BH CV ECHO MEAS - SV(AO): 181 ML
BH CV ECHO MEAS - SV(CUBED): 86.2 ML
BH CV ECHO MEAS - SV(LVOT): 88.3 ML
BH CV ECHO MEAS - SV(MOD-SP2): 75 ML
BH CV ECHO MEAS - SV(MOD-SP4): 81 ML
BH CV ECHO MEAS - SV(RVOT): 62.3 ML
BH CV ECHO MEAS - SV(TEICH): 75.3 ML
BH CV ECHO MEAS - TAPSE (>1.6): 1.6 CM2
BH CV ECHO MEAS - TR MAX VEL: 288 CM/SEC
BH CV VAS BP RIGHT ARM: NORMAL MMHG
BH CV XLRA - RV BASE: 2.4 CM
BH CV XLRA - TDI S': 15 CM/SEC
BH CV XLRA MEAS LEFT DIST CCA EDV: 20.4 CM/SEC
BH CV XLRA MEAS LEFT DIST CCA PSV: 73.9 CM/SEC
BH CV XLRA MEAS LEFT DIST ICA EDV: -22 CM/SEC
BH CV XLRA MEAS LEFT DIST ICA PSV: -84.9 CM/SEC
BH CV XLRA MEAS LEFT MID ICA EDV: 24.4 CM/SEC
BH CV XLRA MEAS LEFT MID ICA PSV: 84.9 CM/SEC
BH CV XLRA MEAS LEFT PROX CCA EDV: 13.4 CM/SEC
BH CV XLRA MEAS LEFT PROX CCA PSV: 74.6 CM/SEC
BH CV XLRA MEAS LEFT PROX ECA EDV: 28.3 CM/SEC
BH CV XLRA MEAS LEFT PROX ECA PSV: 141 CM/SEC
BH CV XLRA MEAS LEFT PROX ICA EDV: -22.8 CM/SEC
BH CV XLRA MEAS LEFT PROX ICA PSV: -96.6 CM/SEC
BH CV XLRA MEAS LEFT PROX SCLA PSV: 87.4 CM/SEC
BH CV XLRA MEAS LEFT VERTEBRAL A EDV: 17.3 CM/SEC
BH CV XLRA MEAS LEFT VERTEBRAL A PSV: 72.3 CM/SEC
BH CV XLRA MEAS RIGHT DIST CCA EDV: 14.7 CM/SEC
BH CV XLRA MEAS RIGHT DIST CCA PSV: 81.5 CM/SEC
BH CV XLRA MEAS RIGHT DIST ICA EDV: -24.4 CM/SEC
BH CV XLRA MEAS RIGHT DIST ICA PSV: -84.1 CM/SEC
BH CV XLRA MEAS RIGHT MID ICA EDV: -28.8 CM/SEC
BH CV XLRA MEAS RIGHT MID ICA PSV: -97.5 CM/SEC
BH CV XLRA MEAS RIGHT PROX CCA EDV: 20.5 CM/SEC
BH CV XLRA MEAS RIGHT PROX CCA PSV: 60.4 CM/SEC
BH CV XLRA MEAS RIGHT PROX ECA EDV: 35.4 CM/SEC
BH CV XLRA MEAS RIGHT PROX ECA PSV: 200 CM/SEC
BH CV XLRA MEAS RIGHT PROX ICA EDV: -25.1 CM/SEC
BH CV XLRA MEAS RIGHT PROX ICA PSV: -106 CM/SEC
BH CV XLRA MEAS RIGHT PROX SCLA PSV: 67.9 CM/SEC
BH CV XLRA MEAS RIGHT VERTEBRAL A EDV: -8.2 CM/SEC
BH CV XLRA MEAS RIGHT VERTEBRAL A PSV: -54.5 CM/SEC
E/E' RATIO: 26
HYALINE CASTS UR QL AUTO: ABNORMAL /LPF
LEFT ARM BP: NORMAL MMHG
LEFT ATRIUM VOLUME INDEX: 39 ML/M2
LV EF 2D ECHO EST: 69 %
PA ADP PRP-ACNC: 138 PRU (ref 194–418)
RBC # UR: ABNORMAL /HPF
REF LAB TEST METHOD: ABNORMAL
RIGHT ARM BP: NORMAL MMHG
SQUAMOUS #/AREA URNS HPF: ABNORMAL /HPF
WBC UR QL AUTO: ABNORMAL /HPF

## 2017-12-14 PROCEDURE — G0378 HOSPITAL OBSERVATION PER HR: HCPCS

## 2017-12-14 PROCEDURE — 93306 TTE W/DOPPLER COMPLETE: CPT

## 2017-12-14 PROCEDURE — 70450 CT HEAD/BRAIN W/O DYE: CPT

## 2017-12-14 PROCEDURE — G8979 MOBILITY GOAL STATUS: HCPCS

## 2017-12-14 PROCEDURE — 93306 TTE W/DOPPLER COMPLETE: CPT | Performed by: INTERNAL MEDICINE

## 2017-12-14 PROCEDURE — 97110 THERAPEUTIC EXERCISES: CPT

## 2017-12-14 PROCEDURE — 99223 1ST HOSP IP/OBS HIGH 75: CPT | Performed by: RADIOLOGY

## 2017-12-14 PROCEDURE — 25010000002 LEVOFLOXACIN PER 250 MG: Performed by: HOSPITALIST

## 2017-12-14 PROCEDURE — G8978 MOBILITY CURRENT STATUS: HCPCS

## 2017-12-14 PROCEDURE — 97112 NEUROMUSCULAR REEDUCATION: CPT

## 2017-12-14 PROCEDURE — 92610 EVALUATE SWALLOWING FUNCTION: CPT

## 2017-12-14 PROCEDURE — 97165 OT EVAL LOW COMPLEX 30 MIN: CPT

## 2017-12-14 PROCEDURE — G8988 SELF CARE GOAL STATUS: HCPCS

## 2017-12-14 PROCEDURE — 97162 PT EVAL MOD COMPLEX 30 MIN: CPT

## 2017-12-14 PROCEDURE — G8989 SELF CARE D/C STATUS: HCPCS

## 2017-12-14 PROCEDURE — 93880 EXTRACRANIAL BILAT STUDY: CPT

## 2017-12-14 PROCEDURE — 85576 BLOOD PLATELET AGGREGATION: CPT | Performed by: RADIOLOGY

## 2017-12-14 PROCEDURE — G8987 SELF CARE CURRENT STATUS: HCPCS

## 2017-12-14 RX ORDER — ACETAMINOPHEN 325 MG/1
650 TABLET ORAL EVERY 4 HOURS PRN
Status: DISCONTINUED | OUTPATIENT
Start: 2017-12-14 | End: 2017-12-16

## 2017-12-14 RX ORDER — ASPIRIN 300 MG/1
300 SUPPOSITORY RECTAL DAILY
Status: DISCONTINUED | OUTPATIENT
Start: 2017-12-14 | End: 2017-12-14

## 2017-12-14 RX ORDER — ASPIRIN 325 MG
325 TABLET, DELAYED RELEASE (ENTERIC COATED) ORAL DAILY
Status: DISCONTINUED | OUTPATIENT
Start: 2017-12-14 | End: 2017-12-14

## 2017-12-14 RX ORDER — CLOPIDOGREL BISULFATE 75 MG/1
75 TABLET ORAL DAILY
Status: DISCONTINUED | OUTPATIENT
Start: 2017-12-14 | End: 2017-12-16 | Stop reason: HOSPADM

## 2017-12-14 RX ORDER — LEVOFLOXACIN 5 MG/ML
750 INJECTION, SOLUTION INTRAVENOUS ONCE
Status: DISCONTINUED | OUTPATIENT
Start: 2017-12-14 | End: 2017-12-14

## 2017-12-14 RX ORDER — LEVOFLOXACIN 5 MG/ML
750 INJECTION, SOLUTION INTRAVENOUS EVERY 24 HOURS
Status: DISCONTINUED | OUTPATIENT
Start: 2017-12-14 | End: 2017-12-14

## 2017-12-14 RX ORDER — SODIUM CHLORIDE 9 MG/ML
75 INJECTION, SOLUTION INTRAVENOUS CONTINUOUS
Status: DISCONTINUED | OUTPATIENT
Start: 2017-12-14 | End: 2017-12-15

## 2017-12-14 RX ORDER — ATORVASTATIN CALCIUM 20 MG/1
40 TABLET, FILM COATED ORAL NIGHTLY
Status: DISCONTINUED | OUTPATIENT
Start: 2017-12-14 | End: 2017-12-15

## 2017-12-14 RX ORDER — MIRTAZAPINE 15 MG/1
15 TABLET, FILM COATED ORAL NIGHTLY
Status: DISCONTINUED | OUTPATIENT
Start: 2017-12-14 | End: 2017-12-16 | Stop reason: HOSPADM

## 2017-12-14 RX ORDER — DOCUSATE SODIUM 100 MG/1
100 CAPSULE, LIQUID FILLED ORAL NIGHTLY
Status: DISCONTINUED | OUTPATIENT
Start: 2017-12-14 | End: 2017-12-16 | Stop reason: HOSPADM

## 2017-12-14 RX ORDER — CALCIUM CARBONATE 200(500)MG
2.5 TABLET,CHEWABLE ORAL DAILY
Status: DISCONTINUED | OUTPATIENT
Start: 2017-12-14 | End: 2017-12-16 | Stop reason: HOSPADM

## 2017-12-14 RX ORDER — ASPIRIN 300 MG/1
300 SUPPOSITORY RECTAL ONCE
Status: DISCONTINUED | OUTPATIENT
Start: 2017-12-14 | End: 2017-12-14

## 2017-12-14 RX ORDER — LEVETIRACETAM 500 MG/1
500 TABLET ORAL 2 TIMES DAILY
Status: DISCONTINUED | OUTPATIENT
Start: 2017-12-14 | End: 2017-12-16 | Stop reason: HOSPADM

## 2017-12-14 RX ORDER — PANTOPRAZOLE SODIUM 40 MG/1
40 TABLET, DELAYED RELEASE ORAL
Status: DISCONTINUED | OUTPATIENT
Start: 2017-12-14 | End: 2017-12-14

## 2017-12-14 RX ORDER — LEVOFLOXACIN 5 MG/ML
500 INJECTION, SOLUTION INTRAVENOUS EVERY 24 HOURS
Status: DISCONTINUED | OUTPATIENT
Start: 2017-12-14 | End: 2017-12-16

## 2017-12-14 RX ORDER — PANTOPRAZOLE SODIUM 40 MG/1
40 TABLET, DELAYED RELEASE ORAL EVERY MORNING
Status: DISCONTINUED | OUTPATIENT
Start: 2017-12-14 | End: 2017-12-16 | Stop reason: HOSPADM

## 2017-12-14 RX ORDER — GABAPENTIN 300 MG/1
300 CAPSULE ORAL EVERY 8 HOURS SCHEDULED
Status: DISCONTINUED | OUTPATIENT
Start: 2017-12-14 | End: 2017-12-16 | Stop reason: HOSPADM

## 2017-12-14 RX ADMIN — SODIUM CHLORIDE 75 ML/HR: 9 INJECTION, SOLUTION INTRAVENOUS at 15:01

## 2017-12-14 RX ADMIN — Medication 2.5 TABLET: at 15:04

## 2017-12-14 RX ADMIN — ACETAMINOPHEN 650 MG: 325 TABLET ORAL at 09:48

## 2017-12-14 RX ADMIN — PANTOPRAZOLE SODIUM 40 MG: 40 TABLET, DELAYED RELEASE ORAL at 15:03

## 2017-12-14 RX ADMIN — ACETAMINOPHEN 650 MG: 325 TABLET ORAL at 22:34

## 2017-12-14 RX ADMIN — LEVETIRACETAM 500 MG: 500 TABLET, FILM COATED ORAL at 18:29

## 2017-12-14 RX ADMIN — GABAPENTIN 300 MG: 300 CAPSULE ORAL at 15:07

## 2017-12-14 RX ADMIN — SODIUM CHLORIDE 75 ML/HR: 9 INJECTION, SOLUTION INTRAVENOUS at 01:20

## 2017-12-14 RX ADMIN — LEVOFLOXACIN 500 MG: 5 INJECTION, SOLUTION INTRAVENOUS at 22:34

## 2017-12-14 RX ADMIN — ATORVASTATIN CALCIUM 40 MG: 20 TABLET, FILM COATED ORAL at 22:34

## 2017-12-14 RX ADMIN — MIRTAZAPINE 15 MG: 15 TABLET, FILM COATED ORAL at 22:35

## 2017-12-14 RX ADMIN — GABAPENTIN 300 MG: 300 CAPSULE ORAL at 22:34

## 2017-12-14 RX ADMIN — LEVETIRACETAM 500 MG: 500 TABLET, FILM COATED ORAL at 15:03

## 2017-12-14 RX ADMIN — DOCUSATE SODIUM 100 MG: 100 CAPSULE, LIQUID FILLED ORAL at 22:34

## 2017-12-14 RX ADMIN — CLOPIDOGREL 75 MG: 75 TABLET, FILM COATED ORAL at 15:03

## 2017-12-14 NOTE — CONSULTS
"Adult Nutrition  Assessment/PES    Patient Name:  Kim Feldman  YOB: 1930  MRN: [id number]  Admit Date:  12/13/2017    Assessment Date:  12/14/2017    Comments:  Consult d/t MST score of 4 per nurse admission screen.  Unsure of weight loss, unsure amount.  S/p SLP evaluation today, rec VFSS.  Patient off of unit for test at the time of my visit, spoke with family member at bedside.  Family reports patient typically eats well and has good appetite, no weight loss reported.  Will continue to follow.          Reason for Assessment       12/14/17 1536    Reason for Assessment    Reason For Assessment/Visit admission assessment;identified at risk by screening criteria;nurse/nurse practitioner consult    Identified At Risk By Screening Criteria MST SCORE 2+    Diagnosis Diagnosis    Cardiac HTN    Gastrointestinal GERD/Reflux;Hiatal hernia    Neurological AMS;Seizure disorder;Dementia    Ortho Osteoarthritis;Osteoporosis    Psychosocial Depression              Nutrition/Diet History       12/14/17 1537    Nutrition/Diet History    Typical Food/Fluid Intake patient off unit for testing, family at bedside reports patient typically with good issa, no weight loss            Anthropometrics       12/14/17 1538    Anthropometrics    Height 160 cm (62.99\")    Weight 95.3 kg (210 lb 1.6 oz)    RD Documented Current Weight  95.3 kg (210 lb 1.6 oz)    Anthropometrics (Special Considerations)    RD Calculated BMI (kg/m2) 37.2    Ideal Body Weight (IBW)    Ideal Body Weight (IBW), Female 53.1    % Ideal Body Weight 179.85    Usual Body Weight (UBW)    Weight Loss Time Frame no weight loss reported by family    Body Mass Index (BMI)    BMI (kg/m2) 37.3    BMI Grade 35 - 39.9 - obesity - grade II            Labs/Tests/Procedures/Meds       12/14/17 1539    Labs/Tests/Procedures/Meds    Diagnostic Test/Procedure Review reviewed, pertinent    Labs/Tests Review Reviewed;Glucose;Creat;BUN;GFR    Procedure Review SLP    " Swallow eval status Done    Type of SLP Evaluation Bedside   rec VFSS    Medication Review Reviewed, pertinent;Antacid   remeron    Significant Vitals reviewed, pertinent            Physical Findings       12/14/17 1539    Physical Findings/Assessment    Additional Documentation Physical Appearance (Group)    Physical Appearance    Overall Physical Appearance obese    Skin --   B=15, intact              Nutrition Prescription Ordered       12/14/17 1539    Nutrition Prescription PO    Current PO Diet Dysphagia    Dysphagia Level 4  Mechanical soft no mixed consistencies    Fluid Consistency Nectar/syrup thick            Evaluation of Received Nutrient/Fluid Intake       12/14/17 1540    PO Evaluation    Number of Days PO Intake Evaluated Insufficient Data            Problem/Interventions:        Problem 1       12/14/17 1540    Nutrition Diagnoses Problem 1    Problem 1 Nutrition Appropriate for Condition at this Time    Etiology (related to) Factors Affecting Nutrition    Appetite Good    Signs/Symptoms (evidenced by) Report/Observation    Reported/Observed By Family                    Intervention Goal       12/14/17 1540    Intervention Goal    General Maintain nutrition;Disease management/therapy;Reduce/improve symptoms    PO Establish PO;PO intake (%)    PO Intake % 75 %    Weight No significant weight loss            Nutrition Intervention       12/14/17 1540    Nutrition Intervention    RD/Tech Action Follow Tx progress;Care plan reviewd              Education/Evaluation       12/14/17 1540    Education    Education Will Instruct as appropriate    Monitor/Evaluation    Monitor Per protocol;PO intake;Weight        Electronically signed by:  Suzy Tomas RD  12/14/17 3:41 PM

## 2017-12-14 NOTE — PLAN OF CARE
Problem: Pain, Acute (Adult)  Goal: Identify Related Risk Factors and Signs and Symptoms  Outcome: Ongoing (interventions implemented as appropriate)    12/14/17 1344   Pain, Acute   Related Risk Factors (Acute Pain) other (see comments)  (pt fell)       Goal: Acceptable Pain Control/Comfort Level  Outcome: Ongoing (interventions implemented as appropriate)    12/14/17 1344   Pain, Acute (Adult)   Acceptable Pain Control/Comfort Level making progress toward outcome

## 2017-12-14 NOTE — THERAPY EVALUATION
Acute Care - Physical Therapy Initial Evaluation  [facility]     Patient Name: Kim Feldman  : 3/13/1930  MRN: [id number]  Today's Date: 2017   Onset of Illness/Injury or Date of Surgery Date: 17  Date of Referral to PT: 17  Referring Physician: Dr. Harper      Admit Date: 2017     Visit Dx:    ICD-10-CM ICD-9-CM   1. Somnolence R40.0 780.09   2. 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 Chronic UTI N39.0 599.0   4. Impaired functional mobility, balance, gait, and endurance Z74.09 V49.89     Patient Active Problem List   Diagnosis   • Anemia   • Bulging lumbar disc   • Depression, endogenous   • Gastroesophageal reflux disease   • Hyperlipidemia   • Intermittent claudication   • Neuropathy   • Osteoarthritis of knee   • Syndrome of inappropriate secretion of antidiuretic hormone   • Vitamin D deficiency   • History of sick sinus syndrome   • Intertrigo   • Generalized convulsive seizures   • Urine frequency   • Change in bowel habits   • Urinary tract infection   • Zenker diverticulum   • History of anxiety   • Urinary tract infection in female   • Clonic seizure disorder   • Thrombocytopenia   • B12 deficiency   • Pain of toe of right foot   • Weakness   • Cardiac pacemaker in situ   • Chronic venous insufficiency   • Arthritis   • S/P knee replacement   • Chronic bilateral low back pain without sciatica   • Essential hypertension   • Hiatal hernia   • Acute vaginitis   • Zenker's diverticulum   • Acute pain of left knee   • Chronic idiopathic constipation   • Pressure sore on buttocks   • Somnolence     Past Medical History:   Diagnosis Date   • Anemia    • Bulging lumbar disc    • Chronic cough    • Chronic UTI    • Chronic venous insufficiency    • Clonic seizure disorder    • DDD (degenerative disc disease), lumbosacral    • Dementia without behavioral disturbance     moderate   • Depression, endogenous    • Disc degeneration, lumbar    • Dysphagia    • GERD (gastroesophageal reflux  disease)    • Hiatal hernia    • History of anxiety    • History of aspiration pneumonitis    • History of cerebral artery occlusion     CVA (following TIA), 10/10, treated with tPA   • History of herpes zoster    • History of ingrowing nail    • History of osteoporosis    • History of poliomyelitis     child   • History of sciatica    • History of sick sinus syndrome     s/p PPM   • History of transient cerebral ischemia     followed by stroke in 10/2010. BIBI was normal.   • History of Zenker's diverticulum removal 2016   • HX: long term anticoagulant use    • Hyperlipidemia    • Hypertension     NO CURRENT MEDICATION   • Hyponatremia    • Intertrigo    • Lumbar radiculopathy    • Morbid obesity    • Neuralgia     with diplopia secondary to facial shingles   • Nonepileptic episode    • Osteoarthritis of right knee    • Pacemaker    • Pneumonia    • Seeing double     secondary to shingles on the face also with neuralgia   • SIADH (syndrome of inappropriate ADH production)    • Vitamin D deficiency    • Zenker's diverticulum      Past Surgical History:   Procedure Laterality Date   • CARDIAC PACEMAKER PLACEMENT      secondary to SSS, placed in 2004, with generator change in 2014   • CATARACT EXTRACTION W/ INTRAOCULAR LENS IMPLANT Bilateral    • COLONOSCOPY     • HAND SURGERY Right    • KNEE ARTHROPLASTY Left    • LAMINECTOMY FOR IMPLANTATION / PLACEMENT NEUROSTIMULATOR ELECTRODES     • LUMBAR LAMINECTOMY      L-3 L4 L4 L5   • TONSILLECTOMY     • ZENKERS DIVERTICULECTOMY N/A 9/27/2016    Procedure: ENDOSCOPIC REMOVAL OF ZENKERS DIVERTICULUM W/ WERDASCOPE;  Surgeon: Oswald Barth MD;  Location: [facility];  Service:    • ZENKERS DIVERTICULECTOMY N/A 4/14/2017    Procedure: ESOPHAGOSCOPY;  Surgeon: Oswald Barth MD;  Location: [facility] OR;  Service:           PT ASSESSMENT (last 72 hours)      PT Evaluation       12/14/17 0800 12/14/17 0602    Rehab Evaluation    Document Type evaluation  -MA     Subjective  "Information agree to therapy;complains of;weakness;fatigue  -MA     Patient Effort, Rehab Treatment good  -MA     Symptoms Noted During/After Treatment fatigue  -MA     General Information    Patient Profile Review yes  -MA     Onset of Illness/Injury or Date of Surgery Date 12/14/17  -MA     Referring Physician Dr. Harper  -MA     General Observations supine in bed with HOB elevated, daugther at bedside, no acute distress noted at rest  -MA     Pertinent History Of Current Problem Admitted for fall at home, L pontine CVA  -MA     Precautions/Limitations fall precautions   stimulator for LBP per patient report  -MA     Prior Level of Function min assist:;all household mobility  -MA     Equipment Currently Used at Home rollator  -MA     Plans/Goals Discussed With patient and family;agreed upon  -MA     Benefits Reviewed patient and family:;improve function;increase independence  -MA     Living Environment    Living Environment Comment Per family: planning to return back to [facility]  -MA     Clinical Impression    Date of Referral to PT 12/14/17  -MA     PT Diagnosis impaired functional mobility and endurance  -MA     Criteria for Skilled Therapeutic Interventions Met yes;treatment indicated  -MA     Pathology/Pathophysiology Noted (Describe Specifically for Each System) musculoskeletal;neuromuscular  -MA     Impairments Found (describe specific impairments) aerobic capacity/endurance;gait, locomotion, and balance  -MA     Rehab Potential good, to achieve stated therapy goals  -MA     Vital Signs    O2 Delivery Pre Treatment room air  -MA     Pain Assessment    Pain Assessment --   No numerical value, reported \"pain all over\"  -MA     Vision Assessment/Intervention    Visual Impairment WFL  -MA     Cognitive Assessment/Intervention    Current Cognitive/Communication Assessment functional  -MA     Orientation Status oriented x 4  -MA     Follows Commands/Answers Questions 100% of the time;able to follow single-step " instructions;needs cueing;needs increased time  -MA     Personal Safety decreased awareness, need for assist  -MA     Personal Safety Interventions fall prevention program maintained;gait belt;nonskid shoes/slippers when out of bed  -MA     Short/Long Term Memory requires frequent cues  -MA     ROM (Range of Motion)    General ROM no range of motion deficits identified  -MA     MMT (Manual Muscle Testing)    General MMT Assessment Detail B LE MMT 4/5 grossly  -MA     Muscle Tone Assessment    Muscle Tone Assessment  Bilateral Lower Extremities  -EK    Bilateral Lower Extremities Muscle Tone Assessment  moderately decreased tone  -EK    Bed Mobility, Assessment/Treatment    Bed Mobility, Assistive Device bed rails;head of bed elevated  -MA     Bed Mobility, Scoot/Bridge, [place] contact guard assist  -MA     Bed Mob, Supine to Sit, [place] moderate assist (50% patient effort);2 person assist required  -MA     Bed Mob, Sit to Supine, [place] not tested  -MA     Bed Mobility, Safety Issues decreased use of legs for bridging/pushing;impaired trunk control for bed mobility  -MA     Bed Mobility, Impairments strength decreased;impaired balance;pain  -MA     Bed Mobility, Comment Assist with B LE  -MA     Transfer Assessment/Treatment    Transfers, Sit-Stand [place] minimum assist (75% patient effort);2 person assist required  -MA     Transfers, Stand-Sit [place] minimum assist (75% patient effort);2 person assist required  -MA     Transfers, Sit-Stand-Sit, Assist Device rolling walker  -MA     Toilet Transfer, [place] minimum assist (75% patient effort);moderate assist (50% patient effort);2 person assist required  -MA     Toilet Transfer, Assistive Device rolling walker   grab bar  -MA     Transfer, Safety Issues balance decreased during turns;sequencing ability decreased  -MA     Transfer, Impairments strength decreased;impaired balance;pain  -MA     Transfer, Comment Cues for hand  placement to promote safety  -MA     Gait Assessment/Treatment    Gait, [place] Level contact guard assist;minimum assist (75% patient effort)  -MA     Gait, Assistive Device rolling walker  -MA     Gait, Distance (Feet) 15  -MA     Gait, Gait Pattern Analysis swing-to gait  -MA     Gait, Gait Deviations step length decreased;stride length decreased;jose decreased;forward flexed posture  -MA     Gait, Safety Issues balance decreased during turns;weight-shifting ability decreased  -MA     Gait, Impairments strength decreased;impaired balance;pain  -MA     Gait, Comment Ambulated from bed to BR and back to chair to get cleaned up with nsg. Unsteady at times, required cues for walker mechanics  -MA     Therapy Exercises    Bilateral Lower Extremities AROM:;5 reps;ankle pumps/circles;LAQ  -MA     Positioning and Restraints    Pre-Treatment Position in bed  -MA     Post Treatment Position chair  -MA     In Chair sitting;with family/caregiver;with nsg  -MA       12/14/17 0100 12/14/17 0056    General Information    Equipment Currently Used at Home walker, rolling  -EK     Living Environment    Lives With facility resident  -EK     Living Arrangements extended care facility  -EK     Home Accessibility bed and bath are not on the first floor  -EK     Stair Railings at Home none  -EK     Type of Financial/Environmental Concern none  -EK     Transportation Available family or friend will provide;ambulance  -EK     Muscle Tone Assessment    Muscle Tone Assessment  Bilateral Lower Extremities  -EK    Bilateral Lower Extremities Muscle Tone Assessment  moderately decreased tone  -EK      User Key  (r) = Recorded By, (t) = Taken By, (c) = Cosigned By    Initials Name Provider Type    EK Susannah Ricketts RN Registered Nurse    CISCO Bangura PT Physical Therapist          Physical Therapy Education     Title: PT OT SLP Therapies (Active)     Topic: Physical Therapy (Active)     Point: Mobility training (Done)     Learning Progress Summary    Learner Readiness Method Response Comment Documented by Status   Patient Acceptance E VU  MA 12/14/17 0915 Done               Point: Home exercise program (Done)    Learning Progress Summary    Learner Readiness Method Response Comment Documented by Status   Patient Acceptance E VU  MA 12/14/17 0915 Done               Point: Body mechanics (Active)    Learning Progress Summary    Learner Readiness Method Response Comment Documented by Status   Patient Acceptance E NR  MA 12/14/17 0915 Active               Point: Precautions (Active)    Learning Progress Summary    Learner Readiness Method Response Comment Documented by Status   Patient Acceptance E NR  MA 12/14/17 0915 Active                      User Key     Initials Effective Dates Name Provider Type Discipline    MA 12/13/16 -  Graciela Bangura, PT Physical Therapist PT                PT Recommendation and Plan  Anticipated Discharge Disposition: extended care facility, skilled nursing facility  Planned Therapy Interventions: balance training, bed mobility training, gait training, home exercise program, patient/family education, postural re-education, strengthening, transfer training  PT Frequency: daily  Plan of Care Review  Plan Of Care Reviewed With: patient  Outcome Summary/Follow up Plan: Patient is a pleasant 87 y.o. female who presents with impaired functional mobility and endurance secondary to fall at facility. L pontine CVA revealed per chart review. Transfers performed with Srinath x 2 and with rollator. Patient ambulated to bathroom today- unsteady at times and decreased endurance noted. Patient's daugther reported she currently lives in a facility and walks short distances with her rollator at baseline. Patient will benefit from skilled PT services acutely to address deficits as able and improve level of independence.          IP PT Goals       12/14/17 0912          Bed Mobility PT LTG    Bed Mobility PT LTG, Date  Established 12/14/17  -MA      Bed Mobility PT LTG, Time to Achieve 1 wk  -MA      Bed Mobility PT LTG, Activity Type all bed mobility  -MA      Bed Mobility PT LTG, [place] Level conditional independence  -MA      Transfer Training PT LTG    Transfer Training PT LTG, Date Established 12/14/17  -MA      Transfer Training PT LTG, Time to Achieve 1 wk  -MA      Transfer Training PT LTG, Activity Type all transfers  -MA      Transfer Training PT LTG, [place] Level supervision required  -MA      Transfer Training PT LTG, Assist Device walker, rolling  -MA      Gait Training PT LTG    Gait Training Goal PT LTG, Date Established 12/14/17  -MA      Gait Training Goal PT LTG, Time to Achieve 1 wk  -MA      Gait Training Goal PT LTG, [place] Level supervision required  -MA      Gait Training Goal PT LTG, Assist Device walker, rolling  -MA      Gait Training Goal PT LTG, Distance to Achieve 100  -MA        User Key  (r) = Recorded By, (t) = Taken By, (c) = Cosigned By    Initials Name Provider Type    CISCO Bangura, PT Physical Therapist                Outcome Measures       12/14/17 0900          How much help from another person do you currently need...    Turning from your back to your side while in flat bed without using bedrails? 3  -MA      Moving from lying on back to sitting on the side of a flat bed without bedrails? 3  -MA      Moving to and from a bed to a chair (including a wheelchair)? 3  -MA      Standing up from a chair using your arms (e.g., wheelchair, bedside chair)? 2  -MA      Climbing 3-5 steps with a railing? 1  -MA      To walk in hospital room? 2  -MA      AM-PAC 6 Clicks Score 14  -MA      Functional Assessment    Outcome Measure Options AM-PAC 6 Clicks Basic Mobility (PT)  -MA        User Key  (r) = Recorded By, (t) = Taken By, (c) = Cosigned By    Initials Name Provider Type    CISCO Bangura, PT Physical Therapist           Time Calculation:         PT Charges        12/14/17 0847          Time Calculation    Start Time 0820  -MA      Stop Time 0844  -MA      Time Calculation (min) 24 min  -MA      PT Received On 12/14/17  -MA      PT - Next Appointment 12/15/17  -MA      PT Goal Re-Cert Due Date 12/21/17  -MA        User Key  (r) = Recorded By, (t) = Taken By, (c) = Cosigned By    Initials Name Provider Type    CISCO Bangura PT Physical Therapist          Therapy Charges for Today     Code Description Service Date Service Provider Modifiers Qty    41608522058 HC PT EVAL MOD COMPLEXITY 2 12/14/2017 Graciela Bangura, PT GP 1    33753334454 HC PT THER PROC EA 15 MIN 12/14/2017 Graciela Bangura, PT GP 1          PT G-Codes  Outcome Measure Options: AM-PAC 6 Clicks Basic Mobility (PT)      Graciela Banguar PT  12/14/2017

## 2017-12-14 NOTE — PLAN OF CARE
Problem: Patient Care Overview (Adult)  Goal: Plan of Care Review    12/14/17 0912   Coping/Psychosocial Response Interventions   Plan Of Care Reviewed With patient   Outcome Evaluation   Outcome Summary/Follow up Plan Patient is a pleasant 87 y.o. female who presents with impaired functional mobility and endurance secondary to fall at facility. L pontine CVA revealed per chart review. Transfers performed with Srinath x 2 and with rollator. Patient ambulated to bathroom today- unsteady at times and decreased endurance noted. Patient's daugther reported she currently lives in a facility and walks short distances with her rollator at baseline. Patient will benefit from skilled PT services acutely to address deficits as able and improve level of independence.         Problem: Inpatient Physical Therapy  Goal: Bed Mobility Goal LTG- PT    12/14/17 0912   Bed Mobility PT LTG   Bed Mobility PT LTG, Date Established 12/14/17   Bed Mobility PT LTG, Time to Achieve 1 wk   Bed Mobility PT LTG, Activity Type all bed mobility   Bed Mobility PT LTG, [place] Level conditional independence       Goal: Transfer Training Goal 1 LTG- PT    12/14/17 0912   Transfer Training PT LTG   Transfer Training PT LTG, Date Established 12/14/17   Transfer Training PT LTG, Time to Achieve 1 wk   Transfer Training PT LTG, Activity Type all transfers   Transfer Training PT LTG, [place] Level supervision required   Transfer Training PT LTG, Assist Device walker, rolling       Goal: Gait Training Goal LTG- PT    12/14/17 0912   Gait Training PT LTG   Gait Training Goal PT LTG, Date Established 12/14/17   Gait Training Goal PT LTG, Time to Achieve 1 wk   Gait Training Goal PT LTG, [place] Level supervision required   Gait Training Goal PT LTG, Assist Device walker, rolling   Gait Training Goal PT LTG, Distance to Achieve 100

## 2017-12-14 NOTE — PLAN OF CARE
Problem: Patient Care Overview (Adult)  Goal: Plan of Care Review  Outcome: Ongoing (interventions implemented as appropriate)    12/14/17 0927 12/14/17 0948 12/14/17 1342   Coping/Psychosocial Response Interventions   Plan Of Care Reviewed With --  patient --    Patient Care Overview   Progress improving --  --    Outcome Evaluation   Outcome Summary/Follow up Plan --  --  spt eval today pt now on mech soft with nectar thick. tylenol given for pain. pt resting comfortably. will continue to monitor        Goal: Adult Individualization and Mutuality  Outcome: Ongoing (interventions implemented as appropriate)    12/14/17 1342   Individualization   Patient Specific Goals no falls, no pain, vss         Problem: Stroke (Ischemic) (Adult)  Goal: Signs and Symptoms of Listed Potential Problems Will be Absent or Manageable (Stroke)  Outcome: Ongoing (interventions implemented as appropriate)    12/14/17 1342   Stroke (Ischemic)   Problems Assessed (Stroke (Ischemic)/TIA) all   Problems Present (Stroke (Ischemic)/TIA) none         Problem: Infection, Risk/Actual (Adult)  Goal: Identify Related Risk Factors and Signs and Symptoms  Outcome: Ongoing (interventions implemented as appropriate)    12/14/17 1342   Infection, Risk/Actual   Infection, Risk/Actual: Related Risk Factors age extremes       Goal: Infection Prevention/Resolution  Outcome: Ongoing (interventions implemented as appropriate)    12/14/17 1342   Infection, Risk/Actual (Adult)   Infection Prevention/Resolution making progress toward outcome         Problem: Fall Risk (Adult)  Goal: Identify Related Risk Factors and Signs and Symptoms  Outcome: Ongoing (interventions implemented as appropriate)    12/14/17 1342   Fall Risk   Fall Risk: Related Risk Factors age-related changes   Fall Risk: Signs and Symptoms presence of risk factors       Goal: Absence of Falls  Outcome: Ongoing (interventions implemented as appropriate)    12/14/17 1342   Fall Risk (Adult)    Absence of Falls making progress toward outcome         Problem: Pressure Ulcer Risk (Clayton Scale) (Adult,Obstetrics,Pediatric)  Goal: Identify Related Risk Factors and Signs and Symptoms  Outcome: Ongoing (interventions implemented as appropriate)    12/14/17 1342   Pressure Ulcer Risk (Clayton Scale)   Related Risk Factors (Pressure Ulcer Risk (Clayton Scale)) age extremes       Goal: Skin Integrity  Outcome: Ongoing (interventions implemented as appropriate)    12/14/17 1342   Pressure Ulcer Risk (Clayton Scale) (Adult,Obstetrics,Pediatric)   Skin Integrity making progress toward outcome

## 2017-12-14 NOTE — PLAN OF CARE
Problem: Patient Care Overview (Adult)  Goal: Plan of Care Review  Outcome: Ongoing (interventions implemented as appropriate)    12/14/17 0129   Coping/Psychosocial Response Interventions   Plan Of Care Reviewed With patient   Patient Care Overview   Progress no change   Outcome Evaluation   Outcome Summary/Follow up Plan admitted from emergency room following a fall; CT revealed new infarct from prior; NIH 3; UTI+; antibtiotics per MD order; ST/OT/PT to eval; will continue to monitor         Problem: Infection, Risk/Actual (Adult)  Goal: Infection Prevention/Resolution  Outcome: Ongoing (interventions implemented as appropriate)    Problem: Fall Risk (Adult)  Goal: Absence of Falls  Outcome: Ongoing (interventions implemented as appropriate)

## 2017-12-14 NOTE — CONSULTS
DOS: 2017  NAME: Kim Feldman   : 3/13/1930  PCP: Herrera Nelson MD  CC: Stroke  Referring MD: Juan Carlos Harper MD    Neurological Problem and Interval History:  87 y.o. RHW female with a Hx of dementia, hyperlipidemia and TIA and stroke .  The patient has been in her state of poor health with back pain as well as generalized weakness living in assisted living facility.  She states she was admitted because of a fall yesterday.  She fell because her legs were generally weak.  Per the history and physical by Dr. goff the patient was admitted because of altered mental status, decreased by mouth intake and the diagnosis of a UTI.  Emergency department she was complaining of headache and generalized weakness.  She denies any stroke or TIA symptoms and does not recall having stroke in the past.  She states that she has a headache now.  She states that she has very poor memory and her daughter takes care of everything.  She normally needs a walker at home is in part due to back pain as well as knee arthritis.  She feels cognitively better today.    Past Medical/Surgical Hx:  Past Medical History:   Diagnosis Date   • Anemia    • Bulging lumbar disc    • Chronic cough    • Chronic UTI    • Chronic venous insufficiency    • Clonic seizure disorder    • DDD (degenerative disc disease), lumbosacral    • Dementia without behavioral disturbance     moderate   • Depression, endogenous    • Disc degeneration, lumbar    • Dysphagia    • GERD (gastroesophageal reflux disease)    • Hiatal hernia    • History of anxiety    • History of aspiration pneumonitis    • History of cerebral artery occlusion     CVA (following TIA), 10/10, treated with tPA   • History of herpes zoster    • History of ingrowing nail    • History of osteoporosis    • History of poliomyelitis     child   • History of sciatica    • History of sick sinus syndrome     s/p PPM   • History of transient cerebral ischemia     followed by stroke in 10/2010.  BIBI was normal.   • History of Zenker's diverticulum removal 2016   • HX: long term anticoagulant use    • Hyperlipidemia    • Hypertension     NO CURRENT MEDICATION   • Hyponatremia    • Intertrigo    • Lumbar radiculopathy    • Morbid obesity    • Neuralgia     with diplopia secondary to facial shingles   • Nonepileptic episode    • Osteoarthritis of right knee    • Pacemaker    • Pneumonia    • Seeing double     secondary to shingles on the face also with neuralgia   • SIADH (syndrome of inappropriate ADH production)    • Vitamin D deficiency    • Zenker's diverticulum      Past Surgical History:   Procedure Laterality Date   • CARDIAC PACEMAKER PLACEMENT      secondary to SSS, placed in 2004, with generator change in 2014   • CATARACT EXTRACTION W/ INTRAOCULAR LENS IMPLANT Bilateral    • COLONOSCOPY     • HAND SURGERY Right    • KNEE ARTHROPLASTY Left    • LAMINECTOMY FOR IMPLANTATION / PLACEMENT NEUROSTIMULATOR ELECTRODES     • LUMBAR LAMINECTOMY      L-3 L4 L4 L5   • TONSILLECTOMY     • ZENKERS DIVERTICULECTOMY N/A 9/27/2016    Procedure: ENDOSCOPIC REMOVAL OF ZENKERS DIVERTICULUM W/ WERDASCOPE;  Surgeon: Oswald Barth MD;  Location: [facility];  Service:    • ZENKERS DIVERTICULECTOMY N/A 4/14/2017    Procedure: ESOPHAGOSCOPY;  Surgeon: Oswald Barth MD;  Location: [facility];  Service:           Review of Systems:        A complete review of all systems is negative except as described above plus Frequent UTI, left shoulder pain, depressed mood, left upper facial tingling due to shingles for which she follows with celio Traylor as a child.    Medications On Admission  Facility-Administered Medications Prior to Admission   Medication Dose Route Frequency Provider Last Rate Last Dose   • cyanocobalamin injection 1,000 mcg  1,000 mcg Intramuscular Q28 Days Sharad Salinas MD   1,000 mcg at 10/09/17 1143     Prescriptions Prior to Admission   Medication Sig Dispense Refill Last Dose   •  acetaminophen (TYLENOL) 500 MG tablet Take 1,000 mg by mouth Every 6 (Six) Hours As Needed for Mild Pain (1-3).   Taking   • B Complex Vitamins (VITAMIN B COMPLEX PO) Take 1 tablet by mouth every morning. HOLD PRIOR TO SURG   Taking   • Calcium-Vitamin D-Vitamin K (VIACTIV PO) Take 500 mg by mouth Every Night. HOLD PRIOR TO SURG    Taking   • clopidogrel (PLAVIX) 75 MG tablet TAKE ONE TABLET BY MOUTH DAILY 90 tablet 0 Taking   • diclofenac (VOLTAREN) 1 % gel gel Apply 4 g topically 4 (Four) Times a Day As Needed. HOLD PRIOR TO SURGERY   Taking   • docusate sodium (COLACE) 100 MG capsule Take 100 mg by mouth Every Night.   Taking   • gabapentin (NEURONTIN) 300 MG capsule Take 2 capsules by mouth 3 (Three) Times a Day. 540 capsule 0 Taking   • hydrocortisone 2.5 % lotion    Not Taking   • Hypromellose (ARTIFICIAL TEARS OP) Apply 2 drops to eye 4 (four) times a day as needed.   Taking   • ketoconazole (NIZORAL) 2 % cream    Taking   • lansoprazole (PREVACID) 30 MG capsule TAKE ONE CAPSULE BY MOUTH EVERY MORNING 30 capsule 2    • levETIRAcetam (KEPPRA) 500 MG tablet Take 1 tablet by mouth 2 (Two) Times a Day. 60 tablet 11 Taking   • methylcellulose, Laxative, (CITRUCEL) 500 MG tablet tablet Take 2 tablets by mouth Every Morning.   Taking   • mirtazapine (REMERON) 15 MG tablet TAKE ONE TABLET BY MOUTH EVERY NIGHT AT BEDTIME 30 tablet 2 Taking       Allergies:  Allergies   Allergen Reactions   • Penicillins Hives     Has previously tolerated ceftriaxone       Social Hx:  Social History     Social History   • Marital status:      Spouse name: N/A   • Number of children: 3   • Years of education: N/A     Occupational History   • Retired      Social History Main Topics   • Smoking status: Former Smoker     Packs/day: 1.00     Years: 40.00     Types: Cigarettes     Quit date: 1992   • Smokeless tobacco: Never Used      Comment: QUIT 1992   • Alcohol use Yes      Comment: 1-2 DRINKS EVERY SIX MONTHS HOLIDAY DRINKER   •  "Drug use: No   • Sexual activity: Defer     Other Topics Concern   • Not on file     Social History Narrative       Family Hx:  Family History   Problem Relation Age of Onset   • Cancer Daughter        Review of Imaging (Interpretation of images not reports):  CT brain 12/13/17: There is diffuse cerebral atrophy, possibly in or subacute stroke in the left anterior limb internal capsule there may be some old stroke in the right insular cortex, There is probable artifact showing hypodensity in the L jana which looks huge ([id number]) and should be associated with severe neurological deficits and was radial  CT brain 7/16/17: Looks similar to above accident without probable artifact in the jana    Laboratory Results:   Lab Results   Component Value Date    GLUCOSE 109 (H) 12/13/2017    CALCIUM 8.7 12/13/2017     12/13/2017    K 4.5 12/13/2017    CO2 27.1 12/13/2017    CL 98 12/13/2017    BUN 24 (H) 12/13/2017    CREATININE 1.25 (H) 12/13/2017    EGFRIFAFRI 74 08/16/2017    EGFRIFNONA 41 (L) 12/13/2017    BCR 19.2 12/13/2017    ANIONGAP 10.9 12/13/2017     Lab Results   Component Value Date    WBC 11.56 (H) 12/13/2017    HGB 11.0 (L) 12/13/2017    HCT 33.3 (L) 12/13/2017    .5 (H) 12/13/2017     12/13/2017     No results found for: LDLCALC  No results found for: HGBA1C  Lab Results   Component Value Date    INR 1.05 12/13/2017    INR 1.0 06/06/2014    PROTIME 13.3 12/13/2017    PROTIME 10.7 06/06/2014   EKG: Sinus rhythm      Physical Examination:  /85 (BP Location: Left arm, Patient Position: Lying)  Pulse 72  Temp 97.8 °F (36.6 °C) (Oral)   Resp 18  Ht 160 cm (63\")  Wt 95.3 kg (210 lb)  SpO2 97%  BMI 37.2 kg/m2  General Appearance:   Well developed, morbidly Obese, well groomed, alert,  depressed-appearing, and cooperative.  HEENT: Normocephalic.  Normal fundoscopic exam including normal retina, discs are flat with sharp margins, normal vasculature.  Neck and Spine: Normal range of " motion.  Normal alignment. No mass or tenderness. No bruits.  Cardiac: Regular rate and rhythm. No murmurs.  Peripheral Vasculature: Radial 1+ and pedal pulses are trace, equal and symmetric. No signs of distal embolization.  Extremities:    No edema or deformities. Normal joint ROM. AMNA hoswayne and SCD's in place  Skin:    No rashes or birth marks.    Neurological examination:  Higher Integrative  Function: Oriented to time, place and person. Normal registration, recall 3/3, attention span and concentration. Normal language including comprehension, spontaneous speech, repetition, reading, writing, naming and vocabulary. No neglect with normal visual-spatial function and construction. Normal fund of knowledge and higher integrative function.  CN II: Pupils are equal, round, and reactive to light. Normal visual acuity and visual fields.    CN III IV VI: Extraocular movements are full without nystagmus. Ptosis on the left (chronic per patient )  CN V: Normal facial sensation and strength of muscles of mastication.  CN VII: Subtle L facial weakness.  CN VIII:   Auditory acuity is normal.  CN IX & X:   Symmetric palatal movement.  CN XI: Sternocleidomastoid and trapezius are normal.  No weakness.  CN XII:   The tongue is midline.  No atrophy or fasciculations.  Motor: Normal muscle strength, bulk and tone in upper and lower extremities with some breakaway weakness versus very mild weakness in both shoulders and hip flexors.  No fasciculations, rigidity, spasticity, or abnormal movements.  Reflexes: trace in the upper and absent lower extremities. Plantar responses are flexor.  Sensation: Normal to light touch, pinprick, vibration, temperature, and proprioception in arms and legs. Normal graphesthesia and no extinction on DSS.  Station and Gait: Needs assistance to stand up and walk.  Gait is very slow cautious and antalgic.    Coordination: Finger to nose test shows no dysmetria.  Rapid alternating movements are normal.   Heel to shin normal.  NIHSS: 1    Diagnoses / Discussion:  87 y.o. who presents with Sx of generalized weakness and altered mental status was neurological examination is nonfocal with an excellent mental status other than probable depression.  I do not find any physical exam findings consistent with the diagnosis of dementia.  The CT scan findings show some white matter disease but the new hypodensity in the left jana probably artifact.  A lesion of that size should be associated with severe neurological deficits including a right hemiparesis, ataxia as well as severe dysarthria and probably oculomotor abnormalities as well.  A neoplastic process of this size may present without significant findings on examination.  Since she cannot have an MRI scan I think a repeat CT scan of the brain with fine cuts of the posterior fossa is appropriate.  If that confirms the presence of a lesion then she will need a scan with and without contrast as well as an evaluation of her vessels.      Plan:  Repeat CT Brain with fine cuts posterior fossa  Continue Plavix  Plavix response   Hydrate  Neurochecks  Check Lipid panel, Hgb A1C  Lipitor 80mg  Non-pharmacological DVT prophylaxis  EKG Tele  PT/OT/ST  Stroke Education  Blood pressure control to <130/80  Goal LDL <70-recommend high dose statins-   Serum glucose < 140     Call 911 for stroke any stroke symptoms    I have discussed the above with the patient and family.  Time spent with patient: 60min    MDM  Reviewed: vitals  Interpretation: labs and CT scan      Dictated using Dragon dictation.

## 2017-12-14 NOTE — THERAPY EVALUATION
Acute Care - Occupational Therapy Initial Evaluation  [facility]     Patient Name: Kim Feldman  : 3/13/1930  MRN: [id number]  Today's Date: 2017  Onset of Illness/Injury or Date of Surgery Date: 17     Referring Physician: Dr. Harper    Admit Date: 2017       ICD-10-CM ICD-9-CM   1. Somnolence R40.0 780.09   2. 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 Chronic UTI N39.0 599.0   4. Impaired functional mobility, balance, gait, and endurance Z74.09 V49.89     Patient Active Problem List   Diagnosis   • Anemia   • Bulging lumbar disc   • Depression, endogenous   • Gastroesophageal reflux disease   • Hyperlipidemia   • Intermittent claudication   • Neuropathy   • Osteoarthritis of knee   • Syndrome of inappropriate secretion of antidiuretic hormone   • Vitamin D deficiency   • History of sick sinus syndrome   • Intertrigo   • Generalized convulsive seizures   • Urine frequency   • Change in bowel habits   • Urinary tract infection   • Zenker diverticulum   • History of anxiety   • Urinary tract infection in female   • Clonic seizure disorder   • Thrombocytopenia   • B12 deficiency   • Pain of toe of right foot   • Weakness   • Cardiac pacemaker in situ   • Chronic venous insufficiency   • Arthritis   • S/P knee replacement   • Chronic bilateral low back pain without sciatica   • Essential hypertension   • Hiatal hernia   • Acute vaginitis   • Zenker's diverticulum   • Acute pain of left knee   • Chronic idiopathic constipation   • Pressure sore on buttocks   • Somnolence     Past Medical History:   Diagnosis Date   • Anemia    • Bulging lumbar disc    • Chronic cough    • Chronic UTI    • Chronic venous insufficiency    • Clonic seizure disorder    • DDD (degenerative disc disease), lumbosacral    • Dementia without behavioral disturbance     moderate   • Depression, endogenous    • Disc degeneration, lumbar    • Dysphagia    • GERD (gastroesophageal reflux disease)    • Hiatal hernia    • History  of anxiety    • History of aspiration pneumonitis    • History of cerebral artery occlusion     CVA (following TIA), 10/10, treated with tPA   • History of herpes zoster    • History of ingrowing nail    • History of osteoporosis    • History of poliomyelitis     child   • History of sciatica    • History of sick sinus syndrome     s/p PPM   • History of transient cerebral ischemia     followed by stroke in 10/2010. BIBI was normal.   • History of Zenker's diverticulum removal 2016   • HX: long term anticoagulant use    • Hyperlipidemia    • Hypertension     NO CURRENT MEDICATION   • Hyponatremia    • Intertrigo    • Lumbar radiculopathy    • Morbid obesity    • Neuralgia     with diplopia secondary to facial shingles   • Nonepileptic episode    • Osteoarthritis of right knee    • Pacemaker    • Pneumonia    • Seeing double     secondary to shingles on the face also with neuralgia   • SIADH (syndrome of inappropriate ADH production)    • Vitamin D deficiency    • Zenker's diverticulum      Past Surgical History:   Procedure Laterality Date   • CARDIAC PACEMAKER PLACEMENT      secondary to SSS, placed in 2004, with generator change in 2014   • CATARACT EXTRACTION W/ INTRAOCULAR LENS IMPLANT Bilateral    • COLONOSCOPY     • HAND SURGERY Right    • KNEE ARTHROPLASTY Left    • LAMINECTOMY FOR IMPLANTATION / PLACEMENT NEUROSTIMULATOR ELECTRODES     • LUMBAR LAMINECTOMY      L-3 L4 L4 L5   • TONSILLECTOMY     • ZENKERS DIVERTICULECTOMY N/A 9/27/2016    Procedure: ENDOSCOPIC REMOVAL OF ZENKERS DIVERTICULUM W/ WERDASCOPE;  Surgeon: Oswald Barth MD;  Location: [facility] OR;  Service:    • ZENKERS DIVERTICULECTOMY N/A 4/14/2017    Procedure: ESOPHAGOSCOPY;  Surgeon: Oswald Barth MD;  Location: [facility] OR;  Service:           OT ASSESSMENT FLOWSHEET (last 72 hours)      OT Evaluation       12/14/17 1609 12/14/17 1600 12/14/17 1458 12/14/17 1457 12/14/17 0918    Rehab Evaluation    Document Type  evaluation  -HC    evaluation  -    Subjective Information  agree to therapy;no complaints  -HC   agree to therapy  -SH    Patient Effort, Rehab Treatment  good  -HC   good  -SH    Symptoms Noted During/After Treatment  none  -HC   none  -SH    Symptoms Noted Comment  MD present at end of eval  -       General Information    Patient Profile Review  yes  -HC       General Observations  supine in bed in no acute distress  -HC       Pertinent History Of Current Problem  admitted for fall at home  -       Precautions/Limitations  fall precautions  -HC       Prior Level of Function  independent:;ADL's  -HC       Equipment Currently Used at Home  rollator;wheelchair  - rollator;wheelchair  -EC      Plans/Goals Discussed With  patient and family;agreed upon  -       Risks Reviewed  patient:  -HC       Benefits Reviewed  patient:  -HC       Barriers to Rehab  none identified  -HC       Living Environment    Lives With  facility resident  -HC facility resident  -EC      Living Arrangements  assisted living  - assisted living  -      Home Accessibility  no concerns  - no concerns  -EC      Stair Railings at Home  none  -HC none  -EC      Type of Financial/Environmental Concern  none  -HC none  -EC      Transportation Available  family or friend will provide  -HC family or friend will provide  -EC      Clinical Impression    Impairments Found (describe specific impairments)  other (see comments)   functional mobility, transfers, and ADLs  -HC       Patient/Family Goals Statement  to improve function  -       Criteria for Skilled Therapeutic Interventions Met  yes  -HC       Rehab Potential  good, to achieve stated therapy goals  -       Therapy Frequency  3-5 times/wk  -       Anticipated Discharge Disposition skilled nursing facility  - skilled nursing facility  -       Functional Level Prior    Ambulation    1-->assistive equipment  -EC     Transferring    1-->assistive equipment  -EC     Toileting    1-->assistive  equipment  -EC     Bathing    0-->independent  -EC     Dressing    0-->independent  -EC     Eating    0-->independent  -EC     Communication    0-->understands/communicates without difficulty  -EC     Swallowing    0-->swallows foods/liquids without difficulty  -EC     Pain Assessment    Pain Assessment  No/denies pain  -   0-10  -SH    Post Pain Score     7  -SH    Pain Type     Acute pain;Other (Comment)   pain only when moving  -    Pain Location     Buttocks   RN aware  -    Vision Assessment/Intervention    Visual Impairment  WFL  -       Cognitive Assessment/Intervention    Current Cognitive/Communication Assessment  functional  -       Orientation Status  oriented x 4  -HC       Follows Commands/Answers Questions  100% of the time  -       Personal Safety  decreased awareness, need for assist  -       Personal Safety Interventions  fall prevention program maintained;gait belt;nonskid shoes/slippers when out of bed  -       Short/Long Term Memory  requires frequent cues  -       ROM (Range of Motion)    General ROM Detail  BUE WFL  -       MMT (Manual Muscle Testing)    General MMT Assessment Detail  BUE 3+ to 4-/5  -       Bed Mobility, Assessment/Treatment    Bed Mobility, Assistive Device  bed rails;head of bed elevated  -       Bed Mob, Supine to Sit, [place]  moderate assist (50% patient effort)  -       Bed Mob, Sit to Supine, [place]  not tested  -       Bed Mobility, Safety Issues  decreased use of legs for bridging/pushing;impaired trunk control for bed mobility  -       Bed Mobility, Impairments  strength decreased;impaired balance;pain  -       Transfer Assessment/Treatment    Transfers, Bed-Chair [place]  minimum assist (75% patient effort);hand held assist;verbal cues required;set up required  -       Transfers, Chair-Bed [place]  not tested  -       Transfers, Sit-Stand [place]  minimum assist (75% patient effort)  -       Transfers,  Stand-Sit [place]  minimum assist (75% patient effort)  -       Transfer, Comment  cues for hand placement and upright standing posture  -       Lower Body Dressing Assessment/Training    LB Dressing Assess/Train, Clothing Type  doffing:;donning:;socks  -       LB Dressing Assess/Train, Position  supine  -       LB Dressing Assess/Train, [place]  maximum assist (25% patient effort)  -       Motor Skills/Interventions    Additional Documentation  Balance Skills Training (Group);Functional Endurance (Group)  -       Balance Skills Training    Sitting-Level of Assistance  Close supervision  -       Sitting-Balance Support  Feet supported  -       Standing-Level of Assistance  Minimum assistance  -       Static Standing Balance Support  Right upper extremity supported;Left upper extremity supported  -       Functional Endurance    Detail (Functional Endurance)  fair  -       Positioning and Restraints    Pre-Treatment Position  in bed  -       Post Treatment Position  chair  -       In Chair  reclined;encouraged to call for assist;with other staff   MD present  -         12/14/17 0800 12/14/17 0602 12/14/17 0100 12/14/17 0056       Rehab Evaluation    Document Type evaluation  -MA        Subjective Information agree to therapy;complains of;weakness;fatigue  -MA        Patient Effort, Rehab Treatment good  -MA        Symptoms Noted During/After Treatment fatigue  -MA        General Information    Patient Profile Review yes  -MA        Onset of Illness/Injury or Date of Surgery Date 12/14/17  -MA        Referring Physician Dr. Harper  -MA        General Observations supine in bed with HOB elevated, daugther at bedside, no acute distress noted at rest  -MA        Pertinent History Of Current Problem Admitted for fall at home, L pontine CVA  -MA        Precautions/Limitations fall precautions   stimulator for LBP per patient report  -MA        Prior Level of Function min assist:;all  "household mobility  -MA        Equipment Currently Used at Home rollator  -MA  walker, rolling  -EK      Plans/Goals Discussed With patient and family;agreed upon  -MA        Benefits Reviewed patient and family:;improve function;increase independence  -MA        Living Environment    Lives With   facility resident  -EK      Living Arrangements   extended care facility  -EK      Home Accessibility   bed and bath are not on the first floor  -EK      Stair Railings at Home   none  -EK      Type of Financial/Environmental Concern   none  -EK      Transportation Available   family or friend will provide;ambulance  -EK      Living Environment Comment Per family: planning to return back to [facility]  -MA        Functional Level Prior    Ambulation   1-->assistive equipment  -EK      Transferring   1-->assistive equipment  -EK      Toileting   1-->assistive equipment  -EK      Bathing   1-->assistive equipment  -EK      Dressing   1-->assistive equipment  -EK      Eating   0-->independent  -EK      Communication   0-->understands/communicates without difficulty  -EK      Swallowing   0-->swallows foods/liquids without difficulty  -EK      Prior Functional Level Comment   ambulated w/ walker  -EK      Vital Signs    O2 Delivery Pre Treatment room air  -MA        Pain Assessment    Pain Assessment --   No numerical value, reported \"pain all over\"  -MA        Vision Assessment/Intervention    Visual Impairment WFL  -MA        Cognitive Assessment/Intervention    Current Cognitive/Communication Assessment functional  -MA        Orientation Status oriented x 4  -MA        Follows Commands/Answers Questions 100% of the time;able to follow single-step instructions;needs cueing;needs increased time  -MA        Personal Safety decreased awareness, need for assist  -MA        Personal Safety Interventions fall prevention program maintained;gait belt;nonskid shoes/slippers when out of bed  -MA        Short/Long Term Memory requires " frequent cues  -MA        ROM (Range of Motion)    General ROM no range of motion deficits identified  -MA        MMT (Manual Muscle Testing)    General MMT Assessment Detail B LE MMT 4/5 grossly  -MA        Muscle Tone Assessment    Muscle Tone Assessment  Bilateral Lower Extremities  -EK  Bilateral Lower Extremities  -EK     Bilateral Lower Extremities Muscle Tone Assessment  moderately decreased tone  -EK  moderately decreased tone  -EK     Bed Mobility, Assessment/Treatment    Bed Mobility, Assistive Device bed rails;head of bed elevated  -MA        Bed Mobility, Scoot/Bridge, [place] contact guard assist  -MA        Bed Mob, Supine to Sit, [place] moderate assist (50% patient effort);2 person assist required  -MA        Bed Mob, Sit to Supine, [place] not tested  -MA        Bed Mobility, Safety Issues decreased use of legs for bridging/pushing;impaired trunk control for bed mobility  -MA        Bed Mobility, Impairments strength decreased;impaired balance;pain  -MA        Bed Mobility, Comment Assist with B LE  -MA        Transfer Assessment/Treatment    Transfers, Sit-Stand [place] minimum assist (75% patient effort);2 person assist required  -MA        Transfers, Stand-Sit [place] minimum assist (75% patient effort);2 person assist required  -MA        Transfers, Sit-Stand-Sit, Assist Device rolling walker  -MA        Toilet Transfer, [place] minimum assist (75% patient effort);moderate assist (50% patient effort);2 person assist required  -MA        Toilet Transfer, Assistive Device rolling walker   grab bar  -MA        Transfer, Safety Issues balance decreased during turns;sequencing ability decreased  -MA        Transfer, Impairments strength decreased;impaired balance;pain  -MA        Transfer, Comment Cues for hand placement to promote safety  -MA        Therapy Exercises    Bilateral Lower Extremities AROM:;5 reps;ankle pumps/circles;LAQ  -MA        Positioning and  Restraints    Pre-Treatment Position in bed  -MA        Post Treatment Position chair  -MA        In Chair sitting;with family/caregiver;with nsg  -MA          User Key  (r) = Recorded By, (t) = Taken By, (c) = Cosigned By    Initials Name Effective Dates    EK Susannah Ricketts, RN 08/10/17 -     MARQUITA Espinoza, MS CCC-SLP 07/13/17 -     EC Patypaul Dimas 02/18/16 -     CISCO Bangura, PT 12/13/16 -      Paulina Almonte OTR 08/17/17 -            Occupational Therapy Education     Title: PT OT SLP Therapies (Active)     Topic: Occupational Therapy (Done)     Point: ADL training (Done)    Description: Instruct learner(s) on proper safety adaptation and remediation techniques during self care or transfers.   Instruct in proper use of assistive devices.    Learning Progress Summary    Learner Readiness Method Response Comment Documented by Status   Patient Tammy E [facility] 12/14/17 1607 Done               Point: Precautions (Done)    Description: Instruct learner(s) on prescribed precautions during self-care and functional transfers.    Learning Progress Summary    Learner Readiness Method Response Comment Documented by Status   Patient Eager E [facility] 12/14/17 1607 Done               Point: Body mechanics (Done)    Description: Instruct learner(s) on proper positioning and spine alignment during self-care, functional mobility activities and/or exercises.    Learning Progress Summary    Learner Readiness Method Response Comment Documented by Status   Patient Varuner E [facility] 12/14/17 1607 Done                      User Key     Initials Effective Dates Name Provider Type Discipline     08/17/17 -  Paulina Almonte OTR Occupational Therapist OT                  OT Recommendation and Plan  Anticipated Discharge Disposition: skilled nursing facility  Therapy Frequency: 3-5 times/wk  Plan of Care Review  Plan Of Care Reviewed With: patient  Outcome Summary/Follow up Plan: Pt presented to OT eval alert and oriented x4. Pt  presents with functional deficits in mobility, transfers, ADLs, and endurance. Pt will benefit from continued skilled OT. Recommending SNF at d/c, pt agreeable with plan. Will continue to follow.          OT Goals       12/14/17 1607          Transfer Training OT LTG    Transfer Training OT LTG, Date Established 12/14/17  -HC      Transfer Training OT LTG, Time to Achieve 1 wk  -HC      Transfer Training OT LTG, Activity Type bed to chair /chair to bed;sit to stand/stand to sit;toilet  -HC      Transfer Training OT LTG, [place] Level contact guard assist  -HC      ADL OT LTG    ADL OT LTG, Date Established 12/14/17  -HC      ADL OT LTG, Time to Achieve 1 wk  -HC      ADL OT LTG, Activity Type ADL skills  -HC      ADL OT LTG, [place] Level standby assist  -HC      Endurance OT LTG    Endurance Goal OT LTG, Date Established 12/14/17  -HC      Endurance Goal OT LTG, Time to Achieve 1 wk  -HC      Endurance Goal OT LTG, Activity Level endurance 2 fair+  -HC        User Key  (r) = Recorded By, (t) = Taken By, (c) = Cosigned By    Initials Name Provider Type    HC RUBY Vega Occupational Therapist                Outcome Measures       12/14/17 1600 12/14/17 0900       How much help from another person do you currently need...    Turning from your back to your side while in flat bed without using bedrails?  3  -MA     Moving from lying on back to sitting on the side of a flat bed without bedrails?  3  -MA     Moving to and from a bed to a chair (including a wheelchair)?  3  -MA     Standing up from a chair using your arms (e.g., wheelchair, bedside chair)?  2  -MA     Climbing 3-5 steps with a railing?  1  -MA     To walk in hospital room?  2  -MA     AM-PAC 6 Clicks Score  14  -MA     How much help from another is currently needed...    Putting on and taking off regular lower body clothing? 2  -HC      Bathing (including washing, rinsing, and drying) 2  -HC      Toileting (which includes using toilet bed  pan or urinal) 3  -HC      Putting on and taking off regular upper body clothing 3  -HC      Taking care of personal grooming (such as brushing teeth) 3  -HC      Eating meals 4  -HC      Score 17  -HC      Functional Assessment    Outcome Measure Options AM-PAC 6 Clicks Daily Activity (OT)  -HC AM-PAC 6 Clicks Basic Mobility (PT)  -MA       User Key  (r) = Recorded By, (t) = Taken By, (c) = Cosigned By    Initials Name Provider Type    CISCO Bangura, PT Physical Therapist     RUBY Vega Occupational Therapist          Time Calculation:   OT Start Time: 1405  OT Stop Time: 1430  OT Time Calculation (min): 25 min    Therapy Charges for Today     Code Description Service Date Service Provider Modifiers Qty    [id number]  OT EVAL LOW COMPLEXITY 2 12/14/2017 RUBY Vega GO 1    [id number]  OT NEUROMUSC RE EDUCATION EA 15 MIN 12/14/2017 RUBY Vega GO 1               RUBY Vega  12/14/2017

## 2017-12-14 NOTE — ED NOTES
Pt's daughter reports she was with her mother all day at the nursing home because she wasn't feeling well.  Pt's daughter reports pt really didn't get out of her chair at all today. Pt around 7pm     Bhumika Aguayo RN  12/13/17 [id number]

## 2017-12-14 NOTE — H&P
History and physical    Primary care physician  Dr. Nelson    Chief complaint  Altered mental status    History of present illness  87-year-old white female with history of hypertension hyperlipidemia seizure disorder neuropathy chronic kidney disease stage III anxiety depression who is a nursing home resident sent to the emergency room with altered mental status.  Patient appeared lethargic generalized weakness.  No fever chills chest pain shortness of breath abdominal pain nausea vomiting diarrhea she is not at her baseline.  Patient workup in ER revealed UTI dehydration admitted for management.       PAST MEDICAL HISTORY    • Anemia     • Bulging lumbar disc     • Chronic cough     • Chronic UTI     • Chronic venous insufficiency     • Clonic seizure disorder     • DDD (degenerative disc disease), lumbosacral     • Dementia without behavioral disturbance     • Depression, endogenous     • Disc degeneration, lumbar     • Dysphagia     • GERD (gastroesophageal reflux disease)     • Hiatal hernia     • History of anxiety     • History of aspiration pneumonitis     • History of cerebral artery occlusion     • History of herpes zoster     • History of ingrowing nail     • History of osteoporosis     • History of poliomyelitis     • History of sciatica     • History of sick sinus syndrome     • History of transient cerebral ischemia     • History of Zenker's diverticulum removal 2016   • HX: long term anticoagulant use     • Hyperlipidemia     • Hypertension     • Hyponatremia     • Intertrigo     • Lumbar radiculopathy     • Morbid obesity     • Neuralgia     • Nonepileptic episode     • Osteoarthritis of right knee     • Pacemaker     • Pneumonia     • Seeing double     • SIADH (syndrome of inappropriate ADH production)     • Vitamin D deficiency     • Zenker's diverticulum           PAST SURGICAL HISTORY   Surgical History          Past Surgical History:   Procedure Laterality Date   • CARDIAC PACEMAKER PLACEMENT      "    secondary to SSS, placed in 2004, with generator change in 2014   • CATARACT EXTRACTION W/ INTRAOCULAR LENS IMPLANT Bilateral     • COLONOSCOPY       • HAND SURGERY Right     • KNEE ARTHROPLASTY Left     • LAMINECTOMY FOR IMPLANTATION / PLACEMENT NEUROSTIMULATOR ELECTRODES       • LUMBAR LAMINECTOMY         L-3 L4 L4 L5   • TONSILLECTOMY       • ZENKERS DIVERTICULECTOMY N/A 9/27/2016     Procedure: ENDOSCOPIC REMOVAL OF ZENKERS DIVERTICULUM W/ WERDASCOPE;  Surgeon: Oswald Barth MD;  Location: [facility];  Service:    • ZENKERS DIVERTICULECTOMY N/A 4/14/2017     Procedure: ESOPHAGOSCOPY;  Surgeon: Oswald Barth MD;  Location: [facility] OR;  Service:             FAMILY HISTORY        Family History   Problem Relation Age of Onset   • Cancer Daughter           SOCIAL HISTORY   Social History    Social History      Social History   • Marital status:        Spouse name: N/A   • Number of children: 3   • Years of education: N/A           Occupational History   • Retired                Social History Main Topics   • Smoking status: Former Smoker       Packs/day: 1.00       Years: 40.00       Types: Cigarettes       Quit date: 1992   • Smokeless tobacco: Never Used         Comment: QUIT 1992   • Alcohol use Yes          Comment: 1-2 DRINKS EVERY SIX MONTHS HOLIDAY DRINKER   • Drug use: No   • Sexual activity: Defer           Other Topics Concern   • Not on file      Social History Narrative         ALLERGIES  Penicillins    Nursing home medications reviewed     REVIEW OF SYSTEMS  Review of Systems   Unable to perform ROS: Mental status change   Constitutional: Positive for fatigue (lethargy).        Decreased PO intake   Neurological: Positive for weakness (generalized) and headaches.      PHYSICAL EXAM  Blood pressure 160/85, pulse 72, temperature 97.8 °F (36.6 °C), temperature source Oral, resp. rate 18, height 160 cm (63\"), weight 95.3 kg (210 lb), SpO2 97 %, not currently " breastfeeding.    Constitutional: Awake and alert  Head: Normocephalic and atraumatic.   Eyes: Pupils are equal, round, and reactive to light.   Cardiovascular: Normal rate and regular rhythm.    Pulmonary/Chest: Effort normal.   Abdominal: Soft. There is no tenderness.   Neurological: She is alert. She displays weakness (Pt moves all extremities but has significant weakness in BLE). No sensory deficit.   Skin: Skin is warm and dry.      LAB RESULTS  Lab Results (last 24 hours)     Procedure Component Value Units Date/Time    CBC & Differential [[id number]] Collected:  12/13/17 2127    Specimen:  Blood Updated:  12/13/17 2144    Narrative:       The following orders were created for panel order CBC & Differential.  Procedure                               Abnormality         Status                     ---------                               -----------         ------                     CBC Auto Differential[[id number]]        Abnormal            Final result                 Please view results for these tests on the individual orders.    CBC Auto Differential [[id number]]  (Abnormal) Collected:  12/13/17 2127    Specimen:  Blood Updated:  12/13/17 2144     WBC 11.56 (H) 10*3/mm3      RBC 3.25 (L) 10*6/mm3      Hemoglobin 11.0 (L) g/dL      Hematocrit 33.3 (L) %      .5 (H) fL      MCH 33.8 (H) pg      MCHC 33.0 g/dL      RDW 13.0 %      RDW-SD 48.9 fl      MPV 9.0 fL      Platelets 154 10*3/mm3      Neutrophil % 69.7 %      Lymphocyte % 21.9 %      Monocyte % 6.8 %      Eosinophil % 1.1 %      Basophil % 0.2 %      Immature Grans % 0.3 %      Neutrophils, Absolute 8.06 10*3/mm3      Lymphocytes, Absolute 2.53 10*3/mm3      Monocytes, Absolute 0.79 10*3/mm3      Eosinophils, Absolute 0.13 10*3/mm3      Basophils, Absolute 0.02 10*3/mm3      Immature Grans, Absolute 0.03 10*3/mm3     Comprehensive Metabolic Panel [[id number]]  (Abnormal) Collected:  12/13/17 2127    Specimen:  Blood Updated:  12/13/17 2157      Glucose 111 (H) mg/dL      BUN 25 (H) mg/dL      Creatinine 1.35 (H) mg/dL      Sodium 134 (L) mmol/L      Potassium 4.7 mmol/L      Chloride 97 (L) mmol/L      CO2 27.5 mmol/L      Calcium 8.9 mg/dL      Total Protein 6.7 g/dL      Albumin 3.20 (L) g/dL      ALT (SGPT) 12 U/L      AST (SGOT) 16 U/L      Alkaline Phosphatase 54 U/L      Total Bilirubin 0.5 mg/dL      eGFR Non African Amer 37 (L) mL/min/1.73      Globulin 3.5 gm/dL      A/G Ratio 0.9 g/dL      BUN/Creatinine Ratio 18.5     Anion Gap 9.5 mmol/L     Narrative:       The MDRD GFR formula is only valid for adults with stable renal function between ages 18 and 70.    Troponin [[id number]]  (Normal) Collected:  12/13/17 2127    Specimen:  Blood Updated:  12/13/17 2222     Troponin T <0.010 ng/mL     Narrative:       Troponin T Reference Ranges:  Less than 0.03 ng/mL:    Negative for AMI  0.03 to 0.09 ng/mL:      Indeterminant for AMI  Greater than 0.09 ng/mL: Positive for AMI    Urinalysis With / Culture If Indicated - Urine, Catheter [[id number]]  (Abnormal) Collected:  12/13/17 2256    Specimen:  Urine from Urine, Catheter Updated:  12/13/17 2306     Color, UA Yellow     Appearance, UA Cloudy (A)     pH, UA 6.0     Specific Gravity, UA 1.019     Glucose, UA Negative     Ketones, UA Negative     Bilirubin, UA Negative     Blood, UA Moderate (2+) (A)     Protein, UA 30 mg/dL (1+) (A)     Leuk Esterase, UA Large (3+) (A)     Nitrite, UA Positive (A)     Urobilinogen, UA 0.2 E.U./dL    Protime-INR [[id number]]  (Normal) Collected:  12/13/17 2256    Specimen:  Blood Updated:  12/13/17 2317     Protime 13.3 Seconds      INR 1.05    aPTT [[id number]]  (Normal) Collected:  12/13/17 2256    Specimen:  Blood Updated:  12/13/17 2317     PTT 34.8 seconds     Basic Metabolic Panel [[id number]]  (Abnormal) Collected:  12/13/17 2256    Specimen:  Blood Updated:  12/13/17 2326     Glucose 109 (H) mg/dL      BUN 24 (H) mg/dL      Creatinine 1.25 (H) mg/dL      Sodium 136  mmol/L      Potassium 4.5 mmol/L      Chloride 98 mmol/L      CO2 27.1 mmol/L      Calcium 8.7 mg/dL      eGFR Non African Amer 41 (L) mL/min/1.73      BUN/Creatinine Ratio 19.2     Anion Gap 10.9 mmol/L     Narrative:       The MDRD GFR formula is only valid for adults with stable renal function between ages 18 and 70.    Urinalysis, Microscopic Only - Urine, Clean Catch [[id number]]  (Abnormal) Collected:  12/13/17 2256    Specimen:  Urine from Urine, Catheter Updated:  12/14/17 0013     RBC, UA 13-20 (A) /HPF      WBC, UA Too Numerous to Count (A) /HPF      Bacteria, UA 4+ (A) /HPF      Squamous Epithelial Cells, UA 7-12 (A) /HPF      Hyaline Casts, UA None Seen /LPF      Methodology Manual Light Microscopy    Urine Culture - Urine, Urine, Clean Catch [[id number]]  (Normal) Collected:  12/13/17 2256    Specimen:  Urine from Urine, Catheter Updated:  12/14/17 0640     Urine Culture Culture in progress        Imaging Results (last 24 hours)     Procedure Component Value Units Date/Time    XR Hip With or Without Pelvis 2 - 3 View Right [[id number]] Collected:  12/13/17 2131     Updated:  12/13/17 2131    Narrative:       X-RAY CHEST 1 VIEW.     HISTORY: Fall.     COMPARISON: 09/28/2017.     FINDINGS:  Cardiomediastinal silhouette is within normal limits.         There is no consolidation or effusion.              Impression:       No acute findings.         ----------------------------------------------------------------     2 VIEWS OF THE RIGHT HIP, SINGLE VIEW OF THE PELVIS.     HISTORY: Fall, trauma.     COMPARISON: No prior studies for comparison.     FINDINGS:  There is no fracture or dislocation. Mild osteoarthritis of the hip  joints. No osteonecrosis.     Soft tissue structures are unremarkable.     IMPRESSION:  No acute fracture or dislocation.           XR Chest 1 View [[id number]] Collected:  12/13/17 2131     Updated:  12/13/17 2131    Narrative:       X-RAY CHEST 1 VIEW.     HISTORY: Fall.     COMPARISON:  09/28/2017.     FINDINGS:  Cardiomediastinal silhouette is within normal limits.         There is no consolidation or effusion.              Impression:       No acute findings.         ----------------------------------------------------------------     2 VIEWS OF THE RIGHT HIP, SINGLE VIEW OF THE PELVIS.     HISTORY: Fall, trauma.     COMPARISON: No prior studies for comparison.     FINDINGS:  There is no fracture or dislocation. Mild osteoarthritis of the hip  joints. No osteonecrosis.     Soft tissue structures are unremarkable.     IMPRESSION:  No acute fracture or dislocation.           CT Head Without Contrast [[id number]] Collected:  12/13/17 2157     Updated:  12/14/17 0518    Narrative:       CT SCAN OF THE BRAIN WITHOUT CONTRAST.     TECHNIQUE: Radiation dose reduction techniques were utilized, including  automated exposure control and exposure modulation based on body size.  Multiple axial images of the brain were obtained from the vertex to the  base of the brain.     HISTORY: Trauma, contusion to the head.     COMPARISON: No prior studies for comparison.     FINDINGS:   Midline structures are within normal limits, there is no hydrocephalus.  Gray-white matter differentiation is maintained. Findings of small  vessel ischemic disease, a few old lacunar infarcts and cortical  atrophy. In the jana to the left of midline, at the level of the  brachium pontis, there is a new low-attenuation 2.2 x 1.7 cm focus which  may be further evaluated.     Orbits are within normal limits. No significant mucosal disease of the  para-nasal sinuses. Mastoid air cells are well aerated.              Impression:       1.  No definite acute intracranial hemorrhage.   2.  2.2 cm low-attenuation area in the jana is new compared to previous  examination, it may represent an age-indeterminate infarct in the right  clinical setting. If indicated further evaluation with MRI may be  performed.     Findings called to Bertin Degroot, 9:55  PM.1               EKG                                                  Rhythm/Rate: SR at 79  P waves and MS: 1st degree AV block  QRS, axis: normal   ST and T waves: normal       Current Facility-Administered Medications:   •  acetaminophen (TYLENOL) tablet 650 mg, 650 mg, Oral, Q4H PRN, Gaetano Harper MD, 650 mg at 12/14/17 0948  •  aspirin EC tablet 325 mg, 325 mg, Oral, Daily, Gaetano Harper MD  •  calcium carbonate (OS-MACRINA) chewable tablet 1,250 mg, 1,250 mg, Oral, Daily, Gaetano Harper MD  •  clopidogrel (PLAVIX) tablet 75 mg, 75 mg, Oral, Daily, Gaetano Harper MD  •  docusate sodium (COLACE) capsule 100 mg, 100 mg, Oral, Nightly, Gaetano Harper MD  •  gabapentin (NEURONTIN) capsule 300 mg, 300 mg, Oral, Q8H, Gaetano Harper MD  •  levETIRAcetam (KEPPRA) tablet 500 mg, 500 mg, Oral, BID, Gaetano Harper MD  •  levoFLOXacin (LEVAQUIN) 750 mg/150 mL D5W (premix) (LEVAQUIN) 750 mg, 750 mg, Intravenous, Q24H, Gaetano Harper MD  •  mirtazapine (REMERON) tablet 15 mg, 15 mg, Oral, Nightly, Gaetano Harper MD  •  pantoprazole (PROTONIX) EC tablet 40 mg, 40 mg, Oral, QAM, Gaetano Harper MD  •  Insert peripheral IV, , , Once **AND** sodium chloride 0.9 % flush 10 mL, 10 mL, Intravenous, PRN, Clark Degroot III, PA  •  sodium chloride 0.9 % infusion, 75 mL/hr, Intravenous, Continuous, Gaetano Harper MD, Last Rate: 75 mL/hr at 12/14/17 0120, 75 mL/hr at 12/14/17 0120     ASSESSMENT  Acute UTI  Left pontine infarct.age indeterminate  Seizure disorder  Gastroesophageal reflux disease  Toxic metabolic encephalopathy  Neuropathy  Chronic kidney disease stage III    PLAN  Admit  IV antibiotics  IV fluid  Aspirin and Lipitor  Neuro consult  Check MRI of the brain  2-D echo  PTOT and speech therapy  Continue nursing home medication and adjust the doses  Supportive care  Stress ulcer DVT prophylaxis  DNR  Follow closely further recommendation according to hospital course    GAETANO HARPER MD

## 2017-12-14 NOTE — PLAN OF CARE
Problem: Patient Care Overview (Adult)  Goal: Plan of Care Review  Outcome: Ongoing (interventions implemented as appropriate)    12/14/17 1607   Coping/Psychosocial Response Interventions   Plan Of Care Reviewed With patient   Outcome Evaluation   Outcome Summary/Follow up Plan Pt presented to OT eval alert and oriented x4. Pt presents with functional deficits in mobility, transfers, ADLs, and endurance. Pt will benefit from continued skilled OT. Recommending SNF at d/c, pt agreeable with plan. Will continue to follow.         Problem: Inpatient Occupational Therapy  Goal: Transfer Training Goal 1 LTG- OT  Outcome: Ongoing (interventions implemented as appropriate)    12/14/17 1607   Transfer Training OT LTG   Transfer Training OT LTG, Date Established 12/14/17   Transfer Training OT LTG, Time to Achieve 1 wk   Transfer Training OT LTG, Activity Type bed to chair /chair to bed;sit to stand/stand to sit;toilet   Transfer Training OT LTG, Stuart Level contact guard assist       Goal: ADL Goal LTG- OT  Outcome: Ongoing (interventions implemented as appropriate)    12/14/17 1607   ADL OT LTG   ADL OT LTG, Date Established 12/14/17   ADL OT LTG, Time to Achieve 1 wk   ADL OT LTG, Activity Type ADL skills   ADL OT LTG, Stuart Level standby assist       Goal: Endurance Goal LTG- OT  Outcome: Ongoing (interventions implemented as appropriate)    12/14/17 1607   Endurance OT LTG   Endurance Goal OT LTG, Date Established 12/14/17   Endurance Goal OT LTG, Time to Achieve 1 wk   Endurance Goal OT LTG, Activity Level endurance 2 fair+

## 2017-12-14 NOTE — THERAPY EVALUATION
Acute Care - Speech Language Pathology   Swallow Initial Evaluation [facility]     Patient Name: Kim Feldman  : 3/13/1930  MRN: [id number]  Today's Date: 2017  Onset of Illness/Injury or Date of Surgery Date: 17            Admit Date: 2017    SPEECH-LANGUAGE PATHOLOGY EVALUATION - SWALLOW  Subjective: The patient was seen on this date for a Clinical Swallow evaluation.  Patient was alert and cooperative.  Significant history: L pontine stroke. Pt with L facial weakness and consonant imprecisions at times. Question slight lingual deviation to L. Pt with hx of Zenker's (repair ) and esophageal diverticulum (repair 2017) s/p repair. Pt's daughter reports no asp pna since repair of esophageal diverticulum (completed at [facility]). Most recent esophagram revealed penetration without aspiration with thins. VFSS 3/18/17 revealed no penetration/aspiration, but presence of esophageal diverticulum. Pt has been tolerating regular and thins without pna per daughter.   Objective: Textures given included ice, thin liquid, nectar thick liquid, puree consistency, mechanical soft consistency and regular consistency.  Assessment: Difficulties were noted with thin liquid, mechanical soft consistency and regular consistency.  Observations:  Intermittent slight voice change with thins despite adequate swallow initiation and laryngeal elevation. Grimace with mixed mech soft with voice change. Pt also grimacing with regular cracker. No oral residue observed. SLP recs conservative diet despite only soft signs of aspiration being observed d/t hx of penetration on esophagram and d/t location of infarct.    SLP Findings:  Patient presents s/s of aspiration.   Recommendations: Diet Textures: nectar thick liquid, mechanical soft consistency with no mixed textures food.  Medications should be taken whole with thickened liquids or puree. May have water between meals after oral care, under staff or family supervision  and with the recommended strategies for safe swallowing.   Recommended Strategies: Upright for PO and may use straw. Oral care before breakfast, after all meals and PRN.  Other Recommended Evaluations: VFSS    Dysphagia therapy is recommended. Rationale: to establish safest PO diet.        Visit Dx:     ICD-10-CM ICD-9-CM   1. Somnolence R40.0 780.09   2. 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 Chronic UTI N39.0 599.0   4. Impaired functional mobility, balance, gait, and endurance Z74.09 V49.89     Patient Active Problem List   Diagnosis   • Anemia   • Bulging lumbar disc   • Depression, endogenous   • Gastroesophageal reflux disease   • Hyperlipidemia   • Intermittent claudication   • Neuropathy   • Osteoarthritis of knee   • Syndrome of inappropriate secretion of antidiuretic hormone   • Vitamin D deficiency   • History of sick sinus syndrome   • Intertrigo   • Generalized convulsive seizures   • Urine frequency   • Change in bowel habits   • Urinary tract infection   • Zenker diverticulum   • History of anxiety   • Urinary tract infection in female   • Clonic seizure disorder   • Thrombocytopenia   • B12 deficiency   • Pain of toe of right foot   • Weakness   • Cardiac pacemaker in situ   • Chronic venous insufficiency   • Arthritis   • S/P knee replacement   • Chronic bilateral low back pain without sciatica   • Essential hypertension   • Hiatal hernia   • Acute vaginitis   • Zenker's diverticulum   • Acute pain of left knee   • Chronic idiopathic constipation   • Pressure sore on buttocks   • Somnolence     Past Medical History:   Diagnosis Date   • Anemia    • Bulging lumbar disc    • Chronic cough    • Chronic UTI    • Chronic venous insufficiency    • Clonic seizure disorder    • DDD (degenerative disc disease), lumbosacral    • Dementia without behavioral disturbance     moderate   • Depression, endogenous    • Disc degeneration, lumbar    • Dysphagia    • GERD (gastroesophageal reflux disease)    • Hiatal  hernia    • History of anxiety    • History of aspiration pneumonitis    • History of cerebral artery occlusion     CVA (following TIA), 10/10, treated with tPA   • History of herpes zoster    • History of ingrowing nail    • History of osteoporosis    • History of poliomyelitis     child   • History of sciatica    • History of sick sinus syndrome     s/p PPM   • History of transient cerebral ischemia     followed by stroke in 10/2010. BIBI was normal.   • History of Zenker's diverticulum removal 2016   • HX: long term anticoagulant use    • Hyperlipidemia    • Hypertension     NO CURRENT MEDICATION   • Hyponatremia    • Intertrigo    • Lumbar radiculopathy    • Morbid obesity    • Neuralgia     with diplopia secondary to facial shingles   • Nonepileptic episode    • Osteoarthritis of right knee    • Pacemaker    • Pneumonia    • Seeing double     secondary to shingles on the face also with neuralgia   • SIADH (syndrome of inappropriate ADH production)    • Vitamin D deficiency    • Zenker's diverticulum      Past Surgical History:   Procedure Laterality Date   • CARDIAC PACEMAKER PLACEMENT      secondary to SSS, placed in 2004, with generator change in 2014   • CATARACT EXTRACTION W/ INTRAOCULAR LENS IMPLANT Bilateral    • COLONOSCOPY     • HAND SURGERY Right    • KNEE ARTHROPLASTY Left    • LAMINECTOMY FOR IMPLANTATION / PLACEMENT NEUROSTIMULATOR ELECTRODES     • LUMBAR LAMINECTOMY      L-3 L4 L4 L5   • TONSILLECTOMY     • ZENKERS DIVERTICULECTOMY N/A 9/27/2016    Procedure: ENDOSCOPIC REMOVAL OF ZENKERS DIVERTICULUM W/ WERDASCOPE;  Surgeon: Oswald Barth MD;  Location: [facility];  Service:    • ZENKERS DIVERTICULECTOMY N/A 4/14/2017    Procedure: ESOPHAGOSCOPY;  Surgeon: Oswald Barth MD;  Location: [facility];  Service:           SWALLOW EVALUATION (last 72 hours)      Swallow Evaluation       12/14/17 0918                Rehab Evaluation    Document Type evaluation  -        Subjective  "Information agree to therapy  -        Patient Effort, Rehab Treatment good  -        Symptoms Noted During/After Treatment none  -        General Information    Patient Profile Review yes  -        Subjective Patient Observations Pt c/o \"thirst\"  -        Pertinent History Of Current Problem acute L jana stroke  -        Current Diet Limitations NPO  -        Prior Level of Function- Communication functional in all spheres  -        Prior Level of Function- Swallowing no diet consistency restrictions;esophageal concerns  -        Plans/Goals Discussed With patient;family  -        Barriers to Rehab medically complex  -        Clinical Impression    Patient's Goals For Discharge return to regular diet  -        Family Goals For Discharge patient able to return to regular diet  -        SLP Swallowing Diagnosis mild dysphagia  -        Rehab Potential/Prognosis, Swallowing good, to achieve stated therapy goals  -        Criteria for Skilled Therapeutic Interventions Met skilled criteria for dysphagia intervention met  -        FCM, Swallowing 4-->Level 4  -        Therapy Frequency PRN  -        Predicted Duration Therapy Interv (days) until discharge  -        Expected Duration Therapy Session (min) 15-30 minutes  -        SLP Diet Recommendation IV - mechanical soft, no mixed consistencies;nectar/syrup-thick liquids  -        Recommended Diagnostics VFSS ([facility])  -        Recommended Feeding/Eating Techniques maintain upright posture during/after eating for 30 mins  -        SLP Rec. for Method of Medication Administration meds whole with thickened liquid;meds whole in pudding/applesauce;meds crushed in pudding/applesauce  -        Monitor For Signs Of Aspiration elevated WBC count;cough;gurgly voice;throat clearing;fever;upper respiratory infection;pneumonia;right lower lobe infiltrates  -        Anticipated Discharge Disposition skilled nursing facility  -        " Pain Assessment    Pain Assessment No/denies pain  -        Oral Motor Structure and Function    Oral Motor Assessment Comment L facial droop, slight imprecisions in diadokokinetic speech tasks  -        Dysphagia Treatment Objectives and Progress    Dysphagia Treatment Objectives Other 1  -        Dysphagia Other 1    Dysphagia Other 1 Objective Pt will participate in [facility]  -        Status: Dysphagia Other 1 New  -          User Key  (r) = Recorded By, (t) = Taken By, (c) = Cosigned By    Initials Name Effective Dates     Joann GARCIA MS [facility]-SLP 07/13/17 -         Pain Assessment   Pain Assessment 0-10  -       Post Pain Score 7  -       Pain Type Acute pain;Other (Comment)   pain only when moving  -       Pain Location Buttocks   RN aware  -         EDUCATION  The patient has been educated in the following areas:   Dysphagia (Swallowing Impairment) Modified Diet Instruction.    SLP Recommendation and Plan  SLP Swallowing Diagnosis: mild dysphagia  SLP Diet Recommendation: IV - mechanical soft, no mixed consistencies, nectar/syrup-thick liquids  Recommended Feeding/Eating Techniques: maintain upright posture during/after eating for 30 mins  SLP Rec. for Method of Medication Administration: meds whole with thickened liquid, meds whole in pudding/applesauce, meds crushed in pudding/applesauce  Monitor For Signs Of Aspiration: elevated WBC count, cough, gurgly voice, throat clearing, fever, upper respiratory infection, pneumonia, right lower lobe infiltrates  Recommended Diagnostics: VFSS ([facility])  Criteria for Skilled Therapeutic Interventions Met: skilled criteria for dysphagia intervention met  Anticipated Discharge Disposition: skilled nursing facility  Rehab Potential/Prognosis, Swallowing: good, to achieve stated therapy goals  Therapy Frequency: PRN                         SLP Outcome Measures (last 72 hours)      SLP Outcome Measures       12/14/17 0900          SLP Outcome Measures    Outcome  [facility]  -      FCM Scores    FCM Chosen Swallowing  -      Swallowing FCM Score 4  -        User Key  (r) = Recorded By, (t) = Taken By, (c) = Cosigned By    Initials Name Effective Dates    MARQUITA Espinoza MS CCC-SLP 07/13/17 -            Time Calculation:         Time Calculation- SLP       12/14/17 0921          Time Calculation- SLP    SLP Start Time 0745  -      SLP Stop Time 0845  -      SLP Time Calculation (min) 60 min  -      SLP Received On 12/14/17  -        User Key  (r) = Recorded By, (t) = Taken By, (c) = Cosigned By    Initials Name Provider Type    MARQUITA Espinoza MS CCC-SLP Speech and Language Pathologist          Therapy Charges for Today     Code Description Service Date Service Provider Modifiers Qty    [id number]  ST EVAL ORAL PHARYNG SWALLOW 4 12/14/2017 Joann Espinoza MS CCC-SLP GN 1               Joann Espinoza MS CCC-GHASSAN  12/14/2017

## 2017-12-14 NOTE — DISCHARGE PLACEMENT REQUEST
"Kim Feldman (87 y.o. Female)     Date of Birth Social Security Number Address Home Phone MRN    03/13/1930  [place] [address] [place] [address] [phone] [id number]    Religious Marital Status          Catholic        Admission Date Admission Type Admitting Provider Attending Provider Department, Room/Bed    12/13/17 Emergency Juan Carlos Harper MD Ahmed, Aftab, MD [address], 534/1    Discharge Date Discharge Disposition Discharge Destination                      Attending Provider: Juan Carlos Harper MD     Allergies:  Penicillins    Isolation:  None   Infection:  None   Code Status:  Conditional    Ht:  160 cm (63\")   Wt:  95.3 kg (210 lb)    Admission Cmt:  None   Principal Problem:  None                Active Insurance as of 12/13/2017     Primary Coverage     Payor Plan Insurance Group Employer/Plan Group    AETNA MEDICARE REPLACEMENT AETNA [id number]     Payor Plan Address Payor Plan Phone Number Effective From Effective To    [address] [phone] 1/1/2016     [place] [address]       Subscriber Name Subscriber Birth Date Member ID       KIM FELDMAN 3/13/1930 [id number]                 Emergency Contacts      (Rel.) Home Phone Work Phone Mobile Phone    Leandra Feldman (Daughter) [phone] -- [phone]              "

## 2017-12-14 NOTE — PROGRESS NOTES
Discharge Planning Assessment  [facility]     Patient Name: Kim Feldman  MRN: [id number]  Today's Date: 12/14/2017    Admit Date: 12/13/2017          Discharge Needs Assessment       12/14/17 1458    Living Environment    Lives With facility resident    Living Arrangements assisted living    Home Accessibility no concerns    Stair Railings at Home none    Type of Financial/Environmental Concern none    Transportation Available family or friend will provide    Living Environment    Provides Primary Care For no one    Quality Of Family Relationships supportive    Able to Return to Prior Living Arrangements yes    Living Arrangement Comments Pt resides at [facility]    Discharge Needs Assessment    Concerns To Be Addressed basic needs concerns    Equipment Currently Used at Home rollator;wheelchair    Equipment Needed After Discharge none    Discharge Planning Comments [facility]            Discharge Plan       12/14/17 1502    Case Management/Social Work Plan    Plan [facility] vs SNF    Additional Comments Checked IMM. Met with pt bedside. Confirmed facesheet correct. Pt reports she has been at [facility] for about one month. Pt reports he thinks  is arranged but not sure of agency. VM left for RN at [facility] to return call. Pt reports she has been to [facility] in past and doesn't want to return. Pt reports she uses a walker and wheelchair. Pt uses [facility] for rx. Pt reports she wants to return back to [facility] and doesn't want to go to SNF. Left SNF list with pt to review in case needed. CCP to follow for d/c planning needs...ALEX Villasenor        Discharge Placement     Facility/Agency Request Status Selected? Address Phone Number Fax Number    [facility] Accepted     [address] [facility] [address] [phone]                Demographic Summary       12/14/17 6007    Referral Information    Admission Type inpatient    Record  Reviewed medical record    Contact Information    Permission Granted to Share Information With facility ;family/designee            Functional Status       12/14/17 7187    Functional Status Current    Ambulation 3-->assistive equipment and person    Transferring 3-->assistive equipment and person    Toileting 3-->assistive equipment and person    Bathing 2-->assistive person    Dressing 2-->assistive person    Eating 0-->independent    Communication 0-->understands/communicates without difficulty    Swallowing (if score 2 or more for any item, consult Rehab Services) 0-->swallows foods/liquids without difficulty    Functional Status Prior    Ambulation 1-->assistive equipment    Transferring 1-->assistive equipment    Toileting 1-->assistive equipment    Bathing 0-->independent    Dressing 0-->independent    Eating 0-->independent    Communication 0-->understands/communicates without difficulty    Swallowing 0-->swallows foods/liquids without difficulty    IADL    Medications assistive person    Meal Preparation assistive person    Housekeeping assistive person    Laundry assistive person    Shopping assistive person    Oral Care independent            Psychosocial     None            Abuse/Neglect     None            Legal     None            Substance Abuse     None            Patient Forms       12/14/17 1508    Patient Forms    Provider Choice List Delivered    Delivered to Patient    Method of delivery In person          Paty Dimas

## 2017-12-14 NOTE — ED PROVIDER NOTES
EMERGENCY DEPARTMENT ENCOUNTER    CHIEF COMPLAINT  Chief Complaint: AMS  History given by: patient and daughter   History limited by: AMS  Room Number: 31/31  PMD: Herrera Nelson MD      HPI:  Pt is a 87 y.o. female who presents due to AMS after fall last night. Pt was at baseline yesterday per relative but appeared lethargic today. She has had decreased PO intake today. Pt herself complains of HA and generalized weakness. Pt has a h/o seizures related to hyponatremia, chronic UTI, and has had 5 episodes of aspiration pneumonia in the past 2 years. Pt has a stimulator for chronic back pain, so she is not an MRI candidate.     Onset: last night  Timing: constant   Location: generalized   Radiation: none  Intensity/Severity: moderate   Progression: unchanged   Associated Symptoms: lethargy, decreased PO intake, HA, weakness   Aggravating Factors: none specified   Alleviating Factors: none specified   Previous Episodes: none specified   Treatment before arrival: none specified     PAST MEDICAL HISTORY  Active Ambulatory Problems     Diagnosis Date Noted   • Anemia 02/05/2016   • Bulging lumbar disc 02/05/2016   • Depression, 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   • Gastroesophageal reflux disease 02/05/2016   • Hyperlipidemia 02/05/2016   • Intermittent claudication 02/05/2016   • Neuropathy 02/05/2016   • Osteoarthritis of knee 02/05/2016   • Syndrome of inappropriate secretion of antidiuretic hormone 02/05/2016   • Vitamin D deficiency 02/05/2016   • History of sick sinus syndrome    • Intertrigo 03/25/2016   • 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   • Urine frequency 05/20/2016   • Change in bowel habits 05/20/2016   • Urinary tract infection 07/08/2016   • 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   • History of anxiety 10/18/2016   • Urinary tract infection in female 01/03/2017   • 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   • Thrombocytopenia 01/05/2017   • B12 deficiency 01/16/2017   • Pain of toe of right foot 01/16/2017   • Weakness  02/03/2017   • Cardiac pacemaker in situ 03/08/2017   • Chronic venous insufficiency 03/09/2017   • Arthritis 03/09/2017   • S/P knee replacement 03/09/2017   • Chronic bilateral low back pain without sciatica 03/09/2017   • Essential hypertension 03/09/2017   • Hiatal hernia 03/16/2017   • Acute vaginitis 03/23/2017   • Zenker's diverticulum 04/14/2017   • Acute pain of left knee 05/19/2017   • Chronic idiopathic constipation 05/19/2017   • Pressure sore on buttocks 05/19/2017     Resolved Ambulatory Problems     Diagnosis Date Noted   • Hyponatremia 02/05/2016   • Pneumonia 07/08/2016   • Aspiration pneumonia 07/17/2016   • History of aspiration pneumonitis 01/04/2017   • Right lower lobe pneumonia 02/02/2017   • Sepsis due to pneumonia 02/02/2017   • UTI (urinary tract infection) 03/15/2017   • Acute kidney injury 03/17/2017     Past Medical History:   Diagnosis Date   • Anemia    • Bulging lumbar disc    • Chronic cough    • Chronic UTI    • Chronic venous insufficiency    • Clonic seizure disorder    • DDD (degenerative disc disease), lumbosacral    • Dementia without behavioral disturbance    • Depression, endogenous    • Disc degeneration, lumbar    • Dysphagia    • GERD (gastroesophageal reflux disease)    • Hiatal hernia    • History of anxiety    • History of aspiration pneumonitis    • History of cerebral artery occlusion    • History of herpes zoster    • History of ingrowing nail    • History of osteoporosis    • History of poliomyelitis    • History of sciatica    • History of sick sinus syndrome    • History of transient cerebral ischemia    • History of Zenker's diverticulum removal 2016   • HX: long term anticoagulant use    • Hyperlipidemia    • Hypertension    • Hyponatremia    • Intertrigo    • Lumbar radiculopathy    • Morbid obesity    • Neuralgia    • Nonepileptic episode    • Osteoarthritis of right knee    • Pacemaker    • Pneumonia    • Seeing double    • SIADH (syndrome of inappropriate  ADH production)    • Vitamin D deficiency    • Zenker's diverticulum        PAST SURGICAL HISTORY  Past Surgical History:   Procedure Laterality Date   • CARDIAC PACEMAKER PLACEMENT      secondary to SSS, placed in 2004, with generator change in 2014   • CATARACT EXTRACTION W/ INTRAOCULAR LENS IMPLANT Bilateral    • COLONOSCOPY     • HAND SURGERY Right    • KNEE ARTHROPLASTY Left    • LAMINECTOMY FOR IMPLANTATION / PLACEMENT NEUROSTIMULATOR ELECTRODES     • LUMBAR LAMINECTOMY      L-3 L4 L4 L5   • TONSILLECTOMY     • ZENKERS DIVERTICULECTOMY N/A 9/27/2016    Procedure: ENDOSCOPIC REMOVAL OF ZENKERS DIVERTICULUM W/ WERDASCOPE;  Surgeon: Oswald Barth MD;  Location: [facility];  Service:    • ZENKERS DIVERTICULECTOMY N/A 4/14/2017    Procedure: ESOPHAGOSCOPY;  Surgeon: Oswald Barth MD;  Location: [facility] OR;  Service:        FAMILY HISTORY  Family History   Problem Relation Age of Onset   • Cancer Daughter        SOCIAL HISTORY  Social History     Social History   • Marital status:      Spouse name: N/A   • Number of children: 3   • Years of education: N/A     Occupational History   • Retired      Social History Main Topics   • Smoking status: Former Smoker     Packs/day: 1.00     Years: 40.00     Types: Cigarettes     Quit date: 1992   • Smokeless tobacco: Never Used      Comment: QUIT 1992   • Alcohol use Yes      Comment: 1-2 DRINKS EVERY SIX MONTHS HOLIDAY DRINKER   • Drug use: No   • Sexual activity: Defer     Other Topics Concern   • Not on file     Social History Narrative       ALLERGIES  Penicillins    REVIEW OF SYSTEMS  Review of Systems   Unable to perform ROS: Mental status change   Constitutional: Positive for fatigue (lethargy).        Decreased PO intake   Neurological: Positive for weakness (generalized) and headaches.       PHYSICAL EXAM  ED Triage Vitals   Temp Heart Rate Resp BP SpO2   12/13/17 2016 12/13/17 2016 12/13/17 2016 12/13/17 2016 12/13/17 2016   98.2 °F (36.8 °C) 82  14 134/92 95 %      Temp src Heart Rate Source Patient Position BP Location FiO2 (%)   12/13/17 2016 12/13/17 2016 12/13/17 2016 12/13/17 2016 --   Oral Monitor Lying Right arm        Physical Exam   Constitutional:   Somnolent bur awakes    HENT:   Head: Normocephalic and atraumatic.   Eyes: Pupils are equal, round, and reactive to light.   Cardiovascular: Normal rate and regular rhythm.    Pulmonary/Chest: Effort normal.   Abdominal: Soft. There is no tenderness.   Neurological: She is alert. She displays weakness (Pt moves all extremities but has significant weakness in BLE). No sensory deficit.   Skin: Skin is warm and dry.   Nursing note and vitals reviewed.      LAB RESULTS  Lab Results (last 24 hours)     Procedure Component Value Units Date/Time    Blood Culture [[id number]] Collected:  12/13/17 2127    Specimen:  Blood from Arm, Right Updated:  12/13/17 2132    CBC & Differential [[id number]] Collected:  12/13/17 2127    Specimen:  Blood Updated:  12/13/17 2144    Narrative:       The following orders were created for panel order CBC & Differential.  Procedure                               Abnormality         Status                     ---------                               -----------         ------                     CBC Auto Differential[[id number]]        Abnormal            Final result                 Please view results for these tests on the individual orders.    Comprehensive Metabolic Panel [[id number]]  (Abnormal) Collected:  12/13/17 2127    Specimen:  Blood Updated:  12/13/17 2157     Glucose 111 (H) mg/dL      BUN 25 (H) mg/dL      Creatinine 1.35 (H) mg/dL      Sodium 134 (L) mmol/L      Potassium 4.7 mmol/L      Chloride 97 (L) mmol/L      CO2 27.5 mmol/L      Calcium 8.9 mg/dL      Total Protein 6.7 g/dL      Albumin 3.20 (L) g/dL      ALT (SGPT) 12 U/L      AST (SGOT) 16 U/L      Alkaline Phosphatase 54 U/L      Total Bilirubin 0.5 mg/dL      eGFR Non African Amer 37 (L) mL/min/1.73       Globulin 3.5 gm/dL      A/G Ratio 0.9 g/dL      BUN/Creatinine Ratio 18.5     Anion Gap 9.5 mmol/L     Narrative:       The MDRD GFR formula is only valid for adults with stable renal function between ages 18 and 70.    CBC Auto Differential [[id number]]  (Abnormal) Collected:  12/13/17 2127    Specimen:  Blood Updated:  12/13/17 2144     WBC 11.56 (H) 10*3/mm3      RBC 3.25 (L) 10*6/mm3      Hemoglobin 11.0 (L) g/dL      Hematocrit 33.3 (L) %      .5 (H) fL      MCH 33.8 (H) pg      MCHC 33.0 g/dL      RDW 13.0 %      RDW-SD 48.9 fl      MPV 9.0 fL      Platelets 154 10*3/mm3      Neutrophil % 69.7 %      Lymphocyte % 21.9 %      Monocyte % 6.8 %      Eosinophil % 1.1 %      Basophil % 0.2 %      Immature Grans % 0.3 %      Neutrophils, Absolute 8.06 10*3/mm3      Lymphocytes, Absolute 2.53 10*3/mm3      Monocytes, Absolute 0.79 10*3/mm3      Eosinophils, Absolute 0.13 10*3/mm3      Basophils, Absolute 0.02 10*3/mm3      Immature Grans, Absolute 0.03 10*3/mm3     Troponin [[id number]]  (Normal) Collected:  12/13/17 2127    Specimen:  Blood Updated:  12/13/17 2222     Troponin T <0.010 ng/mL     Narrative:       Troponin T Reference Ranges:  Less than 0.03 ng/mL:    Negative for AMI  0.03 to 0.09 ng/mL:      Indeterminant for AMI  Greater than 0.09 ng/mL: Positive for AMI    Urinalysis With / Culture If Indicated - Urine, Catheter [[id number]]  (Abnormal) Collected:  12/13/17 2256    Specimen:  Urine from Urine, Catheter Updated:  12/13/17 2306     Color, UA Yellow     Appearance, UA Cloudy (A)     pH, UA 6.0     Specific Gravity, UA 1.019     Glucose, UA Negative     Ketones, UA Negative     Bilirubin, UA Negative     Blood, UA Moderate (2+) (A)     Protein, UA 30 mg/dL (1+) (A)     Leuk Esterase, UA Large (3+) (A)     Nitrite, UA Positive (A)     Urobilinogen, UA 0.2 E.U./dL    Basic Metabolic Panel [[id number]]  (Abnormal) Collected:  12/13/17 2256    Specimen:  Blood Updated:  12/13/17 2326     Glucose 109  (H) mg/dL      BUN 24 (H) mg/dL      Creatinine 1.25 (H) mg/dL      Sodium 136 mmol/L      Potassium 4.5 mmol/L      Chloride 98 mmol/L      CO2 27.1 mmol/L      Calcium 8.7 mg/dL      eGFR Non African Amer 41 (L) mL/min/1.73      BUN/Creatinine Ratio 19.2     Anion Gap 10.9 mmol/L     Narrative:       The MDRD GFR formula is only valid for adults with stable renal function between ages 18 and 70.    Protime-INR [[id number]]  (Normal) Collected:  12/13/17 2256    Specimen:  Blood Updated:  12/13/17 2317     Protime 13.3 Seconds      INR 1.05    aPTT [[id number]]  (Normal) Collected:  12/13/17 2256    Specimen:  Blood Updated:  12/13/17 2317     PTT 34.8 seconds     Urinalysis, Microscopic Only - Urine, Clean Catch [[id number]] Collected:  12/13/17 2256    Specimen:  Urine from Urine, Catheter Updated:  12/13/17 2305    Urine Culture - Urine, Urine, Clean Catch [[id number]] Collected:  12/13/17 2256    Specimen:  Urine from Urine, Catheter Updated:  12/13/17 2305          I ordered the above labs and reviewed the results    RADIOLOGY  CT Head Without Contrast   Preliminary Result   No definite acute intracranial hemorrhage.    2.2 cm low-attenuation area in the jana is new compared to previous   examination, it may represent an age-indeterminate infarct in the right   clinical setting. If indicated further evaluation with MRI may be   performed.       Findings called to Bertin Degroot, 9:55 PM.1                  XR Hip With or Without Pelvis 2 - 3 View Right   Preliminary Result   No acute findings.           ----------------------------------------------------------------       2 VIEWS OF THE RIGHT HIP, SINGLE VIEW OF THE PELVIS.       HISTORY: Fall, trauma.       COMPARISON: No prior studies for comparison.       FINDINGS:   There is no fracture or dislocation. Mild osteoarthritis of the hip   joints. No osteonecrosis.       Soft tissue structures are unremarkable.       IMPRESSION:   No acute fracture or dislocation.               XR Chest 1 View   Preliminary Result   No acute findings.           ----------------------------------------------------------------       2 VIEWS OF THE RIGHT HIP, SINGLE VIEW OF THE PELVIS.       HISTORY: Fall, trauma.       COMPARISON: No prior studies for comparison.       FINDINGS:   There is no fracture or dislocation. Mild osteoarthritis of the hip   joints. No osteonecrosis.       Soft tissue structures are unremarkable.       IMPRESSION:   No acute fracture or dislocation.                   I ordered the above noted radiological studies. Interpreted by radiologist. Reviewed by me in PACS.       PROCEDURES  Procedures    EKG           EKG time: 2311  Rhythm/Rate: SR at 79  P waves and [place]: 1st degree AV block  QRS, axis: normal   ST and T waves: normal     Interpreted Contemporaneously by me, independently viewed  Rate is slower compared to prior 02/28/17    PROGRESS AND CONSULTS  ED Course     2043: CT Head, XR right hip, CXR, EKG, and labs were ordered in triage.     2252: Discussed subacute jana infarct seen on CT and plan to admit for further evaluation. Pt's daughter understands and agrees with this plan, and all questions were addressed.      2258: Placed call to [facility] for admission.     2324: Discussed pt's case with Dr. Harper (internal medicine), who agrees to admit pt.      MEDICAL DECISION MAKING  Results were reviewed/discussed with the patient and they were also made aware of online access. Pt also made aware that some labs, such as cultures, will not be resulted during ER visit and follow up with PMD is necessary.     MDM  Number of Diagnoses or Management Options     Amount and/or Complexity of Data Reviewed  Clinical lab tests: reviewed  Tests in the radiology section of CPT®: reviewed (CT Head shows infarct in jana of unknown age that is new compared to prior. )  Tests in the medicine section of CPT®: reviewed  Decide to obtain previous medical records or to obtain history from  someone other than the patient: yes  Obtain history from someone other than the patient: yes  Discuss the patient with other providers: yes    Patient Progress  Patient progress: stable         DIAGNOSIS  Final diagnoses:   Somnolence   Left pontine CVA   Chronic UTI       DISPOSITION  ADMISSION    Discussed treatment plan and reason for admission with pt/family and admitting physician.  Pt/family voiced understanding of the plan for admission for further testing/treatment as needed.     Latest Documented Vital Signs:  As of 11:29 PM  BP- 148/56 HR- 76 Temp- 98.2 °F (36.8 °C) (Oral) O2 sat- 97%    --  Documentation assistance provided by smita Soto for Eloy Pitts.  Information recorded by the scribe was done at my direction and has been verified and validated by me.            Barbie Sanchez  12/13/17 3225       Eloy Pitts MD  12/14/17 5837

## 2017-12-15 ENCOUNTER — APPOINTMENT (OUTPATIENT)
Dept: GENERAL RADIOLOGY | Facility: HOSPITAL | Age: 82
End: 2017-12-15

## 2017-12-15 LAB
ALBUMIN SERPL-MCNC: 3 G/DL (ref 3.5–5.2)
ALBUMIN/GLOB SERPL: 0.9 G/DL
ALP SERPL-CCNC: 49 U/L (ref 39–117)
ALT SERPL W P-5'-P-CCNC: 9 U/L (ref 1–33)
ANION GAP SERPL CALCULATED.3IONS-SCNC: 14 MMOL/L
AST SERPL-CCNC: 15 U/L (ref 1–32)
BASOPHILS # BLD AUTO: 0.02 10*3/MM3 (ref 0–0.2)
BASOPHILS NFR BLD AUTO: 0.4 % (ref 0–1.5)
BILIRUB SERPL-MCNC: 0.5 MG/DL (ref 0.1–1.2)
BUN BLD-MCNC: 15 MG/DL (ref 8–23)
BUN/CREAT SERPL: 19.2 (ref 7–25)
CALCIUM SPEC-SCNC: 8.2 MG/DL (ref 8.6–10.5)
CHLORIDE SERPL-SCNC: 102 MMOL/L (ref 98–107)
CHOLEST SERPL-MCNC: 147 MG/DL (ref 0–200)
CO2 SERPL-SCNC: 22 MMOL/L (ref 22–29)
CREAT BLD-MCNC: 0.78 MG/DL (ref 0.57–1)
DEPRECATED RDW RBC AUTO: 48.4 FL (ref 37–54)
EOSINOPHIL # BLD AUTO: 0.17 10*3/MM3 (ref 0–0.7)
EOSINOPHIL NFR BLD AUTO: 3.2 % (ref 0.3–6.2)
ERYTHROCYTE [DISTWIDTH] IN BLOOD BY AUTOMATED COUNT: 12.8 % (ref 11.7–13)
GFR SERPL CREATININE-BSD FRML MDRD: 70 ML/MIN/1.73
GLOBULIN UR ELPH-MCNC: 3.2 GM/DL
GLUCOSE BLD-MCNC: 91 MG/DL (ref 65–99)
HBA1C MFR BLD: 5.2 % (ref 4.8–5.6)
HCT VFR BLD AUTO: 33 % (ref 35.6–45.5)
HDLC SERPL-MCNC: 47 MG/DL (ref 40–60)
HGB BLD-MCNC: 10.7 G/DL (ref 11.9–15.5)
IMM GRANULOCYTES # BLD: 0 10*3/MM3 (ref 0–0.03)
IMM GRANULOCYTES NFR BLD: 0 % (ref 0–0.5)
LDLC SERPL CALC-MCNC: 86 MG/DL (ref 0–100)
LDLC/HDLC SERPL: 1.83 {RATIO}
LYMPHOCYTES # BLD AUTO: 1.53 10*3/MM3 (ref 0.9–4.8)
LYMPHOCYTES NFR BLD AUTO: 28.7 % (ref 19.6–45.3)
MCH RBC QN AUTO: 33.8 PG (ref 26.9–32)
MCHC RBC AUTO-ENTMCNC: 32.4 G/DL (ref 32.4–36.3)
MCV RBC AUTO: 104.1 FL (ref 80.5–98.2)
MONOCYTES # BLD AUTO: 0.44 10*3/MM3 (ref 0.2–1.2)
MONOCYTES NFR BLD AUTO: 8.3 % (ref 5–12)
NEUTROPHILS # BLD AUTO: 3.17 10*3/MM3 (ref 1.9–8.1)
NEUTROPHILS NFR BLD AUTO: 59.4 % (ref 42.7–76)
NT-PROBNP SERPL-MCNC: 711.7 PG/ML (ref 0–1800)
PLATELET # BLD AUTO: 148 10*3/MM3 (ref 140–500)
PMV BLD AUTO: 9.6 FL (ref 6–12)
POTASSIUM BLD-SCNC: 4.1 MMOL/L (ref 3.5–5.2)
PROT SERPL-MCNC: 6.2 G/DL (ref 6–8.5)
RBC # BLD AUTO: 3.17 10*6/MM3 (ref 3.9–5.2)
SODIUM BLD-SCNC: 138 MMOL/L (ref 136–145)
TRIGL SERPL-MCNC: 69 MG/DL (ref 0–150)
TSH SERPL DL<=0.05 MIU/L-ACNC: 3.44 MIU/ML (ref 0.27–4.2)
VLDLC SERPL-MCNC: 13.8 MG/DL (ref 5–40)
WBC NRBC COR # BLD: 5.33 10*3/MM3 (ref 4.5–10.7)

## 2017-12-15 PROCEDURE — 25010000002 LEVOFLOXACIN PER 250 MG: Performed by: HOSPITALIST

## 2017-12-15 PROCEDURE — 74230 X-RAY XM SWLNG FUNCJ C+: CPT

## 2017-12-15 PROCEDURE — 85025 COMPLETE CBC W/AUTO DIFF WBC: CPT | Performed by: HOSPITALIST

## 2017-12-15 PROCEDURE — 84443 ASSAY THYROID STIM HORMONE: CPT | Performed by: HOSPITALIST

## 2017-12-15 PROCEDURE — G0378 HOSPITAL OBSERVATION PER HR: HCPCS

## 2017-12-15 PROCEDURE — 83036 HEMOGLOBIN GLYCOSYLATED A1C: CPT | Performed by: HOSPITALIST

## 2017-12-15 PROCEDURE — G8996 SWALLOW CURRENT STATUS: HCPCS

## 2017-12-15 PROCEDURE — 92611 MOTION FLUOROSCOPY/SWALLOW: CPT

## 2017-12-15 PROCEDURE — 80053 COMPREHEN METABOLIC PANEL: CPT | Performed by: HOSPITALIST

## 2017-12-15 PROCEDURE — G8997 SWALLOW GOAL STATUS: HCPCS

## 2017-12-15 PROCEDURE — 97110 THERAPEUTIC EXERCISES: CPT

## 2017-12-15 PROCEDURE — 80061 LIPID PANEL: CPT | Performed by: HOSPITALIST

## 2017-12-15 PROCEDURE — G8998 SWALLOW D/C STATUS: HCPCS

## 2017-12-15 PROCEDURE — 83880 ASSAY OF NATRIURETIC PEPTIDE: CPT | Performed by: HOSPITALIST

## 2017-12-15 RX ORDER — ATORVASTATIN CALCIUM 10 MG/1
10 TABLET, FILM COATED ORAL NIGHTLY
Status: DISCONTINUED | OUTPATIENT
Start: 2017-12-15 | End: 2017-12-16 | Stop reason: HOSPADM

## 2017-12-15 RX ADMIN — LEVOFLOXACIN 500 MG: 5 INJECTION, SOLUTION INTRAVENOUS at 22:27

## 2017-12-15 RX ADMIN — ATORVASTATIN CALCIUM 10 MG: 10 TABLET, FILM COATED ORAL at 22:26

## 2017-12-15 RX ADMIN — CLOPIDOGREL 75 MG: 75 TABLET, FILM COATED ORAL at 08:38

## 2017-12-15 RX ADMIN — MIRTAZAPINE 15 MG: 15 TABLET, FILM COATED ORAL at 22:27

## 2017-12-15 RX ADMIN — ACETAMINOPHEN 650 MG: 325 TABLET ORAL at 22:26

## 2017-12-15 RX ADMIN — PANTOPRAZOLE SODIUM 40 MG: 40 TABLET, DELAYED RELEASE ORAL at 08:40

## 2017-12-15 RX ADMIN — SODIUM CHLORIDE 75 ML/HR: 9 INJECTION, SOLUTION INTRAVENOUS at 05:24

## 2017-12-15 RX ADMIN — GABAPENTIN 300 MG: 300 CAPSULE ORAL at 05:23

## 2017-12-15 RX ADMIN — GABAPENTIN 300 MG: 300 CAPSULE ORAL at 17:28

## 2017-12-15 RX ADMIN — ACETAMINOPHEN 650 MG: 325 TABLET ORAL at 08:38

## 2017-12-15 RX ADMIN — GABAPENTIN 300 MG: 300 CAPSULE ORAL at 22:26

## 2017-12-15 RX ADMIN — LEVETIRACETAM 500 MG: 500 TABLET, FILM COATED ORAL at 08:38

## 2017-12-15 RX ADMIN — LEVETIRACETAM 500 MG: 500 TABLET, FILM COATED ORAL at 17:28

## 2017-12-15 NOTE — DISCHARGE PLACEMENT REQUEST
"Kim Feldman (87 y.o. Female)     Date of Birth Social Security Number Address Home Phone MRN    03/13/1930  [place] AT [address] [place] [address] [phone] [id number]    Yazidi Marital Status          Yazidi        Admission Date Admission Type Admitting Provider Attending Provider Department, Room/Bed    12/13/17 Emergency Juan Carlos Harper MD Ahmed, Aftab, MD [address], 534/1    Discharge Date Discharge Disposition Discharge Destination                      Attending Provider: Juan Carlos Harper MD     Allergies:  Penicillins    Isolation:  None   Infection:  None   Code Status:  Conditional    Ht:  160 cm (62.99\")   Wt:  95.3 kg (210 lb 1.6 oz)    Admission Cmt:  None   Principal Problem:  None                Active Insurance as of 12/13/2017     Primary Coverage     Payor Plan Insurance Group Employer/Plan Group    AETNA MEDICARE REPLACEMENT AETNA [id number]     Payor Plan Address Payor Plan Phone Number Effective From Effective To    [address] [phone] 1/1/2016     [place] [address]       Subscriber Name Subscriber Birth Date Member ID       KIM FELDMAN 3/13/1930 [id number]                 Emergency Contacts      (Rel.) Home Phone Work Phone Mobile Phone    Leandra Feldman (Daughter) [phone] -- [phone]          "

## 2017-12-15 NOTE — PLAN OF CARE
Problem: Patient Care Overview (Adult)  Goal: Plan of Care Review  Outcome: Ongoing (interventions implemented as appropriate)    12/15/17 0624   Coping/Psychosocial Response Interventions   Plan Of Care Reviewed With patient   Patient Care Overview   Progress improving   Outcome Evaluation   Outcome Summary/Follow up Plan VSS. Pt got up to use BSC frequently with assist x1. No instances of incontinence last night. Video swallow scheduled for today. Will continue to monitor.        Goal: Adult Individualization and Mutuality  Outcome: Ongoing (interventions implemented as appropriate)  Goal: Discharge Needs Assessment  Outcome: Ongoing (interventions implemented as appropriate)    Problem: Stroke (Ischemic) (Adult)  Goal: Signs and Symptoms of Listed Potential Problems Will be Absent or Manageable (Stroke)  Outcome: Ongoing (interventions implemented as appropriate)    Problem: Infection, Risk/Actual (Adult)  Goal: Identify Related Risk Factors and Signs and Symptoms  Outcome: Ongoing (interventions implemented as appropriate)  Goal: Infection Prevention/Resolution  Outcome: Ongoing (interventions implemented as appropriate)    Problem: Fall Risk (Adult)  Goal: Identify Related Risk Factors and Signs and Symptoms  Outcome: Outcome(s) achieved Date Met:  12/15/17  Goal: Absence of Falls  Outcome: Ongoing (interventions implemented as appropriate)    Problem: Pressure Ulcer Risk (Clayton Scale) (Adult,Obstetrics,Pediatric)  Goal: Identify Related Risk Factors and Signs and Symptoms  Outcome: Ongoing (interventions implemented as appropriate)  Goal: Skin Integrity  Outcome: Ongoing (interventions implemented as appropriate)    Problem: Pain, Acute (Adult)  Goal: Identify Related Risk Factors and Signs and Symptoms  Outcome: Ongoing (interventions implemented as appropriate)  Goal: Acceptable Pain Control/Comfort Level  Outcome: Ongoing (interventions implemented as appropriate)

## 2017-12-15 NOTE — PAYOR COMM NOTE
"Kim Feldman (87 y.o. Female)     PLEASE SEE ATTACHED CLINICAL REVIEW ON PATIENT. PT. WAS ADMITTED TO [facility] ON 12/13/17 TO A MONITORED TELEM FLOOR.    REF#[id number]    PLEASE CALL   OR  [phone] WITH INPT AUTH AND DAYS APPROVED.     THANK YOU    ASHISH BONNER LPN,            Date of Birth Social Security Number Address Home Phone MRN    03/13/1930  [place] AT [address] [place] [address] [phone] [id number]    Voodoo Marital Status          Rastafari        Admission Date Admission Type Admitting Provider Attending Provider Department, Room/Bed    12/13/17 Emergency Juan Carlos Harper MD Ahmed, Aftab, MD [address], 534/1    Discharge Date Discharge Disposition Discharge Destination                      Attending Provider: Juan Carlos Harper MD     Allergies:  Penicillins    Isolation:  None   Infection:  None   Code Status:  Conditional    Ht:  160 cm (62.99\")   Wt:  95.3 kg (210 lb 1.6 oz)    Admission Cmt:  None   Principal Problem:  None                Active Insurance as of 12/13/2017     Primary Coverage     Payor Plan Insurance Group Employer/Plan Group    AETNA MEDICARE REPLACEMENT AETNA [id number]     Payor Plan Address Payor Plan Phone Number Effective From Effective To    [address] [phone] 1/1/2016     [place] [address]       Subscriber Name Subscriber Birth Date Member ID       KIM FELDMAN 3/13/1930 [id number]                 Emergency Contacts      (Rel.) Home Phone Work Phone Mobile Phone    Leandra Feldman (Daughter) [phone] -- [phone]               History & Physical      Juan Carlos Harper MD at 12/14/2017 12:15 PM          History and physical    Primary care physician  Dr. Nelson    Chief complaint  Altered mental status    History of present illness  87-year-old white female with history of hypertension hyperlipidemia seizure disorder neuropathy chronic kidney disease stage " III anxiety depression who is a nursing home resident sent to the emergency room with altered mental status.  Patient appeared lethargic generalized weakness.  No fever chills chest pain shortness of breath abdominal pain nausea vomiting diarrhea she is not at her baseline.  Patient workup in ER revealed UTI dehydration admitted for management.       PAST MEDICAL HISTORY    • Anemia     • Bulging lumbar disc     • Chronic cough     • Chronic UTI     • Chronic venous insufficiency     • Clonic seizure disorder     • DDD (degenerative disc disease), lumbosacral     • Dementia without behavioral disturbance     • Depression, endogenous     • Disc degeneration, lumbar     • Dysphagia     • GERD (gastroesophageal reflux disease)     • Hiatal hernia     • History of anxiety     • History of aspiration pneumonitis     • History of cerebral artery occlusion     • History of herpes zoster     • History of ingrowing nail     • History of osteoporosis     • History of poliomyelitis     • History of sciatica     • History of sick sinus syndrome     • History of transient cerebral ischemia     • History of Zenker's diverticulum removal 2016   • HX: long term anticoagulant use     • Hyperlipidemia     • Hypertension     • Hyponatremia     • Intertrigo     • Lumbar radiculopathy     • Morbid obesity     • Neuralgia     • Nonepileptic episode     • Osteoarthritis of right knee     • Pacemaker     • Pneumonia     • Seeing double     • SIADH (syndrome of inappropriate ADH production)     • Vitamin D deficiency     • Zenker's diverticulum           PAST SURGICAL HISTORY   Surgical History          Past Surgical History:   Procedure Laterality Date   • CARDIAC PACEMAKER PLACEMENT         secondary to SSS, placed in 2004, with generator change in 2014   • CATARACT EXTRACTION W/ INTRAOCULAR LENS IMPLANT Bilateral     • COLONOSCOPY       • HAND SURGERY Right     • KNEE ARTHROPLASTY Left     • LAMINECTOMY FOR IMPLANTATION / PLACEMENT  "NEUROSTIMULATOR ELECTRODES       • LUMBAR LAMINECTOMY         L-3 L4 L4 L5   • TONSILLECTOMY       • ZENKERS DIVERTICULECTOMY N/A 9/27/2016     Procedure: ENDOSCOPIC REMOVAL OF ZENKERS DIVERTICULUM W/ WERDASCOPE;  Surgeon: Oswald Barth MD;  Location: [facility];  Service:    • ZENKERS DIVERTICULECTOMY N/A 4/14/2017     Procedure: ESOPHAGOSCOPY;  Surgeon: Oswald Barth MD;  Location: [facility];  Service:             FAMILY HISTORY        Family History   Problem Relation Age of Onset   • Cancer Daughter           SOCIAL HISTORY   Social History    Social History            Social History   • Marital status:        Spouse name: N/A   • Number of children: 3   • Years of education: N/A           Occupational History   • Retired                Social History Main Topics   • Smoking status: Former Smoker       Packs/day: 1.00       Years: 40.00       Types: Cigarettes       Quit date: 1992   • Smokeless tobacco: Never Used         Comment: QUIT 1992   • Alcohol use Yes          Comment: 1-2 DRINKS EVERY SIX MONTHS HOLIDAY DRINKER   • Drug use: No   • Sexual activity: Defer           Other Topics Concern   • Not on file      Social History Narrative         ALLERGIES  Penicillins    Nursing home medications reviewed     REVIEW OF SYSTEMS  Review of Systems   Unable to perform ROS: Mental status change   Constitutional: Positive for fatigue (lethargy).        Decreased PO intake   Neurological: Positive for weakness (generalized) and headaches.      PHYSICAL EXAM  Blood pressure 160/85, pulse 72, temperature 97.8 °F (36.6 °C), temperature source Oral, resp. rate 18, height 160 cm (63\"), weight 95.3 kg (210 lb), SpO2 97 %, not currently breastfeeding.    Constitutional: Awake and alert  Head: Normocephalic and atraumatic.   Eyes: Pupils are equal, round, and reactive to light.   Cardiovascular: Normal rate and regular rhythm.    Pulmonary/Chest: Effort normal.   Abdominal: Soft. There is no " tenderness.   Neurological: She is alert. She displays weakness (Pt moves all extremities but has significant weakness in BLE). No sensory deficit.   Skin: Skin is warm and dry.        Current Facility-Administered Medications:   •  acetaminophen (TYLENOL) tablet 650 mg, 650 mg, Oral, Q4H PRN, Gaetano Harper MD, 650 mg at 12/14/17 0948  •  aspirin EC tablet 325 mg, 325 mg, Oral, Daily, Gaetano Harper MD  •  calcium carbonate (OS-MACRINA) chewable tablet 1,250 mg, 1,250 mg, Oral, Daily, Gaetano Harper MD  •  clopidogrel (PLAVIX) tablet 75 mg, 75 mg, Oral, Daily, Gaetano Harper MD  •  docusate sodium (COLACE) capsule 100 mg, 100 mg, Oral, Nightly, Gaetano Harper MD  •  gabapentin (NEURONTIN) capsule 300 mg, 300 mg, Oral, Q8H, Gaetano Harper MD  •  levETIRAcetam (KEPPRA) tablet 500 mg, 500 mg, Oral, BID, Gaetano Harper MD  •  levoFLOXacin (LEVAQUIN) 750 mg/150 mL D5W (premix) (LEVAQUIN) 750 mg, 750 mg, Intravenous, Q24H, Gaetano Harper MD  •  mirtazapine (REMERON) tablet 15 mg, 15 mg, Oral, Nightly, Gaetano Harper MD  •  pantoprazole (PROTONIX) EC tablet 40 mg, 40 mg, Oral, QAM, Gaetano Harper MD  •  Insert peripheral IV, , , Once **AND** sodium chloride 0.9 % flush 10 mL, 10 mL, Intravenous, PRN, Clark Degroot III, PA  •  sodium chloride 0.9 % infusion, 75 mL/hr, Intravenous, Continuous, Gaetano Harper MD, Last Rate: 75 mL/hr at 12/14/17 0120, 75 mL/hr at 12/14/17 0120     ASSESSMENT  Acute UTI  Left pontine infarct.age indeterminate  Seizure disorder  Gastroesophageal reflux disease  Toxic metabolic encephalopathy  Neuropathy  Chronic kidney disease stage III    PLAN  Admit  IV antibiotics  IV fluid  Aspirin and Lipitor  Neuro consult  Check MRI of the brain  2-D echo  PTOT and speech therapy  Continue nursing home medication and adjust the doses  Supportive care  Stress ulcer DVT prophylaxis  DNR  Follow closely further recommendation according to hospital course    GAETANO HARPER MD           Electronically signed by Gaetano Harper MD  at 12/14/2017 12:25 PM           Emergency Department Notes      Eloy Pitts MD at 12/13/2017 10:53 PM           EMERGENCY DEPARTMENT ENCOUNTER    CHIEF COMPLAINT  Chief Complaint: AMS  History given by: patient and daughter   History limited by: AMS  Room Number: 31/31  PMD: Herrera Nelson MD      HPI:  Pt is a 87 y.o. female who presents due to AMS after fall last night. Pt was at baseline yesterday per relative but appeared lethargic today. She has had decreased PO intake today. Pt herself complains of HA and generalized weakness. Pt has a h/o seizures related to hyponatremia, chronic UTI, and has had 5 episodes of aspiration pneumonia in the past 2 years. Pt has a stimulator for chronic back pain, so she is not an MRI candidate.     Onset: last night  Timing: constant   Location: generalized   Radiation: none  Intensity/Severity: moderate   Progression: unchanged   Associated Symptoms: lethargy, decreased PO intake, HA, weakness   Aggravating Factors: none specified   Alleviating Factors: none specified   Previous Episodes: none specified   Treatment before arrival: none specified   ALLERGIES  Penicillins    REVIEW OF SYSTEMS  Review of Systems   Unable to perform ROS: Mental status change   Constitutional: Positive for fatigue (lethargy).        Decreased PO intake   Neurological: Positive for weakness (generalized) and headaches.       PHYSICAL EXAM  ED Triage Vitals   Temp Heart Rate Resp BP SpO2   12/13/17 2016 12/13/17 2016 12/13/17 2016 12/13/17 2016 12/13/17 2016   98.2 °F (36.8 °C) 82 14 134/92 95 %      Temp src Heart Rate Source Patient Position BP Location FiO2 (%)   12/13/17 2016 12/13/17 2016 12/13/17 2016 12/13/17 2016 --   Oral Monitor Lying Right arm        Physical Exam   Constitutional:   Somnolent bur awakes    HENT:   Head: Normocephalic and atraumatic.   Eyes: Pupils are equal, round, and reactive to light.   Cardiovascular: Normal rate and regular rhythm.    Pulmonary/Chest: Effort normal.    Abdominal: Soft. There is no tenderness.   Neurological: She is alert. She displays weakness (Pt moves all extremities but has significant weakness in BLE). No sensory deficit.   Skin: Skin is warm and dry.   Nursing note and vitals reviewed.      I ordered the above noted radiological studies. Interpreted by radiologist. Reviewed by me in PACS.       PROCEDURES  Procedures    EKG           EKG time: 2311  Rhythm/Rate: SR at 79  P waves and CT: 1st degree AV block  QRS, axis: normal   ST and T waves: normal     Interpreted Contemporaneously by me, independently viewed  Rate is slower compared to prior 02/28/17    PROGRESS AND CONSULTS  ED Course     2043: CT Head, XR right hip, CXR, EKG, and labs were ordered in triage.     2252: Discussed subacute jana infarct seen on CT and plan to admit for further evaluation. Pt's daughter understands and agrees with this plan, and all questions were addressed.      2258: Placed call to [facility] for admission.     2324: Discussed pt's case with Dr. Harper (internal medicine), who agrees to admit pt.      MEDICAL DECISION MAKING  Results were reviewed/discussed with the patient and they were also made aware of online access. Pt also made aware that some labs, such as cultures, will not be resulted during ER visit and follow up with PMD is necessary.     MDM  Number of Diagnoses or Management Options     Amount and/or Complexity of Data Reviewed  Clinical lab tests: reviewed  Tests in the radiology section of CPT®:  reviewed (CT Head shows infarct in jana of unknown age that is new compared to prior. )  Tests in the medicine section of CPT®:  reviewed  Decide to obtain previous medical records or to obtain history from someone other than the patient: yes  Obtain history from someone other than the patient: yes  Discuss the patient with other providers: yes    Patient Progress  Patient progress: stable         DIAGNOSIS  Final diagnoses:   Somnolence   Left pontine CVA   Chronic UTI        DISPOSITION  ADMISSION    Discussed treatment plan and reason for admission with pt/family and admitting physician.  Pt/family voiced understanding of the plan for admission for further testing/treatment as needed.     Latest Documented Vital Signs:  As of 11:29 PM  BP- 148/56 HR- 76 Temp- 98.2 °F (36.8 °C) (Oral) O2 sat- 97%    --  Documentation assistance provided by smita Soto for Eloy Pitts.  Information recorded by the scribe was done at my direction and has been verified and validated by me.            Barbie Sanchez  12/13/17 2736       Eloy Pitts MD  12/14/17 4780       Electronically signed by Eloy Pitts MD at 12/14/2017  2:40 AM      Bhumika Aguayo RN at 12/13/2017 11:51 PM          Pt's daughter reports she was with her mother all day at the nursing home because she wasn't feeling well.  Pt's daughter reports pt really didn't get out of her chair at all today. Pt around 7pm     Bhumika Aguayo RN  12/13/17 3556       Electronically signed by Bhumika Aguayo RN at 12/13/2017 11:53 PM        Intake & Output (last day)       12/14 0701 - 12/15 0700 12/15 0701 - 12/16 0700    I.V. (mL/kg) 2044 (21.4)     Total Intake(mL/kg) 2044 (21.4)     Urine (mL/kg/hr) 375 (0.2)     Total Output 375      Net +1669            Unmeasured Urine Occurrence 7 x         Lab Results (all)     Procedure Component Value Units Date/Time    CBC & Differential [[id number]] Collected:  12/13/17 2127    Specimen:  Blood Updated:  12/13/17 2144    Narrative:       CBC Auto Differential [[id number]]  (Abnormal) Collected:  12/13/17 2127    Specimen:  Blood Updated:  12/13/17 2144     WBC 11.56 (H) 10*3/mm3      RBC 3.25 (L) 10*6/mm3      Hemoglobin 11.0 (L) g/dL      Hematocrit 33.3 (L) %      .5 (H) fL      MCH 33.8 (H) pg      MCHC 33.0 g/dL      RDW 13.0 %      RDW-SD 48.9 fl      MPV 9.0 fL      Platelets 154 10*3/mm3      Neutrophil % 69.7 %      Lymphocyte % 21.9 %      Monocyte % 6.8 %      Eosinophil % 1.1  %      Basophil % 0.2 %      Immature Grans % 0.3 %      Neutrophils, Absolute 8.06 10*3/mm3      Lymphocytes, Absolute 2.53 10*3/mm3      Monocytes, Absolute 0.79 10*3/mm3      Eosinophils, Absolute 0.13 10*3/mm3      Basophils, Absolute 0.02 10*3/mm3      Immature Grans, Absolute 0.03 10*3/mm3     Comprehensive Metabolic Panel [[id number]]  (Abnormal) Collected:  12/13/17 2127    Specimen:  Blood Updated:  12/13/17 2157     Glucose 111 (H) mg/dL      BUN 25 (H) mg/dL      Creatinine 1.35 (H) mg/dL      Sodium 134 (L) mmol/L      Potassium 4.7 mmol/L      Chloride 97 (L) mmol/L      CO2 27.5 mmol/L      Calcium 8.9 mg/dL      Total Protein 6.7 g/dL      Albumin 3.20 (L) g/dL      ALT (SGPT) 12 U/L      AST (SGOT) 16 U/L      Alkaline Phosphatase 54 U/L      Total Bilirubin 0.5 mg/dL      eGFR Non African Amer 37 (L) mL/min/1.73      Globulin 3.5 gm/dL      A/G Ratio 0.9 g/dL      BUN/Creatinine Ratio 18.5     Anion Gap 9.5 mmol/L     Narrative:       The MDRD GFR formula is only valid for adults with stable renal function between ages 18 and 70.    Troponin [[id number]]  (Normal) Collected:  12/13/17 2127    Specimen:  Blood Updated:  12/13/17 2222     Troponin T <0.010 ng/mL     Narrative:       Urinalysis With / Culture If Indicated - Urine, Catheter [[id number]]  (Abnormal) Collected:  12/13/17 2256    Specimen:  Urine from Urine, Catheter Updated:  12/13/17 2306     Color, UA Yellow     Appearance, UA Cloudy (A)     pH, UA 6.0     Specific Gravity, UA 1.019     Glucose, UA Negative     Ketones, UA Negative     Bilirubin, UA Negative     Blood, UA Moderate (2+) (A)     Protein, UA 30 mg/dL (1+) (A)     Leuk Esterase, UA Large (3+) (A)     Nitrite, UA Positive (A)     Urobilinogen, UA 0.2 E.U./dL    Protime-INR [[id number]]  (Normal) Collected:  12/13/17 2256    Specimen:  Blood Updated:  12/13/17 2317     Protime 13.3 Seconds      INR 1.05    aPTT [[id number]]  (Normal) Collected:  12/13/17 2256    Specimen:  Blood  Updated:  12/13/17 2317     PTT 34.8 seconds     Basic Metabolic Panel [[id number]]  (Abnormal) Collected:  12/13/17 2256    Specimen:  Blood Updated:  12/13/17 2326     Glucose 109 (H) mg/dL      BUN 24 (H) mg/dL      Creatinine 1.25 (H) mg/dL      Sodium 136 mmol/L      Potassium 4.5 mmol/L      Chloride 98 mmol/L      CO2 27.1 mmol/L      Calcium 8.7 mg/dL      eGFR Non African Amer 41 (L) mL/min/1.73      BUN/Creatinine Ratio 19.2     Anion Gap 10.9 mmol/L     Narrative:       The MDRD GFR formula is only valid for adults with stable renal function between ages 18 and 70.    Urinalysis, Microscopic Only - Urine, Clean Catch [[id number]]  (Abnormal) Collected:  12/13/17 2256    Specimen:  Urine from Urine, Catheter Updated:  12/14/17 0013     RBC, UA 13-20 (A) /HPF      WBC, UA Too Numerous to Count (A) /HPF      Bacteria, UA 4+ (A) /HPF      Squamous Epithelial Cells, UA 7-12 (A) /HPF      Hyaline Casts, UA None Seen /LPF      Methodology Manual Light Microscopy    P2Y12 Platelet Inhibition [[id number]]  (Abnormal) Collected:  12/14/17 1815    Specimen:  Blood Updated:  12/14/17 1857     P2Y12 Platelet Inhibition 138 (L) PRU     Narrative:       Hemoglobin A1c [[id number]]  (Normal) Collected:  12/15/17 0355    Specimen:  Blood Updated:  12/15/17 0430     Hemoglobin A1C 5.20 %     Narrative:       Comprehensive Metabolic Panel [[id number]]  (Abnormal) Collected:  12/15/17 0355    Specimen:  Blood Updated:  12/15/17 0447     Glucose 91 mg/dL      BUN 15 mg/dL      Creatinine 0.78 mg/dL      Sodium 138 mmol/L      Potassium 4.1 mmol/L      Chloride 102 mmol/L      CO2 22.0 mmol/L      Calcium 8.2 (L) mg/dL      Total Protein 6.2 g/dL      Albumin 3.00 (L) g/dL      ALT (SGPT) 9 U/L      AST (SGOT) 15 U/L      Alkaline Phosphatase 49 U/L      Total Bilirubin 0.5 mg/dL      eGFR Non African Amer 70 mL/min/1.73      Globulin 3.2 gm/dL      A/G Ratio 0.9 g/dL      BUN/Creatinine Ratio 19.2     Anion Gap 14.0 mmol/L      Narrative:       The MDRD GFR formula is only valid for adults with stable renal function between ages 18 and 70.    Lipid Panel [[id number]] Collected:  12/15/17 0355    Specimen:  Blood Updated:  12/15/17 0447     Total Cholesterol 147 mg/dL      Triglycerides 69 mg/dL      HDL Cholesterol 47 mg/dL      LDL Cholesterol  86 mg/dL      VLDL Cholesterol 13.8 mg/dL      LDL/HDL Ratio 1.83    Narrative:       CBC & Differential [[id number]] Collected:  12/15/17 0355    Specimen:  Blood Updated:  12/15/17 0451    Narrative:       CBC Auto Differential [[id number]]  (Abnormal) Collected:  12/15/17 0355    Specimen:  Blood Updated:  12/15/17 0451     WBC 5.33 10*3/mm3      RBC 3.17 (L) 10*6/mm3      Hemoglobin 10.7 (L) g/dL      Hematocrit 33.0 (L) %      .1 (H) fL      MCH 33.8 (H) pg      MCHC 32.4 g/dL      RDW 12.8 %      RDW-SD 48.4 fl      MPV 9.6 fL      Platelets 148 10*3/mm3      Neutrophil % 59.4 %      Lymphocyte % 28.7 %      Monocyte % 8.3 %      Eosinophil % 3.2 %      Basophil % 0.4 %      Immature Grans % 0.0 %      Neutrophils, Absolute 3.17 10*3/mm3      Lymphocytes, Absolute 1.53 10*3/mm3      Monocytes, Absolute 0.44 10*3/mm3      Eosinophils, Absolute 0.17 10*3/mm3      Basophils, Absolute 0.02 10*3/mm3      Immature Grans, Absolute 0.00 10*3/mm3     BNP [[id number]]  (Normal) Collected:  12/15/17 0355    Specimen:  Blood Updated:  12/15/17 0454     proBNP 711.7 pg/mL     Narrative:       Among patients with dyspnea, NT-proBNP is highly sensitive for the detection of acute congestive heart failure. In addition NT-proBNP of <300 pg/ml effectively rules out acute congestive heart failure with 99% negative predictive value.    TSH [[id number]]  (Normal) Collected:  12/15/17 0355    Specimen:  Blood Updated:  12/15/17 0454     TSH 3.440 mIU/mL     Urine Culture - Urine, Urine, Clean Catch [[id number]]  (Abnormal) Collected:  12/13/17 2256    Specimen:  Urine from Urine, Catheter Updated:  12/15/17 0736      Urine Culture --      >100,000 CFU/mL Gram Negative Bacilli (A)          Imaging Results (all)     Procedure Component Value Units Date/Time    XR Hip With or Without Pelvis 2 - 3 View Right [[id number]] Collected:  12/13/17 2131     Updated:  12/13/17 2131    Narrative:       X-RAY CHEST 1 VIEW.     HISTORY: Fall.     COMPARISON: 09/28/2017.     FINDINGS:  Cardiomediastinal silhouette is within normal limits.         There is no consolidation or effusion.              Impression:       No acute findings.         ----------------------------------------------------------------     2 VIEWS OF THE RIGHT HIP, SINGLE VIEW OF THE PELVIS.     HISTORY: Fall, trauma.     COMPARISON: No prior studies for comparison.     FINDINGS:  There is no fracture or dislocation. Mild osteoarthritis of the hip  joints. No osteonecrosis.     Soft tissue structures are unremarkable.     IMPRESSION:  No acute fracture or dislocation.           XR Chest 1 View [[id number]] Collected:  12/13/17 2131     Updated:  12/13/17 2131    Narrative:       X-RAY CHEST 1 VIEW.     HISTORY: Fall.     COMPARISON: 09/28/2017.     FINDINGS:  Cardiomediastinal silhouette is within normal limits.         There is no consolidation or effusion.              Impression:       No acute findings.         ----------------------------------------------------------------     2 VIEWS OF THE RIGHT HIP, SINGLE VIEW OF THE PELVIS.     HISTORY: Fall, trauma.     COMPARISON: No prior studies for comparison.     FINDINGS:  There is no fracture or dislocation. Mild osteoarthritis of the hip  joints. No osteonecrosis.     Soft tissue structures are unremarkable.     IMPRESSION:  No acute fracture or dislocation.           CT Head Without Contrast [[id number]] Collected:  12/13/17 2157     Updated:  12/14/17 0518    Narrative:       CT SCAN OF THE BRAIN WITHOUT CONTRAST.     TECHNIQUE: Radiation dose reduction techniques were utilized, including  automated exposure control  and exposure modulation based on body size.  Multiple axial images of the brain were obtained from the vertex to the  base of the brain.     HISTORY: Trauma, contusion to the head.     COMPARISON: No prior studies for comparison.     FINDINGS:   Midline structures are within normal limits, there is no hydrocephalus.  Gray-white matter differentiation is maintained. Findings of small  vessel ischemic disease, a few old lacunar infarcts and cortical  atrophy. In the jana to the left of midline, at the level of the  brachium pontis, there is a new low-attenuation 2.2 x 1.7 cm focus which  may be further evaluated.     Orbits are within normal limits. No significant mucosal disease of the  para-nasal sinuses. Mastoid air cells are well aerated.              Impression:       1.  No definite acute intracranial hemorrhage.   2.  2.2 cm low-attenuation area in the jana is new compared to previous  examination, it may represent an age-indeterminate infarct in the right  clinical setting. If indicated further evaluation with MRI may be  performed.     Findings called to Bertin Degroot, 9:55 PM.1             CT Head Without Contrast [[id number]] Collected:  12/14/17 1818     Updated:  12/14/17 1849    Narrative:       CT SCAN OF THE BRAIN WITHOUT CONTRAST AT 5:39 PM     HISTORY: Recent head trauma. Has recent CT scan describing possible  vague area of decreased density in the jana extending more to the left.  Follow-up evaluation.     TECHNIQUE: The CT scan was performed to the brain without contrast and  includes additional thin section imaging through the brainstem.      FINDINGS: There is moderate diffuse atrophy and chronic small vessel  ischemic change. There is no suspicious abnormality involving the jana  and the appearance on recent CT scan is therefore artifactual. There is  no evidence of hemorrhage or mass effect. The visualized sinuses are  clear. The mastoid air cells are clear.     Radiation dose reduction  techniques were utilized, including automated  exposure control and exposure modulation based on body size.     This report was finalized on 12/14/2017 6:46 PM by Dr. Manuel Elder MD.             ECG/EMG Results (all)     Procedure Component Value Units Date/Time    ECG 12 Lead [[id number]] Collected:  12/13/17 2311     Updated:  12/14/17 1752    Narrative:       RR Interval= 759 ms  IN Interval= 240 ms  QRSD Interval= 90 ms  QT Interval= 404 ms  QTc Interval= 464 ms  Heart Rate= 79 ms  P Axis= 53 deg  QRS Axis= -12 deg  T Wave Axis= 30 deg  I: 40 Axis= -4 deg  T: 40 Axis= -30 deg  ST Axis= 43 deg  SINUS RHYTHM  FIRST DEGREE AV BLOCK  NO SIGNIFICANT CHANGE FROM PREVIOUS ECG  Electronically Signed by:  Douglas Dominguez ([facility] (formerly [facility] 14-Dec-2017 17:51:02  Date and Time of Study: 2017-12-13 23:11:11    Adult Transthoracic Echo Complete W/ Cont if Necessary Per Protocol [[id number]] Collected:  12/14/17 1615     Updated:  12/14/17 2011     Echo EF Estimated 69 %     Narrative:       · Left ventricular systolic function is normal. Calculated EF = 68.6%.   Estimated EF was in agreement with the calculated EF. Estimated EF = 69%.   Normal left ventricular cavity size noted. All left ventricular wall   segments contract normally. Left ventricular wall thickness is consistent   with mild concentric hypertrophy. Left ventricular diastolic function was   indeterminate.  · Normal right ventricular cavity size, wall thickness, systolic function   and septal motion noted. Electronic lead present in the ventricle.  · Left atrial volume is moderately increased.  · The mitral valve is abnormal in structure. Mitral annular calcification   is present. Mild mitral valve regurgitation is present. Mild mitral valve   stenosis is present. The mitral valve mean gradient is 6 mmHg.  · The tricuspid valve is grossly normal. Mild tricuspid valve   regurgitation is present. Estimated right ventricular systolic pressure   from tricuspid regurgitation  is mildly elevated (35-45 mmHg). Calculated   right ventricular systolic pressure from tricuspid regurgitation is 36.2   mmHg.             Orders (all)     Start     Ordered    12/15/17 1315  FL Video Swallow  1 Time Imaging      12/15/17 0648    12/14/17 1521  P2Y12 Platelet Inhibition  Once      12/14/17 1520    12/14/17 1453  CT Head Without Contrast  1 Time Imaging     Comments:  Need fine cuts posterior fossa    12/14/17 1452    12/14/17 1229  Inpatient Consult to Neurology  Once     Specialty:  Neurology  Provider:  Aldo Shah MD    12/14/17 1228    12/14/17 1228  Adult Transthoracic Echo Complete W/ Cont if Necessary Per Protocol  Once      12/14/17 1228    12/14/17 1228  Duplex Carotid Ultrasound CAR  Once      12/14/17 1228    12/14/17 0929  Diet Dysphagia; IV - Mechanical Soft No Mixed Consistencies; Nectar / Syrup Thick  Diet Effective Now      12/14/17 0928    12/14/17 0126  Inpatient Consult to Case Management   Once     Provider:  (Not yet assigned)    12/14/17 0125    12/14/17 0126  Inpatient Consult to Nutrition  Once     Provider:  (Not yet assigned)    12/14/17 0125    12/14/17 0112  Conditional Code  Continuous     Comments:  Verified with second nurse- Nola Duff RN    12/14/17 0112    12/14/17 0110  PT Consult: Eval & Treat  Once      12/14/17 0112    12/14/17 0110  OT Consult: Eval & Treat cva  Once      12/14/17 0112    12/14/17 0109  Place Sequential Compression Device  Once      12/14/17 0112    12/14/17 0044  NPO Diet  Diet Effective Now,   Status:  Canceled     Comments:  Should remain in effect until a complete SLP evaluation occurs and a superceding MD order is received.    12/14/17 0043    12/14/17 0044  SLP Consult: Eval & Treat  Once      12/14/17 0043    12/14/17 0044  SLP Evaluation - Failed Bedside Dysphagia Screening  Once      12/14/17 0043    12/13/17 2329  Inpatient Admission  Once      12/13/17 2329    12/13/17 2326  Inpatient Admission  Once       17 2326    17 2259  LIPPS (on-call MD unless specified)  Once     Specialty:  Internal Medicine  Provider:  (Not yet assigned)    17 1398             Consult Notes (all)      Michael Barajas MD at 2017  1:56 PM  Version 1 of 1     Consult Orders:    1. Inpatient Consult to Neurology [[id number]] ordered by Juan Carlos Harper MD at 17 1228                DOS: 2017  NAME: Kim Feldman   : 3/13/1930  PCP: Herrera Nelson MD  CC: Stroke  Referring MD: Juan Carlos Harper MD    Neurological Problem and Interval History:  87 y.o. RHW female with a Hx of dementia, hyperlipidemia and TIA and stroke .  The patient has been in her state of poor health with back pain as well as generalized weakness living in assisted living facility.  She states she was admitted because of a fall yesterday.  She fell because her legs were generally weak.  Per the history and physical by Dr. goff the patient was admitted because of altered mental status, decreased by mouth intake and the diagnosis of a UTI.  Emergency department she was complaining of headache and generalized weakness.  She denies any stroke or TIA symptoms and does not recall having stroke in the past.  She states that she has a headache now.  She states that she has very poor memory and her daughter takes care of everything.  She normally needs a walker at home is in part due to back pain as well as knee arthritis.  She feels cognitively better today.    Past Medical/Surgical Hx:  Past Medical History:   Diagnosis Date   • Anemia    • Bulging lumbar disc    • Chronic cough    • Chronic UTI    • Chronic venous insufficiency    • Clonic seizure disorder    • DDD (degenerative disc disease), lumbosacral    • Dementia without behavioral disturbance     moderate   • Depression, endogenous    • Disc degeneration, lumbar    • Dysphagia    • GERD (gastroesophageal reflux disease)    • Hiatal hernia    • History of anxiety    • History of aspiration  pneumonitis    • History of cerebral artery occlusion     CVA (following TIA), 10/10, treated with tPA   • History of herpes zoster    • History of ingrowing nail    • History of osteoporosis    • History of poliomyelitis     child   • History of sciatica    • History of sick sinus syndrome     s/p PPM   • History of transient cerebral ischemia     followed by stroke in 10/2010. BIBI was normal.   • History of Zenker's diverticulum removal 2016   • HX: long term anticoagulant use    • Hyperlipidemia    • Hypertension     NO CURRENT MEDICATION   • Hyponatremia    • Intertrigo    • Lumbar radiculopathy    • Morbid obesity    • Neuralgia     with diplopia secondary to facial shingles   • Nonepileptic episode    • Osteoarthritis of right knee    • Pacemaker    • Pneumonia    • Seeing double     secondary to shingles on the face also with neuralgia   • SIADH (syndrome of inappropriate ADH production)    • Vitamin D deficiency    • Zenker's diverticulum      Past Surgical History:   Procedure Laterality Date   • CARDIAC PACEMAKER PLACEMENT      secondary to SSS, placed in 2004, with generator change in 2014   • CATARACT EXTRACTION W/ INTRAOCULAR LENS IMPLANT Bilateral    • COLONOSCOPY     • HAND SURGERY Right    • KNEE ARTHROPLASTY Left    • LAMINECTOMY FOR IMPLANTATION / PLACEMENT NEUROSTIMULATOR ELECTRODES     • LUMBAR LAMINECTOMY      L-3 L4 L4 L5   • TONSILLECTOMY     • ZENKERS DIVERTICULECTOMY N/A 9/27/2016    Procedure: ENDOSCOPIC REMOVAL OF ZENKERS DIVERTICULUM W/ WERDASCOPE;  Surgeon: Oswald Barth MD;  Location: [facility] OR;  Service:    • ZENKERS DIVERTICULECTOMY N/A 4/14/2017    Procedure: ESOPHAGOSCOPY;  Surgeon: Oswald Barth MD;  Location: [facility] OR;  Service:           Review of Systems:        A complete review of all systems is negative except as described above plus Frequent UTI, left shoulder pain, depressed mood, left upper facial tingling due to shingles for which she follows with Dr. Allison  celio Carrizales as a child.    Medications On Admission  Facility-Administered Medications Prior to Admission   Medication Dose Route Frequency Provider Last Rate Last Dose   • cyanocobalamin injection 1,000 mcg  1,000 mcg Intramuscular Q28 Days Sharad Salinas MD   1,000 mcg at 10/09/17 1143     Prescriptions Prior to Admission   Medication Sig Dispense Refill Last Dose   • acetaminophen (TYLENOL) 500 MG tablet Take 1,000 mg by mouth Every 6 (Six) Hours As Needed for Mild Pain (1-3).   Taking   • B Complex Vitamins (VITAMIN B COMPLEX PO) Take 1 tablet by mouth every morning. HOLD PRIOR TO SURG   Taking   • Calcium-Vitamin D-Vitamin K (VIACTIV PO) Take 500 mg by mouth Every Night. HOLD PRIOR TO SURG    Taking   • clopidogrel (PLAVIX) 75 MG tablet TAKE ONE TABLET BY MOUTH DAILY 90 tablet 0 Taking   • diclofenac (VOLTAREN) 1 % gel gel Apply 4 g topically 4 (Four) Times a Day As Needed. HOLD PRIOR TO SURGERY   Taking   • docusate sodium (COLACE) 100 MG capsule Take 100 mg by mouth Every Night.   Taking   • gabapentin (NEURONTIN) 300 MG capsule Take 2 capsules by mouth 3 (Three) Times a Day. 540 capsule 0 Taking   • hydrocortisone 2.5 % lotion    Not Taking   • Hypromellose (ARTIFICIAL TEARS OP) Apply 2 drops to eye 4 (four) times a day as needed.   Taking   • ketoconazole (NIZORAL) 2 % cream    Taking   • lansoprazole (PREVACID) 30 MG capsule TAKE ONE CAPSULE BY MOUTH EVERY MORNING 30 capsule 2    • levETIRAcetam (KEPPRA) 500 MG tablet Take 1 tablet by mouth 2 (Two) Times a Day. 60 tablet 11 Taking   • methylcellulose, Laxative, (CITRUCEL) 500 MG tablet tablet Take 2 tablets by mouth Every Morning.   Taking   • mirtazapine (REMERON) 15 MG tablet TAKE ONE TABLET BY MOUTH EVERY NIGHT AT BEDTIME 30 tablet 2 Taking       Allergies:  Allergies   Allergen Reactions   • Penicillins Hives     Has previously tolerated ceftriaxone       Social Hx:  Social History     Social History   • Marital status:      Spouse name: N/A  "  • Number of children: 3   • Years of education: N/A     Occupational History   • Retired      Social History Main Topics   • Smoking status: Former Smoker     Packs/day: 1.00     Years: 40.00     Types: Cigarettes     Quit date: 1992   • Smokeless tobacco: Never Used      Comment: QUIT 1992   • Alcohol use Yes      Comment: 1-2 DRINKS EVERY SIX MONTHS HOLIDAY DRINKER   • Drug use: No   • Sexual activity: Defer     Other Topics Concern   • Not on file     Social History Narrative       Family Hx:  Family History   Problem Relation Age of Onset   • Cancer Daughter        Review of Imaging (Interpretation of images not reports):  CT brain 12/13/17: There is diffuse cerebral atrophy, possibly in or subacute stroke in the left anterior limb internal capsule there may be some old stroke in the right insular cortex, There is probable artifact showing hypodensity in the L jana which looks huge ([id number]) and should be associated with severe neurological deficits and was radial  CT brain 7/16/17: Looks similar to above accident without probable artifact in the jana    EKG: Sinus rhythm      Physical Examination:  /85 (BP Location: Left arm, Patient Position: Lying)  Pulse 72  Temp 97.8 °F (36.6 °C) (Oral)   Resp 18  Ht 160 cm (63\")  Wt 95.3 kg (210 lb)  SpO2 97%  BMI 37.2 kg/m2  General Appearance:   Well developed, morbidly Obese, well groomed, alert,  depressed-appearing, and cooperative.  HEENT: Normocephalic.  Normal fundoscopic exam including normal retina, discs are flat with sharp margins, normal vasculature.  Neck and Spine: Normal range of motion.  Normal alignment. No mass or tenderness. No bruits.  Cardiac: Regular rate and rhythm. No murmurs.  Peripheral Vasculature: Radial 1+ and pedal pulses are trace, equal and symmetric. No signs of distal embolization.  Extremities:    No edema or deformities. Normal joint ROM. AMNA hoses and SCD's in place  Skin:    No rashes or birth marks.    Neurological " examination:  Higher Integrative  Function: Oriented to time, place and person. Normal registration, recall 3/3, attention span and concentration. Normal language including comprehension, spontaneous speech, repetition, reading, writing, naming and vocabulary. No neglect with normal visual-spatial function and construction. Normal fund of knowledge and higher integrative function.  CN II: Pupils are equal, round, and reactive to light. Normal visual acuity and visual fields.    CN III IV VI: Extraocular movements are full without nystagmus. Ptosis on the left (chronic per patient )  CN V: Normal facial sensation and strength of muscles of mastication.  CN VII: Subtle L facial weakness.  CN VIII:   Auditory acuity is normal.  CN IX & X:   Symmetric palatal movement.  CN XI: Sternocleidomastoid and trapezius are normal.  No weakness.  CN XII:   The tongue is midline.  No atrophy or fasciculations.  Motor: Normal muscle strength, bulk and tone in upper and lower extremities with some breakaway weakness versus very mild weakness in both shoulders and hip flexors.  No fasciculations, rigidity, spasticity, or abnormal movements.  Reflexes: trace in the upper and absent lower extremities. Plantar responses are flexor.  Sensation: Normal to light touch, pinprick, vibration, temperature, and proprioception in arms and legs. Normal graphesthesia and no extinction on DSS.  Station and Gait: Needs assistance to stand up and walk.  Gait is very slow cautious and antalgic.    Coordination: Finger to nose test shows no dysmetria.  Rapid alternating movements are normal.  Heel to shin normal.  NIHSS: 1    Diagnoses / Discussion:  87 y.o. who presents with Sx of generalized weakness and altered mental status was neurological examination is nonfocal with an excellent mental status other than probable depression.  I do not find any physical exam findings consistent with the diagnosis of dementia.  The CT scan findings show some white  matter disease but the new hypodensity in the left jana probably artifact.  A lesion of that size should be associated with severe neurological deficits including a right hemiparesis, ataxia as well as severe dysarthria and probably oculomotor abnormalities as well.  A neoplastic process of this size may present without significant findings on examination.  Since she cannot have an MRI scan I think a repeat CT scan of the brain with fine cuts of the posterior fossa is appropriate.  If that confirms the presence of a lesion then she will need a scan with and without contrast as well as an evaluation of her vessels.      Plan:  Repeat CT Brain with fine cuts posterior fossa  Continue Plavix  Plavix response   Hydrate  Neurochecks  Check Lipid panel, Hgb A1C  Lipitor 80mg  Non-pharmacological DVT prophylaxis  EKG Tele  PT/OT/ST  Stroke Education  Blood pressure control to <130/80  Goal LDL <70-recommend high dose statins-   Serum glucose < 140     Call 911 for stroke any stroke symptoms    I have discussed the above with the patient and family.  Time spent with patient: 60min    MDM  Reviewed: vitals  Interpretation: labs and CT scan      Dictated using Dragon dictation.   Electronically signed by Michael Barajas MD at 12/14/2017  3:18 PM        All medication doses during the admission are shown, including meds that are no longer on order.     Scheduled Meds Sorted by Name for Aba Kim ZOË as of 12/13/17 through 12/15/17       1 Day 3 Days 7 Days 10 Days < Today >    Legend:                          Inactive     Active     Other Encounter    Linked               Medications 12/13/17 12/14/17 12/15/17   aspirin EC tablet 325 mg  Dose: 325 mg Freq: Daily Route: PO  Start: 12/14/17 1015 End: 12/14/17 2029    Admin Instructions:   Herbal/drug interaction: Avoid use with ginkgo biloba. Do not crush or chew.  Do not exceed 4 grams of aspirin in a 24 hr period.    If given for pain, use the following pain scale:    Mild Pain = Pain Score of 1-3, CPOT 1-2  Moderate Pain = Pain Score of 4-6, CPOT 3-4  Severe Pain = Pain Score of 7-10, CPOT 5-8     1015     2029-D/C'd         aspirin suppository 300 mg  Dose: 300 mg Freq: Once Route: RE  Start: 12/14/17 1245 End: 12/14/17 1213    Admin Instructions:   Refrigerate  Do not exceed 4 grams of aspirin in a 24 hr period.    If given for pain, use the following pain scale:   Mild Pain = Pain Score of 1-3, CPOT 1-2  Moderate Pain = Pain Score of 4-6, CPOT 3-4  Severe Pain = Pain Score of 7-10, CPOT 5-8     1213-D/C'd           aspirin suppository 300 mg  Dose: 300 mg Freq: Daily Route: RE  Start: 12/14/17 0107 End: 12/14/17 0937    Admin Instructions:   Refrigerate  Do not exceed 4 grams of aspirin in a 24 hr period.    If given for pain, use the following pain scale:   Mild Pain = Pain Score of 1-3, CPOT 1-2  Moderate Pain = Pain Score of 4-6, CPOT 3-4  Severe Pain = Pain Score of 7-10, CPOT 5-8     0900     0937-D/C'd         aspirin suppository 300 mg  Dose: 300 mg Freq: Once Route: RE  Start: 12/13/17 2326 End: 12/13/17 2339    Admin Instructions:   Refrigerate  Do not exceed 4 grams of aspirin in a 24 hr period.    If given for pain, use the following pain scale:   Mild Pain = Pain Score of 1-3, CPOT 1-2  Moderate Pain = Pain Score of 4-6, CPOT 3-4  Severe Pain = Pain Score of 7-10, CPOT 5-8    2339                atorvastatin (LIPITOR) tablet 40 mg  Dose: 40 mg Freq: Nightly Route: PO  Start: 12/14/17 2100    Admin Instructions:   Avoid grapefruit juice.     2234 2100              calcium carbonate (TUMS) chewable tablet 500 mg (200 mg elemental)  Dose: 2.5 tablet Freq: Daily Route: PO  Start: 12/14/17 1245    Admin Instructions:   One tablet contains 200 mg elemental calcium.  Take with food.     1504            0900              clopidogrel (PLAVIX) tablet 75 mg  Dose: 75 mg Freq: Daily Route: PO  Start: 12/14/17 4631 5015            0900              docusate  sodium (COLACE) capsule 100 mg  Dose: 100 mg Freq: Nightly Route: PO  Start: 12/14/17 2100    Admin Instructions:   Swallow whole. Do not open, crush, or chew capsule.     2234            2100              gabapentin (NEURONTIN) capsule 300 mg  Dose: 300 mg Freq: Every 8 Hours Scheduled Route: PO  Start: 12/14/17 1400     1507     2234          0523     1400     2200          levETIRAcetam (KEPPRA) tablet 500 mg  Dose: 500 mg Freq: 2 Times Daily Route: PO  Start: 12/14/17 1245    Admin Instructions:   Swallow whole; do not crush, chew, or split tablet.     1503     1829          0900     1800            levoFLOXacin (LEVAQUIN) 500 mg/100 mL D5W (premix) (LEVAQUIN) 500 mg  Dose: 500 mg Freq: Every 24 Hours Route: IV  Indications of Use: URINARY TRACT INFECTION  Start: 12/14/17 2300 End: 12/24/17 2259    Admin Instructions:   Protect from light. Do NOT refrigerate.     2234            2300              levoFLOXacin (LEVAQUIN) 750 mg/150 mL D5W (premix) (LEVAQUIN) 750 mg  Dose: 750 mg Freq: Once Route: IV  Indications of Use: URINARY TRACT INFECTION  Start: 12/14/17 1245 End: 12/14/17 1213    Admin Instructions:   Protect from light. Do NOT refrigerate.     1213-D/C'd           levoFLOXacin (LEVAQUIN) 750 mg/150 mL D5W (premix) (LEVAQUIN) 750 mg  Dose: 750 mg Freq: Every 24 Hours Route: IV  Indications of Use: URINARY TRACT INFECTION  Start: 12/14/17 2300 End: 12/14/17 1226    Admin Instructions:   Protect from light. Do NOT refrigerate.     1226-D/C'd           levoFLOXacin (LEVAQUIN) 750 mg/150 mL D5W (premix) (LEVAQUIN) 750 mg  Dose: 750 mg Freq: Once Route: IV  Indications of Use: URINARY TRACT INFECTION  Start: 12/13/17 2327 End: 12/13/17 2347    Admin Instructions:   Protect from light. Do NOT refrigerate.    2347                mirtazapine (REMERON) tablet 15 mg  Dose: 15 mg Freq: Nightly Route: PO  Start: 12/14/17 2100     2235            2100              pantoprazole (PROTONIX) EC tablet 40 mg  Dose: 40 mg  Freq: Every Morning Route: PO  Start: 12/14/17 1245    Admin Instructions:   Swallow whole; do not crush, split, or chew.     1501            0700              pantoprazole (PROTONIX) EC tablet 40 mg  Dose: 40 mg Freq: Every Early Morning Route: PO  Start: 12/14/17 0600 End: 12/14/17 1215    Admin Instructions:   Swallow whole; do not crush, split, or chew.     (3737) 0255-D/C'd         Medications 12/13/17 12/14/17 12/15/17         Continuous Meds Sorted by Name for Kim Feldman as of 12/13/17 through 12/15/17      Legend:                          Inactive     Active     Other Encounter    Linked               Medications 12/13/17 12/14/17 12/15/17   sodium chloride 0.9 % infusion  Rate: 75 mL/hr Freq: Continuous Route: IV  Last Dose: 75 mL/hr (12/15/17 0524)  Start: 12/14/17 0145     0120     1501          0524                    PRN Meds Sorted by Name for Kim Feldman as of 12/13/17 through 12/15/17      Legend:                          Inactive     Active     Other Encounter    Linked               Medications 12/13/17 12/14/17 12/15/17   acetaminophen (TYLENOL) tablet 650 mg  Dose: 650 mg Freq: Every 4 Hours PRN Route: PO  PRN Reason: Mild Pain   Start: 12/14/17 0936    Admin Instructions:   Do not exceed 4 grams of acetaminophen in a 24 hr period.    If given for pain, use the following pain scale:   Mild Pain = Pain Score of 1-3, CPOT 1-2  Moderate Pain = Pain Score of 4-6, CPOT 3-4  Severe Pain = Pain Score of 7-10, CPOT 5-8     4015 3336             sodium chloride 0.9 % flush 10 mL  Dose: 10 mL Freq: As Needed Route: IV  PRN Reason: Line Care  Start: 12/13/17 7580

## 2017-12-15 NOTE — THERAPY TREATMENT NOTE
Acute Care - Physical Therapy Treatment Note  [facility]     Patient Name: Kim Feldman  : 3/13/1930  MRN: [id number]  Today's Date: 12/15/2017  Onset of Illness/Injury or Date of Surgery Date: 17  Date of Referral to PT: 17  Referring Physician: Dr. Harper    Admit Date: 2017    Visit Dx:    ICD-10-CM ICD-9-CM   1. Somnolence R40.0 780.09   2. 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 Chronic UTI N39.0 599.0   4. Impaired functional mobility, balance, gait, and endurance Z74.09 V49.89     Patient Active Problem List   Diagnosis   • Anemia   • Bulging lumbar disc   • Depression, endogenous   • Gastroesophageal reflux disease   • Hyperlipidemia   • Intermittent claudication   • Neuropathy   • Osteoarthritis of knee   • Syndrome of inappropriate secretion of antidiuretic hormone   • Vitamin D deficiency   • History of sick sinus syndrome   • Intertrigo   • Generalized convulsive seizures   • Urine frequency   • Change in bowel habits   • Urinary tract infection   • Zenker diverticulum   • History of anxiety   • Urinary tract infection in female   • Clonic seizure disorder   • Thrombocytopenia   • B12 deficiency   • Pain of toe of right foot   • Weakness   • Cardiac pacemaker in situ   • Chronic venous insufficiency   • Arthritis   • S/P knee replacement   • Chronic bilateral low back pain without sciatica   • Essential hypertension   • Hiatal hernia   • Acute vaginitis   • Zenker's diverticulum   • Acute pain of left knee   • Chronic idiopathic constipation   • Pressure sore on buttocks   • Somnolence               Adult Rehabilitation Note       12/15/17 1500          Rehab Assessment/Intervention    Discipline physical therapist  -MG      Document Type therapy note (daily note)  -MG      Subjective Information no complaints;agree to therapy  -MG      Patient Effort, Rehab Treatment good  -MG      Symptoms Noted During/After Treatment fatigue  -MG      Precautions/Limitations fall precautions   -MG      Recorded by [MG] Megan Gosselin, PT      Pain Assessment    Pain Assessment No/denies pain  -MG      Recorded by [MG] Megan Gosselin, PT      Cognitive Assessment/Intervention    Current Cognitive/Communication Assessment functional  -MG      Orientation Status oriented x 4  -MG      Follows Commands/Answers Questions 100% of the time  -MG      Personal Safety WNL/WFL  -MG      Personal Safety Interventions gait belt;fall prevention program maintained  -MG      Recorded by [MG] Megan Gosselin, PT      Bed Mobility, Assessment/Treatment    Bed Mobility, Comment not tested  -MG      Recorded by [MG] Megan Gosselin, PT      Transfer Assessment/Treatment    Transfers, Sit-Stand [place] minimum assist (75% patient effort);2 person assist required  -MG      Transfers, Stand-Sit [place] minimum assist (75% patient effort);2 person assist required  -MG      Transfers, Sit-Stand-Sit, Assist Device other (see comments)   rollator   -MG      Toilet Transfer, [place] minimum assist (75% patient effort)  -MG      Transfer, Safety Issues step length decreased  -MG      Transfer, Impairments strength decreased;impaired balance  -MG      Recorded by [MG] Megan Gosselin, PT      Gait Assessment/Treatment    Gait, [place] Level contact guard assist  -MG      Gait, Assistive Device rollator  -MG      Gait, Distance (Feet) 50  -MG      Gait, Gait Deviations jose decreased;step length decreased  -MG      Gait, Safety Issues step length decreased;balance decreased during turns  -MG      Gait, Impairments strength decreased;impaired balance  -MG      Recorded by [MG] Megan Gosselin, PT      Positioning and Restraints    Pre-Treatment Position sitting in chair/recliner  -MG      Post Treatment Position bsc  -MG      In Chair call light within reach;encouraged to call for assist  -MG      Recorded by [MG] Megan Gosselin, PT        User Key  (r) = Recorded By, (t) = Taken By, (c) = Cosigned By    Initials Name  Effective Dates    MG Megan Gosselin, PT 09/13/17 -                 IP PT Goals       12/14/17 0912          Bed Mobility PT LTG    Bed Mobility PT LTG, Date Established 12/14/17  -MA      Bed Mobility PT LTG, Time to Achieve 1 wk  -MA      Bed Mobility PT LTG, Activity Type all bed mobility  -MA      Bed Mobility PT LTG, [place] Level conditional independence  -MA      Transfer Training PT LTG    Transfer Training PT LTG, Date Established 12/14/17  -MA      Transfer Training PT LTG, Time to Achieve 1 wk  -MA      Transfer Training PT LTG, Activity Type all transfers  -MA      Transfer Training PT LTG, [place] Level supervision required  -MA      Transfer Training PT LTG, Assist Device walker, rolling  -MA      Gait Training PT LTG    Gait Training Goal PT LTG, Date Established 12/14/17  -MA      Gait Training Goal PT LTG, Time to Achieve 1 wk  -MA      Gait Training Goal PT LTG, [place] Level supervision required  -MA      Gait Training Goal PT LTG, Assist Device walker, rolling  -MA      Gait Training Goal PT LTG, Distance to Achieve 100  -MA        User Key  (r) = Recorded By, (t) = Taken By, (c) = Cosigned By    Initials Name Provider Type    CISCO Bangura, PT Physical Therapist          Physical Therapy Education     Title: PT OT SLP Therapies (Active)     Topic: Physical Therapy (Active)     Point: Mobility training (Done)    Learning Progress Summary    Learner Readiness Method Response Comment Documented by Status   Patient Acceptance E VU,NR  MG 12/15/17 1516 Done    Acceptance E VU  CT 12/15/17 1421 Done    Acceptance E VU  MA 12/14/17 0915 Done               Point: Home exercise program (Done)    Learning Progress Summary    Learner Readiness Method Response Comment Documented by Status   Patient Acceptance E VU  CT 12/15/17 1421 Done    Acceptance E VU  MA 12/14/17 0915 Done               Point: Body mechanics (Active)    Learning Progress Summary    Learner Readiness Method  Response Comment Documented by Status   Patient Acceptance E NR  MA 12/14/17 0915 Active               Point: Precautions (Done)    Learning Progress Summary    Learner Readiness Method Response Comment Documented by Status   Patient Acceptance E VU,NR  MG 12/15/17 1516 Done    Acceptance E NR  MA 12/14/17 0915 Active                      User Key     Initials Effective Dates Name Provider Type Discipline    CT 06/16/16 -  Kyung Holland, RN Registered Nurse Nurse    MA 12/13/16 -  Graciela Bangura, PT Physical Therapist PT     09/13/17 -  Megan Gosselin, JENNIFER Physical Therapist PT                    PT Recommendation and Plan  Anticipated Discharge Disposition: extended care facility, skilled nursing facility  Planned Therapy Interventions: balance training, bed mobility training, gait training, home exercise program, patient/family education, postural re-education, strengthening, transfer training  PT Frequency: daily  Plan of Care Review  Outcome Summary/Follow up Plan: Pt demonstrates improved mobility and decreased need for assist. Improved endurance as she is able to ambulate farher. Will continue to progress as tolerated towards goals.           Outcome Measures       12/15/17 1500 12/14/17 1600 12/14/17 0900    How much help from another person do you currently need...    Turning from your back to your side while in flat bed without using bedrails? 3  -MG  3  -MA    Moving from lying on back to sitting on the side of a flat bed without bedrails? 3  -MG  3  -MA    Moving to and from a bed to a chair (including a wheelchair)? 3  -MG  3  -MA    Standing up from a chair using your arms (e.g., wheelchair, bedside chair)? 3  -MG  2  -MA    Climbing 3-5 steps with a railing? 2  -MG  1  -MA    To walk in hospital room? 2  -MG  2  -MA    AM-PAC 6 Clicks Score 16  -MG  14  -MA    How much help from another is currently needed...    Putting on and taking off regular lower body clothing?  2  -HC     Bathing  (including washing, rinsing, and drying)  2  -HC     Toileting (which includes using toilet bed pan or urinal)  3  -HC     Putting on and taking off regular upper body clothing  3  -HC     Taking care of personal grooming (such as brushing teeth)  3  -HC     Eating meals  4  -HC     Score  17  -HC     Functional Assessment    Outcome Measure Options AM-PAC 6 Clicks Basic Mobility (PT)  -MG AM-PAC 6 Clicks Daily Activity (OT)  -HC AM-PAC 6 Clicks Basic Mobility (PT)  -MA      User Key  (r) = Recorded By, (t) = Taken By, (c) = Cosigned By    Initials Name Provider Type    CISCO Bangura, PT Physical Therapist    HC Paulina Almonte, OTR Occupational Therapist    MG Megan Gosselin, PT Physical Therapist           Time Calculation:         PT Charges       12/15/17 1518          Time Calculation    Start Time 1458  -MG      Stop Time 1506  -MG      Time Calculation (min) 8 min  -MG      PT Received On 12/15/17  -MG      PT - Next Appointment 12/16/17  -MG        User Key  (r) = Recorded By, (t) = Taken By, (c) = Cosigned By    Initials Name Provider Type    MG Megan Gosselin, PT Physical Therapist          Therapy Charges for Today     Code Description Service Date Service Provider Modifiers Qty    [id number] HC PT THER PROC EA 15 MIN 12/15/2017 Megan Gosselin, PT GP, KX 1          PT G-Codes  Outcome Measure Options: AM-PAC 6 Clicks Basic Mobility (PT)    Megan Gosselin, PT  12/15/2017

## 2017-12-15 NOTE — PLAN OF CARE
Problem: Patient Care Overview (Adult)  Goal: Plan of Care Review    12/15/17 6216   Outcome Evaluation   Outcome Summary/Follow up Plan Pt demonstrates improved mobility and decreased need for assist. Improved endurance as she is able to ambulate farher. Will continue to progress as tolerated towards goals.

## 2017-12-15 NOTE — PROGRESS NOTES
"Daily progress note    Chief complaint  Doing better  Alert oriented ×3  No new complaints    History of present illness  87-year-old white female with history of hypertension hyperlipidemia seizure disorder neuropathy chronic kidney disease stage III anxiety depression who is a nursing home resident sent to the emergency room with altered mental status.  Patient appeared lethargic generalized weakness.  No fever chills chest pain shortness of breath abdominal pain nausea vomiting diarrhea she is not at her baseline.  Patient workup in ER revealed UTI dehydration admitted for management.     REVIEW OF SYSTEMS  Review of Systems   Unable to perform ROS: Mental status change   Constitutional: Positive for fatigue (lethargy).        Decreased PO intake   Neurological: Positive for weakness (generalized) and headaches.      PHYSICAL EXAM  Blood pressure 153/86, pulse 73, temperature 97.9 °F (36.6 °C), temperature source Oral, resp. rate 18, height 160 cm (62.99\"), weight 95.3 kg (210 lb 1.6 oz), SpO2 95 %, not currently breastfeeding.    Constitutional: Awake and alert  Head: Normocephalic and atraumatic.   Eyes: Pupils are equal, round, and reactive to light.   Cardiovascular: Normal rate and regular rhythm.    Pulmonary/Chest: Effort normal.   Abdominal: Soft. There is no tenderness.   Neurological: She is alert. She displays weakness (Pt moves all extremities but has significant weakness in BLE). No sensory deficit.   Skin: Skin is warm and dry.      LAB RESULTS  Lab Results (last 24 hours)     Procedure Component Value Units Date/Time    P2Y12 Platelet Inhibition [[id number]]  (Abnormal) Collected:  12/14/17 1815    Specimen:  Blood Updated:  12/14/17 1857     P2Y12 Platelet Inhibition 138 (L) PRU     Narrative:       Test results are in P2Y12 reaction units (PRU). This measures the extent of platelet aggregation in the presence of P2Y12 inhibitor drugs such as clopidrogrel (Plavix), prasugrel (Effient), ticagrelor " (Brilinta), and ticlopidine (Ticlid).   Pre-Drug normal reference range: 194 - 418 PRU   P2Y12 values <194 (low end of reference range) are specific evidence of a P2Y12 inhibitor effect   Patients who have been treated with Glycoprotein IIb/IIIa inhibitors should not be tested until platelet function has recovered. This time period is approximately 14 days after discontinuation of abciximab (ReoPro) and up to 48 hours after discontinuation of eptifibatide (Integrillin) and tirofiban (Aggratstat).   Results should be interpreted in conjuction with other laboratory and clinical data available to the clinician.    Hemoglobin A1c [[id number]]  (Normal) Collected:  12/15/17 0355    Specimen:  Blood Updated:  12/15/17 0430     Hemoglobin A1C 5.20 %     Narrative:       Hemoglobin A1C Ranges:    Increased Risk for Diabetes  5.7% to 6.4%  Diabetes                     >= 6.5%  Diabetic Goal                < 7.0%    Comprehensive Metabolic Panel [[id number]]  (Abnormal) Collected:  12/15/17 0355    Specimen:  Blood Updated:  12/15/17 0447     Glucose 91 mg/dL      BUN 15 mg/dL      Creatinine 0.78 mg/dL      Sodium 138 mmol/L      Potassium 4.1 mmol/L      Chloride 102 mmol/L      CO2 22.0 mmol/L      Calcium 8.2 (L) mg/dL      Total Protein 6.2 g/dL      Albumin 3.00 (L) g/dL      ALT (SGPT) 9 U/L      AST (SGOT) 15 U/L      Alkaline Phosphatase 49 U/L      Total Bilirubin 0.5 mg/dL      eGFR Non African Amer 70 mL/min/1.73      Globulin 3.2 gm/dL      A/G Ratio 0.9 g/dL      BUN/Creatinine Ratio 19.2     Anion Gap 14.0 mmol/L     Narrative:       The MDRD GFR formula is only valid for adults with stable renal function between ages 18 and 70.    Lipid Panel [[id number]] Collected:  12/15/17 0355    Specimen:  Blood Updated:  12/15/17 0447     Total Cholesterol 147 mg/dL      Triglycerides 69 mg/dL      HDL Cholesterol 47 mg/dL      LDL Cholesterol  86 mg/dL      VLDL Cholesterol 13.8 mg/dL      LDL/HDL Ratio 1.83    Narrative:        Cholesterol Reference Ranges  (U.S. Department of Health and Human Services ATP III Classifications)    Desirable          <200 mg/dL  Borderline High    200-239 mg/dL  High Risk          >240 mg/dL      Triglyceride Reference Ranges  (U.S. Department of Health and Human Services ATP III Classifications)    Normal           <150 mg/dL  Borderline High  150-199 mg/dL  High             200-499 mg/dL  Very High        >500 mg/dL    HDL Reference Ranges  (U.S. Department of Health and Human Services ATP III Classifcations)    Low     <40 mg/dl (major risk factor for CHD)  High    >60 mg/dl ('negative' risk factor for CHD)        LDL Reference Ranges  (U.S. Department of Health and Human Services ATP III Classifcations)    Optimal          <100 mg/dL  Near Optimal     100-129 mg/dL  Borderline High  130-159 mg/dL  High             160-189 mg/dL  Very High        >189 mg/dL    CBC & Differential [[id number]] Collected:  12/15/17 0355    Specimen:  Blood Updated:  12/15/17 0451    Narrative:       The following orders were created for panel order CBC & Differential.  Procedure                               Abnormality         Status                     ---------                               -----------         ------                     CBC Auto Differential[[id number]]        Abnormal            Final result                 Please view results for these tests on the individual orders.    CBC Auto Differential [[id number]]  (Abnormal) Collected:  12/15/17 0355    Specimen:  Blood Updated:  12/15/17 0451     WBC 5.33 10*3/mm3      RBC 3.17 (L) 10*6/mm3      Hemoglobin 10.7 (L) g/dL      Hematocrit 33.0 (L) %      .1 (H) fL      MCH 33.8 (H) pg      MCHC 32.4 g/dL      RDW 12.8 %      RDW-SD 48.4 fl      MPV 9.6 fL      Platelets 148 10*3/mm3      Neutrophil % 59.4 %      Lymphocyte % 28.7 %      Monocyte % 8.3 %      Eosinophil % 3.2 %      Basophil % 0.4 %      Immature Grans % 0.0 %      Neutrophils, Absolute  3.17 10*3/mm3      Lymphocytes, Absolute 1.53 10*3/mm3      Monocytes, Absolute 0.44 10*3/mm3      Eosinophils, Absolute 0.17 10*3/mm3      Basophils, Absolute 0.02 10*3/mm3      Immature Grans, Absolute 0.00 10*3/mm3     BNP [[id number]]  (Normal) Collected:  12/15/17 0355    Specimen:  Blood Updated:  12/15/17 0454     proBNP 711.7 pg/mL     Narrative:       Among patients with dyspnea, NT-proBNP is highly sensitive for the detection of acute congestive heart failure. In addition NT-proBNP of <300 pg/ml effectively rules out acute congestive heart failure with 99% negative predictive value.    TSH [[id number]]  (Normal) Collected:  12/15/17 0355    Specimen:  Blood Updated:  12/15/17 0454     TSH 3.440 mIU/mL     Urine Culture - Urine, Urine, Clean Catch [[id number]]  (Abnormal) Collected:  12/13/17 2256    Specimen:  Urine from Urine, Catheter Updated:  12/15/17 0728     Urine Culture --      >100,000 CFU/mL Gram Negative Bacilli (A)        Imaging Results (last 24 hours)     Procedure Component Value Units Date/Time    CT Head Without Contrast [[id number]] Collected:  12/14/17 1818     Updated:  12/14/17 1849    Narrative:       CT SCAN OF THE BRAIN WITHOUT CONTRAST AT 5:39 PM     HISTORY: Recent head trauma. Has recent CT scan describing possible  vague area of decreased density in the jana extending more to the left.  Follow-up evaluation.     TECHNIQUE: The CT scan was performed to the brain without contrast and  includes additional thin section imaging through the brainstem.      FINDINGS: There is moderate diffuse atrophy and chronic small vessel  ischemic change. There is no suspicious abnormality involving the jana  and the appearance on recent CT scan is therefore artifactual. There is  no evidence of hemorrhage or mass effect. The visualized sinuses are  clear. The mastoid air cells are clear.     Radiation dose reduction techniques were utilized, including automated  exposure control and exposure  modulation based on body size.     This report was finalized on 12/14/2017 6:46 PM by Dr. Manuel Elder MD.         EKG                                                  Rhythm/Rate: SR at 79  P waves and VA: 1st degree AV block  QRS, axis: normal   ST and T waves: normal       Current Facility-Administered Medications:   •  acetaminophen (TYLENOL) tablet 650 mg, 650 mg, Oral, Q4H PRN, Juan Carlos Harper MD, 650 mg at 12/15/17 0838  •  atorvastatin (LIPITOR) tablet 40 mg, 40 mg, Oral, Nightly, Juan Carlos Harper MD, 40 mg at 12/14/17 2234  •  calcium carbonate (TUMS) chewable tablet 500 mg (200 mg elemental), 2.5 tablet, Oral, Daily, Juan Carlos Harper MD, 2.5 tablet at 12/14/17 1504  •  clopidogrel (PLAVIX) tablet 75 mg, 75 mg, Oral, Daily, Juan Carlos Harper MD, 75 mg at 12/15/17 0838  •  docusate sodium (COLACE) capsule 100 mg, 100 mg, Oral, Nightly, Juan Carlos Harper MD, 100 mg at 12/14/17 2234  •  gabapentin (NEURONTIN) capsule 300 mg, 300 mg, Oral, Q8H, Juan Carlos Harper MD, 300 mg at 12/15/17 0523  •  levETIRAcetam (KEPPRA) tablet 500 mg, 500 mg, Oral, BID, Juan Carlos Harper MD, 500 mg at 12/15/17 0838  •  levoFLOXacin (LEVAQUIN) 500 mg/100 mL D5W (premix) (LEVAQUIN) 500 mg, 500 mg, Intravenous, Q24H, Juan Carlos Harper MD, 500 mg at 12/14/17 2234  •  mirtazapine (REMERON) tablet 15 mg, 15 mg, Oral, Nightly, Juan Carlos Harper MD, 15 mg at 12/14/17 2235  •  pantoprazole (PROTONIX) EC tablet 40 mg, 40 mg, Oral, QAM, Juan Carlos Harper MD, 40 mg at 12/15/17 0840  •  Insert peripheral IV, , , Once **AND** sodium chloride 0.9 % flush 10 mL, 10 mL, Intravenous, PRN, Clark No Cotton III, PA  •  sodium chloride 0.9 % infusion, 75 mL/hr, Intravenous, Continuous, Juan Carlos Harper MD, Last Rate: 75 mL/hr at 12/15/17 0524, 75 mL/hr at 12/15/17 0524     ASSESSMENT  Acute UTI  Seizure disorder  Gastroesophageal reflux disease  Toxic metabolic encephalopathy  Neuropathy  Chronic kidney disease stage III    PLAN  CPM  IV antibiotics  IV fluid  Aspirin Plavix and Lipitor  PTOT    Continue nursing home medication and adjust the doses  Supportive care  Stress ulcer DVT prophylaxis  DNR  Discussed with family  Possible discharge in a.m.    GAETANO ACOSTA MD

## 2017-12-15 NOTE — PLAN OF CARE
Problem: Patient Care Overview (Adult)  Goal: Plan of Care Review  Outcome: Ongoing (interventions implemented as appropriate)    12/15/17 1415   Coping/Psychosocial Response Interventions   Plan Of Care Reviewed With patient   Outcome Evaluation   Outcome Summary/Follow up Plan VFSS completed. Recommend regular with thin liquids. ST to s/o, please re-consult with concern for aspiration.          Problem: Inpatient SLP  Goal: Dysphagia- Patient will safely consume diet as per recommendation with no signs/symptoms of aspiration  Outcome: Outcome(s) achieved Date Met:  12/15/17    12/15/17 1415   Safely Consume Diet   Safely Consume Diet- SLP, Outcome goal met

## 2017-12-15 NOTE — PROGRESS NOTES
Patient's Carotid US and repeat head CT were reviewed by Dr. Barajas. Her right ICA, , shows moderate stenosis (50% range), not significant. Her repeat CT shows that original CT findings were artifactual. Her clinical presentation is probable TME. Please call with any questions/concerns.

## 2017-12-15 NOTE — SIGNIFICANT NOTE
12/15/17 1410   Rehab Treatment   Discipline occupational therapist   Treatment Not Performed patient unavailable for treatment  (transport present to take to [facility] 1338)

## 2017-12-15 NOTE — PLAN OF CARE
Problem: Patient Care Overview (Adult)  Goal: Plan of Care Review  Outcome: Ongoing (interventions implemented as appropriate)    12/15/17 0624 12/15/17 1415 12/15/17 1421   Coping/Psychosocial Response Interventions   Plan Of Care Reviewed With --  patient --    Patient Care Overview   Progress improving --  --    Outcome Evaluation   Outcome Summary/Follow up Plan --  --  patient still using bsc frequently. video swallow today. vss. pt possible d/c tomm       Goal: Adult Individualization and Mutuality  Outcome: Ongoing (interventions implemented as appropriate)    12/14/17 1342   Individualization   Patient Specific Goals no falls, no pain, vss         Problem: Stroke (Ischemic) (Adult)  Goal: Signs and Symptoms of Listed Potential Problems Will be Absent or Manageable (Stroke)  Outcome: Ongoing (interventions implemented as appropriate)    12/14/17 1342 12/15/17 0624   Stroke (Ischemic)   Problems Assessed (Stroke (Ischemic)/TIA) --  all   Problems Present (Stroke (Ischemic)/TIA) none --          Problem: Infection, Risk/Actual (Adult)  Goal: Identify Related Risk Factors and Signs and Symptoms  Outcome: Ongoing (interventions implemented as appropriate)    12/14/17 1342   Infection, Risk/Actual   Infection, Risk/Actual: Related Risk Factors age extremes       Goal: Infection Prevention/Resolution  Outcome: Ongoing (interventions implemented as appropriate)    12/15/17 1421   Infection, Risk/Actual (Adult)   Infection Prevention/Resolution making progress toward outcome         Problem: Fall Risk (Adult)  Goal: Absence of Falls  Outcome: Ongoing (interventions implemented as appropriate)    12/15/17 0624   Fall Risk (Adult)   Absence of Falls making progress toward outcome         Problem: Pressure Ulcer Risk (Clayton Scale) (Adult,Obstetrics,Pediatric)  Goal: Identify Related Risk Factors and Signs and Symptoms  Outcome: Ongoing (interventions implemented as appropriate)    12/15/17 0624   Pressure Ulcer Risk  (Clayton Scale)   Related Risk Factors (Pressure Ulcer Risk (Clayton Scale)) age extremes;body weight extremes;mobility impaired       Goal: Skin Integrity  Outcome: Ongoing (interventions implemented as appropriate)    12/15/17 1421   Pressure Ulcer Risk (Clayton Scale) (Adult,Obstetrics,Pediatric)   Skin Integrity making progress toward outcome         Problem: Pain, Acute (Adult)  Goal: Identify Related Risk Factors and Signs and Symptoms  Outcome: Ongoing (interventions implemented as appropriate)    12/14/17 1344   Pain, Acute   Related Risk Factors (Acute Pain) other (see comments)  (pt fell)       Goal: Acceptable Pain Control/Comfort Level  Outcome: Ongoing (interventions implemented as appropriate)    12/15/17 1421   Pain, Acute (Adult)   Acceptable Pain Control/Comfort Level making progress toward outcome

## 2017-12-15 NOTE — PROGRESS NOTES
Continued Stay Note  [facility]     Patient Name: Kim Feldman  MRN: [id number]  Today's Date: 12/15/2017    Admit Date: 12/13/2017          Discharge Plan       12/15/17 1555    Case Management/Social Work Plan    Plan [facility] with [facility] .    Patient/Family In Agreement With Plan yes    Additional Comments CCP spoke with pt at bedside. DE [place] notice given and explained. Pt states plan is home back to [facility] with [facility] , Per pt was using prior to admission. CCP notified Cinthya with [facility] of anticipated dc tomorrow. CCP encouraged pt to ambulate with nursing. CCP also spoke pts daughter Sugar also. Guerline FRITZ/ROZ              Discharge Codes     None            Neyda Lutz, RN

## 2017-12-15 NOTE — MBS/VFSS/FEES
Acute Care - Speech Language Pathology   Swallow Initial Evaluation [facility]     Patient Name: Kim Feldman  : 3/13/1930  MRN: [id number]  Today's Date: 12/15/2017  Onset of Illness/Injury or Date of Surgery Date: 17            Admit Date: 2017  SPEECH-LANGUAGE PATHOLOGY EVALUTION - VFSS  Subjective: The patient was seen on this date for a VFSS(Videofluoroscopic Swallowing Study).  Patient was alert and cooperative. Pt now with diagnosis of somnolence.  Objective: Risks/benefits were reviewed with the patient, and consent was obtained. The study was completed with SLP present and Radiologist review. The patient was seen in lateral view(s). Textures given included thin liquid, nectar thick liquid, puree consistency, mechanical soft consistency and regular consistency.  Assessment: Pt presents with age appropriate/functional swallow. No penetration/aspiration with thin, nectar thick, puree, mech soft, and regular. Penetration X1 with mixed consistency.  Residue was minimal and mild, pt able to clear independently.  Esophageal screen was performed, pt with hx of diverticulums..  SLP Findings: Patient presents with functional/age appropriate swallow.   Recommendations: Diet Textures: thin liquid, regular consistency food. Medications should be taken whole with thin or puree. Recommended Strategies: Upright for PO, small bites and sips, no straw and alternate liquids and solids. Oral care before breakfast, after all meals and PRN.  Other Recommended Evaluations:     Dysphagia therapy is not recommended. Rationale: Not warranted at this time.      Visit Dx:     ICD-10-CM ICD-9-CM   1. Somnolence R40.0 780.09   2. 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 Chronic UTI N39.0 599.0   4. Impaired functional mobility, balance, gait, and endurance Z74.09 V49.89     Patient Active Problem List   Diagnosis   • Anemia   • Bulging lumbar disc   • Depression, endogenous   • Gastroesophageal reflux disease   •  Hyperlipidemia   • Intermittent claudication   • Neuropathy   • Osteoarthritis of knee   • Syndrome of inappropriate secretion of antidiuretic hormone   • Vitamin D deficiency   • History of sick sinus syndrome   • Intertrigo   • Generalized convulsive seizures   • Urine frequency   • Change in bowel habits   • Urinary tract infection   • Zenker diverticulum   • History of anxiety   • Urinary tract infection in female   • Clonic seizure disorder   • Thrombocytopenia   • B12 deficiency   • Pain of toe of right foot   • Weakness   • Cardiac pacemaker in situ   • Chronic venous insufficiency   • Arthritis   • S/P knee replacement   • Chronic bilateral low back pain without sciatica   • Essential hypertension   • Hiatal hernia   • Acute vaginitis   • Zenker's diverticulum   • Acute pain of left knee   • Chronic idiopathic constipation   • Pressure sore on buttocks   • Somnolence     Past Medical History:   Diagnosis Date   • Anemia    • Bulging lumbar disc    • Chronic cough    • Chronic UTI    • Chronic venous insufficiency    • Clonic seizure disorder    • DDD (degenerative disc disease), lumbosacral    • Dementia without behavioral disturbance     moderate   • Depression, endogenous    • Disc degeneration, lumbar    • Dysphagia    • GERD (gastroesophageal reflux disease)    • Hiatal hernia    • History of anxiety    • History of aspiration pneumonitis    • History of cerebral artery occlusion     CVA (following TIA), 10/10, treated with tPA   • History of herpes zoster    • History of ingrowing nail    • History of osteoporosis    • History of poliomyelitis     child   • History of sciatica    • History of sick sinus syndrome     s/p PPM   • History of transient cerebral ischemia     followed by stroke in 10/2010. BIBI was normal.   • History of Zenker's diverticulum removal 2016   • HX: long term anticoagulant use    • Hyperlipidemia    • Hypertension     NO CURRENT MEDICATION   • Hyponatremia    • Intertrigo    •  "Lumbar radiculopathy    • Morbid obesity    • Neuralgia     with diplopia secondary to facial shingles   • Nonepileptic episode    • Osteoarthritis of right knee    • Pacemaker    • Pneumonia    • Seeing double     secondary to shingles on the face also with neuralgia   • SIADH (syndrome of inappropriate ADH production)    • Vitamin D deficiency    • Zenker's diverticulum      Past Surgical History:   Procedure Laterality Date   • CARDIAC PACEMAKER PLACEMENT      secondary to SSS, placed in 2004, with generator change in 2014   • CATARACT EXTRACTION W/ INTRAOCULAR LENS IMPLANT Bilateral    • COLONOSCOPY     • HAND SURGERY Right    • KNEE ARTHROPLASTY Left    • LAMINECTOMY FOR IMPLANTATION / PLACEMENT NEUROSTIMULATOR ELECTRODES     • LUMBAR LAMINECTOMY      L-3 L4 L4 L5   • TONSILLECTOMY     • ZENKERS DIVERTICULECTOMY N/A 9/27/2016    Procedure: ENDOSCOPIC REMOVAL OF ZENKERS DIVERTICULUM W/ WERDASCOPE;  Surgeon: Oswald Barth MD;  Location: [facility];  Service:    • ZENKERS DIVERTICULECTOMY N/A 4/14/2017    Procedure: ESOPHAGOSCOPY;  Surgeon: Oswald Barth MD;  Location: [facility];  Service:           SWALLOW EVALUATION (last 72 hours)      Swallow Evaluation       12/15/17 1400 12/14/17 0918             Rehab Evaluation    Document Type evaluation  -OC evaluation  -SH       Subjective Information no complaints;agree to therapy  -OC agree to therapy  -SH       Patient Effort, Rehab Treatment good  -OC good  -SH       Symptoms Noted During/After Treatment none  -OC none  -SH       General Information    Patient Profile Review yes  -OC yes  -SH       Subjective Patient Observations  Pt c/o \"thirst\"  -SH       Pertinent History Of Current Problem  acute L jana stroke  -SH       Current Diet Limitations nectar thick liquids;mechanical soft  -OC NPO  -SH       Prior Level of Function- Communication functional in all spheres  -OC functional in all spheres  -SH       Prior Level of Function- Swallowing no " diet consistency restrictions  -OC no diet consistency restrictions;esophageal concerns  -       Plans/Goals Discussed With patient;agreed upon  -OC patient;family  -       Barriers to Rehab medically complex  -OC medically complex  -       Clinical Impression    Patient's Goals For Discharge return to regular diet  -OC return to regular diet  -       Family Goals For Discharge  patient able to return to regular diet  -       SLP Swallowing Diagnosis mild dysphagia;esophageal dysfunction  -OC mild dysphagia  -       Rehab Potential/Prognosis, Swallowing good, to achieve stated therapy goals  -OC good, to achieve stated therapy goals  -       Criteria for Skilled Therapeutic Interventions Met skilled criteria for dysphagia intervention met  -OC skilled criteria for dysphagia intervention met  -       FCM, Swallowing 6-->Level 6  -OC 4-->Level 4  -SH       Therapy Frequency evaluation only  -OC PRN  -       Predicted Duration Therapy Interv (days)  until discharge  -       Expected Duration Therapy Session (min)  15-30 minutes  -       SLP Diet Recommendation regular textures;thin liquids  -OC IV - mechanical soft, no mixed consistencies;nectar/syrup-thick liquids  -       Recommended Diagnostics  VFSS (MBS)  -       Recommended Feeding/Eating Techniques alternate between small bites and sips of food/liquid;small sips/bites  - maintain upright posture during/after eating for 30 mins  -       SLP Rec. for Method of Medication Administration meds whole with thin liquid;meds whole in pudding/applesauce  - meds whole with thickened liquid;meds whole in pudding/applesauce;meds crushed in pudding/applesauce  -       Monitor For Signs Of Aspiration cough;elevated WBC count;gurgly voice;throat clearing;right lower lobe infiltrates;pneumonia  -OC elevated WBC count;cough;gurgly voice;throat clearing;fever;upper respiratory infection;pneumonia;right lower lobe infiltrates  -       Anticipated  Discharge Disposition skilled nursing facility  - skilled nursing facility  -       Pain Assessment    Pain Assessment No/denies pain  - 0-10  -       Post Pain Score  7  -       Pain Type  Acute pain;Other (Comment)   pain only when moving  -       Pain Location  Buttocks   RN aware  -       Vision Assessment/Intervention    Visual Impairment WFL  -        Oral Motor Structure and Function    Oral Motor Assessment Comment  L facial droop, slight imprecisions in diadokokinetic speech tasks  -       SLP Communication to Radiology    Summary Statement Pt presents with age appropriate/functional swallow. No pen/asp with thin, nectar thick, puree, mech soft, and regular. Penetration X1 with mixed consistency Pt with hx of diverticulums and surgical procedures for diverticulum. ? small diverticulum cervical esophagus. Does not negative impact swallow function.    -        Dysphagia Treatment Objectives and Progress    Dysphagia Treatment Objectives  Other 1  -       Dysphagia Other 1    Dysphagia Other 1 Objective  Pt will participate in [facility]       Status: Dysphagia Other 1  New  -         User Key  (r) = Recorded By, (t) = Taken By, (c) = Cosigned By    Initials Name Effective Dates     Betty Tsang MA,[facility]-SLP 04/05/17 -      Joann Espinoza MS CCC-SLP 07/13/17 -         EDUCATION  The patient has been educated in the following areas:   Dysphagia (Swallowing Impairment).    SLP Recommendation and Plan  SLP Swallowing Diagnosis: mild dysphagia, esophageal dysfunction  SLP Diet Recommendation: regular textures, thin liquids  Recommended Feeding/Eating Techniques: alternate between small bites and sips of food/liquid, small sips/bites  SLP Rec. for Method of Medication Administration: meds whole with thin liquid, meds whole in pudding/applesauce  Monitor For Signs Of Aspiration: cough, elevated WBC count, gurgly voice, throat clearing, right lower lobe infiltrates, pneumonia     Criteria for  Skilled Therapeutic Interventions Met: skilled criteria for dysphagia intervention met  Anticipated Discharge Disposition: skilled nursing facility  Rehab Potential/Prognosis, Swallowing: good, to achieve stated therapy goals  Therapy Frequency: evaluation only             Plan of Care Review  Outcome Summary/Follow up Plan: VFSS completed. Recommend regular with thin liquids. ST to s/o, please re-consult with concern for aspiration.           IP SLP Goals       12/15/17 1415 12/14/17 0927       Safely Consume Diet    Safely Consume Diet- SLP, Date Established  12/14/17  -SH     Safely Consume Diet- SLP, Time to Achieve  by discharge  -     Safely Consume Diet- SLP, Outcome goal met  -        User Key  (r) = Recorded By, (t) = Taken By, (c) = Cosigned By    Initials Name Provider Type    DOC Tsang MA,[facility]-SLP Speech and Language Pathologist     Joann Espinoza MS CCC-SLP Speech and Language Pathologist             SLP Outcome Measures (last 72 hours)      SLP Outcome Measures       12/15/17 1400 12/14/17 0900       SLP Outcome Measures    Outcome Measure Used? Adult NOMS  - Adult NOMS  -     FCM Scores    FCM Chosen Swallowing  -OC Swallowing  -     Swallowing FCM Score 6  -OC 4  -       User Key  (r) = Recorded By, (t) = Taken By, (c) = Cosigned By    Initials Name Effective Dates    OC Betty Tsang MA,CCC-SLP 04/05/17 -      Joann Espinoza MS CCC-SLP 07/13/17 -            Time Calculation:         Time Calculation- SLP       12/15/17 1500          Time Calculation- SLP    SLP Start Time 1300  -OC      SLP Stop Time 1400  -OC      SLP Time Calculation (min) 60 min  -      SLP Received On 12/15/17  -        User Key  (r) = Recorded By, (t) = Taken By, (c) = Cosigned By    Initials Name Provider Type    DOC Tsang MA,[facility]-SLP Speech and Language Pathologist          Therapy Charges for Today     Code Description Service Date Service Provider Modifiers Qty    89449032530  ST SWALLOWING  CURRENT STATUS 12/15/2017 Betty Tsang MA,CCC-SLP GN, CI 1    90924900620 HC ST SWALLOWING PROJECTED 12/15/2017 Betty Tsang MA,CCC-SLP GN, CI 1    92350234854 HC ST SWALLOWING DISCHARGE 12/15/2017 Betty Tsang MA,CCC-SLP GN, CI 1    10327232882 HC ST MOTION FLUORO EVAL SWALLOW 4 12/15/2017 Betty Tsang MACCC-SLP GN, KX 1          SLP G-Codes  SLP NOMS Used?: Yes  Functional Limitations: Swallowing  Swallow Current Status (): At least 1 percent but less than 20 percent impaired, limited or restricted  Swallow Goal Status (): At least 1 percent but less than 20 percent impaired, limited or restricted  Swallow Discharge Status (): At least 1 percent but less than 20 percent impaired, limited or restricted    Betty Tsang MA,CCC-SLP  12/15/2017

## 2017-12-16 VITALS
BODY MASS INDEX: 39 KG/M2 | HEART RATE: 72 BPM | SYSTOLIC BLOOD PRESSURE: 160 MMHG | DIASTOLIC BLOOD PRESSURE: 87 MMHG | HEIGHT: 63 IN | WEIGHT: 220.1 LBS | TEMPERATURE: 98.3 F | RESPIRATION RATE: 20 BRPM | OXYGEN SATURATION: 97 %

## 2017-12-16 LAB
ANION GAP SERPL CALCULATED.3IONS-SCNC: 8.2 MMOL/L
BACTERIA SPEC AEROBE CULT: ABNORMAL
BACTERIA SPEC AEROBE CULT: ABNORMAL
BUN BLD-MCNC: 12 MG/DL (ref 8–23)
BUN/CREAT SERPL: 15.4 (ref 7–25)
CALCIUM SPEC-SCNC: 8.3 MG/DL (ref 8.6–10.5)
CHLORIDE SERPL-SCNC: 102 MMOL/L (ref 98–107)
CO2 SERPL-SCNC: 24.8 MMOL/L (ref 22–29)
CREAT BLD-MCNC: 0.78 MG/DL (ref 0.57–1)
GFR SERPL CREATININE-BSD FRML MDRD: 70 ML/MIN/1.73
GLUCOSE BLD-MCNC: 97 MG/DL (ref 65–99)
POTASSIUM BLD-SCNC: 4.5 MMOL/L (ref 3.5–5.2)
SODIUM BLD-SCNC: 135 MMOL/L (ref 136–145)

## 2017-12-16 PROCEDURE — G0378 HOSPITAL OBSERVATION PER HR: HCPCS

## 2017-12-16 PROCEDURE — 80048 BASIC METABOLIC PNL TOTAL CA: CPT | Performed by: HOSPITALIST

## 2017-12-16 RX ORDER — LEVOFLOXACIN 750 MG/1
750 TABLET ORAL DAILY
Qty: 5 TABLET | Refills: 0 | Status: SHIPPED | OUTPATIENT
Start: 2017-12-16 | End: 2017-12-21

## 2017-12-16 RX ORDER — LEVOFLOXACIN 750 MG/1
750 TABLET ORAL DAILY
Status: DISCONTINUED | OUTPATIENT
Start: 2017-12-16 | End: 2017-12-16 | Stop reason: HOSPADM

## 2017-12-16 RX ORDER — ATORVASTATIN CALCIUM 10 MG/1
10 TABLET, FILM COATED ORAL NIGHTLY
Qty: 30 TABLET | Refills: 0 | Status: SHIPPED | OUTPATIENT
Start: 2017-12-16 | End: 2018-01-15

## 2017-12-16 RX ADMIN — PANTOPRAZOLE SODIUM 40 MG: 40 TABLET, DELAYED RELEASE ORAL at 05:06

## 2017-12-16 RX ADMIN — PANTOPRAZOLE SODIUM 40 MG: 40 TABLET, DELAYED RELEASE ORAL at 08:36

## 2017-12-16 RX ADMIN — LEVETIRACETAM 500 MG: 500 TABLET, FILM COATED ORAL at 08:36

## 2017-12-16 RX ADMIN — GABAPENTIN 300 MG: 300 CAPSULE ORAL at 05:06

## 2017-12-16 RX ADMIN — CLOPIDOGREL 75 MG: 75 TABLET, FILM COATED ORAL at 08:36

## 2017-12-16 NOTE — PROGRESS NOTES
"Daily progress note    Chief complaint  Doing better  No new complaints    History of present illness  87-year-old white female with history of hypertension hyperlipidemia seizure disorder neuropathy chronic kidney disease stage III anxiety depression who is a nursing home resident sent to the emergency room with altered mental status.  Patient appeared lethargic generalized weakness.  No fever chills chest pain shortness of breath abdominal pain nausea vomiting diarrhea she is not at her baseline.  Patient workup in ER revealed UTI dehydration admitted for management.     REVIEW OF SYSTEMS  Review of Systems   Unable to perform ROS: Mental status change   Constitutional: Positive for fatigue (lethargy).        Decreased PO intake   Neurological: Positive for weakness (generalized) and headaches.      PHYSICAL EXAM  Blood pressure 160/87, pulse 72, temperature 98.3 °F (36.8 °C), temperature source Oral, resp. rate 20, height 160 cm (62.99\"), weight 99.8 kg (220 lb 1.6 oz), SpO2 97 %, not currently breastfeeding.    Constitutional: Awake and alert  Head: Normocephalic and atraumatic.   Eyes: Pupils are equal, round, and reactive to light.   Cardiovascular: Normal rate and regular rhythm.    Pulmonary/Chest: Effort normal.   Abdominal: Soft. There is no tenderness.   Neurological: She is alert. She displays weakness (Pt moves all extremities but has significant weakness in BLE). No sensory deficit.   Skin: Skin is warm and dry.      LAB RESULTS  Lab Results (last 24 hours)     Procedure Component Value Units Date/Time    Basic Metabolic Panel [[id number]]  (Abnormal) Collected:  12/16/17 0515    Specimen:  Blood Updated:  12/16/17 0608     Glucose 97 mg/dL      BUN 12 mg/dL      Creatinine 0.78 mg/dL      Sodium 135 (L) mmol/L      Potassium 4.5 mmol/L      Chloride 102 mmol/L      CO2 24.8 mmol/L      Calcium 8.3 (L) mg/dL      eGFR Non African Amer 70 mL/min/1.73      BUN/Creatinine Ratio 15.4     Anion Gap 8.2 " mmol/L     Narrative:       The MDRD GFR formula is only valid for adults with stable renal function between ages 18 and 70.    Urine Culture - Urine, Urine, Clean Catch [[id number]]  (Abnormal)  (Susceptibility) Collected:  12/13/17 2256    Specimen:  Urine from Urine, Catheter Updated:  12/16/17 1004     Urine Culture --      >100,000 CFU/mL Klebsiella oxytoca (A)    Susceptibility      Klebsiella oxytoca     UMANG     Ampicillin >=32 ug/ml Resistant     Ampicillin + Sulbactam 16 ug/ml Intermediate     Cefazolin 8 ug/ml Susceptible     Cefepime <=1 ug/ml Susceptible     Ceftriaxone <=1 ug/ml Susceptible     Ciprofloxacin <=0.25 ug/ml Susceptible     Ertapenem <=0.5 ug/ml Susceptible     Gentamicin <=1 ug/ml Susceptible     Levofloxacin <=0.12 ug/ml Susceptible     Nitrofurantoin <=16 ug/ml Susceptible     Piperacillin + Tazobactam <=4 ug/ml Susceptible     Tetracycline <=1 ug/ml Susceptible     Trimethoprim + Sulfamethoxazole <=20 ug/ml Susceptible                        Imaging Results (last 24 hours)     Procedure Component Value Units Date/Time    FL Video Swallow [[id number]] Collected:  12/15/17 1602     Updated:  12/15/17 1606    Narrative:       BARIUM SWALLOW WITH VIDEO     CLINICAL HISTORY:   Female who is 87 years-old, with dysphagia.     TECHNIQUE: The swallowing mechanism was evaluated with real-time  fluoroscopic imaging, captured on digital disk and performed in  conjunction with speech pathologist (please see report).     FINDINGS: Swallowing mechanism is age-appropriate within functional  limits       Impression:       No increased risk of aspiration.     FLUOROSCOPY TIME: 2 minutes 0 seconds, 5 images      This report was finalized on 12/15/2017 4:03 PM by Dr. Dino Lugo MD.         EKG                                                  Rhythm/Rate: SR at 79  P waves and LA: 1st degree AV block  QRS, axis: normal   ST and T waves: normal       Current Facility-Administered Medications:   •   acetaminophen (TYLENOL) tablet 650 mg, 650 mg, Oral, Q4H PRN, Gaetano Harper MD, 650 mg at 12/15/17 2226  •  atorvastatin (LIPITOR) tablet 10 mg, 10 mg, Oral, Nightly, Gaetano Harper MD, 10 mg at 12/15/17 2226  •  calcium carbonate (TUMS) chewable tablet 500 mg (200 mg elemental), 2.5 tablet, Oral, Daily, Gaetano Harper MD, 2.5 tablet at 12/14/17 1504  •  clopidogrel (PLAVIX) tablet 75 mg, 75 mg, Oral, Daily, Gaetano Harper MD, 75 mg at 12/16/17 0836  •  docusate sodium (COLACE) capsule 100 mg, 100 mg, Oral, Nightly, Gaetano Harper MD, 100 mg at 12/14/17 2234  •  gabapentin (NEURONTIN) capsule 300 mg, 300 mg, Oral, Q8H, Gaetano Harper MD, 300 mg at 12/16/17 0506  •  levETIRAcetam (KEPPRA) tablet 500 mg, 500 mg, Oral, BID, Gaetano Harper MD, 500 mg at 12/16/17 0836  •  levoFLOXacin (LEVAQUIN) 500 mg/100 mL D5W (premix) (LEVAQUIN) 500 mg, 500 mg, Intravenous, Q24H, Gaetano Harper MD, Stopped at 12/15/17 2330  •  mirtazapine (REMERON) tablet 15 mg, 15 mg, Oral, Nightly, Gaetano Harper MD, 15 mg at 12/15/17 2227  •  pantoprazole (PROTONIX) EC tablet 40 mg, 40 mg, Oral, QAM, Gaetano Harper MD, 40 mg at 12/16/17 0836  •  Insert peripheral IV, , , Once **AND** sodium chloride 0.9 % flush 10 mL, 10 mL, Intravenous, PRN, Clark Degroot III, PA     ASSESSMENT  Acute Klebsiella oxytoca UTI  Seizure disorder  Gastroesophageal reflux disease  Toxic metabolic encephalopathy  Neuropathy  Chronic kidney disease stage III    PLAN  Discharge home on oral antibiotics for total of 7 days  Discharge summary dictated    GAETANO HAREPR MD

## 2017-12-16 NOTE — DISCHARGE SUMMARY
Discharge summary    Date of admission 12/13/2017  Date of discharge 12/16/2017    Final diagnosis  Acute Klebsiella oxytoca UTI  Seizure disorder  Gastroesophageal reflux disease  Toxic metabolic encephalopathy  Neuropathy    Discharge medications    Current Facility-Administered Medications:   •  atorvastatin (LIPITOR) tablet 10 mg, 10 mg, Oral, Nightly, Juan Carlos Harper MD, 10 mg at 12/15/17 2226  •  calcium carbonate (TUMS) chewable tablet 500 mg (200 mg elemental), 2.5 tablet, Oral, Daily, Juan Carlos Harper MD, 2.5 tablet at 12/14/17 1504  •  clopidogrel (PLAVIX) tablet 75 mg, 75 mg, Oral, Daily, Juan Carlos Harper MD, 75 mg at 12/16/17 0836  •  docusate sodium (COLACE) capsule 100 mg, 100 mg, Oral, Nightly, Juan Carlos Harper MD, 100 mg at 12/14/17 2234  •  gabapentin (NEURONTIN) capsule 300 mg, 300 mg, Oral, Q8H, Juan Carlos Harper MD, 300 mg at 12/16/17 0506  •  levETIRAcetam (KEPPRA) tablet 500 mg, 500 mg, Oral, BID, Juan Carlos Harper MD, 500 mg at 12/16/17 0836  •  levoFLOXacin (LEVAQUIN) tablet 750 mg, 750 mg, Oral, Daily, Juan Carlos Harper MD  •  mirtazapine (REMERON) tablet 15 mg, 15 mg, Oral, Nightly, Juan Carlos Harper MD, 15 mg at 12/15/17 2227  •  pantoprazole (PROTONIX) EC tablet 40 mg, 40 mg, Oral, QAM, Juan Carlos Harper MD, 40 mg at 12/16/17 0836  •  Insert peripheral IV, , , Once **AND** sodium chloride 0.9 % flush 10 mL, 10 mL, Intravenous, PRN, LEVON Watts III     Consult obtained  Neurology    Procedures  None    Hospital course  87-year-old white female with history of hypertension hyperlipidemia seizure disorder neuropathy admitted through emergency room with altered mental status.  Patient evaluated in ER found to have UTI admitted for management.  Patient started on IV antibiotics her cultures came back positive for Klebsiella which is sensitive to Levaquin.  Patient also evaluated by neurology there is no evidence of TIA or CVA.  Patient remain on aspirin and Lipitor and Plavix.  Patient is back to baseline and wants to  go home with family support.  At the time of discharge she is fully alert oriented and follows all commands.  Patient is afebrile vital signs stable and white count is normal now.  Patient is working with PT OT.    Discharge diet regular    Activity as tolerated    Medication as above    Follow-up with primary doctor in 1 week and take medications directed    GAETANO ACOSTA MD

## 2017-12-16 NOTE — PLAN OF CARE
Problem: Patient Care Overview (Adult)  Goal: Plan of Care Review  Outcome: Ongoing (interventions implemented as appropriate)    12/15/17 0624 12/15/17 1415   Coping/Psychosocial Response Interventions   Plan Of Care Reviewed With --  patient   Patient Care Overview   Progress improving --        Goal: Adult Individualization and Mutuality  Outcome: Ongoing (interventions implemented as appropriate)  Goal: Discharge Needs Assessment  Outcome: Ongoing (interventions implemented as appropriate)    Problem: Stroke (Ischemic) (Adult)  Goal: Signs and Symptoms of Listed Potential Problems Will be Absent or Manageable (Stroke)  Outcome: Ongoing (interventions implemented as appropriate)    Problem: Infection, Risk/Actual (Adult)  Goal: Identify Related Risk Factors and Signs and Symptoms  Outcome: Ongoing (interventions implemented as appropriate)  Goal: Infection Prevention/Resolution  Outcome: Ongoing (interventions implemented as appropriate)    Problem: Fall Risk (Adult)  Goal: Absence of Falls  Outcome: Ongoing (interventions implemented as appropriate)    Problem: Pressure Ulcer Risk (Clayton Scale) (Adult,Obstetrics,Pediatric)  Goal: Identify Related Risk Factors and Signs and Symptoms  Outcome: Ongoing (interventions implemented as appropriate)  Goal: Skin Integrity  Outcome: Ongoing (interventions implemented as appropriate)    Problem: Pain, Acute (Adult)  Goal: Identify Related Risk Factors and Signs and Symptoms  Outcome: Ongoing (interventions implemented as appropriate)  Goal: Acceptable Pain Control/Comfort Level  Outcome: Ongoing (interventions implemented as appropriate)

## 2017-12-18 ENCOUNTER — APPOINTMENT (OUTPATIENT)
Dept: GENERAL RADIOLOGY | Facility: HOSPITAL | Age: 82
End: 2017-12-18

## 2017-12-18 ENCOUNTER — APPOINTMENT (OUTPATIENT)
Dept: CT IMAGING | Facility: HOSPITAL | Age: 82
End: 2017-12-18

## 2017-12-18 ENCOUNTER — HOSPITAL ENCOUNTER (EMERGENCY)
Facility: HOSPITAL | Age: 82
Discharge: HOME | End: 2017-12-18
Attending: EMERGENCY MEDICINE | Admitting: EMERGENCY MEDICINE

## 2017-12-18 ENCOUNTER — APPOINTMENT (OUTPATIENT)
Dept: ULTRASOUND IMAGING | Facility: HOSPITAL | Age: 82
End: 2017-12-18

## 2017-12-18 VITALS
TEMPERATURE: 98.8 F | DIASTOLIC BLOOD PRESSURE: 102 MMHG | BODY MASS INDEX: 37.21 KG/M2 | OXYGEN SATURATION: 96 % | HEIGHT: 63 IN | SYSTOLIC BLOOD PRESSURE: 169 MMHG | HEART RATE: 87 BPM | RESPIRATION RATE: 16 BRPM | WEIGHT: 210 LBS

## 2017-12-18 DIAGNOSIS — R53.1 WEAKNESS: Primary | ICD-10-CM

## 2017-12-18 DIAGNOSIS — K80.20 GALLSTONES: ICD-10-CM

## 2017-12-18 DIAGNOSIS — R20.2 PARESTHESIA: ICD-10-CM

## 2017-12-18 DIAGNOSIS — S30.0XXA LUMBAR CONTUSION, INITIAL ENCOUNTER: ICD-10-CM

## 2017-12-18 DIAGNOSIS — Z91.81 HISTORY OF RECENT FALL: ICD-10-CM

## 2017-12-18 DIAGNOSIS — R79.89 ELEVATED LFTS: ICD-10-CM

## 2017-12-18 LAB
ALBUMIN SERPL-MCNC: 3.5 G/DL (ref 3.5–5.2)
ALBUMIN/GLOB SERPL: 1.1 G/DL
ALP SERPL-CCNC: 172 U/L (ref 39–117)
ALT SERPL W P-5'-P-CCNC: 167 U/L (ref 1–33)
ANION GAP SERPL CALCULATED.3IONS-SCNC: 11.2 MMOL/L
AST SERPL-CCNC: 170 U/L (ref 1–32)
BACTERIA SPEC AEROBE CULT: NORMAL
BASOPHILS # BLD AUTO: 0.03 10*3/MM3 (ref 0–0.2)
BASOPHILS NFR BLD AUTO: 0.6 % (ref 0–1.5)
BILIRUB SERPL-MCNC: 1.2 MG/DL (ref 0.1–1.2)
BUN BLD-MCNC: 14 MG/DL (ref 8–23)
BUN/CREAT SERPL: 15.2 (ref 7–25)
CALCIUM SPEC-SCNC: 8.7 MG/DL (ref 8.6–10.5)
CHLORIDE SERPL-SCNC: 98 MMOL/L (ref 98–107)
CO2 SERPL-SCNC: 25.8 MMOL/L (ref 22–29)
CREAT BLD-MCNC: 0.92 MG/DL (ref 0.57–1)
DEPRECATED RDW RBC AUTO: 47.3 FL (ref 37–54)
EOSINOPHIL # BLD AUTO: 0.17 10*3/MM3 (ref 0–0.7)
EOSINOPHIL NFR BLD AUTO: 3.3 % (ref 0.3–6.2)
ERYTHROCYTE [DISTWIDTH] IN BLOOD BY AUTOMATED COUNT: 12.6 % (ref 11.7–13)
GFR SERPL CREATININE-BSD FRML MDRD: 58 ML/MIN/1.73
GLOBULIN UR ELPH-MCNC: 3.2 GM/DL
GLUCOSE BLD-MCNC: 118 MG/DL (ref 65–99)
HCT VFR BLD AUTO: 34.3 % (ref 35.6–45.5)
HGB BLD-MCNC: 11.1 G/DL (ref 11.9–15.5)
IMM GRANULOCYTES # BLD: 0 10*3/MM3 (ref 0–0.03)
IMM GRANULOCYTES NFR BLD: 0 % (ref 0–0.5)
LYMPHOCYTES # BLD AUTO: 1.39 10*3/MM3 (ref 0.9–4.8)
LYMPHOCYTES NFR BLD AUTO: 26.6 % (ref 19.6–45.3)
MCH RBC QN AUTO: 33.1 PG (ref 26.9–32)
MCHC RBC AUTO-ENTMCNC: 32.4 G/DL (ref 32.4–36.3)
MCV RBC AUTO: 102.4 FL (ref 80.5–98.2)
MONOCYTES # BLD AUTO: 0.58 10*3/MM3 (ref 0.2–1.2)
MONOCYTES NFR BLD AUTO: 11.1 % (ref 5–12)
NEUTROPHILS # BLD AUTO: 3.06 10*3/MM3 (ref 1.9–8.1)
NEUTROPHILS NFR BLD AUTO: 58.4 % (ref 42.7–76)
PLATELET # BLD AUTO: 150 10*3/MM3 (ref 140–500)
PMV BLD AUTO: 9.2 FL (ref 6–12)
POTASSIUM BLD-SCNC: 3.9 MMOL/L (ref 3.5–5.2)
PROT SERPL-MCNC: 6.7 G/DL (ref 6–8.5)
RBC # BLD AUTO: 3.35 10*6/MM3 (ref 3.9–5.2)
SODIUM BLD-SCNC: 135 MMOL/L (ref 136–145)
WBC NRBC COR # BLD: 5.23 10*3/MM3 (ref 4.5–10.7)

## 2017-12-18 PROCEDURE — 80053 COMPREHEN METABOLIC PANEL: CPT | Performed by: EMERGENCY MEDICINE

## 2017-12-18 PROCEDURE — 85025 COMPLETE CBC W/AUTO DIFF WBC: CPT | Performed by: EMERGENCY MEDICINE

## 2017-12-18 PROCEDURE — 72220 X-RAY EXAM SACRUM TAILBONE: CPT

## 2017-12-18 PROCEDURE — 76705 ECHO EXAM OF ABDOMEN: CPT

## 2017-12-18 PROCEDURE — 99283 EMERGENCY DEPT VISIT LOW MDM: CPT

## 2017-12-18 PROCEDURE — 72100 X-RAY EXAM L-S SPINE 2/3 VWS: CPT

## 2017-12-18 PROCEDURE — 70450 CT HEAD/BRAIN W/O DYE: CPT

## 2017-12-18 NOTE — ED NOTES
Pt arrives from [facility] with c/o tingling to the lower extremities and to the upper extremities this am. Pt was discharged on Saturday with dx of a UTI and had a work up for stroke. Pt has a neuro stimulator in place. It was not turned on this morning and after turning it on the tingling got better. Pt appears in NAD.       Vianca Dumont RN  12/18/17 4873

## 2017-12-18 NOTE — PROGRESS NOTES
Case Management Discharge Note    Final Note: Home to [facility] with [facility], Left  for [facility] with [facility] to confirm. Guerline RN/CCP    Discharge Placement     Facility/Agency Request Status Selected? Address Phone Number Fax Number    [facility] Accepted    Yes [address] [facility] [address] [phone]    [facility]     [address] [facility] [address] [phone]             Discharge Codes: 06  Discharged/transferred to home under care of organized home health service organization in anticipation of skilled care

## 2017-12-18 NOTE — ED PROVIDER NOTES
EMERGENCY DEPARTMENT ENCOUNTER    CHIEF COMPLAINT  Chief Complaint: lower back pain  History given by: Pt  History limited by: Nothing  Room Number: 20/20  PMD: Herrera Nelson MD      HPI:  Pt is a 87 y.o. female who presents complaining of lower back pain onset after falling 5 days ago. Pt was seen in  ED for a fall and admitted from 12/13 - 12/16 for UTI and seizure. She had a negative neuro work up at that time. She reports her lower back pain has persisted since being discharged. Pt reports she woke up this morning and got out of bed and felt weak and tingling in her BLE. Pt also c/o HA, but denies neck pain, visual disturbances, or slurred speech. Pt reports she a neuro-stimulator in place and prior back surgeries for her chronic back pain. She reports a hx of LLE tingling for over a year.     Duration:  5 days  Onset: sudden  Timing: constant  Location: lower back  Radiation: none  Quality: pain  Intensity/Severity: moderate  Progression: unchanged  Associated Symptoms: BLE weakness and tingling  Aggravating Factors: none  Alleviating Factors: none  Previous Episodes: Pt reports a hx of LLE tingling to over a year, pt reports a hx of chronic low back pain and has a neuro-stimulator in place  Treatment before arrival: Pt was seen in  ED     PAST MEDICAL HISTORY  Active Ambulatory Problems     Diagnosis Date Noted   • Anemia 02/05/2016   • Bulging lumbar disc 02/05/2016   • Depression, 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   • Gastroesophageal reflux disease 02/05/2016   • Hyperlipidemia 02/05/2016   • Intermittent claudication 02/05/2016   • Neuropathy 02/05/2016   • Osteoarthritis of knee 02/05/2016   • Syndrome of inappropriate secretion of antidiuretic hormone 02/05/2016   • Vitamin D deficiency 02/05/2016   • History of sick sinus syndrome    • Intertrigo 03/25/2016   • 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   • Urine frequency 05/20/2016   • Change in bowel habits 05/20/2016   • Urinary tract infection 07/08/2016    • 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   • History of anxiety 10/18/2016   • Urinary tract infection in female 01/03/2017   • 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   • Thrombocytopenia 01/05/2017   • B12 deficiency 01/16/2017   • Pain of toe of right foot 01/16/2017   • Weakness 02/03/2017   • Cardiac pacemaker in situ 03/08/2017   • Chronic venous insufficiency 03/09/2017   • Arthritis 03/09/2017   • S/P knee replacement 03/09/2017   • Chronic bilateral low back pain without sciatica 03/09/2017   • Essential hypertension 03/09/2017   • Hiatal hernia 03/16/2017   • Acute vaginitis 03/23/2017   • Zenker's diverticulum 04/14/2017   • Acute pain of left knee 05/19/2017   • Chronic idiopathic constipation 05/19/2017   • Pressure sore on buttocks 05/19/2017   • Somnolence 12/13/2017     Resolved Ambulatory Problems     Diagnosis Date Noted   • Hyponatremia 02/05/2016   • Pneumonia 07/08/2016   • Aspiration pneumonia 07/17/2016   • History of aspiration pneumonitis 01/04/2017   • Right lower lobe pneumonia 02/02/2017   • Sepsis due to pneumonia 02/02/2017   • UTI (urinary tract infection) 03/15/2017   • Acute kidney injury 03/17/2017     Past Medical History:   Diagnosis Date   • Anemia    • Bulging lumbar disc    • Chronic cough    • Chronic UTI    • Chronic venous insufficiency    • Clonic seizure disorder    • DDD (degenerative disc disease), lumbosacral    • Dementia without behavioral disturbance    • Depression, endogenous    • Disc degeneration, lumbar    • Dysphagia    • GERD (gastroesophageal reflux disease)    • Hiatal hernia    • History of anxiety    • History of aspiration pneumonitis    • History of cerebral artery occlusion    • History of herpes zoster    • History of ingrowing nail    • History of osteoporosis    • History of poliomyelitis    • History of sciatica    • History of sick sinus syndrome    • History of transient cerebral ischemia    • History of Zenker's diverticulum removal 2016   • HX: long term  anticoagulant use    • Hyperlipidemia    • Hypertension    • Hyponatremia    • Intertrigo    • Lumbar radiculopathy    • Morbid obesity    • Neuralgia    • Nonepileptic episode    • Osteoarthritis of right knee    • Pacemaker    • Pneumonia    • Seeing double    • SIADH (syndrome of inappropriate ADH production)    • Vitamin D deficiency    • Zenker's diverticulum        PAST SURGICAL HISTORY  Past Surgical History:   Procedure Laterality Date   • CARDIAC PACEMAKER PLACEMENT      secondary to SSS, placed in 2004, with generator change in 2014   • CATARACT EXTRACTION W/ INTRAOCULAR LENS IMPLANT Bilateral    • COLONOSCOPY     • HAND SURGERY Right    • KNEE ARTHROPLASTY Left    • LAMINECTOMY FOR IMPLANTATION / PLACEMENT NEUROSTIMULATOR ELECTRODES     • LUMBAR LAMINECTOMY      L-3 L4 L4 L5   • TONSILLECTOMY     • ZENKERS DIVERTICULECTOMY N/A 9/27/2016    Procedure: ENDOSCOPIC REMOVAL OF ZENKERS DIVERTICULUM W/ WERDASCOPE;  Surgeon: Oswald Barth MD;  Location: [facility];  Service:    • ZENKERS DIVERTICULECTOMY N/A 4/14/2017    Procedure: ESOPHAGOSCOPY;  Surgeon: Oswald Barth MD;  Location: [facility];  Service:        FAMILY HISTORY  Family History   Problem Relation Age of Onset   • Cancer Daughter        SOCIAL HISTORY  Social History     Social History   • Marital status:      Spouse name: N/A   • Number of children: 3   • Years of education: N/A     Occupational History   • Retired      Social History Main Topics   • Smoking status: Former Smoker     Packs/day: 1.00     Years: 40.00     Types: Cigarettes     Quit date: 1992   • Smokeless tobacco: Never Used      Comment: QUIT 1992   • Alcohol use Yes      Comment: 1-2 DRINKS EVERY SIX MONTHS HOLIDAY DRINKER   • Drug use: No   • Sexual activity: Defer     Other Topics Concern   • Not on file     Social History Narrative       ALLERGIES  Penicillins    REVIEW OF SYSTEMS  Review of Systems   Constitutional: Negative for fever.   HENT: Negative  for sore throat.    Eyes: Negative.  Negative for visual disturbance.   Respiratory: Negative for cough and shortness of breath.    Cardiovascular: Negative for chest pain.   Gastrointestinal: Negative for abdominal pain, diarrhea and vomiting.   Genitourinary: Negative for dysuria.   Musculoskeletal: Positive for back pain (lower). Negative for neck pain.   Skin: Negative for rash.   Allergic/Immunologic: Negative.    Neurological: Positive for headaches. Negative for speech difficulty, weakness and numbness.   Hematological: Negative.    Psychiatric/Behavioral: Negative.    All other systems reviewed and are negative.      PHYSICAL EXAM  ED Triage Vitals   Temp Heart Rate Resp BP SpO2   12/18/17 0923 12/18/17 0923 12/18/17 0923 12/18/17 0935 12/18/17 0923   98.8 °F (37.1 °C) 84 16 151/75 96 %      Temp src Heart Rate Source Patient Position BP Location FiO2 (%)   -- 12/18/17 0923 12/18/17 0935 12/18/17 0935 --    Monitor Sitting Right arm        Physical Exam   Constitutional: She is oriented to person, place, and time and well-developed, well-nourished, and in no distress. No distress.   HENT:   Head: Normocephalic and atraumatic.   Eyes: EOM are normal. Pupils are equal, round, and reactive to light.   Neck: Normal range of motion. Neck supple. Carotid bruit is not present.   Cardiovascular: Normal rate, regular rhythm and normal heart sounds.    Pulmonary/Chest: Effort normal and breath sounds normal. No respiratory distress.   Abdominal: Soft. She exhibits no distension. There is no tenderness. There is no rebound and no guarding.   Musculoskeletal: Normal range of motion. She exhibits no edema.        Cervical back: She exhibits no tenderness.   Pelvis is stable, plantar flexion is equal bilaterally   Neurological: She is alert and oriented to person, place, and time. She has normal sensation and normal strength.   No focal neuro deficits   Skin: Skin is warm and dry. No rash noted.   Psychiatric: Mood and  affect normal.   Nursing note and vitals reviewed.      LAB RESULTS  Lab Results (last 24 hours)     Procedure Component Value Units Date/Time    CBC & Differential [[id number]] Collected:  12/18/17 0950    Specimen:  Blood Updated:  12/18/17 1002    Narrative:       The following orders were created for panel order CBC & Differential.  Procedure                               Abnormality         Status                     ---------                               -----------         ------                     CBC Auto Differential[[id number]]        Abnormal            Final result                 Please view results for these tests on the individual orders.    Comprehensive Metabolic Panel [[id number]]  (Abnormal) Collected:  12/18/17 0950    Specimen:  Blood Updated:  12/18/17 1022     Glucose 118 (H) mg/dL      BUN 14 mg/dL      Creatinine 0.92 mg/dL      Sodium 135 (L) mmol/L      Potassium 3.9 mmol/L      Chloride 98 mmol/L      CO2 25.8 mmol/L      Calcium 8.7 mg/dL      Total Protein 6.7 g/dL      Albumin 3.50 g/dL      ALT (SGPT) 167 (H) U/L      AST (SGOT) 170 (H) U/L      Alkaline Phosphatase 172 (H) U/L      Total Bilirubin 1.2 mg/dL      eGFR Non African Amer 58 (L) mL/min/1.73      Globulin 3.2 gm/dL      A/G Ratio 1.1 g/dL      BUN/Creatinine Ratio 15.2     Anion Gap 11.2 mmol/L     Narrative:       The MDRD GFR formula is only valid for adults with stable renal function between ages 18 and 70.    CBC Auto Differential [[id number]]  (Abnormal) Collected:  12/18/17 0950    Specimen:  Blood Updated:  12/18/17 1002     WBC 5.23 10*3/mm3      RBC 3.35 (L) 10*6/mm3      Hemoglobin 11.1 (L) g/dL      Hematocrit 34.3 (L) %      .4 (H) fL      MCH 33.1 (H) pg      MCHC 32.4 g/dL      RDW 12.6 %      RDW-SD 47.3 fl      MPV 9.2 fL      Platelets 150 10*3/mm3      Neutrophil % 58.4 %      Lymphocyte % 26.6 %      Monocyte % 11.1 %      Eosinophil % 3.3 %      Basophil % 0.6 %      Immature Grans % 0.0 %       Neutrophils, Absolute 3.06 10*3/mm3      Lymphocytes, Absolute 1.39 10*3/mm3      Monocytes, Absolute 0.58 10*3/mm3      Eosinophils, Absolute 0.17 10*3/mm3      Basophils, Absolute 0.03 10*3/mm3      Immature Grans, Absolute 0.00 10*3/mm3           I ordered the above labs and reviewed the results    RADIOLOGY  US Gallbladder   Final Result   Cholelithiasis without other evidence for cholecystitis.       This report was finalized on 12/18/2017 2:10 PM by Dr. Janes Aldridge MD.          CT Head Without Contrast   Final Result       No change when compared to head CT 4 days ago on 12/14/2017. There is   mild small vessel disease in the cerebral white matter. The remainder of   the head CT is within normal limits with no acute skull fracture or   intracranial hemorrhage identified and the etiology of left-sided   numbness and tingling and weakness is not established on this exam.   Correlate clinically. Results were communicated to Dr. Mitchell in the   emergency room by telephone 12/18/2017 at 10:30 AM.       Radiation dose reduction techniques were utilized, including automated   exposure control and exposure modulation based on body size.       This report was finalized on 12/18/2017 2:03 PM by Dr. Ranjan Strange MD.          XR Sacrum & Coccyx   Final Result   1.  Advanced degenerative disc disease in the lumbar spine with   scoliotic curvature. Compression deformities at T12 and L2 appear   similar to previous exam without definite acute abnormality within the   lumbar spine.    2.  No evidence for acute abnormality on single lateral view of the   sacrum.       This report was finalized on 12/18/2017 12:34 PM by Dr. Janes Aldridge MD.          XR Spine Lumbar 2 or 3 View   Final Result   1.  Advanced degenerative disc disease in the lumbar spine with   scoliotic curvature. Compression deformities at T12 and L2 appear   similar to previous exam without definite acute abnormality within the   lumbar spine.     2.  No evidence for acute abnormality on single lateral view of the   sacrum.       This report was finalized on 12/18/2017 12:34 PM by Dr. Janes Aldridge MD.               I ordered the above noted radiological studies. Interpreted by radiologist. Discussed with radiologist (Dr. Strange). Reviewed by me in PACS.       PROCEDURES  Procedures      PROGRESS AND CONSULTS  ED Course     0946 - XR L-spine, sacrum and coccyx, CT head and lab work ordered for further evaluation.       1104 - Consult placed with DIO.     1135 - Consulted with Dr. Harper ([facility]) who recommends performing an US gallbladder, and if that is normal then to discharge the pt back to the nursing home and have the nursing home recheck the pt's ALT and ASTs.     1137 - Rechecked pt. Pt is resting comfortably. Informed pt and family of her negative acute imaging studies, and her lab work which shows elevated ALTs and ASTs. Pt's daughter states that the pt was recently started on Lipitor per stroke protocol. Informed pt and family of the plan to acquire an US gallbladder.     1138 - US gallbladder ordered for further evaluation.     1321 - Rechecked pt. Pt is resting comfortably. Informed pt and family of the US gallbladder which shows small gallstones, but no signs of infectious pathology. Discussed the plan to discharge home and to stop taking Lipitor.     MEDICAL DECISION MAKING  Results were reviewed/discussed with the patient and they were also made aware of online access. Pt also made aware that some labs, such as cultures, will not be resulted during ER visit and follow up with PMD is necessary.     MDM  Number of Diagnoses or Management Options     Amount and/or Complexity of Data Reviewed  Clinical lab tests: ordered and reviewed (ALT - 167  AST - 170  WBC - 5.23)  Tests in the radiology section of CPT®: ordered and reviewed (XR sacrum&coccyx - No evidence for acute abnormality  XR Lumbar - degenerative changes, negative acute  CT head -  small vessel disease, negative acute  US gallbladder - Cholelithiasis without other evidence for cholecystitis.  )  Discussion of test results with the performing providers: yes (Dr. Strange)  Decide to obtain previous medical records or to obtain history from someone other than the patient: yes  Review and summarize past medical records: yes (Pt was hospitalized 12/13/17 - 12/16/17 for UTI, seizure. Pt was seen by neuro who found no evidence for TIA or stroke. Pt had a CT head that showed atrophy and small vessel disease. She had a carotid doppler that showed non-critical stenosis. )  Discuss the patient with other providers: yes (Dr. Harper ([facility]))           DIAGNOSIS  Final diagnoses:   Weakness   Paresthesia   History of recent fall   Lumbar contusion, initial encounter   Gallstones   Elevated LFTs- felt to be secondary to recent medication changes       DISPOSITION  DISCHARGE    Patient discharged in stable condition.    Reviewed implications of results, diagnosis, meds, responsibility to follow up, warning signs and symptoms of possible worsening, potential complications and reasons to return to ER.    Patient/Family voiced understanding of above instructions.    Discussed plan for discharge, as there is no emergent indication for admission.  Pt/family is agreeable and understands need for follow up and repeat testing.  Pt is aware that discharge does not mean that nothing is wrong but it indicates no emergency is present that requires admission and they must continue care with follow-up as given below or physician of their choice.     FOLLOW-UP  Herrera Nelson MD  [address] [facility] [address] [phone]      follow up at Nursing home with repeat LFT         Medication List      Stop          atorvastatin 10 MG tablet   Commonly known as:  LIPITOR               Latest Documented Vital Signs:  As of 2:19 PM  BP- (!) 169/102 HR- 87 Temp- 98.8 °F (37.1 °C) O2 sat- 96%    --  Documentation assistance  provided by smita Betancur for Dr. Mitchell.  Information recorded by the scribe was done at my direction and has been verified and validated by me.     Solo Betancur  12/18/17 1414       Chun Mitchell MD  12/18/17 [id number]

## 2017-12-27 ENCOUNTER — CLINICAL SUPPORT NO REQUIREMENTS (OUTPATIENT)
Dept: CARDIOLOGY | Facility: CLINIC | Age: 82
End: 2017-12-27

## 2017-12-27 DIAGNOSIS — I49.5 SICK SINUS SYNDROME (HCC): Primary | ICD-10-CM

## 2018-03-08 ENCOUNTER — OFFICE VISIT (OUTPATIENT)
Dept: CARDIOLOGY | Facility: CLINIC | Age: 83
End: 2018-03-08

## 2018-03-08 ENCOUNTER — CLINICAL SUPPORT NO REQUIREMENTS (OUTPATIENT)
Dept: CARDIOLOGY | Facility: CLINIC | Age: 83
End: 2018-03-08

## 2018-03-08 VITALS
BODY MASS INDEX: 38.24 KG/M2 | HEIGHT: 63 IN | DIASTOLIC BLOOD PRESSURE: 62 MMHG | WEIGHT: 215.8 LBS | HEART RATE: 72 BPM | SYSTOLIC BLOOD PRESSURE: 110 MMHG

## 2018-03-08 DIAGNOSIS — I10 ESSENTIAL HYPERTENSION: ICD-10-CM

## 2018-03-08 DIAGNOSIS — Z86.79 HISTORY OF SICK SINUS SYNDROME: ICD-10-CM

## 2018-03-08 DIAGNOSIS — I49.5 SICK SINUS SYNDROME (HCC): Primary | ICD-10-CM

## 2018-03-08 DIAGNOSIS — Z95.0 CARDIAC PACEMAKER IN SITU: Primary | ICD-10-CM

## 2018-03-08 PROBLEM — N39.0 URINARY TRACT INFECTION IN FEMALE: Status: RESOLVED | Noted: 2017-01-03 | Resolved: 2018-03-08

## 2018-03-08 PROBLEM — N76.0 ACUTE VAGINITIS: Status: RESOLVED | Noted: 2017-03-23 | Resolved: 2018-03-08

## 2018-03-08 PROBLEM — M25.562 ACUTE PAIN OF LEFT KNEE: Status: RESOLVED | Noted: 2017-05-19 | Resolved: 2018-03-08

## 2018-03-08 PROCEDURE — 93280 PM DEVICE PROGR EVAL DUAL: CPT | Performed by: INTERNAL MEDICINE

## 2018-03-08 PROCEDURE — 99213 OFFICE O/P EST LOW 20 MIN: CPT | Performed by: INTERNAL MEDICINE

## 2018-03-08 NOTE — PROGRESS NOTES
Date of Office Visit: 2018  Encounter Provider: Bandar Ruiz MD  Place of Service: [facility]  Patient Name: Kim Feldman  :3/13/1930    Chief Complaint   Patient presents with   • sick sinus syndrome   :     HPI: Kim Feldman is a 87 y.o. female who presents today to follow-up. She has a history of sick sinus syndrome and has a permanent pacemaker. She has a history of hypertension but has had her medications slowly decreased due to improvement in her BP.  She has been admitted several times over the last three years with pneumonia and UTIss.  She has been diagnosed with aspiration which was due to a Zenker's diverticulum, which was repaired last year.    She has chronic lower extremity edema, which is unchanged.  She has not had syncope, orthopnea, PND, palpitations, or chest pain.  In 2017 she was admitted and there was concern for a stroke, but this was not the case.    Past Medical History:   Diagnosis Date   • Anemia    • Bulging lumbar disc    • Chronic cough    • Chronic UTI    • Chronic venous insufficiency    • Clonic seizure disorder    • DDD (degenerative disc disease), lumbosacral    • Dementia without behavioral disturbance     moderate   • Depression, endogenous    • Disc degeneration, lumbar    • Dysphagia    • GERD (gastroesophageal reflux disease)    • Hiatal hernia    • History of anxiety    • History of aspiration pneumonitis    • History of cerebral artery occlusion     CVA (following TIA), 10/10, treated with tPA   • History of herpes zoster    • History of ingrowing nail    • History of osteoporosis    • History of poliomyelitis     child   • History of sciatica    • History of sick sinus syndrome     s/p PPM   • History of transient cerebral ischemia     followed by stroke in 10/2010. BIBI was normal.   • History of Zenker's diverticulum removal    • HX: long term anticoagulant use    • Hyperlipidemia    • Hypertension      NO CURRENT MEDICATION   • Hyponatremia    • Intertrigo    • Lumbar radiculopathy    • Morbid obesity    • Neuralgia     with diplopia secondary to facial shingles   • Nonepileptic episode    • Osteoarthritis of right knee    • Pacemaker    • Pneumonia    • Seeing double     secondary to shingles on the face also with neuralgia   • SIADH (syndrome of inappropriate ADH production)    • Vitamin D deficiency    • Zenker's diverticulum        Past Surgical History:   Procedure Laterality Date   • CARDIAC PACEMAKER PLACEMENT      secondary to SSS, placed in 2004, with generator change in 2014   • CATARACT EXTRACTION W/ INTRAOCULAR LENS IMPLANT Bilateral    • COLONOSCOPY     • HAND SURGERY Right    • KNEE ARTHROPLASTY Left    • LAMINECTOMY FOR IMPLANTATION / PLACEMENT NEUROSTIMULATOR ELECTRODES     • LUMBAR LAMINECTOMY      L-3 L4 L4 L5   • TONSILLECTOMY     • ZENKERS DIVERTICULECTOMY N/A 9/27/2016    Procedure: ENDOSCOPIC REMOVAL OF ZENKERS DIVERTICULUM W/ WERDASCOPE;  Surgeon: Oswald Barth MD;  Location: [facility];  Service:    • ZENKERS DIVERTICULECTOMY N/A 4/14/2017    Procedure: ESOPHAGOSCOPY;  Surgeon: Oswald Barth MD;  Location: [facility];  Service:        Social History     Social History   • Marital status:      Spouse name: N/A   • Number of children: 3   • Years of education: N/A     Occupational History   • Retired      Social History Main Topics   • Smoking status: Former Smoker     Packs/day: 1.00     Years: 40.00     Types: Cigarettes     Quit date: 1992   • Smokeless tobacco: Never Used      Comment: caffeine use: one cup of coffee in the morning.    • Alcohol use 0.6 oz/week     1 Shots of liquor per week      Comment: 1-2 DRINKS DAILY; WEAK IN STRENGTH   • Drug use: No   • Sexual activity: Defer     Other Topics Concern   • Not on file     Social History Narrative       Family History   Problem Relation Age of Onset   • Cancer Daughter        Review of Systems   Constitution:  Positive for malaise/fatigue.   Eyes: Positive for double vision.   Cardiovascular: Positive for dyspnea on exertion and leg swelling. Negative for chest pain and palpitations.   Respiratory: Positive for wheezing.    Endocrine: Positive for cold intolerance.   Musculoskeletal: Positive for joint pain.   Neurological: Positive for excessive daytime sleepiness. Negative for dizziness and light-headedness.   Psychiatric/Behavioral: Positive for depression.   All other systems reviewed and are negative.      Allergies   Allergen Reactions   • Lipitor [Atorvastatin] Confusion   • Penicillins Hives     Has previously tolerated ceftriaxone         Current Outpatient Prescriptions:   •  acetaminophen (TYLENOL) 500 MG tablet, Take 1,000 mg by mouth Every 6 (Six) Hours As Needed for Mild Pain (1-3)., Disp: , Rfl:   •  B Complex Vitamins (VITAMIN B COMPLEX PO), Take 1 tablet by mouth every morning. HOLD PRIOR TO SURG, Disp: , Rfl:   •  Calcium-Vitamin D-Vitamin K (VIACTIV PO), Take 500 mg by mouth Every Night. HOLD PRIOR TO SURG , Disp: , Rfl:   •  clopidogrel (PLAVIX) 75 MG tablet, TAKE ONE TABLET BY MOUTH DAILY, Disp: 90 tablet, Rfl: 0  •  diclofenac (VOLTAREN) 1 % gel gel, Apply 4 g topically 4 (Four) Times a Day As Needed. HOLD PRIOR TO SURGERY, Disp: , Rfl:   •  docusate sodium (COLACE) 100 MG capsule, Take 100 mg by mouth Every Night., Disp: , Rfl:   •  gabapentin (NEURONTIN) 300 MG capsule, Take 2 capsules by mouth 3 (Three) Times a Day., Disp: 540 capsule, Rfl: 0  •  Hypromellose (ARTIFICIAL TEARS OP), Apply 2 drops to eye 4 (four) times a day as needed., Disp: , Rfl:   •  ketoconazole (NIZORAL) 2 % cream, , Disp: , Rfl:   •  lansoprazole (PREVACID) 30 MG capsule, TAKE ONE CAPSULE BY MOUTH EVERY MORNING, Disp: 30 capsule, Rfl: 2  •  levETIRAcetam (KEPPRA) 500 MG tablet, Take 1 tablet by mouth 2 (Two) Times a Day., Disp: 60 tablet, Rfl: 11  •  methylcellulose, Laxative, (CITRUCEL) 500 MG tablet tablet, Take 2 tablets  "by mouth Every Morning., Disp: , Rfl:   •  mirtazapine (REMERON) 15 MG tablet, TAKE ONE TABLET BY MOUTH EVERY NIGHT AT BEDTIME, Disp: 30 tablet, Rfl: 2    Current Facility-Administered Medications:   •  cyanocobalamin injection 1,000 mcg, 1,000 mcg, Intramuscular, Q28 Days, Sharad Salinas MD, 1,000 mcg at 10/09/17 1143     Objective:     Vitals:    03/08/18 1328   BP: 110/62   BP Location: Left arm   Pulse: 72   Weight: 97.9 kg (215 lb 12.8 oz)   Height: 160 cm (63\")     Body mass index is 38.23 kg/(m^2).    Physical Exam   Constitutional: She is oriented to person, place, and time.   Obese, in a wheelchair     HENT:   Head: Normocephalic.   Nose: Nose normal.   Mouth/Throat: Oropharynx is clear and moist.   Eyes: Conjunctivae and EOM are normal. Pupils are equal, round, and reactive to light.   Neck: Normal range of motion.   Cannot assess for JVD due to habitus   Cardiovascular: Normal rate, regular rhythm, normal heart sounds and intact distal pulses.    No murmur heard.  Pulmonary/Chest: Effort normal.   Abdominal: Soft.   Obesity limits abdominal exam   Musculoskeletal: Normal range of motion. She exhibits edema (doughy nonpitting edema).   Neurological: She is alert and oriented to person, place, and time. No cranial nerve deficit.   Skin: Skin is warm and dry. No rash noted.   Psychiatric: She has a normal mood and affect. Her behavior is normal. Judgment and thought content normal.   Vitals reviewed.      Procedures      Assessment:       Diagnosis Plan   1. Cardiac pacemaker in situ     2. History of sick sinus syndrome     3. Essential hypertension            Plan:       1/2.  She has SSS s/p PPM.  It was checked today; no episodes have been noted.      3.  She has a history of HTN but her BP has steadily declined over the years and she is not medicated.    Sincerely,       Bandar Ruiz MD                "

## 2018-07-03 ENCOUNTER — CLINICAL SUPPORT NO REQUIREMENTS (OUTPATIENT)
Dept: CARDIOLOGY | Facility: CLINIC | Age: 83
End: 2018-07-03

## 2018-07-03 DIAGNOSIS — I49.5 SICK SINUS SYNDROME (HCC): Primary | ICD-10-CM

## 2018-07-03 PROCEDURE — 93296 REM INTERROG EVL PM/IDS: CPT | Performed by: INTERNAL MEDICINE

## 2018-07-03 PROCEDURE — 93294 REM INTERROG EVL PM/LDLS PM: CPT | Performed by: INTERNAL MEDICINE

## 2018-10-18 ENCOUNTER — OFFICE VISIT (OUTPATIENT)
Dept: NEUROLOGY | Facility: CLINIC | Age: 83
End: 2018-10-18

## 2018-10-18 VITALS
HEIGHT: 62 IN | DIASTOLIC BLOOD PRESSURE: 79 MMHG | BODY MASS INDEX: 38.64 KG/M2 | SYSTOLIC BLOOD PRESSURE: 120 MMHG | WEIGHT: 210 LBS

## 2018-10-18 DIAGNOSIS — R56.9 GENERALIZED CONVULSIVE SEIZURES (HCC): Primary | ICD-10-CM

## 2018-10-18 PROCEDURE — 99213 OFFICE O/P EST LOW 20 MIN: CPT | Performed by: PSYCHIATRY & NEUROLOGY

## 2018-10-18 RX ORDER — LEVETIRACETAM 750 MG/1
750 TABLET ORAL 2 TIMES DAILY
Qty: 60 TABLET | Refills: 11 | Status: ON HOLD | OUTPATIENT
Start: 2018-10-18 | End: 2020-01-01

## 2018-10-18 RX ORDER — ESCITALOPRAM OXALATE 10 MG/1
10 TABLET ORAL DAILY
COMMUNITY
End: 2020-01-01 | Stop reason: ALTCHOICE

## 2018-10-18 NOTE — PROGRESS NOTES
Subjective:     Patient ID: Kim Feldman is a 88 y.o. female.    History of Present Illness    The patient was seen back the office for follow-up of seizures.  A month ago to the day she was found unresponsive by the nurses.  Is not clear what led up to this.  She was poorly responsive for about 15-20 minutes.  Her Keppra was increased to thousand milligrams twice daily.  History was taken from the daughter today he feels that she is somewhat more confused since this happened.  This is the only such episode she has had.  Previously she was on Keppra 500 mg twice daily.  The following portions of the patient's history were reviewed and updated as appropriate: allergies, current medications, past family history, past medical history, past social history, past surgical history and problem list.      Current Outpatient Prescriptions:   •  acetaminophen (TYLENOL) 500 MG tablet, Take 1,000 mg by mouth Every 6 (Six) Hours As Needed for Mild Pain (1-3)., Disp: , Rfl:   •  B Complex Vitamins (VITAMIN B COMPLEX PO), Take 1 tablet by mouth every morning. HOLD PRIOR TO SURG, Disp: , Rfl:   •  Calcium-Vitamin D-Vitamin K (VIACTIV PO), Take 500 mg by mouth Every Night. HOLD PRIOR TO SURG , Disp: , Rfl:   •  clopidogrel (PLAVIX) 75 MG tablet, TAKE ONE TABLET BY MOUTH DAILY, Disp: 90 tablet, Rfl: 0  •  diclofenac (VOLTAREN) 1 % gel gel, Apply 4 g topically 4 (Four) Times a Day As Needed. HOLD PRIOR TO SURGERY, Disp: , Rfl:   •  docusate sodium (COLACE) 100 MG capsule, Take 100 mg by mouth Every Night., Disp: , Rfl:   •  escitalopram (LEXAPRO) 20 MG tablet, Take 20 mg by mouth Daily., Disp: , Rfl:   •  gabapentin (NEURONTIN) 300 MG capsule, Take 2 capsules by mouth 3 (Three) Times a Day., Disp: 540 capsule, Rfl: 0  •  Hypromellose (ARTIFICIAL TEARS OP), Apply 2 drops to eye 4 (four) times a day as needed., Disp: , Rfl:   •  ketoconazole (NIZORAL) 2 % cream, , Disp: , Rfl:   •  lansoprazole (PREVACID) 30 MG capsule, TAKE ONE  CAPSULE BY MOUTH EVERY MORNING, Disp: 30 capsule, Rfl: 2  •  levETIRAcetam (KEPPRA) 750 MG tablet, Take 1 tablet by mouth 2 (Two) Times a Day., Disp: 60 tablet, Rfl: 11  •  methylcellulose, Laxative, (CITRUCEL) 500 MG tablet tablet, Take 2 tablets by mouth Every Morning., Disp: , Rfl:   •  mirtazapine (REMERON) 15 MG tablet, TAKE ONE TABLET BY MOUTH EVERY NIGHT AT BEDTIME, Disp: 30 tablet, Rfl: 2    Current Facility-Administered Medications:   •  cyanocobalamin injection 1,000 mcg, 1,000 mcg, Intramuscular, Q28 Days, Sharad Salinas MD, 1,000 mcg at 10/09/17 1143    Review of Systems   Constitutional: Negative.    Neurological: Positive for seizures. Negative for dizziness, tremors, syncope, facial asymmetry, speech difficulty, weakness, light-headedness, numbness and headaches.   Psychiatric/Behavioral: Negative.         Objective:    Neurologic Exam  Mental status examination was appropriate.  Funduscopy, visual fields, eye movements and pupillary reflexes were normal.  No facial weakness was noted.  In a wheelchair today.  No pattern of focal weakness was noted.  Physical Exam    Assessment/Plan:     Kim was seen today for seizures.    Diagnoses and all orders for this visit:    Generalized convulsive seizures (CMS/HCC)    Other orders  -     levETIRAcetam (KEPPRA) 750 MG tablet; Take 1 tablet by mouth 2 (Two) Times a Day.         Is not clear whether the episode she had was a seizure or perhaps a prolonged vagal episode.  She does have a cardiac pacemaker.  She has been somewhat confused on her current dose of Keppra.  Therefore of lower Keppra to 750 mg twice daily.  I plan to see her back in the office in one year or as needed. Thank you for allowing me to share in the care of this patient.  Arsalan Velez M.D.

## 2018-11-13 ENCOUNTER — APPOINTMENT (OUTPATIENT)
Dept: GENERAL RADIOLOGY | Facility: HOSPITAL | Age: 83
End: 2018-11-13

## 2018-11-13 ENCOUNTER — HOSPITAL ENCOUNTER (INPATIENT)
Facility: HOSPITAL | Age: 83
LOS: 8 days | Discharge: NURSING, REHAB OR LONG-TERM CARE | End: 2018-11-21
Attending: EMERGENCY MEDICINE | Admitting: HOSPITALIST

## 2018-11-13 ENCOUNTER — APPOINTMENT (OUTPATIENT)
Dept: CT IMAGING | Facility: HOSPITAL | Age: 83
End: 2018-11-13

## 2018-11-13 ENCOUNTER — APPOINTMENT (OUTPATIENT)
Dept: GENERAL RADIOLOGY | Facility: HOSPITAL | Age: 83
End: 2018-11-13
Attending: HOSPITALIST

## 2018-11-13 DIAGNOSIS — R26.89 DECREASED MOBILITY: ICD-10-CM

## 2018-11-13 DIAGNOSIS — R41.82 ALTERED MENTAL STATUS, UNSPECIFIED ALTERED MENTAL STATUS TYPE: ICD-10-CM

## 2018-11-13 DIAGNOSIS — S82.851A CLOSED TRIMALLEOLAR FRACTURE OF RIGHT ANKLE, INITIAL ENCOUNTER: Primary | ICD-10-CM
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
## 2018-11-13 LAB
ALBUMIN SERPL-MCNC: 3.9 G/DL (ref 3.5–5.2)
ALBUMIN/GLOB SERPL: 1.1 G/DL
ALP SERPL-CCNC: 62 U/L (ref 39–117)
ALT SERPL W P-5'-P-CCNC: 12 U/L (ref 1–33)
AMPHET+METHAMPHET UR QL: NEGATIVE
ANION GAP SERPL CALCULATED.3IONS-SCNC: 12.8 MMOL/L
AST SERPL-CCNC: 17 U/L (ref 1–32)
BARBITURATES UR QL SCN: NEGATIVE
BASOPHILS # BLD AUTO: 0.04 10*3/MM3 (ref 0–0.2)
BASOPHILS NFR BLD AUTO: 0.6 % (ref 0–1.5)
BENZODIAZ UR QL SCN: NEGATIVE
BILIRUB SERPL-MCNC: 0.7 MG/DL (ref 0.1–1.2)
BILIRUB UR QL STRIP: NEGATIVE
BUN BLD-MCNC: 13 MG/DL (ref 8–23)
BUN/CREAT SERPL: 16.3 (ref 7–25)
CALCIUM SPEC-SCNC: 9.2 MG/DL (ref 8.6–10.5)
CANNABINOIDS SERPL QL: NEGATIVE
CHLORIDE SERPL-SCNC: 93 MMOL/L (ref 98–107)
CK SERPL-CCNC: 62 U/L (ref 20–180)
CLARITY UR: CLEAR
CO2 SERPL-SCNC: 27.2 MMOL/L (ref 22–29)
COCAINE UR QL: NEGATIVE
COLOR UR: YELLOW
CREAT BLD-MCNC: 0.8 MG/DL (ref 0.57–1)
DEPRECATED RDW RBC AUTO: 45 FL (ref 37–54)
EOSINOPHIL # BLD AUTO: 0.22 10*3/MM3 (ref 0–0.7)
EOSINOPHIL NFR BLD AUTO: 3.2 % (ref 0.3–6.2)
ERYTHROCYTE [DISTWIDTH] IN BLOOD BY AUTOMATED COUNT: 11.9 % (ref 11.7–13)
ETHANOL BLD-MCNC: <10 MG/DL (ref 0–10)
ETHANOL UR QL: <0.01 %
GFR SERPL CREATININE-BSD FRML MDRD: 68 ML/MIN/1.73
GLOBULIN UR ELPH-MCNC: 3.5 GM/DL
GLUCOSE BLD-MCNC: 97 MG/DL (ref 65–99)
GLUCOSE BLDC GLUCOMTR-MCNC: 93 MG/DL (ref 70–130)
GLUCOSE UR STRIP-MCNC: NEGATIVE MG/DL
HCT VFR BLD AUTO: 35.7 % (ref 35.6–45.5)
HGB BLD-MCNC: 12 G/DL (ref 11.9–15.5)
HGB UR QL STRIP.AUTO: NEGATIVE
IMM GRANULOCYTES # BLD: 0 10*3/MM3 (ref 0–0.03)
IMM GRANULOCYTES NFR BLD: 0 % (ref 0–0.5)
INR PPP: 1.07 (ref 0.9–1.1)
KETONES UR QL STRIP: NEGATIVE
LEUKOCYTE ESTERASE UR QL STRIP.AUTO: NEGATIVE
LYMPHOCYTES # BLD AUTO: 1.45 10*3/MM3 (ref 0.9–4.8)
LYMPHOCYTES NFR BLD AUTO: 20.8 % (ref 19.6–45.3)
MAGNESIUM SERPL-MCNC: 2 MG/DL (ref 1.6–2.4)
MCH RBC QN AUTO: 34.5 PG (ref 26.9–32)
MCHC RBC AUTO-ENTMCNC: 33.6 G/DL (ref 32.4–36.3)
MCV RBC AUTO: 102.6 FL (ref 80.5–98.2)
METHADONE UR QL SCN: NEGATIVE
MONOCYTES # BLD AUTO: 0.67 10*3/MM3 (ref 0.2–1.2)
MONOCYTES NFR BLD AUTO: 9.6 % (ref 5–12)
NEUTROPHILS # BLD AUTO: 4.6 10*3/MM3 (ref 1.9–8.1)
NEUTROPHILS NFR BLD AUTO: 65.8 % (ref 42.7–76)
NITRITE UR QL STRIP: NEGATIVE
OPIATES UR QL: NEGATIVE
OXYCODONE UR QL SCN: NEGATIVE
PH UR STRIP.AUTO: 8 [PH] (ref 5–8)
PLATELET # BLD AUTO: 188 10*3/MM3 (ref 140–500)
PMV BLD AUTO: 8.9 FL (ref 6–12)
POTASSIUM BLD-SCNC: 4.3 MMOL/L (ref 3.5–5.2)
PROT SERPL-MCNC: 7.4 G/DL (ref 6–8.5)
PROT UR QL STRIP: NEGATIVE
PROTHROMBIN TIME: 13.7 SECONDS (ref 11.7–14.2)
RBC # BLD AUTO: 3.48 10*6/MM3 (ref 3.9–5.2)
SODIUM BLD-SCNC: 133 MMOL/L (ref 136–145)
SP GR UR STRIP: 1.01 (ref 1–1.03)
TROPONIN T SERPL-MCNC: <0.01 NG/ML (ref 0–0.03)
UROBILINOGEN UR QL STRIP: NORMAL
WBC NRBC COR # BLD: 6.98 10*3/MM3 (ref 4.5–10.7)

## 2018-11-13 PROCEDURE — 73590 X-RAY EXAM OF LOWER LEG: CPT

## 2018-11-13 PROCEDURE — 80307 DRUG TEST PRSMV CHEM ANLYZR: CPT | Performed by: EMERGENCY MEDICINE

## 2018-11-13 PROCEDURE — 73600 X-RAY EXAM OF ANKLE: CPT

## 2018-11-13 PROCEDURE — 84484 ASSAY OF TROPONIN QUANT: CPT | Performed by: EMERGENCY MEDICINE

## 2018-11-13 PROCEDURE — 70450 CT HEAD/BRAIN W/O DYE: CPT

## 2018-11-13 PROCEDURE — 25010000002 MORPHINE PER 10 MG: Performed by: HOSPITALIST

## 2018-11-13 PROCEDURE — 85610 PROTHROMBIN TIME: CPT | Performed by: EMERGENCY MEDICINE

## 2018-11-13 PROCEDURE — 83735 ASSAY OF MAGNESIUM: CPT | Performed by: EMERGENCY MEDICINE

## 2018-11-13 PROCEDURE — 93005 ELECTROCARDIOGRAM TRACING: CPT | Performed by: EMERGENCY MEDICINE

## 2018-11-13 PROCEDURE — 82962 GLUCOSE BLOOD TEST: CPT

## 2018-11-13 PROCEDURE — 81003 URINALYSIS AUTO W/O SCOPE: CPT | Performed by: EMERGENCY MEDICINE

## 2018-11-13 PROCEDURE — 93010 ELECTROCARDIOGRAM REPORT: CPT | Performed by: INTERNAL MEDICINE

## 2018-11-13 PROCEDURE — 80053 COMPREHEN METABOLIC PANEL: CPT | Performed by: EMERGENCY MEDICINE

## 2018-11-13 PROCEDURE — 73502 X-RAY EXAM HIP UNI 2-3 VIEWS: CPT

## 2018-11-13 PROCEDURE — 93005 ELECTROCARDIOGRAM TRACING: CPT | Performed by: HOSPITALIST

## 2018-11-13 PROCEDURE — 71046 X-RAY EXAM CHEST 2 VIEWS: CPT

## 2018-11-13 PROCEDURE — 82550 ASSAY OF CK (CPK): CPT | Performed by: EMERGENCY MEDICINE

## 2018-11-13 PROCEDURE — 25010000002 MORPHINE PER 10 MG: Performed by: EMERGENCY MEDICINE

## 2018-11-13 PROCEDURE — 99285 EMERGENCY DEPT VISIT HI MDM: CPT

## 2018-11-13 PROCEDURE — 25010000002 ONDANSETRON PER 1 MG: Performed by: EMERGENCY MEDICINE

## 2018-11-13 PROCEDURE — 85025 COMPLETE CBC W/AUTO DIFF WBC: CPT | Performed by: EMERGENCY MEDICINE

## 2018-11-13 PROCEDURE — 72125 CT NECK SPINE W/O DYE: CPT

## 2018-11-13 RX ORDER — MORPHINE SULFATE 2 MG/ML
1 INJECTION, SOLUTION INTRAMUSCULAR; INTRAVENOUS ONCE
Status: COMPLETED | OUTPATIENT
Start: 2018-11-13 | End: 2018-11-13

## 2018-11-13 RX ORDER — DOCUSATE SODIUM 250 MG
250 CAPSULE ORAL EVERY MORNING
Status: ON HOLD | COMMUNITY
End: 2019-01-28

## 2018-11-13 RX ORDER — ONDANSETRON 2 MG/ML
4 INJECTION INTRAMUSCULAR; INTRAVENOUS EVERY 4 HOURS PRN
Status: DISCONTINUED | OUTPATIENT
Start: 2018-11-13 | End: 2018-11-21

## 2018-11-13 RX ORDER — ONDANSETRON 2 MG/ML
4 INJECTION INTRAMUSCULAR; INTRAVENOUS ONCE
Status: COMPLETED | OUTPATIENT
Start: 2018-11-13 | End: 2018-11-13

## 2018-11-13 RX ORDER — GABAPENTIN 300 MG/1
600 CAPSULE ORAL 3 TIMES DAILY
Status: DISCONTINUED | OUTPATIENT
Start: 2018-11-13 | End: 2018-11-21 | Stop reason: HOSPADM

## 2018-11-13 RX ORDER — SODIUM CHLORIDE 0.9 % (FLUSH) 0.9 %
10 SYRINGE (ML) INJECTION AS NEEDED
Status: DISCONTINUED | OUTPATIENT
Start: 2018-11-13 | End: 2018-11-21 | Stop reason: HOSPADM

## 2018-11-13 RX ORDER — CALCIUM CARBONATE 200(500)MG
1 TABLET,CHEWABLE ORAL 3 TIMES DAILY PRN
COMMUNITY
End: 2019-02-02 | Stop reason: HOSPADM

## 2018-11-13 RX ORDER — MINERAL OIL AND WHITE PETROLATUM 150; 830 MG/G; MG/G
1 OINTMENT OPHTHALMIC
COMMUNITY
End: 2020-01-01 | Stop reason: HOSPADM

## 2018-11-13 RX ORDER — LANSOPRAZOLE 30 MG/1
30 CAPSULE, DELAYED RELEASE ORAL DAILY
Status: ON HOLD | COMMUNITY
End: 2019-01-28

## 2018-11-13 RX ORDER — DOCUSATE SODIUM 250 MG
250 CAPSULE ORAL NIGHTLY PRN
COMMUNITY
End: 2018-11-21 | Stop reason: HOSPADM

## 2018-11-13 RX ORDER — PANTOPRAZOLE SODIUM 40 MG/1
40 TABLET, DELAYED RELEASE ORAL
Status: DISCONTINUED | OUTPATIENT
Start: 2018-11-14 | End: 2018-11-21 | Stop reason: HOSPADM

## 2018-11-13 RX ORDER — MORPHINE SULFATE 2 MG/ML
1 INJECTION, SOLUTION INTRAMUSCULAR; INTRAVENOUS EVERY 4 HOURS PRN
Status: DISCONTINUED | OUTPATIENT
Start: 2018-11-13 | End: 2018-11-21

## 2018-11-13 RX ORDER — ACETAMINOPHEN 325 MG/1
650 TABLET ORAL EVERY 6 HOURS PRN
COMMUNITY
End: 2018-11-21 | Stop reason: HOSPADM

## 2018-11-13 RX ORDER — CLOPIDOGREL BISULFATE 75 MG/1
75 TABLET ORAL DAILY
COMMUNITY
End: 2020-01-01

## 2018-11-13 RX ORDER — VITAMIN B COMPLEX
1 CAPSULE ORAL EVERY MORNING
COMMUNITY
End: 2019-04-03

## 2018-11-13 RX ORDER — MORPHINE SULFATE 2 MG/ML
2 INJECTION, SOLUTION INTRAMUSCULAR; INTRAVENOUS EVERY 4 HOURS PRN
Status: DISCONTINUED | OUTPATIENT
Start: 2018-11-23 | End: 2018-11-21

## 2018-11-13 RX ORDER — MIRTAZAPINE 15 MG/1
15 TABLET, FILM COATED ORAL NIGHTLY
Status: ON HOLD | COMMUNITY
End: 2020-01-01

## 2018-11-13 RX ADMIN — LEVETIRACETAM 750 MG: 250 TABLET, FILM COATED ORAL at 21:26

## 2018-11-13 RX ADMIN — MORPHINE SULFATE 1 MG: 2 INJECTION, SOLUTION INTRAMUSCULAR; INTRAVENOUS at 16:34

## 2018-11-13 RX ADMIN — SODIUM CHLORIDE 250 ML: 9 INJECTION, SOLUTION INTRAVENOUS at 23:54

## 2018-11-13 RX ADMIN — GABAPENTIN 600 MG: 300 CAPSULE ORAL at 21:26

## 2018-11-13 RX ADMIN — MORPHINE SULFATE 1 MG: 2 INJECTION, SOLUTION INTRAMUSCULAR; INTRAVENOUS at 16:08

## 2018-11-13 RX ADMIN — ONDANSETRON 4 MG: 2 INJECTION INTRAMUSCULAR; INTRAVENOUS at 16:05

## 2018-11-13 RX ADMIN — MORPHINE SULFATE 1 MG: 2 INJECTION, SOLUTION INTRAMUSCULAR; INTRAVENOUS at 21:35

## 2018-11-13 NOTE — ED TRIAGE NOTES
Call from [facility]. Pt being transported via EMS for unwitnessed fall today in bathroom. Pt hit head, twisted R ankle and per nurse on phone pt has external rotation to R leg.  Pt is on Plavix. Jose LOC. Nurse on phone states she heard the fall and pt immediately called out for help, but nurse did not see the fall.

## 2018-11-13 NOTE — ED PROVIDER NOTES
Splint Application  Time: 1628  Location: [facility]   Splint Type: posterior orthoglass splint  The splinted body part was neurovascularly unchanged and is in good alignment following the procedure.  Patient tolerated the procedure well with no immediate complications.    Documentation assistance provided by smita Stoddard for JONA Frederick. Information recorded by the scribe was done at my direction and has been verified and validated by me.        Benito Stoddard  11/13/18 8225       Marilyn Frederick, JONA  11/13/18 [id number]

## 2018-11-13 NOTE — PROGRESS NOTES
Clinical Pharmacy Services: Medication History    Kim Feldman is a 88 y.o. female presenting to [facility] for   Chief Complaint   Patient presents with   • Fall       She  has a past medical history of Anemia, Bulging lumbar disc, Chronic cough, Chronic UTI, Chronic venous insufficiency, 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HCC), DDD (degenerative disc disease), lumbosacral, Dementia without behavioral disturbance, Depression, endogenous (CMS/HCC), Disc degeneration, lumbar, Dysphagia, GERD (gastroesophageal reflux disease), Hiatal hernia, History of anxiety, History of aspiration pneumonitis, History of cerebral artery occlusion, History of herpes zoster, History of ingrowing nail, History of osteoporosis, History of poliomyelitis, History of sciatica, History of sick sinus syndrome, History of transient cerebral ischemia, History of Zenker's diverticulum removal (2016), long term anticoagulant use, Hyperlipidemia, Hypertension, Hyponatremia, Intertrigo, Lumbar radiculopathy, Morbid obesity (CMS/HCC), Neuralgia, Nonepileptic episode (CMS/[facility]), Osteoarthritis of right knee, Pacemaker, Pneumonia, Seeing double, SIADH (syndrome of inappropriate ADH production) (CMS/[facility]), Vitamin D deficiency, and Zenker's diverticulum.    Allergies as of 11/13/2018 - Reviewed 11/13/2018   Allergen Reaction Noted   • Lipitor [atorvastatin] Confusion 03/08/2018   • Penicillins Hives 01/05/2016       Medication information was obtained from: Nursing home  Pharmacy and Phone Number:     Prior to Admission Medications     Prescriptions Last Dose Informant Patient Reported? Taking?    acetaminophen (TYLENOL) 325 MG tablet  Nursing Home Yes Yes    Take 650 mg by mouth Every 6 (Six) Hours As Needed for Mild Pain .    artificial tears (LUBRIFRESH P.M.) ointment ophthalmic ointment  Nursing Home Yes Yes    Administer 1 application to both eyes every night at bedtime.    B Complex-Biotin-FA (SUPER B-50 COMPLEX) capsule  Nursing  Home Yes Yes    Take 1 capsule by mouth Every Morning.    calcium carbonate (TUMS) 500 MG chewable tablet  Nursing Home Yes Yes    Chew 1-2 tablets 3 (Three) Times a Day As Needed for Indigestion or Heartburn.    Calcium-Vitamin D-Vitamin K (VIACTIV) 500-500-40 MG-UNT-MCG chewable tablet  Nursing Home Yes Yes    Chew 1 tablet Daily.    clopidogrel (PLAVIX) 75 MG tablet  Nursing Home Yes Yes    Take 75 mg by mouth Daily.    diclofenac (VOLTAREN) 1 % gel gel  Nursing Home Yes Yes    Apply 4 g topically to the appropriate area as directed 2 (Two) Times a Day As Needed (Apply to bilateral knees). HOLD PRIOR TO SURGERY    docusate sodium (COLACE) 250 MG capsule  Nursing Home Yes Yes    Take 250 mg by mouth Every Morning.    docusate sodium (COLACE) 250 MG capsule  Nursing Home Yes Yes    Take 250 mg by mouth At Night As Needed for Constipation.    escitalopram (LEXAPRO) 20 MG tablet  Nursing Home Yes Yes    Take 20 mg by mouth Daily.    gabapentin (NEURONTIN) 300 MG capsule  Nursing Home No Yes    Take 2 capsules by mouth 3 (Three) Times a Day.    ketoconazole (NIZORAL) 2 % cream  Nursing Home Yes Yes    Apply 1 application topically to the appropriate area as directed 2 (Two) Times a Day. Apply to abdominal folds    lansoprazole (PREVACID) 30 MG capsule  Nursing Home Yes Yes    Take 30 mg by mouth Daily.    levETIRAcetam (KEPPRA) 750 MG tablet  Nursing Home No Yes    Take 1 tablet by mouth 2 (Two) Times a Day.    methylcellulose, Laxative, (CITRUCEL) 500 MG tablet tablet  Nursing Home Yes Yes    Take 2 tablets by mouth Every Morning.    mineral oil-hydrophilic petrolatum (AQUAPHOR) ointment  Nursing Home Yes Yes    Apply 1 application topically to the appropriate area as directed 1 (One) Time Per Week. On Monday after shower    mirtazapine (REMERON) 15 MG tablet  Nursing Home Yes Yes    Take 15 mg by mouth Every Night.    cyanocobalamin injection 1,000 mcg   No No            Medication notes: None    This medication  list is complete to the best of my knowledge as of 11/13/2018    Please call if questions.    Hiral Reynolds, Medication History Technician  11/13/2018 4:20 PM

## 2018-11-13 NOTE — ED PROVIDER NOTES
EMERGENCY DEPARTMENT ENCOUNTER    CHIEF COMPLAINT  Chief Complaint: fall  History given by: patient/daughter  History limited by: n/a  Room Number: 31/31  PMD: Herrera Nelson MD      HPI:  Pt is a 88 y.o. female who presents from [facility] at [facility] after an unwitnessed fall and blow to the head which occurred around 1230 today while the pt was in the restroom.  It was presumed by NH staff the pt was pulling up her undergarments when she fell, but pt cannot remember what happened.  Daughter reports pt was unable to get up on her own after the fall and pt normally uses a walker to ambulate.  Pt c/o HA, unsure of chronicity, R hip pain and R ankle pain, but denies neck pain, abd pain, or incontinence.  Daughter reports pt has hx of seizures controlled by Keppra and hx of multiple falls. Daughter reports pt is baseline A+Ox3 and she just noticed disorientation today, however NH reports notes pt has moderate dementia.  Pt anticoagulated on plavix.    Duration:  1 hour  Onset: gradual  Timing: constant  Location: generalized  Radiation: none  Quality: fall  Intensity/Severity: moderate  Progression: unchanged  Associated Symptoms: HA, unsure of chronicity, R hip pain and R ankle pain, increased confusion (disorientation)  Aggravating Factors: none stated  Alleviating Factors: none stated  Previous Episodes: Daughter reports pt has hx of multiple falls.  Treatment before arrival: Pt anticoagulated on plavix and on keppra for hx of seizures.    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   • Gastroesophageal reflux disease 02/05/2016   • Hyperlipidemia 02/05/2016   • Intermittent claudication (CMS/Formerly [facility]) 02/05/2016   • Neuropathy 02/05/2016   • Osteoarthritis of knee 02/05/2016   • Syndrome of inappropriate secretion of antidiuretic hormone (CMS/Formerly [facility]) 02/05/2016   • Vitamin D deficiency  02/05/2016   • History of sick sinus syndrome    • Intertrigo 03/25/2016   • Generalized convulsive seizures (CMS/Formerly [facility]) 04/07/2016   • Change in bowel habits 05/20/2016   • 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   • History of anxiety 10/18/2016   • 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Formerly [facility]) 01/04/2017   • Thrombocytopenia (CMS/Formerly [facility]) 01/05/2017   • B12 deficiency 01/16/2017   • Pain of toe of right foot 01/16/2017   • Weakness 02/03/2017   • Cardiac pacemaker in situ 03/08/2017   • Chronic venous insufficiency 03/09/2017   • Arthritis 03/09/2017   • S/P knee replacement 03/09/2017   • Chronic bilateral low back pain without sciatica 03/09/2017   • Essential hypertension 03/09/2017   • Hiatal hernia 03/16/2017   • Zenker's diverticulum 04/14/2017   • Chronic idiopathic constipation 05/19/2017   • Pressure sore on buttocks 05/19/2017   • Somnolence 12/13/2017     Resolved Ambulatory Problems     Diagnosis Date Noted   • Hyponatremia 02/05/2016   • Urine frequency 05/20/2016   • Urinary tract infection 07/08/2016   • Pneumonia 07/08/2016   • Aspiration pneumonia (CMS/Formerly [facility]) 07/17/2016   • Urinary tract infection in female 01/03/2017   • History of aspiration pneumonitis 01/04/2017   • Right lower lobe pneumonia (CMS/Former[facility]) 02/02/2017   • Sepsis due to pneumonia (CMS/Former[facility]) 02/02/2017   • UTI (urinary tract infection) 03/15/2017   • Acute kidney injury (CMS/Former[facility]) 03/17/2017   • Acute vaginitis 03/23/2017   • Acute pain of left knee 05/19/2017     Past Medical History:   Diagnosis Date   • Anemia    • Bulging lumbar disc    • Chronic cough    • Chronic UTI    • Chronic venous insufficiency    • 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Former[facility])    • DDD (degenerative disc disease), lumbosacral    • Dementia without behavioral disturbance    • Depression, endogenous (CMS/Former[facility])    • Disc degeneration, lumbar    • Dysphagia    • GERD (gastroesophageal reflux disease)    • Hiatal hernia    • History of anxiety    • History of aspiration pneumonitis    • History of  cerebral artery occlusion    • History of herpes zoster    • History of ingrowing nail    • History of osteoporosis    • History of poliomyelitis    • History of sciatica    • History of sick sinus syndrome    • History of transient cerebral ischemia    • History of Zenker's diverticulum removal 2016   • HX: long term anticoagulant use    • Hyperlipidemia    • Hypertension    • Hyponatremia    • Intertrigo    • Lumbar radiculopathy    • Morbid obesity (CMS/HCC)    • Neuralgia    • Nonepileptic episode (CMS/HCC)    • Osteoarthritis of right knee    • Pacemaker    • Pneumonia    • Seeing double    • 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)    • Vitamin D deficiency    • Zenker's diverticulum        PAST SURGICAL HISTORY  Past Surgical History:   Procedure Laterality Date   • CARDIAC PACEMAKER PLACEMENT      secondary to SSS, placed in 2004, with generator change in 2014   • CATARACT EXTRACTION W/ INTRAOCULAR LENS IMPLANT Bilateral    • COLONOSCOPY     • HAND SURGERY Right    • KNEE ARTHROPLASTY Left    • LAMINECTOMY FOR IMPLANTATION / PLACEMENT NEUROSTIMULATOR ELECTRODES     • LUMBAR LAMINECTOMY      L-3 L4 L4 L5   • TONSILLECTOMY         FAMILY HISTORY  Family History   Problem Relation Age of Onset   • Cancer Daughter        SOCIAL HISTORY  Social History     Socioeconomic History   • Marital status:      Spouse name: Not on file   • Number of children: 3   • Years of education: Not on file   • Highest education level: Not on file   Social Needs   • Financial resource strain: Not on file   • Food insecurity - worry: Not on file   • Food insecurity - inability: Not on file   • Transportation needs - medical: Not on file   • Transportation needs - non-medical: Not on file   Occupational History   • Occupation: Retired   Tobacco Use   • Smoking status: Former Smoker     Packs/day: 1.00     Years: 40.00     Pack years: 40.00     Types: Cigarettes     Last attempt to quit: 1992     Years since  quittin.8   • Smokeless tobacco: Never Used   • Tobacco comment: caffeine use: one cup of coffee in the morning.    Substance and Sexual Activity   • Alcohol use: Yes     Alcohol/week: 0.6 oz     Types: 1 Shots of liquor per week     Comment: 1-2 DRINKS DAILY; WEAK IN STRENGTH   • Drug use: No   • Sexual activity: Defer   Other Topics Concern   • Not on file   Social History Narrative   • Not on file       ALLERGIES  Lipitor [atorvastatin] and Penicillins    REVIEW OF SYSTEMS  Review of Systems   Constitutional: Negative for fever.   HENT: Negative for sore throat.    Eyes: Negative.    Respiratory: Negative for cough and shortness of breath.    Cardiovascular: Negative for chest pain.   Gastrointestinal: Negative for abdominal pain, diarrhea and vomiting.   Genitourinary: Negative for dysuria and enuresis.   Musculoskeletal: Negative for neck pain.   Skin: Negative for rash.   Allergic/Immunologic: Negative.    Neurological: Positive for headaches (unsure of chronicity). Negative for weakness and numbness.   Hematological: Negative.    Psychiatric/Behavioral: Positive for confusion (per daughter pt normally oriented, disoriented starting today).   All other systems reviewed and are negative.      PHYSICAL EXAM  ED Triage Vitals [18 1254]   Temp Heart Rate Resp BP SpO2   -- 80 18 (!) 189/102 96 %      Temp src Heart Rate Source Patient Position BP Location FiO2 (%)   -- -- -- -- --       Physical Exam   Constitutional: No distress.   HENT:   Head: Normocephalic and atraumatic.   No notable bite alycia to tongue or lip   Eyes: EOM are normal. Pupils are equal, round, and reactive to light.   Neck: Normal range of motion. Neck supple.   Cardiovascular: Normal rate, regular rhythm and normal heart sounds.   HR 70s   Pulmonary/Chest: Effort normal and breath sounds normal. No respiratory distress.   Abdominal: Soft. There is no tenderness. There is no rebound and no guarding.   Musculoskeletal: Normal range of  motion. She exhibits edema (2+ edema to feet and ankles bilaterally and increased at the right ankle at the site of the injury.).   Pt moves all extremities.  Diffuse contusion and ecchymosis to R ankle.  He has no cyanosis or pallor to the toes and is motor and sensory intact to the right foot and toes.   Neurological: She is alert. She has normal sensation and normal strength. No cranial nerve deficit. GCS score is 15.   Appears to be chronically demented, but daughter states she is normally A+Ox3    Skin: Skin is warm and dry. No rash noted.   Psychiatric: Mood and affect normal.   Nursing note and vitals reviewed.      LAB RESULTS  Lab Results (last 24 hours)     Procedure Component Value Units Date/Time    POC Glucose Once [[id number]]  (Normal) Collected:  11/13/18 1341    Specimen:  Blood Updated:  11/13/18 1358     Glucose 93 mg/dL     CBC & Differential [[id number]] Collected:  11/13/18 1359    Specimen:  Blood Updated:  11/13/18 1425    Narrative:       The following orders were created for panel order CBC & Differential.  Procedure                               Abnormality         Status                     ---------                               -----------         ------                     CBC Auto Differential[[id number]]        Abnormal            Final result                 Please view results for these tests on the individual orders.    Comprehensive Metabolic Panel [[id number]]  (Abnormal) Collected:  11/13/18 1359    Specimen:  Blood Updated:  11/13/18 1450     Glucose 97 mg/dL      BUN 13 mg/dL      Creatinine 0.80 mg/dL      Sodium 133 mmol/L      Potassium 4.3 mmol/L      Chloride 93 mmol/L      CO2 27.2 mmol/L      Calcium 9.2 mg/dL      Total Protein 7.4 g/dL      Albumin 3.90 g/dL      ALT (SGPT) 12 U/L      AST (SGOT) 17 U/L      Alkaline Phosphatase 62 U/L      Total Bilirubin 0.7 mg/dL      eGFR Non African Amer 68 mL/min/1.73      Globulin 3.5 gm/dL      A/G Ratio 1.1 g/dL       BUN/Creatinine Ratio 16.3     Anion Gap 12.8 mmol/L     Narrative:       The MDRD GFR formula is only valid for adults with stable renal function between ages 18 and 70.    Protime-INR [[id number]]  (Normal) Collected:  11/13/18 1359    Specimen:  Blood Updated:  11/13/18 1442     Protime 13.7 Seconds      INR 1.07    Troponin [[id number]]  (Normal) Collected:  11/13/18 1359    Specimen:  Blood Updated:  11/13/18 1449     Troponin T <0.010 ng/mL     Narrative:       Troponin T Reference Ranges:  Less than 0.03 ng/mL:    Negative for AMI  0.03 to 0.09 ng/mL:      Indeterminant for AMI  Greater than 0.09 ng/mL: Positive for AMI    Urinalysis With Microscopic If Indicated (No Culture) - Urine, Catheter [[id number]]  (Normal) Collected:  11/13/18 1359    Specimen:  Urine, Catheter Updated:  11/13/18 1430     Color, UA Yellow     Appearance, UA Clear     pH, UA 8.0     Specific Gravity, UA 1.014     Glucose, UA Negative     Ketones, UA Negative     Bilirubin, UA Negative     Blood, UA Negative     Protein, UA Negative     Leuk Esterase, UA Negative     Nitrite, UA Negative     Urobilinogen, UA 0.2 E.U./dL    Narrative:       Urine microscopic not indicated.    Magnesium [[id number]]  (Normal) Collected:  11/13/18 1359    Specimen:  Blood Updated:  11/13/18 1450     Magnesium 2.0 mg/dL     CK [[id number]]  (Normal) Collected:  11/13/18 1359    Specimen:  Blood Updated:  11/13/18 1450     Creatine Kinase 62 U/L     Ethanol [[id number]] Collected:  11/13/18 1359    Specimen:  Blood Updated:  11/13/18 1450     Ethanol <10 mg/dL      Ethanol % <0.010 %     CBC Auto Differential [[id number]]  (Abnormal) Collected:  11/13/18 1359    Specimen:  Blood Updated:  11/13/18 1425     WBC 6.98 10*3/mm3      RBC 3.48 10*6/mm3      Hemoglobin 12.0 g/dL      Hematocrit 35.7 %      .6 fL      MCH 34.5 pg      MCHC 33.6 g/dL      RDW 11.9 %      RDW-SD 45.0 fl      MPV 8.9 fL      Platelets 188 10*3/mm3      Neutrophil % 65.8 %       Lymphocyte % 20.8 %      Monocyte % 9.6 %      Eosinophil % 3.2 %      Basophil % 0.6 %      Immature Grans % 0.0 %      Neutrophils, Absolute 4.60 10*3/mm3      Lymphocytes, Absolute 1.45 10*3/mm3      Monocytes, Absolute 0.67 10*3/mm3      Eosinophils, Absolute 0.22 10*3/mm3      Basophils, Absolute 0.04 10*3/mm3      Immature Grans, Absolute 0.00 10*3/mm3     Urine Drug Screen - Urine, Catheter [[id number]]  (Normal) Collected:  11/13/18 1400    Specimen:  Urine, Catheter Updated:  11/13/18 1510     Amphet/Methamphet, Screen Negative     Barbiturates Screen, Urine Negative     Benzodiazepine Screen, Urine Negative     Cocaine Screen, Urine Negative     Opiate Screen Negative     THC, Screen, Urine Negative     Methadone Screen, Urine Negative     Oxycodone Screen, Urine Negative    Narrative:       Negative Thresholds For Drugs Screened:     Amphetamines               500 ng/ml   Barbiturates               200 ng/ml   Benzodiazepines            100 ng/ml   Cocaine                    300 ng/ml   Methadone                  300 ng/ml   Opiates                    300 ng/ml   Oxycodone                  100 ng/ml   THC                        50 ng/ml    The Normal Value for all drugs tested is negative. This report includes final unconfirmed screening results to be used for medical treatment purposes only. Unconfirmed results must not be used for non-medical purposes such as employment or legal testing. Clinical consideration should be applied to any drug of abuse test, particulary when unconfirmed results are used.          I ordered the above labs and reviewed the results    RADIOLOGY  XR Hip With or Without Pelvis 2 - 3 View Right   Final Result   No acute abnormality identified at the pelvis, the right hip, or around   the right knee or proximal lower leg. There is a comminuted right ankle   fracture with slight lateral subluxation of the talus and a comminuted,   mildly displaced distal fibular diametaphysis  fracture. There also   appears to be a large, mildly impacted fracture of the posterior   malleolus of the distal tibia.       This report was finalized on 11/13/2018 4:02 PM by Dr. Billy Red M.D.          XR Ankle 2 View Right   Final Result   No acute abnormality identified at the pelvis, the right hip, or around   the right knee or proximal lower leg. There is a comminuted right ankle   fracture with slight lateral subluxation of the talus and a comminuted,   mildly displaced distal fibular diametaphysis fracture. There also   appears to be a large, mildly impacted fracture of the posterior   malleolus of the distal tibia.       This report was finalized on 11/13/2018 4:02 PM by Dr. Billy Red M.D.          XR Tibia Fibula 2 View Right   Final Result   No acute abnormality identified at the pelvis, the right hip, or around   the right knee or proximal lower leg. There is a comminuted right ankle   fracture with slight lateral subluxation of the talus and a comminuted,   mildly displaced distal fibular diametaphysis fracture. There also   appears to be a large, mildly impacted fracture of the posterior   malleolus of the distal tibia.       This report was finalized on 11/13/2018 4:02 PM by Dr. Billy Red M.D.          CT Head Without Contrast   Preliminary Result   Small vessel ischemic disease and vascular calcifications   appreciated similar to 12/18/2017. There is no evidence of fracture or   of intracranial hemorrhage.       CT EXAMINATION OF THE CERVICAL SPINE WITHOUT CONTRAST: There is moderate   to severe loss of disc height from C4 to T1. There is a grade 1   anterolisthesis of C3 upon C4 estimated to be 2-3 mm and a grade 1   anterolisthesis of C4 upon C5 estimated to be 2-3 mm. There is fusion of   the facets bilaterally at C2-3.       C2-3: Moderate facet degenerative disease is present on the left.       C3-4: There is severe neuroforaminal compromise on the left secondary to    severe facet degenerative disease and moderate uncovertebral   degenerative disease. There is a mild broad-based disc osteophyte   complex with no evidence of herniation.       C4-5: There is mild broad-based disc osteophyte complex which is   slightly more prominent to the left. There is moderate neuroforaminal   compromise on the left secondary to uncovertebral degenerative disease   and facet degenerative disease.       C5-6: There is mild central disc osteophyte complex with no evidence of   herniation. There is mild to moderate neuroforaminal compromise on the   right secondary to uncovertebral degenerative disease.       C6-7: There is a mild broad-based disc osteophyte complex with no   evidence of herniation.       C7-T1: There is no evidence of disc bulge or herniation.       Note is made that the internal carotid arteries have a very medial   course and may mimic a submucosal mass. There is dense vascular   calcification involving the right internal carotid artery at the level   of C4-5 and likely severe stenosis.       There is no evidence of fracture.       IMPRESSION:    1. Multilevel degenerative disease involving the cervical spine as   described with no evidence of fracture. See above.   2. Medial course of the internal carotid arteries with vascular   calcification suggesting severe stenosis of the internal carotid artery   on the right.       The above information was called to and discussed with Dr. Bingham.       Radiation dose reduction techniques were utilized, including automated   exposure control and exposure modulation based on body size.                  CT Cervical Spine Without Contrast   Preliminary Result   Small vessel ischemic disease and vascular calcifications   appreciated similar to 12/18/2017. There is no evidence of fracture or   of intracranial hemorrhage.       CT EXAMINATION OF THE CERVICAL SPINE WITHOUT CONTRAST: There is moderate   to severe loss of disc height from C4  to T1. There is a grade 1   anterolisthesis of C3 upon C4 estimated to be 2-3 mm and a grade 1   anterolisthesis of C4 upon C5 estimated to be 2-3 mm. There is fusion of   the facets bilaterally at C2-3.       C2-3: Moderate facet degenerative disease is present on the left.       C3-4: There is severe neuroforaminal compromise on the left secondary to   severe facet degenerative disease and moderate uncovertebral   degenerative disease. There is a mild broad-based disc osteophyte   complex with no evidence of herniation.       C4-5: There is mild broad-based disc osteophyte complex which is   slightly more prominent to the left. There is moderate neuroforaminal   compromise on the left secondary to uncovertebral degenerative disease   and facet degenerative disease.       C5-6: There is mild central disc osteophyte complex with no evidence of   herniation. There is mild to moderate neuroforaminal compromise on the   right secondary to uncovertebral degenerative disease.       C6-7: There is a mild broad-based disc osteophyte complex with no   evidence of herniation.       C7-T1: There is no evidence of disc bulge or herniation.       Note is made that the internal carotid arteries have a very medial   course and may mimic a submucosal mass. There is dense vascular   calcification involving the right internal carotid artery at the level   of C4-5 and likely severe stenosis.       There is no evidence of fracture.       IMPRESSION:    1. Multilevel degenerative disease involving the cervical spine as   described with no evidence of fracture. See above.   2. Medial course of the internal carotid arteries with vascular   calcification suggesting severe stenosis of the internal carotid artery   on the right.       The above information was called to and discussed with Dr. Bingham.       Radiation dose reduction techniques were utilized, including automated   exposure control and exposure modulation based on body size.                        I ordered the above noted radiological studies. Interpreted by radiologist. Discussed with radiologist (Dr. Rodríguez). Reviewed by me in PACS.       PROCEDURES  Procedures    EKG          EKG time: 1335  Rhythm/Rate: SR, 75  P waves and AZ: 1st degree AV block  QRS, axis: narrow QRS, borderline LAD  ST and T waves: nonspecific changes    Interpreted Contemporaneously by me, independently viewed  unchanged compared to prior 12/2017      PROGRESS AND CONSULTS     1323  Ordered XR R Ankle, Tib/fib, hip, CT Head, CT C Spine. Ordered EtOH, URine Drug SCreen, Troponin, UA, Magnesium, CK, CMP,  Protime INR, POC Glucose, CBC, EKG.    1426  Witnessed pt's DNR.     1533  Placed call to ortho and LIPFALGUNI.    1536  Rechecked pt, who is resting comfortably.  Daughter states pt's confusion is improving.  Discussed with pt and daughter her CT Head, CT C Spine, and labs are unremarkable at this time, XR R Ankle shows trimalleolar fracture.  Notified plan for posterior splint placement and admission with consult from ortho. Pt understands and agrees with the plan, all questions answered.    1540  ORdered morphine for pt's pain and zofran for nausea.    1605  Discussed pt's case with Dr. Hill (ortho) who agrees to consult the pt.    1607  Discussed pt's case with Dr. Harper ([facility]) who agrees to admit the pt to tele.    1620  Ordered morphine for pt's pain control for splint placement.    1624  Pt's pain is controlled with morphine. Refer to splint procedure note in Natividad Frederick's note.  Pt tolerated well. Plan for admission at this time.  Notified her of conversations with darell Thomason, and DIO Dorado.    1634  Pt still in pain ordered 1mg additional morphine.  After posterior splint placed to the right ankle the patient is neurovascularly intact and is able to wiggle toes with no pallor or coolness to her toes on her right foot.  MEDICAL DECISION MAKING  Results were reviewed/discussed with the  patient and they were also made aware of online access. Pt also made aware that some labs, such as cultures, will not be resulted during ER visit and follow up with PMD is necessary.     MDM  Number of Diagnoses or Management Options     Amount and/or Complexity of Data Reviewed  Clinical lab tests: ordered and reviewed (Urine Drug Screen and EtOH negative.  UA negative.  INR 1.07.)  Tests in the radiology section of CPT®: ordered and reviewed (CT C Spine and CT Head shows degenerative changes, no acute fracture or abnormality, signs suggestive of RCA stenosis at level C4-C5. XR R Hip, Ankle, and Tib/fib show No acute abnormality identified at the pelvis, the right hip, or around the right knee or proximal lower leg. There is a comminuted right ankle fracture with slight lateral subluxation of the talus and a comminuted,  mildly displaced distal fibular diametaphysis fracture. There also appears to be a large, mildly impacted fracture of the posterior malleolus of the distal tibia.)  Tests in the medicine section of CPT®: ordered and reviewed (See EKG in procedure note.)  Discussion of test results with the performing providers: yes (Dr. Rodríguez (radiology))  Obtain history from someone other than the patient: yes (Daughter)  Discuss the patient with other providers: yes (Dr. Hill (ortho).  Dr. Harper ([facility]))  Independent visualization of images, tracings, or specimens: yes           DIAGNOSIS  Final diagnoses:   Closed trimalleolar fracture of right ankle, initial encounter   Fall, initial encounter   Altered mental status, unspecified altered mental status type, improving       DISPOSITION  ADMISSION    Discussed treatment plan and reason for admission with pt/family and admitting physician.  Pt/family voiced understanding of the plan for admission for further testing/treatment as needed.       Latest Documented Vital Signs:  As of 4:34 PM  BP- (!) 200/114 HR- 71 Temp- 97.7 °F (36.5 °C) (Oral) O2 sat-  95%    --  Documentation assistance provided by smita Jaramillo for Dr. Bingham.  Information recorded by the opheliaibsarahi was done at my direction and has been verified and validated by Cailin Beaver  11/13/18 1631       Cailin Jaramillo  11/13/18 1638       Fidel Bingham MD  11/13/18 [id number]

## 2018-11-14 PROCEDURE — 99222 1ST HOSP IP/OBS MODERATE 55: CPT | Performed by: PSYCHIATRY & NEUROLOGY

## 2018-11-14 RX ORDER — CALCIUM CARBONATE 500(1250)
1000 TABLET ORAL DAILY
Status: DISCONTINUED | OUTPATIENT
Start: 2018-11-14 | End: 2018-11-21 | Stop reason: HOSPADM

## 2018-11-14 RX ORDER — ESCITALOPRAM OXALATE 20 MG/1
20 TABLET ORAL DAILY
Status: DISCONTINUED | OUTPATIENT
Start: 2018-11-14 | End: 2018-11-14

## 2018-11-14 RX ORDER — MORPHINE SULFATE 2 MG/ML
1 INJECTION, SOLUTION INTRAMUSCULAR; INTRAVENOUS ONCE
Status: DISCONTINUED | OUTPATIENT
Start: 2018-11-14 | End: 2018-11-14

## 2018-11-14 RX ORDER — MIRTAZAPINE 15 MG/1
15 TABLET, FILM COATED ORAL NIGHTLY
Status: DISCONTINUED | OUTPATIENT
Start: 2018-11-14 | End: 2018-11-21 | Stop reason: HOSPADM

## 2018-11-14 RX ORDER — ESCITALOPRAM OXALATE 10 MG/1
10 TABLET ORAL NIGHTLY
Status: DISCONTINUED | OUTPATIENT
Start: 2018-11-14 | End: 2018-11-21 | Stop reason: HOSPADM

## 2018-11-14 RX ORDER — ONDANSETRON 2 MG/ML
4 INJECTION INTRAMUSCULAR; INTRAVENOUS ONCE
Status: DISCONTINUED | OUTPATIENT
Start: 2018-11-14 | End: 2018-11-14

## 2018-11-14 RX ORDER — SODIUM CHLORIDE 9 MG/ML
75 INJECTION, SOLUTION INTRAVENOUS CONTINUOUS
Status: DISCONTINUED | OUTPATIENT
Start: 2018-11-14 | End: 2018-11-15

## 2018-11-14 RX ORDER — HYDROCODONE BITARTRATE AND ACETAMINOPHEN 5; 325 MG/1; MG/1
1 TABLET ORAL EVERY 4 HOURS PRN
Status: DISCONTINUED | OUTPATIENT
Start: 2018-11-14 | End: 2018-11-21 | Stop reason: HOSPADM

## 2018-11-14 RX ORDER — ASPIRIN 81 MG/1
81 TABLET, CHEWABLE ORAL DAILY
Status: DISCONTINUED | OUTPATIENT
Start: 2018-11-14 | End: 2018-11-21 | Stop reason: HOSPADM

## 2018-11-14 RX ADMIN — HYDROCODONE BITARTRATE AND ACETAMINOPHEN 1 TABLET: 5; 325 TABLET ORAL at 08:19

## 2018-11-14 RX ADMIN — ESCITALOPRAM 10 MG: 10 TABLET, FILM COATED ORAL at 20:21

## 2018-11-14 RX ADMIN — SODIUM CHLORIDE 75 ML/HR: 9 INJECTION, SOLUTION INTRAVENOUS at 18:05

## 2018-11-14 RX ADMIN — GABAPENTIN 600 MG: 300 CAPSULE ORAL at 20:21

## 2018-11-14 RX ADMIN — CALCIUM 1000 MG: 500 TABLET ORAL at 14:43

## 2018-11-14 RX ADMIN — GABAPENTIN 600 MG: 300 CAPSULE ORAL at 08:10

## 2018-11-14 RX ADMIN — LEVETIRACETAM 750 MG: 250 TABLET, FILM COATED ORAL at 20:21

## 2018-11-14 RX ADMIN — PANTOPRAZOLE SODIUM 40 MG: 40 TABLET, DELAYED RELEASE ORAL at 06:03

## 2018-11-14 RX ADMIN — HYDROCODONE BITARTRATE AND ACETAMINOPHEN 1 TABLET: 5; 325 TABLET ORAL at 18:13

## 2018-11-14 RX ADMIN — HYDROCODONE BITARTRATE AND ACETAMINOPHEN 1 TABLET: 5; 325 TABLET ORAL at 01:29

## 2018-11-14 RX ADMIN — MIRTAZAPINE 15 MG: 15 TABLET, FILM COATED ORAL at 20:21

## 2018-11-14 RX ADMIN — GABAPENTIN 600 MG: 300 CAPSULE ORAL at 16:15

## 2018-11-14 RX ADMIN — DOCUSATE SODIUM 250 MG: 50 CAPSULE, LIQUID FILLED ORAL at 14:42

## 2018-11-14 RX ADMIN — Medication 81 MG: at 14:42

## 2018-11-14 RX ADMIN — LEVETIRACETAM 750 MG: 250 TABLET, FILM COATED ORAL at 08:10

## 2018-11-14 NOTE — NURSING NOTE
"Pt has only voided x's 1 today in brief. States ' I don't have feel like I have to pee.\" bladder scan results 93 ml. Paged Dr. Harper.  "

## 2018-11-14 NOTE — PROGRESS NOTES
Consult request received.  It would appear that surgery is not urgent.  Of note, she does not have CAD.  She simply has a pacemaker.    I will have it interrogated and I will see her in the AM of 11/15/18.

## 2018-11-14 NOTE — PLAN OF CARE
Problem: Fall Risk (Adult)  Goal: Identify Related Risk Factors and Signs and Symptoms  Outcome: Ongoing (interventions implemented as appropriate)    Goal: Absence of Fall  Outcome: Ongoing (interventions implemented as appropriate)      Problem: Patient Care Overview  Goal: Plan of Care Review  Outcome: Ongoing (interventions implemented as appropriate)   11/14/18 1623   OTHER   Outcome Summary bp low this am but daughter states normal for pt. plavix on hold for surgery Tues. Dr. Ruiz consulted r/t plavix on hold     Goal: Individualization and Mutuality  Outcome: Ongoing (interventions implemented as appropriate)    Goal: Discharge Needs Assessment  Outcome: Ongoing (interventions implemented as appropriate)    Goal: Interprofessional Rounds/Family Conf  Outcome: Ongoing (interventions implemented as appropriate)      Problem: Skin Injury Risk (Adult)  Goal: Identify Related Risk Factors and Signs and Symptoms  Outcome: Ongoing (interventions implemented as appropriate)    Goal: Skin Health and Integrity  Outcome: Ongoing (interventions implemented as appropriate)      Problem: Pain, Acute (Adult)  Goal: Identify Related Risk Factors and Signs and Symptoms  Outcome: Ongoing (interventions implemented as appropriate)    Goal: Acceptable Pain Control/Comfort Level  Outcome: Ongoing (interventions implemented as appropriate)

## 2018-11-14 NOTE — CONSULTS
Neurology Consult Note    Referring Provider: Dr. Harper  Reason for Consultation: seizure disorder    History of present illness:    The patient is a 88-year-old woman who is a resident of skilled nursing facility, admitted after an unwitnessed fall that caused a right ankle fracture.  Surgery not pursued urgently due to swelling.  Plans in place for surgery next Tuesday.    Neurology is consulted due to history of seizures.  Patient is on Keppra 750 mg twice a day.  History is obtained from patient and the daughter.  Seizures started 6-7 years ago with a generalized tonic-clonic seizure.  She has been on Keppra since that time.  She did have an episode 3-4 weeks ago when she was found in her chair at the facility poorly responsive.  There was no witnessed seizure.  It was increased from 500 mg twice a day to 750 mg twice a day at that time.  There have been no additional events.    Patient does recall falling in the bathroom and states that she lost her balance.     Past Medical History  Past Medical History:   Diagnosis Date   • Anemia    • Bulging lumbar disc    • Chronic cough    • Chronic UTI    • Chronic venous insufficiency    • 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HCC)    • DDD (degenerative disc disease), lumbosacral    • Dementia without behavioral disturbance     moderate   • Depression, endogenous (CMS/HCC)    • Disc degeneration, lumbar    • Dysphagia    • GERD (gastroesophageal reflux disease)    • Hiatal hernia    • History of anxiety    • History of aspiration pneumonitis    • History of cerebral artery occlusion     CVA (following TIA), 10/10, treated with tPA   • History of herpes zoster    • History of ingrowing nail    • History of osteoporosis    • History of poliomyelitis     child   • History of sciatica    • History of sick sinus syndrome     s/p PPM   • History of transient cerebral ischemia     followed by stroke in 10/2010. BIBI was normal.   • History of Zenker's diverticulum removal 2016   • HX:  long term anticoagulant use    • Hyperlipidemia    • Hypertension     NO CURRENT MEDICATION   • Hyponatremia    • Intertrigo    • Lumbar radiculopathy    • Morbid obesity (CMS/HCC)    • Neuralgia     with diplopia secondary to facial shingles   • Nonepileptic episode (CMS/HCC)    • Osteoarthritis of right knee    • Pacemaker    • Pneumonia    • Seeing double     secondary to shingles on the face also with neuralgia   • 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)    • Vitamin D deficiency    • Zenker's diverticulum        Past Surgical History  Past Surgical History:   Procedure Laterality Date   • CARDIAC PACEMAKER PLACEMENT      secondary to SSS, placed in 2004, with generator change in 2014   • CATARACT EXTRACTION W/ INTRAOCULAR LENS IMPLANT Bilateral    • COLONOSCOPY     • HAND SURGERY Right    • KNEE ARTHROPLASTY Left    • LAMINECTOMY FOR IMPLANTATION / PLACEMENT NEUROSTIMULATOR ELECTRODES     • LUMBAR LAMINECTOMY      L-3 L4 L4 L5   • TONSILLECTOMY         Allergies   Allergen Reactions   • Lipitor [Atorvastatin] Confusion   • Penicillins Hives     Has previously tolerated ceftriaxone       Hospital Medications (all)       Dose Frequency Start End    aspirin chewable tablet 81 mg 81 mg Daily 11/14/2018     Sig - Route: Chew 1 tablet Daily. - Oral    calcium carbonate (oyster shell) tablet 1,000 mg 1,000 mg Daily 11/14/2018     Sig - Route: Take 2 tablets by mouth Daily. - Oral    docusate sodium (COLACE) capsule 250 mg 250 mg Every Morning 11/14/2018     Sig - Route: Take 250 mg by mouth Every Morning. - Oral    escitalopram (LEXAPRO) tablet 10 mg 10 mg Nightly 11/14/2018     Sig - Route: Take 1 tablet by mouth Every Night. - Oral    gabapentin (NEURONTIN) capsule 600 mg 600 mg 3 Times Daily 11/13/2018     Sig - Route: Take 2 capsules by mouth 3 (Three) Times a Day. - Oral    HYDROcodone-acetaminophen (NORCO) 5-325 MG per tablet 1 tablet 1 tablet Every 4 Hours PRN 11/14/2018 11/24/2018    Sig -  "Route: Take 1 tablet by mouth Every 4 (Four) Hours As Needed for Moderate Pain . - Oral    levETIRAcetam (KEPPRA) tablet 750 mg 750 mg Every 12 Hours Scheduled 11/13/2018     Sig - Route: Take 750 mg by mouth Every 12 (Twelve) Hours. - Oral    mirtazapine (REMERON) tablet 15 mg 15 mg Nightly 11/14/2018     Sig - Route: Take 1 tablet by mouth Every Night. - Oral    morphine injection 1 mg 1 mg Once 11/13/2018 11/13/2018    Sig - Route: Infuse 0.5 mL into a venous catheter 1 (One) Time. - Intravenous    morphine injection 1 mg 1 mg Once 11/13/2018 11/13/2018    Sig - Route: Infuse 0.5 mL into a venous catheter 1 (One) Time. - Intravenous    morphine injection 1 mg 1 mg Every 4 Hours PRN 11/13/2018 11/23/2018    Sig - Route: Infuse 0.5 mL into a venous catheter Every 4 (Four) Hours As Needed for Severe Pain . - Intravenous    Linked Group 1:  \"Followed by\" Linked Group Details        morphine injection 2 mg 2 mg Every 4 Hours PRN 11/23/2018 12/3/2018    Sig - Route: Infuse 1 mL into a venous catheter Every 4 (Four) Hours As Needed for Severe Pain . - Intravenous    Linked Group 1:  \"Followed by\" Linked Group Details        ondansetron (ZOFRAN) injection 4 mg 4 mg Once 11/13/2018 11/13/2018    Sig - Route: Infuse 2 mL into a venous catheter 1 (One) Time. - Intravenous    ondansetron (ZOFRAN) injection 4 mg 4 mg Every 4 Hours PRN 11/13/2018     Sig - Route: Infuse 2 mL into a venous catheter Every 4 (Four) Hours As Needed for Nausea or Vomiting. - Intravenous    pantoprazole (PROTONIX) EC tablet 40 mg 40 mg Every Early Morning 11/14/2018     Sig - Route: Take 1 tablet by mouth Every Morning. - Oral    sodium chloride 0.9 % bolus 250 mL 250 mL Once 11/14/2018 11/14/2018    Sig - Route: Infuse 250 mL into a venous catheter 1 (One) Time. - Intravenous    sodium chloride 0.9 % flush 10 mL 10 mL As Needed 11/13/2018     Sig - Route: Infuse 10 mL into a venous catheter As Needed for Line Care. - Intravenous    Linked Group 2:  " "\"And\" Linked Group Details        escitalopram (LEXAPRO) tablet 20 mg (Discontinued) 20 mg Daily 2018    Sig - Route: Take 1 tablet by mouth Daily. - Oral    morphine injection 1 mg (Discontinued) 1 mg Once 2018    Sig - Route: Infuse 0.5 mL into a venous catheter 1 (One) Time. - Intravenous    morphine injection 1 mg (Discontinued) 1 mg Once 2018    Sig - Route: Infuse 0.5 mL into a venous catheter 1 (One) Time. - Intravenous    ondansetron (ZOFRAN) injection 4 mg (Discontinued) 4 mg Once 2018    Sig - Route: Infuse 2 mL into a venous catheter 1 (One) Time. - Intravenous    sodium chloride 0.9 % bolus 250 mL (Discontinued) 250 mL Once 2018    Sig - Route: Infuse 250 mL into a venous catheter 1 (One) Time. - Intravenous          Social History  Social History     Socioeconomic History   • Marital status:      Spouse name: Not on file   • Number of children: 3   • Years of education: Not on file   • Highest education level: Not on file   Social Needs   • Financial resource strain: Not on file   • Food insecurity - worry: Not on file   • Food insecurity - inability: Not on file   • Transportation needs - medical: Not on file   • Transportation needs - non-medical: Not on file   Occupational History   • Occupation: Retired   Tobacco Use   • Smoking status: Former Smoker     Packs/day: 1.00     Years: 40.00     Pack years: 40.00     Types: Cigarettes     Last attempt to quit:      Years since quittin.8   • Smokeless tobacco: Never Used   • Tobacco comment: caffeine use: one cup of coffee in the morning.    Substance and Sexual Activity   • Alcohol use: Yes     Alcohol/week: 0.6 oz     Types: 1 Shots of liquor per week     Comment: 1-2 DRINKS DAILY; WEAK IN STRENGTH   • Drug use: No   • Sexual activity: Defer   Other Topics Concern   • Not on file   Social History Narrative   • Not on file       Review of Systems "   Constitutional: Positive for fatigue.   HENT:        Slight soreness on the left upper scalp where she hit her head   Eyes: Negative.    Respiratory: Negative.    Cardiovascular: Negative.    Gastrointestinal: Negative.    Musculoskeletal:        Right ankle pain and swelling   Neurological:        Cognitive impairment   Hematological: Bruises/bleeds easily.        On clopidogrel   Psychiatric/Behavioral: Negative.        Vital Signs   Temp:  [97.6 °F (36.4 °C)-98.5 °F (36.9 °C)] 97.6 °F (36.4 °C)  Heart Rate:  [64-76] 76  Resp:  [16-18] 16  BP: ()/() 103/68    Examination:  General: Well-groomed, well-nourished  HEENT:  Tenderness left parietal scalp, no hematoma  CVS:  Regular rate and rhythm.  No murmurs.  Good peripheral perfusion.   Chest :  Nonlabored breathing  Abdomen: No distention   Extremities:  No signs of peripheral edema left foot, right ankle/foot swollen and wrapped  Skin:  Normal turgor, several bruises, varying ages  Neurologic:   Alert oriented to hospital, month and year but not to exact date or room number   Fluent   No dysarthria   Cranial nerves intact   No nystagmus   Pupils symmetric and equally reactive   Age appropriate power bilateral upper extremities   Normal coordination in bilateral upper extremities   Reflexes symmetric and not hyperactive   Plantar responses flexor   Sensory exam grossly intact to light touch   Gait not tested    Results Review:  Results from last 7 days   Lab Units  11/13/18   1359   WBC 10*3/mm3  6.98   HEMOGLOBIN g/dL  12.0   HEMATOCRIT %  35.7   PLATELETS 10*3/mm3  188        Results from last 7 days   Lab Units  11/13/18   1359   SODIUM mmol/L  133*   POTASSIUM mmol/L  4.3   CHLORIDE mmol/L  93*   CO2 mmol/L  27.2   BUN mg/dL  13   CREATININE mg/dL  0.80   CALCIUM mg/dL  9.2   BILIRUBIN mg/dL  0.7   ALK PHOS U/L  62   ALT (SGPT) U/L  12   AST (SGOT) U/L  17   GLUCOSE mg/dL  97        Lab Results   Component Value Date    [id number] 410 02/05/2017      Lab Results   Component Value Date    TSH 3.440 12/15/2017       Head CT demonstrates atrophy and moderate small vessel ischemic changes    Cervical spine CT demonstrates no fractures with incidental possible severe stenosis and calcification of the right internal carotid artery      Medical Decision Making and Recommendations  Seizure disorder  Possible breakthrough 3-4 weeks ago, Her dose was increased after that event  She will have an increased risk of seizure in the perioperative period     Check magnesium level  Continue routine Keppra 750 mg twice a day  Change to Keppra 1000 mg IV every 12 hours on the day prior to surgery for increased protection  Resume oral Keppra 750 mg BID 48 hours after surgery      I discussed the patients findings and my recommendations with patient and family.    Sarah Poole MD  11/14/18  3:02 PM

## 2018-11-14 NOTE — NURSING NOTE
Call rec'd from Dr. Glaser, made aware pt's daughter states if Dr. Servin can't see pt then no need for Dr. Glaser to see pt. Daughter ok with Dr. Hill doing pt's surgery. Will notify Dr. Hill.

## 2018-11-14 NOTE — NURSING NOTE
Spoke with Dr. Hill, made aware pt's daughter said Dr. Glaser does not need to see pt and is ok with Dr. Hill doing pt's surgery.

## 2018-11-14 NOTE — H&P
History and physical    Primary care physician  Dr. Nelson    Chief complaint  Right ankle pain and right hip pain  Status post fall    History of present illness  88-year-old white female with very complex past medical history including coronary artery disease hypertension hyperlipidemia seizure disorder low back pain gastroesophageal reflux disease dementia who is a nursing home resident brought to the emergency room after the unwitnessed fall to the bathroom when she lost her balance and fell and does not remember what happened.  Patient complain of right hip pain right ankle pain but denies any chest pain and increased shortness of breath abdominal pain nausea vomiting diarrhea.  Patient workup in ER revealed right ankle fracture admitted for management.  At the time of interview patient is awake and alert consult question appropriately and hurting at the fracture site but no other complaints except generalized weakness.    PAST MEDICAL HISTORY  • Anemia     • Bulging lumbar disc     • Chronic cough     • Chronic UTI     • Chronic venous insufficiency     • 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HCC)     • DDD (degenerative disc disease), lumbosacral     • Dementia without behavioral disturbance     • Depression, endogenous (CMS/HCC)     • Disc degeneration, lumbar     • Dysphagia     • GERD (gastroesophageal reflux disease)     • Hiatal hernia     • History of anxiety     • History of aspiration pneumonitis     • History of cerebral artery occlusion     • History of herpes zoster     • History of ingrowing nail     • History of osteoporosis     • History of poliomyelitis     • History of sciatica     • History of sick sinus syndrome     • History of transient cerebral ischemia     • History of Zenker's diverticulum removal 2016   • HX: long term anticoagulant use     • Hyperlipidemia     • Hypertension     • Hyponatremia     • Intertrigo     • Lumbar radiculopathy     • Morbid obesity (CMS/HCC)     • Neuralgia     •  Nonepileptic episode (CMS/HCC)     • Osteoarthritis of right knee     • Pacemaker     • Pneumonia     • Seeing double     • 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)     • Vitamin D deficiency     • Zenker's diverticulum        PAST SURGICAL HISTORY  Surgical History         Past Surgical History:   Procedure Laterality Date   • CARDIAC PACEMAKER PLACEMENT         secondary to SSS, placed in , with generator change in    • CATARACT EXTRACTION W/ INTRAOCULAR LENS IMPLANT Bilateral     • COLONOSCOPY       • HAND SURGERY Right     • KNEE ARTHROPLASTY Left     • LAMINECTOMY FOR IMPLANTATION / PLACEMENT NEUROSTIMULATOR ELECTRODES       • LUMBAR LAMINECTOMY         L-3 L4 L4 L5   • TONSILLECTOMY             FAMILY HISTORY        Family History   Problem Relation Age of Onset   • Cancer Daughter        SOCIAL HISTORY  Social History               Socioeconomic History   • Marital status:        Spouse name: Not on file   • Number of children: 3   • Years of education: Not on file   • Highest education level: Not on file   Social Needs   • Financial resource strain: Not on file   • Food insecurity - worry: Not on file   • Food insecurity - inability: Not on file   • Transportation needs - medical: Not on file   • Transportation needs - non-medical: Not on file   Occupational History   • Occupation: Retired   Tobacco Use   • Smoking status: Former Smoker       Packs/day: 1.00       Years: 40.00       Pack years: 40.00       Types: Cigarettes       Last attempt to quit:        Years since quittin.8   • Smokeless tobacco: Never Used   • Tobacco comment: caffeine use: one cup of coffee in the morning.    Substance and Sexual Activity   • Alcohol use: Yes       Alcohol/week: 0.6 oz       Types: 1 Shots of liquor per week       Comment: 1-2 DRINKS DAILY; WEAK IN STRENGTH   • Drug use: No   • Sexual activity: Defer   Other Topics Concern   • Not on file   Social History Narrative   • Not on  "file         ALLERGIES  Lipitor [atorvastatin] and Penicillins     REVIEW OF SYSTEMS  Review of Systems   Constitutional: Negative for fever.   HENT: Negative for sore throat.    Eyes: Negative.    Respiratory: Negative for cough and shortness of breath.    Cardiovascular: Negative for chest pain.   Gastrointestinal: Negative for abdominal pain, diarrhea and vomiting.   Genitourinary: Negative for dysuria and enuresis.   Musculoskeletal: Negative for neck pain.   Skin: Negative for rash.   Allergic/Immunologic: Negative.    Neurological: Positive for headaches (unsure of chronicity). Negative for weakness and numbness.   Hematological: Negative.    Psychiatric/Behavioral: Positive for confusion (per daughter pt normally oriented, disoriented starting today).   All other systems reviewed and are negative.     PHYSICAL EXAM  Blood pressure 103/68, pulse 76, temperature 98.5 °F (36.9 °C), temperature source Oral, resp. rate 16, height 157.5 cm (62\"), weight 98.9 kg (218 lb), SpO2 97 %, not currently breastfeeding.    Constitutional: No distress.   Head: Normocephalic and atraumatic.   No notable bite alycia to tongue or lip   Eyes: EOM are normal. Pupils are equal, round, and reactive to light.   Neck: Normal range of motion. Neck supple.   Cardiovascular: Normal rate, regular rhythm and normal heart sounds.   Pulmonary/Chest: Effort normal and breath sounds normal. No respiratory distress.   Abdominal: Soft. There is no tenderness. There is no rebound and no guarding.   Musculoskeletal: Normal range of motion. She exhibits edema (2+ edema to feet and ankles bilaterally and increased at the right ankle at the site of the injury.).   Pt moves all extremities.  Diffuse contusion and ecchymosis to R ankle.  He has no cyanosis or pallor to the toes and is motor and sensory intact to the right foot and toes.   Neurological: She is alert. She has normal sensation and normal strength. No cranial nerve deficit. GCS score is 15. "   Appears to be chronically demented, but daughter states she is normally A+Ox3    Skin: Skin is warm and dry. No rash noted.   Psychiatric: Mood and affect normal.       LAB RESULTS  Lab Results (last 24 hours)     Procedure Component Value Units Date/Time    Urine Drug Screen - Urine, Catheter [[id number]]  (Normal) Collected:  11/13/18 1400    Specimen:  Urine, Catheter Updated:  11/13/18 1510     Amphet/Methamphet, Screen Negative     Barbiturates Screen, Urine Negative     Benzodiazepine Screen, Urine Negative     Cocaine Screen, Urine Negative     Opiate Screen Negative     THC, Screen, Urine Negative     Methadone Screen, Urine Negative     Oxycodone Screen, Urine Negative    Narrative:       Negative Thresholds For Drugs Screened:     Amphetamines               500 ng/ml   Barbiturates               200 ng/ml   Benzodiazepines            100 ng/ml   Cocaine                    300 ng/ml   Methadone                  300 ng/ml   Opiates                    300 ng/ml   Oxycodone                  100 ng/ml   THC                        50 ng/ml    The Normal Value for all drugs tested is negative. This report includes final unconfirmed screening results to be used for medical treatment purposes only. Unconfirmed results must not be used for non-medical purposes such as employment or legal testing. Clinical consideration should be applied to any drug of abuse test, particulary when unconfirmed results are used.    Comprehensive Metabolic Panel [[id number]]  (Abnormal) Collected:  11/13/18 1359    Specimen:  Blood Updated:  11/13/18 1450     Glucose 97 mg/dL      BUN 13 mg/dL      Creatinine 0.80 mg/dL      Sodium 133 mmol/L      Potassium 4.3 mmol/L      Chloride 93 mmol/L      CO2 27.2 mmol/L      Calcium 9.2 mg/dL      Total Protein 7.4 g/dL      Albumin 3.90 g/dL      ALT (SGPT) 12 U/L      AST (SGOT) 17 U/L      Alkaline Phosphatase 62 U/L      Total Bilirubin 0.7 mg/dL      eGFR Non African Amer 68 mL/min/1.73       Globulin 3.5 gm/dL      A/G Ratio 1.1 g/dL      BUN/Creatinine Ratio 16.3     Anion Gap 12.8 mmol/L     Narrative:       The MDRD GFR formula is only valid for adults with stable renal function between ages 18 and 70.    Magnesium [[id number]]  (Normal) Collected:  11/13/18 1359    Specimen:  Blood Updated:  11/13/18 1450     Magnesium 2.0 mg/dL     CK [[id number]]  (Normal) Collected:  11/13/18 1359    Specimen:  Blood Updated:  11/13/18 1450     Creatine Kinase 62 U/L     Ethanol [[id number]] Collected:  11/13/18 1359    Specimen:  Blood Updated:  11/13/18 1450     Ethanol <10 mg/dL      Ethanol % <0.010 %     Troponin [[id number]]  (Normal) Collected:  11/13/18 1359    Specimen:  Blood Updated:  11/13/18 1449     Troponin T <0.010 ng/mL     Narrative:       Troponin T Reference Ranges:  Less than 0.03 ng/mL:    Negative for AMI  0.03 to 0.09 ng/mL:      Indeterminant for AMI  Greater than 0.09 ng/mL: Positive for AMI    Protime-INR [[id number]]  (Normal) Collected:  11/13/18 1359    Specimen:  Blood Updated:  11/13/18 1442     Protime 13.7 Seconds      INR 1.07    Urinalysis With Microscopic If Indicated (No Culture) - Urine, Catheter [[id number]]  (Normal) Collected:  11/13/18 1359    Specimen:  Urine, Catheter Updated:  11/13/18 1430     Color, UA Yellow     Appearance, UA Clear     pH, UA 8.0     Specific Gravity, UA 1.014     Glucose, UA Negative     Ketones, UA Negative     Bilirubin, UA Negative     Blood, UA Negative     Protein, UA Negative     Leuk Esterase, UA Negative     Nitrite, UA Negative     Urobilinogen, UA 0.2 E.U./dL    Narrative:       Urine microscopic not indicated.    CBC & Differential [[id number]] Collected:  11/13/18 1359    Specimen:  Blood Updated:  11/13/18 1425    Narrative:       The following orders were created for panel order CBC & Differential.  Procedure                               Abnormality         Status                     ---------                               -----------          ------                     CBC Auto Differential[[id number]]        Abnormal            Final result                 Please view results for these tests on the individual orders.    CBC Auto Differential [[id number]]  (Abnormal) Collected:  11/13/18 1359    Specimen:  Blood Updated:  11/13/18 1425     WBC 6.98 10*3/mm3      RBC 3.48 10*6/mm3      Hemoglobin 12.0 g/dL      Hematocrit 35.7 %      .6 fL      MCH 34.5 pg      MCHC 33.6 g/dL      RDW 11.9 %      RDW-SD 45.0 fl      MPV 8.9 fL      Platelets 188 10*3/mm3      Neutrophil % 65.8 %      Lymphocyte % 20.8 %      Monocyte % 9.6 %      Eosinophil % 3.2 %      Basophil % 0.6 %      Immature Grans % 0.0 %      Neutrophils, Absolute 4.60 10*3/mm3      Lymphocytes, Absolute 1.45 10*3/mm3      Monocytes, Absolute 0.67 10*3/mm3      Eosinophils, Absolute 0.22 10*3/mm3      Basophils, Absolute 0.04 10*3/mm3      Immature Grans, Absolute 0.00 10*3/mm3     POC Glucose Once [[id number]]  (Normal) Collected:  11/13/18 1341    Specimen:  Blood Updated:  11/13/18 1358     Glucose 93 mg/dL         Imaging Results (last 24 hours)     Procedure Component Value Units Date/Time    XR Chest PA & Lateral [[id number]] Collected:  11/13/18 2119     Updated:  11/13/18 2134    Narrative:       PA AND LATERAL CHEST X-RAY     HISTORY: Hypertension and pacemaker. Former smoker. Preop ankle fracture  repair.     Chest x-ray consisting of PA and lateral views is provided.  Comparison  exams: Chest x-ray 12/13/2017.     FINDINGS: There is a cardiac pacing device and a spinal cord stimulator.  The cardiomediastinal silhouette is normal. The lungs are clear. The  costophrenic sulci are dry and the bones appear normal. There is no  pneumothorax.       Impression:       Negative.     This report was finalized on 11/13/2018 9:31 PM by Dr. Billy Red M.D.       XR Hip With or Without Pelvis 2 - 3 View Right [[id number]] Collected:  11/13/18 1535     Updated:  11/13/18 1601     Narrative:       PELVIS RIGHT HIP, RIGHT LOWER LEG, AND RIGHT ANKLE X-RAYS     HISTORY: Fall, pain in all of the above locations.     An AP view of the pelvis and 2 views of the right hip are provided.     FINDINGS: There is advanced degenerative change in lumbar spine with  some electronic hardware over the lower back, presumably a spinal  stimulator device. The SI joints appear normal. There is mild  degenerative change at the right hip. No fracture is identified around  the pelvis or the right hip.        Four x-rays of the right lower leg and 3 x-rays of the right ankle are  provided.     FINDINGS: The distal femur, patella, proximal tibia and fibula are  intact. There is a comminuted, obliquely oriented, mildly displaced  distal fibular diametaphysis fracture. There is a large posterior  malleolus fracture which is mildly impacted. None of the ankle images  shows the distal tibia in a frontal projection; no definite medial  malleolus fracture is identified. The talus appears laterally subluxed  about 6 mm. The calcaneus and the talus appear intact.       Impression:       No acute abnormality identified at the pelvis, the right hip, or around  the right knee or proximal lower leg. There is a comminuted right ankle  fracture with slight lateral subluxation of the talus and a comminuted,  mildly displaced distal fibular diametaphysis fracture. There also  appears to be a large, mildly impacted fracture of the posterior  malleolus of the distal tibia.     This report was finalized on 11/13/2018 4:02 PM by Dr. Billy Red M.D.       XR Ankle 2 View Right [[id number]] Collected:  11/13/18 1535     Updated:  11/13/18 1605    Narrative:       PELVIS RIGHT HIP, RIGHT LOWER LEG, AND RIGHT ANKLE X-RAYS     HISTORY: Fall, pain in all of the above locations.     An AP view of the pelvis and 2 views of the right hip are provided.     FINDINGS: There is advanced degenerative change in lumbar spine with  some electronic  hardware over the lower back, presumably a spinal  stimulator device. The SI joints appear normal. There is mild  degenerative change at the right hip. No fracture is identified around  the pelvis or the right hip.        Four x-rays of the right lower leg and 3 x-rays of the right ankle are  provided.     FINDINGS: The distal femur, patella, proximal tibia and fibula are  intact. There is a comminuted, obliquely oriented, mildly displaced  distal fibular diametaphysis fracture. There is a large posterior  malleolus fracture which is mildly impacted. None of the ankle images  shows the distal tibia in a frontal projection; no definite medial  malleolus fracture is identified. The talus appears laterally subluxed  about 6 mm. The calcaneus and the talus appear intact.       Impression:       No acute abnormality identified at the pelvis, the right hip, or around  the right knee or proximal lower leg. There is a comminuted right ankle  fracture with slight lateral subluxation of the talus and a comminuted,  mildly displaced distal fibular diametaphysis fracture. There also  appears to be a large, mildly impacted fracture of the posterior  malleolus of the distal tibia.     This report was finalized on 11/13/2018 4:02 PM by Dr. Billy Red M.D.       XR Tibia Fibula 2 View Right [[id number]] Collected:  11/13/18 1535     Updated:  11/13/18 1605    Narrative:       PELVIS RIGHT HIP, RIGHT LOWER LEG, AND RIGHT ANKLE X-RAYS     HISTORY: Fall, pain in all of the above locations.     An AP view of the pelvis and 2 views of the right hip are provided.     FINDINGS: There is advanced degenerative change in lumbar spine with  some electronic hardware over the lower back, presumably a spinal  stimulator device. The SI joints appear normal. There is mild  degenerative change at the right hip. No fracture is identified around  the pelvis or the right hip.        Four x-rays of the right lower leg and 3 x-rays of the right  ankle are  provided.     FINDINGS: The distal femur, patella, proximal tibia and fibula are  intact. There is a comminuted, obliquely oriented, mildly displaced  distal fibular diametaphysis fracture. There is a large posterior  malleolus fracture which is mildly impacted. None of the ankle images  shows the distal tibia in a frontal projection; no definite medial  malleolus fracture is identified. The talus appears laterally subluxed  about 6 mm. The calcaneus and the talus appear intact.       Impression:       No acute abnormality identified at the pelvis, the right hip, or around  the right knee or proximal lower leg. There is a comminuted right ankle  fracture with slight lateral subluxation of the talus and a comminuted,  mildly displaced distal fibular diametaphysis fracture. There also  appears to be a large, mildly impacted fracture of the posterior  malleolus of the distal tibia.     This report was finalized on 11/13/2018 4:02 PM by Dr. Billy Red M.D.       CT Head Without Contrast [[id number]] Collected:  11/13/18 1518     Updated:  11/13/18 1519    Narrative:       CT HEAD WITHOUT CONTRAST AND CT CERVICAL SPINE WITHOUT CONTRAST     HISTORY: Fall, hit head, headache.     COMPARISON: CT head 12/18/2017.     FINDINGS:    CT HEAD WITHOUT CONTRAST: The brain and ventricles are symmetrical.  There is no evidence of hemorrhage, hydrocephalus or of abnormal  extra-axial fluid. No focal area of decreased attenuation to suggest  acute infarction is identified. Vascular calcification is noted. Bone  windows showed no evidence of a calvarial fracture.       Impression:       Small vessel ischemic disease and vascular calcifications  appreciated similar to 12/18/2017. There is no evidence of fracture or  of intracranial hemorrhage.     CT EXAMINATION OF THE CERVICAL SPINE WITHOUT CONTRAST: There is moderate  to severe loss of disc height from C4 to T1. There is a grade 1  anterolisthesis of C3 upon C4  estimated to be 2-3 mm and a grade 1  anterolisthesis of C4 upon C5 estimated to be 2-3 mm. There is fusion of  the facets bilaterally at C2-3.     C2-3: Moderate facet degenerative disease is present on the left.     C3-4: There is severe neuroforaminal compromise on the left secondary to  severe facet degenerative disease and moderate uncovertebral  degenerative disease. There is a mild broad-based disc osteophyte  complex with no evidence of herniation.     C4-5: There is mild broad-based disc osteophyte complex which is  slightly more prominent to the left. There is moderate neuroforaminal  compromise on the left secondary to uncovertebral degenerative disease  and facet degenerative disease.     C5-6: There is mild central disc osteophyte complex with no evidence of  herniation. There is mild to moderate neuroforaminal compromise on the  right secondary to uncovertebral degenerative disease.     C6-7: There is a mild broad-based disc osteophyte complex with no  evidence of herniation.     C7-T1: There is no evidence of disc bulge or herniation.     Note is made that the internal carotid arteries have a very medial  course and may mimic a submucosal mass. There is dense vascular  calcification involving the right internal carotid artery at the level  of C4-5 and likely severe stenosis.     There is no evidence of fracture.     IMPRESSION:   1. Multilevel degenerative disease involving the cervical spine as  described with no evidence of fracture. See above.  2. Medial course of the internal carotid arteries with vascular  calcification suggesting severe stenosis of the internal carotid artery  on the right.     The above information was called to and discussed with Dr. Bingham.     Radiation dose reduction techniques were utilized, including automated  exposure control and exposure modulation based on body size.             CT Cervical Spine Without Contrast [[id number]] Collected:  11/13/18 151     Updated:   11/13/18 1519    Narrative:       CT HEAD WITHOUT CONTRAST AND CT CERVICAL SPINE WITHOUT CONTRAST     HISTORY: Fall, hit head, headache.     COMPARISON: CT head 12/18/2017.     FINDINGS:    CT HEAD WITHOUT CONTRAST: The brain and ventricles are symmetrical.  There is no evidence of hemorrhage, hydrocephalus or of abnormal  extra-axial fluid. No focal area of decreased attenuation to suggest  acute infarction is identified. Vascular calcification is noted. Bone  windows showed no evidence of a calvarial fracture.       Impression:       Small vessel ischemic disease and vascular calcifications  appreciated similar to 12/18/2017. There is no evidence of fracture or  of intracranial hemorrhage.     CT EXAMINATION OF THE CERVICAL SPINE WITHOUT CONTRAST: There is moderate  to severe loss of disc height from C4 to T1. There is a grade 1  anterolisthesis of C3 upon C4 estimated to be 2-3 mm and a grade 1  anterolisthesis of C4 upon C5 estimated to be 2-3 mm. There is fusion of  the facets bilaterally at C2-3.     C2-3: Moderate facet degenerative disease is present on the left.     C3-4: There is severe neuroforaminal compromise on the left secondary to  severe facet degenerative disease and moderate uncovertebral  degenerative disease. There is a mild broad-based disc osteophyte  complex with no evidence of herniation.     C4-5: There is mild broad-based disc osteophyte complex which is  slightly more prominent to the left. There is moderate neuroforaminal  compromise on the left secondary to uncovertebral degenerative disease  and facet degenerative disease.     C5-6: There is mild central disc osteophyte complex with no evidence of  herniation. There is mild to moderate neuroforaminal compromise on the  right secondary to uncovertebral degenerative disease.     C6-7: There is a mild broad-based disc osteophyte complex with no  evidence of herniation.     C7-T1: There is no evidence of disc bulge or herniation.     Note  is made that the internal carotid arteries have a very medial  course and may mimic a submucosal mass. There is dense vascular  calcification involving the right internal carotid artery at the level  of C4-5 and likely severe stenosis.     There is no evidence of fracture.     IMPRESSION:   1. Multilevel degenerative disease involving the cervical spine as  described with no evidence of fracture. See above.  2. Medial course of the internal carotid arteries with vascular  calcification suggesting severe stenosis of the internal carotid artery  on the right.     The above information was called to and discussed with Dr. Bingham.     Radiation dose reduction techniques were utilized, including automated  exposure control and exposure modulation based on body size.                 EKG                              Rhythm/Rate: SR, 75  P waves and OK: 1st degree AV block  QRS, axis: narrow QRS, borderline LAD  ST and T waves: nonspecific changes      Current Facility-Administered Medications:   •  aspirin chewable tablet 81 mg, 81 mg, Oral, Daily, Juan Carlos Harper MD  •  calcium carbonate (oyster shell) tablet 1,000 mg, 1,000 mg, Oral, Daily, Juan Carlos Harper MD  •  docusate sodium (COLACE) capsule 250 mg, 250 mg, Oral, QAM, Juan Carlos Harper MD  •  escitalopram (LEXAPRO) tablet 10 mg, 10 mg, Oral, Nightly, Juan Carlos Harper MD  •  gabapentin (NEURONTIN) capsule 600 mg, 600 mg, Oral, TID, Juan Carlos Harper MD, 600 mg at 11/14/18 0810  •  HYDROcodone-acetaminophen (NORCO) 5-325 MG per tablet 1 tablet, 1 tablet, Oral, Q4H PRN, Juan Carlos Harper MD, 1 tablet at 11/14/18 0819  •  levETIRAcetam (KEPPRA) tablet 750 mg, 750 mg, Oral, Q12H, Juan Carlos Harper MD, 750 mg at 11/14/18 0810  •  mirtazapine (REMERON) tablet 15 mg, 15 mg, Oral, Nightly, Juan Carlos Harper MD  •  morphine injection 1 mg, 1 mg, Intravenous, Q4H PRN, 1 mg at 11/13/18 2135 **FOLLOWED BY** [START ON 11/23/2018] morphine injection 2 mg, 2 mg, Intravenous, Q4H PRN, Juan Carlos Harper MD  •   ondansetron (ZOFRAN) injection 4 mg, 4 mg, Intravenous, Q4H PRN, Gaetano Harper MD  •  pantoprazole (PROTONIX) EC tablet 40 mg, 40 mg, Oral, Q AM, Gaetano Harper MD, 40 mg at 11/14/18 0603  •  [COMPLETED] Insert peripheral IV, , , Once **AND** sodium chloride 0.9 % flush 10 mL, 10 mL, Intravenous, PRN, Fidel Bingham MD     ASSESSMENT  Acute right ankle fracture  Status post fall  Coronary artery disease  Seizure disorder  Hypertension  Depression  Gastroesophageal reflux disease  Osteoarthritis  Degenerative disc disease  DNR    PLAN  Admit  Bedrest  Pain management  Orthopedic surgery consult  Continue home medications  Need cardiac clearance as she needs to be off Lasix for surgery  Neurology to follow for seizure disorder  Supportive care  Stress ulcer DVT prophylaxis  DNR  Discussed with family  Follow closely further recommendation according to hospital course    GAETANO HARPER MD

## 2018-11-14 NOTE — NURSING NOTE
Spoke with Dr. Hill, made aware pt and daughter request a second opinion from Dr. Servin r/t right ankle fx. MD states ok.

## 2018-11-14 NOTE — CONSULTS
ORTHOPAEDIC SURGERY CONSULT NOTE  HPI:  Patient is a 88 y.o. Not  or  female who presents with Right ankle pain after a fall getting off the bathroom.  She noted immediate pain and deformity and was sent to the ER for further evaluation.  A trimalleolar right ankle fracture was found.  She was admitted and I was consult for further management.  She does take Plavix.      Past Medical History:   Diagnosis Date   • Anemia    • Bulging lumbar disc    • Chronic cough    • Chronic UTI    • Chronic venous insufficiency    • 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HCC)    • DDD (degenerative disc disease), lumbosacral    • Dementia without behavioral disturbance     moderate   • Depression, endogenous (CMS/HCC)    • Disc degeneration, lumbar    • Dysphagia    • GERD (gastroesophageal reflux disease)    • Hiatal hernia    • History of anxiety    • History of aspiration pneumonitis    • History of cerebral artery occlusion     CVA (following TIA), 10/10, treated with tPA   • History of herpes zoster    • History of ingrowing nail    • History of osteoporosis    • History of poliomyelitis     child   • History of sciatica    • History of sick sinus syndrome     s/p PPM   • History of transient cerebral ischemia     followed by stroke in 10/2010. BIBI was normal.   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 Authorizing Provider   acetaminophen (TYLENOL) 325 MG tablet Take 650 mg by mouth Every 6 (Six) Hours As Needed for Mild Pain .   Yes Sammy Veronica MD   artificial tears (LUBRIFRESH P.M.) ointment ophthalmic ointment Administer 1 application to both eyes every night at bedtime.   Yes Sammy Veronica MD   B Complex-Biotin-FA (SUPER B-50 COMPLEX) capsule Take 1 capsule by mouth Every Morning.   Yes Sammy Veronica MD   calcium carbonate (TUMS) 500 MG chewable tablet Chew 1-2 tablets 3 (Three) Times a Day As Needed for Indigestion or Heartburn.   Yes Sammy Veronica MD   Calcium-Vitamin D-Vitamin K (VIACTIV) 500-500-40 MG-UNT-MCG chewable tablet Chew 1 tablet Daily.   Yes Sammy Veronica MD   clopidogrel (PLAVIX) 75 MG tablet Take 75 mg by mouth Daily.   Yes Sammy Veronica MD   diclofenac (VOLTAREN) 1 % gel gel Apply 4 g topically to the appropriate area as directed 2 (Two) Times a Day As Needed (Apply to bilateral knees). HOLD PRIOR TO SURGERY   Yes Sammy Veronica MD   docusate sodium (COLACE) 250 MG capsule Take 250 mg by mouth Every Morning.   Yes Sammy Veronica MD   docusate sodium (COLACE) 250 MG capsule Take 250 mg by mouth At Night As Needed for Constipation.   Yes Sammy Veronica MD   escitalopram (LEXAPRO) 20 MG tablet Take 20 mg by mouth Daily.   Yes Sammy Veronica MD   gabapentin (NEURONTIN) 300 MG capsule Take 2 capsules by mouth 3 (Three) Times a Day. 9/19/17  Yes Sharad Salinas MD   ketoconazole (NIZORAL) 2 % cream  Apply 1 application topically to the appropriate area as directed 2 (Two) Times a Day. Apply to abdominal folds 17  Yes Sammy Veronica MD   lansoprazole (PREVACID) 30 MG capsule Take 30 mg by mouth Daily.   Yes Sammy Veronica MD   levETIRAcetam (KEPPRA) 750 MG tablet Take 1 tablet by mouth 2 (Two) Times a Day. 10/18/18  Yes Arsalan Velez Jr., MD   methylcellulose, Laxative, (CITRUCEL) 500 MG tablet tablet Take 2 tablets by mouth Every Morning.   Yes Sammy Veronica MD   mineral oil-hydrophilic petrolatum (AQUAPHOR) ointment Apply 1 application topically to the appropriate area as directed 1 (One) Time Per Week. On Monday after shower   Yes Sammy Veronica MD   mirtazapine (REMERON) 15 MG tablet Take 15 mg by mouth Every Night.   Yes Sammy Veronica MD     Allergies   Allergen Reactions   • Lipitor [Atorvastatin] Confusion   • Penicillins Hives     Has previously tolerated ceftriaxone     Most Recent Immunizations   Administered Date(s) Administered   • Flu Mist 10/05/2015   • Flu Vaccine High Dose PF 65YR+ 10/09/2017   • Flu Vaccine Quad PF >18YRS 2016     Social History     Tobacco Use   • Smoking status: Former Smoker     Packs/day: 1.00     Years: 40.00     Pack years: 40.00     Types: Cigarettes     Last attempt to quit:      Years since quittin.8   • Smokeless tobacco: Never Used   • Tobacco comment: caffeine use: one cup of coffee in the morning.    Substance Use Topics   • Alcohol use: Yes     Alcohol/week: 0.6 oz     Types: 1 Shots of liquor per week     Comment: 1-2 DRINKS DAILY; WEAK IN STRENGTH      Social History     Substance and Sexual Activity   Drug Use No     REVIEW OF SYSTEMS:  Head: negative for headache  Respiratory: negative for shortness of breath.   Cardiovascular: negative for chest pain.   Gastrointestinal: negative abdominal pain.   Neurological: negative for LOC  Psychiatric/Behavioral: negative for memory loss.   All other systems  "reviewed and are negative  VITALS: BP 93/57 (BP Location: Right arm, Patient Position: Lying)   Pulse 76   Temp 98.5 °F (36.9 °C) (Oral)   Resp 16   Ht 157.5 cm (62\")   Wt 98.9 kg (218 lb) Comment: one week ago at [facility]  SpO2 97%   BMI 39.87 kg/m²  Body mass index is 39.87 kg/m².  EXAM:   CONSTITUTIONAL: A&Ox3, No acute distress  LUNGS: Equal chest rise, no shortness of air  CARDIOVASCULAR: palpable peripheral pulses  SKIN: no skin lesions in the area examined  LYMPH: no lymphadenopathy in the area examined  EXTREMITY: Right Lower Extremity Is splinted in a posterior slab splint.  The ankle is grossly swollen and has not been sufficiently elevated overnight   Pulses:  Brisk Capillary Refill   Sensation: sensation intact to toes   Motor: able to wiggle toes   Range of Motion: Deferred secondary to known fracture    DATA REVIEW:   Xr Tibia Fibula 2 View Right    Result Date: 11/13/2018  No acute abnormality identified at the pelvis, the right hip, or around the right knee or proximal lower leg. There is a comminuted right ankle fracture with slight lateral subluxation of the talus and a comminuted, mildly displaced distal fibular diametaphysis fracture. There also appears to be a large, mildly impacted fracture of the posterior malleolus of the distal tibia.  This report was finalized on 11/13/2018 4:02 PM by Dr. Billy Red M.D.      Xr Ankle 2 View Right    Result Date: 11/13/2018  No acute abnormality identified at the pelvis, the right hip, or around the right knee or proximal lower leg. There is a comminuted right ankle fracture with slight lateral subluxation of the talus and a comminuted, mildly displaced distal fibular diametaphysis fracture. There also appears to be a large, mildly impacted fracture of the posterior malleolus of the distal tibia.  This report was finalized on 11/13/2018 4:02 PM by Dr. Billy Red M.D.      Ct Head Without Contrast    Result Date: 11/13/2018  Small vessel " ischemic disease and vascular calcifications appreciated similar to 12/18/2017. There is no evidence of fracture or of intracranial hemorrhage.  CT EXAMINATION OF THE CERVICAL SPINE WITHOUT CONTRAST: There is moderate to severe loss of disc height from C4 to T1. There is a grade 1 anterolisthesis of C3 upon C4 estimated to be 2-3 mm and a grade 1 anterolisthesis of C4 upon C5 estimated to be 2-3 mm. There is fusion of the facets bilaterally at C2-3.  C2-3: Moderate facet degenerative disease is present on the left.  C3-4: There is severe neuroforaminal compromise on the left secondary to severe facet degenerative disease and moderate uncovertebral degenerative disease. There is a mild broad-based disc osteophyte complex with no evidence of herniation.  C4-5: There is mild broad-based disc osteophyte complex which is slightly more prominent to the left. There is moderate neuroforaminal compromise on the left secondary to uncovertebral degenerative disease and facet degenerative disease.  C5-6: There is mild central disc osteophyte complex with no evidence of herniation. There is mild to moderate neuroforaminal compromise on the right secondary to uncovertebral degenerative disease.  C6-7: There is a mild broad-based disc osteophyte complex with no evidence of herniation.  C7-T1: There is no evidence of disc bulge or herniation.  Note is made that the internal carotid arteries have a very medial course and may mimic a submucosal mass. There is dense vascular calcification involving the right internal carotid artery at the level of C4-5 and likely severe stenosis.  There is no evidence of fracture.  IMPRESSION: 1. Multilevel degenerative disease involving the cervical spine as described with no evidence of fracture. See above. 2. Medial course of the internal carotid arteries with vascular calcification suggesting severe stenosis of the internal carotid artery on the right.  The above information was called to and  discussed with Dr. Bingham.  Radiation dose reduction techniques were utilized, including automated exposure control and exposure modulation based on body size.        Ct Cervical Spine Without Contrast    Result Date: 11/13/2018  Small vessel ischemic disease and vascular calcifications appreciated similar to 12/18/2017. There is no evidence of fracture or of intracranial hemorrhage.  CT EXAMINATION OF THE CERVICAL SPINE WITHOUT CONTRAST: There is moderate to severe loss of disc height from C4 to T1. There is a grade 1 anterolisthesis of C3 upon C4 estimated to be 2-3 mm and a grade 1 anterolisthesis of C4 upon C5 estimated to be 2-3 mm. There is fusion of the facets bilaterally at C2-3.  C2-3: Moderate facet degenerative disease is present on the left.  C3-4: There is severe neuroforaminal compromise on the left secondary to severe facet degenerative disease and moderate uncovertebral degenerative disease. There is a mild broad-based disc osteophyte complex with no evidence of herniation.  C4-5: There is mild broad-based disc osteophyte complex which is slightly more prominent to the left. There is moderate neuroforaminal compromise on the left secondary to uncovertebral degenerative disease and facet degenerative disease.  C5-6: There is mild central disc osteophyte complex with no evidence of herniation. There is mild to moderate neuroforaminal compromise on the right secondary to uncovertebral degenerative disease.  C6-7: There is a mild broad-based disc osteophyte complex with no evidence of herniation.  C7-T1: There is no evidence of disc bulge or herniation.  Note is made that the internal carotid arteries have a very medial course and may mimic a submucosal mass. There is dense vascular calcification involving the right internal carotid artery at the level of C4-5 and likely severe stenosis.  There is no evidence of fracture.  IMPRESSION: 1. Multilevel degenerative disease involving the cervical spine as  described with no evidence of fracture. See above. 2. Medial course of the internal carotid arteries with vascular calcification suggesting severe stenosis of the internal carotid artery on the right.  The above information was called to and discussed with Dr. Bingham.  Radiation dose reduction techniques were utilized, including automated exposure control and exposure modulation based on body size.        Xr Chest Pa & Lateral    Result Date: 11/13/2018  Negative.  This report was finalized on 11/13/2018 9:31 PM by Dr. Billy Red M.D.      Xr Hip With Or Without Pelvis 2 - 3 View Right    Result Date: 11/13/2018  No acute abnormality identified at the pelvis, the right hip, or around the right knee or proximal lower leg. There is a comminuted right ankle fracture with slight lateral subluxation of the talus and a comminuted, mildly displaced distal fibular diametaphysis fracture. There also appears to be a large, mildly impacted fracture of the posterior malleolus of the distal tibia.  This report was finalized on 11/13/2018 4:02 PM by Dr. Billy Red M.D.      Labs:   Results for the past 24 hours:   Recent Results (from the past 24 hour(s))   POC Glucose Once    Collection Time: 11/13/18  1:41 PM   Result Value Ref Range    Glucose 93 70 - 130 mg/dL   Comprehensive Metabolic Panel    Collection Time: 11/13/18  1:59 PM   Result Value Ref Range    Glucose 97 65 - 99 mg/dL    BUN 13 8 - 23 mg/dL    Creatinine 0.80 0.57 - 1.00 mg/dL    Sodium 133 (L) 136 - 145 mmol/L    Potassium 4.3 3.5 - 5.2 mmol/L    Chloride 93 (L) 98 - 107 mmol/L    CO2 27.2 22.0 - 29.0 mmol/L    Calcium 9.2 8.6 - 10.5 mg/dL    Total Protein 7.4 6.0 - 8.5 g/dL    Albumin 3.90 3.50 - 5.20 g/dL    ALT (SGPT) 12 1 - 33 U/L    AST (SGOT) 17 1 - 32 U/L    Alkaline Phosphatase 62 39 - 117 U/L    Total Bilirubin 0.7 0.1 - 1.2 mg/dL    eGFR Non African Amer 68 >60 mL/min/1.73    Globulin 3.5 gm/dL    A/G Ratio 1.1 g/dL    BUN/Creatinine Ratio  16.3 7.0 - 25.0    Anion Gap 12.8 mmol/L   Protime-INR    Collection Time: 11/13/18  1:59 PM   Result Value Ref Range    Protime 13.7 11.7 - 14.2 Seconds    INR 1.07 0.90 - 1.10   Troponin    Collection Time: 11/13/18  1:59 PM   Result Value Ref Range    Troponin T <0.010 0.000 - 0.030 ng/mL   Urinalysis With Microscopic If Indicated (No Culture) - Urine, Catheter    Collection Time: 11/13/18  1:59 PM   Result Value Ref Range    Color, UA Yellow Yellow, Straw    Appearance, UA Clear Clear    pH, UA 8.0 5.0 - 8.0    Specific Gravity, UA 1.014 1.005 - 1.030    Glucose, UA Negative Negative    Ketones, UA Negative Negative    Bilirubin, UA Negative Negative    Blood, UA Negative Negative    Protein, UA Negative Negative    Leuk Esterase, UA Negative Negative    Nitrite, UA Negative Negative    Urobilinogen, UA 0.2 E.U./dL 0.2 - 1.0 E.U./dL   Magnesium    Collection Time: 11/13/18  1:59 PM   Result Value Ref Range    Magnesium 2.0 1.6 - 2.4 mg/dL   CK    Collection Time: 11/13/18  1:59 PM   Result Value Ref Range    Creatine Kinase 62 20 - 180 U/L   Ethanol    Collection Time: 11/13/18  1:59 PM   Result Value Ref Range    Ethanol <10 0 - 10 mg/dL    Ethanol % <0.010 %   CBC Auto Differential    Collection Time: 11/13/18  1:59 PM   Result Value Ref Range    WBC 6.98 4.50 - 10.70 10*3/mm3    RBC 3.48 (L) 3.90 - 5.20 10*6/mm3    Hemoglobin 12.0 11.9 - 15.5 g/dL    Hematocrit 35.7 35.6 - 45.5 %    .6 (H) 80.5 - 98.2 fL    MCH 34.5 (H) 26.9 - 32.0 pg    MCHC 33.6 32.4 - 36.3 g/dL    RDW 11.9 11.7 - 13.0 %    RDW-SD 45.0 37.0 - 54.0 fl    MPV 8.9 6.0 - 12.0 fL    Platelets 188 140 - 500 10*3/mm3    Neutrophil % 65.8 42.7 - 76.0 %    Lymphocyte % 20.8 19.6 - 45.3 %    Monocyte % 9.6 5.0 - 12.0 %    Eosinophil % 3.2 0.3 - 6.2 %    Basophil % 0.6 0.0 - 1.5 %    Immature Grans % 0.0 0.0 - 0.5 %    Neutrophils, Absolute 4.60 1.90 - 8.10 10*3/mm3    Lymphocytes, Absolute 1.45 0.90 - 4.80 10*3/mm3    Monocytes, Absolute 0.67  0.20 - 1.20 10*3/mm3    Eosinophils, Absolute 0.22 0.00 - 0.70 10*3/mm3    Basophils, Absolute 0.04 0.00 - 0.20 10*3/mm3    Immature Grans, Absolute 0.00 0.00 - 0.03 10*3/mm3   Urine Drug Screen - Urine, Catheter    Collection Time: 11/13/18  2:00 PM   Result Value Ref Range    Amphet/Methamphet, Screen Negative Negative    Barbiturates Screen, Urine Negative Negative    Benzodiazepine Screen, Urine Negative Negative    Cocaine Screen, Urine Negative Negative    Opiate Screen Negative Negative    THC, Screen, Urine Negative Negative    Methadone Screen, Urine Negative Negative    Oxycodone Screen, Urine Negative Negative             IMPRESSION:  Patient is a 88 y.o. Not  or  female with <principal problem not specified>  PLAN:   - Admited to: Juan Carlos Harper MD  - Disposition: Because this patient takes Plavix, and there is severe swelling to the ankle Due to insufficient elevation and the nature of her bad injury, I do not feel that proceeding with surgery today would be prudent.  Additionally, the family would like a second opinion by another orthopedist and was taking care of the daughter in the past.  I am fine with this.  I would be happy to take care of this patient should they choose to proceed with surgery with me.  I will wait to hear more before scheduling surgery, but likely would schedule surgery next Tuesday for open reduction internal fixation of the ankle. If she decides to proceed with surgery with me, she may be discharged back to her care facility and come back to the hospital next Tuesday for surgery.  I would have her admitted at least overnight after surgery.  Before proceeding I will wait to hear back if the patient wishes for me to take care of her.    Cristian Hill II, MD  Orthopaedic Surgery  [facility]

## 2018-11-14 NOTE — NURSING NOTE
Spoke with Dr. Harper, aware pt and daughter request a second opinion from Dr. Servin r/t  Right ankle fx. MD aware that Dr. Hill is also aware.

## 2018-11-14 NOTE — CONSULTS
Date of Hospital Visit: 11/15/18  Encounter Provider: Bandar Ruiz MD  Place of Service: [facility]  Patient Name: Kim Feldman  :3/13/1930  Referral Provider: Juan Carlos Harper MD    Chief complaint: Fall with fracture of right ankle    Consulted for: perioperative clearance      History of Present Illness:  Ms Feldman is an 89yo woman who presents after a fall which resulted in a fractured leg.  She denies syncope.      She has SSS and has a pacemaker (Medtronic Adapta, dual chamber).  She does not have CAD or CHF.  An echo in 2017 was unremarkable for age.  She has hypertension, dementia, and many other chronic medical problems.      She is on clopidogrel for a prior history of stroke.    She denies chest pain , dyspnea, orthopnea, palpitations, or syncope.     Previous Results  7/3/2018 [facility] device check results  Remote report received and reviewed. Normal dual chamber pacemaker function. Presenting rhythm is AS/VS@ 70bpm. Apace 6.2%, Vpace 0.6%. No episodes reported. Lead trending appears stable. Will follow up with clinic in 3 months      2017 ECHO  Interpretation Summary   · Left ventricular systolic function is normal. Calculated EF = 68.6%. Estimated EF was in agreement with the calculated EF. Estimated EF = 69%. Normal left ventricular cavity size noted. All left ventricular wall segments contract normally. Left ventricular wall thickness is consistent with mild concentric hypertrophy. Left ventricular diastolic function was indeterminate.  · Normal right ventricular cavity size, wall thickness, systolic function and septal motion noted. Electronic lead present in the ventricle.  · Left atrial volume is moderately increased.  · The mitral valve is abnormal in structure. Mitral annular calcification is present. Mild mitral valve regurgitation is present. Mild mitral valve stenosis is present. The mitral valve mean gradient is 6 mmHg.  · The  tricuspid valve is grossly normal. Mild tricuspid valve regurgitation is present. Estimated right ventricular systolic pressure from tricuspid regurgitation is mildly elevated (35-45 mmHg). Calculated right ventricular systolic pressure from tricuspid regurgitation is 36.2 mmHg.         Past Medical History:   Diagnosis Date   • Anemia    • Bulging lumbar disc    • Chronic cough    • Chronic UTI    • Chronic venous insufficiency    • 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HCC)    • DDD (degenerative disc disease), lumbosacral    • Dementia without behavioral disturbance     moderate   • Depression, endogenous (CMS/HCC)    • Disc degeneration, lumbar    • Dysphagia    • GERD (gastroesophageal reflux disease)    • Hiatal hernia    • History of anxiety    • History of aspiration pneumonitis    • History of cerebral artery occlusion     CVA (following TIA), 10/10, treated with tPA   • History of herpes zoster    • History of ingrowing nail    • History of osteoporosis    • History of poliomyelitis     child   • History of sciatica    • History of sick sinus syndrome     s/p PPM   • History of transient cerebral ischemia     followed by stroke in 10/2010. BIBI was normal.   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)    • Vitamin D deficiency    • Zenker's diverticulum        Past Surgical History:   Procedure Laterality Date   • CARDIAC PACEMAKER PLACEMENT      secondary to SSS, placed in 2004, with generator change in 2014   • CATARACT  EXTRACTION W/ INTRAOCULAR LENS IMPLANT Bilateral    • COLONOSCOPY     • HAND SURGERY Right    • KNEE ARTHROPLASTY Left    • LAMINECTOMY FOR IMPLANTATION / PLACEMENT NEUROSTIMULATOR ELECTRODES     • LUMBAR LAMINECTOMY      L-3 L4 L4 L5   • TONSILLECTOMY         Prior to Admission medications    Medication Sig Start Date End Date Taking? Authorizing Provider   acetaminophen (TYLENOL) 325 MG tablet Take 650 mg by mouth Every 6 (Six) Hours As Needed for Mild Pain .   Yes Sammy Veronica MD   artificial tears (LUBRIFRESH P.M.) ointment ophthalmic ointment Administer 1 application to both eyes every night at bedtime.   Yes Sammy Veronica MD   B Complex-Biotin-FA (SUPER B-50 COMPLEX) capsule Take 1 capsule by mouth Every Morning.   Yes Sammy Veronica MD   calcium carbonate (TUMS) 500 MG chewable tablet Chew 1-2 tablets 3 (Three) Times a Day As Needed for Indigestion or Heartburn.   Yes Sammy Veronica MD   Calcium-Vitamin D-Vitamin K (VIACTIV) 500-500-40 MG-UNT-MCG chewable tablet Chew 1 tablet Daily.   Yes Sammy Veronica MD   clopidogrel (PLAVIX) 75 MG tablet Take 75 mg by mouth Daily.   Yes Sammy Veronica MD   diclofenac (VOLTAREN) 1 % gel gel Apply 4 g topically to the appropriate area as directed 2 (Two) Times a Day As Needed (Apply to bilateral knees). HOLD PRIOR TO SURGERY   Yes Sammy Veronica MD   docusate sodium (COLACE) 250 MG capsule Take 250 mg by mouth Every Morning.   Yes Sammy Veronica MD   docusate sodium (COLACE) 250 MG capsule Take 250 mg by mouth At Night As Needed for Constipation.   Yes Sammy Veronica MD   escitalopram (LEXAPRO) 20 MG tablet Take 20 mg by mouth Daily.   Yes Sammy Veronica MD   gabapentin (NEURONTIN) 300 MG capsule Take 2 capsules by mouth 3 (Three) Times a Day. 9/19/17  Yes Sharad Salinas MD   ketoconazole (NIZORAL) 2 % cream Apply 1 application topically to the appropriate area as directed 2 (Two) Times a Day. Apply  to abdominal folds 17  Yes Sammy Veronica MD   lansoprazole (PREVACID) 30 MG capsule Take 30 mg by mouth Daily.   Yes Sammy Veronica MD   levETIRAcetam (KEPPRA) 750 MG tablet Take 1 tablet by mouth 2 (Two) Times a Day. 10/18/18  Yes Arsalan Veelz Jr., MD   methylcellulose, Laxative, (CITRUCEL) 500 MG tablet tablet Take 2 tablets by mouth Every Morning.   Yes Sammy Veronica MD   mineral oil-hydrophilic petrolatum (AQUAPHOR) ointment Apply 1 application topically to the appropriate area as directed 1 (One) Time Per Week. On Monday after shower   Yes Sammy Veronica MD   mirtazapine (REMERON) 15 MG tablet Take 15 mg by mouth Every Night.   Yes Sammy Veronica MD       Social History     Socioeconomic History   • Marital status:      Spouse name: Not on file   • Number of children: 3   • Years of education: Not on file   • Highest education level: Not on file   Social Needs   • Financial resource strain: Not on file   • Food insecurity - worry: Not on file   • Food insecurity - inability: Not on file   • Transportation needs - medical: Not on file   • Transportation needs - non-medical: Not on file   Occupational History   • Occupation: Retired   Tobacco Use   • Smoking status: Former Smoker     Packs/day: 1.00     Years: 40.00     Pack years: 40.00     Types: Cigarettes     Last attempt to quit:      Years since quittin.8   • Smokeless tobacco: Never Used   • Tobacco comment: caffeine use: one cup of coffee in the morning.    Substance and Sexual Activity   • Alcohol use: Yes     Alcohol/week: 0.6 oz     Types: 1 Shots of liquor per week     Comment: 1-2 DRINKS DAILY; WEAK IN STRENGTH   • Drug use: No   • Sexual activity: Defer   Other Topics Concern   • Not on file   Social History Narrative   • Not on file       Family History   Problem Relation Age of Onset   • Cancer Daughter        Review of Systems   Cardiovascular: Positive for leg swelling.  "  Musculoskeletal: Positive for arthralgias, back pain and gait problem.   Hematological: Bruises/bleeds easily.   All other systems reviewed and are negative.       Objective:     Vitals:    11/14/18 1308 11/14/18 1945 11/14/18 2345 11/15/18 0710   BP: 108/74 100/63 108/52 121/60   BP Location: Right arm Right arm Right arm Right arm   Patient Position: Lying Lying Lying Lying   Pulse:  77 75 76   Resp: 16 16 18 16   Temp: 97.6 °F (36.4 °C) 98.3 °F (36.8 °C) 98.1 °F (36.7 °C) 98 °F (36.7 °C)   TempSrc: Oral Oral Oral Oral   SpO2: 97% 94% 96% 100%   Weight:       Height:         Body mass index is 39.87 kg/m².  Flowsheet Rows      First Filed Value   Admission Height  157.5 cm (62\") Documented at 11/13/2018 1308   Admission Weight  103 kg (227 lb 8 oz) Documented at 11/13/2018 1308          Physical Exam   Constitutional:   obese   HENT:   Head: Normocephalic.   Nose: Nose normal.   Mouth/Throat: Oropharynx is clear and moist.   Eyes: Conjunctivae and EOM are normal. Pupils are equal, round, and reactive to light.   Neck: Normal range of motion.   Cannot assess JVD due to body habitus   Cardiovascular: Normal rate, regular rhythm and intact distal pulses. Exam reveals distant heart sounds.   Pulmonary/Chest: Effort normal and breath sounds normal.   Abdominal: Soft.   Cannot feel organs or aorta   Musculoskeletal: Normal range of motion. She exhibits deformity (RLE extensively bandaged). She exhibits no edema.   Neurological: She is alert. No cranial nerve deficit.   Skin: Skin is warm and dry. No erythema.   Numerous bruises   Psychiatric: She has a normal mood and affect. Her behavior is normal.   Vitals reviewed.              Lab Review:                Results from last 7 days   Lab Units  11/15/18   0346   SODIUM mmol/L  133*   POTASSIUM mmol/L  4.2   CHLORIDE mmol/L  99   CO2 mmol/L  25.8   BUN mg/dL  22   CREATININE mg/dL  1.44*   GLUCOSE mg/dL  100*   CALCIUM mg/dL  8.4*     Results from last 7 days   Lab " Units  11/13/18   1359   CK TOTAL U/L  62   TROPONIN T ng/mL  <0.010     Results from last 7 days   Lab Units  11/15/18   0346   WBC 10*3/mm3  6.56   HEMOGLOBIN g/dL  9.0*   HEMATOCRIT %  27.7*   PLATELETS 10*3/mm3  140     Results from last 7 days   Lab Units  11/13/18   1359   INR   1.07     Results from last 7 days   Lab Units  11/15/18   0346   CHOLESTEROL mg/dL  124     Results from last 7 days   Lab Units  11/15/18   0346   MAGNESIUM mg/dL  2.1     Results from last 7 days   Lab Units  11/15/18   0346   CHOLESTEROL mg/dL  124   TRIGLYCERIDES mg/dL  56   HDL CHOL mg/dL  44   LDL CHOL mg/dL  69     11/13/2018 @ 1852      11/13/2018 @1335      12/13/2017       I personally viewed and interpreted the patient's EKG/Telemetry data -- NSR, PRWP, no change from prior    Assessment/Plan:     1.  Fractured leg  2.  Preoperative cardiovascular exam  3.  SSS s/p PPM  4.  History of stroke    She has no cardiac contraindications to surgery.  Her clopidogrel is for the stroke, not for cardiac reasons.  I'll have her device interrogated today.      She's on IVF as her SCr crept up and she has low UOP.  Watch for any evidence of volume overload.    As long as her PPM interrogation is okay, I will see her as needed.    Add:    No events on PPM interrogation.

## 2018-11-14 NOTE — PROGRESS NOTES
Discharge Planning Assessment  [facility]     Patient Name: Kim Feldman  MRN: [id number]  Today's Date: 11/14/2018    Admit Date: 11/13/2018    Discharge Needs Assessment     Row Name 11/14/18 0942       Living Environment    Lives With  facility resident    Current Living Arrangements  independent/assisted living facility    Primary Care Provided by  self    Provides Primary Care For  no one    Quality of Family Relationships  helpful;involved    Able to Return to Prior Arrangements  yes    Living Arrangement Comments  pt lives at [facility]        Resource/Environmental Concerns    Resource/Environmental Concerns  none       Transition Planning    Patient/Family Anticipates Transition to  other (see comments) assisted living    Transportation Anticipated  family or friend will provide       Discharge Needs Assessment    Readmission Within the Last 30 Days  no previous admission in last 30 days    Concerns to be Addressed  denies needs/concerns at this time    Equipment Currently Used at Home  walker, rolling;shower chair    Equipment Needed After Discharge  none    Offered/Gave Vendor List  yes    Discharge Coordination/Progress  return to [facility] until Surgery        Discharge Plan     Row Name 11/14/18 0946       Plan    Plan  return to [facility] until Surgery    Patient/Family in Agreement with Plan  yes    Plan Comments  Checked IMM. Met with pt and pt's daughter Leandra [phone] bedside. Permission to speak with daughter who is POA, paperwork is on file. Pt is from [facility] at [facility] personal care. Pt uses a walker to ambulate. Pt uses [facility] for prescriptions unless they are long term medications then [facility] gets them for her. Pt has had HH in the past but daughter is unsure what agency she has used. Pt has never been to SNF. Daughter reports she plans for pt to return to [facility] until her surgery. She reports if pt will need SNF after surgery  they will probably go to [facility] but no referral made. [facility] and  list provided. VM left for Rosalie at [facility] to see if they will need to evaluate pt prior to her returning. CCP to follow…ALEX Villasenor        Destination      No service coordination in this encounter.      Durable Medical Equipment      No service coordination in this encounter.      Dialysis/Infusion      No service coordination in this encounter.      Home Medical Care      No service coordination in this encounter.      Community Resources      No service coordination in this encounter.          Demographic Summary     Row Name 11/14/18 0941       General Information    Admission Type  inpatient    Arrived From  home    Required Notices Provided  Important Message from Medicare    Reason for Consult  discharge planning    Preferred Language  English     Used During This Interaction  no       Contact Information    Permission Granted to Share Info With  facility ;family/designee        Functional Status     Row Name 11/14/18 0941       Functional Status    Usual Activity Tolerance  moderate    Current Activity Tolerance  moderate       Functional Status, IADL    Medications  assistive person    Meal Preparation  assistive person    Housekeeping  assistive person    Laundry  assistive person    Shopping  assistive person       Mental Status    General Appearance WDL  WDL       Mental Status Summary    Recent Changes in Mental Status/Cognitive Functioning  no changes        Psychosocial    No documentation.       Abuse/Neglect    No documentation.       Legal    No documentation.       Substance Abuse    No documentation.       Patient Forms    No documentation.           Paty Dimas

## 2018-11-14 NOTE — NURSING NOTE
Dr. Harper aware pt voided once today and states doesn't feel like she has to pee. Aware bladder scan results= 93ml. New orders rec'd.

## 2018-11-14 NOTE — PAYOR COMM NOTE
"Kim Feldman (88 y.o. Female)     ATTN: NURSE REVIEW    REF#[id number]  PLEASE CALL BACK TO ROSALIND BEASLEY@[phone] OR [phone]  THANKS!   ROSALIND      Date of Birth Social Security Number Address Home Phone MRN    03/13/1930  [place] AT [address] [place] [address] [phone] [id number]    Zoroastrian Marital Status          Yazdanism        Admission Date Admission Type Admitting Provider Attending Provider Department, Room/Bed    11/13/18 Emergency Juan Carlos Harper MD Ahmed, Aftab, MD [address], E455/1    Discharge Date Discharge Disposition Discharge Destination                       Attending Provider:  Juan Carlos aHrper MD    Allergies:  Lipitor [Atorvastatin], Penicillins    Isolation:  None   Infection:  None   Code Status:  No CPR    Ht:  157.5 cm (62\")   Wt:  98.9 kg (218 lb)    Admission Cmt:  None   Principal Problem:  None                Active Insurance as of 11/13/2018     Primary Coverage     Payor Plan Insurance Group Employer/Plan Group    AETNA MEDICARE REPLACEMENT AETNA [id number]     Payor Plan Address Payor Plan Phone Number Payor Plan Fax Number Effective Dates    [address] [phone]  1/1/2018 - None Entered    [facility] [id number]       Subscriber Name Subscriber Birth Date Member ID       KIM FELDMAN 3/13/1930 [id number]                 Emergency Contacts      (Rel.) Home Phone Work Phone Mobile Phone    Leandra Feldman (Daughter) [phone] -- [phone]               History & Physical      Juan Carlos Harper MD at 11/14/2018 11:26 AM          History and physical    Primary care physician  Dr. Nelson    Chief complaint  Right ankle pain and right hip pain  Status post fall    History of present illness  88-year-old white female with very complex past medical history including coronary artery disease hypertension hyperlipidemia seizure disorder low back pain gastroesophageal reflux disease " dementia who is a nursing home resident brought to the emergency room after the unwitnessed fall to the bathroom when she lost her balance and fell and does not remember what happened.  Patient complain of right hip pain right ankle pain but denies any chest pain and increased shortness of breath abdominal pain nausea vomiting diarrhea.  Patient workup in ER revealed right ankle fracture admitted for management.  At the time of interview patient is awake and alert consult question appropriately and hurting at the fracture site but no other complaints except generalized weakness.    PAST MEDICAL HISTORY  • Anemia     • Bulging lumbar disc     • Chronic cough     • Chronic UTI     • Chronic venous insufficiency     • 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HCC)     • DDD (degenerative disc disease), lumbosacral     • Dementia without behavioral disturbance     • Depression, endogenous (CMS/HCC)     • Disc degeneration, lumbar     • Dysphagia     • GERD (gastroesophageal reflux disease)     • Hiatal hernia     • History of anxiety     • History of aspiration pneumonitis     • History of cerebral artery occlusion     • History of herpes zoster     • History of ingrowing nail     • History of osteoporosis     • History of poliomyelitis     • History of sciatica     • History of sick sinus syndrome     • History of transient cerebral ischemia     • History of Zenker's diverticulum removal 2016   • HX: long term anticoagulant use     • Hyperlipidemia     • Hypertension     • Hyponatremia     • Intertrigo     • Lumbar radiculopathy     • Morbid obesity (CMS/HCC)     • Neuralgia     • Nonepileptic episode (CMS/HCC)     • Osteoarthritis of right knee     • Pacemaker     • Pneumonia     • Seeing double     • 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)     • Vitamin D deficiency     • Zenker's diverticulum        PAST SURGICAL HISTORY  Surgical History         Past Surgical History:   Procedure Laterality Date   • CARDIAC  PACEMAKER PLACEMENT         secondary to SSS, placed in , with generator change in    • CATARACT EXTRACTION W/ INTRAOCULAR LENS IMPLANT Bilateral     • COLONOSCOPY       • HAND SURGERY Right     • KNEE ARTHROPLASTY Left     • LAMINECTOMY FOR IMPLANTATION / PLACEMENT NEUROSTIMULATOR ELECTRODES       • LUMBAR LAMINECTOMY         L-3 L4 L4 L5   • TONSILLECTOMY             FAMILY HISTORY        Family History   Problem Relation Age of Onset   • Cancer Daughter        SOCIAL HISTORY  Social History               Socioeconomic History   • Marital status:        Spouse name: Not on file   • Number of children: 3   • Years of education: Not on file   • Highest education level: Not on file   Social Needs   • Financial resource strain: Not on file   • Food insecurity - worry: Not on file   • Food insecurity - inability: Not on file   • Transportation needs - medical: Not on file   • Transportation needs - non-medical: Not on file   Occupational History   • Occupation: Retired   Tobacco Use   • Smoking status: Former Smoker       Packs/day: 1.00       Years: 40.00       Pack years: 40.00       Types: Cigarettes       Last attempt to quit:        Years since quittin.8   • Smokeless tobacco: Never Used   • Tobacco comment: caffeine use: one cup of coffee in the morning.    Substance and Sexual Activity   • Alcohol use: Yes       Alcohol/week: 0.6 oz       Types: 1 Shots of liquor per week       Comment: 1-2 DRINKS DAILY; WEAK IN STRENGTH   • Drug use: No   • Sexual activity: Defer   Other Topics Concern   • Not on file   Social History Narrative   • Not on file         ALLERGIES  Lipitor [atorvastatin] and Penicillins     REVIEW OF SYSTEMS  Review of Systems   Constitutional: Negative for fever.   HENT: Negative for sore throat.    Eyes: Negative.    Respiratory: Negative for cough and shortness of breath.    Cardiovascular: Negative for chest pain.   Gastrointestinal: Negative for abdominal pain,  "diarrhea and vomiting.   Genitourinary: Negative for dysuria and enuresis.   Musculoskeletal: Negative for neck pain.   Skin: Negative for rash.   Allergic/Immunologic: Negative.    Neurological: Positive for headaches (unsure of chronicity). Negative for weakness and numbness.   Hematological: Negative.    Psychiatric/Behavioral: Positive for confusion (per daughter pt normally oriented, disoriented starting today).   All other systems reviewed and are negative.     PHYSICAL EXAM  Blood pressure 103/68, pulse 76, temperature 98.5 °F (36.9 °C), temperature source Oral, resp. rate 16, height 157.5 cm (62\"), weight 98.9 kg (218 lb), SpO2 97 %, not currently breastfeeding.    Constitutional: No distress.   Head: Normocephalic and atraumatic.   No notable bite alycia to tongue or lip   Eyes: EOM are normal. Pupils are equal, round, and reactive to light.   Neck: Normal range of motion. Neck supple.   Cardiovascular: Normal rate, regular rhythm and normal heart sounds.   Pulmonary/Chest: Effort normal and breath sounds normal. No respiratory distress.   Abdominal: Soft. There is no tenderness. There is no rebound and no guarding.   Musculoskeletal: Normal range of motion. She exhibits edema (2+ edema to feet and ankles bilaterally and increased at the right ankle at the site of the injury.).   Pt moves all extremities.  Diffuse contusion and ecchymosis to R ankle.  He has no cyanosis or pallor to the toes and is motor and sensory intact to the right foot and toes.   Neurological: She is alert. She has normal sensation and normal strength. No cranial nerve deficit. GCS score is 15.   Appears to be chronically demented, but daughter states she is normally A+Ox3    Skin: Skin is warm and dry. No rash noted.   Psychiatric: Mood and affect normal.       LAB RESULTS  Lab Results (last 24 hours)     Procedure Component Value Units Date/Time    Urine Drug Screen - Urine, Catheter [[id number]]  (Normal) Collected:  11/13/18 1400    " Specimen:  Urine, Catheter Updated:  11/13/18 1510     Amphet/Methamphet, Screen Negative     Barbiturates Screen, Urine Negative     Benzodiazepine Screen, Urine Negative     Cocaine Screen, Urine Negative     Opiate Screen Negative     THC, Screen, Urine Negative     Methadone Screen, Urine Negative     Oxycodone Screen, Urine Negative    Narrative:       Negative Thresholds For Drugs Screened:     Amphetamines               500 ng/ml   Barbiturates               200 ng/ml   Benzodiazepines            100 ng/ml   Cocaine                    300 ng/ml   Methadone                  300 ng/ml   Opiates                    300 ng/ml   Oxycodone                  100 ng/ml   THC                        50 ng/ml    The Normal Value for all drugs tested is negative. This report includes final unconfirmed screening results to be used for medical treatment purposes only. Unconfirmed results must not be used for non-medical purposes such as employment or legal testing. Clinical consideration should be applied to any drug of abuse test, particulary when unconfirmed results are used.    Comprehensive Metabolic Panel [[id number]]  (Abnormal) Collected:  11/13/18 1359    Specimen:  Blood Updated:  11/13/18 1450     Glucose 97 mg/dL      BUN 13 mg/dL      Creatinine 0.80 mg/dL      Sodium 133 mmol/L      Potassium 4.3 mmol/L      Chloride 93 mmol/L      CO2 27.2 mmol/L      Calcium 9.2 mg/dL      Total Protein 7.4 g/dL      Albumin 3.90 g/dL      ALT (SGPT) 12 U/L      AST (SGOT) 17 U/L      Alkaline Phosphatase 62 U/L      Total Bilirubin 0.7 mg/dL      eGFR Non African Amer 68 mL/min/1.73      Globulin 3.5 gm/dL      A/G Ratio 1.1 g/dL      BUN/Creatinine Ratio 16.3     Anion Gap 12.8 mmol/L     Narrative:       The MDRD GFR formula is only valid for adults with stable renal function between ages 18 and 70.    Magnesium [[id number]]  (Normal) Collected:  11/13/18 1359    Specimen:  Blood Updated:  11/13/18 1450     Magnesium 2.0  mg/dL     CK [[id number]]  (Normal) Collected:  11/13/18 1359    Specimen:  Blood Updated:  11/13/18 1450     Creatine Kinase 62 U/L     Ethanol [[id number]] Collected:  11/13/18 1359    Specimen:  Blood Updated:  11/13/18 1450     Ethanol <10 mg/dL      Ethanol % <0.010 %     Troponin [[id number]]  (Normal) Collected:  11/13/18 1359    Specimen:  Blood Updated:  11/13/18 1449     Troponin T <0.010 ng/mL     Narrative:       Troponin T Reference Ranges:  Less than 0.03 ng/mL:    Negative for AMI  0.03 to 0.09 ng/mL:      Indeterminant for AMI  Greater than 0.09 ng/mL: Positive for AMI    Protime-INR [[id number]]  (Normal) Collected:  11/13/18 1359    Specimen:  Blood Updated:  11/13/18 1442     Protime 13.7 Seconds      INR 1.07    Urinalysis With Microscopic If Indicated (No Culture) - Urine, Catheter [[id number]]  (Normal) Collected:  11/13/18 1359    Specimen:  Urine, Catheter Updated:  11/13/18 1430     Color, UA Yellow     Appearance, UA Clear     pH, UA 8.0     Specific Gravity, UA 1.014     Glucose, UA Negative     Ketones, UA Negative     Bilirubin, UA Negative     Blood, UA Negative     Protein, UA Negative     Leuk Esterase, UA Negative     Nitrite, UA Negative     Urobilinogen, UA 0.2 E.U./dL    Narrative:       Urine microscopic not indicated.    CBC & Differential [[id number]] Collected:  11/13/18 1359    Specimen:  Blood Updated:  11/13/18 1425    Narrative:       The following orders were created for panel order CBC & Differential.  Procedure                               Abnormality         Status                     ---------                               -----------         ------                     CBC Auto Differential[[id number]]        Abnormal            Final result                 Please view results for these tests on the individual orders.    CBC Auto Differential [[id number]]  (Abnormal) Collected:  11/13/18 1359    Specimen:  Blood Updated:  11/13/18 1425     WBC 6.98 10*3/mm3      RBC 3.48  10*6/mm3      Hemoglobin 12.0 g/dL      Hematocrit 35.7 %      .6 fL      MCH 34.5 pg      MCHC 33.6 g/dL      RDW 11.9 %      RDW-SD 45.0 fl      MPV 8.9 fL      Platelets 188 10*3/mm3      Neutrophil % 65.8 %      Lymphocyte % 20.8 %      Monocyte % 9.6 %      Eosinophil % 3.2 %      Basophil % 0.6 %      Immature Grans % 0.0 %      Neutrophils, Absolute 4.60 10*3/mm3      Lymphocytes, Absolute 1.45 10*3/mm3      Monocytes, Absolute 0.67 10*3/mm3      Eosinophils, Absolute 0.22 10*3/mm3      Basophils, Absolute 0.04 10*3/mm3      Immature Grans, Absolute 0.00 10*3/mm3     POC Glucose Once [[id number]]  (Normal) Collected:  11/13/18 1341    Specimen:  Blood Updated:  11/13/18 1358     Glucose 93 mg/dL         Imaging Results (last 24 hours)     Procedure Component Value Units Date/Time    XR Chest PA & Lateral [[id number]] Collected:  11/13/18 2119     Updated:  11/13/18 2134    Narrative:       PA AND LATERAL CHEST X-RAY     HISTORY: Hypertension and pacemaker. Former smoker. Preop ankle fracture  repair.     Chest x-ray consisting of PA and lateral views is provided.  Comparison  exams: Chest x-ray 12/13/2017.     FINDINGS: There is a cardiac pacing device and a spinal cord stimulator.  The cardiomediastinal silhouette is normal. The lungs are clear. The  costophrenic sulci are dry and the bones appear normal. There is no  pneumothorax.       Impression:       Negative.     This report was finalized on 11/13/2018 9:31 PM by Dr. Billy Red M.D.       XR Hip With or Without Pelvis 2 - 3 View Right [[id number]] Collected:  11/13/18 1535     Updated:  11/13/18 1605    Narrative:       PELVIS RIGHT HIP, RIGHT LOWER LEG, AND RIGHT ANKLE X-RAYS     HISTORY: Fall, pain in all of the above locations.     An AP view of the pelvis and 2 views of the right hip are provided.     FINDINGS: There is advanced degenerative change in lumbar spine with  some electronic hardware over the lower back, presumably a  spinal  stimulator device. The SI joints appear normal. There is mild  degenerative change at the right hip. No fracture is identified around  the pelvis or the right hip.        Four x-rays of the right lower leg and 3 x-rays of the right ankle are  provided.     FINDINGS: The distal femur, patella, proximal tibia and fibula are  intact. There is a comminuted, obliquely oriented, mildly displaced  distal fibular diametaphysis fracture. There is a large posterior  malleolus fracture which is mildly impacted. None of the ankle images  shows the distal tibia in a frontal projection; no definite medial  malleolus fracture is identified. The talus appears laterally subluxed  about 6 mm. The calcaneus and the talus appear intact.       Impression:       No acute abnormality identified at the pelvis, the right hip, or around  the right knee or proximal lower leg. There is a comminuted right ankle  fracture with slight lateral subluxation of the talus and a comminuted,  mildly displaced distal fibular diametaphysis fracture. There also  appears to be a large, mildly impacted fracture of the posterior  malleolus of the distal tibia.     This report was finalized on 11/13/2018 4:02 PM by Dr. Billy Red M.D.       XR Ankle 2 View Right [[id number]] Collected:  11/13/18 1535     Updated:  11/13/18 1605    Narrative:       PELVIS RIGHT HIP, RIGHT LOWER LEG, AND RIGHT ANKLE X-RAYS     HISTORY: Fall, pain in all of the above locations.     An AP view of the pelvis and 2 views of the right hip are provided.     FINDINGS: There is advanced degenerative change in lumbar spine with  some electronic hardware over the lower back, presumably a spinal  stimulator device. The SI joints appear normal. There is mild  degenerative change at the right hip. No fracture is identified around  the pelvis or the right hip.        Four x-rays of the right lower leg and 3 x-rays of the right ankle are  provided.     FINDINGS: The distal femur,  patella, proximal tibia and fibula are  intact. There is a comminuted, obliquely oriented, mildly displaced  distal fibular diametaphysis fracture. There is a large posterior  malleolus fracture which is mildly impacted. None of the ankle images  shows the distal tibia in a frontal projection; no definite medial  malleolus fracture is identified. The talus appears laterally subluxed  about 6 mm. The calcaneus and the talus appear intact.       Impression:       No acute abnormality identified at the pelvis, the right hip, or around  the right knee or proximal lower leg. There is a comminuted right ankle  fracture with slight lateral subluxation of the talus and a comminuted,  mildly displaced distal fibular diametaphysis fracture. There also  appears to be a large, mildly impacted fracture of the posterior  malleolus of the distal tibia.     This report was finalized on 11/13/2018 4:02 PM by Dr. Billy Red M.D.       XR Tibia Fibula 2 View Right [[id number]] Collected:  11/13/18 1535     Updated:  11/13/18 1605    Narrative:       PELVIS RIGHT HIP, RIGHT LOWER LEG, AND RIGHT ANKLE X-RAYS     HISTORY: Fall, pain in all of the above locations.     An AP view of the pelvis and 2 views of the right hip are provided.     FINDINGS: There is advanced degenerative change in lumbar spine with  some electronic hardware over the lower back, presumably a spinal  stimulator device. The SI joints appear normal. There is mild  degenerative change at the right hip. No fracture is identified around  the pelvis or the right hip.        Four x-rays of the right lower leg and 3 x-rays of the right ankle are  provided.     FINDINGS: The distal femur, patella, proximal tibia and fibula are  intact. There is a comminuted, obliquely oriented, mildly displaced  distal fibular diametaphysis fracture. There is a large posterior  malleolus fracture which is mildly impacted. None of the ankle images  shows the distal tibia in a frontal  projection; no definite medial  malleolus fracture is identified. The talus appears laterally subluxed  about 6 mm. The calcaneus and the talus appear intact.       Impression:       No acute abnormality identified at the pelvis, the right hip, or around  the right knee or proximal lower leg. There is a comminuted right ankle  fracture with slight lateral subluxation of the talus and a comminuted,  mildly displaced distal fibular diametaphysis fracture. There also  appears to be a large, mildly impacted fracture of the posterior  malleolus of the distal tibia.     This report was finalized on 11/13/2018 4:02 PM by Dr. Billy Red M.D.       CT Head Without Contrast [[id number]] Collected:  11/13/18 1518     Updated:  11/13/18 1519    Narrative:       CT HEAD WITHOUT CONTRAST AND CT CERVICAL SPINE WITHOUT CONTRAST     HISTORY: Fall, hit head, headache.     COMPARISON: CT head 12/18/2017.     FINDINGS:    CT HEAD WITHOUT CONTRAST: The brain and ventricles are symmetrical.  There is no evidence of hemorrhage, hydrocephalus or of abnormal  extra-axial fluid. No focal area of decreased attenuation to suggest  acute infarction is identified. Vascular calcification is noted. Bone  windows showed no evidence of a calvarial fracture.       Impression:       Small vessel ischemic disease and vascular calcifications  appreciated similar to 12/18/2017. There is no evidence of fracture or  of intracranial hemorrhage.     CT EXAMINATION OF THE CERVICAL SPINE WITHOUT CONTRAST: There is moderate  to severe loss of disc height from C4 to T1. There is a grade 1  anterolisthesis of C3 upon C4 estimated to be 2-3 mm and a grade 1  anterolisthesis of C4 upon C5 estimated to be 2-3 mm. There is fusion of  the facets bilaterally at C2-3.     C2-3: Moderate facet degenerative disease is present on the left.     C3-4: There is severe neuroforaminal compromise on the left secondary to  severe facet degenerative disease and moderate  uncovertebral  degenerative disease. There is a mild broad-based disc osteophyte  complex with no evidence of herniation.     C4-5: There is mild broad-based disc osteophyte complex which is  slightly more prominent to the left. There is moderate neuroforaminal  compromise on the left secondary to uncovertebral degenerative disease  and facet degenerative disease.     C5-6: There is mild central disc osteophyte complex with no evidence of  herniation. There is mild to moderate neuroforaminal compromise on the  right secondary to uncovertebral degenerative disease.     C6-7: There is a mild broad-based disc osteophyte complex with no  evidence of herniation.     C7-T1: There is no evidence of disc bulge or herniation.     Note is made that the internal carotid arteries have a very medial  course and may mimic a submucosal mass. There is dense vascular  calcification involving the right internal carotid artery at the level  of C4-5 and likely severe stenosis.     There is no evidence of fracture.     IMPRESSION:   1. Multilevel degenerative disease involving the cervical spine as  described with no evidence of fracture. See above.  2. Medial course of the internal carotid arteries with vascular  calcification suggesting severe stenosis of the internal carotid artery  on the right.     The above information was called to and discussed with Dr. Bingham.     Radiation dose reduction techniques were utilized, including automated  exposure control and exposure modulation based on body size.             CT Cervical Spine Without Contrast [[id number]] Collected:  11/13/18 1518     Updated:  11/13/18 1519    Narrative:       CT HEAD WITHOUT CONTRAST AND CT CERVICAL SPINE WITHOUT CONTRAST     HISTORY: Fall, hit head, headache.     COMPARISON: CT head 12/18/2017.     FINDINGS:    CT HEAD WITHOUT CONTRAST: The brain and ventricles are symmetrical.  There is no evidence of hemorrhage, hydrocephalus or of abnormal  extra-axial  fluid. No focal area of decreased attenuation to suggest  acute infarction is identified. Vascular calcification is noted. Bone  windows showed no evidence of a calvarial fracture.       Impression:       Small vessel ischemic disease and vascular calcifications  appreciated similar to 12/18/2017. There is no evidence of fracture or  of intracranial hemorrhage.     CT EXAMINATION OF THE CERVICAL SPINE WITHOUT CONTRAST: There is moderate  to severe loss of disc height from C4 to T1. There is a grade 1  anterolisthesis of C3 upon C4 estimated to be 2-3 mm and a grade 1  anterolisthesis of C4 upon C5 estimated to be 2-3 mm. There is fusion of  the facets bilaterally at C2-3.     C2-3: Moderate facet degenerative disease is present on the left.     C3-4: There is severe neuroforaminal compromise on the left secondary to  severe facet degenerative disease and moderate uncovertebral  degenerative disease. There is a mild broad-based disc osteophyte  complex with no evidence of herniation.     C4-5: There is mild broad-based disc osteophyte complex which is  slightly more prominent to the left. There is moderate neuroforaminal  compromise on the left secondary to uncovertebral degenerative disease  and facet degenerative disease.     C5-6: There is mild central disc osteophyte complex with no evidence of  herniation. There is mild to moderate neuroforaminal compromise on the  right secondary to uncovertebral degenerative disease.     C6-7: There is a mild broad-based disc osteophyte complex with no  evidence of herniation.     C7-T1: There is no evidence of disc bulge or herniation.     Note is made that the internal carotid arteries have a very medial  course and may mimic a submucosal mass. There is dense vascular  calcification involving the right internal carotid artery at the level  of C4-5 and likely severe stenosis.     There is no evidence of fracture.     IMPRESSION:   1. Multilevel degenerative disease involving  the cervical spine as  described with no evidence of fracture. See above.  2. Medial course of the internal carotid arteries with vascular  calcification suggesting severe stenosis of the internal carotid artery  on the right.     The above information was called to and discussed with Dr. Bingham.     Radiation dose reduction techniques were utilized, including automated  exposure control and exposure modulation based on body size.                 EKG                              Rhythm/Rate: SR, 75  P waves and MN: 1st degree AV block  QRS, axis: narrow QRS, borderline LAD  ST and T waves: nonspecific changes      Current Facility-Administered Medications:   •  aspirin chewable tablet 81 mg, 81 mg, Oral, Daily, Juan Carlos Harper MD  •  calcium carbonate (oyster shell) tablet 1,000 mg, 1,000 mg, Oral, Daily, Juan Carlos Harper MD  •  docusate sodium (COLACE) capsule 250 mg, 250 mg, Oral, QAM, Juan Carlos Harper MD  •  escitalopram (LEXAPRO) tablet 10 mg, 10 mg, Oral, Nightly, Juan Carlos Harper MD  •  gabapentin (NEURONTIN) capsule 600 mg, 600 mg, Oral, TID, Juan Carlos Harper MD, 600 mg at 11/14/18 0810  •  HYDROcodone-acetaminophen (NORCO) 5-325 MG per tablet 1 tablet, 1 tablet, Oral, Q4H PRN, Juan Carlos Harper MD, 1 tablet at 11/14/18 0819  •  levETIRAcetam (KEPPRA) tablet 750 mg, 750 mg, Oral, Q12H, Juan Carlos Harper MD, 750 mg at 11/14/18 0810  •  mirtazapine (REMERON) tablet 15 mg, 15 mg, Oral, Nightly, Juan Carlos Harper MD  •  morphine injection 1 mg, 1 mg, Intravenous, Q4H PRN, 1 mg at 11/13/18 2135 **FOLLOWED BY** [START ON 11/23/2018] morphine injection 2 mg, 2 mg, Intravenous, Q4H PRN, Juan Carlos Harper MD  •  ondansetron (ZOFRAN) injection 4 mg, 4 mg, Intravenous, Q4H PRN, Juan Carlos Harper MD  •  pantoprazole (PROTONIX) EC tablet 40 mg, 40 mg, Oral, Q AM, Juan Carlos Harper MD, 40 mg at 11/14/18 0603  •  [COMPLETED] Insert peripheral IV, , , Once **AND** sodium chloride 0.9 % flush 10 mL, 10 mL, Intravenous, PRN, Fidel Bingham MD      ASSESSMENT  Acute right ankle fracture  Status post fall  Coronary artery disease  Seizure disorder  Hypertension  Depression  Gastroesophageal reflux disease  Osteoarthritis  Degenerative disc disease  DNR    PLAN  Admit  Bedrest  Pain management  Orthopedic surgery consult  Continue home medications  Need cardiac clearance as she needs to be off Lasix for surgery  Neurology to follow for seizure disorder  Supportive care  Stress ulcer DVT prophylaxis  DNR  Discussed with family  Follow closely further recommendation according to hospital course    GAETANO HARPER MD              Electronically signed by Gaetano Harper MD at 11/14/2018 11:34 AM          Emergency Department Notes      Bandar Valentine RN at 11/13/2018 12:30 PM        Call from [facility]. Pt being transported via EMS for unwitnessed fall today in bathroom. Pt hit head, twisted R ankle and per nurse on phone pt has external rotation to R leg.  Pt is on Plavix. Jose LOC. Nurse on phone states she heard the fall and pt immediately called out for help, but nurse did not see the fall.     Electronically signed by Bandar Valentine RN at 11/13/2018 12:32 PM     Fidel Bingham MD at 11/13/2018  1:09 PM           EMERGENCY DEPARTMENT ENCOUNTER    CHIEF COMPLAINT  Chief Complaint: fall  History given by: patient/daughter  History limited by: n/a  Room Number: 31/31  PMD: Herrera Nelson MD      HPI:  Pt is a 88 y.o. female who presents from [facility] after an unwitnessed fall and blow to the head which occurred around 1230 today while the pt was in the restroom.  It was presumed by NH staff the pt was pulling up her undergarments when she fell, but pt cannot remember what happened.  Daughter reports pt was unable to get up on her own after the fall and pt normally uses a walker to ambulate.  Pt c/o HA, unsure of chronicity, R hip pain and R ankle pain, but denies neck pain, abd pain, or incontinence.  Daughter reports pt has hx of seizures  controlled by Keppra and hx of multiple falls. Daughter reports pt is baseline A+Ox3 and she just noticed disorientation today, however NH reports notes pt has moderate dementia.  Pt anticoagulated on plavix.    Duration:  1 hour  Onset: gradual  Timing: constant  Location: generalized  Radiation: none  Quality: fall  Intensity/Severity: moderate  Progression: unchanged  Associated Symptoms: HA, unsure of chronicity, R hip pain and R ankle pain, increased confusion (disorientation)  Aggravating Factors: none stated  Alleviating Factors: none stated  Previous Episodes: Daughter reports pt has hx of multiple falls.  Treatment before arrival: Pt anticoagulated on plavix and on keppra for hx of seizures.    PAST MEDICAL HISTORY  Active Ambulatory Problems     Diagnosis Date Noted   • Anemia 02/05/2016   • Bulging lumbar disc 02/05/2016   • Depression, endogenous (CMS/[facility]) 02/05/2016   • Gastroesophageal reflux disease 02/05/2016   • Hyperlipidemia 02/05/2016   • Intermittent claudication (CMS/[facility]) 02/05/2016   • Neuropathy 02/05/2016   • Osteoarthritis of knee 02/05/2016   • Syndrome of inappropriate secretion of antidiuretic hormone (CMS/[facility]) 02/05/2016   • Vitamin D deficiency 02/05/2016   • History of sick sinus syndrome    • Intertrigo 03/25/2016   • Generalized convulsive seizures (CMS/[facility]) 04/07/2016   • Change in bowel habits 05/20/2016   • 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   • History of anxiety 10/18/2016   • 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[facility]) 01/04/2017   • Thrombocytopenia (CMS/[facility]) 01/05/2017   • B12 deficiency 01/16/2017   • Pain of toe of right foot 01/16/2017   • Weakness 02/03/2017   • Cardiac pacemaker in situ 03/08/2017   • Chronic venous insufficiency 03/09/2017   • Arthritis 03/09/2017   • S/P knee replacement 03/09/2017   • Chronic bilateral low back pain without sciatica 03/09/2017   • Essential hypertension 03/09/2017   • Hiatal hernia 03/16/2017   • Zenker's diverticulum 04/14/2017   • Chronic  idiopathic constipation 05/19/2017   • Pressure sore on buttocks 05/19/2017   • Somnolence 12/13/2017     Resolved Ambulatory Problems     Diagnosis Date Noted   • Hyponatremia 02/05/2016   • Urine frequency 05/20/2016   • Urinary tract infection 07/08/2016   • Pneumonia 07/08/2016   • Aspiration pneumonia (CMS/[facility]) 07/17/2016   • Urinary tract infection in female 01/03/2017   • History of aspiration pneumonitis 01/04/2017   • Right lower lobe pneumonia (CMS/[facility]) 02/02/2017   • Sepsis due to pneumonia (CMS/[facility]) 02/02/2017   • UTI (urinary tract infection) 03/15/2017   • Acute kidney injury (CMS/[facility]) 03/17/2017   • Acute vaginitis 03/23/2017   • Acute pain of left knee 05/19/2017     Past Medical History:   Diagnosis Date   • Anemia    • Bulging lumbar disc    • Chronic cough    • Chronic UTI    • Chronic venous insufficiency    • 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[facility])    • DDD (degenerative disc disease), lumbosacral    • Dementia without behavioral disturbance    • Depression, endogenous (CMS/[facility])    • Disc degeneration, lumbar    • Dysphagia    • GERD (gastroesophageal reflux disease)    • Hiatal hernia    • History of anxiety    • History of aspiration pneumonitis    • History of cerebral artery occlusion    • History of herpes zoster    • History of ingrowing nail    • History of osteoporosis    • History of poliomyelitis    • History of sciatica    • History of sick sinus syndrome    • History of transient cerebral ischemia    • History of Zenker's diverticulum removal 2016   • HX: long term anticoagulant use    • Hyperlipidemia    • Hypertension    • Hyponatremia    • Intertrigo    • Lumbar radiculopathy    • Morbid obesity (CMS/[facility])    • Neuralgia    • Nonepileptic episode (CMS/[facility])    • Osteoarthritis of right knee    • Pacemaker    • Pneumonia    • Seeing double    • SIADH (syndrome of inappropriate ADH production) (CMS/[facility])    • Vitamin D deficiency    • Zenker's diverticulum        PAST SURGICAL HISTORY  Past  Surgical History:   Procedure Laterality Date   • CARDIAC PACEMAKER PLACEMENT      secondary to SSS, placed in , with generator change in    • CATARACT EXTRACTION W/ INTRAOCULAR LENS IMPLANT Bilateral    • COLONOSCOPY     • HAND SURGERY Right    • KNEE ARTHROPLASTY Left    • LAMINECTOMY FOR IMPLANTATION / PLACEMENT NEUROSTIMULATOR ELECTRODES     • LUMBAR LAMINECTOMY      L-3 L4 L4 L5   • TONSILLECTOMY         FAMILY HISTORY  Family History   Problem Relation Age of Onset   • Cancer Daughter        SOCIAL HISTORY  Social History     Socioeconomic History   • Marital status:      Spouse name: Not on file   • Number of children: 3   • Years of education: Not on file   • Highest education level: Not on file   Social Needs   • Financial resource strain: Not on file   • Food insecurity - worry: Not on file   • Food insecurity - inability: Not on file   • Transportation needs - medical: Not on file   • Transportation needs - non-medical: Not on file   Occupational History   • Occupation: Retired   Tobacco Use   • Smoking status: Former Smoker     Packs/day: 1.00     Years: 40.00     Pack years: 40.00     Types: Cigarettes     Last attempt to quit:      Years since quittin.8   • Smokeless tobacco: Never Used   • Tobacco comment: caffeine use: one cup of coffee in the morning.    Substance and Sexual Activity   • Alcohol use: Yes     Alcohol/week: 0.6 oz     Types: 1 Shots of liquor per week     Comment: 1-2 DRINKS DAILY; WEAK IN STRENGTH   • Drug use: No   • Sexual activity: Defer   Other Topics Concern   • Not on file   Social History Narrative   • Not on file       ALLERGIES  Lipitor [atorvastatin] and Penicillins    REVIEW OF SYSTEMS  Review of Systems   Constitutional: Negative for fever.   HENT: Negative for sore throat.    Eyes: Negative.    Respiratory: Negative for cough and shortness of breath.    Cardiovascular: Negative for chest pain.   Gastrointestinal: Negative for abdominal pain,  diarrhea and vomiting.   Genitourinary: Negative for dysuria and enuresis.   Musculoskeletal: Negative for neck pain.   Skin: Negative for rash.   Allergic/Immunologic: Negative.    Neurological: Positive for headaches (unsure of chronicity). Negative for weakness and numbness.   Hematological: Negative.    Psychiatric/Behavioral: Positive for confusion (per daughter pt normally oriented, disoriented starting today).   All other systems reviewed and are negative.      PHYSICAL EXAM  ED Triage Vitals [11/13/18 1254]   Temp Heart Rate Resp BP SpO2   -- 80 18 (!) 189/102 96 %      Temp src Heart Rate Source Patient Position BP Location FiO2 (%)   -- -- -- -- --       Physical Exam   Constitutional: No distress.   HENT:   Head: Normocephalic and atraumatic.   No notable bite alycia to tongue or lip   Eyes: EOM are normal. Pupils are equal, round, and reactive to light.   Neck: Normal range of motion. Neck supple.   Cardiovascular: Normal rate, regular rhythm and normal heart sounds.   HR 70s   Pulmonary/Chest: Effort normal and breath sounds normal. No respiratory distress.   Abdominal: Soft. There is no tenderness. There is no rebound and no guarding.   Musculoskeletal: Normal range of motion. She exhibits edema (2+ edema to feet and ankles bilaterally and increased at the right ankle at the site of the injury.).   Pt moves all extremities.  Diffuse contusion and ecchymosis to R ankle.  He has no cyanosis or pallor to the toes and is motor and sensory intact to the right foot and toes.   Neurological: She is alert. She has normal sensation and normal strength. No cranial nerve deficit. GCS score is 15.   Appears to be chronically demented, but daughter states she is normally A+Ox3    Skin: Skin is warm and dry. No rash noted.   Psychiatric: Mood and affect normal.   Nursing note and vitals reviewed.      LAB RESULTS  Lab Results (last 24 hours)     Procedure Component Value Units Date/Time    POC Glucose Once [[id number]]   (Normal) Collected:  11/13/18 1341    Specimen:  Blood Updated:  11/13/18 1358     Glucose 93 mg/dL     CBC & Differential [[id number]] Collected:  11/13/18 1359    Specimen:  Blood Updated:  11/13/18 1425    Narrative:       The following orders were created for panel order CBC & Differential.  Procedure                               Abnormality         Status                     ---------                               -----------         ------                     CBC Auto Differential[[id number]]        Abnormal            Final result                 Please view results for these tests on the individual orders.    Comprehensive Metabolic Panel [[id number]]  (Abnormal) Collected:  11/13/18 1359    Specimen:  Blood Updated:  11/13/18 1450     Glucose 97 mg/dL      BUN 13 mg/dL      Creatinine 0.80 mg/dL      Sodium 133 mmol/L      Potassium 4.3 mmol/L      Chloride 93 mmol/L      CO2 27.2 mmol/L      Calcium 9.2 mg/dL      Total Protein 7.4 g/dL      Albumin 3.90 g/dL      ALT (SGPT) 12 U/L      AST (SGOT) 17 U/L      Alkaline Phosphatase 62 U/L      Total Bilirubin 0.7 mg/dL      eGFR Non African Amer 68 mL/min/1.73      Globulin 3.5 gm/dL      A/G Ratio 1.1 g/dL      BUN/Creatinine Ratio 16.3     Anion Gap 12.8 mmol/L     Narrative:       The MDRD GFR formula is only valid for adults with stable renal function between ages 18 and 70.    Protime-INR [[id number]]  (Normal) Collected:  11/13/18 1359    Specimen:  Blood Updated:  11/13/18 1442     Protime 13.7 Seconds      INR 1.07    Troponin [[id number]]  (Normal) Collected:  11/13/18 1359    Specimen:  Blood Updated:  11/13/18 1449     Troponin T <0.010 ng/mL     Narrative:       Troponin T Reference Ranges:  Less than 0.03 ng/mL:    Negative for AMI  0.03 to 0.09 ng/mL:      Indeterminant for AMI  Greater than 0.09 ng/mL: Positive for AMI    Urinalysis With Microscopic If Indicated (No Culture) - Urine, Catheter [[id number]]  (Normal) Collected:  11/13/18 1359     Specimen:  Urine, Catheter Updated:  11/13/18 1430     Color, UA Yellow     Appearance, UA Clear     pH, UA 8.0     Specific Gravity, UA 1.014     Glucose, UA Negative     Ketones, UA Negative     Bilirubin, UA Negative     Blood, UA Negative     Protein, UA Negative     Leuk Esterase, UA Negative     Nitrite, UA Negative     Urobilinogen, UA 0.2 E.U./dL    Narrative:       Urine microscopic not indicated.    Magnesium [[id number]]  (Normal) Collected:  11/13/18 1359    Specimen:  Blood Updated:  11/13/18 1450     Magnesium 2.0 mg/dL     CK [[id number]]  (Normal) Collected:  11/13/18 1359    Specimen:  Blood Updated:  11/13/18 1450     Creatine Kinase 62 U/L     Ethanol [[id number]] Collected:  11/13/18 1359    Specimen:  Blood Updated:  11/13/18 1450     Ethanol <10 mg/dL      Ethanol % <0.010 %     CBC Auto Differential [[id number]]  (Abnormal) Collected:  11/13/18 1359    Specimen:  Blood Updated:  11/13/18 1425     WBC 6.98 10*3/mm3      RBC 3.48 10*6/mm3      Hemoglobin 12.0 g/dL      Hematocrit 35.7 %      .6 fL      MCH 34.5 pg      MCHC 33.6 g/dL      RDW 11.9 %      RDW-SD 45.0 fl      MPV 8.9 fL      Platelets 188 10*3/mm3      Neutrophil % 65.8 %      Lymphocyte % 20.8 %      Monocyte % 9.6 %      Eosinophil % 3.2 %      Basophil % 0.6 %      Immature Grans % 0.0 %      Neutrophils, Absolute 4.60 10*3/mm3      Lymphocytes, Absolute 1.45 10*3/mm3      Monocytes, Absolute 0.67 10*3/mm3      Eosinophils, Absolute 0.22 10*3/mm3      Basophils, Absolute 0.04 10*3/mm3      Immature Grans, Absolute 0.00 10*3/mm3     Urine Drug Screen - Urine, Catheter [[id number]]  (Normal) Collected:  11/13/18 1400    Specimen:  Urine, Catheter Updated:  11/13/18 1510     Amphet/Methamphet, Screen Negative     Barbiturates Screen, Urine Negative     Benzodiazepine Screen, Urine Negative     Cocaine Screen, Urine Negative     Opiate Screen Negative     THC, Screen, Urine Negative     Methadone Screen, Urine Negative      Oxycodone Screen, Urine Negative    Narrative:       Negative Thresholds For Drugs Screened:     Amphetamines               500 ng/ml   Barbiturates               200 ng/ml   Benzodiazepines            100 ng/ml   Cocaine                    300 ng/ml   Methadone                  300 ng/ml   Opiates                    300 ng/ml   Oxycodone                  100 ng/ml   THC                        50 ng/ml    The Normal Value for all drugs tested is negative. This report includes final unconfirmed screening results to be used for medical treatment purposes only. Unconfirmed results must not be used for non-medical purposes such as employment or legal testing. Clinical consideration should be applied to any drug of abuse test, particulary when unconfirmed results are used.          I ordered the above labs and reviewed the results    RADIOLOGY  XR Hip With or Without Pelvis 2 - 3 View Right   Final Result   No acute abnormality identified at the pelvis, the right hip, or around   the right knee or proximal lower leg. There is a comminuted right ankle   fracture with slight lateral subluxation of the talus and a comminuted,   mildly displaced distal fibular diametaphysis fracture. There also   appears to be a large, mildly impacted fracture of the posterior   malleolus of the distal tibia.       This report was finalized on 11/13/2018 4:02 PM by Dr. Billy Red M.D.          XR Ankle 2 View Right   Final Result   No acute abnormality identified at the pelvis, the right hip, or around   the right knee or proximal lower leg. There is a comminuted right ankle   fracture with slight lateral subluxation of the talus and a comminuted,   mildly displaced distal fibular diametaphysis fracture. There also   appears to be a large, mildly impacted fracture of the posterior   malleolus of the distal tibia.       This report was finalized on 11/13/2018 4:02 PM by Dr. Billy Red M.D.          XR Tibia Fibula 2 View Right    Final Result   No acute abnormality identified at the pelvis, the right hip, or around   the right knee or proximal lower leg. There is a comminuted right ankle   fracture with slight lateral subluxation of the talus and a comminuted,   mildly displaced distal fibular diametaphysis fracture. There also   appears to be a large, mildly impacted fracture of the posterior   malleolus of the distal tibia.       This report was finalized on 11/13/2018 4:02 PM by Dr. Billy Red M.D.          CT Head Without Contrast   Preliminary Result   Small vessel ischemic disease and vascular calcifications   appreciated similar to 12/18/2017. There is no evidence of fracture or   of intracranial hemorrhage.       CT EXAMINATION OF THE CERVICAL SPINE WITHOUT CONTRAST: There is moderate   to severe loss of disc height from C4 to T1. There is a grade 1   anterolisthesis of C3 upon C4 estimated to be 2-3 mm and a grade 1   anterolisthesis of C4 upon C5 estimated to be 2-3 mm. There is fusion of   the facets bilaterally at C2-3.       C2-3: Moderate facet degenerative disease is present on the left.       C3-4: There is severe neuroforaminal compromise on the left secondary to   severe facet degenerative disease and moderate uncovertebral   degenerative disease. There is a mild broad-based disc osteophyte   complex with no evidence of herniation.       C4-5: There is mild broad-based disc osteophyte complex which is   slightly more prominent to the left. There is moderate neuroforaminal   compromise on the left secondary to uncovertebral degenerative disease   and facet degenerative disease.       C5-6: There is mild central disc osteophyte complex with no evidence of   herniation. There is mild to moderate neuroforaminal compromise on the   right secondary to uncovertebral degenerative disease.       C6-7: There is a mild broad-based disc osteophyte complex with no   evidence of herniation.       C7-T1: There is no evidence of  disc bulge or herniation.       Note is made that the internal carotid arteries have a very medial   course and may mimic a submucosal mass. There is dense vascular   calcification involving the right internal carotid artery at the level   of C4-5 and likely severe stenosis.       There is no evidence of fracture.       IMPRESSION:    1. Multilevel degenerative disease involving the cervical spine as   described with no evidence of fracture. See above.   2. Medial course of the internal carotid arteries with vascular   calcification suggesting severe stenosis of the internal carotid artery   on the right.       The above information was called to and discussed with Dr. Bingham.       Radiation dose reduction techniques were utilized, including automated   exposure control and exposure modulation based on body size.                  CT Cervical Spine Without Contrast   Preliminary Result   Small vessel ischemic disease and vascular calcifications   appreciated similar to 12/18/2017. There is no evidence of fracture or   of intracranial hemorrhage.       CT EXAMINATION OF THE CERVICAL SPINE WITHOUT CONTRAST: There is moderate   to severe loss of disc height from C4 to T1. There is a grade 1   anterolisthesis of C3 upon C4 estimated to be 2-3 mm and a grade 1   anterolisthesis of C4 upon C5 estimated to be 2-3 mm. There is fusion of   the facets bilaterally at C2-3.       C2-3: Moderate facet degenerative disease is present on the left.       C3-4: There is severe neuroforaminal compromise on the left secondary to   severe facet degenerative disease and moderate uncovertebral   degenerative disease. There is a mild broad-based disc osteophyte   complex with no evidence of herniation.       C4-5: There is mild broad-based disc osteophyte complex which is   slightly more prominent to the left. There is moderate neuroforaminal   compromise on the left secondary to uncovertebral degenerative disease   and facet  degenerative disease.       C5-6: There is mild central disc osteophyte complex with no evidence of   herniation. There is mild to moderate neuroforaminal compromise on the   right secondary to uncovertebral degenerative disease.       C6-7: There is a mild broad-based disc osteophyte complex with no   evidence of herniation.       C7-T1: There is no evidence of disc bulge or herniation.       Note is made that the internal carotid arteries have a very medial   course and may mimic a submucosal mass. There is dense vascular   calcification involving the right internal carotid artery at the level   of C4-5 and likely severe stenosis.       There is no evidence of fracture.       IMPRESSION:    1. Multilevel degenerative disease involving the cervical spine as   described with no evidence of fracture. See above.   2. Medial course of the internal carotid arteries with vascular   calcification suggesting severe stenosis of the internal carotid artery   on the right.       The above information was called to and discussed with Dr. Bingham.       Radiation dose reduction techniques were utilized, including automated   exposure control and exposure modulation based on body size.                       I ordered the above noted radiological studies. Interpreted by radiologist. Discussed with radiologist (Dr. Rodríguez). Reviewed by me in PACS.       PROCEDURES  Procedures    EKG          EKG time: 1335  Rhythm/Rate: SR, 75  P waves and SC: 1st degree AV block  QRS, axis: narrow QRS, borderline LAD  ST and T waves: nonspecific changes    Interpreted Contemporaneously by me, independently viewed  unchanged compared to prior 12/2017      PROGRESS AND CONSULTS     1323  Ordered XR R Ankle, Tib/fib, hip, CT Head, CT C Spine. Ordered EtOH, URine Drug SCreen, Troponin, UA, Magnesium, CK, CMP,  Protime INR, POC Glucose, CBC, EKG.    1426  Witnessed pt's DNR.     1533  Placed call to ortho and [facility].    1536  Rechecked pt, who is  resting comfortably.  Daughter states pt's confusion is improving.  Discussed with pt and daughter her CT Head, CT C Spine, and labs are unremarkable at this time, XR R Ankle shows trimalleolar fracture.  Notified plan for posterior splint placement and admission with consult from ortho. Pt understands and agrees with the plan, all questions answered.    1540  ORdered morphine for pt's pain and zofran for nausea.    1605  Discussed pt's case with Dr. Hill (ortho) who agrees to consult the pt.    1607  Discussed pt's case with Dr. Harper ([facility]) who agrees to admit the pt to tele.    1620  Ordered morphine for pt's pain control for splint placement.    1624  Pt's pain is controlled with morphine. Refer to splint procedure note in Natividad Frederick's note.  Pt tolerated well. Plan for admission at this time.  Notified her of conversations with Dr. Hill, darell, and DIO Dorado.    1634  Pt still in pain ordered 1mg additional morphine.  After posterior splint placed to the right ankle the patient is neurovascularly intact and is able to wiggle toes with no pallor or coolness to her toes on her right foot.  MEDICAL DECISION MAKING  Results were reviewed/discussed with the patient and they were also made aware of online access. Pt also made aware that some labs, such as cultures, will not be resulted during ER visit and follow up with PMD is necessary.     MDM  Number of Diagnoses or Management Options     Amount and/or Complexity of Data Reviewed  Clinical lab tests: ordered and reviewed (Urine Drug Screen and EtOH negative.  UA negative.  INR 1.07.)  Tests in the radiology section of CPT®:  ordered and reviewed (CT C Spine and CT Head shows degenerative changes, no acute fracture or abnormality, signs suggestive of RCA stenosis at level C4-C5. XR R Hip, Ankle, and Tib/fib show No acute abnormality identified at the pelvis, the right hip, or around the right knee or proximal lower leg. There is a comminuted  right ankle fracture with slight lateral subluxation of the talus and a comminuted,  mildly displaced distal fibular diametaphysis fracture. There also appears to be a large, mildly impacted fracture of the posterior malleolus of the distal tibia.)  Tests in the medicine section of CPT®:  ordered and reviewed (See EKG in procedure note.)  Discussion of test results with the performing providers: yes (Dr. Rodríguez (radiology))  Obtain history from someone other than the patient: yes (Daughter)  Discuss the patient with other providers: yes (Dr. Hill (ortho).  Dr. Harper ([facility]))  Independent visualization of images, tracings, or specimens: yes           DIAGNOSIS  Final diagnoses:   Closed trimalleolar fracture of right ankle, initial encounter   Fall, initial encounter   Altered mental status, unspecified altered mental status type, improving       DISPOSITION  ADMISSION    Discussed treatment plan and reason for admission with pt/family and admitting physician.  Pt/family voiced understanding of the plan for admission for further testing/treatment as needed.       Latest Documented Vital Signs:  As of 4:34 PM  BP- (!) 200/114 HR- 71 Temp- 97.7 °F (36.5 °C) (Oral) O2 sat- 95%    --  Documentation assistance provided by smita Jaramillo for Dr. Bingham.  Information recorded by the scribsarahi was done at my direction and has been verified and validated by Cailin Beaver  11/13/18 1631       Cailin Jaramillo  11/13/18 1634       Fidel Bingham MD  11/13/18 1700      Electronically signed by Fidel Bingham MD at 11/13/2018  5:00 PM     Marilyn Frederick, JONA at 11/13/2018  4:29 PM        Splint Application  Time: 1628  Location: [facility]   Splint Type: posterior orthoglass splint  The splinted body part was neurovascularly unchanged and is in good alignment following the procedure.  Patient tolerated the procedure well with no immediate complications.    Documentation assistance provided by smita  Benito Stoddard for APRN Marilyn Frederick. Information recorded by the scribe was done at my direction and has been verified and validated by me.        Benito Stoddard  11/13/18 5916       Marilyn Frederick APRN  11/13/18 9120      Electronically signed by Marilyn Frederick APRN at 11/13/2018 11:12 PM       Hospital Medications (active)       Dose Frequency Start End    aspirin chewable tablet 81 mg 81 mg Daily 11/14/2018     Sig - Route: Chew 1 tablet Daily. - Oral    calcium carbonate (oyster shell) tablet 1,000 mg 1,000 mg Daily 11/14/2018     Sig - Route: Take 2 tablets by mouth Daily. - Oral    docusate sodium (COLACE) capsule 250 mg 250 mg Every Morning 11/14/2018     Sig - Route: Take 250 mg by mouth Every Morning. - Oral    escitalopram (LEXAPRO) tablet 10 mg 10 mg Nightly 11/14/2018     Sig - Route: Take 1 tablet by mouth Every Night. - Oral    gabapentin (NEURONTIN) capsule 600 mg 600 mg 3 Times Daily 11/13/2018     Sig - Route: Take 2 capsules by mouth 3 (Three) Times a Day. - Oral    HYDROcodone-acetaminophen (NORCO) 5-325 MG per tablet 1 tablet 1 tablet Every 4 Hours PRN 11/14/2018 11/24/2018    Sig - Route: Take 1 tablet by mouth Every 4 (Four) Hours As Needed for Moderate Pain . - Oral    levETIRAcetam (KEPPRA) tablet 750 mg 750 mg Every 12 Hours Scheduled 11/13/2018     Sig - Route: Take 750 mg by mouth Every 12 (Twelve) Hours. - Oral    mirtazapine (REMERON) tablet 15 mg 15 mg Nightly 11/14/2018     Sig - Route: Take 1 tablet by mouth Every Night. - Oral    morphine injection 1 mg 1 mg Once 11/13/2018 11/13/2018    Sig - Route: Infuse 0.5 mL into a venous catheter 1 (One) Time. - Intravenous    morphine injection 1 mg 1 mg Once 11/13/2018 11/13/2018    Sig - Route: Infuse 0.5 mL into a venous catheter 1 (One) Time. - Intravenous    morphine injection 1 mg 1 mg Every 4 Hours PRN 11/13/2018 11/23/2018    Sig - Route: Infuse 0.5 mL into a venous catheter Every 4 (Four) Hours As Needed for  "Severe Pain . - Intravenous    Linked Group 1:  \"Followed by\" Linked Group Details        morphine injection 2 mg 2 mg Every 4 Hours PRN 11/23/2018 12/3/2018    Sig - Route: Infuse 1 mL into a venous catheter Every 4 (Four) Hours As Needed for Severe Pain . - Intravenous    Linked Group 1:  \"Followed by\" Linked Group Details        ondansetron (ZOFRAN) injection 4 mg 4 mg Once 11/13/2018 11/13/2018    Sig - Route: Infuse 2 mL into a venous catheter 1 (One) Time. - Intravenous    ondansetron (ZOFRAN) injection 4 mg 4 mg Every 4 Hours PRN 11/13/2018     Sig - Route: Infuse 2 mL into a venous catheter Every 4 (Four) Hours As Needed for Nausea or Vomiting. - Intravenous    pantoprazole (PROTONIX) EC tablet 40 mg 40 mg Every Early Morning 11/14/2018     Sig - Route: Take 1 tablet by mouth Every Morning. - Oral    sodium chloride 0.9 % bolus 250 mL 250 mL Once 11/14/2018 11/14/2018    Sig - Route: Infuse 250 mL into a venous catheter 1 (One) Time. - Intravenous    sodium chloride 0.9 % flush 10 mL 10 mL As Needed 11/13/2018     Sig - Route: Infuse 10 mL into a venous catheter As Needed for Line Care. - Intravenous    Linked Group 2:  \"And\" Linked Group Details        escitalopram (LEXAPRO) tablet 20 mg (Discontinued) 20 mg Daily 11/14/2018 11/14/2018    Sig - Route: Take 1 tablet by mouth Daily. - Oral    morphine injection 1 mg (Discontinued) 1 mg Once 11/14/2018 11/14/2018    Sig - Route: Infuse 0.5 mL into a venous catheter 1 (One) Time. - Intravenous    morphine injection 1 mg (Discontinued) 1 mg Once 11/14/2018 11/14/2018    Sig - Route: Infuse 0.5 mL into a venous catheter 1 (One) Time. - Intravenous    ondansetron (ZOFRAN) injection 4 mg (Discontinued) 4 mg Once 11/14/2018 11/14/2018    Sig - Route: Infuse 2 mL into a venous catheter 1 (One) Time. - Intravenous    sodium chloride 0.9 % bolus 250 mL (Discontinued) 250 mL Once 11/14/2018 11/14/2018    Sig - Route: Infuse 250 mL into a venous catheter 1 (One) Time. - " Intravenous             Physician Progress Notes (last 72 hours) (Notes from 11/11/2018  3:18 PM through 11/14/2018  3:18 PM)      Bandar Ruiz MD at 11/14/2018 11:50 AM        Consult request received.  It would appear that surgery is not urgent.  Of note, she does not have CAD.  She simply has a pacemaker.    I will have it interrogated and I will see her in the AM of 11/15/18.    Electronically signed by Bandar Ruiz MD at 11/14/2018 11:50 AM          Consult Notes (last 72 hours) (Notes from 11/11/2018  3:18 PM through 11/14/2018  3:18 PM)      Cristian Hill II, MD at 11/14/2018  9:08 AM      Consult Orders    1. Ortho (on-call MD unless specified) [[id number]] ordered by Fidel Bingham MD at 11/13/18 1533                ORTHOPAEDIC SURGERY CONSULT NOTE  HPI:  Patient is a 88 y.o. Not  or  female who presents with Right ankle pain after a fall getting off the bathroom.  She noted immediate pain and deformity and was sent to the ER for further evaluation.  A trimalleolar right ankle fracture was found.  She was admitted and I was consult for further management.  She does take Plavix.      Past Medical History:   Diagnosis Date   • Anemia    • Bulging lumbar disc    • Chronic cough    • Chronic UTI    • Chronic venous insufficiency    • 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HCC)    • DDD (degenerative disc disease), lumbosacral    • Dementia without behavioral disturbance     moderate   • Depression, endogenous (CMS/HCC)    • Disc degeneration, lumbar    • Dysphagia    • GERD (gastroesophageal reflux disease)    • Hiatal hernia    • History of anxiety    • History of aspiration pneumonitis    • History of cerebral artery occlusion     CVA (following TIA), 10/10, treated with tPA   • History of herpes zoster    • History of ingrowing nail    • History of osteoporosis    • History of poliomyelitis     child   • History of sciatica    • History of sick sinus syndrome     s/p PPM   • History of  transient cerebral ischemia     followed by stroke in 10/2010. BIBI was normal.   • History of Zenker's diverticulum removal 2016   • HX: long term anticoagulant use    • Hyperlipidemia    • Hypertension     NO CURRENT MEDICATION   • Hyponatremia    • Intertrigo    • Lumbar radiculopathy    • Morbid obesity (CMS/HCC)    • Neuralgia     with diplopia secondary to facial shingles   • Nonepileptic episode (CMS/HCC)    • Osteoarthritis of right knee    • Pacemaker    • Pneumonia    • Seeing double     secondary to shingles on the face also with neuralgia   • SIADH (syndrome of inappropriate ADH production) (CMS/[facility])    • Vitamin D deficiency    • Zenker's diverticulum      Past Surgical History:   Procedure Laterality Date   • CARDIAC PACEMAKER PLACEMENT      secondary to SSS, placed in 2004, with generator change in 2014   • CATARACT EXTRACTION W/ INTRAOCULAR LENS IMPLANT Bilateral    • COLONOSCOPY     • HAND SURGERY Right    • KNEE ARTHROPLASTY Left    • LAMINECTOMY FOR IMPLANTATION / PLACEMENT NEUROSTIMULATOR ELECTRODES     • LUMBAR LAMINECTOMY      L-3 L4 L4 L5   • TONSILLECTOMY       Prior to Admission medications    Medication Sig Start Date End Date Taking? Authorizing Provider   acetaminophen (TYLENOL) 325 MG tablet Take 650 mg by mouth Every 6 (Six) Hours As Needed for Mild Pain .   Yes Sammy Veronica MD   artificial tears (LUBRIFRESH P.M.) ointment ophthalmic ointment Administer 1 application to both eyes every night at bedtime.   Yes Sammy Veronica MD   B Complex-Biotin-FA (SUPER B-50 COMPLEX) capsule Take 1 capsule by mouth Every Morning.   Yes Sammy Veronica MD   calcium carbonate (TUMS) 500 MG chewable tablet Chew 1-2 tablets 3 (Three) Times a Day As Needed for Indigestion or Heartburn.   Yes Sammy Veronica MD   Calcium-Vitamin D-Vitamin K (VIACTIV) 500-500-40 MG-UNT-MCG chewable tablet Chew 1 tablet Daily.   Yes Sammy Veronica MD   clopidogrel (PLAVIX) 75 MG tablet Take  75 mg by mouth Daily.   Yes Sammy Veronica MD   diclofenac (VOLTAREN) 1 % gel gel Apply 4 g topically to the appropriate area as directed 2 (Two) Times a Day As Needed (Apply to bilateral knees). HOLD PRIOR TO SURGERY   Yes Sammy Veronica MD   docusate sodium (COLACE) 250 MG capsule Take 250 mg by mouth Every Morning.   Yes Sammy Veronica MD   docusate sodium (COLACE) 250 MG capsule Take 250 mg by mouth At Night As Needed for Constipation.   Yes Sammy Veronica MD   escitalopram (LEXAPRO) 20 MG tablet Take 20 mg by mouth Daily.   Yes Sammy Veronica MD   gabapentin (NEURONTIN) 300 MG capsule Take 2 capsules by mouth 3 (Three) Times a Day. 9/19/17  Yes Sharad Salinas MD   ketoconazole (NIZORAL) 2 % cream Apply 1 application topically to the appropriate area as directed 2 (Two) Times a Day. Apply to abdominal folds 6/1/17  Yes Sammy Veronica MD   lansoprazole (PREVACID) 30 MG capsule Take 30 mg by mouth Daily.   Yes Sammy Veronica MD   levETIRAcetam (KEPPRA) 750 MG tablet Take 1 tablet by mouth 2 (Two) Times a Day. 10/18/18  Yes Arsalan Velez Jr., MD   methylcellulose, Laxative, (CITRUCEL) 500 MG tablet tablet Take 2 tablets by mouth Every Morning.   Yes Sammy Veronica MD   mineral oil-hydrophilic petrolatum (AQUAPHOR) ointment Apply 1 application topically to the appropriate area as directed 1 (One) Time Per Week. On Monday after shower   Yes Sammy Veronica MD   mirtazapine (REMERON) 15 MG tablet Take 15 mg by mouth Every Night.   Yes Sammy Veronica MD     Allergies   Allergen Reactions   • Lipitor [Atorvastatin] Confusion   • Penicillins Hives     Has previously tolerated ceftriaxone     Most Recent Immunizations   Administered Date(s) Administered   • Flu Mist 10/05/2015   • Flu Vaccine High Dose PF 65YR+ 10/09/2017   • Flu Vaccine Quad PF >18YRS 09/28/2016     Social History     Tobacco Use   • Smoking status: Former Smoker     Packs/day:  "1.00     Years: 40.00     Pack years: 40.00     Types: Cigarettes     Last attempt to quit:      Years since quittin.8   • Smokeless tobacco: Never Used   • Tobacco comment: caffeine use: one cup of coffee in the morning.    Substance Use Topics   • Alcohol use: Yes     Alcohol/week: 0.6 oz     Types: 1 Shots of liquor per week     Comment: 1-2 DRINKS DAILY; WEAK IN STRENGTH      Social History     Substance and Sexual Activity   Drug Use No     REVIEW OF SYSTEMS:  Head: negative for headache  Respiratory: negative for shortness of breath.   Cardiovascular: negative for chest pain.   Gastrointestinal: negative abdominal pain.   Neurological: negative for LOC  Psychiatric/Behavioral: negative for memory loss.   All other systems reviewed and are negative  VITALS: BP 93/57 (BP Location: Right arm, Patient Position: Lying)   Pulse 76   Temp 98.5 °F (36.9 °C) (Oral)   Resp 16   Ht 157.5 cm (62\")   Wt 98.9 kg (218 lb) Comment: one week ago at [facility]  SpO2 97%   BMI 39.87 kg/m²   Body mass index is 39.87 kg/m².  EXAM:   CONSTITUTIONAL: A&Ox3, No acute distress  LUNGS: Equal chest rise, no shortness of air  CARDIOVASCULAR: palpable peripheral pulses  SKIN: no skin lesions in the area examined  LYMPH: no lymphadenopathy in the area examined  EXTREMITY: Right Lower Extremity Is splinted in a posterior slab splint.  The ankle is grossly swollen and has not been sufficiently elevated overnight   Pulses:  Brisk Capillary Refill   Sensation: sensation intact to toes   Motor: able to wiggle toes   Range of Motion: Deferred secondary to known fracture    DATA REVIEW:   Xr Tibia Fibula 2 View Right    Result Date: 2018  No acute abnormality identified at the pelvis, the right hip, or around the right knee or proximal lower leg. There is a comminuted right ankle fracture with slight lateral subluxation of the talus and a comminuted, mildly displaced distal fibular diametaphysis fracture. There also appears to " be a large, mildly impacted fracture of the posterior malleolus of the distal tibia.  This report was finalized on 11/13/2018 4:02 PM by Dr. Billy Red M.D.      Xr Ankle 2 View Right    Result Date: 11/13/2018  No acute abnormality identified at the pelvis, the right hip, or around the right knee or proximal lower leg. There is a comminuted right ankle fracture with slight lateral subluxation of the talus and a comminuted, mildly displaced distal fibular diametaphysis fracture. There also appears to be a large, mildly impacted fracture of the posterior malleolus of the distal tibia.  This report was finalized on 11/13/2018 4:02 PM by Dr. Billy Red M.D.      Ct Head Without Contrast    Result Date: 11/13/2018  Small vessel ischemic disease and vascular calcifications appreciated similar to 12/18/2017. There is no evidence of fracture or of intracranial hemorrhage.  CT EXAMINATION OF THE CERVICAL SPINE WITHOUT CONTRAST: There is moderate to severe loss of disc height from C4 to T1. There is a grade 1 anterolisthesis of C3 upon C4 estimated to be 2-3 mm and a grade 1 anterolisthesis of C4 upon C5 estimated to be 2-3 mm. There is fusion of the facets bilaterally at C2-3.  C2-3: Moderate facet degenerative disease is present on the left.  C3-4: There is severe neuroforaminal compromise on the left secondary to severe facet degenerative disease and moderate uncovertebral degenerative disease. There is a mild broad-based disc osteophyte complex with no evidence of herniation.  C4-5: There is mild broad-based disc osteophyte complex which is slightly more prominent to the left. There is moderate neuroforaminal compromise on the left secondary to uncovertebral degenerative disease and facet degenerative disease.  C5-6: There is mild central disc osteophyte complex with no evidence of herniation. There is mild to moderate neuroforaminal compromise on the right secondary to uncovertebral degenerative disease.   C6-7: There is a mild broad-based disc osteophyte complex with no evidence of herniation.  C7-T1: There is no evidence of disc bulge or herniation.  Note is made that the internal carotid arteries have a very medial course and may mimic a submucosal mass. There is dense vascular calcification involving the right internal carotid artery at the level of C4-5 and likely severe stenosis.  There is no evidence of fracture.  IMPRESSION: 1. Multilevel degenerative disease involving the cervical spine as described with no evidence of fracture. See above. 2. Medial course of the internal carotid arteries with vascular calcification suggesting severe stenosis of the internal carotid artery on the right.  The above information was called to and discussed with Dr. Bingham.  Radiation dose reduction techniques were utilized, including automated exposure control and exposure modulation based on body size.        Ct Cervical Spine Without Contrast    Result Date: 11/13/2018  Small vessel ischemic disease and vascular calcifications appreciated similar to 12/18/2017. There is no evidence of fracture or of intracranial hemorrhage.  CT EXAMINATION OF THE CERVICAL SPINE WITHOUT CONTRAST: There is moderate to severe loss of disc height from C4 to T1. There is a grade 1 anterolisthesis of C3 upon C4 estimated to be 2-3 mm and a grade 1 anterolisthesis of C4 upon C5 estimated to be 2-3 mm. There is fusion of the facets bilaterally at C2-3.  C2-3: Moderate facet degenerative disease is present on the left.  C3-4: There is severe neuroforaminal compromise on the left secondary to severe facet degenerative disease and moderate uncovertebral degenerative disease. There is a mild broad-based disc osteophyte complex with no evidence of herniation.  C4-5: There is mild broad-based disc osteophyte complex which is slightly more prominent to the left. There is moderate neuroforaminal compromise on the left secondary to uncovertebral degenerative  disease and facet degenerative disease.  C5-6: There is mild central disc osteophyte complex with no evidence of herniation. There is mild to moderate neuroforaminal compromise on the right secondary to uncovertebral degenerative disease.  C6-7: There is a mild broad-based disc osteophyte complex with no evidence of herniation.  C7-T1: There is no evidence of disc bulge or herniation.  Note is made that the internal carotid arteries have a very medial course and may mimic a submucosal mass. There is dense vascular calcification involving the right internal carotid artery at the level of C4-5 and likely severe stenosis.  There is no evidence of fracture.  IMPRESSION: 1. Multilevel degenerative disease involving the cervical spine as described with no evidence of fracture. See above. 2. Medial course of the internal carotid arteries with vascular calcification suggesting severe stenosis of the internal carotid artery on the right.  The above information was called to and discussed with Dr. Bingham.  Radiation dose reduction techniques were utilized, including automated exposure control and exposure modulation based on body size.        Xr Chest Pa & Lateral    Result Date: 11/13/2018  Negative.  This report was finalized on 11/13/2018 9:31 PM by Dr. Billy Red M.D.      Xr Hip With Or Without Pelvis 2 - 3 View Right    Result Date: 11/13/2018  No acute abnormality identified at the pelvis, the right hip, or around the right knee or proximal lower leg. There is a comminuted right ankle fracture with slight lateral subluxation of the talus and a comminuted, mildly displaced distal fibular diametaphysis fracture. There also appears to be a large, mildly impacted fracture of the posterior malleolus of the distal tibia.  This report was finalized on 11/13/2018 4:02 PM by Dr. Billy Red M.D.      Labs:   Results for the past 24 hours:   Recent Results (from the past 24 hour(s))   POC Glucose Once    Collection  Time: 11/13/18  1:41 PM   Result Value Ref Range    Glucose 93 70 - 130 mg/dL   Comprehensive Metabolic Panel    Collection Time: 11/13/18  1:59 PM   Result Value Ref Range    Glucose 97 65 - 99 mg/dL    BUN 13 8 - 23 mg/dL    Creatinine 0.80 0.57 - 1.00 mg/dL    Sodium 133 (L) 136 - 145 mmol/L    Potassium 4.3 3.5 - 5.2 mmol/L    Chloride 93 (L) 98 - 107 mmol/L    CO2 27.2 22.0 - 29.0 mmol/L    Calcium 9.2 8.6 - 10.5 mg/dL    Total Protein 7.4 6.0 - 8.5 g/dL    Albumin 3.90 3.50 - 5.20 g/dL    ALT (SGPT) 12 1 - 33 U/L    AST (SGOT) 17 1 - 32 U/L    Alkaline Phosphatase 62 39 - 117 U/L    Total Bilirubin 0.7 0.1 - 1.2 mg/dL    eGFR Non African Amer 68 >60 mL/min/1.73    Globulin 3.5 gm/dL    A/G Ratio 1.1 g/dL    BUN/Creatinine Ratio 16.3 7.0 - 25.0    Anion Gap 12.8 mmol/L   Protime-INR    Collection Time: 11/13/18  1:59 PM   Result Value Ref Range    Protime 13.7 11.7 - 14.2 Seconds    INR 1.07 0.90 - 1.10   Troponin    Collection Time: 11/13/18  1:59 PM   Result Value Ref Range    Troponin T <0.010 0.000 - 0.030 ng/mL   Urinalysis With Microscopic If Indicated (No Culture) - Urine, Catheter    Collection Time: 11/13/18  1:59 PM   Result Value Ref Range    Color, UA Yellow Yellow, Straw    Appearance, UA Clear Clear    pH, UA 8.0 5.0 - 8.0    Specific Gravity, UA 1.014 1.005 - 1.030    Glucose, UA Negative Negative    Ketones, UA Negative Negative    Bilirubin, UA Negative Negative    Blood, UA Negative Negative    Protein, UA Negative Negative    Leuk Esterase, UA Negative Negative    Nitrite, UA Negative Negative    Urobilinogen, UA 0.2 E.U./dL 0.2 - 1.0 E.U./dL   Magnesium    Collection Time: 11/13/18  1:59 PM   Result Value Ref Range    Magnesium 2.0 1.6 - 2.4 mg/dL   CK    Collection Time: 11/13/18  1:59 PM   Result Value Ref Range    Creatine Kinase 62 20 - 180 U/L   Ethanol    Collection Time: 11/13/18  1:59 PM   Result Value Ref Range    Ethanol <10 0 - 10 mg/dL    Ethanol % <0.010 %   CBC Auto  Differential    Collection Time: 11/13/18  1:59 PM   Result Value Ref Range    WBC 6.98 4.50 - 10.70 10*3/mm3    RBC 3.48 (L) 3.90 - 5.20 10*6/mm3    Hemoglobin 12.0 11.9 - 15.5 g/dL    Hematocrit 35.7 35.6 - 45.5 %    .6 (H) 80.5 - 98.2 fL    MCH 34.5 (H) 26.9 - 32.0 pg    MCHC 33.6 32.4 - 36.3 g/dL    RDW 11.9 11.7 - 13.0 %    RDW-SD 45.0 37.0 - 54.0 fl    MPV 8.9 6.0 - 12.0 fL    Platelets 188 140 - 500 10*3/mm3    Neutrophil % 65.8 42.7 - 76.0 %    Lymphocyte % 20.8 19.6 - 45.3 %    Monocyte % 9.6 5.0 - 12.0 %    Eosinophil % 3.2 0.3 - 6.2 %    Basophil % 0.6 0.0 - 1.5 %    Immature Grans % 0.0 0.0 - 0.5 %    Neutrophils, Absolute 4.60 1.90 - 8.10 10*3/mm3    Lymphocytes, Absolute 1.45 0.90 - 4.80 10*3/mm3    Monocytes, Absolute 0.67 0.20 - 1.20 10*3/mm3    Eosinophils, Absolute 0.22 0.00 - 0.70 10*3/mm3    Basophils, Absolute 0.04 0.00 - 0.20 10*3/mm3    Immature Grans, Absolute 0.00 0.00 - 0.03 10*3/mm3   Urine Drug Screen - Urine, Catheter    Collection Time: 11/13/18  2:00 PM   Result Value Ref Range    Amphet/Methamphet, Screen Negative Negative    Barbiturates Screen, Urine Negative Negative    Benzodiazepine Screen, Urine Negative Negative    Cocaine Screen, Urine Negative Negative    Opiate Screen Negative Negative    THC, Screen, Urine Negative Negative    Methadone Screen, Urine Negative Negative    Oxycodone Screen, Urine Negative Negative             IMPRESSION:  Patient is a 88 y.o. Not  or  female with <principal problem not specified>  PLAN:   - Admited to: Juan Carlos Harper MD  - Disposition: Because this patient takes Plavix, and there is severe swelling to the ankle Due to insufficient elevation and the nature of her bad injury, I do not feel that proceeding with surgery today would be prudent.  Additionally, the family would like a second opinion by another orthopedist and was taking care of the daughter in the past.  I am fine with this.  I would be happy to take care of this  patient should they choose to proceed with surgery with me.  I will wait to hear more before scheduling surgery, but likely would schedule surgery next Tuesday for open reduction internal fixation of the ankle. If she decides to proceed with surgery with me, she may be discharged back to her care facility and come back to the hospital next Tuesday for surgery.  I would have her admitted at least overnight after surgery.  Before proceeding I will wait to hear back if the patient wishes for me to take care of her.    Cristian Hill II, MD  Orthopaedic Surgery  [facility]            Electronically signed by Cristian Hill II, MD at 11/14/2018  9:12 AM

## 2018-11-14 NOTE — PLAN OF CARE
Problem: Fall Risk (Adult)  Goal: Absence of Fall  Outcome: Ongoing (interventions implemented as appropriate)      Problem: Patient Care Overview  Goal: Plan of Care Review  Outcome: Ongoing (interventions implemented as appropriate)   11/14/18 5917   Coping/Psychosocial   Plan of Care Reviewed With patient   Plan of Care Review   Progress no change   OTHER   Outcome Summary Pt BP low this shift, MD made aware and bolus of NS given, pt c/o persistant pain and was given morphine once and Norco, turned throughout shift, pt reamined on 2L of O2, CT, bedside swallow, completed, side rails padded and suction at bedside, pt unable to void bladder scan of 780mL, pt placed on bedpan and able to void 125mL and also voided in brief, Bladder scanned again and in and out cath performed, will continue to monitor.      Goal: Individualization and Mutuality  Outcome: Ongoing (interventions implemented as appropriate)    Goal: Discharge Needs Assessment  Outcome: Ongoing (interventions implemented as appropriate)    Goal: Interprofessional Rounds/Family Conf  Outcome: Ongoing (interventions implemented as appropriate)      Problem: Skin Injury Risk (Adult)  Goal: Identify Related Risk Factors and Signs and Symptoms  Outcome: Ongoing (interventions implemented as appropriate)    Goal: Skin Health and Integrity  Outcome: Ongoing (interventions implemented as appropriate)      Problem: Pain, Acute (Adult)  Goal: Identify Related Risk Factors and Signs and Symptoms  Outcome: Ongoing (interventions implemented as appropriate)    Goal: Acceptable Pain Control/Comfort Level  Outcome: Ongoing (interventions implemented as appropriate)

## 2018-11-15 PROBLEM — R53.1 WEAKNESS: Status: RESOLVED | Noted: 2017-02-03 | Resolved: 2018-11-15

## 2018-11-15 PROBLEM — M79.674 PAIN OF TOE OF RIGHT FOOT: Status: RESOLVED | Noted: 2017-01-16 | Resolved: 2018-11-15

## 2018-11-15 LAB
ALBUMIN SERPL-MCNC: 3 G/DL (ref 3.5–5.2)
ALBUMIN/GLOB SERPL: 1.2 G/DL
ALP SERPL-CCNC: 49 U/L (ref 39–117)
ALT SERPL W P-5'-P-CCNC: 8 U/L (ref 1–33)
ANION GAP SERPL CALCULATED.3IONS-SCNC: 8.2 MMOL/L
AST SERPL-CCNC: 14 U/L (ref 1–32)
BASOPHILS # BLD AUTO: 0.02 10*3/MM3 (ref 0–0.2)
BASOPHILS NFR BLD AUTO: 0.3 % (ref 0–1.5)
BILIRUB SERPL-MCNC: 0.5 MG/DL (ref 0.1–1.2)
BUN BLD-MCNC: 22 MG/DL (ref 8–23)
BUN/CREAT SERPL: 15.3 (ref 7–25)
CALCIUM SPEC-SCNC: 8.4 MG/DL (ref 8.6–10.5)
CHLORIDE SERPL-SCNC: 99 MMOL/L (ref 98–107)
CHOLEST SERPL-MCNC: 124 MG/DL (ref 0–200)
CO2 SERPL-SCNC: 25.8 MMOL/L (ref 22–29)
CREAT BLD-MCNC: 1.44 MG/DL (ref 0.57–1)
DEPRECATED RDW RBC AUTO: 46.6 FL (ref 37–54)
EOSINOPHIL # BLD AUTO: 0.23 10*3/MM3 (ref 0–0.7)
EOSINOPHIL NFR BLD AUTO: 3.5 % (ref 0.3–6.2)
ERYTHROCYTE [DISTWIDTH] IN BLOOD BY AUTOMATED COUNT: 12.2 % (ref 11.7–13)
GFR SERPL CREATININE-BSD FRML MDRD: 34 ML/MIN/1.73
GLOBULIN UR ELPH-MCNC: 2.6 GM/DL
GLUCOSE BLD-MCNC: 100 MG/DL (ref 65–99)
HBA1C MFR BLD: 5.33 % (ref 4.8–5.6)
HCT VFR BLD AUTO: 27.7 % (ref 35.6–45.5)
HDLC SERPL-MCNC: 44 MG/DL (ref 40–60)
HGB BLD-MCNC: 9 G/DL (ref 11.9–15.5)
IMM GRANULOCYTES # BLD: 0.01 10*3/MM3 (ref 0–0.03)
IMM GRANULOCYTES NFR BLD: 0.2 % (ref 0–0.5)
LDLC SERPL CALC-MCNC: 69 MG/DL (ref 0–100)
LDLC/HDLC SERPL: 1.56 {RATIO}
LYMPHOCYTES # BLD AUTO: 1.72 10*3/MM3 (ref 0.9–4.8)
LYMPHOCYTES NFR BLD AUTO: 26.2 % (ref 19.6–45.3)
MAGNESIUM SERPL-MCNC: 2.1 MG/DL (ref 1.6–2.4)
MCH RBC QN AUTO: 34.1 PG (ref 26.9–32)
MCHC RBC AUTO-ENTMCNC: 32.5 G/DL (ref 32.4–36.3)
MCV RBC AUTO: 104.9 FL (ref 80.5–98.2)
MONOCYTES # BLD AUTO: 0.77 10*3/MM3 (ref 0.2–1.2)
MONOCYTES NFR BLD AUTO: 11.7 % (ref 5–12)
NEUTROPHILS # BLD AUTO: 3.82 10*3/MM3 (ref 1.9–8.1)
NEUTROPHILS NFR BLD AUTO: 58.3 % (ref 42.7–76)
NT-PROBNP SERPL-MCNC: 360.8 PG/ML (ref 0–1800)
PLATELET # BLD AUTO: 140 10*3/MM3 (ref 140–500)
PMV BLD AUTO: 9 FL (ref 6–12)
POTASSIUM BLD-SCNC: 4.2 MMOL/L (ref 3.5–5.2)
PROT SERPL-MCNC: 5.6 G/DL (ref 6–8.5)
RBC # BLD AUTO: 2.64 10*6/MM3 (ref 3.9–5.2)
SODIUM BLD-SCNC: 133 MMOL/L (ref 136–145)
TRIGL SERPL-MCNC: 56 MG/DL (ref 0–150)
TSH SERPL DL<=0.05 MIU/L-ACNC: 2.43 MIU/ML (ref 0.27–4.2)
VLDLC SERPL-MCNC: 11.2 MG/DL (ref 5–40)
WBC NRBC COR # BLD: 6.56 10*3/MM3 (ref 4.5–10.7)

## 2018-11-15 PROCEDURE — 83036 HEMOGLOBIN GLYCOSYLATED A1C: CPT | Performed by: HOSPITALIST

## 2018-11-15 PROCEDURE — 80061 LIPID PANEL: CPT | Performed by: HOSPITALIST

## 2018-11-15 PROCEDURE — 83880 ASSAY OF NATRIURETIC PEPTIDE: CPT | Performed by: HOSPITALIST

## 2018-11-15 PROCEDURE — 99222 1ST HOSP IP/OBS MODERATE 55: CPT | Performed by: INTERNAL MEDICINE

## 2018-11-15 PROCEDURE — 83735 ASSAY OF MAGNESIUM: CPT | Performed by: PSYCHIATRY & NEUROLOGY

## 2018-11-15 PROCEDURE — 80053 COMPREHEN METABOLIC PANEL: CPT | Performed by: HOSPITALIST

## 2018-11-15 PROCEDURE — 85025 COMPLETE CBC W/AUTO DIFF WBC: CPT | Performed by: HOSPITALIST

## 2018-11-15 PROCEDURE — 84443 ASSAY THYROID STIM HORMONE: CPT | Performed by: HOSPITALIST

## 2018-11-15 RX ORDER — SODIUM CHLORIDE 9 MG/ML
50 INJECTION, SOLUTION INTRAVENOUS CONTINUOUS
Status: DISCONTINUED | OUTPATIENT
Start: 2018-11-15 | End: 2018-11-19

## 2018-11-15 RX ADMIN — SODIUM CHLORIDE 75 ML/HR: 9 INJECTION, SOLUTION INTRAVENOUS at 07:16

## 2018-11-15 RX ADMIN — LEVETIRACETAM 750 MG: 250 TABLET, FILM COATED ORAL at 22:22

## 2018-11-15 RX ADMIN — GABAPENTIN 600 MG: 300 CAPSULE ORAL at 07:49

## 2018-11-15 RX ADMIN — HYDROCODONE BITARTRATE AND ACETAMINOPHEN 1 TABLET: 5; 325 TABLET ORAL at 07:55

## 2018-11-15 RX ADMIN — LEVETIRACETAM 750 MG: 250 TABLET, FILM COATED ORAL at 07:48

## 2018-11-15 RX ADMIN — MIRTAZAPINE 15 MG: 15 TABLET, FILM COATED ORAL at 22:22

## 2018-11-15 RX ADMIN — PANTOPRAZOLE SODIUM 40 MG: 40 TABLET, DELAYED RELEASE ORAL at 05:26

## 2018-11-15 RX ADMIN — Medication 81 MG: at 07:49

## 2018-11-15 RX ADMIN — GABAPENTIN 600 MG: 300 CAPSULE ORAL at 22:22

## 2018-11-15 RX ADMIN — ESCITALOPRAM 10 MG: 10 TABLET, FILM COATED ORAL at 22:22

## 2018-11-15 RX ADMIN — GABAPENTIN 600 MG: 300 CAPSULE ORAL at 18:03

## 2018-11-15 RX ADMIN — HYDROCODONE BITARTRATE AND ACETAMINOPHEN 1 TABLET: 5; 325 TABLET ORAL at 18:07

## 2018-11-15 RX ADMIN — DOCUSATE SODIUM 250 MG: 50 CAPSULE, LIQUID FILLED ORAL at 07:49

## 2018-11-15 RX ADMIN — SODIUM CHLORIDE 75 ML/HR: 9 INJECTION, SOLUTION INTRAVENOUS at 21:23

## 2018-11-15 RX ADMIN — CALCIUM 1000 MG: 500 TABLET ORAL at 07:48

## 2018-11-15 NOTE — PLAN OF CARE
Problem: Fall Risk (Adult)  Goal: Identify Related Risk Factors and Signs and Symptoms  Outcome: Outcome(s) achieved Date Met: 11/15/18    Goal: Absence of Fall  Outcome: Ongoing (interventions implemented as appropriate)      Problem: Patient Care Overview  Goal: Plan of Care Review  Outcome: Ongoing (interventions implemented as appropriate)   11/15/18 0873   Coping/Psychosocial   Plan of Care Reviewed With patient;daughter   Plan of Care Review   Progress no change   OTHER   Outcome Summary VSS, Q2hr turn, pt unable to void completely this afternoon, I&O cath with 350ml returned; plan for surgery this weekend per ortho; elevating lower extremities; PRN Norco given once this shift; PM interrogated, cardiac clearance obtained; pharm dosing Keppra; will continue to monitor     Goal: Individualization and Mutuality  Outcome: Ongoing (interventions implemented as appropriate)    Goal: Discharge Needs Assessment  Outcome: Ongoing (interventions implemented as appropriate)    Goal: Interprofessional Rounds/Family Conf  Outcome: Ongoing (interventions implemented as appropriate)      Problem: Skin Injury Risk (Adult)  Goal: Identify Related Risk Factors and Signs and Symptoms  Outcome: Outcome(s) achieved Date Met: 11/15/18    Goal: Skin Health and Integrity  Outcome: Ongoing (interventions implemented as appropriate)      Problem: Pain, Acute (Adult)  Goal: Identify Related Risk Factors and Signs and Symptoms  Outcome: Outcome(s) achieved Date Met: 11/15/18    Goal: Acceptable Pain Control/Comfort Level  Outcome: Ongoing (interventions implemented as appropriate)

## 2018-11-15 NOTE — DISCHARGE PLACEMENT REQUEST
"Kim Feldman (88 y.o. Female)     Date of Birth Social Security Number Address Home Phone MRN    03/13/1930  [place] [address] [place] [address] [phone] [id number]    Spiritism Marital Status          Anglican        Admission Date Admission Type Admitting Provider Attending Provider Department, Room/Bed    11/13/18 Emergency Juan Carlos Harper MD Ahmed, Aftab, MD [address], [address]    Discharge Date Discharge Disposition Discharge Destination                       Attending Provider:  Juan Carlos Harper MD    Allergies:  Lipitor [Atorvastatin], Penicillins    Isolation:  None   Infection:  None   Code Status:  No CPR    Ht:  157.5 cm (62\")   Wt:  98.9 kg (218 lb)    Admission Cmt:  None   Principal Problem:  None                Active Insurance as of 11/13/2018     Primary Coverage     Payor Plan Insurance Group Employer/Plan Group    [employer] MEDICARE REPLACEMENT AET[employer] [id number]     Payor Plan Address Payor Plan Phone Number Payor Plan Fax Number Effective Dates    [address] [phone]  1/1/2018 - None Entered    [facility] [id number]       Subscriber Name Subscriber Birth Date Member ID       KIM FELDMAN 3/13/1930 [id number]                 Emergency Contacts      (Rel.) Home Phone Work Phone Mobile Phone    Leandra Feldman (Daughter) [phone] -- [phone]               "

## 2018-11-15 NOTE — NURSING NOTE
S/w daughter who is concerned pt fall that was unwitnessed may have been d/t a seizure and she is concerned because she has been confused, more sleepy and this is not like her. Daughter is also concerned that she is going to have surgery this weekend and undergo anesthesia and this may present an increased risk for her. Daughter wants to know if a workup is necessary prior to surgery. Made Neurology aware, Dr. Poole and she will discuss and smooth things over with daughter tomorrow

## 2018-11-15 NOTE — PROGRESS NOTES
Continued Stay Note  [facility]     Patient Name: Kim Feldman  MRN: [id number]  Today's Date: 11/15/2018    Admit Date: 11/13/2018    Discharge Plan     Row Name 11/15/18 1228       Plan    Plan  Pt from  at [facility] referral to [facility], will need precert    Plan Comments  Discussed with Leandra who feels pt will need rehab after surgery. She would like a referral to [facility]. Call to Jemma and referral in her inbasket         Discharge Codes    No documentation.             Ally Woods RN

## 2018-11-15 NOTE — PROGRESS NOTES
Magnesium level 2.1 (WNL)     No change in plan from 11/14/2018. No further neurology workup indicated. We will sign off and see again upon request.    Case reviewed w/ Dr. Poole.     9:40 AM  JONA Darling

## 2018-11-15 NOTE — PROGRESS NOTES
The patient wishes to proceed with open reduction internal fixation of her ankle fracture with me.  It appears that she may stay in the hospital until surgery.  She is getting cardiac clearance today possibly depending on how her pacemaker looks.  Since she is going to be staying in house, I will plan to do her surgery either Saturday or Sunday morning.  I will know by this afternoon which day we will put her on.  I appreciate medical comanagement.  I think by Saturday or Sunday will be safe to proceed from up Plavix standpoint.  I will plan to restart her Plavix immediately after surgery.  She will have to remain nonweightbearing for at least a couple if not 3 months after surgery.  She will need a wheelchair for mobilization.  I will let nursing know when I decide to do surgery either Saturday or Sunday.    Cristian Hill II, MD  Orthopaedic Surgery  [facility]

## 2018-11-15 NOTE — PLAN OF CARE
Problem: Patient Care Overview  Goal: Plan of Care Review  Outcome: Ongoing (interventions implemented as appropriate)   11/15/18 2787   Coping/Psychosocial   Plan of Care Reviewed With patient   Plan of Care Review   Progress no change   OTHER   Outcome Summary Still off AC, awaiting surgery to repair ankle. Bladder scanned this morning 400cc scanned. Intermittent cath performed, 300 cc urine extracted. no reports of pain,.       Problem: Skin Injury Risk (Adult)  Goal: Identify Related Risk Factors and Signs and Symptoms  Outcome: Ongoing (interventions implemented as appropriate)    Goal: Skin Health and Integrity  Outcome: Ongoing (interventions implemented as appropriate)      Problem: Pain, Acute (Adult)  Goal: Identify Related Risk Factors and Signs and Symptoms  Outcome: Ongoing (interventions implemented as appropriate)    Goal: Acceptable Pain Control/Comfort Level  Outcome: Ongoing (interventions implemented as appropriate)

## 2018-11-15 NOTE — PROGRESS NOTES
"Daily progress note    Chief complaint  Doing same  No new complaints    History of present illness  88-year-old white female with very complex past medical history including coronary artery disease hypertension hyperlipidemia seizure disorder low back pain gastroesophageal reflux disease dementia who is a nursing home resident brought to the emergency room after the unwitnessed fall to the bathroom when she lost her balance and fell and does not remember what happened.  Patient complain of right hip pain right ankle pain but denies any chest pain and increased shortness of breath abdominal pain nausea vomiting diarrhea.  Patient workup in ER revealed right ankle fracture admitted for management.  At the time of interview patient is awake and alert consult question appropriately and hurting at the fracture site but no other complaints except generalized weakness.     REVIEW OF SYSTEMS  Review of Systems   Constitutional: Negative for fever.   HENT: Negative for sore throat.    Eyes: Negative.    Respiratory: Negative for cough and shortness of breath.    Cardiovascular: Negative for chest pain.   Gastrointestinal: Negative for abdominal pain, diarrhea and vomiting.   Genitourinary: Negative for dysuria and enuresis.   Musculoskeletal: Negative for neck pain.   Skin: Negative for rash.   Allergic/Immunologic: Negative.    Neurological: Positive for headaches (unsure of chronicity). Negative for weakness and numbness.   Hematological: Negative.    Psychiatric/Behavioral: Positive for confusion (per daughter pt normally oriented, disoriented starting today).   All other systems reviewed and are negative.     PHYSICAL EXAM  Blood pressure 100/54, pulse 74, temperature 98.5 °F (36.9 °C), temperature source Oral, resp. rate 16, height 157.5 cm (62\"), weight 98.9 kg (218 lb), SpO2 98 %, not currently breastfeeding.    Constitutional: No distress.   Head: Normocephalic and atraumatic.   No notable bite alycia to tongue or lip "   Eyes: EOM are normal. Pupils are equal, round, and reactive to light.   Neck: Normal range of motion. Neck supple.   Cardiovascular: Normal rate, regular rhythm and normal heart sounds.   Pulmonary/Chest: Effort normal and breath sounds normal. No respiratory distress.   Abdominal: Soft. There is no tenderness. There is no rebound and no guarding.   Musculoskeletal: Normal range of motion. She exhibits edema (2+ edema to feet and ankles bilaterally and increased at the right ankle at the site of the injury.).   Pt moves all extremities.  Diffuse contusion and ecchymosis to R ankle.  He has no cyanosis or pallor to the toes and is motor and sensory intact to the right foot and toes.   Neurological: She is alert. She has normal sensation and normal strength. No cranial nerve deficit. GCS score is 15.   Appears to be chronically demented, but daughter states she is normally A+Ox3    Skin: Skin is warm and dry. No rash noted.   Psychiatric: Mood and affect normal.       LAB RESULTS  Lab Results (last 24 hours)     Procedure Component Value Units Date/Time    BNP [[id number]]  (Normal) Collected:  11/15/18 0346    Specimen:  Blood Updated:  11/15/18 0456     proBNP 360.8 pg/mL     Narrative:       Among patients with dyspnea, NT-proBNP is highly sensitive for the detection of acute congestive heart failure. In addition NT-proBNP of <300 pg/ml effectively rules out acute congestive heart failure with 99% negative predictive value.    TSH [[id number]]  (Normal) Collected:  11/15/18 0346    Specimen:  Blood Updated:  11/15/18 0456     TSH 2.430 mIU/mL     Comprehensive Metabolic Panel [[id number]]  (Abnormal) Collected:  11/15/18 0346    Specimen:  Blood Updated:  11/15/18 0447     Glucose 100 mg/dL      BUN 22 mg/dL      Creatinine 1.44 mg/dL      Sodium 133 mmol/L      Potassium 4.2 mmol/L      Chloride 99 mmol/L      CO2 25.8 mmol/L      Calcium 8.4 mg/dL      Total Protein 5.6 g/dL      Albumin 3.00 g/dL      ALT (SGPT)  8 U/L      AST (SGOT) 14 U/L      Alkaline Phosphatase 49 U/L      Total Bilirubin 0.5 mg/dL      eGFR Non African Amer 34 mL/min/1.73      Globulin 2.6 gm/dL      A/G Ratio 1.2 g/dL      BUN/Creatinine Ratio 15.3     Anion Gap 8.2 mmol/L     Narrative:       The MDRD GFR formula is only valid for adults with stable renal function between ages 18 and 70.    Lipid Panel [[id number]] Collected:  11/15/18 0346    Specimen:  Blood Updated:  11/15/18 0445     Total Cholesterol 124 mg/dL      Triglycerides 56 mg/dL      HDL Cholesterol 44 mg/dL      LDL Cholesterol  69 mg/dL      VLDL Cholesterol 11.2 mg/dL      LDL/HDL Ratio 1.56    Narrative:       Cholesterol Reference Ranges  (U.[place] Department of Health and Human Services ATP III Classifications)    Desirable          <200 mg/dL  Borderline High    200-239 mg/dL  High Risk          >240 mg/dL      Triglyceride Reference Ranges  (U.S. Department of Health and Human Services ATP III Classifications)    Normal           <150 mg/dL  Borderline High  150-199 mg/dL  High             200-499 mg/dL  Very High        >500 mg/dL    HDL Reference Ranges  (U.S. Department of Health and Human Services ATP III Classifcations)    Low     <40 mg/dl (major risk factor for CHD)  High    >60 mg/dl ('negative' risk factor for CHD)        LDL Reference Ranges  (U.S. Department of Health and Human Services ATP III Classifcations)    Optimal          <100 mg/dL  Near Optimal     100-129 mg/dL  Borderline High  130-159 mg/dL  High             160-189 mg/dL  Very High        >189 mg/dL    Magnesium [[id number]]  (Normal) Collected:  11/15/18 0346    Specimen:  Blood Updated:  11/15/18 0445     Magnesium 2.1 mg/dL     Hemoglobin A1c [[id number]]  (Normal) Collected:  11/15/18 0346    Specimen:  Blood Updated:  11/15/18 0425     Hemoglobin A1C 5.33 %     Narrative:       Hemoglobin A1C Ranges:    Increased Risk for Diabetes  5.7% to 6.4%  Diabetes                     >= 6.5%  Diabetic Goal                 < 7.0%    CBC & Differential [[id number]] Collected:  11/15/18 0346    Specimen:  Blood Updated:  11/15/18 0420    Narrative:       The following orders were created for panel order CBC & Differential.  Procedure                               Abnormality         Status                     ---------                               -----------         ------                     Scan Slide[[id number]]                                                                  CBC Auto Differential[[id number]]        Abnormal            Final result                 Please view results for these tests on the individual orders.    CBC Auto Differential [[id number]]  (Abnormal) Collected:  11/15/18 0346    Specimen:  Blood Updated:  11/15/18 0420     WBC 6.56 10*3/mm3      RBC 2.64 10*6/mm3      Hemoglobin 9.0 g/dL      Hematocrit 27.7 %      .9 fL      MCH 34.1 pg      MCHC 32.5 g/dL      RDW 12.2 %      RDW-SD 46.6 fl      MPV 9.0 fL      Platelets 140 10*3/mm3      Neutrophil % 58.3 %      Lymphocyte % 26.2 %      Monocyte % 11.7 %      Eosinophil % 3.5 %      Basophil % 0.3 %      Immature Grans % 0.2 %      Neutrophils, Absolute 3.82 10*3/mm3      Lymphocytes, Absolute 1.72 10*3/mm3      Monocytes, Absolute 0.77 10*3/mm3      Eosinophils, Absolute 0.23 10*3/mm3      Basophils, Absolute 0.02 10*3/mm3      Immature Grans, Absolute 0.01 10*3/mm3         Imaging Results (last 24 hours)     Procedure Component Value Units Date/Time    CT Head Without Contrast [[id number]] Collected:  11/13/18 1518     Updated:  11/14/18 1543    Narrative:       CT HEAD WITHOUT CONTRAST AND CT CERVICAL SPINE WITHOUT CONTRAST     HISTORY: Fall, hit head, headache.     COMPARISON: CT head 12/18/2017.     FINDINGS:    CT HEAD WITHOUT CONTRAST: The brain and ventricles are symmetrical.  There is no evidence of hemorrhage, hydrocephalus or of abnormal  extra-axial fluid. No focal area of decreased attenuation to suggest  acute infarction is  identified. Vascular calcification is noted. Bone  windows showed no evidence of a calvarial fracture.       Impression:       Small vessel ischemic disease and vascular calcifications  appreciated similar to 12/18/2017. There is no evidence of fracture or  of intracranial hemorrhage.     CT EXAMINATION OF THE CERVICAL SPINE WITHOUT CONTRAST: There is moderate  to severe loss of disc height from C4 to T1. There is a grade 1  anterolisthesis of C3 upon C4 estimated to be 2-3 mm and a grade 1  anterolisthesis of C4 upon C5 estimated to be 2-3 mm. There is fusion of  the facets bilaterally at C2-3.     C2-3: Moderate facet degenerative disease is present on the left.     C3-4: There is severe neuroforaminal compromise on the left secondary to  severe facet degenerative disease and moderate uncovertebral  degenerative disease. There is a mild broad-based disc osteophyte  complex with no evidence of herniation.     C4-5: There is mild broad-based disc osteophyte complex which is  slightly more prominent to the left. There is moderate neuroforaminal  compromise on the left secondary to uncovertebral degenerative disease  and facet degenerative disease.     C5-6: There is mild central disc osteophyte complex with no evidence of  herniation. There is mild to moderate neuroforaminal compromise on the  right secondary to uncovertebral degenerative disease.     C6-7: There is a mild broad-based disc osteophyte complex with no  evidence of herniation.     C7-T1: There is no evidence of disc bulge or herniation.     Note is made that the internal carotid arteries have a very medial  course and may mimic a submucosal mass. There is dense vascular  calcification involving the right internal carotid artery at the level  of C4-5 and likely severe stenosis.     There is no evidence of fracture.     IMPRESSION:   1. Multilevel degenerative disease involving the cervical spine as  described with no evidence of fracture. See above.  2.  Medial course of the internal carotid arteries with vascular  calcification suggesting severe stenosis of the internal carotid artery  on the right.     The above information was called to and discussed with Dr. Bingham.     Radiation dose reduction techniques were utilized, including automated  exposure control and exposure modulation based on body size.     This report was finalized on 11/14/2018 3:40 PM by Dr. Markell Rodríguez M.D.       CT Cervical Spine Without Contrast [[id number]] Collected:  11/13/18 1518     Updated:  11/14/18 1543    Narrative:       CT HEAD WITHOUT CONTRAST AND CT CERVICAL SPINE WITHOUT CONTRAST     HISTORY: Fall, hit head, headache.     COMPARISON: CT head 12/18/2017.     FINDINGS:    CT HEAD WITHOUT CONTRAST: The brain and ventricles are symmetrical.  There is no evidence of hemorrhage, hydrocephalus or of abnormal  extra-axial fluid. No focal area of decreased attenuation to suggest  acute infarction is identified. Vascular calcification is noted. Bone  windows showed no evidence of a calvarial fracture.       Impression:       Small vessel ischemic disease and vascular calcifications  appreciated similar to 12/18/2017. There is no evidence of fracture or  of intracranial hemorrhage.     CT EXAMINATION OF THE CERVICAL SPINE WITHOUT CONTRAST: There is moderate  to severe loss of disc height from C4 to T1. There is a grade 1  anterolisthesis of C3 upon C4 estimated to be 2-3 mm and a grade 1  anterolisthesis of C4 upon C5 estimated to be 2-3 mm. There is fusion of  the facets bilaterally at C2-3.     C2-3: Moderate facet degenerative disease is present on the left.     C3-4: There is severe neuroforaminal compromise on the left secondary to  severe facet degenerative disease and moderate uncovertebral  degenerative disease. There is a mild broad-based disc osteophyte  complex with no evidence of herniation.     C4-5: There is mild broad-based disc osteophyte complex which is  slightly  more prominent to the left. There is moderate neuroforaminal  compromise on the left secondary to uncovertebral degenerative disease  and facet degenerative disease.     C5-6: There is mild central disc osteophyte complex with no evidence of  herniation. There is mild to moderate neuroforaminal compromise on the  right secondary to uncovertebral degenerative disease.     C6-7: There is a mild broad-based disc osteophyte complex with no  evidence of herniation.     C7-T1: There is no evidence of disc bulge or herniation.     Note is made that the internal carotid arteries have a very medial  course and may mimic a submucosal mass. There is dense vascular  calcification involving the right internal carotid artery at the level  of C4-5 and likely severe stenosis.     There is no evidence of fracture.     IMPRESSION:   1. Multilevel degenerative disease involving the cervical spine as  described with no evidence of fracture. See above.  2. Medial course of the internal carotid arteries with vascular  calcification suggesting severe stenosis of the internal carotid artery  on the right.     The above information was called to and discussed with Dr. Bingham.     Radiation dose reduction techniques were utilized, including automated  exposure control and exposure modulation based on body size.     This report was finalized on 11/14/2018 3:40 PM by Dr. Markell Rodríguez M.D.           EKG                              Rhythm/Rate: SR, 75  P waves and SC: 1st degree AV block  QRS, axis: narrow QRS, borderline LAD  ST and T waves: nonspecific changes      Current Facility-Administered Medications:   •  aspirin chewable tablet 81 mg, 81 mg, Oral, Daily, Juan Carlos Harper MD, 81 mg at 11/15/18 0749  •  calcium carbonate (oyster shell) tablet 1,000 mg, 1,000 mg, Oral, Daily, Juan Carlos Harper MD, 1,000 mg at 11/15/18 0748  •  docusate sodium (COLACE) capsule 250 mg, 250 mg, Oral, Leroy TURNER Aftab, MD, 250 mg at 11/15/18 0749  •   escitalopram (LEXAPRO) tablet 10 mg, 10 mg, Oral, Nightly, Gaetano Harper MD, 10 mg at 11/14/18 2021  •  gabapentin (NEURONTIN) capsule 600 mg, 600 mg, Oral, TID, Gaetano Harper MD, 600 mg at 11/15/18 0749  •  HYDROcodone-acetaminophen (NORCO) 5-325 MG per tablet 1 tablet, 1 tablet, Oral, Q4H PRN, Gaetano Harper MD, 1 tablet at 11/15/18 0755  •  levETIRAcetam (KEPPRA) tablet 750 mg, 750 mg, Oral, Q12H, Gaetano Harper MD, 750 mg at 11/15/18 0748  •  mirtazapine (REMERON) tablet 15 mg, 15 mg, Oral, Nightly, Gaetano Harper MD, 15 mg at 11/14/18 2021  •  morphine injection 1 mg, 1 mg, Intravenous, Q4H PRN, 1 mg at 11/13/18 2135 **FOLLOWED BY** [START ON 11/23/2018] morphine injection 2 mg, 2 mg, Intravenous, Q4H PRN, Gaetano Harper MD  •  ondansetron (ZOFRAN) injection 4 mg, 4 mg, Intravenous, Q4H PRN, Gaetano Harper MD  •  pantoprazole (PROTONIX) EC tablet 40 mg, 40 mg, Oral, Q AM, Gaetano Harper MD, 40 mg at 11/15/18 0526  •  Pharmacy Consult - Pharmacy to dose, , Does not apply, Continuous PRN, Lauren Sandhu, APRN  •  [COMPLETED] Insert peripheral IV, , , Once **AND** sodium chloride 0.9 % flush 10 mL, 10 mL, Intravenous, PRN, Fidel Bingham MD  •  sodium chloride 0.9 % infusion, 75 mL/hr, Intravenous, Continuous, Bandar Ruiz MD, Last Rate: 75 mL/hr at 11/15/18 1032, 75 mL/hr at 11/15/18 1032     ASSESSMENT  Acute right ankle fracture  Status post fall  Seizure disorder  Hypertension  Depression  History of CVA on Plavix  Gastroesophageal reflux disease  Status post permanent pacemaker  Osteoarthritis  Degenerative disc disease  DNR    PLAN  CPM  Bedrest  Pain management  Possible surgery on Saturday or Sunday morning  Orthopedic surgery consult appreciated  Continue home medications  Supportive care  Stress ulcer DVT prophylaxis  DNR  Discussed with family  Follow closely further recommendation according to hospital course    GAETANO HARPER MD

## 2018-11-16 ENCOUNTER — ANESTHESIA EVENT (OUTPATIENT)
Dept: PERIOP | Facility: HOSPITAL | Age: 83
End: 2018-11-16

## 2018-11-16 LAB
ABO GROUP BLD: NORMAL
ANION GAP SERPL CALCULATED.3IONS-SCNC: 9.1 MMOL/L
ANTI-M: NORMAL
BASOPHILS # BLD AUTO: 0.02 10*3/MM3 (ref 0–0.2)
BASOPHILS NFR BLD AUTO: 0.3 % (ref 0–1.5)
BLD GP AB SCN SERPL QL: POSITIVE
BUN BLD-MCNC: 19 MG/DL (ref 8–23)
BUN/CREAT SERPL: 24.1 (ref 7–25)
CALCIUM SPEC-SCNC: 8 MG/DL (ref 8.6–10.5)
CHLORIDE SERPL-SCNC: 102 MMOL/L (ref 98–107)
CO2 SERPL-SCNC: 23.9 MMOL/L (ref 22–29)
CREAT BLD-MCNC: 0.79 MG/DL (ref 0.57–1)
DEPRECATED RDW RBC AUTO: 46.1 FL (ref 37–54)
EOSINOPHIL # BLD AUTO: 0.21 10*3/MM3 (ref 0–0.7)
EOSINOPHIL NFR BLD AUTO: 3.3 % (ref 0.3–6.2)
ERYTHROCYTE [DISTWIDTH] IN BLOOD BY AUTOMATED COUNT: 12 % (ref 11.7–13)
GFR SERPL CREATININE-BSD FRML MDRD: 69 ML/MIN/1.73
GLUCOSE BLD-MCNC: 97 MG/DL (ref 65–99)
HCT VFR BLD AUTO: 26.1 % (ref 35.6–45.5)
HGB BLD-MCNC: 8.4 G/DL (ref 11.9–15.5)
IMM GRANULOCYTES # BLD: 0.01 10*3/MM3 (ref 0–0.03)
IMM GRANULOCYTES NFR BLD: 0.2 % (ref 0–0.5)
LYMPHOCYTES # BLD AUTO: 1.84 10*3/MM3 (ref 0.9–4.8)
LYMPHOCYTES NFR BLD AUTO: 29.1 % (ref 19.6–45.3)
MCH RBC QN AUTO: 34.1 PG (ref 26.9–32)
MCHC RBC AUTO-ENTMCNC: 32.2 G/DL (ref 32.4–36.3)
MCV RBC AUTO: 106.1 FL (ref 80.5–98.2)
MONOCYTES # BLD AUTO: 0.64 10*3/MM3 (ref 0.2–1.2)
MONOCYTES NFR BLD AUTO: 10.1 % (ref 5–12)
NEUTROPHILS # BLD AUTO: 3.62 10*3/MM3 (ref 1.9–8.1)
NEUTROPHILS NFR BLD AUTO: 57.2 % (ref 42.7–76)
NRBC BLD MANUAL-RTO: 0 /100 WBC (ref 0–0)
PLATELET # BLD AUTO: 141 10*3/MM3 (ref 140–500)
PMV BLD AUTO: 9.3 FL (ref 6–12)
POTASSIUM BLD-SCNC: 4.5 MMOL/L (ref 3.5–5.2)
RBC # BLD AUTO: 2.46 10*6/MM3 (ref 3.9–5.2)
RH BLD: POSITIVE
SODIUM BLD-SCNC: 135 MMOL/L (ref 136–145)
T&S EXPIRATION DATE: NORMAL
WBC NRBC COR # BLD: 6.33 10*3/MM3 (ref 4.5–10.7)

## 2018-11-16 PROCEDURE — 86901 BLOOD TYPING SEROLOGIC RH(D): CPT | Performed by: ORTHOPAEDIC SURGERY

## 2018-11-16 PROCEDURE — 86920 COMPATIBILITY TEST SPIN: CPT

## 2018-11-16 PROCEDURE — 86870 RBC ANTIBODY IDENTIFICATION: CPT | Performed by: ORTHOPAEDIC SURGERY

## 2018-11-16 PROCEDURE — 85025 COMPLETE CBC W/AUTO DIFF WBC: CPT | Performed by: HOSPITALIST

## 2018-11-16 PROCEDURE — 86900 BLOOD TYPING SEROLOGIC ABO: CPT | Performed by: ORTHOPAEDIC SURGERY

## 2018-11-16 PROCEDURE — 80048 BASIC METABOLIC PNL TOTAL CA: CPT | Performed by: HOSPITALIST

## 2018-11-16 PROCEDURE — 86850 RBC ANTIBODY SCREEN: CPT | Performed by: ORTHOPAEDIC SURGERY

## 2018-11-16 PROCEDURE — 25010000003 LEVETIRACETAM IN NACL 0.75% 1000 MG/100ML SOLUTION: Performed by: PSYCHIATRY & NEUROLOGY

## 2018-11-16 PROCEDURE — 86922 COMPATIBILITY TEST ANTIGLOB: CPT

## 2018-11-16 RX ORDER — LEVETIRACETAM 10 MG/ML
1000 INJECTION INTRAVASCULAR EVERY 12 HOURS SCHEDULED
Status: COMPLETED | OUTPATIENT
Start: 2018-11-16 | End: 2018-11-18

## 2018-11-16 RX ORDER — CETIRIZINE HYDROCHLORIDE 10 MG/1
10 TABLET ORAL DAILY
Status: DISCONTINUED | OUTPATIENT
Start: 2018-11-16 | End: 2018-11-21 | Stop reason: HOSPADM

## 2018-11-16 RX ADMIN — ESCITALOPRAM 10 MG: 10 TABLET, FILM COATED ORAL at 21:14

## 2018-11-16 RX ADMIN — CETIRIZINE HYDROCHLORIDE 10 MG: 10 TABLET, FILM COATED ORAL at 11:53

## 2018-11-16 RX ADMIN — LEVETIRACETAM 750 MG: 250 TABLET, FILM COATED ORAL at 08:41

## 2018-11-16 RX ADMIN — GABAPENTIN 600 MG: 300 CAPSULE ORAL at 21:14

## 2018-11-16 RX ADMIN — HYDROCODONE BITARTRATE AND ACETAMINOPHEN 1 TABLET: 5; 325 TABLET ORAL at 00:14

## 2018-11-16 RX ADMIN — CALCIUM 1000 MG: 500 TABLET ORAL at 08:41

## 2018-11-16 RX ADMIN — LEVETIRACETAM 1000 MG: 10 INJECTION INTRAVENOUS at 21:14

## 2018-11-16 RX ADMIN — Medication 81 MG: at 08:41

## 2018-11-16 RX ADMIN — MIRTAZAPINE 15 MG: 15 TABLET, FILM COATED ORAL at 21:14

## 2018-11-16 RX ADMIN — PANTOPRAZOLE SODIUM 40 MG: 40 TABLET, DELAYED RELEASE ORAL at 06:36

## 2018-11-16 RX ADMIN — SODIUM CHLORIDE 75 ML/HR: 9 INJECTION, SOLUTION INTRAVENOUS at 09:56

## 2018-11-16 RX ADMIN — DOCUSATE SODIUM 250 MG: 50 CAPSULE, LIQUID FILLED ORAL at 08:41

## 2018-11-16 RX ADMIN — GABAPENTIN 600 MG: 300 CAPSULE ORAL at 16:52

## 2018-11-16 RX ADMIN — GABAPENTIN 600 MG: 300 CAPSULE ORAL at 08:41

## 2018-11-16 NOTE — PLAN OF CARE
Problem: Fall Risk (Adult)  Goal: Absence of Fall  Outcome: Ongoing (interventions implemented as appropriate)      Problem: Patient Care Overview  Goal: Plan of Care Review  Outcome: Ongoing (interventions implemented as appropriate)   11/16/18 9157   Coping/Psychosocial   Plan of Care Reviewed With patient;daughter   Plan of Care Review   Progress improving   OTHER   Outcome Summary VSS. Pt continues on bedrest r/t fracture of right ankle, no c/o pain this shift thus far, plan for surgery in the morning, pt and daughter aware, consent to be obtained tomorrow before procedure, NPO after MN. Pt weaned off of O2 this shift, reports that she does not wear O2 at home, no SOA noted. Pt has voided 5 times this shift, last bladder scan showed 201mL after voiding, pt asymptomatic, IVF rate decreased today.      Goal: Individualization and Mutuality  Outcome: Ongoing (interventions implemented as appropriate)    Goal: Discharge Needs Assessment  Outcome: Ongoing (interventions implemented as appropriate)    Goal: Interprofessional Rounds/Family Conf  Outcome: Ongoing (interventions implemented as appropriate)      Problem: Skin Injury Risk (Adult)  Goal: Skin Health and Integrity  Outcome: Ongoing (interventions implemented as appropriate)      Problem: Pain, Acute (Adult)  Goal: Acceptable Pain Control/Comfort Level  Outcome: Outcome(s) achieved Date Met: 11/16/18

## 2018-11-16 NOTE — PROGRESS NOTES
"Per Neuro consult - change Keppra on day of surgery for 48hr     \"Continue routine Keppra 750 mg twice a day  Change to Keppra 1000 mg IV every 12 hours on the day prior to surgery for increased protection (surgery planned for Saturday 11/17/18)  Resume oral Keppra 750 mg BID 48 hours after surgery    Changed keppra to 1gm iv q12h x5 doses starting at 2100 11/16/18 (received po dose of 750mg this am)  Restart 750mg po bid at 0900 11/19/18.    Arsalan Zelaya [facility].  "

## 2018-11-16 NOTE — PLAN OF CARE
Problem: Fall Risk (Adult)  Goal: Absence of Fall  Outcome: Ongoing (interventions implemented as appropriate)      Problem: Patient Care Overview  Goal: Plan of Care Review  Outcome: Ongoing (interventions implemented as appropriate)   11/16/18 0603   Coping/Psychosocial   Plan of Care Reviewed With patient   Plan of Care Review   Progress no change   OTHER   Outcome Summary PT VSS, c/o pain once controlled with PRN pain med, pt In and Out cath performed, bladder scan perrformed, MD called surgery set for Saturday, Pt's daughter made aware, pt turned throughout shift, will continue to monitor.      Goal: Individualization and Mutuality  Outcome: Ongoing (interventions implemented as appropriate)    Goal: Discharge Needs Assessment  Outcome: Ongoing (interventions implemented as appropriate)    Goal: Interprofessional Rounds/Family Conf  Outcome: Ongoing (interventions implemented as appropriate)      Problem: Skin Injury Risk (Adult)  Goal: Skin Health and Integrity  Outcome: Ongoing (interventions implemented as appropriate)      Problem: Pain, Acute (Adult)  Goal: Acceptable Pain Control/Comfort Level  Outcome: Ongoing (interventions implemented as appropriate)

## 2018-11-16 NOTE — PROGRESS NOTES
"Daily progress note    Chief complaint  Doing same  Complaining of congestion and runny nose    History of present illness  88-year-old white female with very complex past medical history including coronary artery disease hypertension hyperlipidemia seizure disorder low back pain gastroesophageal reflux disease dementia who is a nursing home resident brought to the emergency room after the unwitnessed fall to the bathroom when she lost her balance and fell and does not remember what happened.  Patient complain of right hip pain right ankle pain but denies any chest pain and increased shortness of breath abdominal pain nausea vomiting diarrhea.  Patient workup in ER revealed right ankle fracture admitted for management.  At the time of interview patient is awake and alert consult question appropriately and hurting at the fracture site but no other complaints except generalized weakness.     REVIEW OF SYSTEMS  Review of Systems   Constitutional: Negative for fever.   HENT: Negative for sore throat.    Eyes: Negative.    Respiratory: Negative for cough and shortness of breath.    Cardiovascular: Negative for chest pain.   Gastrointestinal: Negative for abdominal pain, diarrhea and vomiting.   Genitourinary: Negative for dysuria and enuresis.   Musculoskeletal: Negative for neck pain.   Skin: Negative for rash.   Allergic/Immunologic: Negative.    Neurological: Positive for headaches (unsure of chronicity). Negative for weakness and numbness.   Hematological: Negative.    Psychiatric/Behavioral: Positive for confusion (per daughter pt normally oriented, disoriented starting today).   All other systems reviewed and are negative.     PHYSICAL EXAM  Blood pressure 142/72, pulse 77, temperature 97.6 °F (36.4 °C), temperature source Oral, resp. rate 16, height 157.5 cm (62\"), weight 98.9 kg (218 lb), SpO2 94 %, not currently breastfeeding.    Constitutional: No distress.   Head: Normocephalic and atraumatic.   No notable " bite alycia to tongue or lip   Eyes: EOM are normal. Pupils are equal, round, and reactive to light.   Neck: Normal range of motion. Neck supple.   Cardiovascular: Normal rate, regular rhythm and normal heart sounds.   Pulmonary/Chest: Effort normal and breath sounds normal. No respiratory distress.   Abdominal: Soft. There is no tenderness. There is no rebound and no guarding.   Musculoskeletal: Normal range of motion. She exhibits edema (2+ edema to feet and ankles bilaterally and increased at the right ankle at the site of the injury.).   Pt moves all extremities.  Diffuse contusion and ecchymosis to R ankle.  He has no cyanosis or pallor to the toes and is motor and sensory intact to the right foot and toes.   Neurological: She is alert. She has normal sensation and normal strength. No cranial nerve deficit. GCS score is 15.   Appears to be chronically demented, but daughter states she is normally A+Ox3    Skin: Skin is warm and dry. No rash noted.   Psychiatric: Mood and affect normal.       LAB RESULTS  Lab Results (last 24 hours)     Procedure Component Value Units Date/Time    CBC & Differential [[id number]] Collected:  11/16/18 0313    Specimen:  Blood Updated:  11/16/18 0439    Narrative:       The following orders were created for panel order CBC & Differential.  Procedure                               Abnormality         Status                     ---------                               -----------         ------                     Scan Slide[[id number]]                                                                  CBC Auto Differential[[id number]]        Abnormal            Final result                 Please view results for these tests on the individual orders.    CBC Auto Differential [[id number]]  (Abnormal) Collected:  11/16/18 0313    Specimen:  Blood Updated:  11/16/18 0439     WBC 6.33 10*3/mm3      RBC 2.46 10*6/mm3      Hemoglobin 8.4 g/dL      Hematocrit 26.1 %      .1 fL      MCH 34.1  pg      MCHC 32.2 g/dL      RDW 12.0 %      RDW-SD 46.1 fl      MPV 9.3 fL      Platelets 141 10*3/mm3      Neutrophil % 57.2 %      Lymphocyte % 29.1 %      Monocyte % 10.1 %      Eosinophil % 3.3 %      Basophil % 0.3 %      Immature Grans % 0.2 %      Neutrophils, Absolute 3.62 10*3/mm3      Lymphocytes, Absolute 1.84 10*3/mm3      Monocytes, Absolute 0.64 10*3/mm3      Eosinophils, Absolute 0.21 10*3/mm3      Basophils, Absolute 0.02 10*3/mm3      Immature Grans, Absolute 0.01 10*3/mm3      nRBC 0.0 /100 WBC     Basic Metabolic Panel [[id number]]  (Abnormal) Collected:  11/16/18 0313    Specimen:  Blood Updated:  11/16/18 0434     Glucose 97 mg/dL      BUN 19 mg/dL      Creatinine 0.79 mg/dL      Sodium 135 mmol/L      Potassium 4.5 mmol/L      Chloride 102 mmol/L      CO2 23.9 mmol/L      Calcium 8.0 mg/dL      eGFR Non African Amer 69 mL/min/1.73      BUN/Creatinine Ratio 24.1     Anion Gap 9.1 mmol/L     Narrative:       The MDRD GFR formula is only valid for adults with stable renal function between ages 18 and 70.        Imaging Results (last 24 hours)     ** No results found for the last 24 hours. **        EKG                              Rhythm/Rate: SR, 75  P waves and IN: 1st degree AV block  QRS, axis: narrow QRS, borderline LAD  ST and T waves: nonspecific changes      Current Facility-Administered Medications:   •  aspirin chewable tablet 81 mg, 81 mg, Oral, Daily, Juan Carlos Harper MD, 81 mg at 11/16/18 0841  •  calcium carbonate (oyster shell) tablet 1,000 mg, 1,000 mg, Oral, Daily, Juna Carlos Harper MD, 1,000 mg at 11/16/18 0841  •  cetirizine (zyrTEC) tablet 10 mg, 10 mg, Oral, Daily, Juan Carlos Harper MD  •  docusate sodium (COLACE) capsule 250 mg, 250 mg, Oral, QAM, Juan Carlos Harper MD, 250 mg at 11/16/18 0841  •  escitalopram (LEXAPRO) tablet 10 mg, 10 mg, Oral, Nightly, Juan Carlos Harper MD, 10 mg at 11/15/18 2222  •  gabapentin (NEURONTIN) capsule 600 mg, 600 mg, Oral, TID, Juan Carlso Harper MD, 600 mg at 11/16/18  0841  •  HYDROcodone-acetaminophen (NORCO) 5-325 MG per tablet 1 tablet, 1 tablet, Oral, Q4H PRN, Gaetano Harper MD, 1 tablet at 11/16/18 0014  •  [START ON 11/19/2018] levETIRAcetam (KEPPRA) tablet 750 mg, 750 mg, Oral, Q12H, Sarah Poole MD  •  levETIRAcetam in NaCl 0.75% (KEPPRA) IVPB 1,000 mg, 1,000 mg, Intravenous, Q12H, Sarah Poole MD  •  mirtazapine (REMERON) tablet 15 mg, 15 mg, Oral, Nightly, Gaetano Harper MD, 15 mg at 11/15/18 2222  •  morphine injection 1 mg, 1 mg, Intravenous, Q4H PRN, 1 mg at 11/13/18 2135 **FOLLOWED BY** [START ON 11/23/2018] morphine injection 2 mg, 2 mg, Intravenous, Q4H PRN, Gaetano Harper MD  •  ondansetron (ZOFRAN) injection 4 mg, 4 mg, Intravenous, Q4H PRN, Gaetano Harper MD  •  pantoprazole (PROTONIX) EC tablet 40 mg, 40 mg, Oral, Q AM, Gaetano Harper MD, 40 mg at 11/16/18 0636  •  Pharmacy Consult - Pharmacy to dose, , Does not apply, Continuous PRN, Lauren Sandhu, APRRABIA  •  [COMPLETED] Insert peripheral IV, , , Once **AND** sodium chloride 0.9 % flush 10 mL, 10 mL, Intravenous, PRN, Fidel Bingham MD  •  sodium chloride 0.9 % infusion, 75 mL/hr, Intravenous, Continuous, Bandar Ruiz MD, Last Rate: 75 mL/hr at 11/16/18 0956, 75 mL/hr at 11/16/18 0956     ASSESSMENT  Acute right ankle fracture  Status post fall  Seizure disorder  Hypertension  Depression  History of CVA on Plavix  Gastroesophageal reflux disease  Status post permanent pacemaker  Osteoarthritis  Degenerative disc disease  DNR    PLAN  CPM  Bedrest  Pain management  Add Claritin 10 mg daily  Surgery IN AM  Orthopedic surgery consult appreciated  Continue home medications  Supportive care  Stress ulcer DVT prophylaxis  DNR  Discussed with family  Follow closely further recommendation according to hospital course    GAETANO HARPER MD

## 2018-11-16 NOTE — PROGRESS NOTES
Continued Stay Note  [facility]     Patient Name: Kim Feldman  MRN: [id number]  Today's Date: 11/16/2018    Admit Date: 11/13/2018    Discharge Plan     Row Name 11/16/18 0942       Plan    Plan  Pt PC at [facility] would need approval to return vs rehab if rehab needed pt has [employer] will need precert    Plan Comments  Daughter now says if possilble she would like her mother to return to [facility], pt will need approval from Director of admission prior to return. If unable to return she would agree t rehab at [place]. Possible OR this weekend will follow up Monday regarding DC plans....drc        Discharge Codes    No documentation.             Ally Woods RN

## 2018-11-16 NOTE — PROGRESS NOTES
Plan for open reduction internal fixation of right ankle tomorrow morning around 9 or 10 AM.  Nothing by mouth after midnight.  Hemoglobin low at 8.4, I'm ordering a type and screen.  I can do the case under tourniquet, but I want to make sure there is blood available if needed.  Patient is a little loopy right now, I will plan to obtain informed consent tomorrow morning and if I have to I will get it from the daughter.    Cristian Hill II, MD  Orthopaedic Surgery  [facility]

## 2018-11-16 NOTE — PROGRESS NOTES
Daughter concerned about Keppra dosing and possibility of seizure w/ fall that brought her mom in. I called to speak w/ daughter via phone per patients permission.  I discussed our plan to increase Keppra to 1000mg IV BID for 48 hours then switch back to Keppra 750mg po BID thereafter to assist w/ seizure management. She agreed with plan. No further neurology workup indicated. We will sign off and see again upon request. Pharmacy consulted to assist w/ recommendations above.       Plan communicated to CATRINA Mackay     2:32 PM  JONA Darling

## 2018-11-17 ENCOUNTER — ANESTHESIA (OUTPATIENT)
Dept: PERIOP | Facility: HOSPITAL | Age: 83
End: 2018-11-17

## 2018-11-17 ENCOUNTER — APPOINTMENT (OUTPATIENT)
Dept: GENERAL RADIOLOGY | Facility: HOSPITAL | Age: 83
End: 2018-11-17

## 2018-11-17 LAB
ANION GAP SERPL CALCULATED.3IONS-SCNC: 9.2 MMOL/L
BASOPHILS # BLD AUTO: 0.03 10*3/MM3 (ref 0–0.2)
BASOPHILS NFR BLD AUTO: 0.5 % (ref 0–1.5)
BUN BLD-MCNC: 12 MG/DL (ref 8–23)
BUN/CREAT SERPL: 18.8 (ref 7–25)
CALCIUM SPEC-SCNC: 8.4 MG/DL (ref 8.6–10.5)
CHLORIDE SERPL-SCNC: 102 MMOL/L (ref 98–107)
CO2 SERPL-SCNC: 21.8 MMOL/L (ref 22–29)
CREAT BLD-MCNC: 0.64 MG/DL (ref 0.57–1)
DEPRECATED RDW RBC AUTO: 44.7 FL (ref 37–54)
EOSINOPHIL # BLD AUTO: 0.18 10*3/MM3 (ref 0–0.7)
EOSINOPHIL NFR BLD AUTO: 2.8 % (ref 0.3–6.2)
ERYTHROCYTE [DISTWIDTH] IN BLOOD BY AUTOMATED COUNT: 11.8 % (ref 11.7–13)
GFR SERPL CREATININE-BSD FRML MDRD: 88 ML/MIN/1.73
GLUCOSE BLD-MCNC: 101 MG/DL (ref 65–99)
HCT VFR BLD AUTO: 28.5 % (ref 35.6–45.5)
HGB BLD-MCNC: 9.3 G/DL (ref 11.9–15.5)
IMM GRANULOCYTES # BLD: 0.01 10*3/MM3 (ref 0–0.03)
IMM GRANULOCYTES NFR BLD: 0.2 % (ref 0–0.5)
LYMPHOCYTES # BLD AUTO: 1.35 10*3/MM3 (ref 0.9–4.8)
LYMPHOCYTES NFR BLD AUTO: 20.9 % (ref 19.6–45.3)
MCH RBC QN AUTO: 34.3 PG (ref 26.9–32)
MCHC RBC AUTO-ENTMCNC: 32.6 G/DL (ref 32.4–36.3)
MCV RBC AUTO: 105.2 FL (ref 80.5–98.2)
MONOCYTES # BLD AUTO: 0.47 10*3/MM3 (ref 0.2–1.2)
MONOCYTES NFR BLD AUTO: 7.3 % (ref 5–12)
NEUTROPHILS # BLD AUTO: 4.44 10*3/MM3 (ref 1.9–8.1)
NEUTROPHILS NFR BLD AUTO: 68.5 % (ref 42.7–76)
NT-PROBNP SERPL-MCNC: 1641 PG/ML (ref 0–1800)
PLATELET # BLD AUTO: 157 10*3/MM3 (ref 140–500)
PMV BLD AUTO: 9.1 FL (ref 6–12)
POTASSIUM BLD-SCNC: 4.5 MMOL/L (ref 3.5–5.2)
RBC # BLD AUTO: 2.71 10*6/MM3 (ref 3.9–5.2)
SODIUM BLD-SCNC: 133 MMOL/L (ref 136–145)
WBC NRBC COR # BLD: 6.47 10*3/MM3 (ref 4.5–10.7)

## 2018-11-17 PROCEDURE — 76000 FLUOROSCOPY <1 HR PHYS/QHP: CPT

## 2018-11-17 PROCEDURE — 85025 COMPLETE CBC W/AUTO DIFF WBC: CPT | Performed by: HOSPITALIST

## 2018-11-17 PROCEDURE — 80048 BASIC METABOLIC PNL TOTAL CA: CPT | Performed by: HOSPITALIST

## 2018-11-17 PROCEDURE — C1713 ANCHOR/SCREW BN/BN,TIS/BN: HCPCS | Performed by: ORTHOPAEDIC SURGERY

## 2018-11-17 PROCEDURE — 25010000002 VANCOMYCIN PER 500 MG: Performed by: ORTHOPAEDIC SURGERY

## 2018-11-17 PROCEDURE — 73600 X-RAY EXAM OF ANKLE: CPT

## 2018-11-17 PROCEDURE — 25010000002 FENTANYL CITRATE (PF) 100 MCG/2ML SOLUTION: Performed by: ANESTHESIOLOGY

## 2018-11-17 PROCEDURE — 25010000002 ROPIVACAINE PER 1 MG: Performed by: ANESTHESIOLOGY

## 2018-11-17 PROCEDURE — 25010000002 MIDAZOLAM PER 1 MG: Performed by: ANESTHESIOLOGY

## 2018-11-17 PROCEDURE — L4386 NON-PNEUM WALK BOOT PRE CST: HCPCS | Performed by: ORTHOPAEDIC SURGERY

## 2018-11-17 PROCEDURE — 83880 ASSAY OF NATRIURETIC PEPTIDE: CPT | Performed by: HOSPITALIST

## 2018-11-17 PROCEDURE — 0QSG04Z REPOSITION RIGHT TIBIA WITH INTERNAL FIXATION DEVICE, OPEN APPROACH: ICD-10-PCS | Performed by: ORTHOPAEDIC SURGERY

## 2018-11-17 PROCEDURE — 25010000002 PROPOFOL 10 MG/ML EMULSION: Performed by: NURSE ANESTHETIST, CERTIFIED REGISTERED

## 2018-11-17 PROCEDURE — 25010000003 LEVETIRACETAM IN NACL 0.75% 1000 MG/100ML SOLUTION: Performed by: PSYCHIATRY & NEUROLOGY

## 2018-11-17 DEVICE — 4.0MM PARTIALLY THREADED                                    CANNULATED SCREW 44MM: Type: IMPLANTABLE DEVICE | Site: ANKLE | Status: FUNCTIONAL

## 2018-11-17 DEVICE — EVOS 3.5MM LOCKING 1/3 TUBULAR                                    PLATE 10 HOLE 118MM
Type: IMPLANTABLE DEVICE | Site: ANKLE | Status: FUNCTIONAL
Brand: EVOS

## 2018-11-17 DEVICE — EVOS 3.5MM X 12MM CORTEX SCREW SELF-TAPPING
Type: IMPLANTABLE DEVICE | Site: ANKLE | Status: FUNCTIONAL
Brand: EVOS

## 2018-11-17 DEVICE — EVOS 3.5MM X 14MM LOCKING SCREW SELF-TAPPING
Type: IMPLANTABLE DEVICE | Site: ANKLE | Status: FUNCTIONAL
Brand: EVOS

## 2018-11-17 DEVICE — 4.0MM PARTIALLY THREADED                                    CANNULATED SCREW 46MM: Type: IMPLANTABLE DEVICE | Site: ANKLE | Status: FUNCTIONAL

## 2018-11-17 DEVICE — EVOS 3.5MM X 16MM CORTEX SCREW SELF-TAPPING
Type: IMPLANTABLE DEVICE | Site: ANKLE | Status: FUNCTIONAL
Brand: EVOS

## 2018-11-17 DEVICE — 4.0MM PARTIALLY THREADED                                    CANNULATED SCREW 56MM: Type: IMPLANTABLE DEVICE | Site: ANKLE | Status: FUNCTIONAL

## 2018-11-17 DEVICE — EVOS 3.5MM X 14MM CORTEX SCREW SELF-TAPPING
Type: IMPLANTABLE DEVICE | Site: ANKLE | Status: FUNCTIONAL
Brand: EVOS

## 2018-11-17 RX ORDER — HYDROCODONE BITARTRATE AND ACETAMINOPHEN 7.5; 325 MG/1; MG/1
1 TABLET ORAL ONCE AS NEEDED
Status: DISCONTINUED | OUTPATIENT
Start: 2018-11-17 | End: 2018-11-17 | Stop reason: HOSPADM

## 2018-11-17 RX ORDER — SODIUM CHLORIDE 0.9 % (FLUSH) 0.9 %
1-10 SYRINGE (ML) INJECTION AS NEEDED
Status: DISCONTINUED | OUTPATIENT
Start: 2018-11-17 | End: 2018-11-17

## 2018-11-17 RX ORDER — ROPIVACAINE HYDROCHLORIDE 5 MG/ML
INJECTION, SOLUTION EPIDURAL; INFILTRATION; PERINEURAL AS NEEDED
Status: DISCONTINUED | OUTPATIENT
Start: 2018-11-17 | End: 2018-11-17

## 2018-11-17 RX ORDER — MIDAZOLAM HYDROCHLORIDE 1 MG/ML
2 INJECTION INTRAMUSCULAR; INTRAVENOUS
Status: DISCONTINUED | OUTPATIENT
Start: 2018-11-17 | End: 2018-11-17

## 2018-11-17 RX ORDER — CLINDAMYCIN PHOSPHATE 900 MG/50ML
900 INJECTION INTRAVENOUS EVERY 8 HOURS
Status: COMPLETED | OUTPATIENT
Start: 2018-11-17 | End: 2018-11-18

## 2018-11-17 RX ORDER — PROMETHAZINE HYDROCHLORIDE 25 MG/ML
12.5 INJECTION, SOLUTION INTRAMUSCULAR; INTRAVENOUS ONCE AS NEEDED
Status: DISCONTINUED | OUTPATIENT
Start: 2018-11-17 | End: 2018-11-17 | Stop reason: HOSPADM

## 2018-11-17 RX ORDER — FENTANYL CITRATE 50 UG/ML
50 INJECTION, SOLUTION INTRAMUSCULAR; INTRAVENOUS
Status: DISCONTINUED | OUTPATIENT
Start: 2018-11-17 | End: 2018-11-17

## 2018-11-17 RX ORDER — NALOXONE HCL 0.4 MG/ML
0.2 VIAL (ML) INJECTION AS NEEDED
Status: DISCONTINUED | OUTPATIENT
Start: 2018-11-17 | End: 2018-11-17 | Stop reason: HOSPADM

## 2018-11-17 RX ORDER — CLINDAMYCIN PHOSPHATE 900 MG/50ML
900 INJECTION INTRAVENOUS ONCE
Status: COMPLETED | OUTPATIENT
Start: 2018-11-17 | End: 2018-11-17

## 2018-11-17 RX ORDER — ONDANSETRON 2 MG/ML
4 INJECTION INTRAMUSCULAR; INTRAVENOUS ONCE AS NEEDED
Status: DISCONTINUED | OUTPATIENT
Start: 2018-11-17 | End: 2018-11-17 | Stop reason: HOSPADM

## 2018-11-17 RX ORDER — FENTANYL CITRATE 50 UG/ML
25 INJECTION, SOLUTION INTRAMUSCULAR; INTRAVENOUS
Status: DISCONTINUED | OUTPATIENT
Start: 2018-11-17 | End: 2018-11-17 | Stop reason: HOSPADM

## 2018-11-17 RX ORDER — EPHEDRINE SULFATE 50 MG/ML
5 INJECTION, SOLUTION INTRAVENOUS ONCE AS NEEDED
Status: DISCONTINUED | OUTPATIENT
Start: 2018-11-17 | End: 2018-11-17 | Stop reason: HOSPADM

## 2018-11-17 RX ORDER — CLOPIDOGREL BISULFATE 75 MG/1
75 TABLET ORAL DAILY
Status: DISCONTINUED | OUTPATIENT
Start: 2018-11-18 | End: 2018-11-21 | Stop reason: HOSPADM

## 2018-11-17 RX ORDER — DIPHENHYDRAMINE HYDROCHLORIDE 50 MG/ML
12.5 INJECTION INTRAMUSCULAR; INTRAVENOUS
Status: DISCONTINUED | OUTPATIENT
Start: 2018-11-17 | End: 2018-11-17 | Stop reason: HOSPADM

## 2018-11-17 RX ORDER — PROMETHAZINE HYDROCHLORIDE 25 MG/1
25 TABLET ORAL ONCE AS NEEDED
Status: DISCONTINUED | OUTPATIENT
Start: 2018-11-17 | End: 2018-11-17 | Stop reason: HOSPADM

## 2018-11-17 RX ORDER — OXYCODONE AND ACETAMINOPHEN 7.5; 325 MG/1; MG/1
1 TABLET ORAL ONCE AS NEEDED
Status: DISCONTINUED | OUTPATIENT
Start: 2018-11-17 | End: 2018-11-17 | Stop reason: HOSPADM

## 2018-11-17 RX ORDER — LABETALOL HYDROCHLORIDE 5 MG/ML
5 INJECTION, SOLUTION INTRAVENOUS
Status: DISCONTINUED | OUTPATIENT
Start: 2018-11-17 | End: 2018-11-17 | Stop reason: HOSPADM

## 2018-11-17 RX ORDER — FLUMAZENIL 0.1 MG/ML
0.2 INJECTION INTRAVENOUS AS NEEDED
Status: DISCONTINUED | OUTPATIENT
Start: 2018-11-17 | End: 2018-11-17 | Stop reason: HOSPADM

## 2018-11-17 RX ORDER — ROPIVACAINE HYDROCHLORIDE 5 MG/ML
INJECTION, SOLUTION EPIDURAL; INFILTRATION; PERINEURAL
Status: COMPLETED | OUTPATIENT
Start: 2018-11-17 | End: 2018-11-17

## 2018-11-17 RX ORDER — SODIUM CHLORIDE, SODIUM LACTATE, POTASSIUM CHLORIDE, CALCIUM CHLORIDE 600; 310; 30; 20 MG/100ML; MG/100ML; MG/100ML; MG/100ML
9 INJECTION, SOLUTION INTRAVENOUS CONTINUOUS
Status: DISCONTINUED | OUTPATIENT
Start: 2018-11-17 | End: 2018-11-21

## 2018-11-17 RX ORDER — HYDROMORPHONE HYDROCHLORIDE 1 MG/ML
0.25 INJECTION, SOLUTION INTRAMUSCULAR; INTRAVENOUS; SUBCUTANEOUS
Status: DISCONTINUED | OUTPATIENT
Start: 2018-11-17 | End: 2018-11-17 | Stop reason: HOSPADM

## 2018-11-17 RX ORDER — PROMETHAZINE HYDROCHLORIDE 25 MG/1
25 SUPPOSITORY RECTAL ONCE AS NEEDED
Status: DISCONTINUED | OUTPATIENT
Start: 2018-11-17 | End: 2018-11-17 | Stop reason: HOSPADM

## 2018-11-17 RX ORDER — LIDOCAINE HYDROCHLORIDE 10 MG/ML
0.5 INJECTION, SOLUTION EPIDURAL; INFILTRATION; INTRACAUDAL; PERINEURAL ONCE AS NEEDED
Status: DISCONTINUED | OUTPATIENT
Start: 2018-11-17 | End: 2018-11-17

## 2018-11-17 RX ORDER — PROPOFOL 10 MG/ML
VIAL (ML) INTRAVENOUS AS NEEDED
Status: DISCONTINUED | OUTPATIENT
Start: 2018-11-17 | End: 2018-11-17 | Stop reason: SURG

## 2018-11-17 RX ORDER — MIDAZOLAM HYDROCHLORIDE 1 MG/ML
1 INJECTION INTRAMUSCULAR; INTRAVENOUS
Status: DISCONTINUED | OUTPATIENT
Start: 2018-11-17 | End: 2018-11-17

## 2018-11-17 RX ORDER — VANCOMYCIN HYDROCHLORIDE 1 G/200ML
1 INJECTION, SOLUTION INTRAVENOUS ONCE
Status: COMPLETED | OUTPATIENT
Start: 2018-11-17 | End: 2018-11-17

## 2018-11-17 RX ORDER — FAMOTIDINE 10 MG/ML
20 INJECTION, SOLUTION INTRAVENOUS ONCE
Status: COMPLETED | OUTPATIENT
Start: 2018-11-17 | End: 2018-11-17

## 2018-11-17 RX ORDER — LIDOCAINE HYDROCHLORIDE 20 MG/ML
INJECTION, SOLUTION INFILTRATION; PERINEURAL AS NEEDED
Status: DISCONTINUED | OUTPATIENT
Start: 2018-11-17 | End: 2018-11-17 | Stop reason: SURG

## 2018-11-17 RX ORDER — PROMETHAZINE HYDROCHLORIDE 25 MG/1
12.5 TABLET ORAL ONCE AS NEEDED
Status: DISCONTINUED | OUTPATIENT
Start: 2018-11-17 | End: 2018-11-17 | Stop reason: HOSPADM

## 2018-11-17 RX ADMIN — ESCITALOPRAM 10 MG: 10 TABLET, FILM COATED ORAL at 20:53

## 2018-11-17 RX ADMIN — SODIUM CHLORIDE, POTASSIUM CHLORIDE, SODIUM LACTATE AND CALCIUM CHLORIDE 9 ML/HR: 600; 310; 30; 20 INJECTION, SOLUTION INTRAVENOUS at 13:52

## 2018-11-17 RX ADMIN — Medication 0.5 MG: at 11:33

## 2018-11-17 RX ADMIN — LEVETIRACETAM 1000 MG: 10 INJECTION INTRAVENOUS at 22:01

## 2018-11-17 RX ADMIN — PROPOFOL 100 MG: 10 INJECTION, EMULSION INTRAVENOUS at 12:12

## 2018-11-17 RX ADMIN — GABAPENTIN 600 MG: 300 CAPSULE ORAL at 15:02

## 2018-11-17 RX ADMIN — CLINDAMYCIN PHOSPHATE 900 MG: 900 INJECTION INTRAVENOUS at 12:18

## 2018-11-17 RX ADMIN — Medication 1 MG: at 09:56

## 2018-11-17 RX ADMIN — SODIUM CHLORIDE 50 ML/HR: 9 INJECTION, SOLUTION INTRAVENOUS at 07:03

## 2018-11-17 RX ADMIN — SODIUM CHLORIDE, POTASSIUM CHLORIDE, SODIUM LACTATE AND CALCIUM CHLORIDE 9 ML/HR: 600; 310; 30; 20 INJECTION, SOLUTION INTRAVENOUS at 09:55

## 2018-11-17 RX ADMIN — MIRTAZAPINE 15 MG: 15 TABLET, FILM COATED ORAL at 20:53

## 2018-11-17 RX ADMIN — CLINDAMYCIN PHOSPHATE 900 MG: 900 INJECTION, SOLUTION INTRAVENOUS at 20:54

## 2018-11-17 RX ADMIN — ROPIVACAINE HYDROCHLORIDE 30 ML: 5 INJECTION, SOLUTION EPIDURAL; INFILTRATION; PERINEURAL at 11:30

## 2018-11-17 RX ADMIN — LEVETIRACETAM 1000 MG: 10 INJECTION INTRAVENOUS at 07:54

## 2018-11-17 RX ADMIN — LIDOCAINE HYDROCHLORIDE 60 MG: 20 INJECTION, SOLUTION INFILTRATION; PERINEURAL at 12:12

## 2018-11-17 RX ADMIN — GABAPENTIN 600 MG: 300 CAPSULE ORAL at 20:53

## 2018-11-17 RX ADMIN — FENTANYL CITRATE 25 MCG: 50 INJECTION INTRAMUSCULAR; INTRAVENOUS at 11:06

## 2018-11-17 RX ADMIN — FAMOTIDINE 20 MG: 10 INJECTION, SOLUTION INTRAVENOUS at 09:56

## 2018-11-17 RX ADMIN — VANCOMYCIN HYDROCHLORIDE 1 G: 1 INJECTION, SOLUTION INTRAVENOUS at 11:37

## 2018-11-17 RX ADMIN — ROPIVACAINE HYDROCHLORIDE 30 ML: 5 INJECTION, SOLUTION EPIDURAL; INFILTRATION; PERINEURAL at 11:29

## 2018-11-17 NOTE — PROGRESS NOTES
"Daily progress note    Chief complaint  Doing same  No new complaints  Going for surgery    History of present illness  88-year-old white female with very complex past medical history including coronary artery disease hypertension hyperlipidemia seizure disorder low back pain gastroesophageal reflux disease dementia who is a nursing home resident brought to the emergency room after the unwitnessed fall to the bathroom when she lost her balance and fell and does not remember what happened.  Patient complain of right hip pain right ankle pain but denies any chest pain and increased shortness of breath abdominal pain nausea vomiting diarrhea.  Patient workup in ER revealed right ankle fracture admitted for management.  At the time of interview patient is awake and alert consult question appropriately and hurting at the fracture site but no other complaints except generalized weakness.     REVIEW OF SYSTEMS  Review of Systems   Constitutional: Negative for fever.   HENT: Negative for sore throat.    Eyes: Negative.    Respiratory: Negative for cough and shortness of breath.    Cardiovascular: Negative for chest pain.   Gastrointestinal: Negative for abdominal pain, diarrhea and vomiting.   Genitourinary: Negative for dysuria and enuresis.   Musculoskeletal: Negative for neck pain.   Skin: Negative for rash.   Allergic/Immunologic: Negative.    Neurological: Positive for headaches (unsure of chronicity). Negative for weakness and numbness.   Hematological: Negative.    Psychiatric/Behavioral: Positive for confusion (per daughter pt normally oriented, disoriented starting today).   All other systems reviewed and are negative.     PHYSICAL EXAM  Blood pressure 174/94, pulse 68, temperature 98.2 °F (36.8 °C), temperature source Oral, resp. rate 16, height 157.5 cm (62\"), weight 98.9 kg (218 lb), SpO2 (!) 67 %, not currently breastfeeding.    Constitutional: No distress.   Head: Normocephalic and atraumatic.   No notable " bite alycia to tongue or lip   Eyes: EOM are normal. Pupils are equal, round, and reactive to light.   Neck: Normal range of motion. Neck supple.   Cardiovascular: Normal rate, regular rhythm and normal heart sounds.   Pulmonary/Chest: Effort normal and breath sounds normal. No respiratory distress.   Abdominal: Soft. There is no tenderness. There is no rebound and no guarding.   Musculoskeletal: Normal range of motion. She exhibits edema (2+ edema to feet and ankles bilaterally and increased at the right ankle at the site of the injury.).   Pt moves all extremities.  Diffuse contusion and ecchymosis to R ankle.  He has no cyanosis or pallor to the toes and is motor and sensory intact to the right foot and toes.   Neurological: She is alert. She has normal sensation and normal strength. No cranial nerve deficit. GCS score is 15.   Appears to be chronically demented, but daughter states she is normally A+Ox3    Skin: Skin is warm and dry. No rash noted.   Psychiatric: Mood and affect normal.       LAB RESULTS  Lab Results (last 24 hours)     Procedure Component Value Units Date/Time    CBC & Differential [[id number]] Collected:  11/17/18 0819    Specimen:  Blood Updated:  11/17/18 0844    Narrative:       The following orders were created for panel order CBC & Differential.  Procedure                               Abnormality         Status                     ---------                               -----------         ------                     Scan Slide[[id number]]                                                                  CBC Auto Differential[[id number]]        Abnormal            Final result                 Please view results for these tests on the individual orders.    CBC Auto Differential [[id number]]  (Abnormal) Collected:  11/17/18 0819    Specimen:  Blood Updated:  11/17/18 0844     WBC 6.47 10*3/mm3      RBC 2.71 10*6/mm3      Hemoglobin 9.3 g/dL      Hematocrit 28.5 %      .2 fL      MCH 34.3  pg      MCHC 32.6 g/dL      RDW 11.8 %      RDW-SD 44.7 fl      MPV 9.1 fL      Platelets 157 10*3/mm3      Neutrophil % 68.5 %      Lymphocyte % 20.9 %      Monocyte % 7.3 %      Eosinophil % 2.8 %      Basophil % 0.5 %      Immature Grans % 0.2 %      Neutrophils, Absolute 4.44 10*3/mm3      Lymphocytes, Absolute 1.35 10*3/mm3      Monocytes, Absolute 0.47 10*3/mm3      Eosinophils, Absolute 0.18 10*3/mm3      Basophils, Absolute 0.03 10*3/mm3      Immature Grans, Absolute 0.01 10*3/mm3     Basic Metabolic Panel [[id number]]  (Abnormal) Collected:  11/17/18 0507    Specimen:  Blood Updated:  11/17/18 0554     Glucose 101 mg/dL      BUN 12 mg/dL      Creatinine 0.64 mg/dL      Sodium 133 mmol/L      Potassium 4.5 mmol/L      Chloride 102 mmol/L      CO2 21.8 mmol/L      Calcium 8.4 mg/dL      eGFR Non African Amer 88 mL/min/1.73      BUN/Creatinine Ratio 18.8     Anion Gap 9.2 mmol/L     Narrative:       The MDRD GFR formula is only valid for adults with stable renal function between ages 18 and 70.    BNP [[id number]]  (Normal) Collected:  11/17/18 0507    Specimen:  Blood Updated:  11/17/18 0552     proBNP 1,641.0 pg/mL     Narrative:       Among patients with dyspnea, NT-proBNP is highly sensitive for the detection of acute congestive heart failure. In addition NT-proBNP of <300 pg/ml effectively rules out acute congestive heart failure with 99% negative predictive value.        Imaging Results (last 24 hours)     Procedure Component Value Units Date/Time    XR Ankle 2 View Right [[id number]] Updated:  11/17/18 1359    XR Ankle 2 View Right [[id number]] Updated:  11/17/18 1333    FL C Arm During Surgery [[id number]] Resulted:  11/17/18 1333     Updated:  11/17/18 1333    Narrative:       This procedure was auto-finalized with no dictation required.        EKG                              Rhythm/Rate: SR, 75  P waves and MO: 1st degree AV block  QRS, axis: narrow QRS, borderline LAD  ST and T waves: nonspecific  changes      Current Facility-Administered Medications:   •  [MAR Hold] aspirin chewable tablet 81 mg, 81 mg, Oral, Daily, Juan Carlos Harper MD, 81 mg at 11/16/18 0841  •  [MAR Hold] calcium carbonate (oyster shell) tablet 1,000 mg, 1,000 mg, Oral, Daily, Juan Carlos Harper MD, 1,000 mg at 11/16/18 0841  •  [MAR Hold] cetirizine (zyrTEC) tablet 10 mg, 10 mg, Oral, Daily, Juan Carlos Harper MD, 10 mg at 11/16/18 1153  •  diphenhydrAMINE (BENADRYL) injection 12.5 mg, 12.5 mg, Intravenous, Q15 Min PRN, Criss Miranda CRNA  •  [MAR Hold] docusate sodium (COLACE) capsule 250 mg, 250 mg, Oral, QAM, Juan Carlos Harper MD, 250 mg at 11/16/18 0841  •  ePHEDrine injection 5 mg, 5 mg, Intravenous, Once PRN, Criss Miranda CRNA  •  [MAR Hold] escitalopram (LEXAPRO) tablet 10 mg, 10 mg, Oral, Nightly, Juan Carlos Harper MD, 10 mg at 11/16/18 2114  •  fentaNYL citrate (PF) (SUBLIMAZE) injection 25 mcg, 25 mcg, Intravenous, Q5 Min PRN, Criss Miranda CRNA  •  fentaNYL citrate (PF) (SUBLIMAZE) injection 50 mcg, 50 mcg, Intravenous, Q10 Min PRN, Billy Valdes MD, 25 mcg at 11/17/18 1106  •  flumazenil (ROMAZICON) injection 0.2 mg, 0.2 mg, Intravenous, PRN, Criss Miranda CRNA  •  gabapentin (NEURONTIN) capsule 600 mg, 600 mg, Oral, TID, Juan Carlos Harper MD, 600 mg at 11/16/18 2114  •  [MAR Hold] HYDROcodone-acetaminophen (NORCO) 5-325 MG per tablet 1 tablet, 1 tablet, Oral, Q4H PRN, Juan Carlos Harper MD, 1 tablet at 11/16/18 0014  •  HYDROcodone-acetaminophen (NORCO) 7.5-325 MG per tablet 1 tablet, 1 tablet, Oral, Once PRN, Criss Miranda CRNA  •  HYDROmorphone (DILAUDID) injection 0.25 mg, 0.25 mg, Intravenous, Q5 Min PRN, Criss Miranda CRNA  •  labetalol (NORMODYNE,TRANDATE) injection 5 mg, 5 mg, Intravenous, Q5 Min PRN, Criss Miranda CRNA  •  lactated ringers infusion, 9 mL/hr, Intravenous, Continuous, Billy Valdes MD, Last Rate: 9 mL/hr at 11/17/18 1352, 9 mL/hr at 11/17/18 1352  •  levETIRAcetam in NaCl 0.75%  (KEPPRA) IVPB 1,000 mg, 1,000 mg, Intravenous, Q12H, Sarah Poole MD, 1,000 mg at 11/17/18 0754  •  lidocaine PF 1% (XYLOCAINE) injection 0.5 mL, 0.5 mL, Injection, Once PRN, Billy Valdes MD  •  midazolam (VERSED) injection 1 mg, 1 mg, Intravenous, Q5 Min PRN, 0.5 mg at 11/17/18 1133 **OR** midazolam (VERSED) injection 2 mg, 2 mg, Intravenous, Q5 Min PRN, Billy Valdes MD  •  [MAR Hold] mirtazapine (REMERON) tablet 15 mg, 15 mg, Oral, Nightly, Juan Carlos Harper MD, 15 mg at 11/16/18 2114  •  [MAR Hold] morphine injection 1 mg, 1 mg, Intravenous, Q4H PRN, 1 mg at 11/13/18 2135 **FOLLOWED BY** [MAR Hold] morphine injection 2 mg, 2 mg, Intravenous, Q4H PRN, Juan Carlos Harper MD  •  naloxone (NARCAN) injection 0.2 mg, 0.2 mg, Intravenous, PRN, Criss Miranda CRNA  •  [MAR Hold] ondansetron (ZOFRAN) injection 4 mg, 4 mg, Intravenous, Q4H PRN, Juan Carlos Harper MD  •  ondansetron (ZOFRAN) injection 4 mg, 4 mg, Intravenous, Once PRN, Criss Miranda CRNA  •  oxyCODONE-acetaminophen (PERCOCET) 7.5-325 MG per tablet 1 tablet, 1 tablet, Oral, Once PRN, Criss Miranda CRNA  •  [MAR Hold] pantoprazole (PROTONIX) EC tablet 40 mg, 40 mg, Oral, Q AM, Juan Carlos Harper MD, 40 mg at 11/16/18 0636  •  Pharmacy Consult - Pharmacy to dose, , Does not apply, Continuous PRN, Lauren Sandhu, APRN  •  promethazine (PHENERGAN) injection 12.5 mg, 12.5 mg, Intravenous, Once PRN **OR** promethazine (PHENERGAN) injection 12.5 mg, 12.5 mg, Intramuscular, Once PRN **OR** promethazine (PHENERGAN) suppository 25 mg, 25 mg, Rectal, Once PRN **OR** promethazine (PHENERGAN) tablet 25 mg, 25 mg, Oral, Once PRN, Criss Miranda CRNA  •  promethazine (PHENERGAN) tablet 12.5 mg, 12.5 mg, Oral, Once PRN, Criss Miranda CRNA  •  sodium chloride 0.9 % flush 1-10 mL, 1-10 mL, Intravenous, PRN, Billy Valdes MD  •  [COMPLETED] Insert peripheral IV, , , Once **AND** [MAR Hold] sodium chloride 0.9 % flush 10 mL, 10 mL, Intravenous,  PRN, Fidel Bingham MD  •  sodium chloride 0.9 % infusion, 50 mL/hr, Intravenous, Continuous, Gaetano Harper MD, Last Rate: 50 mL/hr at 11/17/18 0703, 50 mL/hr at 11/17/18 0703     ASSESSMENT  Acute right ankle fracture  Status post fall  Seizure disorder  Hypertension  Depression  History of CVA on Plavix  Gastroesophageal reflux disease  Status post permanent pacemaker  Osteoarthritis  Degenerative disc disease  DNR    PLAN  CPM  Bedrest  Pain management  Right ankle open reduction and fixation today  Orthopedic surgery consult appreciated  Continue home medications  Supportive care  Stress ulcer DVT prophylaxis  DNR  Discussed with family  Follow closely further recommendation according to hospital course    GAETANO HARPER MD

## 2018-11-17 NOTE — ANESTHESIA POSTPROCEDURE EVALUATION
"Patient: Kim Feldman    Procedure Summary     Date:  11/17/18 Room / Location:  [facility] OR 09 / [facility] MAIN OR    Anesthesia Start:  1207 Anesthesia Stop:  1341    Procedure:  ANKLE OPEN REDUCTION INTERNAL FIXATION (Right Ankle) Diagnosis:       Closed trimalleolar fracture of right ankle, initial encounter      (Closed trimalleolar fracture of right ankle, initial encounter [S82.006P])    Surgeon:  Cristian Hill II, MD Provider:  Billy Valdes MD    Anesthesia Type:  regional ASA Status:  3          Anesthesia Type: regional  Last vitals  BP   159/96 (11/17/18 1405)   Temp   36.8 °C (98.2 °F) (11/17/18 1340)   Pulse   66 (11/17/18 1405)   Resp   16 (11/17/18 1405)     SpO2   100 % (11/17/18 1405)     Post Anesthesia Care and Evaluation    Patient location during evaluation: bedside  Patient participation: complete - patient participated  Level of consciousness: awake and alert  Pain management: adequate  Airway patency: patent  Anesthetic complications: No anesthetic complications  PONV Status: none  Cardiovascular status: acceptable  Respiratory status: acceptable  Hydration status: acceptable    Comments: /96   Pulse 66   Temp 36.8 °C (98.2 °F) (Oral)   Resp 16   Ht 157.5 cm (62\")   Wt 98.9 kg (218 lb) Comment: one week ago at [facility]  SpO2 100%   BMI 39.87 kg/m²         "

## 2018-11-17 NOTE — PLAN OF CARE
Problem: Fall Risk (Adult)  Goal: Absence of Fall  Outcome: Ongoing (interventions implemented as appropriate)      Problem: Patient Care Overview  Goal: Plan of Care Review  Outcome: Ongoing (interventions implemented as appropriate)   11/17/18 0014 11/17/18 0227   Coping/Psychosocial   Plan of Care Reviewed With patient --    Plan of Care Review   Progress --  no change   OTHER   Outcome Summary --  VSS. Pt to have surgery in am. Waiting for daughter to sign consent. Pt had no complaints of pain. On room air. NPO at midnight. Pt has voided. Bladder scan 139. Continue to monitor.      Goal: Individualization and Mutuality  Outcome: Ongoing (interventions implemented as appropriate)    Goal: Discharge Needs Assessment  Outcome: Ongoing (interventions implemented as appropriate)    Goal: Interprofessional Rounds/Family Conf  Outcome: Ongoing (interventions implemented as appropriate)      Problem: Skin Injury Risk (Adult)  Goal: Skin Health and Integrity  Outcome: Ongoing (interventions implemented as appropriate)

## 2018-11-17 NOTE — ANESTHESIA PROCEDURE NOTES
Peripheral Block    Pre-sedation assessment completed: 11/17/2018 11:07 AM    Patient reassessed immediately prior to procedure    Patient location during procedure: holding area  Start time: 11/17/2018 11:07 AM  Stop time: 11/17/2018 11:18 AM  Reason for block: procedure for pain, at surgeon's request and post-op pain management  Performed by  Anesthesiologist: Billy Valdes MD  Preanesthetic Checklist  Completed: patient identified, site marked, surgical consent, pre-op evaluation, timeout performed, IV checked, risks and benefits discussed and monitors and equipment checked  Prep:  Pt Position: supine  Sterile barriers:cap, gloves, sterile barriers and mask  Prep: ChloraPrep  Patient monitoring: blood pressure monitoring, continuous pulse oximetry and EKG  Procedure  Sedation:yes  Performed under: local infiltration  Guidance:ultrasound guided  ULTRASOUND INTERPRETATION.  Using ultrasound guidance a 21 G gauge needle was placed in close proximity to the brachial plexus nerve, at which point, under ultrasound guidance anesthetic was injected in the area of the nerve and spread of the anesthesia was seen on ultrasound in close proximity thereto.  There were no abnormalities seen on ultrasound; a digital image was taken; and the patient tolerated the procedure with no complications. Images:still images obtained    Laterality:right  Block Type:adductor canal block  Injection Technique:single-shot  Needle Type:echogenic  Needle Gauge:22 G  Resistance on Injection: none  Medications Used: ropivacaine (NAROPIN) 0.5 % injection, 30 mL  Post Assessment  Injection Assessment: negative aspiration for heme, no paresthesia on injection and incremental injection  Patient Tolerance:comfortable throughout block  Complications:no

## 2018-11-17 NOTE — OP NOTE
ANKLE ORIF OPERATIVE NOTE  PATIENT NAME: Kim Feldman  MRN: [id number]  : 3/13/1930 AGE: 88 y.o. GENDER: female  DATE OF OPERATION: 2018 - 2018  PREOPERATIVE DIAGNOSIS: Trimalleolar Ankle Fracture  POSTOPERATIVE DIAGNOSIS: Trimalleolar Ankle Fracture  OPERATION PERFORMED: Procedure(s):  ANKLE OPEN REDUCTION INTERNAL FIXATION  SURGEON: Cristian Hill MD  Circulator: Susanne Dobson RN  Radiology Technologist: Caren Gill RRT  Scrub Person: Maddi Baker  Vendor Representative: Billy Roach  ANESTHESIA: General  ESTIMATED BLOOD LOSS: Minimal  SPONGE AND NEEDLE COUNT: Correct  INDICATIONS: This patient is noted to have an unstable ankle fracture that was not amenable to conservative treatment. The risks and benefits of surgery were thoroughly discussed and the patient signed surgical consent.     COMPONENTS:     Fibula  3.5 tubular nonlocking/locking plate with 2 interfragmentary reduction screws    Medial Malleolus  Screws: [employer] 4.0 mm partially threaded cannulated screw ×1    Posterior Malleolus  Screws: [employer] 4.0 mm partially threaded cannulated screws: ×2    PERTINENT FINDINGS: Ankle Fracture    DETAILS OF PROCEDURE:  The patient was met in the preoperative area. The site was marked. The consent and H&P were reviewed. The patient was then wheeled back to the operative suite and transferred to the operative table. The patient underwent anesthesia. A tourniquet was placed on the upper thigh. Surgical alcohol was used to thoroughly clean the entire operative extremity.    The leg was then prepped in the normal sterile fashion, which included Chloroprep and multiple layers of sterile drapes. After a surgical timeout in which administration of preoperative antibiotics and the surgical site were confirmed, the tourniquet was inflated.    LATERAL MALEOLUS   A skin incision based off of the lateral border of the fibula was used. Dissection was carried down to the fibula,  ensuring that the superficial peroneal nerve was not damaged. The fracture site was identified. Hematoma and tissue in the fracture site was cleaned out. The bone was reduced.    LAG SCREWS   2 interfragmentary screws were utilized to maintain reduction of the fibula. Proper reduction of the fracture was assessed on fluoroscopy.     A plate was chosen and clamped to the fibula.  As this fracture was more proximal, tubular plate was used and set of the distal locking plate.  On the lateral, the fracture could be seen to extend more proximally there was originally believed.  I therefore used a longer plate.  The position of the plate was checked by fluoroscopy prior to placement of screws. The fibular plate was then fixed to the bone with screws proximally and distally, ensuring that no distal screws penetrated into the joint. The wound was thoroughly irrigated and the skin was closed with a deep 0 Vicryl, 2-0 Vicryl subcutaneously and staples for the skin.     POSTERIOR MALLEOLUS   The posterior malleolar fracture was clamped percutaneously and through two small anterior incisions k-wires were passed across the fracture site. Drill guides were used to protect the soft tissue. Proper placement of the k-wires was ensured on fluoroscopy. cannulated lag screws were placed to hold the fracture in good alignment. After placement of the screws they were checked on fluoroscopy to ensure proper placement. The wound was thoroughly irrigated and the skin was closed with 2-0 Vicryl subcutaneously and staples for the skin.    MEDIAL MALLEOLUS   As the medial malleolus was nondisplaced, I made a percutaneous incision and passed a K wire.  This was visualized on fluoroscopy to make sure it was in the correct trajectory and a proper length.  This was then overdrilled and a 40 cancellus partially threaded screw was then inserted.     A sterile dressing was applied as well as a splint. The patient was awoken from anesthesia, moved to  the gurney and taken to the recovery room in stable condition. Sponge and needle count was correct. There were no complications. Patient tolerated the procedure well.    Cristian Hill II, MD  Orthopaedic Surgery  [employer] [facility]

## 2018-11-17 NOTE — ANESTHESIA PROCEDURE NOTES
ANESTHESIA INTUBATION  Urgency: elective    Airway not difficult    General Information and Staff    Patient location during procedure: OR  Anesthesiologist: Billy Valdes MD  CRNA: Criss Miranda CRNA    Indications and Patient Condition  Indications for airway management: airway protection    Preoxygenated: yes  MILS not maintained throughout  Mask difficulty assessment: 1 - vent by mask    Final Airway Details  Final airway type: supraglottic airway      Successful airway: classic  Size 4    Number of attempts at approach: 1    Additional Comments  Inflated to seal.

## 2018-11-17 NOTE — ANESTHESIA PREPROCEDURE EVALUATION
Anesthesia Evaluation     NPO Solid Status: > 8 hours  NPO Liquid Status: > 2 hours           Airway   Mallampati: II  TM distance: >3 FB  Neck ROM: full  No difficulty expected  Dental      Pulmonary    (+) pneumonia ,   (-) rhonchi, decreased breath sounds, wheezes  Cardiovascular   Exercise tolerance: poor (<4 METS)    Rhythm: regular  Rate: normal    (+) pacemaker, hypertension, valvular problems/murmurs MS, PVD, hyperlipidemia,   (-) CAD, angina, murmur    ROS comment: medtronic pacemaker for SSS  Normal LVEF mild/mod MS      Neuro/Psych  (+) seizures, numbness, psychiatric history, dementia,       ROS Comment: CTH negative for hemhorrage  GI/Hepatic/Renal/Endo    (+) morbid obesity, hiatal hernia, GERD,  renal disease ARF,     ROS Comment: Zenkers diverticulum  Hist ANDREW creatine now normal post hydration    Musculoskeletal     Abdominal     Abdomen: soft.   Substance History      OB/GYN          Other   (+) arthritis                   Anesthesia Plan    ASA 3     regional   (Right Adductor canal and poplliteal nerve blocks)  intravenous induction   Anesthetic plan, all risks, benefits, and alternatives have been provided, discussed and informed consent has been obtained with: patient.

## 2018-11-17 NOTE — PLAN OF CARE
Problem: Fall Risk (Adult)  Goal: Absence of Fall  Outcome: Ongoing (interventions implemented as appropriate)      Problem: Patient Care Overview  Goal: Plan of Care Review  Outcome: Ongoing (interventions implemented as appropriate)   11/17/18 1538   OTHER   Outcome Summary vss. pt had ankle surgery today with block.alert but confused upon return from surgery. labs in am.     Goal: Individualization and Mutuality  Outcome: Ongoing (interventions implemented as appropriate)    Goal: Discharge Needs Assessment  Outcome: Ongoing (interventions implemented as appropriate)    Goal: Interprofessional Rounds/Family Conf  Outcome: Ongoing (interventions implemented as appropriate)      Problem: Skin Injury Risk (Adult)  Goal: Skin Health and Integrity  Outcome: Ongoing (interventions implemented as appropriate)

## 2018-11-17 NOTE — ANESTHESIA PROCEDURE NOTES
Peripheral Block    Pre-sedation assessment completed: 11/17/2018 11:07 AM    Patient reassessed immediately prior to procedure    Patient location during procedure: holding area  Start time: 11/17/2018 11:18 AM  Stop time: 11/17/2018 11:28 AM  Reason for block: procedure for pain, at surgeon's request and post-op pain management  Performed by  Anesthesiologist: Billy Valdes MD  Preanesthetic Checklist  Completed: patient identified, site marked, surgical consent, pre-op evaluation, timeout performed, IV checked, risks and benefits discussed and monitors and equipment checked  Prep:  Pt Position: supine  Sterile barriers:cap, gloves, sterile barriers and mask  Prep: ChloraPrep  Patient monitoring: blood pressure monitoring, continuous pulse oximetry and EKG  Procedure  Sedation:yes  Performed under: local infiltration  Guidance:ultrasound guided  ULTRASOUND INTERPRETATION.  Using ultrasound guidance a 21 G gauge needle was placed in close proximity to the sciatic nerve, at which point, under ultrasound guidance anesthetic was injected in the area of the nerve and spread of the anesthesia was seen on ultrasound in close proximity thereto.  There were no abnormalities seen on ultrasound; a digital image was taken; and the patient tolerated the procedure with no complications. Images:still images obtained    Block Type:popliteal  Injection Technique:single-shot  Needle Type:echogenic  Needle Gauge:22 G  Resistance on Injection: none  Medications Used: ropivacaine (NAROPIN) 0.5 % injection, 30 mL  Post Assessment  Injection Assessment: negative aspiration for heme, no paresthesia on injection and incremental injection  Patient Tolerance:comfortable throughout block  Complications:no

## 2018-11-18 LAB
ANION GAP SERPL CALCULATED.3IONS-SCNC: 9.3 MMOL/L
BASOPHILS # BLD AUTO: 0.03 10*3/MM3 (ref 0–0.2)
BASOPHILS NFR BLD AUTO: 0.4 % (ref 0–1.5)
BUN BLD-MCNC: 9 MG/DL (ref 8–23)
BUN/CREAT SERPL: 13.2 (ref 7–25)
CALCIUM SPEC-SCNC: 8.3 MG/DL (ref 8.6–10.5)
CHLORIDE SERPL-SCNC: 96 MMOL/L (ref 98–107)
CO2 SERPL-SCNC: 26.7 MMOL/L (ref 22–29)
CREAT BLD-MCNC: 0.68 MG/DL (ref 0.57–1)
DEPRECATED RDW RBC AUTO: 44.8 FL (ref 37–54)
EOSINOPHIL # BLD AUTO: 0.11 10*3/MM3 (ref 0–0.7)
EOSINOPHIL NFR BLD AUTO: 1.5 % (ref 0.3–6.2)
ERYTHROCYTE [DISTWIDTH] IN BLOOD BY AUTOMATED COUNT: 11.7 % (ref 11.7–13)
GFR SERPL CREATININE-BSD FRML MDRD: 82 ML/MIN/1.73
GLUCOSE BLD-MCNC: 114 MG/DL (ref 65–99)
GLUCOSE BLDC GLUCOMTR-MCNC: 93 MG/DL (ref 70–130)
HCT VFR BLD AUTO: 30.4 % (ref 35.6–45.5)
HGB BLD-MCNC: 9.9 G/DL (ref 11.9–15.5)
IMM GRANULOCYTES # BLD: 0 10*3/MM3 (ref 0–0.03)
IMM GRANULOCYTES NFR BLD: 0 % (ref 0–0.5)
LYMPHOCYTES # BLD AUTO: 1.16 10*3/MM3 (ref 0.9–4.8)
LYMPHOCYTES NFR BLD AUTO: 15.9 % (ref 19.6–45.3)
MCH RBC QN AUTO: 34.4 PG (ref 26.9–32)
MCHC RBC AUTO-ENTMCNC: 32.6 G/DL (ref 32.4–36.3)
MCV RBC AUTO: 105.6 FL (ref 80.5–98.2)
MONOCYTES # BLD AUTO: 0.64 10*3/MM3 (ref 0.2–1.2)
MONOCYTES NFR BLD AUTO: 8.8 % (ref 5–12)
NEUTROPHILS # BLD AUTO: 5.36 10*3/MM3 (ref 1.9–8.1)
NEUTROPHILS NFR BLD AUTO: 73.4 % (ref 42.7–76)
PLATELET # BLD AUTO: 168 10*3/MM3 (ref 140–500)
PMV BLD AUTO: 9 FL (ref 6–12)
POTASSIUM BLD-SCNC: 4 MMOL/L (ref 3.5–5.2)
RBC # BLD AUTO: 2.88 10*6/MM3 (ref 3.9–5.2)
SODIUM BLD-SCNC: 132 MMOL/L (ref 136–145)
WBC NRBC COR # BLD: 7.3 10*3/MM3 (ref 4.5–10.7)

## 2018-11-18 PROCEDURE — 97162 PT EVAL MOD COMPLEX 30 MIN: CPT

## 2018-11-18 PROCEDURE — 82962 GLUCOSE BLOOD TEST: CPT

## 2018-11-18 PROCEDURE — 25010000003 LEVETIRACETAM IN NACL 0.75% 1000 MG/100ML SOLUTION: Performed by: PSYCHIATRY & NEUROLOGY

## 2018-11-18 PROCEDURE — 99233 SBSQ HOSP IP/OBS HIGH 50: CPT | Performed by: NURSE PRACTITIONER

## 2018-11-18 PROCEDURE — 97110 THERAPEUTIC EXERCISES: CPT

## 2018-11-18 PROCEDURE — 85025 COMPLETE CBC W/AUTO DIFF WBC: CPT | Performed by: HOSPITALIST

## 2018-11-18 PROCEDURE — 80048 BASIC METABOLIC PNL TOTAL CA: CPT | Performed by: HOSPITALIST

## 2018-11-18 RX ORDER — ACETAMINOPHEN 325 MG/1
650 TABLET ORAL EVERY 4 HOURS PRN
Status: DISCONTINUED | OUTPATIENT
Start: 2018-11-18 | End: 2018-11-21

## 2018-11-18 RX ADMIN — LEVETIRACETAM 1000 MG: 10 INJECTION INTRAVENOUS at 20:30

## 2018-11-18 RX ADMIN — ACETAMINOPHEN 650 MG: 325 TABLET, FILM COATED ORAL at 15:58

## 2018-11-18 RX ADMIN — PANTOPRAZOLE SODIUM 40 MG: 40 TABLET, DELAYED RELEASE ORAL at 06:27

## 2018-11-18 RX ADMIN — CLOPIDOGREL 75 MG: 75 TABLET, FILM COATED ORAL at 09:48

## 2018-11-18 RX ADMIN — LEVETIRACETAM 1000 MG: 10 INJECTION INTRAVENOUS at 09:48

## 2018-11-18 RX ADMIN — CALCIUM 1000 MG: 500 TABLET ORAL at 09:48

## 2018-11-18 RX ADMIN — GABAPENTIN 600 MG: 300 CAPSULE ORAL at 09:48

## 2018-11-18 RX ADMIN — CLINDAMYCIN PHOSPHATE 900 MG: 900 INJECTION, SOLUTION INTRAVENOUS at 03:55

## 2018-11-18 RX ADMIN — CLINDAMYCIN PHOSPHATE 900 MG: 900 INJECTION, SOLUTION INTRAVENOUS at 11:51

## 2018-11-18 RX ADMIN — Medication 81 MG: at 09:48

## 2018-11-18 RX ADMIN — DOCUSATE SODIUM 250 MG: 50 CAPSULE, LIQUID FILLED ORAL at 06:27

## 2018-11-18 RX ADMIN — CETIRIZINE HYDROCHLORIDE 10 MG: 10 TABLET, FILM COATED ORAL at 09:48

## 2018-11-18 RX ADMIN — GABAPENTIN 600 MG: 300 CAPSULE ORAL at 15:58

## 2018-11-18 RX ADMIN — SODIUM CHLORIDE 50 ML/HR: 9 INJECTION, SOLUTION INTRAVENOUS at 11:13

## 2018-11-18 NOTE — PROGRESS NOTES
"DOS: 2018  NAME: Kim Feldman   : 3/13/1930  PCP: Herrera Nelson MD  Chief Complaint   Patient presents with   • Fall     CC: Seizure    Subjective: No new events overnight per RN, no witnessed seizure activity.  Patient is POD #1 ORIF right ankle. She is drowsy this am and c/o of some right leg pain but otherwise denies any neurologic changes.      Interval History  History taken from: patient chart RN    Objective:  Vital signs: /75 (BP Location: Left arm, Patient Position: Lying)   Pulse 84   Temp 98.5 °F (36.9 °C) (Oral)   Resp 18   Ht 157.5 cm (62\")   Wt 98.9 kg (218 lb) Comment: one week ago at [facility]  SpO2 97%   BMI 39.87 kg/m²       Physical Exam:  GENERAL: NAD, obease  HEENT: Normocephalic, atraumatic   COR: RRR  Resp: Even and unlabored  Extremities: No signs of distal embolization. RLE in brace.     Neurological:   MS: Drowsy but easily arousable. Oriented to self, place and situation. Naming and repetition intact. No neglect. Follows most commands.   CN: II-XII grossly normal   Motor: Moves upper extremities and LLE spontaneously. Wiggles toes of right foot.   Sensory: Intact to noxious stimulus.     Current Medications:  Scheduled Medications:    aspirin 81 mg Oral Daily   calcium carbonate (oyster shell) 1,000 mg Oral Daily   cetirizine 10 mg Oral Daily   clindamycin 900 mg Intravenous Q8H   clopidogrel 75 mg Oral Daily   docusate sodium 250 mg Oral QAM   escitalopram 10 mg Oral Nightly   gabapentin 600 mg Oral TID   [START ON 2018] levETIRAcetam 750 mg Oral BID   levETIRAcetam 1,000 mg Intravenous Q12H   mirtazapine 15 mg Oral Nightly   pantoprazole 40 mg Oral Q AM     Infusions:     lactated ringers 9 mL/hr Last Rate: 9 mL/hr (18 1352)   Pharmacy Consult - Pharmacy to dose     sodium chloride 50 mL/hr Last Rate: 50 mL/hr (18 4403)     PRN Medications:  HYDROcodone-acetaminophen  •  Morphine **FOLLOWED BY** [START ON 2018] Morphine  •  ondansetron  •  " Pharmacy Consult - Pharmacy to dose  •  [COMPLETED] Insert peripheral IV **AND** sodium chloride    Medications Reviewed: changes made    Laboratory results:  Lab Results   Component Value Date    GLUCOSE 114 (H) 11/18/2018    CALCIUM 8.3 (L) 11/18/2018     (L) 11/18/2018    K 4.0 11/18/2018    CO2 26.7 11/18/2018    CL 96 (L) 11/18/2018    BUN 9 11/18/2018    CREATININE 0.68 11/18/2018    EGFRIFAFRI 74 08/16/2017    EGFRIFNONA 82 11/18/2018    BCR 13.2 11/18/2018    ANIONGAP 9.3 11/18/2018     Lab Results   Component Value Date    WBC 7.30 11/18/2018    HGB 9.9 (L) 11/18/2018    HCT 30.4 (L) 11/18/2018    .6 (H) 11/18/2018     11/18/2018      Results from last 7 days   Lab Units  11/15/18   0346   CHOLESTEROL mg/dL  124     Lab Results   Component Value Date    INR 1.07 11/13/2018    INR 1.05 12/13/2017    INR 1.0 06/06/2014    PROTIME 13.7 11/13/2018    PROTIME 13.3 12/13/2017    PROTIME 10.7 06/06/2014     Lab Results   Component Value Date    TSH 2.430 11/15/2018     Lab Results   Component Value Date    HGBA1C 5.33 11/15/2018     Lab Results   Component Value Date    CHOL 124 11/15/2018    CHLPL 200 06/19/2015    TRIG 56 11/15/2018    HDL 44 11/15/2018    LDL 69 11/15/2018      Lab Results   Component Value Date    YUOYHRIJ96 410 02/05/2017       Results Review:     I reviewed the patient's new clinical results.  I reviewed the patient's new imaging results and agree with the interpretation.  CT head 11/13/18: no acute findings, small vessel ischemic disease  CT C-spine 11/13/18: IMPRESSION:   1. Multilevel degenerative disease involving the cervical spine as  described with no evidence of fracture. See above.  2. Medial course of the internal carotid arteries with vascular  calcification suggesting severe stenosis of the internal carotid artery  on the right    Impression/Plan: 89 yo female followed as an outpatient by Dr Liang for seizure management who presented from her [facility]  after a fall and is POD#1 ORIF right ankle. Her imaging was negative for any acute findings. She was maintained on keppra 750 mg BID prior to admission (increased from 500 mg BID in Oct. 2018). She was prophylactically started on keppra 1000 mg BID perioperatively. She has not had any seizure activity. Will resume her home dose of keppra 750 mg BID. Continue seizure precautions. No further neurologic work-up at this time. F/U with Dr. Velez outpatient as scheduled. Call with any questions or concerns.     I have discussed the above with Dr. Poole, RN, patient.  Jessica Duong, APRN  11/18/18  9:52 AM        History of sick sinus syndrome    Cardiac pacemaker in situ    Closed trimalleolar fracture of right ankle

## 2018-11-18 NOTE — PROGRESS NOTES
ORTHOPAEDIC SURGERY DAILY PROGRESS NOTE  Patient is a 88 y.o. female who is 1 Day Post-Op s/p Procedure(s):  ANKLE OPEN REDUCTION INTERNAL FIXATION     E455/1 Kim Feldman Age:88 y.o. MRN:[id number]  Admitted: 11/13/2018  Overnight events: No acute events.  Some disorientation.  Pain: Pain well controlled with current pain regiment.   Ambulation/Activity: Minimal activity since surgery   Vitals:  Vitals:    11/17/18 2000 11/18/18 0044 11/18/18 0058 11/18/18 0357   BP: 158/95  160/92 156/75   BP Location: Left arm  Left arm Left arm   Patient Position: Lying  Lying Lying   Pulse: 77 84     Resp: 16 18     Temp: 98.8 °F (37.1 °C) 98.5 °F (36.9 °C)     TempSrc: Oral Oral     SpO2: 98% 97%     Weight:       Height:         Ins/Outs:  Last 24hrs    Intake/Output Summary (Last 24 hours) at 11/18/2018 0744  Last data filed at 11/17/2018 1328  Gross per 24 hour   Intake 800 ml   Output 20 ml   Net 780 ml     Physical Exam:  CONSTITUTIONAL: A&Ox3, No acute distress  LUNGS: Equal chest rise, no shortness of air  CARDIOVASCULAR: palpable peripheral pulses  WOUND: Dressings clean, dry, and intact  EXTREMITY: Right Lower Extremity   Pulses: brisk capillary refill intact   Sensation: Intact to toes   Motor: Wiggles toes in splint    Labs:   Lab Results   Component Value Date    HGB 9.3 (L) 11/17/2018     Lab Results   Component Value Date    WBC 6.47 11/17/2018     Lab Results   Component Value Date    GLUCOSE 101 (H) 11/17/2018    CALCIUM 8.4 (L) 11/17/2018     (L) 11/17/2018    K 4.5 11/17/2018    CO2 21.8 (L) 11/17/2018     11/17/2018    BUN 12 11/17/2018    CREATININE 0.64 11/17/2018    EGFRIFAFRI 74 08/16/2017    EGFRIFNONA 88 11/17/2018    BCR 18.8 11/17/2018    ANIONGAP 9.2 11/17/2018         Radiology: No radiology results for the last day  Assessment: Patient is a 88 y.o. female who is 1 Day Post-Op s/p Procedure(s):  ANKLE OPEN REDUCTION INTERNAL FIXATION   - Weight Bearing: Right Lower Extremity Non  Weight Bearing  - Labs: Above lab values review. Plan: Labs not back yet today  - PT/OT: To Mobilize  - DVT PPX: She can resume her Plavix 75 mg daily today  - Post-Op Xray: Hardware in good position. Acceptable bony reduction.   - Dispo: There is discussion about possibly transferring her to the orthopedic floor.  Alternatively, she may also go back to her care facility.  I am fine with her either being discharged today or going to 7 or [facility] [place].  I do not need her to stay in the hospital any longer.  She may be discharged when cleared by medicine.      Cristian Hill II, MD  Orthopaedic Surgery  [facility]

## 2018-11-18 NOTE — THERAPY EVALUATION
Acute Care - Physical Therapy Initial Evaluation  [facility]     Patient Name: Kim Feldman  : 3/13/1930  MRN: [id number]  Today's Date: 2018   Onset of Illness/Injury or Date of Surgery: 18     Referring Physician: Sergio      Admit Date: 2018    Visit Dx:     ICD-10-CM ICD-9-CM   1. Closed trimalleolar fracture of right ankle, initial encounter S82.851A 824.6   2. Fall, initial encounter W19.XXXA E888.9   3. Altered mental status, unspecified altered mental status type, improving R41.82 780.97   4. 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)   • Vitamin D deficiency   • History of sick sinus syndrome   • Intertrigo   • Generalized convulsive seizures (CMS/HCC)   • Change in bowel habits   • Zenker diverticulum   • History of anxiety   • 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HCC)   • Thrombocytopenia (CMS/HCC)   • B12 deficiency   • Cardiac pacemaker in situ   • Chronic venous insufficiency   • Arthritis   • S/P knee replacement   • Chronic bilateral low back pain without sciatica   • Essential hypertension   • Hiatal hernia   • Zenker's diverticulum   • Chronic idiopathic constipation   • Pressure sore on buttocks   • Somnolence   • Closed trimalleolar fracture of right ankle     Past Medical History:   Diagnosis Date   • Anemia    • At risk for sleep apnea 2018   • Bulging lumbar disc    • Chronic cough    • Chronic UTI    • Chronic venous insufficiency    • 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HCC)    • DDD (degenerative disc disease), lumbosacral    • Dementia without behavioral disturbance     moderate   • Depression, endogenous (CMS/HCC)    • Disc degeneration, lumbar    • Dysphagia     • GERD (gastroesophageal reflux disease)    • Hiatal hernia    • History of anxiety    • History of aspiration pneumonitis    • History of cerebral artery occlusion     CVA (following TIA), 10/10, treated with tPA   • History of herpes zoster    • History of ingrowing nail    • History of osteoporosis    • History of poliomyelitis     child   • History of sciatica    • History of sick sinus syndrome     s/p PPM   • History of transient cerebral ischemia     followed by stroke in 10/2010. BIBI was normal.   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)    • Vitamin D deficiency    • Zenker's diverticulum      Past Surgical History:   Procedure Laterality Date   • CARDIAC PACEMAKER PLACEMENT      secondary to SSS, placed in 2004, with generator change in 2014   • CATARACT EXTRACTION W/ INTRAOCULAR LENS IMPLANT Bilateral    • COLONOSCOPY     • HAND SURGERY Right    • KNEE ARTHROPLASTY Left    • LAMINECTOMY FOR IMPLANTATION / PLACEMENT NEUROSTIMULATOR ELECTRODES     • LUMBAR LAMINECTOMY      L-3 L4 L4 L5   • TONSILLECTOMY          PT ASSESSMENT (last 12 hours)      Physical Therapy Evaluation     Row Name 11/18/18 7538          PT Evaluation Time/Intention    Subjective Information  -- pt very lethargic, difficult to arouse   -EM     Document Type  evaluation  -EM     Mode of Treatment  physical therapy  -EM     Patient Effort  fair  -EM     Row Name 11/18/18 0788          General Information    Onset of Illness/Injury or Date of  "Surgery  11/13/18  -EM     Referring Physician  Sergio  -EM     Patient Observations  lethargic  -EM     General Observations of Patient  obese elderly female in supine, sleeping and difficult to arouse   -EM     Prior Level of Function  independent:;all household mobility  -EM     Equipment Currently Used at Home  rollator  -EM     Pertinent History of Current Functional Problem  s/p ORIF R ankle 11/17/18   -EM     Existing Precautions/Restrictions  non-weight bearing NWB RLE  -EM     Row Name 11/18/18 1341          Relationship/Environment    Lives With  facility resident  -EM     Row Name 11/18/18 1341          Resource/Environmental Concerns    Current Living Arrangements  independent/assisted living facility  -EM     Row Name 11/18/18 1341          Cognitive Assessment/Intervention- PT/OT    Orientation Status (Cognition)  unable/difficult to assess pt states \"no\" when asked her name and place  -EM     Follows Commands (Cognition)  follows one step commands;50-74% accuracy;increased processing time needed constant cues to stay awake   -EM     Row Name 11/18/18 1341          Mobility Assessment/Treatment    Extremity Weight-bearing Status  right lower extremity  -EM     Right Lower Extremity (Weight-bearing Status)  non weight-bearing (NWB)  -EM     Row Name 11/18/18 1341          Bed Mobility Assessment/Treatment    Bed Mobility Assessment/Treatment  supine-sit;sit-supine  -EM     Supine-Sit Arnold (Bed Mobility)  maximum assist (25% patient effort);2 person assist  -EM     Sit-Supine Arnold (Bed Mobility)  maximum assist (25% patient effort);2 person assist  -EM     Assistive Device (Bed Mobility)  draw sheet  -EM     Row Name 11/18/18 1341          Transfer Assessment/Treatment    Comment (Transfers)  pt too lethargic to attempt today   -EM     Row Name 11/18/18 1341          General ROM    GENERAL ROM COMMENTS  ROM WNL (R ankle in splint and not tested)   -EM     Row Name 11/18/18 1341          " MMT (Manual Muscle Testing)    General MMT Comments  difficult to assess, pt very lethargic  -EM     Row Name 11/18/18 1341          Motor Assessment/Intervention    Additional Documentation  Balance (Group)  -EM     Row Name 11/18/18 1341          Balance    Balance  static sitting balance  -EM     Row Name 11/18/18 1341          Static Sitting Balance    Level of Sturgeon (Unsupported Sitting, Static Balance)  supervision;contact guard assist  -EM     Sitting Position (Unsupported Sitting, Static Balance)  sitting on edge of bed  -EM     Time Able to Maintain Position (Unsupported Sitting, Static Balance)  2 to 3 minutes  -EM     Row Name 11/18/18 1341          Pain Assessment    Additional Documentation  Pain Scale: Word Pre/Post-Treatment (Group)  -EM     Row Name 11/18/18 1341          Pain Scale: Numbers Pre/Post-Treatment    Pain Location - Side  Right  -EM     Pain Location  ankle  -EM     Pain Intervention(s)  Medication (See MAR);Repositioned  -EM     Row Name 11/18/18 1341          Pain Scale: Word Pre/Post-Treatment    Pain: Word Scale, Pretreatment  2 - mild pain difficult to assess, pt seems uncomfortable on EOB   -EM     Row Name             Wound 11/13/18 1755 other (see notes) skin tear    Wound - Properties Group Date first assessed: 11/13/18  -CC Time first assessed: 1755  -CC Present On Admission : yes;picture taken  -CC Location: other (see notes)  -CC, between butt cheeks  Type: skin tear  -CC    Row Name             Wound 11/17/18 1353 Right foot incision    Wound - Properties Group Date first assessed: 11/17/18  -RG Time first assessed: 1353  -RG Side: Right  -RG Location: foot  -RG Type: incision  -RG    Row Name 11/18/18 1341          Physical Therapy Clinical Impression    Patient/Family Goals Statement (PT Clinical Impression)  pt unable to state, d/c plans are SNU   -EM     Criteria for Skilled Interventions Met (PT Clinical Impression)  yes;treatment indicated  -EM     Impairments  Found (describe specific impairments)  gait, locomotion, and balance;arousal, attention, and cognition  -EM     Rehab Potential (PT Clinical Summary)  fair, will monitor progress closely  -EM     Row Name 11/18/18 1341          Physical Therapy Goals    Bed Mobility Goal Selection (PT)  bed mobility, PT goal 1  -EM     Transfer Goal Selection (PT)  transfer, PT goal 1  -EM     Row Name 11/18/18 1341          Bed Mobility Goal 1 (PT)    Activity/Assistive Device (Bed Mobility Goal 1, PT)  bed mobility activities, all  -EM     Vina Level/Cues Needed (Bed Mobility Goal 1, PT)  moderate assist (50-74% patient effort)  -EM     Time Frame (Bed Mobility Goal 1, PT)  1 week  -EM     Row Name 11/18/18 1341          Transfer Goal 1 (PT)    Activity/Assistive Device (Transfer Goal 1, PT)  bed-to-chair/chair-to-bed;walker, rolling  -EM     Vina Level/Cues Needed (Transfer Goal 1, PT)  maximum assist (25-49% patient effort)  -EM     Time Frame (Transfer Goal 1, PT)  1 week  -EM     Row Name 11/18/18 1341          Positioning and Restraints    Pre-Treatment Position  in bed  -EM     Post Treatment Position  bed  -EM     In Bed  supine;call light within reach;notified nsg;exit alarm on  -EM       User Key  (r) = Recorded By, (t) = Taken By, (c) = Cosigned By    Initials Name Provider Type    Susanne Walter, RN Registered Nurse    EM Paty Lock PT Physical Therapist    Marquita Nobles RN Registered Nurse        Physical Therapy Education     Title: PT OT SLP Therapies (Active)     Topic: Physical Therapy (Active)     Point: Mobility training (Active)     Learning Progress Summary           Patient Acceptance, E, NR by EM at 11/18/2018  1:53 PM                               User Key     Initials Effective Dates Name Provider Type Discipline    EM 04/03/18 -  Paty Lock, PT Physical Therapist PT              PT Recommendation and Plan  Anticipated Discharge Disposition (PT): skilled  nursing facility  Planned Therapy Interventions (PT Eval): home exercise program, bed mobility training, transfer training  Therapy Frequency (PT Clinical Impression): daily  Outcome Summary/Treatment Plan (PT)  Anticipated Discharge Disposition (PT): skilled nursing facility  Outcome Summary: patient presents with generalized post op weakness, lethargy, decreased activity tolerance, decreased safety awareness which limit independence with functional mobility and patient would benefit from skilled PT. Patient from asst living, independent with use of rollator. Patient with NWB status on RLE, very lethargic during PT. Anticipate need for SNU at d/c and slow progress with mobility   Outcome Measures     Row Name 11/18/18 1300             How much help from another person do you currently need...    Turning from your back to your side while in flat bed without using bedrails?  2  -EM      Moving from lying on back to sitting on the side of a flat bed without bedrails?  2  -EM      Moving to and from a bed to a chair (including a wheelchair)?  1  -EM      Standing up from a chair using your arms (e.g., wheelchair, bedside chair)?  1  -EM      Climbing 3-5 steps with a railing?  1  -EM      To walk in hospital room?  1  -EM      AM-PAC 6 Clicks Score  8  -EM         Functional Assessment    Outcome Measure Options  AM-PAC 6 Clicks Basic Mobility (PT)  -EM        User Key  (r) = Recorded By, (t) = Taken By, (c) = Cosigned By    Initials Name Provider Type    EM Paty Lock, PT Physical Therapist         Time Calculation:   PT Charges     Row Name 11/18/18 1356             Time Calculation    Start Time  1315  -EM      Stop Time  1338  -EM      Time Calculation (min)  23 min  -EM      PT Received On  11/18/18  -EM      PT - Next Appointment  11/19/18  -EM      PT Goal Re-Cert Due Date  11/25/18  -EM         Time Calculation- PT    Total Timed Code Minutes- PT  10 minute(s)  -EM        User Key  (r) = Recorded By,  (t) = Taken By, (c) = Cosigned By    Initials Name Provider Type    EM Paty Lock PT Physical Therapist        Therapy Suggested Charges     Code   Minutes Charges    None           Therapy Charges for Today     Code Description Service Date Service Provider Modifiers Qty    78200396839 HC PT EVAL MOD COMPLEXITY 2 11/18/2018 Paty Lock, PT GP 1    67917692281 HC PT THER PROC EA 15 MIN 11/18/2018 Paty Lock, PT GP 1    77897015760 HC PT THER SUPP EA 15 MIN 11/18/2018 Paty Lock, PT GP 1          PT G-Codes  Outcome Measure Options: AM-PAC 6 Clicks Basic Mobility (PT)  AM-PAC 6 Clicks Score: 8      Paty Lock PT  11/18/2018

## 2018-11-18 NOTE — PLAN OF CARE
Problem: Patient Care Overview  Goal: Plan of Care Review   11/18/18 3993   OTHER   Outcome Summary patient presents with generalized post op weakness, lethargy, decreased activity tolerance, decreased safety awareness which limit independence with functional mobility and patient would benefit from skilled PT. Patient from asst living, independent with use of rollator. Patient with NWB status on RLE, very lethargic during PT. Anticipate need for SNU at d/c and slow progress with mobility

## 2018-11-18 NOTE — PLAN OF CARE
Problem: Fall Risk (Adult)  Goal: Absence of Fall  Outcome: Ongoing (interventions implemented as appropriate)      Problem: Patient Care Overview  Goal: Plan of Care Review  Outcome: Ongoing (interventions implemented as appropriate)   11/18/18 1544   OTHER   Outcome Summary vss. pt confused throughout the day and drowsy.worked with PT. labs in am     Goal: Individualization and Mutuality  Outcome: Ongoing (interventions implemented as appropriate)    Goal: Discharge Needs Assessment  Outcome: Ongoing (interventions implemented as appropriate)    Goal: Interprofessional Rounds/Family Conf  Outcome: Ongoing (interventions implemented as appropriate)      Problem: Skin Injury Risk (Adult)  Goal: Skin Health and Integrity  Outcome: Ongoing (interventions implemented as appropriate)

## 2018-11-18 NOTE — PROGRESS NOTES
"Daily progress note    Chief complaint  Doing same  Status post surgery  Complain of pain     History of present illness  88-year-old white female with very complex past medical history including coronary artery disease hypertension hyperlipidemia seizure disorder low back pain gastroesophageal reflux disease dementia who is a nursing home resident brought to the emergency room after the unwitnessed fall to the bathroom when she lost her balance and fell and does not remember what happened.  Patient complain of right hip pain right ankle pain but denies any chest pain and increased shortness of breath abdominal pain nausea vomiting diarrhea.  Patient workup in ER revealed right ankle fracture admitted for management.  At the time of interview patient is awake and alert consult question appropriately and hurting at the fracture site but no other complaints except generalized weakness.     REVIEW OF SYSTEMS  Review of Systems   Constitutional: Negative for fever.   HENT: Negative for sore throat.    Eyes: Negative.    Respiratory: Negative for cough and shortness of breath.    Cardiovascular: Negative for chest pain.   Gastrointestinal: Negative for abdominal pain, diarrhea and vomiting.   Genitourinary: Negative for dysuria and enuresis.   Musculoskeletal: Negative for neck pain.   Skin: Negative for rash.   Allergic/Immunologic: Negative.    Neurological: Positive for headaches (unsure of chronicity). Negative for weakness and numbness.   Hematological: Negative.    Psychiatric/Behavioral: Positive for confusion (per daughter pt normally oriented, disoriented starting today).   All other systems reviewed and are negative.     PHYSICAL EXAM  Blood pressure 110/67, pulse 80, temperature 99.2 °F (37.3 °C), temperature source Oral, resp. rate 16, height 157.5 cm (62\"), weight 98.9 kg (218 lb), SpO2 97 %, not currently breastfeeding.    Constitutional: No distress.   Head: Normocephalic and atraumatic.   No notable bite " alycia to tongue or lip   Eyes: EOM are normal. Pupils are equal, round, and reactive to light.   Neck: Normal range of motion. Neck supple.   Cardiovascular: Normal rate, regular rhythm and normal heart sounds.   Pulmonary/Chest: Effort normal and breath sounds normal. No respiratory distress.   Abdominal: Soft. There is no tenderness. There is no rebound and no guarding.   Musculoskeletal: Normal range of motion. She exhibits edema (2+ edema to feet and ankles bilaterally and increased at the right ankle at the site of the injury.).   Pt moves all extremities.  Diffuse contusion and ecchymosis to R ankle.  He has no cyanosis or pallor to the toes and is motor and sensory intact to the right foot and toes.   Neurological: She is alert. She has normal sensation and normal strength. No cranial nerve deficit. GCS score is 15.   Appears to be chronically demented, but daughter states she is normally A+Ox3    Skin: Skin is warm and dry. No rash noted.   Psychiatric: Mood and affect normal.       LAB RESULTS  Lab Results (last 24 hours)     Procedure Component Value Units Date/Time    Basic Metabolic Panel [[id number]]  (Abnormal) Collected:  11/18/18 0843    Specimen:  Blood Updated:  11/18/18 0946     Glucose 114 mg/dL      BUN 9 mg/dL      Creatinine 0.68 mg/dL      Sodium 132 mmol/L      Potassium 4.0 mmol/L      Chloride 96 mmol/L      CO2 26.7 mmol/L      Calcium 8.3 mg/dL      eGFR Non African Amer 82 mL/min/1.73      BUN/Creatinine Ratio 13.2     Anion Gap 9.3 mmol/L     Narrative:       The MDRD GFR formula is only valid for adults with stable renal function between ages 18 and 70.    CBC & Differential [[id number]] Collected:  11/18/18 0843    Specimen:  Blood Updated:  11/18/18 0922    Narrative:       The following orders were created for panel order CBC & Differential.  Procedure                               Abnormality         Status                     ---------                               -----------          ------                     Scan Slide[[id number]]                                                                  CBC Auto Differential[[id number]]        Abnormal            Final result                 Please view results for these tests on the individual orders.    CBC Auto Differential [[id number]]  (Abnormal) Collected:  11/18/18 0843    Specimen:  Blood Updated:  11/18/18 0922     WBC 7.30 10*3/mm3      RBC 2.88 10*6/mm3      Hemoglobin 9.9 g/dL      Hematocrit 30.4 %      .6 fL      MCH 34.4 pg      MCHC 32.6 g/dL      RDW 11.7 %      RDW-SD 44.8 fl      MPV 9.0 fL      Platelets 168 10*3/mm3      Neutrophil % 73.4 %      Lymphocyte % 15.9 %      Monocyte % 8.8 %      Eosinophil % 1.5 %      Basophil % 0.4 %      Immature Grans % 0.0 %      Neutrophils, Absolute 5.36 10*3/mm3      Lymphocytes, Absolute 1.16 10*3/mm3      Monocytes, Absolute 0.64 10*3/mm3      Eosinophils, Absolute 0.11 10*3/mm3      Basophils, Absolute 0.03 10*3/mm3      Immature Grans, Absolute 0.00 10*3/mm3         Imaging Results (last 24 hours)     Procedure Component Value Units Date/Time    XR Ankle 2 View Right [[id number]] Collected:  11/17/18 1425     Updated:  11/17/18 1527    Narrative:       2 VIEW PORTABLE RIGHT ANKLE     HISTORY: Internal fixation of fractures.     FINDINGS: Imaging through splint material demonstrates internal fixation  of trimalleolar fractures and the alignment appears satisfactory.     This report was finalized on 11/17/2018 3:24 PM by Dr. Manuel Elder M.D.       XR Ankle 2 View Right [[id number]] Collected:  11/17/18 1404     Updated:  11/17/18 1527    Narrative:       2 VIEW PORTABLE RIGHT ANKLE     HISTORY: Internal fixation of fracture.     FINDINGS: Imaging in the operating room shows internal fixation of a  bimalleolar fracture with a lateral plate and screws transfixing the  lateral malleolus and a single screw transfixing the medial malleolus  and 2 additional screws possibly transfixing a  fracture of the posterior  malleolus. The alignment appears satisfactory.        Two images were obtained and the fluoroscopy time measures 45 seconds.     This report was finalized on 11/17/2018 3:24 PM by Dr. Manuel Elder M.D.           EKG                              Rhythm/Rate: SR, 75  P waves and WI: 1st degree AV block  QRS, axis: narrow QRS, borderline LAD  ST and T waves: nonspecific changes      Current Facility-Administered Medications:   •  aspirin chewable tablet 81 mg, 81 mg, Oral, Daily, Juan Carlos Harper MD, 81 mg at 11/18/18 0948  •  calcium carbonate (oyster shell) tablet 1,000 mg, 1,000 mg, Oral, Daily, Juan Carlos Harper MD, 1,000 mg at 11/18/18 0948  •  cetirizine (zyrTEC) tablet 10 mg, 10 mg, Oral, Daily, Juan Carlos Harper MD, 10 mg at 11/18/18 0948  •  clopidogrel (PLAVIX) tablet 75 mg, 75 mg, Oral, Daily, SweetCristian II, MD, 75 mg at 11/18/18 0948  •  docusate sodium (COLACE) capsule 250 mg, 250 mg, Oral, QAM, Juan Carlos Harper MD, 250 mg at 11/18/18 0627  •  escitalopram (LEXAPRO) tablet 10 mg, 10 mg, Oral, Nightly, Juan Carlos Harper MD, 10 mg at 11/17/18 2053  •  gabapentin (NEURONTIN) capsule 600 mg, 600 mg, Oral, TID, Juan Carlos Harper MD, 600 mg at 11/18/18 0948  •  HYDROcodone-acetaminophen (NORCO) 5-325 MG per tablet 1 tablet, 1 tablet, Oral, Q4H PRN, Juan Carlos Harper MD, 1 tablet at 11/16/18 0014  •  lactated ringers infusion, 9 mL/hr, Intravenous, Continuous, Billy Valdes MD, Last Rate: 9 mL/hr at 11/17/18 1352, 9 mL/hr at 11/17/18 1352  •  [START ON 11/19/2018] levETIRAcetam (KEPPRA) tablet 750 mg, 750 mg, Oral, BID, Jessica Duong APRN  •  levETIRAcetam in NaCl 0.75% (KEPPRA) IVPB 1,000 mg, 1,000 mg, Intravenous, Q12H, Sarah Poole MD, 1,000 mg at 11/18/18 0948  •  mirtazapine (REMERON) tablet 15 mg, 15 mg, Oral, Nightly, Juan Carlos Harepr MD, 15 mg at 11/17/18 2053  •  morphine injection 1 mg, 1 mg, Intravenous, Q4H PRN, 1 mg at 11/13/18 2135 **FOLLOWED BY** [START ON  11/23/2018] morphine injection 2 mg, 2 mg, Intravenous, Q4H PRN, Gaetano Harper MD  •  ondansetron (ZOFRAN) injection 4 mg, 4 mg, Intravenous, Q4H PRN, Gaetano Harper MD  •  pantoprazole (PROTONIX) EC tablet 40 mg, 40 mg, Oral, Q AM, Gaetano Harper MD, 40 mg at 11/18/18 0627  •  Pharmacy Consult - Pharmacy to dose, , Does not apply, Continuous PRN, Lauren Sandhu, JONA  •  [COMPLETED] Insert peripheral IV, , , Once **AND** sodium chloride 0.9 % flush 10 mL, 10 mL, Intravenous, PRN, Fidel Bingham MD  •  sodium chloride 0.9 % infusion, 50 mL/hr, Intravenous, Continuous, Gaetano Harper MD, Last Rate: 50 mL/hr at 11/18/18 1113, 50 mL/hr at 11/18/18 1113     ASSESSMENT  Acute right ankle fracture status post open reduction and internal fixation  Status post fall  Seizure disorder  Hypertension  Depression  History of CVA on Plavix  Gastroesophageal reflux disease  Status post permanent pacemaker  Osteoarthritis  Degenerative disc disease  DNR    PLAN  CPM  Postop care  Pain management  Orthopedic surgery consult appreciated  Continue home medications  Supportive care  Stress ulcer DVT prophylaxis  DNR  Discussed with family  Follow closely further recommendation according to hospital course    GAETANO HARPER MD

## 2018-11-18 NOTE — PLAN OF CARE
Problem: Fall Risk (Adult)  Goal: Absence of Fall  Outcome: Ongoing (interventions implemented as appropriate)      Problem: Patient Care Overview  Goal: Plan of Care Review  Outcome: Ongoing (interventions implemented as appropriate)   11/18/18 0522   Coping/Psychosocial   Plan of Care Reviewed With patient   Plan of Care Review   Progress no change   OTHER   Outcome Summary VSS. O2 decreased to 1L. No c/o pain. Disoriented at times. Q2 turn. Leg elevated all through shift. Will continue to monitor.     Goal: Individualization and Mutuality  Outcome: Ongoing (interventions implemented as appropriate)    Goal: Discharge Needs Assessment  Outcome: Ongoing (interventions implemented as appropriate)    Goal: Interprofessional Rounds/Family Conf  Outcome: Ongoing (interventions implemented as appropriate)      Problem: Skin Injury Risk (Adult)  Goal: Skin Health and Integrity  Outcome: Ongoing (interventions implemented as appropriate)

## 2018-11-19 LAB
ANION GAP SERPL CALCULATED.3IONS-SCNC: 11.7 MMOL/L
BASOPHILS # BLD AUTO: 0.07 10*3/MM3 (ref 0–0.2)
BASOPHILS NFR BLD AUTO: 1.1 % (ref 0–1.5)
BUN BLD-MCNC: 9 MG/DL (ref 8–23)
BUN/CREAT SERPL: 15 (ref 7–25)
CALCIUM SPEC-SCNC: 8.5 MG/DL (ref 8.6–10.5)
CHLORIDE SERPL-SCNC: 96 MMOL/L (ref 98–107)
CO2 SERPL-SCNC: 23.3 MMOL/L (ref 22–29)
CREAT BLD-MCNC: 0.6 MG/DL (ref 0.57–1)
DEPRECATED RDW RBC AUTO: 44.2 FL (ref 37–54)
EOSINOPHIL # BLD AUTO: 0.27 10*3/MM3 (ref 0–0.7)
EOSINOPHIL NFR BLD AUTO: 4.3 % (ref 0.3–6.2)
ERYTHROCYTE [DISTWIDTH] IN BLOOD BY AUTOMATED COUNT: 11.8 % (ref 11.7–13)
GFR SERPL CREATININE-BSD FRML MDRD: 94 ML/MIN/1.73
GLUCOSE BLD-MCNC: 95 MG/DL (ref 65–99)
HCT VFR BLD AUTO: 30.5 % (ref 35.6–45.5)
HGB BLD-MCNC: 10.4 G/DL (ref 11.9–15.5)
IMM GRANULOCYTES # BLD: 0.04 10*3/MM3 (ref 0–0.03)
IMM GRANULOCYTES NFR BLD: 0.6 % (ref 0–0.5)
LYMPHOCYTES # BLD AUTO: 1.11 10*3/MM3 (ref 0.9–4.8)
LYMPHOCYTES NFR BLD AUTO: 17.8 % (ref 19.6–45.3)
MCH RBC QN AUTO: 34.9 PG (ref 26.9–32)
MCHC RBC AUTO-ENTMCNC: 34.1 G/DL (ref 32.4–36.3)
MCV RBC AUTO: 102.3 FL (ref 80.5–98.2)
MONOCYTES # BLD AUTO: 0.58 10*3/MM3 (ref 0.2–1.2)
MONOCYTES NFR BLD AUTO: 9.3 % (ref 5–12)
NEUTROPHILS # BLD AUTO: 4.22 10*3/MM3 (ref 1.9–8.1)
NEUTROPHILS NFR BLD AUTO: 67.5 % (ref 42.7–76)
NRBC BLD MANUAL-RTO: 1.4 /100 WBC (ref 0–0)
PLAT MORPH BLD: NORMAL
PLATELET # BLD AUTO: 183 10*3/MM3 (ref 140–500)
PMV BLD AUTO: 10.3 FL (ref 6–12)
POTASSIUM BLD-SCNC: 3.9 MMOL/L (ref 3.5–5.2)
RBC # BLD AUTO: 2.98 10*6/MM3 (ref 3.9–5.2)
RBC MORPH BLD: NORMAL
SODIUM BLD-SCNC: 131 MMOL/L (ref 136–145)
WBC MORPH BLD: NORMAL
WBC NRBC COR # BLD: 6.25 10*3/MM3 (ref 4.5–10.7)

## 2018-11-19 PROCEDURE — 85025 COMPLETE CBC W/AUTO DIFF WBC: CPT | Performed by: HOSPITALIST

## 2018-11-19 PROCEDURE — 97110 THERAPEUTIC EXERCISES: CPT

## 2018-11-19 PROCEDURE — 97165 OT EVAL LOW COMPLEX 30 MIN: CPT

## 2018-11-19 PROCEDURE — 97535 SELF CARE MNGMENT TRAINING: CPT

## 2018-11-19 PROCEDURE — 85007 BL SMEAR W/DIFF WBC COUNT: CPT | Performed by: HOSPITALIST

## 2018-11-19 PROCEDURE — 80048 BASIC METABOLIC PNL TOTAL CA: CPT | Performed by: HOSPITALIST

## 2018-11-19 RX ADMIN — CETIRIZINE HYDROCHLORIDE 10 MG: 10 TABLET, FILM COATED ORAL at 09:04

## 2018-11-19 RX ADMIN — POLYETHYLENE GLYCOL 3350 17 G: 17 POWDER, FOR SOLUTION ORAL at 09:04

## 2018-11-19 RX ADMIN — SODIUM CHLORIDE 50 ML/HR: 9 INJECTION, SOLUTION INTRAVENOUS at 06:43

## 2018-11-19 RX ADMIN — ACETAMINOPHEN 650 MG: 325 TABLET, FILM COATED ORAL at 04:27

## 2018-11-19 RX ADMIN — LEVETIRACETAM 750 MG: 250 TABLET, FILM COATED ORAL at 23:00

## 2018-11-19 RX ADMIN — CALCIUM 1000 MG: 500 TABLET ORAL at 09:04

## 2018-11-19 RX ADMIN — PANTOPRAZOLE SODIUM 40 MG: 40 TABLET, DELAYED RELEASE ORAL at 06:41

## 2018-11-19 RX ADMIN — ACETAMINOPHEN 650 MG: 325 TABLET, FILM COATED ORAL at 14:37

## 2018-11-19 RX ADMIN — DOCUSATE SODIUM 250 MG: 50 CAPSULE, LIQUID FILLED ORAL at 09:10

## 2018-11-19 RX ADMIN — GABAPENTIN 600 MG: 300 CAPSULE ORAL at 15:23

## 2018-11-19 RX ADMIN — LEVETIRACETAM 750 MG: 250 TABLET, FILM COATED ORAL at 09:04

## 2018-11-19 RX ADMIN — ACETAMINOPHEN 650 MG: 325 TABLET, FILM COATED ORAL at 18:38

## 2018-11-19 RX ADMIN — GABAPENTIN 600 MG: 300 CAPSULE ORAL at 09:04

## 2018-11-19 RX ADMIN — Medication 81 MG: at 09:04

## 2018-11-19 RX ADMIN — CLOPIDOGREL 75 MG: 75 TABLET, FILM COATED ORAL at 09:04

## 2018-11-19 NOTE — THERAPY EVALUATION
Acute Care - Occupational Therapy Initial Evaluation  [facility]     Patient Name: Kim Feldman  : 3/13/1930  MRN: [id number]  Today's Date: 2018  Onset of Illness/Injury or Date of Surgery: 18     Referring Physician: Leroy    Admit Date: 2018       ICD-10-CM ICD-9-CM   1. Closed trimalleolar fracture of right ankle, initial encounter S82.851A 824.6   2. Fall, initial encounter W19.XXXA E888.9   3. Altered mental status, unspecified altered mental status type, improving R41.82 780.97   4. 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)   • Vitamin D deficiency   • History of sick sinus syndrome   • Intertrigo   • Generalized convulsive seizures (CMS/HCC)   • Change in bowel habits   • Zenker diverticulum   • History of anxiety   • 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HCC)   • Thrombocytopenia (CMS/HCC)   • B12 deficiency   • Cardiac pacemaker in situ   • Chronic venous insufficiency   • Arthritis   • S/P knee replacement   • Chronic bilateral low back pain without sciatica   • Essential hypertension   • Hiatal hernia   • Zenker's diverticulum   • Chronic idiopathic constipation   • Pressure sore on buttocks   • Somnolence   • Closed trimalleolar fracture of right ankle     Past Medical History:   Diagnosis Date   • Anemia    • At risk for sleep apnea 2018   • Bulging lumbar disc    • Chronic cough    • Chronic UTI    • Chronic venous insufficiency    • 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HCC)    • DDD (degenerative disc disease), lumbosacral    • Dementia without behavioral disturbance     moderate   • Depression, endogenous (CMS/HCC)    • Disc degeneration, lumbar    • Dysphagia    • GERD  (gastroesophageal reflux disease)    • Hiatal hernia    • History of anxiety    • History of aspiration pneumonitis    • History of cerebral artery occlusion     CVA (following TIA), 10/10, treated with tPA   • History of herpes zoster    • History of ingrowing nail    • History of osteoporosis    • History of poliomyelitis     child   • History of sciatica    • History of sick sinus syndrome     s/p PPM   • History of transient cerebral ischemia     followed by stroke in 10/2010. BIBI was normal.   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)    • Vitamin D deficiency    • Zenker's diverticulum      Past Surgical History:   Procedure Laterality Date   • CARDIAC PACEMAKER PLACEMENT      secondary to SSS, placed in 2004, with generator change in 2014   • CATARACT EXTRACTION W/ INTRAOCULAR LENS IMPLANT Bilateral    • COLONOSCOPY     • HAND SURGERY Right    • KNEE ARTHROPLASTY Left    • LAMINECTOMY FOR IMPLANTATION / PLACEMENT NEUROSTIMULATOR ELECTRODES     • LUMBAR LAMINECTOMY      L-3 L4 L4 L5   • TONSILLECTOMY            OT ASSESSMENT FLOWSHEET (last 72 hours)      Occupational Therapy Evaluation     Row Name 11/19/18 1044                   OT Evaluation Time/Intention    Subjective Information  complains of;weakness;fatigue  -LE        Document Type  evaluation;therapy note (daily note)  -LE        Patient Effort  adequate  -LE        Comment  pt expresses need to use bathroom.  RN and OT work together to put pt on bedpan, change  brief, reposition in bed.   -LE           General Information    Patient Profile Reviewed?  yes  -LE        Referring Physician  Leroy  -LE        Patient Observations  lethargic  -LE        General Observations of Patient  sidelying on R.  Pt sleepy but wakens.  States need to void.  RN present  -LE        Prior Level of Function  independent:;gait;transfer;ADL's at walker level, assist for bath.  from assisted living  -LE        Equipment Currently Used at Home  walker, rolling;shower chair  -LE        Pertinent History of Current Functional Problem  from [facility] with fall, s/p ORIF R ankle, now NWB R ankle.   -LE        Existing Precautions/Restrictions  fall;weight bearing NWB R LE for at least 8 weeks.   -LE           Relationship/Environment    Lives With  facility resident  -LE           Bed Mobility Assessment/Treatment    Bed Mobility Assessment/Treatment  rolling left;rolling right;scooting/bridging  -LE        Rolling Left [place] (Bed Mobility)  maximum assist (25% patient effort)  -LE        Rolling Right [place] (Bed Mobility)  maximum assist (25% patient effort)  -LE        Scooting/Bridging [place] (Bed Mobility)  dependent (less than 25% patient effort);2 person assist  -LE        Supine-Sit [place] (Bed Mobility)  -- too fatigue, focus on toileting, pt reluctant when discuss  -LE           Functional Mobility    Functional Mobility- Ind. Level  unable to perform;not appropriate to assess;not tested  -LE           Transfer Assessment/Treatment    Transfer Assessment/Treatment  sit-stand transfer;toilet transfer  -LE           Sit-Stand Transfer    Assistive Device (Sit-Stand Transfers)  -- explain and encourage pt potential to pivot xfer on L LE.   -LE           Toilet Transfer    Assistive Device (Toilet Transfer)  -- has not yet stood. unsafe to attempt BSC.  assisted pt with   -LE           ADL Assessment/Intervention    BADL Assessment/Intervention  bathing;upper body  "dressing;lower body dressing;feeding;toileting;grooming  -LE           Bathing Assessment/Intervention    Comment (Bathing)  anticipate max/dependent.   -LE           Lower Body Dressing Assessment/Training    Comment (Lower Body Dressing)  anticipate total assist.   -LE           Self-Feeding Assessment/Training    Comment (Feeding)  pt states ate a muffin.   -LE           Toileting Assessment/Training    [place] Level (Toileting)  dependent (less than 25% patient effort);two person assist required tarun/doff bedpan, change brief and bed pad  -LE           BADL Safety/Performance    Impairments, BADL Safety/Performance  balance;cognition;endurance/activity tolerance;pain;trunk/postural control  -LE           General ROM    GENERAL ROM COMMENTS  -- about 1/2 R shld, L shld 2/3. h/o CVA per RN.   -LE           MMT (Manual Muscle Testing)    General MMT Comments  deferred MMT except fair grasps.    -LE           Positioning and Restraints    Pre-Treatment Position  in bed  -LE        Post Treatment Position  bed  -LE        In Bed  side lying left;notified nsg;call light within reach;encouraged to call for assist;exit alarm on;side rails up x3;SCD pump applied;pillow between legs;RLE elevated SCD L LE only. R LE on 2 pillows per RN   -LE           Pain Assessment    Additional Documentation  Pain Scale: Numbers Pre/Post-Treatment (Group)  -LE           Pain Scale: Numbers Pre/Post-Treatment    Pain Scale: Numbers, Pretreatment  -- intermittent groans during rolling  -LE        Pain Scale: Numbers, Post-Treatment  -- \"Not really when asked about pain during moving\".    -LE        Pain Location - Side  Right  -LE        Pain Location  ankle  -LE        Pain Intervention(s)  Repositioned;Emotional support;Rest  -LE           Wound 11/13/18 1755 other (see notes) skin tear    Wound - Properties Group Date first assessed: 11/13/18  -CC Time first assessed: 1755  -CC Present On Admission : yes;picture taken  -CC " Location: other (see notes)  -CC, between butt cheeks  Type: skin tear  -CC       Wound 11/17/18 1353 Right foot incision    Wound - Properties Group Date first assessed: 11/17/18  -RG Time first assessed: 1353  -RG Side: Right  -RG Location: foot  -RG Type: incision  -RG       Plan of Care Review    Plan of Care Reviewed With  patient  -LE           Clinical Impression (OT)    OT Diagnosis  need for assist with personal care  -LE        Patient/Family Goals Statement (OT Eval)  return to prior level of function.   -LE        Criteria for Skilled Therapeutic Interventions Met (OT Eval)  yes;treatment indicated  -LE        Rehab Potential (OT Eval)  good, to achieve stated therapy goals  -LE        Therapy Frequency (OT Eval)  5 times/wk  -LE        Care Plan Review (OT)  evaluation/treatment results reviewed;care plan/treatment goals reviewed  -LE        Anticipated Equipment Needs at Discharge (OT)  -- BSC  -LE        Anticipated Discharge Disposition (OT)  skilled nursing facility  -LE           Vital Signs    O2 Delivery Pre Treatment  room air  -LE        O2 Delivery Intra Treatment  room air  -LE        O2 Delivery Post Treatment  room air  -LE        Pre Patient Position  Side Lying  -LE        Intra Patient Position  Supine  -LE        Post Patient Position  Side Lying  -LE        Activity Duration  -- pt lethargic but awake, slow moving and poor task initiation  -LE           Planned OT Interventions    Planned Therapy Interventions (OT Eval)  activity tolerance training;adaptive equipment training;BADL retraining;transfer/mobility retraining  -LE           OT Goals    Transfer Goal Selection (OT)  transfer, OT goal 1  -LE        Dressing Goal Selection (OT)  dressing, OT goal 1  -LE        Grooming Goal Selection (OT)  grooming, OT goal 1  -LE        Additional Documentation  Grooming Goal Selection (OT) (Row)  -LE           Transfer Goal 1 (OT)    Activity/Assistive Device (Transfer Goal 1, OT)   sit-to-stand/stand-to-sit;bed-to-chair/chair-to-bed;toilet;commode, 3-in-1;walker, standard  -LE        [place] Level/Cues Needed (Transfer Goal 1, OT)  maximum assist (25-49% patient effort)  -LE        Time Frame (Transfer Goal 1, OT)  1 week  -LE        Progress/Outcome (Transfer Goal 1, OT)  goal ongoing  -LE           Dressing Goal 1 (OT)    Activity/Assistive Device (Dressing Goal 1, OT)  upper body dressing  -LE        [place]/Cues Needed (Dressing Goal 1, OT)  minimum assist (75% or more patient effort)  -LE        Time Frame (Dressing Goal 1, OT)  1 week  -LE        Progress/Outcome (Dressing Goal 1, OT)  goal ongoing  -LE           Grooming Goal 1 (OT)    Activity/Device (Grooming Goal 1, OT)  wash face, hands;oral care  -LE        [place] (Grooming Goal 1, OT)  set-up required;supervision required  -LE        Time Frame (Grooming Goal 1, OT)  1 week  -LE        Progress/Outcome (Grooming Goal 1, OT)  goal ongoing  -LE          User Key  (r) = Recorded By, (t) = Taken By, (c) = Cosigned By    Initials Name Effective Dates    Jessica French, OTR 06/08/18 -     Susanne Walter RN 06/16/16 -     CC Marquita Mas, CATRINA 09/12/18 -          Occupational Therapy Education     Title: PT OT SLP Therapies (Active)     Topic: Occupational Therapy (Active)     Point: ADL training (Done)     Description: Instruct learner(s) on proper safety adaptation and remediation techniques during self care or transfers.   Instruct in proper use of assistive devices.    Learning Progress Summary           Patient Acceptance, E, VU by JORGE ALBERTO at 11/19/2018 10:42 AM    Comment:  Ed pt on role of OT in acute care. Ed body mechanics during rolling to reduce pain and increase ease of movement.                   Point: Body mechanics (Done)     Description: Instruct learner(s) on proper positioning and spine alignment during self-care, functional mobility activities and/or exercises.    Learning Progress Summary            Patient Acceptance, ZOË VU by JORGE ALBERTO at 11/19/2018 10:42 AM    Comment:  Ed pt on role of OT in acute care. Ed body mechanics during rolling to reduce pain and increase ease of movement.                               User Key     Initials Effective Dates Name Provider Type Discipline    JORGE ALBERTO 06/08/18 -  Jessica Clayton OTR Occupational Therapist OT                  OT Recommendation and Plan  Outcome Summary/Treatment Plan (OT)  Anticipated Equipment Needs at Discharge (OT): (BSC)  Anticipated Discharge Disposition (OT): skilled nursing facility  Planned Therapy Interventions (OT Eval): activity tolerance training, adaptive equipment training, BADL retraining, transfer/mobility retraining  Therapy Frequency (OT Eval): 5 times/wk  Plan of Care Review  Plan of Care Reviewed With: patient  Plan of Care Reviewed With: patient  Outcome Summary: Pt presents with impaired processsing, mobility, NWB R LE, weak  RUE.  Pt is assist of 2 for toileting and bed mobility.  Pt may benefit from skilled OT to increase safety and independence.     Outcome Measures     Row Name 11/19/18 1040 11/19/18 1000 11/18/18 1300       How much help from another person do you currently need...    Turning from your back to your side while in flat bed without using bedrails?  --  2  -CW  2  -EM    Moving from lying on back to sitting on the side of a flat bed without bedrails?  --  2  -CW  2  -EM    Moving to and from a bed to a chair (including a wheelchair)?  --  1  -CW  1  -EM    Standing up from a chair using your arms (e.g., wheelchair, bedside chair)?  --  1  -CW  1  -EM    Climbing 3-5 steps with a railing?  --  1  -CW  1  -EM    To walk in hospital room?  --  1  -CW  1  -EM    AM-PAC 6 Clicks Score  --  8  -CW  8  -EM       How much help from another is currently needed...    Putting on and taking off regular lower body clothing?  1  -LE  --  --    Bathing (including washing, rinsing, and drying)  1  -LE  --  --    Toileting (which includes using  toilet bed pan or urinal)  1  -LE  --  --    Putting on and taking off regular upper body clothing  1  -LE  --  --    Taking care of personal grooming (such as brushing teeth)  1  -LE  --  --    Eating meals  2  -LE  --  --    Score  7  -LE  --  --       Functional Assessment    Outcome Measure Options  AM-PAC 6 Clicks Daily Activity (OT)  -LE  AM-PAC 6 Clicks Basic Mobility (PT)  -CW  AM-PAC 6 Clicks Basic Mobility (PT)  -EM      User Key  (r) = Recorded By, (t) = Taken By, (c) = Cosigned By    Initials Name Provider Type    Jessica French OTR Occupational Therapist    EM Paty Lock, PT Physical Therapist    CW Duncan Byrd, PTA Physical Therapy Assistant          Time Calculation:   Time Calculation- OT     Row Name 11/19/18 1053             Time Calculation- OT    OT Start Time  1023  -LE      OT Stop Time  1038  -LE      OT Time Calculation (min)  15 min  -LE      Total Timed Code Minutes- OT  8 minute(s)  -LE      OT Received On  11/19/18  -LE      OT Goal Re-Cert Due Date  11/26/18  -LE        User Key  (r) = Recorded By, (t) = Taken By, (c) = Cosigned By    Initials Name Provider Type    Jessica French OTLAWANDA Occupational Therapist        Therapy Suggested Charges     Code   Minutes Charges    None           Therapy Charges for Today     Code Description Service Date Service Provider Modifiers Qty    [id number]  OT EVAL LOW COMPLEXITY 2 11/19/2018 Jessica Clayton OTR GO 1    [id number]  OT SELF CARE/MGMT/TRAIN EA 15 MIN 11/19/2018 Jessica Clayton OTR GO 1               RUBY Aguirre  11/19/2018

## 2018-11-19 NOTE — PLAN OF CARE
Problem: Fall Risk (Adult)  Goal: Absence of Fall  Outcome: Ongoing (interventions implemented as appropriate)      Problem: Patient Care Overview  Goal: Plan of Care Review  Outcome: Ongoing (interventions implemented as appropriate)   11/19/18 1700   Coping/Psychosocial   Plan of Care Reviewed With patient   Plan of Care Review   Progress improving   OTHER   Outcome Summary VSS. Orientation much better but does get forgetful on situation. Back on RA. IVF stopped. C/O tingling pain left foot but did miss gabapentin yesterday. Plan is to transfer to [facility] this evening. Continue to [facility].      Goal: Individualization and Mutuality  Outcome: Ongoing (interventions implemented as appropriate)    Goal: Discharge Needs Assessment  Outcome: Ongoing (interventions implemented as appropriate)    Goal: Interprofessional Rounds/Family Conf  Outcome: Ongoing (interventions implemented as appropriate)      Problem: Skin Injury Risk (Adult)  Goal: Skin Health and Integrity  Outcome: Ongoing (interventions implemented as appropriate)      Problem: Pain, Acute (Adult)  Goal: Acceptable Pain Control/Comfort Level  Outcome: Ongoing (interventions implemented as appropriate)

## 2018-11-19 NOTE — PROGRESS NOTES
ORTHOPAEDIC SURGERY DAILY PROGRESS NOTE  Patient is a 88 y.o. female who is 2 Days Post-Op s/p Procedure(s):  ANKLE OPEN REDUCTION INTERNAL FIXATION     E455/1 Kim Feldman Age:88 y.o. MRN:[id number]  Admitted: 11/13/2018  Overnight events: Markedly confused this morning  Pain: Complaints  Ambulation/Activity: Working with therapy but slow to mobilize due to nonweightbearing to ankle and advanced age  Vitals:  Vitals:    11/18/18 1400 11/18/18 1945 11/18/18 2338 11/19/18 0715   BP: 161/86 147/83 160/78 157/68   BP Location: Right arm Left arm Left arm Left arm   Patient Position: Lying Lying Lying Lying   Pulse: 76 70  70   Resp: 16 16 16 16   Temp: 97.8 °F (36.6 °C) 98.4 °F (36.9 °C) 98.6 °F (37 °C) 97.8 °F (36.6 °C)   TempSrc: Oral  Oral Oral   SpO2: 98%   97%   Weight:       Height:         Ins/Outs:  Last 24hrs    Intake/Output Summary (Last 24 hours) at 11/19/2018 0725  Last data filed at 11/18/2018 1151  Gross per 24 hour   Intake 1050 ml   Output --   Net 1050 ml     Physical Exam:  CONSTITUTIONAL: A&Ox3, No acute distress  LUNGS: Equal chest rise, no shortness of air  CARDIOVASCULAR: palpable peripheral pulses  WOUND: Dressings clean, dry, and intact  EXTREMITY: Right Lower Extremity   Pulses: brisk capillary refill intact   Sensation: Intact to toes   Motor: Wiggles toes in splint    Labs:   Lab Results   Component Value Date    HGB 10.4 (L) 11/19/2018     Lab Results   Component Value Date    WBC 6.25 11/19/2018     Lab Results   Component Value Date    GLUCOSE 95 11/19/2018    CALCIUM 8.5 (L) 11/19/2018     (L) 11/19/2018    K 3.9 11/19/2018    CO2 23.3 11/19/2018    CL 96 (L) 11/19/2018    BUN 9 11/19/2018    CREATININE 0.60 11/19/2018    EGFRIFAFRI 74 08/16/2017    EGFRIFNONA 94 11/19/2018    BCR 15.0 11/19/2018    ANIONGAP 11.7 11/19/2018         Radiology: No radiology results for the last day  Assessment: Patient is a 88 y.o. female who is 2 Days Post-Op s/p Procedure(s):  ANKLE OPEN  REDUCTION INTERNAL FIXATION   - Weight Bearing: Right Lower Extremity Non Weight Bearing  - Labs: Above lab values review. Plan: No intervention necessary  - PT/OT: To Mobilize  - DVT PPX: Home dose of Plavix 75 mg and aspirin 81 mg  - Post-Op Xray: Hardware in good position. Acceptable bony reduction.   - Dispo: Distal per primary team.  She will need to remain nonweightbearing to the ankle for at least 8 weeks, possibly longer.  I might cast her in the office when I take out her cyndie.      Cristian Hill II, MD  Orthopaedic Surgery  [facility]

## 2018-11-19 NOTE — THERAPY TREATMENT NOTE
Acute Care - Physical Therapy Treatment Note  [facility]     Patient Name: Kim Feldman  : 3/13/1930  MRN: [id number]  Today's Date: 2018  Onset of Illness/Injury or Date of Surgery: 18     Referring Physician: Sergio    Admit Date: 2018    Visit Dx:    ICD-10-CM ICD-9-CM   1. Closed trimalleolar fracture of right ankle, initial encounter S82.851A 824.6   2. Fall, initial encounter W19.XXXA E888.9   3. Altered mental status, unspecified altered mental status type, improving R41.82 780.97   4. 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)   • Vitamin D deficiency   • History of sick sinus syndrome   • Intertrigo   • Generalized convulsive seizures (CMS/HCC)   • Change in bowel habits   • Zenker diverticulum   • History of anxiety   • 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HCC)   • Thrombocytopenia (CMS/HCC)   • B12 deficiency   • Cardiac pacemaker in situ   • Chronic venous insufficiency   • Arthritis   • S/P knee replacement   • Chronic bilateral low back pain without sciatica   • Essential hypertension   • Hiatal hernia   • Zenker's diverticulum   • Chronic idiopathic constipation   • Pressure sore on buttocks   • Somnolence   • Closed trimalleolar fracture of right ankle       Therapy Treatment    Rehabilitation Treatment Summary     Row Name 18 1000             Treatment Time/Intention    Discipline  physical therapy assistant  -CW      Document Type  therapy note (daily note)  -CW      Subjective Information  complains of;pain  -CW      Mode of Treatment  physical therapy  -CW      Therapy Frequency (PT Clinical Impression)  daily  -CW      Patient Effort  poor  -CW      Existing  Precautions/Restrictions  fall;non-weight bearing R LE  -CW      Recorded by [CW] Duncan Byrd, PTA 11/19/18 1005      Row Name 11/19/18 1000             Cognitive Assessment/Intervention- PT/OT    Orientation Status (Cognition)  unable/difficult to assess  -CW      Follows Commands (Cognition)  follows one step commands;0-24% accuracy  -CW      Recorded by [CW] Duncan Byrd, PTA 11/19/18 1005      Row Name 11/19/18 1000             Mobility Assessment/Intervention    Extremity Weight-bearing Status  right lower extremity  -CW      Right Lower Extremity (Weight-bearing Status)  non weight-bearing (NWB)  -CW      Recorded by [CW] Duncan Byrd, PTA 11/19/18 1005      Row Name 11/19/18 1000             Bed Mobility Assessment/Treatment    Bed Mobility Assessment/Treatment  supine-sit;sit-supine  -CW      Supine-Sit White (Bed Mobility)  maximum assist (25% patient effort);2 person assist  -CW      Sit-Supine White (Bed Mobility)  maximum assist (25% patient effort);2 person assist  -CW      Assistive Device (Bed Mobility)  draw sheet  -CW      Recorded by [CW] Duncan Byrd, PTA 11/19/18 1005      Row Name 11/19/18 1000             Transfer Assessment/Treatment    Comment (Transfers)  Pt lethargic and unwilling to wake up for PT  -CW      Recorded by [CW] Duncan Byrd, PTA 11/19/18 1005      Row Name 11/19/18 1000             Therapeutic Exercise    Lower Extremity (Therapeutic Exercise)  heel slides, bilateral;SLR (straight leg raise), bilateral  -CW      Lower Extremity Range of Motion (Therapeutic Exercise)  ankle dorsiflexion/plantar flexion, left;hip abduction/adduction, bilateral  -CW      Exercise Type (Therapeutic Exercise)  PROM (passive range of motion)  -CW      Position (Therapeutic Exercise)  supine  -CW      Sets/Reps (Therapeutic Exercise)  10  -CW      Recorded by [CW] Duncan Byrd, PTA 11/19/18 1008      Row Name 11/19/18 1000              Positioning and Restraints    Pre-Treatment Position  in bed  -CW      Post Treatment Position  bed  -CW      In Bed  notified nsg;side lying right;call light within reach;encouraged to call for assist;exit alarm on  -CW      Recorded by [CW] Duncan Byrd PTA 11/19/18 1005      Row Name 11/19/18 1000             Pain Scale: Numbers Pre/Post-Treatment    Pain Location - Side  Right  -CW      Pain Location  ankle  -CW      Pain Intervention(s)  Medication (See MAR);Repositioned;Ambulation/increased activity  -CW      Recorded by [CW] Duncan Byrd, PTA 11/19/18 1005      Row Name                Wound 11/13/18 1755 other (see notes) skin tear    Wound - Properties Group Date first assessed: 11/13/18 [CC] Time first assessed: 1755 [CC] Present On Admission : yes;picture taken [CC] Location: other (see notes) [CC], between butt cheeks  Type: skin tear [CC] Recorded by:  [CC] Marquita Mas RN 11/13/18 1822    Row Name                Wound 11/17/18 1353 Right foot incision    Wound - Properties Group Date first assessed: 11/17/18 [RG] Time first assessed: 1353 [RG] Side: Right [RG] Location: foot [RG] Type: incision [RG] Recorded by:  [RG] Susanne Dobson RN 11/17/18 1353    Row Name 11/19/18 1000             Outcome Summary/Treatment Plan (PT)    Anticipated Discharge Disposition (PT)  skilled nursing facility  -CW      Recorded by [CW] Duncan Byrd PTA 11/19/18 1005        User Key  (r) = Recorded By, (t) = Taken By, (c) = Cosigned By    Initials Name Effective Dates Discipline    RG Susanne Dobson RN 06/16/16 -  Nurse    CW Duncan Byrd, PTA 03/07/18 -  PT    CC Marquita Mas RN 09/12/18 -  Nurse          Wound 11/13/18 1755 other (see notes) skin tear (Active)   Dressing Appearance open to air 11/19/2018  8:58 AM   Closure None 11/19/2018  8:58 AM   Base red/granulating 11/19/2018  8:58 AM   Periwound intact;warm 11/19/2018  8:58 AM   Periwound Temperature warm 11/19/2018  8:58  AM   Periwound Skin Turgor soft 11/19/2018  8:58 AM   Drainage Amount none 11/19/2018  8:58 AM   Dressing Care, Wound open to air 11/19/2018  8:58 AM       Wound 11/17/18 1353 Right foot incision (Active)   Dressing Appearance dry;intact 11/19/2018  8:58 AM   Closure JULIANNE 11/19/2018  8:58 AM   Base dressing in place, unable to visualize 11/19/2018  8:58 AM   Dressing Care, Wound multi-layer wrap 11/19/2018  8:58 AM           Physical Therapy Education     Title: PT OT SLP Therapies (Active)     Topic: Physical Therapy (Active)     Point: Mobility training (Active)     Learning Progress Summary           Patient Nonacceptance, D, NL by CW at 11/19/2018 10:05 AM    Acceptance, E, NR by EM at 11/18/2018  1:53 PM                   Point: Home exercise program (Active)     Learning Progress Summary           Patient Nonacceptance, D, NL by CW at 11/19/2018 10:05 AM                   Point: Body mechanics (Active)     Learning Progress Summary           Patient Nonacceptance, D, NL by CW at 11/19/2018 10:05 AM                   Point: Precautions (Active)     Learning Progress Summary           Patient Nonacceptance, D, NL by CW at 11/19/2018 10:05 AM                               User Key     Initials Effective Dates Name Provider Type Discipline    EM 04/03/18 -  Paty Lock, PT Physical Therapist PT    CW 03/07/18 -  Duncan Byrd, PTA Physical Therapy Assistant PT                PT Recommendation and Plan  Anticipated Discharge Disposition (PT): skilled nursing facility  Therapy Frequency (PT Clinical Impression): daily  Outcome Summary/Treatment Plan (PT)  Anticipated Discharge Disposition (PT): skilled nursing facility  Plan of Care Reviewed With: patient  Progress: improving  Outcome Summary: Pt refused to fully participate and would only do ther ex in bed pt required max enouragement  Outcome Measures     Row Name 11/19/18 1000 11/18/18 1300          How much help from another person do you currently  need...    Turning from your back to your side while in flat bed without using bedrails?  2  -CW  2  -EM     Moving from lying on back to sitting on the side of a flat bed without bedrails?  2  -CW  2  -EM     Moving to and from a bed to a chair (including a wheelchair)?  1  -CW  1  -EM     Standing up from a chair using your arms (e.g., wheelchair, bedside chair)?  1  -CW  1  -EM     Climbing 3-5 steps with a railing?  1  -CW  1  -EM     To walk in hospital room?  1  -CW  1  -EM     AM-PAC 6 Clicks Score  8  -CW  8  -EM        Functional Assessment    Outcome Measure Options  AM-PAC 6 Clicks Basic Mobility (PT)  -CW  AM-PAC 6 Clicks Basic Mobility (PT)  -EM       User Key  (r) = Recorded By, (t) = Taken By, (c) = Cosigned By    Initials Name Provider Type    EM Paty Lock, PT Physical Therapist    Duncan James PTA Physical Therapy Assistant         Time Calculation:   PT Charges     Row Name 11/19/18 1008             Time Calculation    Start Time  0955  -CW      Stop Time  1009  -CW      Time Calculation (min)  14 min  -CW      PT Received On  11/19/18  -CW      PT - Next Appointment  11/20/18  -CW        User Key  (r) = Recorded By, (t) = Taken By, (c) = Cosigned By    Initials Name Provider Type    Duncan James PTA Physical Therapy Assistant        Therapy Suggested Charges     Code   Minutes Charges    None           Therapy Charges for Today     Code Description Service Date Service Provider Modifiers Qty    91729902124 HC PT THER PROC EA 15 MIN 11/19/2018 Duncan Byrd PTA GP 1          PT G-Codes  Outcome Measure Options: AM-PAC 6 Clicks Basic Mobility (PT)  AM-PAC 6 Clicks Score: 8    Duncan Byrd PTA  11/19/2018

## 2018-11-19 NOTE — PROGRESS NOTES
Continued Stay Note  [facility]     Patient Name: Kim Feldman  MRN: [id number]  Today's Date: 11/19/2018    Admit Date: 11/13/2018    Discharge Plan     Row Name 11/19/18 0922       Plan    Plan  [facility] skilled bed pending precert    Plan Comments  Call to [facility] to request initiation of [facility] precert. CCP will follow...drc        Discharge Codes    No documentation.             Ally Woods RN

## 2018-11-19 NOTE — PROGRESS NOTES
"Daily progress note    Chief complaint  Doing same  No new complaints    History of present illness  88-year-old white female with very complex past medical history including coronary artery disease hypertension hyperlipidemia seizure disorder low back pain gastroesophageal reflux disease dementia who is a nursing home resident brought to the emergency room after the unwitnessed fall to the bathroom when she lost her balance and fell and does not remember what happened.  Patient complain of right hip pain right ankle pain but denies any chest pain and increased shortness of breath abdominal pain nausea vomiting diarrhea.  Patient workup in ER revealed right ankle fracture admitted for management.  At the time of interview patient is awake and alert consult question appropriately and hurting at the fracture site but no other complaints except generalized weakness.     REVIEW OF SYSTEMS  Review of Systems   Constitutional: Negative for fever.   HENT: Negative for sore throat.    Eyes: Negative.    Respiratory: Negative for cough and shortness of breath.    Cardiovascular: Negative for chest pain.   Gastrointestinal: Negative for abdominal pain, diarrhea and vomiting.   Genitourinary: Negative for dysuria and enuresis.   Musculoskeletal: Negative for neck pain.   Skin: Negative for rash.   Allergic/Immunologic: Negative.    Neurological: Positive for headaches (unsure of chronicity). Negative for weakness and numbness.   Hematological: Negative.    Psychiatric/Behavioral: Positive for confusion (per daughter pt normally oriented, disoriented starting today).   All other systems reviewed and are negative.     PHYSICAL EXAM  Blood pressure 105/52, pulse 74, temperature 97.4 °F (36.3 °C), temperature source Oral, resp. rate 16, height 157.5 cm (62\"), weight 98.9 kg (218 lb), SpO2 97 %, not currently breastfeeding.    Constitutional: No distress.   Head: Normocephalic and atraumatic.   No notable bite alycia to tongue or lip "   Eyes: EOM are normal. Pupils are equal, round, and reactive to light.   Neck: Normal range of motion. Neck supple.   Cardiovascular: Normal rate, regular rhythm and normal heart sounds.   Pulmonary/Chest: Effort normal and breath sounds normal. No respiratory distress.   Abdominal: Soft. There is no tenderness. There is no rebound and no guarding.   Musculoskeletal: Normal range of motion. She exhibits edema (2+ edema to feet and ankles bilaterally and increased at the right ankle at the site of the injury.).   Pt moves all extremities.  Diffuse contusion and ecchymosis to R ankle.  He has no cyanosis or pallor to the toes and is motor and sensory intact to the right foot and toes.   Neurological: She is alert. She has normal sensation and normal strength. No cranial nerve deficit. GCS score is 15.   Appears to be chronically demented, but daughter states she is normally A+Ox3    Skin: Skin is warm and dry. No rash noted.   Psychiatric: Mood and affect normal.       LAB RESULTS  Lab Results (last 24 hours)     Procedure Component Value Units Date/Time    CBC & Differential [[id number]] Collected:  11/19/18 0543    Specimen:  Blood Updated:  11/19/18 0641    Narrative:       The following orders were created for panel order CBC & Differential.  Procedure                               Abnormality         Status                     ---------                               -----------         ------                     Scan Slide[[id number]]                   Normal              Final result               CBC Auto Differential[[id number]]        Abnormal            Final result                 Please view results for these tests on the individual orders.    CBC Auto Differential [[id number]]  (Abnormal) Collected:  11/19/18 0543    Specimen:  Blood Updated:  11/19/18 0641     WBC 6.25 10*3/mm3      RBC 2.98 10*6/mm3      Hemoglobin 10.4 g/dL      Hematocrit 30.5 %      .3 fL      MCH 34.9 pg      MCHC 34.1 g/dL       RDW 11.8 %      RDW-SD 44.2 fl      MPV 10.3 fL      Platelets 183 10*3/mm3      Neutrophil % 67.5 %      Lymphocyte % 17.8 %      Monocyte % 9.3 %      Eosinophil % 4.3 %      Basophil % 1.1 %      Immature Grans % 0.6 %      Neutrophils, Absolute 4.22 10*3/mm3      Lymphocytes, Absolute 1.11 10*3/mm3      Monocytes, Absolute 0.58 10*3/mm3      Eosinophils, Absolute 0.27 10*3/mm3      Basophils, Absolute 0.07 10*3/mm3      Immature Grans, Absolute 0.04 10*3/mm3      nRBC 1.4 /100 WBC     Scan Slide [[id number]]  (Normal) Collected:  11/19/18 0543    Specimen:  Blood Updated:  11/19/18 0641     RBC Morphology Normal     WBC Morphology Normal     Platelet Morphology Normal    Basic Metabolic Panel [[id number]]  (Abnormal) Collected:  11/19/18 0543    Specimen:  Blood Updated:  11/19/18 0622     Glucose 95 mg/dL      BUN 9 mg/dL      Creatinine 0.60 mg/dL      Sodium 131 mmol/L      Potassium 3.9 mmol/L      Chloride 96 mmol/L      CO2 23.3 mmol/L      Calcium 8.5 mg/dL      eGFR Non African Amer 94 mL/min/1.73      BUN/Creatinine Ratio 15.0     Anion Gap 11.7 mmol/L     Narrative:       The MDRD GFR formula is only valid for adults with stable renal function between ages 18 and 70.    POC Glucose Once [[id number]]  (Normal) Collected:  11/18/18 1531    Specimen:  Blood Updated:  11/18/18 1534     Glucose 93 mg/dL         Imaging Results (last 24 hours)     ** No results found for the last 24 hours. **        EKG                              Rhythm/Rate: SR, 75  P waves and WI: 1st degree AV block  QRS, axis: narrow QRS, borderline LAD  ST and T waves: nonspecific changes      Current Facility-Administered Medications:   •  acetaminophen (TYLENOL) tablet 650 mg, 650 mg, Oral, Q4H PRN, Juan Carlos Harper MD, 650 mg at 11/19/18 0427  •  aspirin chewable tablet 81 mg, 81 mg, Oral, Daily, Juan Carlos Harper MD, 81 mg at 11/19/18 0904  •  calcium carbonate (oyster shell) tablet 1,000 mg, 1,000 mg, Oral, Daily, Juan Carlos Harper MD, 1,000  mg at 11/19/18 0904  •  cetirizine (zyrTEC) tablet 10 mg, 10 mg, Oral, Daily, Juan Carlos Harper MD, 10 mg at 11/19/18 0904  •  clopidogrel (PLAVIX) tablet 75 mg, 75 mg, Oral, Daily, Cristian Hill II, MD, 75 mg at 11/19/18 0904  •  docusate sodium (COLACE) capsule 250 mg, 250 mg, Oral, QAM, Juan Carlos Harper MD, 250 mg at 11/19/18 0910  •  escitalopram (LEXAPRO) tablet 10 mg, 10 mg, Oral, Nightly, Juan Carlos Harper MD, 10 mg at 11/17/18 2053  •  gabapentin (NEURONTIN) capsule 600 mg, 600 mg, Oral, TID, Juan Carlos Harper MD, 600 mg at 11/19/18 0904  •  HYDROcodone-acetaminophen (NORCO) 5-325 MG per tablet 1 tablet, 1 tablet, Oral, Q4H PRN, Juan Carlos Harper MD, 1 tablet at 11/16/18 0014  •  lactated ringers infusion, 9 mL/hr, Intravenous, Continuous, Billy Valdes MD, Last Rate: 9 mL/hr at 11/17/18 1352, 9 mL/hr at 11/17/18 1352  •  levETIRAcetam (KEPPRA) tablet 750 mg, 750 mg, Oral, BID, Jessica Duong, APRN, 750 mg at 11/19/18 0904  •  mirtazapine (REMERON) tablet 15 mg, 15 mg, Oral, Nightly, Juan Carlos Harper MD, 15 mg at 11/17/18 2053  •  morphine injection 1 mg, 1 mg, Intravenous, Q4H PRN, 1 mg at 11/13/18 2135 **FOLLOWED BY** [START ON 11/23/2018] morphine injection 2 mg, 2 mg, Intravenous, Q4H PRN, Juan Carlos Harper MD  •  ondansetron (ZOFRAN) injection 4 mg, 4 mg, Intravenous, Q4H PRN, Juan Carlos Harper MD  •  pantoprazole (PROTONIX) EC tablet 40 mg, 40 mg, Oral, Q AM, Juan Carlos Harper MD, 40 mg at 11/19/18 0641  •  polyethylene glycol 3350 powder (packet), 17 g, Oral, Daily, Juan Carlos Harper MD, 17 g at 11/19/18 0904  •  [COMPLETED] Insert peripheral IV, , , Once **AND** sodium chloride 0.9 % flush 10 mL, 10 mL, Intravenous, PRN, Fidel Bingham MD  •  sodium chloride 0.9 % infusion, 50 mL/hr, Intravenous, Continuous, Juan Carlos Harper MD, Last Rate: 50 mL/hr at 11/19/18 0643, 50 mL/hr at 11/19/18 0643     ASSESSMENT  Acute right ankle fracture status post open reduction and internal fixation  Status post fall  Seizure  disorder  Hypertension  Depression  History of CVA on Plavix  Gastroesophageal reflux disease  Status post permanent pacemaker  Osteoarthritis  Degenerative disc disease  DNR    PLAN  CPM  Postop care  Pain management  Continue home medications  Supportive care  Stress ulcer DVT prophylaxis  DNR  PT/OT  May transfer to orthopedic floor  Discussed with family  Follow closely further recommendation according to hospital course    GAETANO ACOSTA MD

## 2018-11-19 NOTE — PAYOR COMM NOTE
"Kim Feldman (88 y.o. Female)     ATTN: LINDA MENDOZA  REF#[id number]  PLEASE CALL BACK TO ROSALIND [phone] OR [phone]  THANKS!   ROSALIND      Date of Birth Social Security Number Address Home Phone MRN    03/13/1930  [place] AT [address]   [place] [address] [phone] [id number]    [employer] Marital Status          Yarsani        Admission Date Admission Type Admitting Provider Attending Provider Department, Room/Bed    11/13/18 Emergency Juan Carlos Harper MD Ahmed, Aftab, MD [address], E455/1    Discharge Date Discharge Disposition Discharge Destination                       Attending Provider:  Juan Carlos Harper MD    Allergies:  Lipitor [Atorvastatin], Penicillins    Isolation:  None   Infection:  None   Code Status:  No CPR    Ht:  157.5 cm (62\")   Wt:  98.9 kg (218 lb)    Admission Cmt:  None   Principal Problem:  None                Active Insurance as of 11/13/2018     Primary Coverage     Payor Plan Insurance Group Employer/Plan Group    AETNA MEDICARE REPLACEMENT AETNA [id number]     Payor Plan Address Payor Plan Phone Number Payor Plan Fax Number Effective Dates    [address] [phone]  1/1/2018 - None Entered    [facility] [id number]       Subscriber Name Subscriber Birth Date Member ID       KIM FELDMAN 3/13/1930 [id number]                 Emergency Contacts      (Rel.) Home Phone Work Phone Mobile Phone    Leandra Feldman (Daughter) [phone] -- [phone]            [facility] Medications (active)       Dose Frequency Start End    acetaminophen (TYLENOL) tablet 650 mg 650 mg Every 4 Hours PRN 11/18/2018     Sig - Route: Take 2 tablets by mouth Every 4 (Four) Hours As Needed for Mild Pain . - Oral    aspirin chewable tablet 81 mg 81 mg Daily 11/14/2018     Sig - Route: Chew 1 tablet Daily. - Oral    calcium carbonate (oyster shell) tablet 1,000 mg 1,000 mg Daily 11/14/2018     Sig - Route: Take 2 " "tablets by mouth Daily. - Oral    cetirizine (zyrTEC) tablet 10 mg 10 mg Daily 11/16/2018     Sig - Route: Take 1 tablet by mouth Daily. - Oral    clopidogrel (PLAVIX) tablet 75 mg 75 mg Daily 11/18/2018     Sig - Route: Take 1 tablet by mouth Daily. - Oral    docusate sodium (COLACE) capsule 250 mg 250 mg Every Morning 11/14/2018     Sig - Route: Take 250 mg by mouth Every Morning. - Oral    escitalopram (LEXAPRO) tablet 10 mg 10 mg Nightly 11/14/2018     Sig - Route: Take 1 tablet by mouth Every Night. - Oral    gabapentin (NEURONTIN) capsule 600 mg 600 mg 3 Times Daily 11/13/2018     Sig - Route: Take 2 capsules by mouth 3 (Three) Times a Day. - Oral    HYDROcodone-acetaminophen (NORCO) 5-325 MG per tablet 1 tablet 1 tablet Every 4 Hours PRN 11/14/2018 11/24/2018    Sig - Route: Take 1 tablet by mouth Every 4 (Four) Hours As Needed for Moderate Pain . - Oral    lactated ringers infusion 9 mL/hr Continuous 11/17/2018     Sig - Route: Infuse 9 mL/hr into a venous catheter Continuous. - Intravenous    levETIRAcetam (KEPPRA) tablet 750 mg 750 mg 2 Times Daily 11/19/2018     Sig - Route: Take 750 mg by mouth 2 (Two) Times a Day. - Oral    levETIRAcetam in NaCl 0.75% (KEPPRA) IVPB 1,000 mg 1,000 mg Every 12 Hours Scheduled 11/16/2018 11/18/2018    Sig - Route: Infuse 100 mL into a venous catheter Every 12 (Twelve) Hours. - Intravenous    mirtazapine (REMERON) tablet 15 mg 15 mg Nightly 11/14/2018     Sig - Route: Take 1 tablet by mouth Every Night. - Oral    morphine injection 1 mg 1 mg Every 4 Hours PRN 11/13/2018 11/23/2018    Sig - Route: Infuse 0.5 mL into a venous catheter Every 4 (Four) Hours As Needed for Severe Pain . - Intravenous    Linked Group 1:  \"Followed by\" Linked Group Details        morphine injection 2 mg 2 mg Every 4 Hours PRN 11/23/2018 12/3/2018    Sig - Route: Infuse 1 mL into a venous catheter Every 4 (Four) Hours As Needed for Severe Pain . - Intravenous    Linked Group 1:  \"Followed by\" Linked " "Group Details        ondansetron (ZOFRAN) injection 4 mg 4 mg Every 4 Hours PRN 11/13/2018     Sig - Route: Infuse 2 mL into a venous catheter Every 4 (Four) Hours As Needed for Nausea or Vomiting. - Intravenous    pantoprazole (PROTONIX) EC tablet 40 mg 40 mg Every Early Morning 11/14/2018     Sig - Route: Take 1 tablet by mouth Every Morning. - Oral    polyethylene glycol 3350 powder (packet) 17 g Daily 11/19/2018     Sig - Route: Take 17 g by mouth Daily. - Oral    sodium chloride 0.9 % flush 10 mL 10 mL As Needed 11/13/2018     Sig - Route: Infuse 10 mL into a venous catheter As Needed for Line Care. - Intravenous    Linked Group 2:  \"And\" Linked Group Details        Pharmacy Consult - Pharmacy to dose (Discontinued)  Continuous PRN 11/15/2018 11/19/2018    Sig - Route: Continuous As Needed for Consult (See Dr. Poole note from 11/14 re: Keppra dosing). - Does not apply    sodium chloride 0.9 % infusion (Discontinued) 50 mL/hr Continuous 11/15/2018 11/19/2018    Sig - Route: Infuse 50 mL/hr into a venous catheter Continuous. - Intravenous             Physician Progress Notes (last 24 hours) (Notes from 11/18/2018  3:43 PM through 11/19/2018  3:43 PM)      Juan Carlos Harper MD at 11/19/2018  2:35 PM          Daily progress note    Chief complaint  Doing same  No new complaints    History of present illness  88-year-old white female with very complex past medical history including coronary artery disease hypertension hyperlipidemia seizure disorder low back pain gastroesophageal reflux disease dementia who is a nursing home resident brought to the emergency room after the unwitnessed fall to the bathroom when she lost her balance and fell and does not remember what happened.  Patient complain of right hip pain right ankle pain but denies any chest pain and increased shortness of breath abdominal pain nausea vomiting diarrhea.  Patient workup in ER revealed right ankle fracture admitted for management.  At the time of " "interview patient is awake and alert consult question appropriately and hurting at the fracture site but no other complaints except generalized weakness.     REVIEW OF SYSTEMS  Review of Systems   Constitutional: Negative for fever.   HENT: Negative for sore throat.    Eyes: Negative.    Respiratory: Negative for cough and shortness of breath.    Cardiovascular: Negative for chest pain.   Gastrointestinal: Negative for abdominal pain, diarrhea and vomiting.   Genitourinary: Negative for dysuria and enuresis.   Musculoskeletal: Negative for neck pain.   Skin: Negative for rash.   Allergic/Immunologic: Negative.    Neurological: Positive for headaches (unsure of chronicity). Negative for weakness and numbness.   Hematological: Negative.    Psychiatric/Behavioral: Positive for confusion (per daughter pt normally oriented, disoriented starting today).   All other systems reviewed and are negative.     PHYSICAL EXAM  Blood pressure 105/52, pulse 74, temperature 97.4 °F (36.3 °C), temperature source Oral, resp. rate 16, height 157.5 cm (62\"), weight 98.9 kg (218 lb), SpO2 97 %, not currently breastfeeding.    Constitutional: No distress.   Head: Normocephalic and atraumatic.   No notable bite alycia to tongue or lip   Eyes: EOM are normal. Pupils are equal, round, and reactive to light.   Neck: Normal range of motion. Neck supple.   Cardiovascular: Normal rate, regular rhythm and normal heart sounds.   Pulmonary/Chest: Effort normal and breath sounds normal. No respiratory distress.   Abdominal: Soft. There is no tenderness. There is no rebound and no guarding.   Musculoskeletal: Normal range of motion. She exhibits edema (2+ edema to feet and ankles bilaterally and increased at the right ankle at the site of the injury.).   Pt moves all extremities.  Diffuse contusion and ecchymosis to R ankle.  He has no cyanosis or pallor to the toes and is motor and sensory intact to the right foot and toes.   Neurological: She is " alert. She has normal sensation and normal strength. No cranial nerve deficit. GCS score is 15.   Appears to be chronically demented, but daughter states she is normally A+Ox3    Skin: Skin is warm and dry. No rash noted.   Psychiatric: Mood and affect normal.       LAB RESULTS  Lab Results (last 24 hours)     Procedure Component Value Units Date/Time    CBC & Differential [[id number]] Collected:  11/19/18 0543    Specimen:  Blood Updated:  11/19/18 0641    Narrative:       The following orders were created for panel order CBC & Differential.  Procedure                               Abnormality         Status                     ---------                               -----------         ------                     Scan Slide[[id number]]                   Normal              Final result               CBC Auto Differential[[id number]]        Abnormal            Final result                 Please view results for these tests on the individual orders.    CBC Auto Differential [[id number]]  (Abnormal) Collected:  11/19/18 0543    Specimen:  Blood Updated:  11/19/18 0641     WBC 6.25 10*3/mm3      RBC 2.98 10*6/mm3      Hemoglobin 10.4 g/dL      Hematocrit 30.5 %      .3 fL      MCH 34.9 pg      MCHC 34.1 g/dL      RDW 11.8 %      RDW-SD 44.2 fl      MPV 10.3 fL      Platelets 183 10*3/mm3      Neutrophil % 67.5 %      Lymphocyte % 17.8 %      Monocyte % 9.3 %      Eosinophil % 4.3 %      Basophil % 1.1 %      Immature Grans % 0.6 %      Neutrophils, Absolute 4.22 10*3/mm3      Lymphocytes, Absolute 1.11 10*3/mm3      Monocytes, Absolute 0.58 10*3/mm3      Eosinophils, Absolute 0.27 10*3/mm3      Basophils, Absolute 0.07 10*3/mm3      Immature Grans, Absolute 0.04 10*3/mm3      nRBC 1.4 /100 WBC     Scan Slide [[id number]]  (Normal) Collected:  11/19/18 0543    Specimen:  Blood Updated:  11/19/18 0641     RBC Morphology Normal     WBC Morphology Normal     Platelet Morphology Normal    Basic Metabolic Panel  [[id number]]  (Abnormal) Collected:  11/19/18 0543    Specimen:  Blood Updated:  11/19/18 0622     Glucose 95 mg/dL      BUN 9 mg/dL      Creatinine 0.60 mg/dL      Sodium 131 mmol/L      Potassium 3.9 mmol/L      Chloride 96 mmol/L      CO2 23.3 mmol/L      Calcium 8.5 mg/dL      eGFR Non African Amer 94 mL/min/1.73      BUN/Creatinine Ratio 15.0     Anion Gap 11.7 mmol/L     Narrative:       The MDRD GFR formula is only valid for adults with stable renal function between ages 18 and 70.    POC Glucose Once [[id number]]  (Normal) Collected:  11/18/18 1531    Specimen:  Blood Updated:  11/18/18 1534     Glucose 93 mg/dL         Imaging Results (last 24 hours)     ** No results found for the last 24 hours. **        EKG                              Rhythm/Rate: SR, 75  P waves and DE: 1st degree AV block  QRS, axis: narrow QRS, borderline LAD  ST and T waves: nonspecific changes      Current Facility-Administered Medications:   •  acetaminophen (TYLENOL) tablet 650 mg, 650 mg, Oral, Q4H PRN, Juan Carlos Harper MD, 650 mg at 11/19/18 0427  •  aspirin chewable tablet 81 mg, 81 mg, Oral, Daily, Juan Carlos Harper MD, 81 mg at 11/19/18 0904  •  calcium carbonate (oyster shell) tablet 1,000 mg, 1,000 mg, Oral, Daily, Juan Carlos Harper MD, 1,000 mg at 11/19/18 0904  •  cetirizine (zyrTEC) tablet 10 mg, 10 mg, Oral, Daily, Juan Carlos Harper MD, 10 mg at 11/19/18 0904  •  clopidogrel (PLAVIX) tablet 75 mg, 75 mg, Oral, Daily, Cristian Hill II, MD, 75 mg at 11/19/18 0904  •  docusate sodium (COLACE) capsule 250 mg, 250 mg, Oral, QAM, Juan Carlos Harper MD, 250 mg at 11/19/18 0910  •  escitalopram (LEXAPRO) tablet 10 mg, 10 mg, Oral, Nightly, Juan Carlos Harper MD, 10 mg at 11/17/18 2053  •  gabapentin (NEURONTIN) capsule 600 mg, 600 mg, Oral, TID, Juan Carlos Harper MD, 600 mg at 11/19/18 0904  •  HYDROcodone-acetaminophen (NORCO) 5-325 MG per tablet 1 tablet, 1 tablet, Oral, Q4H PRN, Juan Carlos Harper MD, 1 tablet at 11/16/18 0014  •  lactated ringers  infusion, 9 mL/hr, Intravenous, Continuous, Billy Valdes MD, Last Rate: 9 mL/hr at 11/17/18 1352, 9 mL/hr at 11/17/18 1352  •  levETIRAcetam (KEPPRA) tablet 750 mg, 750 mg, Oral, BID, Jessica Duong APRN, 750 mg at 11/19/18 0904  •  mirtazapine (REMERON) tablet 15 mg, 15 mg, Oral, Nightly, Gaetano Harper MD, 15 mg at 11/17/18 2053  •  morphine injection 1 mg, 1 mg, Intravenous, Q4H PRN, 1 mg at 11/13/18 2135 **FOLLOWED BY** [START ON 11/23/2018] morphine injection 2 mg, 2 mg, Intravenous, Q4H PRN, Gaetano Harper MD  •  ondansetron (ZOFRAN) injection 4 mg, 4 mg, Intravenous, Q4H PRN, Gaetano Harper MD  •  pantoprazole (PROTONIX) EC tablet 40 mg, 40 mg, Oral, Q AM, Gaetano Harper MD, 40 mg at 11/19/18 0641  •  polyethylene glycol 3350 powder (packet), 17 g, Oral, Daily, Gaetano Harper MD, 17 g at 11/19/18 0904  •  [COMPLETED] Insert peripheral IV, , , Once **AND** sodium chloride 0.9 % flush 10 mL, 10 mL, Intravenous, PRN, Fidel Bingham MD  •  sodium chloride 0.9 % infusion, 50 mL/hr, Intravenous, Continuous, Gaetano Harper MD, Last Rate: 50 mL/hr at 11/19/18 0643, 50 mL/hr at 11/19/18 0643     ASSESSMENT  Acute right ankle fracture status post open reduction and internal fixation  Status post fall  Seizure disorder  Hypertension  Depression  History of CVA on Plavix  Gastroesophageal reflux disease  Status post permanent pacemaker  Osteoarthritis  Degenerative disc disease  DNR    PLAN  CPM  Postop care  Pain management  Continue home medications  Supportive care  Stress ulcer DVT prophylaxis  DNR  PT/OT  May transfer to orthopedic floor  Discussed with family  Follow closely further recommendation according to hospital course    GAETANO HARPER MD              Electronically signed by Gaetano Harper MD at 11/19/2018  2:36 PM     Cristian Hill II, MD at 11/19/2018  7:25 AM          ORTHOPAEDIC SURGERY DAILY PROGRESS NOTE  Patient is a 88 y.o. female who is 2 Days Post-Op s/p Procedure(s):  ANKLE OPEN  REDUCTION INTERNAL FIXATION     E455/1 Kim Feldman Age:88 y.o. MRN:[id number]  Admitted: 11/13/2018  Overnight events: Markedly confused this morning  Pain: Complaints  Ambulation/Activity: Working with therapy but slow to mobilize due to nonweightbearing to ankle and advanced age  Vitals:  Vitals:    11/18/18 1400 11/18/18 1945 11/18/18 2338 11/19/18 0715   BP: 161/86 147/83 160/78 157/68   BP Location: Right arm Left arm Left arm Left arm   Patient Position: Lying Lying Lying Lying   Pulse: 76 70  70   Resp: 16 16 16 16   Temp: 97.8 °F (36.6 °C) 98.4 °F (36.9 °C) 98.6 °F (37 °C) 97.8 °F (36.6 °C)   TempSrc: Oral  Oral Oral   SpO2: 98%   97%   Weight:       Height:         Ins/Outs:  Last 24hrs    Intake/Output Summary (Last 24 hours) at 11/19/2018 0725  Last data filed at 11/18/2018 1151  Gross per 24 hour   Intake 1050 ml   Output --   Net 1050 ml     Physical Exam:  CONSTITUTIONAL: A&Ox3, No acute distress  LUNGS: Equal chest rise, no shortness of air  CARDIOVASCULAR: palpable peripheral pulses  WOUND: Dressings clean, dry, and intact  EXTREMITY: Right Lower Extremity   Pulses: brisk capillary refill intact   Sensation: Intact to toes   Motor: Wiggles toes in splint    Labs:   Lab Results   Component Value Date    HGB 10.4 (L) 11/19/2018     Lab Results   Component Value Date    WBC 6.25 11/19/2018     Lab Results   Component Value Date    GLUCOSE 95 11/19/2018    CALCIUM 8.5 (L) 11/19/2018     (L) 11/19/2018    K 3.9 11/19/2018    CO2 23.3 11/19/2018    CL 96 (L) 11/19/2018    BUN 9 11/19/2018    CREATININE 0.60 11/19/2018    EGFRIFAFRI 74 08/16/2017    EGFRIFNONA 94 11/19/2018    BCR 15.0 11/19/2018    ANIONGAP 11.7 11/19/2018         Radiology: No radiology results for the last day  Assessment: Patient is a 88 y.o. female who is 2 Days Post-Op s/p Procedure(s):  ANKLE OPEN REDUCTION INTERNAL FIXATION   - Weight Bearing: Right Lower Extremity Non Weight Bearing  - Labs: Above lab values review.  Plan: No intervention necessary  - PT/OT: To Mobilize  - DVT PPX: Home dose of Plavix 75 mg and aspirin 81 mg  - Post-Op Xray: Hardware in good position. Acceptable bony reduction.   - Dispo: Distal per primary team.  She will need to remain nonweightbearing to the ankle for at least 8 weeks, possibly longer.  I might cast her in the office when I take out her cyndie.      Cristian Hill II, MD  Orthopaedic Surgery  [facility]            Electronically signed by Cristian Hill II, MD at 2018  7:27 AM       Consult Notes (last 24 hours) (Notes from 2018  3:43 PM through 2018  3:43 PM)     No notes of this type exist for this encounter.           Physical Therapy Notes (last 24 hours) (Notes from 2018  3:43 PM through 2018  3:43 PM)      Duncan Byrd PTA at 2018 10:06 AM  Version 1 of 1         Problem: Patient Care Overview  Goal: Plan of Care Review  Outcome: Ongoing (interventions implemented as appropriate)   18 1005   Coping/Psychosocial   Plan of Care Reviewed With patient   Plan of Care Review   Progress improving   OTHER   Outcome Summary Pt refused to fully participate and would only do ther ex in bed pt required max encouragement           Electronically signed by Duncan Byrd PTA at 2018 10:06 AM     Duncan Byrd PTA at 2018 10:09 AM  Version 1 of 1         Acute Care - Physical Therapy Treatment Note  [facility]     Patient Name: Kim Feldman  : 3/13/1930  MRN: [id number]  Today's Date: 2018  Onset of Illness/Injury or Date of Surgery: 18     Referring Physician: Sergio    Admit Date: 2018    Visit Dx:    ICD-10-CM ICD-9-CM   1. Closed trimalleolar fracture of right ankle, initial encounter S82.851A 824.6   2. Fall, initial encounter W19.XXXA E888.9   3. Altered mental status, unspecified altered mental status type, improving R41.82 780.97   4. 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)   • Vitamin D deficiency   • History of sick sinus syndrome   • Intertrigo   • Generalized convulsive seizures (CMS/HCC)   • Change in bowel habits   • Zenker diverticulum   • History of anxiety   • 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HCC)   • Thrombocytopenia (CMS/HCC)   • B12 deficiency   • Cardiac pacemaker in situ   • Chronic venous insufficiency   • Arthritis   • S/P knee replacement   • Chronic bilateral low back pain without sciatica   • Essential hypertension   • Hiatal hernia   • Zenker's diverticulum   • Chronic idiopathic constipation   • Pressure sore on buttocks   • Somnolence   • Closed trimalleolar fracture of right ankle       Therapy Treatment    Rehabilitation Treatment Summary     Row Name 11/19/18 1000             Treatment Time/Intention    Discipline  physical therapy assistant  -CW      Document Type  therapy note (daily note)  -CW      Subjective Information  complains of;pain  -CW      Mode of Treatment  physical therapy  -CW      Therapy Frequency (PT Clinical Impression)  daily  -CW      Patient Effort  poor  -CW      Existing Precautions/Restrictions  fall;non-weight bearing R LE  -CW      Recorded by [CW] Duncan Byrd P, PTA 11/19/18 1005      Row Name 11/19/18 1000             Cognitive Assessment/Intervention- PT/OT    Orientation Status (Cognition)  unable/difficult to assess  -CW      Follows Commands (Cognition)  follows one step commands;0-24% accuracy  -CW      Recorded by [CW] Duncan Byrd P, PTA 11/19/18 1005      Row Name 11/19/18 1000             Mobility Assessment/Intervention    Extremity Weight-bearing Status  right lower extremity  -CW       Right Lower Extremity (Weight-bearing Status)  non weight-bearing (NWB)  -CW      Recorded by [CW] Duncan Byrd, PTA 11/19/18 1005      Row Name 11/19/18 1000             Bed Mobility Assessment/Treatment    Bed Mobility Assessment/Treatment  supine-sit;sit-supine  -CW      Supine-Sit [place] (Bed Mobility)  maximum assist (25% patient effort);2 person assist  -CW      Sit-Supine [place] (Bed Mobility)  maximum assist (25% patient effort);2 person assist  -CW      Assistive Device (Bed Mobility)  draw sheet  -CW      Recorded by [CW] Duncan Byrd, PTA 11/19/18 1005      Row Name 11/19/18 1000             Transfer Assessment/Treatment    Comment (Transfers)  Pt lethargic and unwilling to wake up for PT  -CW      Recorded by [CW] Duncan Byrd, PTA 11/19/18 1005      Row Name 11/19/18 1000             Therapeutic Exercise    Lower Extremity (Therapeutic Exercise)  heel slides, bilateral;SLR (straight leg raise), bilateral  -CW      Lower Extremity Range of Motion (Therapeutic Exercise)  ankle dorsiflexion/plantar flexion, left;hip abduction/adduction, bilateral  -CW      Exercise Type (Therapeutic Exercise)  PROM (passive range of motion)  -CW      Position (Therapeutic Exercise)  supine  -CW      Sets/Reps (Therapeutic Exercise)  10  -CW      Recorded by [CW] Duncan Byrd, PTA 11/19/18 1008      Row Name 11/19/18 1000             Positioning and Restraints    Pre-Treatment Position  in bed  -CW      Post Treatment Position  bed  -CW      In Bed  notified nsg;side lying right;call light within reach;encouraged to call for assist;exit alarm on  -CW      Recorded by [CW] Duncan Byrd, PTA 11/19/18 1005      Row Name 11/19/18 1000             Pain Scale: Numbers Pre/Post-Treatment    Pain Location - Side  Right  -CW      Pain Location  ankle  -CW      Pain Intervention(s)  Medication (See MAR);Repositioned;Ambulation/increased activity  -CW      Recorded by [CW] Carson  Duncan PURCELL, PTA 11/19/18 1005      Row Name                Wound 11/13/18 1755 other (see notes) skin tear    Wound - Properties Group Date first assessed: 11/13/18 [CC] Time first assessed: 1755 [CC] Present On Admission : yes;picture taken [CC] Location: other (see notes) [CC], between butt cheeks  Type: skin tear [CC] Recorded by:  [CC] Marquita Mas RN 11/13/18 1822    Row Name                Wound 11/17/18 1353 Right foot incision    Wound - Properties Group Date first assessed: 11/17/18 [RG] Time first assessed: 1353 [RG] Side: Right [RG] Location: foot [RG] Type: incision [RG] Recorded by:  [RG] Susanne Dobson RN 11/17/18 1353    Row Name 11/19/18 1000             Outcome Summary/Treatment Plan (PT)    Anticipated Discharge Disposition (PT)  skilled nursing facility  -CW      Recorded by [CW] Duncan Byrd, PTA 11/19/18 1005        User Key  (r) = Recorded By, (t) = Taken By, (c) = Cosigned By    Initials Name Effective Dates Discipline    RG Susanne Dobson RN 06/16/16 -  Nurse    CW Duncan Byrd PTA 03/07/18 -  PT    CC Marquita Mas RN 09/12/18 -  Nurse          Wound 11/13/18 1755 other (see notes) skin tear (Active)   Dressing Appearance open to air 11/19/2018  8:58 AM   Closure None 11/19/2018  8:58 AM   Base red/granulating 11/19/2018  8:58 AM   Periwound intact;warm 11/19/2018  8:58 AM   Periwound Temperature warm 11/19/2018  8:58 AM   Periwound Skin Turgor soft 11/19/2018  8:58 AM   Drainage Amount none 11/19/2018  8:58 AM   Dressing Care, Wound open to air 11/19/2018  8:58 AM       Wound 11/17/18 1353 Right foot incision (Active)   Dressing Appearance dry;intact 11/19/2018  8:58 AM   Closure JULIANNE 11/19/2018  8:58 AM   Base dressing in place, unable to visualize 11/19/2018  8:58 AM   Dressing Care, Wound multi-layer wrap 11/19/2018  8:58 AM           Physical Therapy Education     Title: PT OT SLP Therapies (Active)     Topic: Physical Therapy (Active)     Point: Mobility  training (Active)     Learning Progress Summary           Patient Nonacceptance, D, NL by CW at 11/19/2018 10:05 AM    Acceptance, E, NR by EM at 11/18/2018  1:53 PM                   Point: Home exercise program (Active)     Learning Progress Summary           Patient Nonacceptance, D, NL by CW at 11/19/2018 10:05 AM                   Point: Body mechanics (Active)     Learning Progress Summary           Patient Nonacceptance, D, NL by CW at 11/19/2018 10:05 AM                   Point: Precautions (Active)     Learning Progress Summary           Patient Nonacceptance, D, NL by CW at 11/19/2018 10:05 AM                               User Key     Initials Effective Dates Name Provider Type Discipline    EM 04/03/18 -  Paty Lock, PT Physical Therapist PT    CW 03/07/18 -  Duncan Byrd, PTA Physical Therapy Assistant PT                PT Recommendation and Plan  Anticipated Discharge Disposition (PT): skilled nursing facility  Therapy Frequency (PT Clinical Impression): daily  Outcome Summary/Treatment Plan (PT)  Anticipated Discharge Disposition (PT): skilled nursing facility  Plan of Care Reviewed With: patient  Progress: improving  Outcome Summary: Pt refused to fully participate and would only do ther ex in bed pt required max enouragement  Outcome Measures     Row Name 11/19/18 1000 11/18/18 1300          How much help from another person do you currently need...    Turning from your back to your side while in flat bed without using bedrails?  2  -CW  2  -EM     Moving from lying on back to sitting on the side of a flat bed without bedrails?  2  -CW  2  -EM     Moving to and from a bed to a chair (including a wheelchair)?  1  -CW  1  -EM     Standing up from a chair using your arms (e.g., wheelchair, bedside chair)?  1  -CW  1  -EM     Climbing 3-5 steps with a railing?  1  -CW  1  -EM     To walk in hospital room?  1  -CW  1  -EM     AM-PAC 6 Clicks Score  8  -CW  8  -EM        Functional  Assessment    Outcome Measure Options  AM-PAC 6 Clicks Basic Mobility (PT)  -CW  AM-PAC 6 Clicks Basic Mobility (PT)  -EM       User Key  (r) = Recorded By, (t) = Taken By, (c) = Cosigned By    Initials Name Provider Type    Paty Dickens, PT Physical Therapist    CW Duncan Byrd PTA Physical Therapy Assistant         Time Calculation:   PT Charges     Row Name 18 1008             Time Calculation    Start Time  0955  -CW      Stop Time  1009  -CW      Time Calculation (min)  14 min  -CW      PT Received On  18  -CW      PT - Next Appointment  18  -CW        User Key  (r) = Recorded By, (t) = Taken By, (c) = Cosigned By    Initials Name Provider Type    Duncan James PTA Physical Therapy Assistant        Therapy Suggested Charges     Code   Minutes Charges    None           Therapy Charges for Today     Code Description Service Date Service Provider Modifiers Qty    31808384579 HC PT THER PROC EA 15 MIN 2018 Duncan Byrd PTA GP 1          PT G-Codes  Outcome Measure Options: AM-PAC 6 Clicks Basic Mobility (PT)  AM-PAC 6 Clicks Score: 8    Duncan Byrd PTA  2018         Electronically signed by Duncan Byrd PTA at 2018 10:09 AM          Occupational Therapy Notes (last 24 hours) (Notes from 2018  3:43 PM through 2018  3:43 PM)      Jessica Clayton, OTR at 2018 10:55 AM          Acute Care - Occupational Therapy Initial Evaluation  [facility]     Patient Name: Kim Feldman  : 3/13/1930  MRN: [id number]  Today's Date: 2018  Onset of Illness/Injury or Date of Surgery: 18     Referring Physician: Leroy    Admit Date: 2018       ICD-10-CM ICD-9-CM   1. Closed trimalleolar fracture of right ankle, initial encounter S82.851A 824.6   2. Fall, initial encounter W19.XXXA E888.9   3. Altered mental status, unspecified altered mental status type, improving R41.82 780.97   4. 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)   • Vitamin D deficiency   • History of sick sinus syndrome   • Intertrigo   • Generalized convulsive seizures (CMS/[facility])   • Change in bowel habits   • Zenker diverticulum   • History of anxiety   • 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[facility])   • Thrombocytopenia (CMS/[facility])   • B12 deficiency   • Cardiac pacemaker in situ   • Chronic venous insufficiency   • Arthritis   • S/P knee replacement   • Chronic bilateral low back pain without sciatica   • Essential hypertension   • Hiatal hernia   • Zenker's diverticulum   • Chronic idiopathic constipation   • Pressure sore on buttocks   • Somnolence   • Closed trimalleolar fracture of right ankle     Past Medical History:   Diagnosis Date   • Anemia    • At risk for sleep apnea 11/17/2018   • Bulging lumbar disc    • Chronic cough    • Chronic UTI    • Chronic venous insufficiency    • 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[facility])    • DDD (degenerative disc disease), lumbosacral    • Dementia without behavioral disturbance     moderate   • Depression, endogenous (CMS/HCC)    • Disc degeneration, lumbar    • Dysphagia    • GERD (gastroesophageal reflux disease)    • Hiatal hernia    • History of anxiety    • History of aspiration pneumonitis    • History of cerebral artery occlusion     CVA (following TIA), 10/10, treated with tPA   • History of herpes zoster    • History of ingrowing nail    • History of osteoporosis    • History of poliomyelitis     child   • History of sciatica    • History of sick sinus syndrome     s/p PPM   • History of transient cerebral ischemia     followed by stroke in 10/2010. BIBI was normal.   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)    • Vitamin D deficiency    • Zenker's diverticulum      Past Surgical History:   Procedure Laterality Date   • CARDIAC PACEMAKER PLACEMENT      secondary to SSS, placed in 2004, with generator change in 2014   • CATARACT EXTRACTION W/ INTRAOCULAR LENS IMPLANT Bilateral    • COLONOSCOPY     • HAND SURGERY Right    • KNEE ARTHROPLASTY Left    • LAMINECTOMY FOR IMPLANTATION / PLACEMENT NEUROSTIMULATOR ELECTRODES     • LUMBAR LAMINECTOMY      L-3 L4 L4 L5   • TONSILLECTOMY            OT ASSESSMENT FLOWSHEET (last 72 hours)      Occupational Therapy Evaluation     Row Name 11/19/18 1044                   OT Evaluation Time/Intention    Subjective Information  complains of;weakness;fatigue  -LE        Document Type  evaluation;therapy note (daily note)  -LE        Patient Effort  adequate  -LE        Comment  pt expresses need to use bathroom.  RN and OT work together to put pt on bedpan, change brief, reposition in bed.   -LE           General Information    Patient Profile Reviewed?  yes  -LE        Referring Physician  Ahmed  -LE        Patient Observations  lethargic  -LE        General Observations of Patient  sidelying on R.  Pt sleepy but wakens.  States need to void.  RN present  -LE        Prior Level of Function  independent:;gait;transfer;ADL's at walker level, assist for bath.  from assisted living  -LE        Equipment Currently Used at Home  walker, rolling;shower chair  -LE        Pertinent History of Current Functional  Problem  from retirement with fall, s/p ORIF R ankle, now NWB R ankle.   -LE        Existing Precautions/Restrictions  fall;weight bearing NWB R LE for at least 8 weeks.   -LE           Relationship/Environment    Lives With  facility resident  -LE           Bed Mobility Assessment/Treatment    Bed Mobility Assessment/Treatment  rolling left;rolling right;scooting/bridging  -LE        Rolling Left [place] (Bed Mobility)  maximum assist (25% patient effort)  -LE        Rolling Right [place] (Bed Mobility)  maximum assist (25% patient effort)  -LE        Scooting/Bridging [place] (Bed Mobility)  dependent (less than 25% patient effort);2 person assist  -LE        Supine-Sit [place] (Bed Mobility)  -- too fatigue, focus on toileting, pt reluctant when discuss  -LE           Functional Mobility    Functional Mobility- Ind. Level  unable to perform;not appropriate to assess;not tested  -LE           Transfer Assessment/Treatment    Transfer Assessment/Treatment  sit-stand transfer;toilet transfer  -LE           Sit-Stand Transfer    Assistive Device (Sit-Stand Transfers)  -- explain and encourage pt potential to pivot xfer on L LE.   -LE           Toilet Transfer    Assistive Device (Toilet Transfer)  -- has not yet stood. unsafe to attempt BSC.  assisted pt with   -LE           ADL Assessment/Intervention    BADL Assessment/Intervention  bathing;upper body dressing;lower body dressing;feeding;toileting;grooming  -LE           Bathing Assessment/Intervention    Comment (Bathing)  anticipate max/dependent.   -LE           Lower Body Dressing Assessment/Training    Comment (Lower Body Dressing)  anticipate total assist.   -LE           Self-Feeding Assessment/Training    Comment (Feeding)  pt states ate a muffin.   -LE           Toileting Assessment/Training    [place] Level (Toileting)  dependent (less than 25% patient effort);two person assist required tarun/doff bedpan, change brief and bed pad  -LE      "      BADL Safety/Performance    Impairments, BADL Safety/Performance  balance;cognition;endurance/activity tolerance;pain;trunk/postural control  -LE           General ROM    GENERAL ROM COMMENTS  -- about 1/2 R shld, L shld 2/3. h/o CVA per RN.   -LE           MMT (Manual Muscle Testing)    General MMT Comments  deferred MMT except fair grasps.    -LE           Positioning and Restraints    Pre-Treatment Position  in bed  -LE        Post Treatment Position  bed  -LE        In Bed  side lying left;notified nsg;call light within reach;encouraged to call for assist;exit alarm on;side rails up x3;SCD pump applied;pillow between legs;RLE elevated SCD L LE only. R LE on 2 pillows per RN   -LE           Pain Assessment    Additional Documentation  Pain Scale: Numbers Pre/Post-Treatment (Group)  -LE           Pain Scale: Numbers Pre/Post-Treatment    Pain Scale: Numbers, Pretreatment  -- intermittent groans during rolling  -LE        Pain Scale: Numbers, Post-Treatment  -- \"Not really when asked about pain during moving\".    -LE        Pain Location - Side  Right  -LE        Pain Location  ankle  -LE        Pain Intervention(s)  Repositioned;Emotional support;Rest  -LE           Wound 11/13/18 1755 other (see notes) skin tear    Wound - Properties Group Date first assessed: 11/13/18  -CC Time first assessed: 1755  -CC Present On Admission : yes;picture taken  -CC Location: other (see notes)  -CC, between butt cheeks  Type: skin tear  -CC       Wound 11/17/18 1353 Right foot incision    Wound - Properties Group Date first assessed: 11/17/18  -RG Time first assessed: 1353  -RG Side: Right  -RG Location: foot  -RG Type: incision  -RG       Plan of Care Review    Plan of Care Reviewed With  patient  -LE           Clinical Impression (OT)    OT Diagnosis  need for assist with personal care  -LE        Patient/Family Goals Statement (OT Eval)  return to prior level of function.   -LE        Criteria for Skilled Therapeutic " Interventions Met (OT Eval)  yes;treatment indicated  -LE        Rehab Potential (OT Eval)  good, to achieve stated therapy goals  -LE        Therapy Frequency (OT Eval)  5 times/wk  -LE        Care Plan Review (OT)  evaluation/treatment results reviewed;care plan/treatment goals reviewed  -LE        Anticipated Equipment Needs at Discharge (OT)  -- BS  -LE        Anticipated Discharge Disposition (OT)  skilled nursing facility  -LE           Vital Signs    O2 Delivery Pre Treatment  room air  -LE        O2 Delivery Intra Treatment  room air  -LE        O2 Delivery Post Treatment  room air  -LE        Pre Patient Position  Side Lying  -LE        Intra Patient Position  Supine  -LE        Post Patient Position  Side Lying  -LE        Activity Duration  -- pt lethargic but awake, slow moving and poor task initiation  -LE           Planned OT Interventions    Planned Therapy Interventions (OT Eval)  activity tolerance training;adaptive equipment training;BADL retraining;transfer/mobility retraining  -LE           OT Goals    Transfer Goal Selection (OT)  transfer, OT goal 1  -LE        Dressing Goal Selection (OT)  dressing, OT goal 1  -LE        Grooming Goal Selection (OT)  grooming, OT goal 1  -LE        Additional Documentation  Grooming Goal Selection (OT) (Row)  -LE           Transfer Goal 1 (OT)    Activity/Assistive Device (Transfer Goal 1, OT)  sit-to-stand/stand-to-sit;bed-to-chair/chair-to-bed;toilet;commode, 3-in-1;walker, standard  -LE        [place] Level/Cues Needed (Transfer Goal 1, OT)  maximum assist (25-49% patient effort)  -LE        Time Frame (Transfer Goal 1, OT)  1 week  -LE        Progress/Outcome (Transfer Goal 1, OT)  goal ongoing  -LE           Dressing Goal 1 (OT)    Activity/Assistive Device (Dressing Goal 1, OT)  upper body dressing  -LE        [place]/Cues Needed (Dressing Goal 1, OT)  minimum assist (75% or more patient effort)  -LE        Time Frame (Dressing Goal 1, OT)  1  week  -LE        Progress/Outcome (Dressing Goal 1, OT)  goal ongoing  -LE           Grooming Goal 1 (OT)    Activity/Device (Grooming Goal 1, OT)  wash face, hands;oral care  -LE        [place] (Grooming Goal 1, OT)  set-up required;supervision required  -LE        Time Frame (Grooming Goal 1, OT)  1 week  -LE        Progress/Outcome (Grooming Goal 1, OT)  goal ongoing  -LE          User Key  (r) = Recorded By, (t) = Taken By, (c) = Cosigned By    Initials Name Effective Dates    Jessica French OTR 06/08/18 -     Susanne Walter RN 06/16/16 -     CC Marquita Mas RN 09/12/18 -          Occupational Therapy Education     Title: PT OT SLP Therapies (Active)     Topic: Occupational Therapy (Active)     Point: ADL training (Done)     Description: Instruct learner(s) on proper safety adaptation and remediation techniques during self care or transfers.   Instruct in proper use of assistive devices.    Learning Progress Summary           Patient Acceptance, E, VU by JORGE ALBERTO at 11/19/2018 10:42 AM    Comment:  Ed pt on role of OT in acute care. Ed body mechanics during rolling to reduce pain and increase ease of movement.                   Point: Body mechanics (Done)     Description: Instruct learner(s) on proper positioning and spine alignment during self-care, functional mobility activities and/or exercises.    Learning Progress Summary           Patient Acceptance, E, VU by JORGE ALBERTO at 11/19/2018 10:42 AM    Comment:  Ed pt on role of OT in acute care. Ed body mechanics during rolling to reduce pain and increase ease of movement.                               User Key     Initials Effective Dates Name Provider Type Discipline    JORGE ALBERTO 06/08/18 -  Jessica Clayton OTR Occupational Therapist OT                  OT Recommendation and Plan  Outcome Summary/Treatment Plan (OT)  Anticipated Equipment Needs at Discharge (OT): (BSC)  Anticipated Discharge Disposition (OT): skilled nursing facility  Planned Therapy Interventions  (OT Eval): activity tolerance training, adaptive equipment training, BADL retraining, transfer/mobility retraining  Therapy Frequency (OT Eval): 5 times/wk  Plan of Care Review  Plan of Care Reviewed With: patient  Plan of Care Reviewed With: patient  Outcome Summary: Pt presents with impaired processsing, mobility, NWB R LE, weak  RUE.  Pt is assist of 2 for toileting and bed mobility.  Pt may benefit from skilled OT to increase safety and independence.     Outcome Measures     Row Name 11/19/18 1040 11/19/18 1000 11/18/18 1300       How much help from another person do you currently need...    Turning from your back to your side while in flat bed without using bedrails?  --  2  -CW  2  -EM    Moving from lying on back to sitting on the side of a flat bed without bedrails?  --  2  -CW  2  -EM    Moving to and from a bed to a chair (including a wheelchair)?  --  1  -CW  1  -EM    Standing up from a chair using your arms (e.g., wheelchair, bedside chair)?  --  1  -CW  1  -EM    Climbing 3-5 steps with a railing?  --  1  -CW  1  -EM    To walk in hospital room?  --  1  -CW  1  -EM    AM-PAC 6 Clicks Score  --  8  -CW  8  -EM       How much help from another is currently needed...    Putting on and taking off regular lower body clothing?  1  -LE  --  --    Bathing (including washing, rinsing, and drying)  1  -LE  --  --    Toileting (which includes using toilet bed pan or urinal)  1  -LE  --  --    Putting on and taking off regular upper body clothing  1  -LE  --  --    Taking care of personal grooming (such as brushing teeth)  1  -LE  --  --    Eating meals  2  -LE  --  --    Score  7  -LE  --  --       Functional Assessment    Outcome Measure Options  AM-PAC 6 Clicks Daily Activity (OT)  -LE  AM-PAC 6 Clicks Basic Mobility (PT)  -CW  AM-PAC 6 Clicks Basic Mobility (PT)  -EM      User Key  (r) = Recorded By, (t) = Taken By, (c) = Cosigned By    Initials Name Provider Type    Jessica French, OTR Occupational Therapist     EM Paty Lock, PT Physical Therapist    CW Duncan Byrd, PTA Physical Therapy Assistant          Time Calculation:   Time Calculation- OT     Row Name 11/19/18 1053             Time Calculation- OT    OT Start Time  1023  -LE      OT Stop Time  1038  -LE      OT Time Calculation (min)  15 min  -LE      Total Timed Code Minutes- OT  8 minute(s)  -LE      OT Received On  11/19/18  -LE      OT Goal Re-Cert Due Date  11/26/18  -LE        User Key  (r) = Recorded By, (t) = Taken By, (c) = Cosigned By    Initials Name Provider Type    Jessica Frnech OTR Occupational Therapist        Therapy Suggested Charges     Code   Minutes Charges    None           Therapy Charges for Today     Code Description Service Date Service Provider Modifiers Qty    31075722635  OT EVAL LOW COMPLEXITY 2 11/19/2018 Jessica Clayton OTR GO 1    [id number]  OT SELF CARE/MGMT/TRAIN EA 15 MIN 11/19/2018 Jessica Clayton OTR GO 1               RUBY Aguirre  11/19/2018    Electronically signed by Jessica Clayton OTR at 11/19/2018 10:55 AM     Jessica Clayton OTR at 11/19/2018 10:42 AM          Problem: Patient Care Overview  Goal: Plan of Care Review  Outcome: Ongoing (interventions implemented as appropriate)   11/19/18 1041   Coping/Psychosocial   Plan of Care Reviewed With patient   OTHER   Outcome Summary Pt presents with impaired processsing, mobility, NWB R LE, weak RUE. Pt is assist of 2 for toileting and bed mobility. Pt may benefit from skilled OT to increase safety and independence.            Electronically signed by Jessica Clayton OTR at 11/19/2018 10:42 AM

## 2018-11-19 NOTE — PLAN OF CARE
Problem: Patient Care Overview  Goal: Plan of Care Review  Outcome: Ongoing (interventions implemented as appropriate)   11/19/18 1041   Coping/Psychosocial   Plan of Care Reviewed With patient   OTHER   Outcome Summary Pt presents with impaired processsing, mobility, NWB R LE, weak RUE. Pt is assist of 2 for toileting and bed mobility. Pt may benefit from skilled OT to increase safety and independence.

## 2018-11-19 NOTE — PLAN OF CARE
Problem: Patient Care Overview  Goal: Plan of Care Review  Outcome: Ongoing (interventions implemented as appropriate)   11/19/18 1005   Coping/Psychosocial   Plan of Care Reviewed With patient   Plan of Care Review   Progress improving   OTHER   Outcome Summary Pt refused to fully participate and would only do ther ex in bed pt required max encouragement

## 2018-11-19 NOTE — PLAN OF CARE
Problem: Fall Risk (Adult)  Goal: Absence of Fall  Outcome: Ongoing (interventions implemented as appropriate)      Problem: Patient Care Overview  Goal: Plan of Care Review  Outcome: Ongoing (interventions implemented as appropriate)   11/19/18 0433   Coping/Psychosocial   Plan of Care Reviewed With patient   Plan of Care Review   Progress no change   OTHER   Outcome Summary Pt VSS, pt c/o pain once and was controlled with PRN tylenol, pt turned throughout shift, right leg elevated, remained on O2, pt drowsy at beginning of shift and more awake and alert towards the end, will continue to monitor.      Goal: Individualization and Mutuality  Outcome: Ongoing (interventions implemented as appropriate)    Goal: Discharge Needs Assessment  Outcome: Ongoing (interventions implemented as appropriate)    Goal: Interprofessional Rounds/Family Conf  Outcome: Ongoing (interventions implemented as appropriate)      Problem: Skin Injury Risk (Adult)  Goal: Skin Health and Integrity  Outcome: Ongoing (interventions implemented as appropriate)

## 2018-11-20 LAB
ABO + RH BLD: NORMAL
ABO + RH BLD: NORMAL
ANION GAP SERPL CALCULATED.3IONS-SCNC: 9.5 MMOL/L
BASOPHILS # BLD AUTO: 0.05 10*3/MM3 (ref 0–0.2)
BASOPHILS NFR BLD AUTO: 1 % (ref 0–1.5)
BH BB BLOOD EXPIRATION DATE: NORMAL
BH BB BLOOD EXPIRATION DATE: NORMAL
BH BB BLOOD TYPE BARCODE: 6200
BH BB BLOOD TYPE BARCODE: 6200
BH BB DISPENSE STATUS: NORMAL
BH BB DISPENSE STATUS: NORMAL
BH BB PRODUCT CODE: NORMAL
BH BB PRODUCT CODE: NORMAL
BH BB UNIT NUMBER: NORMAL
BH BB UNIT NUMBER: NORMAL
BUN BLD-MCNC: 11 MG/DL (ref 8–23)
BUN/CREAT SERPL: 18.3 (ref 7–25)
CALCIUM SPEC-SCNC: 8.2 MG/DL (ref 8.6–10.5)
CHLORIDE SERPL-SCNC: 97 MMOL/L (ref 98–107)
CO2 SERPL-SCNC: 26.5 MMOL/L (ref 22–29)
CREAT BLD-MCNC: 0.6 MG/DL (ref 0.57–1)
CROSSMATCH INTERPRETATION: NORMAL
CROSSMATCH INTERPRETATION: NORMAL
DEPRECATED RDW RBC AUTO: 44.4 FL (ref 37–54)
EOSINOPHIL # BLD AUTO: 0.28 10*3/MM3 (ref 0–0.7)
EOSINOPHIL NFR BLD AUTO: 5.3 % (ref 0.3–6.2)
ERYTHROCYTE [DISTWIDTH] IN BLOOD BY AUTOMATED COUNT: 11.8 % (ref 11.7–13)
GFR SERPL CREATININE-BSD FRML MDRD: 94 ML/MIN/1.73
GLUCOSE BLD-MCNC: 103 MG/DL (ref 65–99)
HCT VFR BLD AUTO: 28.6 % (ref 35.6–45.5)
HGB BLD-MCNC: 9.6 G/DL (ref 11.9–15.5)
IMM GRANULOCYTES # BLD: 0.01 10*3/MM3 (ref 0–0.03)
IMM GRANULOCYTES NFR BLD: 0.2 % (ref 0–0.5)
LYMPHOCYTES # BLD AUTO: 1.15 10*3/MM3 (ref 0.9–4.8)
LYMPHOCYTES NFR BLD AUTO: 21.9 % (ref 19.6–45.3)
MCH RBC QN AUTO: 34.4 PG (ref 26.9–32)
MCHC RBC AUTO-ENTMCNC: 33.6 G/DL (ref 32.4–36.3)
MCV RBC AUTO: 102.5 FL (ref 80.5–98.2)
MONOCYTES # BLD AUTO: 0.47 10*3/MM3 (ref 0.2–1.2)
MONOCYTES NFR BLD AUTO: 9 % (ref 5–12)
NEUTROPHILS # BLD AUTO: 3.29 10*3/MM3 (ref 1.9–8.1)
NEUTROPHILS NFR BLD AUTO: 62.8 % (ref 42.7–76)
PLATELET # BLD AUTO: 206 10*3/MM3 (ref 140–500)
PMV BLD AUTO: 9.1 FL (ref 6–12)
POTASSIUM BLD-SCNC: 3.8 MMOL/L (ref 3.5–5.2)
RBC # BLD AUTO: 2.79 10*6/MM3 (ref 3.9–5.2)
SODIUM BLD-SCNC: 133 MMOL/L (ref 136–145)
UNIT  ABO: NORMAL
UNIT  ABO: NORMAL
UNIT  RH: NORMAL
UNIT  RH: NORMAL
WBC NRBC COR # BLD: 5.24 10*3/MM3 (ref 4.5–10.7)

## 2018-11-20 PROCEDURE — 85025 COMPLETE CBC W/AUTO DIFF WBC: CPT | Performed by: HOSPITALIST

## 2018-11-20 PROCEDURE — 97110 THERAPEUTIC EXERCISES: CPT

## 2018-11-20 PROCEDURE — 80048 BASIC METABOLIC PNL TOTAL CA: CPT | Performed by: HOSPITALIST

## 2018-11-20 RX ADMIN — PANTOPRAZOLE SODIUM 40 MG: 40 TABLET, DELAYED RELEASE ORAL at 05:57

## 2018-11-20 RX ADMIN — ESCITALOPRAM 10 MG: 10 TABLET, FILM COATED ORAL at 20:04

## 2018-11-20 RX ADMIN — CALCIUM 1000 MG: 500 TABLET ORAL at 09:13

## 2018-11-20 RX ADMIN — Medication 81 MG: at 09:13

## 2018-11-20 RX ADMIN — GABAPENTIN 600 MG: 300 CAPSULE ORAL at 09:13

## 2018-11-20 RX ADMIN — LEVETIRACETAM 750 MG: 250 TABLET, FILM COATED ORAL at 20:01

## 2018-11-20 RX ADMIN — GABAPENTIN 600 MG: 300 CAPSULE ORAL at 15:59

## 2018-11-20 RX ADMIN — POLYETHYLENE GLYCOL 3350 17 G: 17 POWDER, FOR SOLUTION ORAL at 09:13

## 2018-11-20 RX ADMIN — CETIRIZINE HYDROCHLORIDE 10 MG: 10 TABLET, FILM COATED ORAL at 09:13

## 2018-11-20 RX ADMIN — ACETAMINOPHEN 650 MG: 325 TABLET, FILM COATED ORAL at 20:12

## 2018-11-20 RX ADMIN — DOCUSATE SODIUM 250 MG: 50 CAPSULE, LIQUID FILLED ORAL at 09:13

## 2018-11-20 RX ADMIN — MIRTAZAPINE 15 MG: 15 TABLET, FILM COATED ORAL at 20:04

## 2018-11-20 RX ADMIN — GABAPENTIN 600 MG: 300 CAPSULE ORAL at 20:01

## 2018-11-20 RX ADMIN — LEVETIRACETAM 750 MG: 250 TABLET, FILM COATED ORAL at 09:13

## 2018-11-20 RX ADMIN — CLOPIDOGREL 75 MG: 75 TABLET, FILM COATED ORAL at 09:13

## 2018-11-20 RX ADMIN — ACETAMINOPHEN 650 MG: 325 TABLET, FILM COATED ORAL at 09:13

## 2018-11-20 NOTE — THERAPY TREATMENT NOTE
Acute Care - Physical Therapy Treatment Note  [facility]     Patient Name: Kim Feldman  : 3/13/1930  MRN: [id number]  Today's Date: 2018  Onset of Illness/Injury or Date of Surgery: 18     Referring Physician: Leroy    Admit Date: 2018    Visit Dx:    ICD-10-CM ICD-9-CM   1. Closed trimalleolar fracture of right ankle, initial encounter S82.851A 824.6   2. Fall, initial encounter W19.XXXA E888.9   3. Altered mental status, unspecified altered mental status type, improving R41.82 780.97   4. 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)   • Vitamin D deficiency   • History of sick sinus syndrome   • Intertrigo   • Generalized convulsive seizures (CMS/HCC)   • Change in bowel habits   • Zenker diverticulum   • History of anxiety   • 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HCC)   • Thrombocytopenia (CMS/HCC)   • B12 deficiency   • Cardiac pacemaker in situ   • Chronic venous insufficiency   • Arthritis   • S/P knee replacement   • Chronic bilateral low back pain without sciatica   • Essential hypertension   • Hiatal hernia   • Zenker's diverticulum   • Chronic idiopathic constipation   • Pressure sore on buttocks   • Somnolence   • Closed trimalleolar fracture of right ankle       Therapy Treatment    Rehabilitation Treatment Summary     Row Name 18 0827             Treatment Time/Intention    Discipline  physical therapist  -MS      Document Type  therapy note (daily note)  -MS      Subjective Information  complains of;weakness;fatigue;pain  -MS      Mode of Treatment  physical therapy;individual therapy  -MS      Patient Effort  good  -MS      Comment  Pt. awake and alert this AM and  agreeable to work with P.T.  Pt. c/o pain/soreness in her Right ankle/foot throughout general mobility as well as overall fatigue.  -MS      Existing Precautions/Restrictions  fall  (Significant)  NWB RLE;  Cam Boot  -MS      Treatment Considerations/Comments  Precautions taken to help minimize/eliminate all friction/shearing forces to pt.'s skin during mobility.  (Significant)   -MS      Recorded by [MS] Luis Felipe Walker, PT 11/20/18 0832      Row Name 11/20/18 0827             Cognitive Assessment/Intervention- PT/OT    Orientation Status (Cognition)  oriented x 3  -MS      Follows Commands (Cognition)  WNL  -MS      Personal Safety Interventions  fall prevention program maintained;gait belt;nonskid shoes/slippers when out of bed;supervised activity  -MS      Recorded by [MS] Luis Felipe Walker, PT 11/20/18 0832      Row Name 11/20/18 0827             Mobility Assessment/Intervention    Right Lower Extremity (Weight-bearing Status)  non weight-bearing (NWB)  (Significant)   -MS      Recorded by [MS] Luis Felipe Walker, PT 11/20/18 0832      Row Name 11/20/18 0827             Bed Mobility Assessment/Treatment    Supine-Sit [place] (Bed Mobility)  moderate assist (50% patient effort);2 person assist  -MS      Assistive Device (Bed Mobility)  draw sheet  -MS      Recorded by [MS] Luis Felipe Walker, PT 11/20/18 0832      Row Name 11/20/18 0827             Transfer Assessment/Treatment    Transfer Assessment/Treatment  sit-stand transfer;stand-sit transfer;bed-chair transfer  -MS      Maintains Weight-bearing Status (Transfers)  able to maintain  (Significant)  NWB Right L.E.  -MS      Comment (Transfers)  Pt. performed sit <-> stand transfer x 2 for functional strength training prior to transfer from bed to chair  -MS      Recorded by [MS] Luis Felipe Walker, PT 11/20/18 0832      Row Name 11/20/18 0827             Bed-Chair Transfer    Bed-Chair [place] (Transfers)  moderate assist (50% patient effort);2  person assist Stand pivot  -MS      Assistive Device (Bed-Chair Transfers)  -- HHA x 2 and use of gait belt  -MS      Recorded by [MS] Luis Felipe Walker, PT 11/20/18 0832      Row Name 11/20/18 0827             Sit-Stand Transfer    Sit-Stand [place] (Transfers)  moderate assist (50% patient effort);2 person assist  -MS      Assistive Device (Sit-Stand Transfers)  -- HHA x 2 and use of gait belt  -MS      Recorded by [MS] Luis Felipe Walker, PT 11/20/18 0832      Row Name 11/20/18 0827             Stand-Sit Transfer    Stand-Sit [place] (Transfers)  moderate assist (50% patient effort);2 person assist  -MS      Assistive Device (Stand-Sit Transfers)  -- HHA x 2 and use of gait belt  -MS      Recorded by [MS] Luis Felipe Walker, PT 11/20/18 0832      Row Name 11/20/18 0827             Therapeutic Exercise    Comment (Therapeutic Exercise)  Pt. performed BLE ther. ex. program x 10 reps (LAQ's, Hip Flexion)  -MS      Recorded by [MS] Luis Felipe Walker, PT 11/20/18 0832      Row Name 11/20/18 0827             Positioning and Restraints    Pre-Treatment Position  in bed  -MS      Post Treatment Position  chair  -MS      In Chair  notified nsg;reclined;sitting;call light within reach;encouraged to call for assist;RLE elevated All lines intact.  -MS      Recorded by [MS] Luis Felipe Walker, PT 11/20/18 0832      Row Name 11/20/18 0827             Pain Scale: Numbers Pre/Post-Treatment    Pain Scale: Numbers, Pretreatment  5/10  -MS      Pain Scale: Numbers, Post-Treatment  3/10  -MS      Pain Location - Side  Right  -MS      Pain Location  ankle  -MS      Pain Intervention(s)  Medication (See MAR);Repositioned;Elevated;Rest  -MS      Recorded by [MS] Luis Felipe Walker, PT 11/20/18 0832      Row Name                Wound 11/13/18 1755 other (see notes) skin tear    Wound - Properties Group Date first assessed: 11/13/18 [CC] Time first assessed: 1755 [CC] Present On Admission : yes;picture taken [CC] Location: other  (see notes) [CC], between butt cheeks  Type: skin tear [CC] Recorded by:  [CC] Marquita Mas RN 11/13/18 1822    Row Name                Wound 11/17/18 1353 Right foot incision    Wound - Properties Group Date first assessed: 11/17/18 [RG] Time first assessed: 1353 [RG] Side: Right [RG] Location: foot [RG] Type: incision [RG] Recorded by:  [RG] Susanne Dobson RN 11/17/18 1353      User Key  (r) = Recorded By, (t) = Taken By, (c) = Cosigned By    Initials Name Effective Dates Discipline    RG Susanne Dobson RN 06/16/16 -  Nurse    Luis Felipe Sandhu, PT 04/03/18 -  PT    Marquita Nobles RN 09/12/18 -  Nurse          Wound 11/13/18 1755 other (see notes) skin tear (Active)   Dressing Appearance open to air 11/19/2018  8:05 PM   Closure None 11/19/2018  6:33 PM   Base red/granulating 11/19/2018  6:33 PM   Periwound intact;warm 11/19/2018  6:33 PM   Periwound Temperature warm 11/19/2018  6:33 PM   Periwound Skin Turgor soft 11/19/2018  6:33 PM   Drainage Amount none 11/19/2018  6:33 PM   Care, Wound barrier applied 11/19/2018  8:05 PM   Dressing Care, Wound open to air 11/19/2018  8:05 PM       Wound 11/17/18 1353 Right foot incision (Active)   Dressing Appearance dry;intact 11/19/2018  8:05 PM   Closure JULIANNE 11/19/2018  8:05 PM   Base dressing in place, unable to visualize 11/19/2018  8:05 PM   Dressing Care, Wound multi-layer wrap 11/19/2018  2:37 PM           Physical Therapy Education     Title: PT OT SLP Therapies (Active)     Topic: Physical Therapy (Done)     Point: Mobility training (Done)     Learning Progress Summary           Patient Acceptance, E,D, VU,NR by MS at 11/20/2018  8:35 AM    Nonacceptance, D, NL by CW at 11/19/2018 10:05 AM    Acceptance, E, NR by EM at 11/18/2018  1:53 PM                   Point: Home exercise program (Done)     Learning Progress Summary           Patient Acceptance, EMEREDITH, VU,NR by MS at 11/20/2018  8:35 AM    Nonacceptance, MEREDITH, NL by CW at 11/19/2018 10:05 AM                    Point: Body mechanics (Done)     Learning Progress Summary           Patient Acceptance, E,D, VU,NR by MS at 11/20/2018  8:35 AM    Nonacceptance, D, NL by CW at 11/19/2018 10:05 AM                   Point: Precautions (Done)     Learning Progress Summary           Patient Acceptance, E,D, VU,NR by MS at 11/20/2018  8:35 AM    Nonacceptance, D, NL by CW at 11/19/2018 10:05 AM                               User Key     Initials Effective Dates Name Provider Type Discipline     04/03/18 -  Paty Lock, PT Physical Therapist PT    MS 04/03/18 -  Luis Felipe Walker, PT Physical Therapist PT     03/07/18 -  Duncan Byrd, PTA Physical Therapy Assistant PT                PT Recommendation and Plan     Plan of Care Reviewed With: patient  Progress: improving  Outcome Summary: Improved tolerance to functional activity this day with initiation of sit <-> stand transfers and bed <-> chair transfers for functional strengthening as well as performance of BLE ther. ex. program for general strengthening.  Pt. requires decreased assist for overall functional mobility this day as well.  Outcome Measures     Row Name 11/20/18 0800 11/19/18 1040 11/19/18 1000       How much help from another person do you currently need...    Turning from your back to your side while in flat bed without using bedrails?  2  -MS  --  2  -CW    Moving from lying on back to sitting on the side of a flat bed without bedrails?  2  -MS  --  2  -CW    Moving to and from a bed to a chair (including a wheelchair)?  2  -MS  --  1  -CW    Standing up from a chair using your arms (e.g., wheelchair, bedside chair)?  2  -MS  --  1  -CW    Climbing 3-5 steps with a railing?  1  -MS  --  1  -CW    To walk in hospital room?  1  -MS  --  1  -CW    AM-PAC 6 Clicks Score  10  -MS  --  8  -CW       How much help from another is currently needed...    Putting on and taking off regular lower body clothing?  --  1  -LE  --    Bathing  (including washing, rinsing, and drying)  --  1  -LE  --    Toileting (which includes using toilet bed pan or urinal)  --  1  -LE  --    Putting on and taking off regular upper body clothing  --  1  -LE  --    Taking care of personal grooming (such as brushing teeth)  --  1  -LE  --    Eating meals  --  2  -LE  --    Score  --  7  -LE  --       Functional Assessment    Outcome Measure Options  AM-PAC 6 Clicks Basic Mobility (PT)  -MS  AM-PAC 6 Clicks Daily Activity (OT)  -LE  AM-PAC 6 Clicks Basic Mobility (PT)  -CW    Row Name 11/18/18 1300             How much help from another person do you currently need...    Turning from your back to your side while in flat bed without using bedrails?  2  -EM      Moving from lying on back to sitting on the side of a flat bed without bedrails?  2  -EM      Moving to and from a bed to a chair (including a wheelchair)?  1  -EM      Standing up from a chair using your arms (e.g., wheelchair, bedside chair)?  1  -EM      Climbing 3-5 steps with a railing?  1  -EM      To walk in hospital room?  1  -EM      AM-PAC 6 Clicks Score  8  -EM         Functional Assessment    Outcome Measure Options  AM-PAC 6 Clicks Basic Mobility (PT)  -EM        User Key  (r) = Recorded By, (t) = Taken By, (c) = Cosigned By    Initials Name Provider Type    Jessica French, OTR Occupational Therapist    EM Paty Lock, PT Physical Therapist    Luis Felipe Sandhu, PT Physical Therapist    CW Duncan Byrd, PTA Physical Therapy Assistant         Time Calculation:   PT Charges     Row Name 11/20/18 0838             Time Calculation    Start Time  0810  -MS      Stop Time  0825  -MS      Time Calculation (min)  15 min  -MS      PT Received On  11/20/18  -MS      PT - Next Appointment  11/21/18  -MS         Time Calculation- PT    Total Timed Code Minutes- PT  13 minute(s)  -MS        User Key  (r) = Recorded By, (t) = Taken By, (c) = Cosigned By    Initials Name Provider Type    MS Walker  Luis Felipe EDOUARD, PT Physical Therapist        Therapy Suggested Charges     Code   Minutes Charges    None           Therapy Charges for Today     Code Description Service Date Service Provider Modifiers Qty    64243611599 HC PT THER PROC EA 15 MIN 11/20/2018 Luis Felipe Walker, PT GP 1    03836536425 HC PT THER SUPP EA 15 MIN 11/20/2018 Luis Felipe Walker, PT GP 1          PT G-Codes  Outcome Measure Options: AM-PAC 6 Clicks Basic Mobility (PT)  AM-PAC 6 Clicks Score: 10  Score: 7    Luis Felipe Walker, PT  11/20/2018

## 2018-11-20 NOTE — PLAN OF CARE
Problem: Patient Care Overview  Goal: Plan of Care Review  Outcome: Ongoing (interventions implemented as appropriate)   11/20/18 0835 11/20/18 1804   Coping/Psychosocial   Plan of Care Reviewed With patient --    Plan of Care Review   Progress improving --    OTHER   Outcome Summary --  Pt pod 3 right ankle ORIF. Dressing CDI, vitals WNL. Pt worked with PT and sat up in chair today. Possible d/c to rehab tomorrow. Will continue to monitor for seizure activity, none noted on this shift.     Goal: Individualization and Mutuality  Outcome: Ongoing (interventions implemented as appropriate)    Goal: Discharge Needs Assessment  Outcome: Ongoing (interventions implemented as appropriate)    Goal: Interprofessional Rounds/Family Conf  Outcome: Ongoing (interventions implemented as appropriate)      Problem: Skin Injury Risk (Adult)  Goal: Skin Health and Integrity  Outcome: Ongoing (interventions implemented as appropriate)      Problem: Pain, Acute (Adult)  Goal: Acceptable Pain Control/Comfort Level  Outcome: Ongoing (interventions implemented as appropriate)

## 2018-11-20 NOTE — PROGRESS NOTES
"Daily progress note    Chief complaint  Doing better  No new complaints    History of present illness  88-year-old white female with very complex past medical history including coronary artery disease hypertension hyperlipidemia seizure disorder low back pain gastroesophageal reflux disease dementia who is a nursing home resident brought to the emergency room after the unwitnessed fall to the bathroom when she lost her balance and fell and does not remember what happened.  Patient complain of right hip pain right ankle pain but denies any chest pain and increased shortness of breath abdominal pain nausea vomiting diarrhea.  Patient workup in ER revealed right ankle fracture admitted for management.  At the time of interview patient is awake and alert consult question appropriately and hurting at the fracture site but no other complaints except generalized weakness.     REVIEW OF SYSTEMS  Review of Systems   Constitutional: Negative for fever.   HENT: Negative for sore throat.    Eyes: Negative.    Respiratory: Negative for cough and shortness of breath.    Cardiovascular: Negative for chest pain.   Gastrointestinal: Negative for abdominal pain, diarrhea and vomiting.   Genitourinary: Negative for dysuria and enuresis.   Musculoskeletal: Negative for neck pain.   Skin: Negative for rash.   Allergic/Immunologic: Negative.    Neurological: Positive for headaches (unsure of chronicity). Negative for weakness and numbness.   Hematological: Negative.    Psychiatric/Behavioral: Positive for confusion (per daughter pt normally oriented, disoriented starting today).   All other systems reviewed and are negative.     PHYSICAL EXAM  Blood pressure 132/74, pulse 74, temperature 99.5 °F (37.5 °C), temperature source Oral, resp. rate 16, height 157.5 cm (62\"), weight 98.9 kg (218 lb), SpO2 98 %, not currently breastfeeding.    Constitutional: No distress.   Head: Normocephalic and atraumatic.   No notable bite alycia to tongue or " lip   Eyes: EOM are normal. Pupils are equal, round, and reactive to light.   Neck: Normal range of motion. Neck supple.   Cardiovascular: Normal rate, regular rhythm and normal heart sounds.   Pulmonary/Chest: Effort normal and breath sounds normal. No respiratory distress.   Abdominal: Soft. There is no tenderness. There is no rebound and no guarding.   Musculoskeletal: Normal range of motion. She exhibits edema (2+ edema to feet and ankles bilaterally and increased at the right ankle at the site of the injury.).   Pt moves all extremities.  Diffuse contusion and ecchymosis to R ankle.  He has no cyanosis or pallor to the toes and is motor and sensory intact to the right foot and toes.   Neurological: She is alert. She has normal sensation and normal strength. No cranial nerve deficit. GCS score is 15.   Appears to be chronically demented, but daughter states she is normally A+Ox3    Skin: Skin is warm and dry. No rash noted.   Psychiatric: Mood and affect normal.       LAB RESULTS  Lab Results (last 24 hours)     Procedure Component Value Units Date/Time    Basic Metabolic Panel [[id number]]  (Abnormal) Collected:  11/20/18 0650    Specimen:  Blood Updated:  11/20/18 0746     Glucose 103 mg/dL      BUN 11 mg/dL      Creatinine 0.60 mg/dL      Sodium 133 mmol/L      Potassium 3.8 mmol/L      Chloride 97 mmol/L      CO2 26.5 mmol/L      Calcium 8.2 mg/dL      eGFR Non African Amer 94 mL/min/1.73      BUN/Creatinine Ratio 18.3     Anion Gap 9.5 mmol/L     Narrative:       The MDRD GFR formula is only valid for adults with stable renal function between ages 18 and 70.    CBC & Differential [[id number]] Collected:  11/20/18 0311    Specimen:  Blood Updated:  11/20/18 0729    Narrative:       The following orders were created for panel order CBC & Differential.  Procedure                               Abnormality         Status                     ---------                               -----------         ------                      Scan Slide[[id number]]                                                                  CBC Auto Differential[[id number]]        Abnormal            Final result                 Please view results for these tests on the individual orders.    CBC Auto Differential [[id number]]  (Abnormal) Collected:  11/20/18 0650    Specimen:  Blood Updated:  11/20/18 0729     WBC 5.24 10*3/mm3      RBC 2.79 10*6/mm3      Hemoglobin 9.6 g/dL      Hematocrit 28.6 %      .5 fL      MCH 34.4 pg      MCHC 33.6 g/dL      RDW 11.8 %      RDW-SD 44.4 fl      MPV 9.1 fL      Platelets 206 10*3/mm3      Neutrophil % 62.8 %      Lymphocyte % 21.9 %      Monocyte % 9.0 %      Eosinophil % 5.3 %      Basophil % 1.0 %      Immature Grans % 0.2 %      Neutrophils, Absolute 3.29 10*3/mm3      Lymphocytes, Absolute 1.15 10*3/mm3      Monocytes, Absolute 0.47 10*3/mm3      Eosinophils, Absolute 0.28 10*3/mm3      Basophils, Absolute 0.05 10*3/mm3      Immature Grans, Absolute 0.01 10*3/mm3         Imaging Results (last 24 hours)     ** No results found for the last 24 hours. **        EKG                              Rhythm/Rate: SR, 75  P waves and FL: 1st degree AV block  QRS, axis: narrow QRS, borderline LAD  ST and T waves: nonspecific changes      Current Facility-Administered Medications:   •  acetaminophen (TYLENOL) tablet 650 mg, 650 mg, Oral, Q4H PRN, Juan Carlos Harper MD, 650 mg at 11/20/18 0913  •  aspirin chewable tablet 81 mg, 81 mg, Oral, Daily, Juan Carlos Harper MD, 81 mg at 11/20/18 0913  •  calcium carbonate (oyster shell) tablet 1,000 mg, 1,000 mg, Oral, Daily, Juan Carlos Harper MD, 1,000 mg at 11/20/18 0913  •  cetirizine (zyrTEC) tablet 10 mg, 10 mg, Oral, Daily, Juan Carlos Harper MD, 10 mg at 11/20/18 0913  •  clopidogrel (PLAVIX) tablet 75 mg, 75 mg, Oral, Daily, Cristian Hill II, MD, 75 mg at 11/20/18 0913  •  docusate sodium (COLACE) capsule 250 mg, 250 mg, Oral, Leroy TURNER Aftab, MD, 250 mg at 11/20/18 0913  •   escitalopram (LEXAPRO) tablet 10 mg, 10 mg, Oral, Nightly, Gaetano Harper MD, 10 mg at 11/17/18 2053  •  gabapentin (NEURONTIN) capsule 600 mg, 600 mg, Oral, TID, Gaetano Harper MD, 600 mg at 11/20/18 0913  •  HYDROcodone-acetaminophen (NORCO) 5-325 MG per tablet 1 tablet, 1 tablet, Oral, Q4H PRN, Gaetano Harper MD, 1 tablet at 11/16/18 0014  •  lactated ringers infusion, 9 mL/hr, Intravenous, Continuous, Billy Valdes MD, Last Rate: 9 mL/hr at 11/17/18 1352, 9 mL/hr at 11/17/18 1352  •  levETIRAcetam (KEPPRA) tablet 750 mg, 750 mg, Oral, BID, Jessica Duong APRN, 750 mg at 11/20/18 0913  •  mirtazapine (REMERON) tablet 15 mg, 15 mg, Oral, Nightly, Gaetano Harper MD, 15 mg at 11/17/18 2053  •  morphine injection 1 mg, 1 mg, Intravenous, Q4H PRN, 1 mg at 11/13/18 2135 **FOLLOWED BY** [START ON 11/23/2018] morphine injection 2 mg, 2 mg, Intravenous, Q4H PRN, Gaetano Harper MD  •  ondansetron (ZOFRAN) injection 4 mg, 4 mg, Intravenous, Q4H PRN, Gaetano Harper MD  •  pantoprazole (PROTONIX) EC tablet 40 mg, 40 mg, Oral, Q AM, Gaetano Harper MD, 40 mg at 11/20/18 0557  •  polyethylene glycol 3350 powder (packet), 17 g, Oral, Daily, Gaetano Harper MD, 17 g at 11/20/18 0913  •  [COMPLETED] Insert peripheral IV, , , Once **AND** sodium chloride 0.9 % flush 10 mL, 10 mL, Intravenous, PRN, Fidel Bingham MD     ASSESSMENT  Acute right ankle fracture status post open reduction and internal fixation  Status post fall  Seizure disorder  Hypertension  Depression  History of CVA on Plavix  Gastroesophageal reflux disease  Status post permanent pacemaker  Osteoarthritis  Degenerative disc disease  DNR    PLAN  CPM  Postop care  Pain management  Continue home medications  Supportive care  Stress ulcer DVT prophylaxis  DNR  PT/OT  Discussed with family  Subacute rehabilitation in a.m. if more stable    GAETANO HARPER MD

## 2018-11-20 NOTE — PLAN OF CARE
Problem: Patient Care Overview  Goal: Plan of Care Review   11/20/18 0835   Coping/Psychosocial   Plan of Care Reviewed With patient   Plan of Care Review   Progress improving   OTHER   Outcome Summary Improved tolerance to functional activity this day with initiation of sit <-> stand transfers and bed <-> chair transfers for functional strengthening as well as performance of BLE ther. ex. program for general strengthening. Pt. requires decreased assist for overall functional mobility this day as well.

## 2018-11-20 NOTE — PLAN OF CARE
Problem: Patient Care Overview  Goal: Plan of Care Review  Outcome: Ongoing (interventions implemented as appropriate)   11/20/18 0605   Coping/Psychosocial   Plan of Care Reviewed With patient   Plan of Care Review   Progress no change   OTHER   Outcome Summary vital signs stable overnight. patient slept throughout the night-denies pain. turn q2hr. very drowsy overnight but alert and oriented this am. no falls. Seizure precautions in place. ctm      Goal: Individualization and Mutuality  Outcome: Ongoing (interventions implemented as appropriate)    Goal: Discharge Needs Assessment  Outcome: Ongoing (interventions implemented as appropriate)      Problem: Skin Injury Risk (Adult)  Goal: Skin Health and Integrity  Outcome: Ongoing (interventions implemented as appropriate)      Problem: Pain, Acute (Adult)  Goal: Acceptable Pain Control/Comfort Level  Outcome: Ongoing (interventions implemented as appropriate)

## 2018-11-21 VITALS
BODY MASS INDEX: 40.12 KG/M2 | TEMPERATURE: 98 F | WEIGHT: 218 LBS | HEIGHT: 62 IN | OXYGEN SATURATION: 97 % | DIASTOLIC BLOOD PRESSURE: 73 MMHG | SYSTOLIC BLOOD PRESSURE: 151 MMHG | HEART RATE: 73 BPM | RESPIRATION RATE: 16 BRPM

## 2018-11-21 LAB
ANION GAP SERPL CALCULATED.3IONS-SCNC: 10.1 MMOL/L
BASOPHILS # BLD AUTO: 0.03 10*3/MM3 (ref 0–0.2)
BASOPHILS NFR BLD AUTO: 0.7 % (ref 0–1.5)
BUN BLD-MCNC: 11 MG/DL (ref 8–23)
BUN/CREAT SERPL: 16.9 (ref 7–25)
CALCIUM SPEC-SCNC: 8.4 MG/DL (ref 8.6–10.5)
CHLORIDE SERPL-SCNC: 98 MMOL/L (ref 98–107)
CO2 SERPL-SCNC: 24.9 MMOL/L (ref 22–29)
CREAT BLD-MCNC: 0.65 MG/DL (ref 0.57–1)
DEPRECATED RDW RBC AUTO: 44.3 FL (ref 37–54)
EOSINOPHIL # BLD AUTO: 0.29 10*3/MM3 (ref 0–0.7)
EOSINOPHIL NFR BLD AUTO: 6.3 % (ref 0.3–6.2)
ERYTHROCYTE [DISTWIDTH] IN BLOOD BY AUTOMATED COUNT: 12 % (ref 11.7–13)
GFR SERPL CREATININE-BSD FRML MDRD: 86 ML/MIN/1.73
GLUCOSE BLD-MCNC: 97 MG/DL (ref 65–99)
HCT VFR BLD AUTO: 27.8 % (ref 35.6–45.5)
HGB BLD-MCNC: 9.4 G/DL (ref 11.9–15.5)
IMM GRANULOCYTES # BLD: 0.01 10*3/MM3 (ref 0–0.03)
IMM GRANULOCYTES NFR BLD: 0.2 % (ref 0–0.5)
LYMPHOCYTES # BLD AUTO: 1.39 10*3/MM3 (ref 0.9–4.8)
LYMPHOCYTES NFR BLD AUTO: 30.2 % (ref 19.6–45.3)
MCH RBC QN AUTO: 34.3 PG (ref 26.9–32)
MCHC RBC AUTO-ENTMCNC: 33.8 G/DL (ref 32.4–36.3)
MCV RBC AUTO: 101.5 FL (ref 80.5–98.2)
MONOCYTES # BLD AUTO: 0.51 10*3/MM3 (ref 0.2–1.2)
MONOCYTES NFR BLD AUTO: 11.1 % (ref 5–12)
NEUTROPHILS # BLD AUTO: 2.38 10*3/MM3 (ref 1.9–8.1)
NEUTROPHILS NFR BLD AUTO: 51.7 % (ref 42.7–76)
NRBC BLD MANUAL-RTO: 0 /100 WBC (ref 0–0)
PLATELET # BLD AUTO: 213 10*3/MM3 (ref 140–500)
PMV BLD AUTO: 9 FL (ref 6–12)
POTASSIUM BLD-SCNC: 4 MMOL/L (ref 3.5–5.2)
RBC # BLD AUTO: 2.74 10*6/MM3 (ref 3.9–5.2)
SODIUM BLD-SCNC: 133 MMOL/L (ref 136–145)
WBC NRBC COR # BLD: 4.6 10*3/MM3 (ref 4.5–10.7)

## 2018-11-21 PROCEDURE — 80048 BASIC METABOLIC PNL TOTAL CA: CPT | Performed by: HOSPITALIST

## 2018-11-21 PROCEDURE — 85025 COMPLETE CBC W/AUTO DIFF WBC: CPT | Performed by: HOSPITALIST

## 2018-11-21 PROCEDURE — 97110 THERAPEUTIC EXERCISES: CPT

## 2018-11-21 RX ORDER — ASPIRIN 81 MG/1
81 TABLET, CHEWABLE ORAL DAILY
Qty: 30 TABLET | Refills: 0 | Status: SHIPPED | OUTPATIENT
Start: 2018-11-22 | End: 2018-12-22

## 2018-11-21 RX ORDER — GABAPENTIN 300 MG/1
600 CAPSULE ORAL 3 TIMES DAILY
Qty: 10 CAPSULE | Refills: 0 | OUTPATIENT
Start: 2018-11-21 | End: 2018-11-24

## 2018-11-21 RX ORDER — HYDROCODONE BITARTRATE AND ACETAMINOPHEN 5; 325 MG/1; MG/1
1 TABLET ORAL EVERY 4 HOURS PRN
Qty: 10 TABLET | Refills: 0 | Status: SHIPPED | OUTPATIENT
Start: 2018-11-21 | End: 2018-11-24

## 2018-11-21 RX ADMIN — CETIRIZINE HYDROCHLORIDE 10 MG: 10 TABLET, FILM COATED ORAL at 08:07

## 2018-11-21 RX ADMIN — PANTOPRAZOLE SODIUM 40 MG: 40 TABLET, DELAYED RELEASE ORAL at 06:35

## 2018-11-21 RX ADMIN — GABAPENTIN 600 MG: 300 CAPSULE ORAL at 08:07

## 2018-11-21 RX ADMIN — LEVETIRACETAM 750 MG: 250 TABLET, FILM COATED ORAL at 08:07

## 2018-11-21 RX ADMIN — Medication 81 MG: at 08:07

## 2018-11-21 RX ADMIN — CLOPIDOGREL 75 MG: 75 TABLET, FILM COATED ORAL at 08:07

## 2018-11-21 RX ADMIN — DOCUSATE SODIUM 250 MG: 50 CAPSULE, LIQUID FILLED ORAL at 06:34

## 2018-11-21 RX ADMIN — POLYETHYLENE GLYCOL 3350 17 G: 17 POWDER, FOR SOLUTION ORAL at 08:07

## 2018-11-21 RX ADMIN — CALCIUM 1000 MG: 500 TABLET ORAL at 08:07

## 2018-11-21 NOTE — DISCHARGE SUMMARY
Discharge summary    Date of admission 11/13/2018  Date of discharge 11/21/2018    Final diagnosis  Acute right ankle fracture status post open reduction and internal fixation  Status post fall  Seizure disorder  Hypertension  Depression  History of CVA on Plavix  Gastroesophageal reflux disease  Status post permanent pacemaker  Osteoarthritis  Degenerative disc disease  DNR    Discharge medications    Current Facility-Administered Medications:   •  aspirin chewable tablet 81 mg, 81 mg, Oral, Daily, Juan Carlos Harper MD, 81 mg at 11/21/18 0807  •  calcium carbonate (oyster shell) tablet 1,000 mg, 1,000 mg, Oral, Daily, Juan Carlos Harper MD, 1,000 mg at 11/21/18 0807  •  cetirizine (zyrTEC) tablet 10 mg, 10 mg, Oral, Daily, Juan Carlos Harper MD, 10 mg at 11/21/18 0807  •  clopidogrel (PLAVIX) tablet 75 mg, 75 mg, Oral, Daily, Cristian Hill II, MD, 75 mg at 11/21/18 0807  •  docusate sodium (COLACE) capsule 250 mg, 250 mg, Oral, QAM, Juan Carlos Harper MD, 250 mg at 11/21/18 0634  •  escitalopram (LEXAPRO) tablet 10 mg, 10 mg, Oral, Nightly, Juan Carlos Harper MD, 10 mg at 11/20/18 2004  •  gabapentin (NEURONTIN) capsule 600 mg, 600 mg, Oral, TID, Juan Carlos Harper MD, 600 mg at 11/21/18 0807  •  HYDROcodone-acetaminophen (NORCO) 5-325 MG per tablet 1 tablet, 1 tablet, Oral, Q4H PRN, Juan Carlos Harper MD, 1 tablet at 11/16/18 0014  •  levETIRAcetam (KEPPRA) tablet 750 mg, 750 mg, Oral, BID, Jessica Duong APRN, 750 mg at 11/21/18 0807  •  mirtazapine (REMERON) tablet 15 mg, 15 mg, Oral, Nightly, Juan Carlos Harper MD, 15 mg at 11/20/18 2004  •  pantoprazole (PROTONIX) EC tablet 40 mg, 40 mg, Oral, Q AM, Juan Carlos Harper MD, 40 mg at 11/21/18 0635  •  polyethylene glycol 3350 powder (packet), 17 g, Oral, Daily, Juan Carlos Harper MD, 17 g at 11/21/18 0807  •  [COMPLETED] Insert peripheral IV, , , Once **AND** sodium chloride 0.9 % flush 10 mL, 10 mL, Intravenous, PRN, Fidel Bingham MD     Consult obtained  Orthopedic  surgery  Cardiology  Neurology    Procedures  Status post open reduction and fixation right ankle fracture    Hospital course  88-year-old white female with very complex past medical history admitted through emergency room after the fall with right ankle pain and right hip pain.  Patient workup revealed right ankle fracture admitted for management.  Patient admitted she has history of seizure disorder and followed by neurology and was kept on IV Keppra during surgery and increase the dose.  Patient Plavix was held and restarted after surgery.  Postoperatively patient doing very well and participating with PT OT.  Patient clear from cardiology neurology orthopedic surgery and myself to be discharged back to subacute rehabilitation Patient remained DNR throughout hospital course    Discharge diet regular    Activity per PT OT    Medication as above    Further care per accepting physician at nursing home    GAETANO ACOSTA MD

## 2018-11-21 NOTE — PLAN OF CARE
Problem: Patient Care Overview  Goal: Plan of Care Review  Outcome: Ongoing (interventions implemented as appropriate)   11/21/18 0342   Coping/Psychosocial   Plan of Care Reviewed With patient   Plan of Care Review   Progress improving   OTHER   Outcome Summary Pt pain controlled with PO pain meds. VSS. Pt NWB to RLE. Voiding per brief. Plan to dc to rehab when ready. Will cont to monitor.      Goal: Individualization and Mutuality  Outcome: Ongoing (interventions implemented as appropriate)    Goal: Discharge Needs Assessment  Outcome: Ongoing (interventions implemented as appropriate)    Goal: Interprofessional Rounds/Family Conf  Outcome: Ongoing (interventions implemented as appropriate)      Problem: Skin Injury Risk (Adult)  Goal: Skin Health and Integrity  Outcome: Ongoing (interventions implemented as appropriate)      Problem: Pain, Acute (Adult)  Goal: Acceptable Pain Control/Comfort Level  Outcome: Ongoing (interventions implemented as appropriate)

## 2018-11-21 NOTE — PAYOR COMM NOTE
"DISCHARGE DATE        Kim Feldman (88 y.o. Female)     Date of Birth Social Security Number Address Home Phone MRN    03/13/1930  [facility] [address] [place] [address] [phone] [id number]    Spiritism Marital Status          Sikh        Admission Date Admission Type Admitting Provider Attending Provider Department, Room/Bed    11/13/18 Emergency Juan Carlos Harper MD  [address], P878/1    Discharge Date Discharge Disposition Discharge Destination        11/21/2018 Skilled Nursing Facility (DC - External)              Attending Provider:  (none)   Allergies:  Lipitor [Atorvastatin], Penicillins    Isolation:  None   Infection:  None   Code Status:  No CPR    Ht:  157.5 cm (62\")   Wt:  98.9 kg (218 lb)    Admission Cmt:  None   Principal Problem:  None                Active Insurance as of 11/13/2018     Primary Coverage     Payor Plan Insurance Group Employer/Plan Group    [employer] MEDICARE REPLACEMENT AETNA [id number]     Payor Plan Address Payor Plan Phone Number Payor Plan Fax Number Effective Dates    [address] [phone]  1/1/2018 - None Entered    [facility] [id number]       Subscriber Name Subscriber Birth Date Member ID       KIM FELDMAN 3/13/1930 [id number]                 Emergency Contacts      (Rel.) Home Phone Work Phone Mobile Phone    Leandra Feldman (Daughter) [phone] -- [phone]            "

## 2018-11-21 NOTE — DISCHARGE INSTRUCTIONS
Patient may follow-up with Dr. Cristian Hill II at the [facility].  The phone number is [phone].  Please call to schedule an appointment at your convenience within 10-14 days .

## 2018-11-21 NOTE — PROGRESS NOTES
"Daily progress note    Chief complaint  Doing better  No new complaints    History of present illness  88-year-old white female with very complex past medical history including coronary artery disease hypertension hyperlipidemia seizure disorder low back pain gastroesophageal reflux disease dementia who is a nursing home resident brought to the emergency room after the unwitnessed fall to the bathroom when she lost her balance and fell and does not remember what happened.  Patient complain of right hip pain right ankle pain but denies any chest pain and increased shortness of breath abdominal pain nausea vomiting diarrhea.  Patient workup in ER revealed right ankle fracture admitted for management.  At the time of interview patient is awake and alert consult question appropriately and hurting at the fracture site but no other complaints except generalized weakness.     REVIEW OF SYSTEMS  Review of Systems   Constitutional: Negative for fever.   HENT: Negative for sore throat.    Eyes: Negative.    Respiratory: Negative for cough and shortness of breath.    Cardiovascular: Negative for chest pain.   Gastrointestinal: Negative for abdominal pain, diarrhea and vomiting.   Genitourinary: Negative for dysuria and enuresis.   Musculoskeletal: Negative for neck pain.   Skin: Negative for rash.   Allergic/Immunologic: Negative.    Neurological: Positive for headaches (unsure of chronicity). Negative for weakness and numbness.   Hematological: Negative.    Psychiatric/Behavioral: Positive for confusion (per daughter pt normally oriented, disoriented starting today).   All other systems reviewed and are negative.     PHYSICAL EXAM  Blood pressure 151/73, pulse 73, temperature 98 °F (36.7 °C), temperature source Oral, resp. rate 16, height 157.5 cm (62\"), weight 98.9 kg (218 lb), SpO2 97 %, not currently breastfeeding.    Constitutional: No distress.   Head: Normocephalic and atraumatic.   No notable bite alycia to tongue or lip "   Eyes: EOM are normal. Pupils are equal, round, and reactive to light.   Neck: Normal range of motion. Neck supple.   Cardiovascular: Normal rate, regular rhythm and normal heart sounds.   Pulmonary/Chest: Effort normal and breath sounds normal. No respiratory distress.   Abdominal: Soft. There is no tenderness. There is no rebound and no guarding.   Musculoskeletal: Normal range of motion. She exhibits edema (2+ edema to feet and ankles bilaterally and increased at the right ankle at the site of the injury.).   Pt moves all extremities.  Diffuse contusion and ecchymosis to R ankle.  He has no cyanosis or pallor to the toes and is motor and sensory intact to the right foot and toes.   Neurological: She is alert. She has normal sensation and normal strength. No cranial nerve deficit. GCS score is 15.   Appears to be chronically demented, but daughter states she is normally A+Ox3    Skin: Skin is warm and dry. No rash noted.   Psychiatric: Mood and affect normal.       LAB RESULTS  Lab Results (last 24 hours)     Procedure Component Value Units Date/Time    Basic Metabolic Panel [[id number]]  (Abnormal) Collected:  11/21/18 0346    Specimen:  Blood Updated:  11/21/18 0512     Glucose 97 mg/dL      BUN 11 mg/dL      Creatinine 0.65 mg/dL      Sodium 133 mmol/L      Potassium 4.0 mmol/L      Chloride 98 mmol/L      CO2 24.9 mmol/L      Calcium 8.4 mg/dL      eGFR Non African Amer 86 mL/min/1.73      BUN/Creatinine Ratio 16.9     Anion Gap 10.1 mmol/L     Narrative:       The MDRD GFR formula is only valid for adults with stable renal function between ages 18 and 70.    CBC & Differential [[id number]] Collected:  11/21/18 0346    Specimen:  Blood Updated:  11/21/18 0501    Narrative:       The following orders were created for panel order CBC & Differential.  Procedure                               Abnormality         Status                     ---------                               -----------         ------                      Scan Slide[[id number]]                                                                  CBC Auto Differential[[id number]]        Abnormal            Final result                 Please view results for these tests on the individual orders.    CBC Auto Differential [[id number]]  (Abnormal) Collected:  11/21/18 0346    Specimen:  Blood Updated:  11/21/18 0501     WBC 4.60 10*3/mm3      RBC 2.74 10*6/mm3      Hemoglobin 9.4 g/dL      Hematocrit 27.8 %      .5 fL      MCH 34.3 pg      MCHC 33.8 g/dL      RDW 12.0 %      RDW-SD 44.3 fl      MPV 9.0 fL      Platelets 213 10*3/mm3      Neutrophil % 51.7 %      Lymphocyte % 30.2 %      Monocyte % 11.1 %      Eosinophil % 6.3 %      Basophil % 0.7 %      Immature Grans % 0.2 %      Neutrophils, Absolute 2.38 10*3/mm3      Lymphocytes, Absolute 1.39 10*3/mm3      Monocytes, Absolute 0.51 10*3/mm3      Eosinophils, Absolute 0.29 10*3/mm3      Basophils, Absolute 0.03 10*3/mm3      Immature Grans, Absolute 0.01 10*3/mm3      nRBC 0.0 /100 WBC         Imaging Results (last 24 hours)     ** No results found for the last 24 hours. **        EKG                              Rhythm/Rate: SR, 75  P waves and [place]: 1st degree AV block  QRS, axis: narrow QRS, borderline LAD  ST and T waves: nonspecific changes      Current Facility-Administered Medications:   •  acetaminophen (TYLENOL) tablet 650 mg, 650 mg, Oral, Q4H PRN, Juan Carlos Harper MD, 650 mg at 11/20/18 2012  •  aspirin chewable tablet 81 mg, 81 mg, Oral, Daily, Juan Carlos Harper MD, 81 mg at 11/21/18 0807  •  calcium carbonate (oyster shell) tablet 1,000 mg, 1,000 mg, Oral, Daily, Juan Carlos Harper MD, 1,000 mg at 11/21/18 0807  •  cetirizine (zyrTEC) tablet 10 mg, 10 mg, Oral, Daily, Juan Carlos Harper MD, 10 mg at 11/21/18 0807  •  clopidogrel (PLAVIX) tablet 75 mg, 75 mg, Oral, Daily, Cristian Hill II, MD, 75 mg at 11/21/18 0807  •  docusate sodium (COLACE) capsule 250 mg, 250 mg, Oral, Leroy TURNER Aftab, MD, 250 mg  at 11/21/18 0634  •  escitalopram (LEXAPRO) tablet 10 mg, 10 mg, Oral, Nightly, Gaetano Harper MD, 10 mg at 11/20/18 2004  •  gabapentin (NEURONTIN) capsule 600 mg, 600 mg, Oral, TID, Gaetano Harper MD, 600 mg at 11/21/18 0807  •  HYDROcodone-acetaminophen (NORCO) 5-325 MG per tablet 1 tablet, 1 tablet, Oral, Q4H PRN, Gaetano Harper MD, 1 tablet at 11/16/18 0014  •  lactated ringers infusion, 9 mL/hr, Intravenous, Continuous, Billy Valdes MD, Last Rate: 9 mL/hr at 11/17/18 1352, 9 mL/hr at 11/17/18 1352  •  levETIRAcetam (KEPPRA) tablet 750 mg, 750 mg, Oral, BID, Jessica Duong, APRN, 750 mg at 11/21/18 0807  •  mirtazapine (REMERON) tablet 15 mg, 15 mg, Oral, Nightly, Gaetano Harper MD, 15 mg at 11/20/18 2004  •  morphine injection 1 mg, 1 mg, Intravenous, Q4H PRN, 1 mg at 11/13/18 2135 **FOLLOWED BY** [START ON 11/23/2018] morphine injection 2 mg, 2 mg, Intravenous, Q4H PRN, Gaetano Harper MD  •  ondansetron (ZOFRAN) injection 4 mg, 4 mg, Intravenous, Q4H PRN, Gaetano Harper MD  •  pantoprazole (PROTONIX) EC tablet 40 mg, 40 mg, Oral, Q AM, Gaetano Harper MD, 40 mg at 11/21/18 0635  •  polyethylene glycol 3350 powder (packet), 17 g, Oral, Daily, Gaetano Harper MD, 17 g at 11/21/18 0807  •  [COMPLETED] Insert peripheral IV, , , Once **AND** sodium chloride 0.9 % flush 10 mL, 10 mL, Intravenous, PRN, Fidel Bingham MD     ASSESSMENT  Acute right ankle fracture status post open reduction and internal fixation  Status post fall  Seizure disorder  Hypertension  Depression  History of CVA on Plavix  Gastroesophageal reflux disease  Status post permanent pacemaker  Osteoarthritis  Degenerative disc disease  DNR    PLAN  Discharge to subacute rehabilitation  Discharge summary dictated    GAETANO HARPER MD

## 2018-11-21 NOTE — THERAPY TREATMENT NOTE
Acute Care - Physical Therapy Treatment Note  Psychiatric     Patient Name: Kim Feldman  : 3/13/1930  MRN: [id number]  Today's Date: 2018  Onset of Illness/Injury or Date of Surgery: 18     Referring Physician: Leroy    Admit Date: 2018    Visit Dx:    ICD-10-CM ICD-9-CM   1. Closed trimalleolar fracture of right ankle, initial encounter S82.851A 824.6   2. Fall, initial encounter W19.XXXA E888.9   3. Altered mental status, unspecified altered mental status type, improving R41.82 780.97   4. 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)   • Vitamin D deficiency   • History of sick sinus syndrome   • Intertrigo   • Generalized convulsive seizures (CMS/HCC)   • Change in bowel habits   • Zenker diverticulum   • History of anxiety   • 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HCC)   • Thrombocytopenia (CMS/HCC)   • B12 deficiency   • Cardiac pacemaker in situ   • Chronic venous insufficiency   • Arthritis   • S/P knee replacement   • Chronic bilateral low back pain without sciatica   • Essential hypertension   • Hiatal hernia   • Zenker's diverticulum   • Chronic idiopathic constipation   • Pressure sore on buttocks   • Somnolence   • Closed trimalleolar fracture of right ankle       Therapy Treatment    Rehabilitation Treatment Summary     Row Name 18 0949             Treatment Time/Intention    Discipline  physical therapist  -MS      Document Type  therapy note (daily note)  -MS      Subjective Information  complains of;weakness;fatigue;pain  -MS      Mode of Treatment  physical therapy;individual therapy  -MS      Patient Effort  good  -MS      Comment  Pt. reports continued  pain/soreness in her Right ankle this AM.  -MS      Existing Precautions/Restrictions  fall  (Significant)  NWB RLE  -MS      Treatment Considerations/Comments  Precautions taken to help minimize/eliminate all friction/shearing forces to pt.'s skin during mobility.  (Significant)   -MS      Recorded by [MS] Luis Felipe Walker, PT 11/21/18 0953      Row Name 11/21/18 0949             Cognitive Assessment/Intervention- PT/OT    Orientation Status (Cognition)  oriented x 3  -MS      Follows Commands (Cognition)  WNL  -MS      Personal Safety Interventions  fall prevention program maintained;gait belt;nonskid shoes/slippers when out of bed;supervised activity  -MS      Recorded by [MS] Luis Felipe Walker, PT 11/21/18 0953      Row Name 11/21/18 0949             Mobility Assessment/Intervention    Right Lower Extremity (Weight-bearing Status)  non weight-bearing (NWB)  (Significant)   -MS      Recorded by [MS] Luis Felipe Walker, PT 11/21/18 0953      Row Name 11/21/18 0949             Bed Mobility Assessment/Treatment    Supine-Sit [place] (Bed Mobility)  moderate assist (50% patient effort)  -MS      Assistive Device (Bed Mobility)  draw sheet  -MS      Recorded by [MS] Luis Felipe Walker, PT 11/21/18 0953      Row Name 11/21/18 0949             Transfer Assessment/Treatment    Maintains Weight-bearing Status (Transfers)  able to maintain NWB Right L.E. with P.T. assist.  -MS      Recorded by [MS] Luis Felipe Walker, PT 11/21/18 0953      Row Name 11/21/18 0949             Bed-Chair Transfer    Bed-Chair [place] (Transfers)  moderate assist (50% patient effort);2 person assist  -MS      Assistive Device (Bed-Chair Transfers)  -- HHA and use of gait belt  -MS      Recorded by [MS] Luis Felipe Walker, PT 11/21/18 0953      Row Name 11/21/18 0949             Sit-Stand Transfer    Sit-Stand [place] (Transfers)  moderate assist (50% patient effort);2 person assist  -MS      Assistive Device (Sit-Stand Transfers)   -- HHA x 2 and use of gait belt  -MS      Recorded by [MS] Luis Felipe Walker, PT 11/21/18 0953      Row Name 11/21/18 0949             Stand-Sit Transfer    Stand-Sit [place] (Transfers)  moderate assist (50% patient effort);2 person assist  -MS      Assistive Device (Stand-Sit Transfers)  -- HHA x 2 and use of gait belt  -MS      Recorded by [MS] Luis Felipe Walker, PT 11/21/18 0953      Row Name 11/21/18 0949             Therapeutic Exercise    Comment (Therapeutic Exercise)  BLE ther. ex. program x 15 reps (LAQ's, Hip Flexion)  -MS      Recorded by [MS] Luis Felipe Walker, PT 11/21/18 0953      Row Name 11/21/18 0949             Positioning and Restraints    Pre-Treatment Position  in bed  -MS      Post Treatment Position  bsc  -MS      On BS commode  notified nsg;sitting;call light within reach;encouraged to call for assist All lines intact. Nurse aware of pt.'s position.  -MS      Recorded by [MS] Luis Felipe Walker, PT 11/21/18 0953      Row Name 11/21/18 0949             Pain Scale: Numbers Pre/Post-Treatment    Pain Scale: Numbers, Pretreatment  5/10  -MS      Pain Scale: Numbers, Post-Treatment  5/10  -MS      Pain Location - Side  Right  -MS      Pain Location  ankle  -MS      Pain Intervention(s)  Medication (See MAR);Repositioned;Rest  -MS      Recorded by [MS] Luis Felipe Walker, PT 11/21/18 0953      Row Name                Wound 11/13/18 1755 other (see notes) skin tear    Wound - Properties Group Date first assessed: 11/13/18 [CC] Time first assessed: 1755 [CC] Present On Admission : yes;picture taken [CC] Location: other (see notes) [CC], between butt cheeks  Type: skin tear [CC] Recorded by:  [CC] Marquita Mas RN 11/13/18 1822    Row Name                Wound 11/17/18 1353 Right foot incision    Wound - Properties Group Date first assessed: 11/17/18 [RG] Time first assessed: 1353 [RG] Side: Right [RG] Location: foot [RG] Type: incision [RG] Recorded by:  [RG] Susanne Dobson RN 11/17/18  1353      User Key  (r) = Recorded By, (t) = Taken By, (c) = Cosigned By    Initials Name Effective Dates Discipline    RG Susanne Dobson RN 06/16/16 -  Nurse    Luis Felipe Sandhu, PT 04/03/18 -  PT    Marquita Nobles, RN 09/12/18 -  Nurse          Wound 11/13/18 7585 other (see notes) skin tear (Active)   Dressing Appearance open to air 11/21/2018  8:11 AM   Closure None 11/21/2018  8:11 AM   Base red/granulating 11/21/2018  8:11 AM   Drainage Amount none 11/21/2018  8:11 AM   Dressing Care, Wound open to air 11/21/2018  8:11 AM       Wound 11/17/18 1353 Right foot incision (Active)   Dressing Appearance dry;intact 11/21/2018  8:11 AM   Closure JULIANNE 11/21/2018  8:11 AM   Base dressing in place, unable to visualize 11/21/2018  8:11 AM   Drainage Amount none 11/21/2018  8:11 AM   Dressing Care, Wound elastic bandage;multi-layer wrap 11/21/2018  8:11 AM           Physical Therapy Education     Title: PT OT SLP Therapies (Active)     Topic: Physical Therapy (Done)     Point: Mobility training (Done)     Learning Progress Summary           Patient Acceptance, E,D, VU,NR by MS at 11/21/2018  9:53 AM    Acceptance, E,D, VU,NR by MS at 11/20/2018  8:35 AM    Nonacceptance, D, NL by CW at 11/19/2018 10:05 AM    Acceptance, E, NR by EM at 11/18/2018  1:53 PM                   Point: Home exercise program (Done)     Learning Progress Summary           Patient Acceptance, E,D, VU,NR by MS at 11/21/2018  9:53 AM    Acceptance, E,D, VU,NR by MS at 11/20/2018  8:35 AM    Nonacceptance, D, NL by CW at 11/19/2018 10:05 AM                   Point: Body mechanics (Done)     Learning Progress Summary           Patient Acceptance, E,D, VU,NR by MS at 11/21/2018  9:53 AM    Acceptance, E,D, VU,NR by MS at 11/20/2018  8:35 AM    Nonacceptance, D, NL by CW at 11/19/2018 10:05 AM                   Point: Precautions (Done)     Learning Progress Summary           Patient Acceptance, MEREDITH CAMP, LILO,NR by MS at 11/21/2018  9:53 AM     Acceptance, E,D, VU,NR by MS at 11/20/2018  8:35 AM    Nonacceptance, D, NL by CW at 11/19/2018 10:05 AM                               User Key     Initials Effective Dates Name Provider Type Discipline    EM 04/03/18 -  Paty Lock, PT Physical Therapist PT    MS 04/03/18 -  Luis Felipe Walker, PT Physical Therapist PT    CW 03/07/18 -  Duncan Byrd, PTA Physical Therapy Assistant PT                PT Recommendation and Plan     Plan of Care Reviewed With: patient  Progress: improving  Outcome Summary: Improved tolerance to functional activity this day with a decrease in assist required for bed mobility and a progression in ther. ex. program.  Outcome Measures     Row Name 11/21/18 0900 11/20/18 0800 11/19/18 1040       How much help from another person do you currently need...    Turning from your back to your side while in flat bed without using bedrails?  3  -MS  2  -MS  --    Moving from lying on back to sitting on the side of a flat bed without bedrails?  2  -MS  2  -MS  --    Moving to and from a bed to a chair (including a wheelchair)?  2  -MS  2  -MS  --    Standing up from a chair using your arms (e.g., wheelchair, bedside chair)?  2  -MS  2  -MS  --    Climbing 3-5 steps with a railing?  1  -MS  1  -MS  --    To walk in hospital room?  1  -MS  1  -MS  --    AM-PAC 6 Clicks Score  11  -MS  10  -MS  --       How much help from another is currently needed...    Putting on and taking off regular lower body clothing?  --  --  1  -LE    Bathing (including washing, rinsing, and drying)  --  --  1  -LE    Toileting (which includes using toilet bed pan or urinal)  --  --  1  -LE    Putting on and taking off regular upper body clothing  --  --  1  -LE    Taking care of personal grooming (such as brushing teeth)  --  --  1  -LE    Eating meals  --  --  2  -LE    Score  --  --  7  -LE       Functional Assessment    Outcome Measure Options  AM-PAC 6 Clicks Basic Mobility (PT)  -MS  AM-PAC 6 Clicks Basic  Mobility (PT)  -MS  AM-PAC 6 Clicks Daily Activity (OT)  -JORGE ALBERTO    Row Name 11/19/18 1000 11/18/18 1300          How much help from another person do you currently need...    Turning from your back to your side while in flat bed without using bedrails?  2  -CW  2  -EM     Moving from lying on back to sitting on the side of a flat bed without bedrails?  2  -CW  2  -EM     Moving to and from a bed to a chair (including a wheelchair)?  1  -CW  1  -EM     Standing up from a chair using your arms (e.g., wheelchair, bedside chair)?  1  -CW  1  -EM     Climbing 3-5 steps with a railing?  1  -CW  1  -EM     To walk in hospital room?  1  -CW  1  -EM     AM-PAC 6 Clicks Score  8  -CW  8  -EM        Functional Assessment    Outcome Measure Options  AM-PAC 6 Clicks Basic Mobility (PT)  -CW  AM-PAC 6 Clicks Basic Mobility (PT)  -EM       User Key  (r) = Recorded By, (t) = Taken By, (c) = Cosigned By    Initials Name Provider Type    Jessica French, OTR Occupational Therapist    EM Paty Lock, PT Physical Therapist    MS Luis Felipe Walker, PT Physical Therapist    CW Duncan Byrd, PTA Physical Therapy Assistant         Time Calculation:   PT Charges     Row Name 11/21/18 0954             Time Calculation    Start Time  0850  -MS      Stop Time  0905  -MS      Time Calculation (min)  15 min  -MS      PT Received On  11/21/18  -MS      PT - Next Appointment  11/22/18  -MS         Time Calculation- PT    Total Timed Code Minutes- PT  13 minute(s)  -MS        User Key  (r) = Recorded By, (t) = Taken By, (c) = Cosigned By    Initials Name Provider Type    Luis Felipe Sandhu, PT Physical Therapist        Therapy Suggested Charges     Code   Minutes Charges    None           Therapy Charges for Today     Code Description Service Date Service Provider Modifiers Qty    09211792143 HC PT THER PROC EA 15 MIN 11/20/2018 Luis Felipe Walker, PT GP 1    78805977267 HC PT THER SUPP EA 15 MIN 11/20/2018 Luis Felipe Walker, PT GP 1     [id number]  PT THER PROC EA 15 MIN 11/21/2018 Luis Felipe Walker, PT GP 1    62409397496 HC PT THER SUPP EA 15 MIN 11/21/2018 Luis Felipe Walker, PT GP 1          PT G-Codes  Outcome Measure Options: AM-PAC 6 Clicks Basic Mobility (PT)  AM-PAC 6 Clicks Score: 11  Score: 7    Luis Felipe Walker, PT  11/21/2018

## 2018-11-21 NOTE — PROGRESS NOTES
Continued Stay Note  [facility]     Patient Name: Kim Feldman  MRN: [id number]  Today's Date: 11/21/2018    Admit Date: 11/13/2018    Discharge Plan     Row Name 11/21/18 1325       Plan    Plan Comments  Precert approved to d/c to [facility] ([facility]) per Katya Faustin.     Final Discharge Disposition Code  03 - skilled nursing facility (SNF)    Final Note  Pt being d/c'ed to [facility] skilled. Pt will transport to facility via [facility] (cab voucher given).         Discharge Codes    No documentation.       Expected Discharge Date and Time     Expected Discharge Date Expected Discharge Time    Nov 21, 2018             Tran Botello RN

## 2018-11-21 NOTE — PLAN OF CARE
Problem: Patient Care Overview  Goal: Plan of Care Review   11/21/18 0953   Coping/Psychosocial   Plan of Care Reviewed With patient   Plan of Care Review   Progress improving   OTHER   Outcome Summary Improved tolerance to functional activity this day with a decrease in assist required for bed mobility and a progression in ther. ex. program.

## 2019-01-28 ENCOUNTER — HOSPITAL ENCOUNTER (INPATIENT)
Facility: HOSPITAL | Age: 84
LOS: 4 days | Discharge: NURSING, REHAB OR LONG-TERM CARE | End: 2019-02-01
Attending: ORTHOPAEDIC SURGERY | Admitting: HOSPITALIST

## 2019-01-28 ENCOUNTER — APPOINTMENT (OUTPATIENT)
Dept: GENERAL RADIOLOGY | Facility: HOSPITAL | Age: 84
End: 2019-01-28
Attending: ORTHOPAEDIC SURGERY

## 2019-01-28 DIAGNOSIS — R26.2 DIFFICULTY WALKING: Primary | ICD-10-CM

## 2019-01-28 LAB
ANION GAP SERPL CALCULATED.3IONS-SCNC: 12.1 MMOL/L
BASOPHILS # BLD AUTO: 0.05 10*3/MM3 (ref 0–0.2)
BASOPHILS NFR BLD AUTO: 0.8 % (ref 0–1.5)
BUN BLD-MCNC: 27 MG/DL (ref 8–23)
BUN/CREAT SERPL: 25.2 (ref 7–25)
CALCIUM SPEC-SCNC: 9.2 MG/DL (ref 8.6–10.5)
CHLORIDE SERPL-SCNC: 104 MMOL/L (ref 98–107)
CO2 SERPL-SCNC: 22.9 MMOL/L (ref 22–29)
CREAT BLD-MCNC: 1.07 MG/DL (ref 0.57–1)
CRP SERPL-MCNC: 1.95 MG/DL (ref 0–0.5)
DEPRECATED RDW RBC AUTO: 54.5 FL (ref 37–54)
EOSINOPHIL # BLD AUTO: 0.31 10*3/MM3 (ref 0–0.7)
EOSINOPHIL NFR BLD AUTO: 5.1 % (ref 0.3–6.2)
ERYTHROCYTE [DISTWIDTH] IN BLOOD BY AUTOMATED COUNT: 13.5 % (ref 11.7–13)
GFR SERPL CREATININE-BSD FRML MDRD: 48 ML/MIN/1.73
GLUCOSE BLD-MCNC: 107 MG/DL (ref 65–99)
HCT VFR BLD AUTO: 34 % (ref 35.6–45.5)
HGB BLD-MCNC: 10.3 G/DL (ref 11.9–15.5)
IMM GRANULOCYTES # BLD AUTO: 0 10*3/MM3 (ref 0–0.03)
IMM GRANULOCYTES NFR BLD AUTO: 0 % (ref 0–0.5)
LYMPHOCYTES # BLD AUTO: 2.35 10*3/MM3 (ref 0.9–4.8)
LYMPHOCYTES NFR BLD AUTO: 38.5 % (ref 19.6–45.3)
MCH RBC QN AUTO: 33.4 PG (ref 26.9–32)
MCHC RBC AUTO-ENTMCNC: 30.3 G/DL (ref 32.4–36.3)
MCV RBC AUTO: 110.4 FL (ref 80.5–98.2)
MONOCYTES # BLD AUTO: 0.45 10*3/MM3 (ref 0.2–1.2)
MONOCYTES NFR BLD AUTO: 7.4 % (ref 5–12)
NEUTROPHILS # BLD AUTO: 2.95 10*3/MM3 (ref 1.9–8.1)
NEUTROPHILS NFR BLD AUTO: 48.2 % (ref 42.7–76)
PLAT MORPH BLD: NORMAL
PLATELET # BLD AUTO: 212 10*3/MM3 (ref 140–500)
PMV BLD AUTO: 9 FL (ref 6–12)
POTASSIUM BLD-SCNC: 4.9 MMOL/L (ref 3.5–5.2)
RBC # BLD AUTO: 3.08 10*6/MM3 (ref 3.9–5.2)
RBC MORPH BLD: NORMAL
SODIUM BLD-SCNC: 139 MMOL/L (ref 136–145)
WBC MORPH BLD: NORMAL
WBC NRBC COR # BLD: 6.11 10*3/MM3 (ref 4.5–10.7)

## 2019-01-28 PROCEDURE — 87147 CULTURE TYPE IMMUNOLOGIC: CPT | Performed by: ORTHOPAEDIC SURGERY

## 2019-01-28 PROCEDURE — 87040 BLOOD CULTURE FOR BACTERIA: CPT | Performed by: HOSPITALIST

## 2019-01-28 PROCEDURE — 80048 BASIC METABOLIC PNL TOTAL CA: CPT | Performed by: ORTHOPAEDIC SURGERY

## 2019-01-28 PROCEDURE — 87186 SC STD MICRODIL/AGAR DIL: CPT | Performed by: ORTHOPAEDIC SURGERY

## 2019-01-28 PROCEDURE — 25010000002 VANCOMYCIN 10 G RECONSTITUTED SOLUTION: Performed by: ORTHOPAEDIC SURGERY

## 2019-01-28 PROCEDURE — 85025 COMPLETE CBC W/AUTO DIFF WBC: CPT | Performed by: ORTHOPAEDIC SURGERY

## 2019-01-28 PROCEDURE — 85007 BL SMEAR W/DIFF WBC COUNT: CPT | Performed by: ORTHOPAEDIC SURGERY

## 2019-01-28 PROCEDURE — 87205 SMEAR GRAM STAIN: CPT | Performed by: ORTHOPAEDIC SURGERY

## 2019-01-28 PROCEDURE — 87070 CULTURE OTHR SPECIMN AEROBIC: CPT | Performed by: ORTHOPAEDIC SURGERY

## 2019-01-28 PROCEDURE — 73610 X-RAY EXAM OF ANKLE: CPT

## 2019-01-28 PROCEDURE — 86140 C-REACTIVE PROTEIN: CPT | Performed by: ORTHOPAEDIC SURGERY

## 2019-01-28 RX ORDER — MULTIPLE VITAMINS W/ MINERALS TAB 9MG-400MCG
1 TAB ORAL DAILY
Status: DISCONTINUED | OUTPATIENT
Start: 2019-01-28 | End: 2019-02-02 | Stop reason: HOSPADM

## 2019-01-28 RX ORDER — SULFAMETHOXAZOLE AND TRIMETHOPRIM 800; 160 MG/1; MG/1
1 TABLET ORAL 2 TIMES DAILY
COMMUNITY
Start: 2019-01-18 | End: 2019-02-02 | Stop reason: HOSPADM

## 2019-01-28 RX ORDER — CETIRIZINE HYDROCHLORIDE 10 MG/1
10 TABLET ORAL DAILY
COMMUNITY
End: 2019-02-02 | Stop reason: HOSPADM

## 2019-01-28 RX ORDER — GABAPENTIN 100 MG/1
100 CAPSULE ORAL EVERY 8 HOURS SCHEDULED
Status: DISCONTINUED | OUTPATIENT
Start: 2019-01-28 | End: 2019-02-02 | Stop reason: HOSPADM

## 2019-01-28 RX ORDER — POLYETHYLENE GLYCOL 3350 17 G/17G
17 POWDER, FOR SOLUTION ORAL DAILY
Status: DISCONTINUED | OUTPATIENT
Start: 2019-01-29 | End: 2019-02-02 | Stop reason: HOSPADM

## 2019-01-28 RX ORDER — POLYETHYLENE GLYCOL 3350 17 G/17G
17 POWDER, FOR SOLUTION ORAL DAILY PRN
Status: ON HOLD | COMMUNITY
End: 2020-01-01

## 2019-01-28 RX ORDER — MIRTAZAPINE 15 MG/1
15 TABLET, FILM COATED ORAL NIGHTLY
Status: DISCONTINUED | OUTPATIENT
Start: 2019-01-28 | End: 2019-02-02 | Stop reason: HOSPADM

## 2019-01-28 RX ORDER — CLOPIDOGREL BISULFATE 75 MG/1
75 TABLET ORAL DAILY
Status: DISCONTINUED | OUTPATIENT
Start: 2019-01-28 | End: 2019-02-02 | Stop reason: HOSPADM

## 2019-01-28 RX ORDER — PANTOPRAZOLE SODIUM 40 MG/1
40 TABLET, DELAYED RELEASE ORAL DAILY
Status: ON HOLD | COMMUNITY
End: 2020-01-01

## 2019-01-28 RX ORDER — PANTOPRAZOLE SODIUM 40 MG/1
40 TABLET, DELAYED RELEASE ORAL EVERY MORNING
Status: DISCONTINUED | OUTPATIENT
Start: 2019-01-29 | End: 2019-02-02 | Stop reason: HOSPADM

## 2019-01-28 RX ORDER — NYSTATIN 100000 [USP'U]/G
1 POWDER TOPICAL 2 TIMES DAILY
Status: DISCONTINUED | OUTPATIENT
Start: 2019-01-28 | End: 2019-02-02 | Stop reason: HOSPADM

## 2019-01-28 RX ORDER — HYDROCODONE BITARTRATE AND ACETAMINOPHEN 5; 325 MG/1; MG/1
1 TABLET ORAL EVERY 6 HOURS PRN
Status: DISCONTINUED | OUTPATIENT
Start: 2019-01-28 | End: 2019-02-02 | Stop reason: HOSPADM

## 2019-01-28 RX ORDER — ESCITALOPRAM OXALATE 10 MG/1
10 TABLET ORAL NIGHTLY
Status: DISCONTINUED | OUTPATIENT
Start: 2019-01-28 | End: 2019-02-02 | Stop reason: HOSPADM

## 2019-01-28 RX ORDER — VANCOMYCIN HYDROCHLORIDE 1 G/200ML
1000 INJECTION, SOLUTION INTRAVENOUS EVERY 12 HOURS
Status: DISCONTINUED | OUTPATIENT
Start: 2019-01-28 | End: 2019-01-28 | Stop reason: DRUGHIGH

## 2019-01-28 RX ORDER — NYSTATIN 100000 [USP'U]/G
POWDER TOPICAL 2 TIMES DAILY
COMMUNITY
End: 2019-04-03

## 2019-01-28 RX ORDER — CASTOR OIL AND BALSAM, PERU 788; 87 MG/G; MG/G
OINTMENT TOPICAL 2 TIMES DAILY PRN
COMMUNITY
End: 2019-02-02 | Stop reason: HOSPADM

## 2019-01-28 RX ORDER — FOLIC ACID/VIT BCOMP,C/CU/ZINC 5-1.5-25MG
1 TABLET ORAL DAILY
Status: DISCONTINUED | OUTPATIENT
Start: 2019-01-29 | End: 2019-02-02 | Stop reason: HOSPADM

## 2019-01-28 RX ORDER — FERROUS SULFATE 325(65) MG
325 TABLET ORAL
COMMUNITY
End: 2019-04-03

## 2019-01-28 RX ORDER — CALCIUM CARBONATE 200(500)MG
1 TABLET,CHEWABLE ORAL 3 TIMES DAILY PRN
Status: DISCONTINUED | OUTPATIENT
Start: 2019-01-28 | End: 2019-02-01

## 2019-01-28 RX ORDER — FERROUS SULFATE 325(65) MG
325 TABLET ORAL
Status: DISCONTINUED | OUTPATIENT
Start: 2019-01-29 | End: 2019-02-02 | Stop reason: HOSPADM

## 2019-01-28 RX ORDER — ACETAMINOPHEN 500 MG
500 TABLET ORAL EVERY 6 HOURS PRN
COMMUNITY
End: 2019-02-02 | Stop reason: HOSPADM

## 2019-01-28 RX ORDER — GABAPENTIN 600 MG/1
600 TABLET ORAL 3 TIMES DAILY
Status: ON HOLD | COMMUNITY
End: 2019-02-01 | Stop reason: SDUPTHER

## 2019-01-28 RX ORDER — ONDANSETRON 2 MG/ML
4 INJECTION INTRAMUSCULAR; INTRAVENOUS EVERY 6 HOURS PRN
Status: DISCONTINUED | OUTPATIENT
Start: 2019-01-28 | End: 2019-02-01

## 2019-01-28 RX ORDER — SODIUM CHLORIDE 9 MG/ML
50 INJECTION, SOLUTION INTRAVENOUS CONTINUOUS
Status: DISCONTINUED | OUTPATIENT
Start: 2019-01-28 | End: 2019-01-30

## 2019-01-28 RX ADMIN — MIRTAZAPINE 15 MG: 15 TABLET, FILM COATED ORAL at 20:08

## 2019-01-28 RX ADMIN — NYSTATIN 1 APPLICATION: 100000 POWDER TOPICAL at 20:09

## 2019-01-28 RX ADMIN — SODIUM CHLORIDE 100 ML/HR: 9 INJECTION, SOLUTION INTRAVENOUS at 16:05

## 2019-01-28 RX ADMIN — VANCOMYCIN HYDROCHLORIDE 2000 MG: 10 INJECTION, POWDER, LYOPHILIZED, FOR SOLUTION INTRAVENOUS at 19:49

## 2019-01-28 RX ADMIN — CLOPIDOGREL 75 MG: 75 TABLET, FILM COATED ORAL at 20:08

## 2019-01-28 RX ADMIN — ESCITALOPRAM 10 MG: 10 TABLET, FILM COATED ORAL at 20:08

## 2019-01-28 RX ADMIN — GABAPENTIN 100 MG: 100 CAPSULE ORAL at 20:08

## 2019-01-28 RX ADMIN — HYDROCODONE BITARTRATE AND ACETAMINOPHEN 1 TABLET: 5; 325 TABLET ORAL at 22:06

## 2019-01-28 RX ADMIN — LEVETIRACETAM 750 MG: 500 TABLET, FILM COATED ORAL at 20:08

## 2019-01-29 PROBLEM — T81.49XA SURGICAL WOUND INFECTION: Status: ACTIVE | Noted: 2019-01-29

## 2019-01-29 LAB
ALBUMIN SERPL-MCNC: 3.1 G/DL (ref 3.5–5.2)
ALBUMIN/GLOB SERPL: 1 G/DL
ALP SERPL-CCNC: 51 U/L (ref 39–117)
ALT SERPL W P-5'-P-CCNC: 11 U/L (ref 1–33)
ANION GAP SERPL CALCULATED.3IONS-SCNC: 10.8 MMOL/L
AST SERPL-CCNC: 15 U/L (ref 1–32)
BASOPHILS # BLD AUTO: 0.03 10*3/MM3 (ref 0–0.2)
BASOPHILS NFR BLD AUTO: 0.5 % (ref 0–1.5)
BILIRUB SERPL-MCNC: 0.3 MG/DL (ref 0.1–1.2)
BUN BLD-MCNC: 24 MG/DL (ref 8–23)
BUN/CREAT SERPL: 22.2 (ref 7–25)
CALCIUM SPEC-SCNC: 8.7 MG/DL (ref 8.6–10.5)
CHLORIDE SERPL-SCNC: 107 MMOL/L (ref 98–107)
CHOLEST SERPL-MCNC: 170 MG/DL (ref 0–200)
CO2 SERPL-SCNC: 22.2 MMOL/L (ref 22–29)
CREAT BLD-MCNC: 1.08 MG/DL (ref 0.57–1)
CRP SERPL-MCNC: 1.36 MG/DL (ref 0–0.5)
DEPRECATED RDW RBC AUTO: 52.4 FL (ref 37–54)
EOSINOPHIL # BLD AUTO: 0.23 10*3/MM3 (ref 0–0.7)
EOSINOPHIL NFR BLD AUTO: 3.8 % (ref 0.3–6.2)
ERYTHROCYTE [DISTWIDTH] IN BLOOD BY AUTOMATED COUNT: 13.4 % (ref 11.7–13)
ERYTHROCYTE [SEDIMENTATION RATE] IN BLOOD: 49 MM/HR (ref 0–30)
GFR SERPL CREATININE-BSD FRML MDRD: 48 ML/MIN/1.73
GLOBULIN UR ELPH-MCNC: 3.2 GM/DL
GLUCOSE BLD-MCNC: 97 MG/DL (ref 65–99)
HBA1C MFR BLD: 5.13 % (ref 4.8–5.6)
HCT VFR BLD AUTO: 30.5 % (ref 35.6–45.5)
HDLC SERPL-MCNC: 47 MG/DL (ref 40–60)
HGB BLD-MCNC: 9.6 G/DL (ref 11.9–15.5)
IMM GRANULOCYTES # BLD AUTO: 0.02 10*3/MM3 (ref 0–0.03)
IMM GRANULOCYTES NFR BLD AUTO: 0.3 % (ref 0–0.5)
LDLC SERPL CALC-MCNC: 104 MG/DL (ref 0–100)
LDLC/HDLC SERPL: 2.22 {RATIO}
LYMPHOCYTES # BLD AUTO: 1.26 10*3/MM3 (ref 0.9–4.8)
LYMPHOCYTES NFR BLD AUTO: 20.9 % (ref 19.6–45.3)
MCH RBC QN AUTO: 33.6 PG (ref 26.9–32)
MCHC RBC AUTO-ENTMCNC: 31.5 G/DL (ref 32.4–36.3)
MCV RBC AUTO: 106.6 FL (ref 80.5–98.2)
MONOCYTES # BLD AUTO: 0.81 10*3/MM3 (ref 0.2–1.2)
MONOCYTES NFR BLD AUTO: 13.4 % (ref 5–12)
NEUTROPHILS # BLD AUTO: 3.7 10*3/MM3 (ref 1.9–8.1)
NEUTROPHILS NFR BLD AUTO: 61.4 % (ref 42.7–76)
NT-PROBNP SERPL-MCNC: 1330 PG/ML (ref 0–1800)
PLATELET # BLD AUTO: 197 10*3/MM3 (ref 140–500)
PMV BLD AUTO: 9.1 FL (ref 6–12)
POTASSIUM BLD-SCNC: 4.8 MMOL/L (ref 3.5–5.2)
PROT SERPL-MCNC: 6.3 G/DL (ref 6–8.5)
RBC # BLD AUTO: 2.86 10*6/MM3 (ref 3.9–5.2)
SODIUM BLD-SCNC: 140 MMOL/L (ref 136–145)
TRIGL SERPL-MCNC: 94 MG/DL (ref 0–150)
TSH SERPL DL<=0.05 MIU/L-ACNC: 4.69 MIU/ML (ref 0.27–4.2)
VLDLC SERPL-MCNC: 18.8 MG/DL (ref 5–40)
WBC NRBC COR # BLD: 6.03 10*3/MM3 (ref 4.5–10.7)

## 2019-01-29 PROCEDURE — 86140 C-REACTIVE PROTEIN: CPT | Performed by: INTERNAL MEDICINE

## 2019-01-29 PROCEDURE — 85025 COMPLETE CBC W/AUTO DIFF WBC: CPT | Performed by: HOSPITALIST

## 2019-01-29 PROCEDURE — 83880 ASSAY OF NATRIURETIC PEPTIDE: CPT | Performed by: HOSPITALIST

## 2019-01-29 PROCEDURE — 80061 LIPID PANEL: CPT | Performed by: HOSPITALIST

## 2019-01-29 PROCEDURE — 25010000002 VANCOMYCIN: Performed by: ORTHOPAEDIC SURGERY

## 2019-01-29 PROCEDURE — 84443 ASSAY THYROID STIM HORMONE: CPT | Performed by: HOSPITALIST

## 2019-01-29 PROCEDURE — 83036 HEMOGLOBIN GLYCOSYLATED A1C: CPT | Performed by: HOSPITALIST

## 2019-01-29 PROCEDURE — 85652 RBC SED RATE AUTOMATED: CPT | Performed by: ORTHOPAEDIC SURGERY

## 2019-01-29 PROCEDURE — 80053 COMPREHEN METABOLIC PANEL: CPT | Performed by: HOSPITALIST

## 2019-01-29 PROCEDURE — 25010000002 CEFEPIME PER 500 MG: Performed by: INTERNAL MEDICINE

## 2019-01-29 RX ADMIN — FERROUS SULFATE TAB 325 MG (65 MG ELEMENTAL FE) 325 MG: 325 (65 FE) TAB at 08:52

## 2019-01-29 RX ADMIN — VANCOMYCIN HYDROCHLORIDE 1250 MG: 10 INJECTION, POWDER, LYOPHILIZED, FOR SOLUTION INTRAVENOUS at 10:18

## 2019-01-29 RX ADMIN — GABAPENTIN 100 MG: 100 CAPSULE ORAL at 16:31

## 2019-01-29 RX ADMIN — PANTOPRAZOLE SODIUM 40 MG: 40 TABLET, DELAYED RELEASE ORAL at 06:11

## 2019-01-29 RX ADMIN — LEVETIRACETAM 750 MG: 500 TABLET, FILM COATED ORAL at 08:53

## 2019-01-29 RX ADMIN — Medication 1 TABLET: at 08:52

## 2019-01-29 RX ADMIN — MULTIPLE VITAMINS W/ MINERALS TAB 1 TABLET: TAB at 08:52

## 2019-01-29 RX ADMIN — GABAPENTIN 100 MG: 100 CAPSULE ORAL at 23:49

## 2019-01-29 RX ADMIN — MIRTAZAPINE 15 MG: 15 TABLET, FILM COATED ORAL at 20:03

## 2019-01-29 RX ADMIN — ESCITALOPRAM 10 MG: 10 TABLET, FILM COATED ORAL at 20:03

## 2019-01-29 RX ADMIN — NYSTATIN 1 APPLICATION: 100000 POWDER TOPICAL at 20:14

## 2019-01-29 RX ADMIN — LEVETIRACETAM 750 MG: 500 TABLET, FILM COATED ORAL at 20:04

## 2019-01-29 RX ADMIN — CEFEPIME 2 G: 2 INJECTION, POWDER, FOR SOLUTION INTRAVENOUS at 20:04

## 2019-01-29 RX ADMIN — POLYETHYLENE GLYCOL 3350 17 G: 17 POWDER, FOR SOLUTION ORAL at 08:53

## 2019-01-29 RX ADMIN — CLOPIDOGREL 75 MG: 75 TABLET, FILM COATED ORAL at 08:52

## 2019-01-29 RX ADMIN — GABAPENTIN 100 MG: 100 CAPSULE ORAL at 06:11

## 2019-01-29 RX ADMIN — CALCIUM CARBONATE-VITAMIN D TAB 500 MG-200 UNIT 1000 MG: 500-200 TAB at 08:52

## 2019-01-29 RX ADMIN — CEFEPIME 2 G: 2 INJECTION, POWDER, FOR SOLUTION INTRAVENOUS at 01:47

## 2019-01-29 RX ADMIN — CEFEPIME 2 G: 2 INJECTION, POWDER, FOR SOLUTION INTRAVENOUS at 08:53

## 2019-01-29 RX ADMIN — NYSTATIN 1 APPLICATION: 100000 POWDER TOPICAL at 08:53

## 2019-01-30 ENCOUNTER — APPOINTMENT (OUTPATIENT)
Dept: GENERAL RADIOLOGY | Facility: HOSPITAL | Age: 84
End: 2019-01-30
Attending: INTERNAL MEDICINE

## 2019-01-30 LAB
ANION GAP SERPL CALCULATED.3IONS-SCNC: 10.6 MMOL/L
BASOPHILS # BLD AUTO: 0.04 10*3/MM3 (ref 0–0.2)
BASOPHILS NFR BLD AUTO: 0.8 % (ref 0–1.5)
BUN BLD-MCNC: 21 MG/DL (ref 8–23)
BUN/CREAT SERPL: 23.3 (ref 7–25)
CALCIUM SPEC-SCNC: 9.3 MG/DL (ref 8.6–10.5)
CHLORIDE SERPL-SCNC: 105 MMOL/L (ref 98–107)
CO2 SERPL-SCNC: 23.4 MMOL/L (ref 22–29)
CREAT BLD-MCNC: 0.9 MG/DL (ref 0.57–1)
DEPRECATED RDW RBC AUTO: 52.4 FL (ref 37–54)
EOSINOPHIL # BLD AUTO: 0.33 10*3/MM3 (ref 0–0.7)
EOSINOPHIL NFR BLD AUTO: 6.3 % (ref 0.3–6.2)
ERYTHROCYTE [DISTWIDTH] IN BLOOD BY AUTOMATED COUNT: 13.2 % (ref 11.7–13)
GFR SERPL CREATININE-BSD FRML MDRD: 59 ML/MIN/1.73
GLUCOSE BLD-MCNC: 92 MG/DL (ref 65–99)
HCT VFR BLD AUTO: 32.9 % (ref 35.6–45.5)
HGB BLD-MCNC: 10.1 G/DL (ref 11.9–15.5)
IMM GRANULOCYTES # BLD AUTO: 0 10*3/MM3 (ref 0–0.03)
IMM GRANULOCYTES NFR BLD AUTO: 0 % (ref 0–0.5)
LYMPHOCYTES # BLD AUTO: 1.7 10*3/MM3 (ref 0.9–4.8)
LYMPHOCYTES NFR BLD AUTO: 32.3 % (ref 19.6–45.3)
MCH RBC QN AUTO: 33.7 PG (ref 26.9–32)
MCHC RBC AUTO-ENTMCNC: 30.7 G/DL (ref 32.4–36.3)
MCV RBC AUTO: 109.7 FL (ref 80.5–98.2)
MONOCYTES # BLD AUTO: 0.45 10*3/MM3 (ref 0.2–1.2)
MONOCYTES NFR BLD AUTO: 8.5 % (ref 5–12)
NEUTROPHILS # BLD AUTO: 2.75 10*3/MM3 (ref 1.9–8.1)
NEUTROPHILS NFR BLD AUTO: 52.1 % (ref 42.7–76)
PLATELET # BLD AUTO: 196 10*3/MM3 (ref 140–500)
PMV BLD AUTO: 9 FL (ref 6–12)
POTASSIUM BLD-SCNC: 4.8 MMOL/L (ref 3.5–5.2)
RBC # BLD AUTO: 3 10*6/MM3 (ref 3.9–5.2)
SODIUM BLD-SCNC: 139 MMOL/L (ref 136–145)
WBC NRBC COR # BLD: 5.27 10*3/MM3 (ref 4.5–10.7)

## 2019-01-30 PROCEDURE — 25010000002 CEFEPIME 2 G/NS 100 ML SOLUTION: Performed by: INTERNAL MEDICINE

## 2019-01-30 PROCEDURE — 02HV33Z INSERTION OF INFUSION DEVICE INTO SUPERIOR VENA CAVA, PERCUTANEOUS APPROACH: ICD-10-PCS | Performed by: HOSPITALIST

## 2019-01-30 PROCEDURE — C1751 CATH, INF, PER/CENT/MIDLINE: HCPCS

## 2019-01-30 PROCEDURE — 80048 BASIC METABOLIC PNL TOTAL CA: CPT | Performed by: HOSPITALIST

## 2019-01-30 PROCEDURE — 97110 THERAPEUTIC EXERCISES: CPT

## 2019-01-30 PROCEDURE — 97162 PT EVAL MOD COMPLEX 30 MIN: CPT

## 2019-01-30 PROCEDURE — 25010000002 VANCOMYCIN 10 G RECONSTITUTED SOLUTION: Performed by: ORTHOPAEDIC SURGERY

## 2019-01-30 PROCEDURE — 25010000002 CEFEPIME PER 500 MG: Performed by: INTERNAL MEDICINE

## 2019-01-30 PROCEDURE — B548ZZA ULTRASONOGRAPHY OF SUPERIOR VENA CAVA, GUIDANCE: ICD-10-PCS | Performed by: HOSPITALIST

## 2019-01-30 PROCEDURE — 85025 COMPLETE CBC W/AUTO DIFF WBC: CPT | Performed by: HOSPITALIST

## 2019-01-30 RX ORDER — AMLODIPINE BESYLATE 5 MG/1
5 TABLET ORAL
Status: DISCONTINUED | OUTPATIENT
Start: 2019-01-30 | End: 2019-02-02 | Stop reason: HOSPADM

## 2019-01-30 RX ORDER — SODIUM CHLORIDE 0.9 % (FLUSH) 0.9 %
20 SYRINGE (ML) INJECTION AS NEEDED
Status: DISCONTINUED | OUTPATIENT
Start: 2019-01-30 | End: 2019-02-01

## 2019-01-30 RX ORDER — SODIUM CHLORIDE 0.9 % (FLUSH) 0.9 %
10 SYRINGE (ML) INJECTION AS NEEDED
Status: DISCONTINUED | OUTPATIENT
Start: 2019-01-30 | End: 2019-02-01

## 2019-01-30 RX ORDER — SODIUM CHLORIDE 0.9 % (FLUSH) 0.9 %
10 SYRINGE (ML) INJECTION EVERY 12 HOURS SCHEDULED
Status: DISCONTINUED | OUTPATIENT
Start: 2019-01-30 | End: 2019-02-02 | Stop reason: HOSPADM

## 2019-01-30 RX ADMIN — FERROUS SULFATE TAB 325 MG (65 MG ELEMENTAL FE) 325 MG: 325 (65 FE) TAB at 09:14

## 2019-01-30 RX ADMIN — CEFEPIME 2 G: 2 INJECTION, POWDER, FOR SOLUTION INTRAVENOUS at 21:27

## 2019-01-30 RX ADMIN — VANCOMYCIN HYDROCHLORIDE 1250 MG: 10 INJECTION, POWDER, LYOPHILIZED, FOR SOLUTION INTRAVENOUS at 09:13

## 2019-01-30 RX ADMIN — MIRTAZAPINE 15 MG: 15 TABLET, FILM COATED ORAL at 21:28

## 2019-01-30 RX ADMIN — NYSTATIN 1 APPLICATION: 100000 POWDER TOPICAL at 21:28

## 2019-01-30 RX ADMIN — LEVETIRACETAM 750 MG: 500 TABLET, FILM COATED ORAL at 21:28

## 2019-01-30 RX ADMIN — MULTIPLE VITAMINS W/ MINERALS TAB 1 TABLET: TAB at 09:13

## 2019-01-30 RX ADMIN — GABAPENTIN 100 MG: 100 CAPSULE ORAL at 16:48

## 2019-01-30 RX ADMIN — ESCITALOPRAM 10 MG: 10 TABLET, FILM COATED ORAL at 21:28

## 2019-01-30 RX ADMIN — NYSTATIN 1 APPLICATION: 100000 POWDER TOPICAL at 09:14

## 2019-01-30 RX ADMIN — GABAPENTIN 100 MG: 100 CAPSULE ORAL at 21:28

## 2019-01-30 RX ADMIN — GABAPENTIN 100 MG: 100 CAPSULE ORAL at 09:13

## 2019-01-30 RX ADMIN — AMLODIPINE BESYLATE 5 MG: 5 TABLET ORAL at 21:27

## 2019-01-30 RX ADMIN — CLOPIDOGREL 75 MG: 75 TABLET, FILM COATED ORAL at 09:13

## 2019-01-30 RX ADMIN — CEFEPIME 2 G: 2 INJECTION, POWDER, FOR SOLUTION INTRAVENOUS at 12:12

## 2019-01-30 RX ADMIN — Medication 1 TABLET: at 09:14

## 2019-01-30 RX ADMIN — PANTOPRAZOLE SODIUM 40 MG: 40 TABLET, DELAYED RELEASE ORAL at 06:04

## 2019-01-30 RX ADMIN — CALCIUM CARBONATE-VITAMIN D TAB 500 MG-200 UNIT 1000 MG: 500-200 TAB at 09:14

## 2019-01-30 RX ADMIN — POLYETHYLENE GLYCOL 3350 17 G: 17 POWDER, FOR SOLUTION ORAL at 09:13

## 2019-01-30 RX ADMIN — LEVETIRACETAM 750 MG: 500 TABLET, FILM COATED ORAL at 09:13

## 2019-01-30 RX ADMIN — SODIUM CHLORIDE, PRESERVATIVE FREE 10 ML: 5 INJECTION INTRAVENOUS at 21:27

## 2019-01-31 LAB
ANION GAP SERPL CALCULATED.3IONS-SCNC: 10 MMOL/L
BASOPHILS # BLD AUTO: 0.04 10*3/MM3 (ref 0–0.2)
BASOPHILS NFR BLD AUTO: 0.7 % (ref 0–1.5)
BUN BLD-MCNC: 22 MG/DL (ref 8–23)
BUN/CREAT SERPL: 28.9 (ref 7–25)
CALCIUM SPEC-SCNC: 9.5 MG/DL (ref 8.6–10.5)
CHLORIDE SERPL-SCNC: 103 MMOL/L (ref 98–107)
CO2 SERPL-SCNC: 25 MMOL/L (ref 22–29)
CREAT BLD-MCNC: 0.76 MG/DL (ref 0.57–1)
DEPRECATED RDW RBC AUTO: 50 FL (ref 37–54)
EOSINOPHIL # BLD AUTO: 0.39 10*3/MM3 (ref 0–0.7)
EOSINOPHIL NFR BLD AUTO: 6.3 % (ref 0.3–6.2)
ERYTHROCYTE [DISTWIDTH] IN BLOOD BY AUTOMATED COUNT: 12.9 % (ref 11.7–13)
GFR SERPL CREATININE-BSD FRML MDRD: 72 ML/MIN/1.73
GLUCOSE BLD-MCNC: 93 MG/DL (ref 65–99)
HCT VFR BLD AUTO: 31.7 % (ref 35.6–45.5)
HGB BLD-MCNC: 10 G/DL (ref 11.9–15.5)
IMM GRANULOCYTES # BLD AUTO: 0 10*3/MM3 (ref 0–0.03)
IMM GRANULOCYTES NFR BLD AUTO: 0 % (ref 0–0.5)
LYMPHOCYTES # BLD AUTO: 1.75 10*3/MM3 (ref 0.9–4.8)
LYMPHOCYTES NFR BLD AUTO: 28.5 % (ref 19.6–45.3)
MCH RBC QN AUTO: 33.3 PG (ref 26.9–32)
MCHC RBC AUTO-ENTMCNC: 31.5 G/DL (ref 32.4–36.3)
MCV RBC AUTO: 105.7 FL (ref 80.5–98.2)
MONOCYTES # BLD AUTO: 0.49 10*3/MM3 (ref 0.2–1.2)
MONOCYTES NFR BLD AUTO: 8 % (ref 5–12)
NEUTROPHILS # BLD AUTO: 3.48 10*3/MM3 (ref 1.9–8.1)
NEUTROPHILS NFR BLD AUTO: 56.5 % (ref 42.7–76)
PLATELET # BLD AUTO: 203 10*3/MM3 (ref 140–500)
PMV BLD AUTO: 8.8 FL (ref 6–12)
POTASSIUM BLD-SCNC: 4.1 MMOL/L (ref 3.5–5.2)
RBC # BLD AUTO: 3 10*6/MM3 (ref 3.9–5.2)
SODIUM BLD-SCNC: 138 MMOL/L (ref 136–145)
VANCOMYCIN TROUGH SERPL-MCNC: 15.8 MCG/ML (ref 5–20)
WBC NRBC COR # BLD: 6.15 10*3/MM3 (ref 4.5–10.7)

## 2019-01-31 PROCEDURE — 25010000002 VANCOMYCIN 10 G RECONSTITUTED SOLUTION: Performed by: ORTHOPAEDIC SURGERY

## 2019-01-31 PROCEDURE — 80202 ASSAY OF VANCOMYCIN: CPT | Performed by: ORTHOPAEDIC SURGERY

## 2019-01-31 PROCEDURE — 97165 OT EVAL LOW COMPLEX 30 MIN: CPT

## 2019-01-31 PROCEDURE — 97110 THERAPEUTIC EXERCISES: CPT

## 2019-01-31 PROCEDURE — 97530 THERAPEUTIC ACTIVITIES: CPT

## 2019-01-31 PROCEDURE — 85025 COMPLETE CBC W/AUTO DIFF WBC: CPT | Performed by: HOSPITALIST

## 2019-01-31 PROCEDURE — 80048 BASIC METABOLIC PNL TOTAL CA: CPT | Performed by: HOSPITALIST

## 2019-01-31 PROCEDURE — 25010000002 CEFEPIME PER 500 MG: Performed by: INTERNAL MEDICINE

## 2019-01-31 RX ADMIN — FERROUS SULFATE TAB 325 MG (65 MG ELEMENTAL FE) 325 MG: 325 (65 FE) TAB at 08:24

## 2019-01-31 RX ADMIN — CALCIUM CARBONATE-VITAMIN D TAB 500 MG-200 UNIT 1000 MG: 500-200 TAB at 08:24

## 2019-01-31 RX ADMIN — ESCITALOPRAM 10 MG: 10 TABLET, FILM COATED ORAL at 20:30

## 2019-01-31 RX ADMIN — NYSTATIN 1 APPLICATION: 100000 POWDER TOPICAL at 20:34

## 2019-01-31 RX ADMIN — POLYETHYLENE GLYCOL 3350 17 G: 17 POWDER, FOR SOLUTION ORAL at 08:25

## 2019-01-31 RX ADMIN — PANTOPRAZOLE SODIUM 40 MG: 40 TABLET, DELAYED RELEASE ORAL at 08:24

## 2019-01-31 RX ADMIN — LEVETIRACETAM 750 MG: 500 TABLET, FILM COATED ORAL at 20:30

## 2019-01-31 RX ADMIN — SODIUM CHLORIDE, PRESERVATIVE FREE 10 ML: 5 INJECTION INTRAVENOUS at 20:30

## 2019-01-31 RX ADMIN — AMLODIPINE BESYLATE 5 MG: 5 TABLET ORAL at 08:24

## 2019-01-31 RX ADMIN — CLOPIDOGREL 75 MG: 75 TABLET, FILM COATED ORAL at 08:24

## 2019-01-31 RX ADMIN — MIRTAZAPINE 15 MG: 15 TABLET, FILM COATED ORAL at 20:30

## 2019-01-31 RX ADMIN — CEFEPIME 2 G: 2 INJECTION, POWDER, FOR SOLUTION INTRAVENOUS at 20:30

## 2019-01-31 RX ADMIN — Medication 1 TABLET: at 08:26

## 2019-01-31 RX ADMIN — GABAPENTIN 100 MG: 100 CAPSULE ORAL at 16:32

## 2019-01-31 RX ADMIN — CEFEPIME 2 G: 2 INJECTION, POWDER, FOR SOLUTION INTRAVENOUS at 10:22

## 2019-01-31 RX ADMIN — VANCOMYCIN HYDROCHLORIDE 1250 MG: 10 INJECTION, POWDER, LYOPHILIZED, FOR SOLUTION INTRAVENOUS at 08:25

## 2019-01-31 RX ADMIN — NYSTATIN 1 APPLICATION: 100000 POWDER TOPICAL at 08:25

## 2019-01-31 RX ADMIN — LEVETIRACETAM 750 MG: 500 TABLET, FILM COATED ORAL at 08:24

## 2019-01-31 RX ADMIN — GABAPENTIN 100 MG: 100 CAPSULE ORAL at 08:24

## 2019-01-31 RX ADMIN — MULTIPLE VITAMINS W/ MINERALS TAB 1 TABLET: TAB at 08:24

## 2019-01-31 RX ADMIN — SODIUM CHLORIDE, PRESERVATIVE FREE 10 ML: 5 INJECTION INTRAVENOUS at 08:25

## 2019-02-01 VITALS
HEART RATE: 74 BPM | WEIGHT: 219.9 LBS | DIASTOLIC BLOOD PRESSURE: 55 MMHG | BODY MASS INDEX: 40.46 KG/M2 | OXYGEN SATURATION: 98 % | HEIGHT: 62 IN | RESPIRATION RATE: 18 BRPM | TEMPERATURE: 98 F | SYSTOLIC BLOOD PRESSURE: 111 MMHG

## 2019-02-01 LAB
ANION GAP SERPL CALCULATED.3IONS-SCNC: 10.8 MMOL/L
BACTERIA SPEC AEROBE CULT: ABNORMAL
BUN BLD-MCNC: 24 MG/DL (ref 8–23)
BUN/CREAT SERPL: 32.9 (ref 7–25)
CALCIUM SPEC-SCNC: 9.6 MG/DL (ref 8.6–10.5)
CHLORIDE SERPL-SCNC: 102 MMOL/L (ref 98–107)
CO2 SERPL-SCNC: 25.2 MMOL/L (ref 22–29)
CREAT BLD-MCNC: 0.73 MG/DL (ref 0.57–1)
CRP SERPL-MCNC: 0.64 MG/DL (ref 0–0.5)
GFR SERPL CREATININE-BSD FRML MDRD: 75 ML/MIN/1.73
GLUCOSE BLD-MCNC: 95 MG/DL (ref 65–99)
GRAM STN SPEC: ABNORMAL
GRAM STN SPEC: ABNORMAL
POTASSIUM BLD-SCNC: 4.1 MMOL/L (ref 3.5–5.2)
SODIUM BLD-SCNC: 138 MMOL/L (ref 136–145)

## 2019-02-01 PROCEDURE — 25010000002 VANCOMYCIN 10 G RECONSTITUTED SOLUTION: Performed by: HOSPITALIST

## 2019-02-01 PROCEDURE — 25010000002 CEFEPIME PER 500 MG: Performed by: INTERNAL MEDICINE

## 2019-02-01 PROCEDURE — 80048 BASIC METABOLIC PNL TOTAL CA: CPT | Performed by: HOSPITALIST

## 2019-02-01 PROCEDURE — 97110 THERAPEUTIC EXERCISES: CPT

## 2019-02-01 PROCEDURE — 86140 C-REACTIVE PROTEIN: CPT | Performed by: ORTHOPAEDIC SURGERY

## 2019-02-01 RX ORDER — HYDROCODONE BITARTRATE AND ACETAMINOPHEN 5; 325 MG/1; MG/1
1 TABLET ORAL EVERY 6 HOURS PRN
Qty: 10 TABLET | Refills: 0 | Status: SHIPPED | OUTPATIENT
Start: 2019-02-01 | End: 2019-02-04

## 2019-02-01 RX ORDER — AMLODIPINE BESYLATE 5 MG/1
5 TABLET ORAL
Qty: 30 TABLET | Refills: 0 | Status: SHIPPED | OUTPATIENT
Start: 2019-02-02 | End: 2019-03-04

## 2019-02-01 RX ORDER — GABAPENTIN 600 MG/1
600 TABLET ORAL 3 TIMES DAILY
Qty: 9 TABLET | Refills: 0 | Status: SHIPPED | OUTPATIENT
Start: 2019-02-01 | End: 2019-02-04

## 2019-02-01 RX ORDER — ACETAMINOPHEN 325 MG/1
650 TABLET ORAL EVERY 6 HOURS PRN
Status: DISCONTINUED | OUTPATIENT
Start: 2019-02-01 | End: 2019-02-01

## 2019-02-01 RX ADMIN — NYSTATIN 1 APPLICATION: 100000 POWDER TOPICAL at 09:22

## 2019-02-01 RX ADMIN — LEVETIRACETAM 750 MG: 500 TABLET, FILM COATED ORAL at 09:22

## 2019-02-01 RX ADMIN — GABAPENTIN 100 MG: 100 CAPSULE ORAL at 09:22

## 2019-02-01 RX ADMIN — AMLODIPINE BESYLATE 5 MG: 5 TABLET ORAL at 09:22

## 2019-02-01 RX ADMIN — FERROUS SULFATE TAB 325 MG (65 MG ELEMENTAL FE) 325 MG: 325 (65 FE) TAB at 09:22

## 2019-02-01 RX ADMIN — CALCIUM CARBONATE-VITAMIN D TAB 500 MG-200 UNIT 1000 MG: 500-200 TAB at 09:22

## 2019-02-01 RX ADMIN — POLYETHYLENE GLYCOL 3350 17 G: 17 POWDER, FOR SOLUTION ORAL at 09:22

## 2019-02-01 RX ADMIN — MULTIPLE VITAMINS W/ MINERALS TAB 1 TABLET: TAB at 09:22

## 2019-02-01 RX ADMIN — ACETAMINOPHEN 650 MG: 325 TABLET, FILM COATED ORAL at 12:45

## 2019-02-01 RX ADMIN — CEFEPIME 2 G: 2 INJECTION, POWDER, FOR SOLUTION INTRAVENOUS at 09:47

## 2019-02-01 RX ADMIN — Medication 1 TABLET: at 09:22

## 2019-02-01 RX ADMIN — CLOPIDOGREL 75 MG: 75 TABLET, FILM COATED ORAL at 09:22

## 2019-02-01 RX ADMIN — VANCOMYCIN HYDROCHLORIDE 1250 MG: 10 INJECTION, POWDER, LYOPHILIZED, FOR SOLUTION INTRAVENOUS at 07:32

## 2019-02-01 RX ADMIN — PANTOPRAZOLE SODIUM 40 MG: 40 TABLET, DELAYED RELEASE ORAL at 05:54

## 2019-02-01 RX ADMIN — SODIUM CHLORIDE, PRESERVATIVE FREE 10 ML: 5 INJECTION INTRAVENOUS at 09:22

## 2019-02-01 NOTE — PLAN OF CARE
Problem: Skin Injury Risk (Adult)  Goal: Skin Health and Integrity  Outcome: Ongoing (interventions implemented as appropriate)      Problem: Fall Risk (Adult)  Goal: Absence of Fall  Outcome: Ongoing (interventions implemented as appropriate)      Problem: Patient Care Overview  Goal: Plan of Care Review  Outcome: Ongoing (interventions implemented as appropriate)   02/01/19 0358   Coping/Psychosocial   Plan of Care Reviewed With patient   Plan of Care Review   Progress improving   OTHER   Outcome Summary Possible DC soon, awaiting precert. PICC line intact.

## 2019-02-01 NOTE — DISCHARGE SUMMARY
Discharge summary    Date of admission 1/28/2019  Date of discharge 2/1/2019    Final diagnosis  Right ankle wound with MRSA infection with recent fracture and surgery and surrounding cellulitis  Hypertension  Seizure disorder  Depression  History of CVA  Status post permanent pacemaker  Osteoarthritis  Degenerative disease  Gastroesophageal reflux disease    Discharge medications    Current Facility-Administered Medications:   •  amLODIPine (NORVASC) tablet 5 mg, 5 mg, Oral, Q24H, Juan Carlos Harper MD, 5 mg at 02/01/19 0922  •  calcium-vitamin D 500-200 MG-UNIT tablet 1,000 mg, 1,000 mg, Oral, Daily, Juan Carlos Harper MD, 1,000 mg at 02/01/19 0922  •  cefepime (MAXIPIME) 2 g/100 mL 0.9% NS (mbp), 2 g, Intravenous, Q12H, SilverMyra MD, 2 g at 02/01/19 0947  •  clopidogrel (PLAVIX) tablet 75 mg, 75 mg, Oral, Daily, Juan Carlos Harper MD, 75 mg at 02/01/19 0922  •  escitalopram (LEXAPRO) tablet 10 mg, 10 mg, Oral, Nightly, Juan Carlos Harper MD, 10 mg at 01/31/19 2030  •  ferrous sulfate tablet 325 mg, 325 mg, Oral, Daily With Breakfast, Juan Carlos Harper MD, 325 mg at 02/01/19 0922  •  FOLBEE PLUS CZ tablet 1 tablet, 1 tablet, Oral, Daily, Juan Carlos Harper MD, 1 tablet at 02/01/19 0922  •  gabapentin (NEURONTIN) capsule 100 mg, 100 mg, Oral, Q8H, Juan Carlos Harper MD, 100 mg at 02/01/19 0922  •  HYDROcodone-acetaminophen (NORCO) 5-325 MG per tablet 1 tablet, 1 tablet, Oral, Q6H PRN, Juan Carlos Harper MD, 1 tablet at 01/28/19 2206  •  levETIRAcetam (KEPPRA) tablet 750 mg, 750 mg, Oral, BID, Juan Carlos Harper MD, 750 mg at 02/01/19 0922  •  mirtazapine (REMERON) tablet 15 mg, 15 mg, Oral, Nightly, Juan Carlos Harper MD, 15 mg at 01/31/19 2030  •  multivitamin with minerals 1 tablet, 1 tablet, Oral, Daily, Juan Carlos Harper MD, 1 tablet at 02/01/19 0922  •  nystatin (MYCOSTATIN) powder 1 application, 1 application, Topical, BID, Juan Carlos Harper MD, 1 application at 02/01/19 0922  •  pantoprazole (PROTONIX) EC tablet 40 mg, 40 mg, Oral, QAM, Juan Carlos Harper MD, 40 mg  at 02/01/19 0554  •  polyethylene glycol (MIRALAX) powder 17 g, 17 g, Oral, Daily, Gaetano Harper MD, 17 g at 02/01/19 0922  •  sodium chloride 0.9 % flush 10 mL, 10 mL, Intravenous, Q12H, Myra Sheppard MD, 10 mL at 02/01/19 0922  •  vancomycin 1250 mg/250 mL 0.9% NS IVPB ([facility]), 1,250 mg, Intravenous, Q24H, Myra Sheppard MD, 1,250 mg at 02/01/19 0732     Consult obtained  Orthopedic surgery  Infectious disease    Procedures  None    Hospital course  88-year-old white female who is well-known to our service with very complex past medical history who was recently discharged from the hospital after taking care of right ankle fracture with open reduction internal fixation admitted from orthopedic service with right ankle pain swelling and redness and drainage.  Patient workup revealed infected wound with surrounding cellulitis started on IV antibiotics after pending the cultures.  Patient wound cultures came back positive for MRSA and patient remained on vancomycin and cefepime.  Patient followed by infectious disease and recommended 2 weeks of IV antibiotics as she failed outpatient treatment with oral antibiotics.  Patient has a PICC line placed and can finish IV antibiotics at nursing home.  Patient is feeling 100% after and her lab workup is essentially at within normal limit.  Patient is nonambulatory but still work with PT OT at bedside.  Patient remained DNR throughout hospital course    Discharge diet regular    Activity per PT OT    Medication as above    Further care per accepting physician at nursing home    GAETANO HARPER MD

## 2019-02-01 NOTE — PROGRESS NOTES
Daily progress note    Chief complaint  Doing better  No new complaints    History of present illness  88-year-old white female who is well-known to our service with history of seizure disorder hypertension depression stroke gastroesophageal reflux disease osteoarthritis degenerative disease who was recently discharged from the hospital after taking care of right ankle fracture and underwent open reduction internal fixation and then transferred to subacute rehabilitation for PT OT nursing care directly admitted to our service from orthopedic surgery clinic where she presented with swelling and redness pain and drainage from the right ankle and found to have infection.  Patient denies any fever chills but hurting at the fracture site with worsening redness and drainage.  Patient denies any chest pain shortness of breath abdominal pain nausea vomiting diarrhea.    REVIEW OF SYSTEMS  Review of Systems   Constitutional: Negative for fever.   HENT: Negative for sore throat.    Eyes: Negative.    Respiratory: Negative for cough and shortness of breath.    Cardiovascular: Negative for chest pain.   Gastrointestinal: Negative for abdominal pain, diarrhea and vomiting.   Genitourinary: Negative for dysuria and enuresis.   Musculoskeletal: Negative for neck pain.   Skin: Negative for rash.   Allergic/Immunologic: Negative.    Neurological: Negative for weakness and numbness.   Hematological: Negative.    Psychiatric/Behavioral: Normal  All other systems reviewed and are negative.     PHYSICAL EXAM    Constitutional: No distress.   Head: Normocephalic and atraumatic.   No notable bite alycia to tongue or lip   Eyes: EOM are normal. Pupils are equal, round, and reactive to light.   Neck: Normal range of motion. Neck supple.   Cardiovascular: Normal rate, regular rhythm and normal heart sounds.   Pulmonary/Chest: Effort normal and breath sounds normal. No respiratory distress.   Abdominal: Soft. There is no tenderness. There is no  rebound and no guarding.   Musculoskeletal: Normal range of motion. She exhibits edema (2+ edema to feet and ankles bilaterally and increased at the right ankle at the site of the injury.).   Pt moves all extremities.  Diffuse contusion and ecchymosis to R ankle.  He has no cyanosis or pallor to the toes and is motor and sensory intact to the right foot and toes.   Neurological: She is alert. She has normal sensation and normal strength. No cranial nerve deficit. GCS score is 15.   Appears to be chronically demented, but daughter states she is normally A+Ox3    Skin: Skin is warm and dry. No rash noted.   Psychiatric: Mood and affect normal.       LAB RESULTS  Lab Results (last 24 hours)     Procedure Component Value Units Date/Time    Wound Culture - Wound, Foot, Left [[id number]]  (Abnormal)  (Susceptibility) Collected:  01/28/19 1950    Specimen:  Wound from Foot, Left Updated:  02/01/19 0948     Wound Culture Scant growth (1+) Staphylococcus aureus, MRSA     Comment:   Methicillin resistant Staphylococcus aureus, Patient may be an isolation risk.         Scant growth (1+) Staphylococcus aureus, MRSA     Comment:   Methicillin resistant Staphylococcus aureus, Patient may be an isolation risk.         Scant growth (1+) Normal Skin Jodie     Gram Stain Rare (1+) Gram positive bacilli      No WBCs seen    Susceptibility      Staphylococcus aureus, MRSA (1)     UMANG     Clindamycin Susceptible     Erythromycin Susceptible     Oxacillin Resistant     Penicillin G Resistant     Rifampin Susceptible     Tetracycline Susceptible     Trimethoprim + Sulfamethoxazole Susceptible     Vancomycin Susceptible                Susceptibility      Staphylococcus aureus, MRSA (2)     UMANG     Clindamycin Resistant     Erythromycin Resistant     Oxacillin Resistant     Penicillin G Resistant     Rifampin Susceptible     Tetracycline Susceptible     Trimethoprim + Sulfamethoxazole Susceptible     Vancomycin Susceptible                     C-reactive Protein [[id number]]  (Abnormal) Collected:  02/01/19 0550    Specimen:  Blood Updated:  02/01/19 0655     C-Reactive Protein 0.64 mg/dL     Basic Metabolic Panel [[id number]]  (Abnormal) Collected:  02/01/19 0550    Specimen:  Blood Updated:  02/01/19 0631     Glucose 95 mg/dL      BUN 24 mg/dL      Creatinine 0.73 mg/dL      Sodium 138 mmol/L      Potassium 4.1 mmol/L      Chloride 102 mmol/L      CO2 25.2 mmol/L      Calcium 9.6 mg/dL      eGFR Non African Amer 75 mL/min/1.73      BUN/Creatinine Ratio 32.9     Anion Gap 10.8 mmol/L     Narrative:       The MDRD GFR formula is only valid for adults with stable renal function between ages 18 and 70.    Blood Culture - Blood, Hand, Left [[id number]] Collected:  01/28/19 1914    Specimen:  Blood from Hand, Left Updated:  01/31/19 1930     Blood Culture No growth at 3 days    Blood Culture - Blood, Arm, Right [[id number]] Collected:  01/28/19 1845    Specimen:  Blood from Arm, Right Updated:  01/31/19 1900     Blood Culture No growth at 3 days          Imaging Results (last 24 hours)     ** No results found for the last 24 hours. **          Current Facility-Administered Medications:   •  acetaminophen (TYLENOL) tablet 650 mg, 650 mg, Oral, Q6H PRN, Juan Carlos Harper MD, 650 mg at 02/01/19 1245  •  amLODIPine (NORVASC) tablet 5 mg, 5 mg, Oral, Q24H, Juan Carlos Harper MD, 5 mg at 02/01/19 0922  •  calcium carbonate (TUMS) chewable tablet 500 mg (200 mg elemental), 1 tablet, Oral, TID PRN, Juan Carlos Harper MD  •  calcium-vitamin D 500-200 MG-UNIT tablet 1,000 mg, 1,000 mg, Oral, Daily, Juan Carlos Harper MD, 1,000 mg at 02/01/19 0922  •  cefepime (MAXIPIME) 2 g/100 mL 0.9% NS (mbp), 2 g, Intravenous, Q12H, Myra Sheppard MD, 2 g at 02/01/19 0947  •  clopidogrel (PLAVIX) tablet 75 mg, 75 mg, Oral, Daily, Juan Carlos Harper MD, 75 mg at 02/01/19 0922  •  escitalopram (LEXAPRO) tablet 10 mg, 10 mg, Oral, Nightly, Juan Carlos Harper MD, 10 mg at 01/31/19 2030  •  ferrous sulfate tablet 325 mg,  325 mg, Oral, Daily With Breakfast, Juan Carlos Harper MD, 325 mg at 02/01/19 0922  •  FOLBEE PLUS CZ tablet 1 tablet, 1 tablet, Oral, Daily, Juan Carlos Harper MD, 1 tablet at 02/01/19 0922  •  gabapentin (NEURONTIN) capsule 100 mg, 100 mg, Oral, Q8H, Juan Carlos Harper MD, 100 mg at 02/01/19 0922  •  HYDROcodone-acetaminophen (NORCO) 5-325 MG per tablet 1 tablet, 1 tablet, Oral, Q6H PRN, Juan Carlos Harper MD, 1 tablet at 01/28/19 2206  •  levETIRAcetam (KEPPRA) tablet 750 mg, 750 mg, Oral, BID, Juan Carlos Harper MD, 750 mg at 02/01/19 0922  •  mirtazapine (REMERON) tablet 15 mg, 15 mg, Oral, Nightly, Juan Carlos Harper MD, 15 mg at 01/31/19 2030  •  multivitamin with minerals 1 tablet, 1 tablet, Oral, Daily, Juan Carlos Harper MD, 1 tablet at 02/01/19 0922  •  nystatin (MYCOSTATIN) powder 1 application, 1 application, Topical, BID, Juan Carlos Harper MD, 1 application at 02/01/19 0922  •  ondansetron (ZOFRAN) injection 4 mg, 4 mg, Intravenous, Q6H PRN, Juan Carlos Harper MD  •  pantoprazole (PROTONIX) EC tablet 40 mg, 40 mg, Oral, QAM, Juan Carlos Harper MD, 40 mg at 02/01/19 0554  •  Pharmacy to dose vancomycin, , Does not apply, Continuous PRN, Myra Sheppard MD  •  polyethylene glycol (MIRALAX) powder 17 g, 17 g, Oral, Daily, Juan Carlos Harper MD, 17 g at 02/01/19 0922  •  sodium chloride 0.9 % flush 10 mL, 10 mL, Intravenous, Q12H, Myra Sheppard MD, 10 mL at 02/01/19 0922  •  sodium chloride 0.9 % flush 10 mL, 10 mL, Intravenous, PRN, Myra Sheppard MD  •  sodium chloride 0.9 % flush 20 mL, 20 mL, Intravenous, PRN, Myra Sheppard MD  •  vancomycin 1250 mg/250 mL 0.9% NS IVPB ([facility]), 1,250 mg, Intravenous, Q24H, Myra Sheppard MD, 1,250 mg at 02/01/19 0732     ASSESSMENT  Right ankle wound with MRSA infection with recent fracture and surgery and surrounding cellulitis  Hypertension  Seizure disorder  Depression  History of CVA  Status post permanent pacemaker  Osteoarthritis  Degenerative disease  Gastroesophageal reflux disease    PLAN  Discharge to nursing home on  IV antibiotics for total of 2 weeks  Discharge summary dictated    GAETANO ACOSTA MD

## 2019-02-01 NOTE — PROGRESS NOTES
[facility]    Physicians Statement of Medical Necessity for Ambulance Transportation    It is medically necessary for:    Patient Name: Kim Feldman    Insurance Information:      To be transported by ambulance:    From (if nursing facility, specify level of care: skilled, long term, etc): [facility]     To (specify level of care if nursing facility): [facility]     Date of Service: 2/1/2019     For dialysis patients state date dialysis began:     Diagnosis: Right Ankle infection     Past Medical/Surgical History:  Past Medical History:   Diagnosis Date   • Anemia    • At risk for sleep apnea 11/17/2018   • Bulging lumbar disc    • Chronic cough    • Chronic UTI    • Chronic venous insufficiency    • 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HCC)    • DDD (degenerative disc disease), lumbosacral    • Dementia without behavioral disturbance     moderate   • Depression, endogenous (CMS/HCC)    • Disc degeneration, lumbar    • Dysphagia    • GERD (gastroesophageal reflux disease)    • Hiatal hernia    • History of anxiety    • History of aspiration pneumonitis    • History of cerebral artery occlusion     CVA (following TIA), 10/10, treated with tPA   • History of herpes zoster    • History of ingrowing nail    • History of osteoporosis    • History of poliomyelitis     child   • History of sciatica    • History of sick sinus syndrome     s/p PPM   • History of transient cerebral ischemia     followed by stroke in 10/2010. BIBI was normal.   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)    • Vitamin D deficiency    • Zenker's diverticulum       Past Surgical History:   Procedure Laterality Date   • ANKLE OPEN REDUCTION INTERNAL FIXATION Right 11/17/2018    Procedure: ANKLE OPEN REDUCTION INTERNAL FIXATION;  Surgeon: Cristian Hill II, MD;  Location: [facility];  Service: Orthopedics   • CARDIAC PACEMAKER PLACEMENT      secondary to SSS, placed in 2004, with generator change in 2014   • CATARACT EXTRACTION W/ INTRAOCULAR LENS IMPLANT Bilateral    • COLONOSCOPY     • HAND SURGERY Right    • KNEE ARTHROPLASTY Left    • LAMINECTOMY FOR IMPLANTATION / PLACEMENT NEUROSTIMULATOR ELECTRODES     • LUMBAR LAMINECTOMY      L-3 L4 L4 L5   • TONSILLECTOMY     • ZENKERS DIVERTICULECTOMY N/A 9/27/2016    Procedure: ENDOSCOPIC REMOVAL OF ZENKERS DIVERTICULUM W/ WERDASCOPE;  Surgeon: Oswald Barth MD;  Location: [facility];  Service:    • ZENKERS DIVERTICULECTOMY N/A 4/14/2017    Procedure: ESOPHAGOSCOPY;  Surgeon: Oswald Barth MD;  Location: [facility];  Service:         Current Objective Medical Evidence(including physical exam finding to support reason for limitations):    Immobilization syndrome  Confused/combative: may require restraints    Other:     Physician Signature:           (RN,NP,PA,CAN, Discharge Planner) Date/Time: 2/1/2019 2:54 PM      Printed Name:    _________Dori Stephens RN _________________________    [facility]   Phone: [phone] Phone: [phone]   Fax: [phone] Fax: [phone]

## 2019-02-01 NOTE — THERAPY TREATMENT NOTE
Acute Care - Physical Therapy Treatment Note  [facility]     Patient Name: Kim Feldman  : 3/13/1930  MRN: [id number]  Today's Date: 2019  Onset of Illness/Injury or Date of Surgery: 19  Date of Referral to PT: 19       Admit Date: 2019    Visit Dx:    ICD-10-CM ICD-9-CM   1. 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)   • Vitamin D deficiency   • History of sick sinus syndrome   • Intertrigo   • Generalized convulsive seizures (CMS/HCC)   • Change in bowel habits   • Zenker diverticulum   • History of anxiety   • 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HCC)   • Thrombocytopenia (CMS/HCC)   • B12 deficiency   • Cardiac pacemaker in situ   • Chronic venous insufficiency   • Arthritis   • S/P knee replacement   • Chronic bilateral low back pain without sciatica   • Essential hypertension   • Hiatal hernia   • Zenker's diverticulum   • Chronic idiopathic constipation   • Pressure sore on buttocks   • Somnolence   • Closed trimalleolar fracture of right ankle   • Surgical wound infection       Therapy Treatment    Rehabilitation Treatment Summary     Row Name 19 1408             Treatment Time/Intention    Discipline  physical therapist  -MS      Document Type  therapy note (daily note)  -MS      Subjective Information  complains of;weakness;fatigue;pain  -MS      Mode of Treatment  physical therapy;individual therapy  -MS      Patient Effort  good  -MS      Comment  Pt. reports pain in her mid back with general mobility this day as well as overall fatigue, weakness.  Otherwise, pt. agreeable to work with P.T.   -MS      Existing Precautions/Restrictions  fall  (Significant)  Cam Boot  Right L.E. when upright  -MS      Treatment Considerations/Comments  Precautions taken to help minimize/eliminate all friction/shearing forces to pt.'s skin during mobility.   (Significant)   -MS      Recorded by [MS] Lui sFelipe Walker, PT 02/01/19 1413      Row Name 02/01/19 1408             Cognitive Assessment/Intervention- PT/OT    Orientation Status (Cognition)  oriented x 3  -MS      Follows Commands (Cognition)  WNL  -MS      Personal Safety Interventions  fall prevention program maintained;gait belt;nonskid shoes/slippers when out of bed;supervised activity  -MS      Recorded by [MS] Luis Felipe Walker, PT 02/01/19 1413      Row Name 02/01/19 1408             Bed Mobility Assessment/Treatment    Supine-Sit [place] (Bed Mobility)  minimum assist (75% patient effort);2 person assist  -MS      Sit-Supine [place] (Bed Mobility)  moderate assist (50% patient effort);2 person assist  -MS      Assistive Device (Bed Mobility)  draw sheet  -MS      Comment (Bed Mobility)  Assisted pt. in donning her Cam Boot RLE prior to sitting EOB.  -MS      Recorded by [MS] Luis Felipe Walker, PT 02/01/19 1413      Row Name 02/01/19 1408             Transfer Assessment/Treatment    Comment (Transfers)  Performed x 3 for functional strength training  -MS      Recorded by [MS] Luis Felipe Walker, PT 02/01/19 1413      Row Name 02/01/19 1408             Sit-Stand Transfer    Sit-Stand [place] (Transfers)  minimum assist (75% patient effort);2 person assist  -MS      Assistive Device (Sit-Stand Transfers)  walker, front-wheeled  -MS      Recorded by [MS] Luis Felipe Walker, PT 02/01/19 1413      Row Name 02/01/19 1408             Stand-Sit Transfer    Stand-Sit [place] (Transfers)  minimum assist (75% patient effort);2 person assist  -MS      Assistive Device (Stand-Sit Transfers)  walker, front-wheeled  -MS      Recorded by [MS] Luis Felipe Walker, PT 02/01/19 1413      Row Name 02/01/19 1408             Gait/Stairs  Assessment/Training    [place] Level (Gait)  contact guard;2 person assist  -MS      Assistive Device (Gait)  walker, front-wheeled  -MS      Distance in Feet (Gait)  10 feet  -MS      Pattern (Gait)  step-to  -MS      Deviations/Abnormal Patterns (Gait)  antalgic;stride length decreased  -MS      Comment (Gait/Stairs)  Verbal/tactile cues for posture correction.  -MS      Recorded by [MS] Luis Felipe Walker, PT 02/01/19 1413      Row Name 02/01/19 1408             Positioning and Restraints    Pre-Treatment Position  in bed  -MS      Post Treatment Position  bed  -MS      In Bed  notified nsg;supine;call light within reach;encouraged to call for assist;exit alarm on;L waffle boot  -MS      Recorded by [MS] Luis Felipe Walker, PT 02/01/19 1413      Row Name 02/01/19 1408             Pain Scale: Numbers Pre/Post-Treatment    Pain Scale: Numbers, Pretreatment  3/10  -MS      Pain Scale: Numbers, Post-Treatment  3/10  -MS      Pain Location  back  -MS      Recorded by [MS] Luis Felipe Walker, PT 02/01/19 1413      Row Name                Wound 01/28/19 Left heel pressure injury    Wound - Properties Group Date first assessed: 01/28/19 [BF] Present On Admission : yes [BF] Side: Left [BF] Location: heel [BF] Type: pressure injury [BF] Stage, Pressure Injury: unstageable [BF] Recorded by:  [BF] Venus Dior RN, RUSTYN 01/28/19 9330      User Key  (r) = Recorded By, (t) = Taken By, (c) = Cosigned By    Initials Name Effective Dates Discipline    BF Venus Dior RN, CWOCN 06/16/16 -  Nurse    MS LuisF elipe Walker, PT 04/03/18 -  PT          Wound 01/28/19 Left heel pressure injury (Active)   Dressing Appearance dry;intact 1/31/2019  8:30 PM   Closure JULIANNE 1/31/2019  8:30 PM   Base dressing in place, unable to visualize 1/31/2019  8:30 PM   Care, Wound cleansed with 2/1/2019 12:41 PM   Dressing Care, Wound low-adherent 1/31/2019  8:30 PM           Physical Therapy Education     Title: PT OT SLP Therapies (Done)      Topic: Physical Therapy (Done)     Point: Mobility training (Done)     Learning Progress Summary           Patient Acceptance, E,D, VU,NR by MS at 2/1/2019  2:14 PM    Acceptance, E, NR by AR at 1/31/2019  4:50 PM    Acceptance, E, VU,NR by EF at 1/30/2019  2:58 PM                   Point: Home exercise program (Done)     Learning Progress Summary           Patient Acceptance, E,D, VU,NR by MS at 2/1/2019  2:14 PM    Acceptance, E, NR by AR at 1/31/2019  4:50 PM    Acceptance, E, VU,NR by EF at 1/30/2019  2:58 PM                   Point: Body mechanics (Done)     Learning Progress Summary           Patient Acceptance, E,D, VU,NR by MS at 2/1/2019  2:14 PM    Acceptance, E, NR by AR at 1/31/2019  4:50 PM    Acceptance, E, VU,NR by EF at 1/30/2019  2:58 PM                   Point: Precautions (Done)     Learning Progress Summary           Patient Acceptance, E,D, VU,NR by MS at 2/1/2019  2:14 PM    Acceptance, E, NR by AR at 1/31/2019  4:50 PM    Acceptance, E, VU,NR by EF at 1/30/2019  2:58 PM                               User Key     Initials Effective Dates Name Provider Type Discipline     06/08/18 -  Paty Smith, PT Physical Therapist PT    MS 04/03/18 -  Luis Felipe Walker, PT Physical Therapist PT    AR 04/03/18 -  Amina Rodas PT Physical Therapist PT                PT Recommendation and Plan     Plan of Care Reviewed With: patient  Progress: improving  Outcome Summary: Improved tolerance to functional activity this day with an increase in gait distance and decreased assist required for overall functional mobility.   Outcome Measures     Row Name 02/01/19 1400 01/31/19 1209 01/31/19 1200       How much help from another person do you currently need...    Turning from your back to your side while in flat bed without using bedrails?  3  -MS  --  3  -AR    Moving from lying on back to sitting on the side of a flat bed without bedrails?  2  -MS  --  3  -AR    Moving to and from a bed to a chair  (including a wheelchair)?  3  -MS  --  2  -AR    Standing up from a chair using your arms (e.g., wheelchair, bedside chair)?  2  -MS  --  2  -AR    Climbing 3-5 steps with a railing?  1  -MS  --  1  -AR    To walk in hospital room?  3  -MS  --  2  -AR    AM-PAC 6 Clicks Score  14  -MS  --  13  -AR       How much help from another is currently needed...    Putting on and taking off regular lower body clothing?  --  1  -LE  --    Bathing (including washing, rinsing, and drying)  --  1  -LE  --    Toileting (which includes using toilet bed pan or urinal)  --  1  -LE  --    Putting on and taking off regular upper body clothing  --  2  -LE  --    Taking care of personal grooming (such as brushing teeth)  --  2  -LE  --    Eating meals  --  2  -LE  --    Score  --  9  -LE  --       Functional Assessment    Outcome Measure Options  AM-PAC 6 Clicks Basic Mobility (PT)  -MS  --  AM-PAC 6 Clicks Daily Activity (OT)  -LE    Row Name 01/30/19 1500             How much help from another person do you currently need...    Turning from your back to your side while in flat bed without using bedrails?  3  -EF      Moving from lying on back to sitting on the side of a flat bed without bedrails?  3  -EF      Moving to and from a bed to a chair (including a wheelchair)?  1  -EF      Standing up from a chair using your arms (e.g., wheelchair, bedside chair)?  1  -EF      Climbing 3-5 steps with a railing?  1  -EF      To walk in hospital room?  1  -EF      AM-PAC 6 Clicks Score  10  -EF         Functional Assessment    Outcome Measure Options  AM-PAC 6 Clicks Basic Mobility (PT)  -EF        User Key  (r) = Recorded By, (t) = Taken By, (c) = Cosigned By    Initials Name Provider Type    Jessica French, OTR Occupational Therapist    Paty Montgomery, PT Physical Therapist    Luis Felipe Sandhu, PT Physical Therapist    Amina Colindres, PT Physical Therapist         Time Calculation:   PT Charges     Row Name 02/01/19 9835              Time Calculation    Start Time  1332  -MS      Stop Time  1350  -MS      Time Calculation (min)  18 min  -MS      PT Received On  02/01/19  -MS      PT - Next Appointment  02/02/19  -MS         Time Calculation- PT    Total Timed Code Minutes- PT  15 minute(s)  -MS        User Key  (r) = Recorded By, (t) = Taken By, (c) = Cosigned By    Initials Name Provider Type    Luis Felipe Sandhu, PT Physical Therapist        Therapy Suggested Charges     Code   Minutes Charges    None           Therapy Charges for Today     Code Description Service Date Service Provider Modifiers Qty    65386474985 HC PT THER PROC EA 15 MIN 2/1/2019 Luis Felipe Walker, PT GP 1    11710675283 HC PT THER SUPP EA 15 MIN 2/1/2019 Luis Felipe Walker, PT GP 1          PT G-Codes  Outcome Measure Options: AM-PAC 6 Clicks Basic Mobility (PT)  AM-PAC 6 Clicks Score: 14  Score: 9    Luis Felipe Walker, PT  2/1/2019

## 2019-02-01 NOTE — DISCHARGE INSTR - APPOINTMENTS
Dr. Cristian Hill  [facility]  [address]  [place] [address] [phone]    Make appointment for follow up in a few weeks.

## 2019-02-01 NOTE — PLAN OF CARE
Problem: Patient Care Overview  Goal: Plan of Care Review   02/01/19 1414   Coping/Psychosocial   Plan of Care Reviewed With patient   Plan of Care Review   Progress improving   OTHER   Outcome Summary Improved tolerance to functional activity this day with an increase in gait distance and decreased assist required for overall functional mobility.

## 2019-02-01 NOTE — PROGRESS NOTES
"  Infectious Diseases Progress Note    Myra Sheppard MD     [facility]  [place]: 4 days  Patient Identification:  Name: Kim Feldman  Age: 88 y.o.  Sex: female  :  3/13/1930  MRN: [id number]         Primary Care Physician: Herrera Nelson MD            Subjective: Doing well and probably will be discharged to rehabilitation facility with IV antibiotics once arrangements are made.    Interval History: See consultation note.    Objective:    Scheduled Meds:    amLODIPine 5 mg Oral Q24H   calcium-vitamin D 1,000 mg Oral Daily   cefepime 2 g Intravenous Q12H   clopidogrel 75 mg Oral Daily   escitalopram 10 mg Oral Nightly   ferrous sulfate 325 mg Oral Daily With Breakfast   FOLBEE PLUS CZ 1 tablet Oral Daily   gabapentin 100 mg Oral Q8H   levETIRAcetam 750 mg Oral BID   mirtazapine 15 mg Oral Nightly   multivitamin with minerals 1 tablet Oral Daily   nystatin 1 application Topical BID   pantoprazole 40 mg Oral QAM   polyethylene glycol 17 g Oral Daily   sodium chloride 10 mL Intravenous Q12H   vancomycin 1,250 mg Intravenous Q24H     Continuous Infusions:    Pharmacy to dose vancomycin        Vital signs in last 24 hours:  Temp:  [97.8 °F (36.6 °C)-98.2 °F (36.8 °C)] 98.2 °F (36.8 °C)  Heart Rate:  [73-83] 75  Resp:  [16] 16  BP: (109-167)/(61-79) 167/79    Intake/Output:    Intake/Output Summary (Last 24 hours) at 2019 0905  Last data filed at 2019 0500  Gross per 24 hour   Intake 600 ml   Output 1300 ml   Net -700 ml       Exam:  /79 (BP Location: Left arm, Patient Position: Lying)   Pulse 75   Temp 98.2 °F (36.8 °C) (Oral)   Resp 16   Ht 157.5 cm (62.01\")   Wt 99.7 kg (219 lb 14.4 oz)   SpO2 97%   BMI 40.21 kg/m²     General Appearance:    Alert, cooperative, no distress,                          Head:    Normocephalic, without obvious abnormality, atraumatic                           Eyes:    PERRL, conjunctivae/corneas clear, EOM's intact, both eyes                         " Throat:   Lips, tongue, gums normal; oral mucosa pink and moist                           Neck:   Supple, symmetrical, trachea midline, no JVD                         Lungs:    Clear to auscultation bilaterally, respirations unlabored                 Chest Wall:    No tenderness or deformity                          Heart:    Regular rate and rhythm, S1 and S2 normal                  Abdomen:     Soft, non-tender, bowel sounds active                 Extremities:   Significant decrease in bilateral lower extremity erythema and swelling around the right ankle incision.                        Pulses:   Pulses palpable in all extremities                            Skin:   Skin is warm and dry,  no rashes or palpable lesions         Data Review:    I reviewed the patient's new clinical results.  Results from last 7 days   Lab Units 01/31/19  0726 01/30/19  0536 01/29/19  0444 01/28/19  1453   WBC 10*3/mm3 6.15 5.27 6.03 6.11   HEMOGLOBIN g/dL 10.0* 10.1* 9.6* 10.3*   PLATELETS 10*3/mm3 203 196 197 212     Results from last 7 days   Lab Units 02/01/19  0550 01/31/19  0726 01/30/19  0536 01/29/19  0444 01/28/19  1453   SODIUM mmol/L 138 138 139 140 139   POTASSIUM mmol/L 4.1 4.1 4.8 4.8 4.9   CHLORIDE mmol/L 102 103 105 107 104   CO2 mmol/L 25.2 25.0 23.4 22.2 22.9   BUN mg/dL 24* 22 21 24* 27*   CREATININE mg/dL 0.73 0.76 0.90 1.08* 1.07*   CALCIUM mg/dL 9.6 9.5 9.3 8.7 9.2   GLUCOSE mg/dL 95 93 92 97 107*     Microbiology Results (last 10 days)     Procedure Component Value - Date/Time    Wound Culture - Wound, Foot, Left [[id number]]  (Abnormal)  (Susceptibility) Collected:  01/28/19 1950    Lab Status:  Preliminary result Specimen:  Wound from Foot, Left Updated:  01/31/19 1009     Wound Culture Scant growth (1+) Staphylococcus aureus, MRSA     Comment:   Methicillin resistant Staphylococcus aureus, Patient may be an isolation risk.         Scant growth (1+) Gram Positive Cocci      Scant growth (1+) Normal Skin Jodie      Gram Stain Rare (1+) Gram positive bacilli      No WBCs seen    Susceptibility      Staphylococcus aureus, MRSA     UMANG     Clindamycin Susceptible     Erythromycin Susceptible     Oxacillin Resistant     Penicillin G Resistant     Rifampin Susceptible     Tetracycline Susceptible     Trimethoprim + Sulfamethoxazole Susceptible     Vancomycin Susceptible                    Blood Culture - Blood, Hand, Left [[id number]] Collected:  01/28/19 1914    Lab Status:  Preliminary result Specimen:  Blood from Hand, Left Updated:  01/31/19 1930     Blood Culture No growth at 3 days    Blood Culture - Blood, Arm, Right [[id number]] Collected:  01/28/19 1845    Lab Status:  Preliminary result Specimen:  Blood from Arm, Right Updated:  01/31/19 1900     Blood Culture No growth at 3 days            Assessment: Overall improved  1-possible postop delayed surgical site infection of the right ankle lateral incision partially treated with risk of deeper structure involvement including hardware infection and contiguous focus osteomyelitis - failed outpatient oral Bactrim versus  2-chronic changes and atrophic changes involving the right ankle due to recent surgery chronic immobility and edema as patient does not exhibit any significant local tenderness or systemic symptoms of fever and chills and able to bear weight on the ankle.  3-chronic stasis changes and mild erythema of the left leg with chronic left heel wound likely a pressure sore.  4-other diagnosis include history of seizure disorder hypertension depression history of CVA gastroesophageal reflux disease osteoarthritis.      Recommendations:  · Continue present antibiotics.    · Would recommend minimal soft tissue infection treatment with couple weeks given the response that she has shown on this broad-spectrum combination and apparent failure on oral Bactrim.    · We'll arrange for PICC line.   · See CCP orders.  · OK to discharge once arrangements are made.    Myra  MD Silver  2/1/2019  9:05 AM    Much of this encounter note is an electronic transcription/translation of spoken language to printed text. The electronic translation of spoken language may permit erroneous, or at times, nonsensical words or phrases to be inadvertently transcribed; Although I have reviewed the note for such errors, some may still exist

## 2019-02-01 NOTE — PROGRESS NOTES
Discharge Planning Assessment  [facility]     Patient Name: Kim Feldman  MRN: [id number]  Today's Date: 2/1/2019    Admit Date: 1/28/2019      Discharge Plan     Row Name 02/01/19 1301       Plan    Plan  Cameron Liu, pre-cert obtained; [facility] for 6:30pm    Plan Comments  Message recieved from Irene/Rambo, pre-cert obtained and pt has a bed available today. [facility] set [facility] for 6:30pm. Pts dtr updated at bedside. CCP to follow. Per yeny LOVELACE today. Rikki/CCP         Destination - Selection Complete      Service Provider Request Status Selected Services Address Phone Number Fax Number    [facility] Selected Skilled Nursing [address] [facility] [address] [phone]       Dori Stephens RN

## 2019-02-01 NOTE — PROGRESS NOTES
Wound looking good on right lower extremity.  Minimal pain.  Swelling reduced.  No redness.  No drainage.  Patient with minimal pain.  IV antibiotics and be working very well.  PICC line in place.  Awaiting placement at [facility].  I will recheck a CRP/ESR today.  Patient is afebrile and white count is normal.  It seems that conservative treatment is working for her right leg infection.  I can see her back in a few weeks in clinic.    Cristian Hill II, MD  Orthopaedic Surgery  [facility]

## 2019-02-02 LAB
BACTERIA SPEC AEROBE CULT: NORMAL
BACTERIA SPEC AEROBE CULT: NORMAL

## 2019-02-04 NOTE — PROGRESS NOTES
Case Management Discharge Note    Final Note: pt dc'd to SNF on 2/1/19    Destination - Selection Complete      Service Provider Request Status Selected Services Address Phone Number Fax Number    [facility] Selected Skilled Nursing [address] [facility] [address] [phone]      Durable Medical Equipment      No service has been selected for the patient.      Dialysis/Infusion      No service has been selected for the patient.      Home Medical Care      No service has been selected for the patient.      Community Resources      No service has been selected for the patient.        [facility]: [facility]    Final Discharge Disposition Code: 03 - skilled nursing facility (SNF)

## 2019-02-04 NOTE — PAYOR COMM NOTE
"Kim Feldman (88 y.o. Female)    PLEASE SEE ATTACHED DC SUMMARY    REF#[id number]    THANK YOU    ASHISH BONNER LPN CCP     Date of Birth Social Security Number Address Home Phone MRN    03/13/1930  [address] [address] [phone] [id number]    Confucianism Marital Status          Yazidism        Admission Date Admission Type Admitting Provider Attending Provider Department, Room/Bed    1/28/19 Urgent Juan Carlos Harper MD  [address], [address]    Discharge Date Discharge Disposition Discharge Destination        2/1/2019 Skilled Nursing Facility (DC - External)              Attending Provider:  (none)   Allergies:  Lipitor [Atorvastatin], Penicillins    Isolation:  Contact   Infection:  MRSA (01/31/19)   Code Status:  Prior    Ht:  157.5 cm (62.01\")   Wt:  99.7 kg (219 lb 14.4 oz)    Admission Cmt:  [employer]ET[employer] [facility] REPLACEMENT   Principal Problem:  None                Active Insurance as of 1/28/2019     Primary Coverage     Payor Plan Insurance Group Employer/Plan Group    AETNA MEDICARE REPLACEMENT AETNA [id number]     Payor Plan Address Payor Plan Phone Number Payor Plan Fax Number Effective Dates    [address] [phone]  1/1/2018 - None Entered    [facility] [id number]       Subscriber Name Subscriber Birth Date Member ID       KIM FELDMAN 3/13/1930 [id number]                 Emergency Contacts      (Rel.) Home Phone Work Phone Mobile Phone    Leandra Feldman (Daughter) [phone] -- [phone]               Discharge Summary      Juan Carlos Harper MD at 2/1/2019  1:57 PM        Discharge summary    Date of admission 1/28/2019  Date of discharge 2/1/2019    Final diagnosis  Right ankle wound with MRSA infection with recent fracture and surgery and surrounding cellulitis  Hypertension  Seizure disorder  Depression  History of CVA  Status post permanent pacemaker  Osteoarthritis  Degenerative disease  Gastroesophageal reflux " disease    Discharge medications    Current Facility-Administered Medications:   •  amLODIPine (NORVASC) tablet 5 mg, 5 mg, Oral, Q24H, Juan Carlos Harper MD, 5 mg at 02/01/19 0922  •  calcium-vitamin D 500-200 MG-UNIT tablet 1,000 mg, 1,000 mg, Oral, Daily, Juan Carlos Harper MD, 1,000 mg at 02/01/19 0922  •  cefepime (MAXIPIME) 2 g/100 mL 0.9% NS (mbp), 2 g, Intravenous, Q12H, Myra Sheppard MD, 2 g at 02/01/19 0947  •  clopidogrel (PLAVIX) tablet 75 mg, 75 mg, Oral, Daily, Juan Carlos Harper MD, 75 mg at 02/01/19 0922  •  escitalopram (LEXAPRO) tablet 10 mg, 10 mg, Oral, Nightly, Juan Carlos Harper MD, 10 mg at 01/31/19 2030  •  ferrous sulfate tablet 325 mg, 325 mg, Oral, Daily With Breakfast, Juan Carlos Harper MD, 325 mg at 02/01/19 0922  •  FOLBEE PLUS CZ tablet 1 tablet, 1 tablet, Oral, Daily, Juan Carlos Harper MD, 1 tablet at 02/01/19 0922  •  gabapentin (NEURONTIN) capsule 100 mg, 100 mg, Oral, Q8H, Juan Carlos Harper MD, 100 mg at 02/01/19 0922  •  HYDROcodone-acetaminophen (NORCO) 5-325 MG per tablet 1 tablet, 1 tablet, Oral, Q6H PRN, Juan Carlos Harper MD, 1 tablet at 01/28/19 2206  •  levETIRAcetam (KEPPRA) tablet 750 mg, 750 mg, Oral, BID, Juan Carlos Harper MD, 750 mg at 02/01/19 0922  •  mirtazapine (REMERON) tablet 15 mg, 15 mg, Oral, Nightly, Juan Carlos Harper MD, 15 mg at 01/31/19 2030  •  multivitamin with minerals 1 tablet, 1 tablet, Oral, Daily, Juan Carlos Harper MD, 1 tablet at 02/01/19 0922  •  nystatin (MYCOSTATIN) powder 1 application, 1 application, Topical, BID, Juan Carlos Harper MD, 1 application at 02/01/19 0922  •  pantoprazole (PROTONIX) EC tablet 40 mg, 40 mg, Oral, QAM, Juan Carlos Harper MD, 40 mg at 02/01/19 0554  •  polyethylene glycol (MIRALAX) powder 17 g, 17 g, Oral, Daily, Juan Carlos Harper MD, 17 g at 02/01/19 0922  •  sodium chloride 0.9 % flush 10 mL, 10 mL, Intravenous, Q12H, Myra Sheppard MD, 10 mL at 02/01/19 0922  •  vancomycin 1250 mg/250 mL 0.9% NS IVPB ([facility]), 1,250 mg, Intravenous, Q24H, Myra Sheppard MD, 1,250 mg at 02/01/19  0732     Consult obtained  Orthopedic surgery  Infectious disease    Procedures  None    Hospital course  88-year-old white female who is well-known to our service with very complex past medical history who was recently discharged from the hospital after taking care of right ankle fracture with open reduction internal fixation admitted from orthopedic service with right ankle pain swelling and redness and drainage.  Patient workup revealed infected wound with surrounding cellulitis started on IV antibiotics after pending the cultures.  Patient wound cultures came back positive for MRSA and patient remained on vancomycin and cefepime.  Patient followed by infectious disease and recommended 2 weeks of IV antibiotics as she failed outpatient treatment with oral antibiotics.  Patient has a PICC line placed and can finish IV antibiotics at nursing home.  Patient is feeling 100% after and her lab workup is essentially at within normal limit.  Patient is nonambulatory but still work with PT OT at bedside.  Patient remained DNR throughout hospital course    Discharge diet regular    Activity per PT OT    Medication as above    Further care per accepting physician at nursing home    GAETANO ACOSTA MD        Electronically signed by Gaetano Acosta MD at 2/1/2019  2:04 PM

## 2019-02-14 ENCOUNTER — APPOINTMENT (OUTPATIENT)
Dept: PREADMISSION TESTING | Facility: HOSPITAL | Age: 84
End: 2019-02-14

## 2019-02-14 VITALS
OXYGEN SATURATION: 92 % | WEIGHT: 228 LBS | TEMPERATURE: 97.3 F | BODY MASS INDEX: 41.96 KG/M2 | DIASTOLIC BLOOD PRESSURE: 71 MMHG | HEART RATE: 88 BPM | RESPIRATION RATE: 16 BRPM | SYSTOLIC BLOOD PRESSURE: 132 MMHG | HEIGHT: 62 IN

## 2019-02-14 RX ORDER — GABAPENTIN 600 MG/1
600 TABLET ORAL 3 TIMES DAILY
Status: ON HOLD | COMMUNITY
End: 2019-04-08 | Stop reason: SDUPTHER

## 2019-02-19 ENCOUNTER — APPOINTMENT (OUTPATIENT)
Dept: GENERAL RADIOLOGY | Facility: HOSPITAL | Age: 84
End: 2019-02-19

## 2019-02-19 ENCOUNTER — ANESTHESIA EVENT (OUTPATIENT)
Dept: PERIOP | Facility: HOSPITAL | Age: 84
End: 2019-02-19

## 2019-02-19 ENCOUNTER — ANESTHESIA (OUTPATIENT)
Dept: PERIOP | Facility: HOSPITAL | Age: 84
End: 2019-02-19

## 2019-02-19 ENCOUNTER — HOSPITAL ENCOUNTER (OUTPATIENT)
Facility: HOSPITAL | Age: 84
Discharge: HOME | End: 2019-02-20
Attending: ORTHOPAEDIC SURGERY | Admitting: ORTHOPAEDIC SURGERY

## 2019-02-19 DIAGNOSIS — Z74.09 IMPAIRED TRANSFERS: Primary | ICD-10-CM

## 2019-02-19 DIAGNOSIS — Z98.890 HISTORY OF FAILED ARTHROPLASTY OF ANKLE: ICD-10-CM

## 2019-02-19 PROCEDURE — 25010000002 DEXAMETHASONE PER 1 MG: Performed by: NURSE ANESTHETIST, CERTIFIED REGISTERED

## 2019-02-19 PROCEDURE — 87205 SMEAR GRAM STAIN: CPT | Performed by: ORTHOPAEDIC SURGERY

## 2019-02-19 PROCEDURE — 63710000001 AMLODIPINE 5 MG TABLET: Performed by: ORTHOPAEDIC SURGERY

## 2019-02-19 PROCEDURE — 87070 CULTURE OTHR SPECIMN AEROBIC: CPT | Performed by: ORTHOPAEDIC SURGERY

## 2019-02-19 PROCEDURE — A9270 NON-COVERED ITEM OR SERVICE: HCPCS | Performed by: ORTHOPAEDIC SURGERY

## 2019-02-19 PROCEDURE — 25010000002 FENTANYL CITRATE (PF) 100 MCG/2ML SOLUTION: Performed by: NURSE ANESTHETIST, CERTIFIED REGISTERED

## 2019-02-19 PROCEDURE — 71045 X-RAY EXAM CHEST 1 VIEW: CPT

## 2019-02-19 PROCEDURE — 25010000002 ONDANSETRON PER 1 MG: Performed by: NURSE ANESTHETIST, CERTIFIED REGISTERED

## 2019-02-19 PROCEDURE — 63710000001 CLOPIDOGREL 75 MG TABLET: Performed by: ORTHOPAEDIC SURGERY

## 2019-02-19 PROCEDURE — 63710000001 LEVETIRACETAM 250 MG TABLET: Performed by: ORTHOPAEDIC SURGERY

## 2019-02-19 PROCEDURE — 63710000001 LEVETIRACETAM 500 MG TABLET: Performed by: ORTHOPAEDIC SURGERY

## 2019-02-19 PROCEDURE — 73600 X-RAY EXAM OF ANKLE: CPT

## 2019-02-19 PROCEDURE — 76000 FLUOROSCOPY <1 HR PHYS/QHP: CPT

## 2019-02-19 PROCEDURE — 63710000001 GABAPENTIN 300 MG CAPSULE: Performed by: ORTHOPAEDIC SURGERY

## 2019-02-19 PROCEDURE — 25010000002 PROPOFOL 10 MG/ML EMULSION: Performed by: NURSE ANESTHETIST, CERTIFIED REGISTERED

## 2019-02-19 PROCEDURE — G0378 HOSPITAL OBSERVATION PER HR: HCPCS

## 2019-02-19 PROCEDURE — 87075 CULTR BACTERIA EXCEPT BLOOD: CPT | Performed by: ORTHOPAEDIC SURGERY

## 2019-02-19 PROCEDURE — 63710000001 ESCITALOPRAM 10 MG TABLET: Performed by: ORTHOPAEDIC SURGERY

## 2019-02-19 PROCEDURE — 63710000001 MIRTAZAPINE 15 MG TABLET: Performed by: ORTHOPAEDIC SURGERY

## 2019-02-19 RX ORDER — PANTOPRAZOLE SODIUM 40 MG/1
40 TABLET, DELAYED RELEASE ORAL DAILY
Status: DISCONTINUED | OUTPATIENT
Start: 2019-02-19 | End: 2019-02-20 | Stop reason: HOSPADM

## 2019-02-19 RX ORDER — CLINDAMYCIN PHOSPHATE 900 MG/50ML
900 INJECTION INTRAVENOUS EVERY 8 HOURS SCHEDULED
Status: COMPLETED | OUTPATIENT
Start: 2019-02-19 | End: 2019-02-20

## 2019-02-19 RX ORDER — ACETAMINOPHEN 325 MG/1
325 TABLET ORAL EVERY 6 HOURS PRN
COMMUNITY
End: 2019-04-08 | Stop reason: HOSPADM

## 2019-02-19 RX ORDER — ALBUTEROL SULFATE 2.5 MG/3ML
2.5 SOLUTION RESPIRATORY (INHALATION) EVERY 6 HOURS PRN
Status: DISCONTINUED | OUTPATIENT
Start: 2019-02-19 | End: 2019-02-20 | Stop reason: HOSPADM

## 2019-02-19 RX ORDER — POLYETHYLENE GLYCOL 3350 17 G/17G
17 POWDER, FOR SOLUTION ORAL DAILY
Status: DISCONTINUED | OUTPATIENT
Start: 2019-02-19 | End: 2019-02-20 | Stop reason: HOSPADM

## 2019-02-19 RX ORDER — LABETALOL HYDROCHLORIDE 5 MG/ML
5 INJECTION, SOLUTION INTRAVENOUS
Status: DISCONTINUED | OUTPATIENT
Start: 2019-02-19 | End: 2019-02-19 | Stop reason: HOSPADM

## 2019-02-19 RX ORDER — DIPHENHYDRAMINE HCL 25 MG
25 CAPSULE ORAL
Status: DISCONTINUED | OUTPATIENT
Start: 2019-02-19 | End: 2019-02-19 | Stop reason: HOSPADM

## 2019-02-19 RX ORDER — BUPIVACAINE HYDROCHLORIDE AND EPINEPHRINE 5; 5 MG/ML; UG/ML
INJECTION, SOLUTION PERINEURAL AS NEEDED
Status: DISCONTINUED | OUTPATIENT
Start: 2019-02-19 | End: 2019-02-19 | Stop reason: HOSPADM

## 2019-02-19 RX ORDER — ONDANSETRON 2 MG/ML
INJECTION INTRAMUSCULAR; INTRAVENOUS AS NEEDED
Status: DISCONTINUED | OUTPATIENT
Start: 2019-02-19 | End: 2019-02-19 | Stop reason: SURG

## 2019-02-19 RX ORDER — PROMETHAZINE HYDROCHLORIDE 25 MG/ML
12.5 INJECTION, SOLUTION INTRAMUSCULAR; INTRAVENOUS ONCE AS NEEDED
Status: DISCONTINUED | OUTPATIENT
Start: 2019-02-19 | End: 2019-02-19 | Stop reason: HOSPADM

## 2019-02-19 RX ORDER — HYDRALAZINE HYDROCHLORIDE 20 MG/ML
5 INJECTION INTRAMUSCULAR; INTRAVENOUS
Status: DISCONTINUED | OUTPATIENT
Start: 2019-02-19 | End: 2019-02-19 | Stop reason: HOSPADM

## 2019-02-19 RX ORDER — ESCITALOPRAM OXALATE 10 MG/1
10 TABLET ORAL NIGHTLY
Status: DISCONTINUED | OUTPATIENT
Start: 2019-02-19 | End: 2019-02-20 | Stop reason: HOSPADM

## 2019-02-19 RX ORDER — MIDAZOLAM HYDROCHLORIDE 1 MG/ML
1 INJECTION INTRAMUSCULAR; INTRAVENOUS
Status: DISCONTINUED | OUTPATIENT
Start: 2019-02-19 | End: 2019-02-19 | Stop reason: HOSPADM

## 2019-02-19 RX ORDER — MIRTAZAPINE 15 MG/1
15 TABLET, FILM COATED ORAL NIGHTLY
Status: DISCONTINUED | OUTPATIENT
Start: 2019-02-19 | End: 2019-02-20 | Stop reason: HOSPADM

## 2019-02-19 RX ORDER — PROMETHAZINE HYDROCHLORIDE 25 MG/1
25 SUPPOSITORY RECTAL ONCE AS NEEDED
Status: DISCONTINUED | OUTPATIENT
Start: 2019-02-19 | End: 2019-02-19 | Stop reason: HOSPADM

## 2019-02-19 RX ORDER — ONDANSETRON 2 MG/ML
4 INJECTION INTRAMUSCULAR; INTRAVENOUS ONCE AS NEEDED
Status: DISCONTINUED | OUTPATIENT
Start: 2019-02-19 | End: 2019-02-19 | Stop reason: HOSPADM

## 2019-02-19 RX ORDER — LIDOCAINE HYDROCHLORIDE 20 MG/ML
INJECTION, SOLUTION INFILTRATION; PERINEURAL AS NEEDED
Status: DISCONTINUED | OUTPATIENT
Start: 2019-02-19 | End: 2019-02-19 | Stop reason: SURG

## 2019-02-19 RX ORDER — LIDOCAINE HYDROCHLORIDE 10 MG/ML
0.5 INJECTION, SOLUTION EPIDURAL; INFILTRATION; INTRACAUDAL; PERINEURAL ONCE AS NEEDED
Status: DISCONTINUED | OUTPATIENT
Start: 2019-02-19 | End: 2019-02-19 | Stop reason: HOSPADM

## 2019-02-19 RX ORDER — FAMOTIDINE 10 MG/ML
20 INJECTION, SOLUTION INTRAVENOUS ONCE
Status: COMPLETED | OUTPATIENT
Start: 2019-02-19 | End: 2019-02-19

## 2019-02-19 RX ORDER — EPHEDRINE SULFATE 50 MG/ML
5 INJECTION, SOLUTION INTRAVENOUS ONCE AS NEEDED
Status: DISCONTINUED | OUTPATIENT
Start: 2019-02-19 | End: 2019-02-19 | Stop reason: HOSPADM

## 2019-02-19 RX ORDER — FLUMAZENIL 0.1 MG/ML
0.2 INJECTION INTRAVENOUS AS NEEDED
Status: DISCONTINUED | OUTPATIENT
Start: 2019-02-19 | End: 2019-02-19 | Stop reason: HOSPADM

## 2019-02-19 RX ORDER — OXYCODONE AND ACETAMINOPHEN 7.5; 325 MG/1; MG/1
1 TABLET ORAL ONCE AS NEEDED
Status: DISCONTINUED | OUTPATIENT
Start: 2019-02-19 | End: 2019-02-19 | Stop reason: HOSPADM

## 2019-02-19 RX ORDER — CLINDAMYCIN PHOSPHATE 900 MG/50ML
900 INJECTION INTRAVENOUS ONCE
Status: COMPLETED | OUTPATIENT
Start: 2019-02-19 | End: 2019-02-19

## 2019-02-19 RX ORDER — DEXAMETHASONE SODIUM PHOSPHATE 10 MG/ML
INJECTION INTRAMUSCULAR; INTRAVENOUS AS NEEDED
Status: DISCONTINUED | OUTPATIENT
Start: 2019-02-19 | End: 2019-02-19 | Stop reason: SURG

## 2019-02-19 RX ORDER — NALOXONE HCL 0.4 MG/ML
0.2 VIAL (ML) INJECTION AS NEEDED
Status: DISCONTINUED | OUTPATIENT
Start: 2019-02-19 | End: 2019-02-19 | Stop reason: HOSPADM

## 2019-02-19 RX ORDER — ASPIRIN 325 MG
325 TABLET ORAL DAILY
Qty: 30 TABLET | Refills: 0 | Status: SHIPPED | OUTPATIENT
Start: 2019-02-19 | End: 2019-04-03

## 2019-02-19 RX ORDER — ACETAMINOPHEN 325 MG/1
650 TABLET ORAL EVERY 6 HOURS PRN
Status: DISCONTINUED | OUTPATIENT
Start: 2019-02-19 | End: 2019-02-20 | Stop reason: HOSPADM

## 2019-02-19 RX ORDER — PROPOFOL 10 MG/ML
VIAL (ML) INTRAVENOUS AS NEEDED
Status: DISCONTINUED | OUTPATIENT
Start: 2019-02-19 | End: 2019-02-19 | Stop reason: SURG

## 2019-02-19 RX ORDER — HYDROCODONE BITARTRATE AND ACETAMINOPHEN 7.5; 325 MG/1; MG/1
1 TABLET ORAL ONCE AS NEEDED
Status: DISCONTINUED | OUTPATIENT
Start: 2019-02-19 | End: 2019-02-19 | Stop reason: HOSPADM

## 2019-02-19 RX ORDER — ACETAMINOPHEN 325 MG/1
650 TABLET ORAL ONCE AS NEEDED
Status: DISCONTINUED | OUTPATIENT
Start: 2019-02-19 | End: 2019-02-19 | Stop reason: HOSPADM

## 2019-02-19 RX ORDER — FERROUS SULFATE 325(65) MG
325 TABLET ORAL
Status: DISCONTINUED | OUTPATIENT
Start: 2019-02-20 | End: 2019-02-20 | Stop reason: HOSPADM

## 2019-02-19 RX ORDER — SODIUM CHLORIDE, SODIUM LACTATE, POTASSIUM CHLORIDE, CALCIUM CHLORIDE 600; 310; 30; 20 MG/100ML; MG/100ML; MG/100ML; MG/100ML
9 INJECTION, SOLUTION INTRAVENOUS CONTINUOUS
Status: DISCONTINUED | OUTPATIENT
Start: 2019-02-19 | End: 2019-02-20 | Stop reason: HOSPADM

## 2019-02-19 RX ORDER — FENTANYL CITRATE 50 UG/ML
50 INJECTION, SOLUTION INTRAMUSCULAR; INTRAVENOUS
Status: DISCONTINUED | OUTPATIENT
Start: 2019-02-19 | End: 2019-02-19 | Stop reason: HOSPADM

## 2019-02-19 RX ORDER — HYDROCODONE BITARTRATE AND ACETAMINOPHEN 5; 325 MG/1; MG/1
1 TABLET ORAL EVERY 4 HOURS PRN
Qty: 30 TABLET | Refills: 0 | Status: SHIPPED | OUTPATIENT
Start: 2019-02-19 | End: 2019-04-03

## 2019-02-19 RX ORDER — MIDAZOLAM HYDROCHLORIDE 1 MG/ML
2 INJECTION INTRAMUSCULAR; INTRAVENOUS
Status: DISCONTINUED | OUTPATIENT
Start: 2019-02-19 | End: 2019-02-19 | Stop reason: HOSPADM

## 2019-02-19 RX ORDER — GABAPENTIN 300 MG/1
600 CAPSULE ORAL 3 TIMES DAILY
Status: DISCONTINUED | OUTPATIENT
Start: 2019-02-19 | End: 2019-02-20 | Stop reason: HOSPADM

## 2019-02-19 RX ORDER — CLINDAMYCIN HYDROCHLORIDE 300 MG/1
300 CAPSULE ORAL 3 TIMES DAILY
Qty: 42 CAPSULE | Refills: 0 | Status: SHIPPED | OUTPATIENT
Start: 2019-02-19 | End: 2019-04-03

## 2019-02-19 RX ORDER — AMLODIPINE BESYLATE 5 MG/1
5 TABLET ORAL
Status: DISCONTINUED | OUTPATIENT
Start: 2019-02-19 | End: 2019-02-20 | Stop reason: HOSPADM

## 2019-02-19 RX ORDER — HYDROCODONE BITARTRATE AND ACETAMINOPHEN 5; 325 MG/1; MG/1
1 TABLET ORAL EVERY 4 HOURS PRN
Status: DISCONTINUED | OUTPATIENT
Start: 2019-02-19 | End: 2019-02-20 | Stop reason: HOSPADM

## 2019-02-19 RX ORDER — IPRATROPIUM BROMIDE AND ALBUTEROL SULFATE 2.5; .5 MG/3ML; MG/3ML
3 SOLUTION RESPIRATORY (INHALATION)
Status: DISCONTINUED | OUTPATIENT
Start: 2019-02-19 | End: 2019-02-20 | Stop reason: HOSPADM

## 2019-02-19 RX ORDER — SODIUM CHLORIDE 9 MG/ML
100 INJECTION, SOLUTION INTRAVENOUS CONTINUOUS
Status: DISCONTINUED | OUTPATIENT
Start: 2019-02-19 | End: 2019-02-20 | Stop reason: HOSPADM

## 2019-02-19 RX ORDER — HYDROCODONE BITARTRATE AND ACETAMINOPHEN 5; 325 MG/1; MG/1
2 TABLET ORAL EVERY 4 HOURS PRN
Status: DISCONTINUED | OUTPATIENT
Start: 2019-02-19 | End: 2019-02-20 | Stop reason: HOSPADM

## 2019-02-19 RX ORDER — DIPHENHYDRAMINE HYDROCHLORIDE 50 MG/ML
12.5 INJECTION INTRAMUSCULAR; INTRAVENOUS
Status: DISCONTINUED | OUTPATIENT
Start: 2019-02-19 | End: 2019-02-19 | Stop reason: HOSPADM

## 2019-02-19 RX ORDER — PROMETHAZINE HYDROCHLORIDE 25 MG/1
25 TABLET ORAL ONCE AS NEEDED
Status: DISCONTINUED | OUTPATIENT
Start: 2019-02-19 | End: 2019-02-19 | Stop reason: HOSPADM

## 2019-02-19 RX ORDER — FENTANYL CITRATE 50 UG/ML
INJECTION, SOLUTION INTRAMUSCULAR; INTRAVENOUS AS NEEDED
Status: DISCONTINUED | OUTPATIENT
Start: 2019-02-19 | End: 2019-02-19 | Stop reason: SURG

## 2019-02-19 RX ORDER — CLOPIDOGREL BISULFATE 75 MG/1
75 TABLET ORAL DAILY
Status: DISCONTINUED | OUTPATIENT
Start: 2019-02-19 | End: 2019-02-20 | Stop reason: HOSPADM

## 2019-02-19 RX ORDER — SODIUM CHLORIDE 0.9 % (FLUSH) 0.9 %
1-10 SYRINGE (ML) INJECTION AS NEEDED
Status: DISCONTINUED | OUTPATIENT
Start: 2019-02-19 | End: 2019-02-19 | Stop reason: HOSPADM

## 2019-02-19 RX ORDER — MORPHINE SULFATE 2 MG/ML
1 INJECTION, SOLUTION INTRAMUSCULAR; INTRAVENOUS
Status: DISCONTINUED | OUTPATIENT
Start: 2019-02-19 | End: 2019-02-20 | Stop reason: HOSPADM

## 2019-02-19 RX ADMIN — GABAPENTIN 600 MG: 300 CAPSULE ORAL at 20:47

## 2019-02-19 RX ADMIN — FAMOTIDINE 20 MG: 10 INJECTION, SOLUTION INTRAVENOUS at 12:23

## 2019-02-19 RX ADMIN — AMLODIPINE BESYLATE 5 MG: 5 TABLET ORAL at 20:47

## 2019-02-19 RX ADMIN — SODIUM CHLORIDE, POTASSIUM CHLORIDE, SODIUM LACTATE AND CALCIUM CHLORIDE: 600; 310; 30; 20 INJECTION, SOLUTION INTRAVENOUS at 14:19

## 2019-02-19 RX ADMIN — PROPOFOL 150 MG: 10 INJECTION, EMULSION INTRAVENOUS at 14:29

## 2019-02-19 RX ADMIN — CLINDAMYCIN PHOSPHATE 900 MG: 900 INJECTION, SOLUTION INTRAVENOUS at 22:43

## 2019-02-19 RX ADMIN — MIRTAZAPINE 15 MG: 15 TABLET, FILM COATED ORAL at 20:47

## 2019-02-19 RX ADMIN — FENTANYL CITRATE 50 MCG: 50 INJECTION INTRAMUSCULAR; INTRAVENOUS at 14:47

## 2019-02-19 RX ADMIN — CLOPIDOGREL 75 MG: 75 TABLET, FILM COATED ORAL at 20:47

## 2019-02-19 RX ADMIN — DEXAMETHASONE SODIUM PHOSPHATE 8 MG: 10 INJECTION INTRAMUSCULAR; INTRAVENOUS at 14:34

## 2019-02-19 RX ADMIN — ESCITALOPRAM 10 MG: 10 TABLET, FILM COATED ORAL at 20:47

## 2019-02-19 RX ADMIN — LIDOCAINE HYDROCHLORIDE 100 MG: 20 INJECTION, SOLUTION INFILTRATION; PERINEURAL at 14:29

## 2019-02-19 RX ADMIN — CLINDAMYCIN PHOSPHATE 900 MG: 900 INJECTION INTRAVENOUS at 14:24

## 2019-02-19 RX ADMIN — SODIUM CHLORIDE, POTASSIUM CHLORIDE, SODIUM LACTATE AND CALCIUM CHLORIDE 9 ML/HR: 600; 310; 30; 20 INJECTION, SOLUTION INTRAVENOUS at 12:18

## 2019-02-19 RX ADMIN — LEVETIRACETAM 750 MG: 500 TABLET, FILM COATED ORAL at 20:47

## 2019-02-19 RX ADMIN — ONDANSETRON 4 MG: 2 INJECTION INTRAMUSCULAR; INTRAVENOUS at 15:06

## 2019-02-19 NOTE — ANESTHESIA PREPROCEDURE EVALUATION
Anesthesia Evaluation     Patient summary reviewed and Nursing notes reviewed   NPO Solid Status: > 8 hours  NPO Liquid Status: > 4 hours           Airway   Mallampati: III  Neck ROM: full  Possible difficult intubation  Dental      Comment: Loose tooth upper left    Pulmonary     breath sounds clear to auscultation  (+) a smoker,   Cardiovascular     Rhythm: regular    (+) pacemaker pacemaker, hypertension, dysrhythmias Atrial Fib, PVD, hyperlipidemia,       Neuro/Psych  (+) seizures, TIA, headaches, numbness, psychiatric history Depression and Anxiety, dementia,     GI/Hepatic/Renal/Endo    (+) obesity, morbid obesity, hiatal hernia, GERD,      Musculoskeletal     Abdominal   (+) obese,    Substance History      OB/GYN          Other   (+) arthritis                     Anesthesia Plan    ASA 3     general     intravenous induction   Anesthetic plan, all risks, benefits, and alternatives have been provided, discussed and informed consent has been obtained with: patient.

## 2019-02-19 NOTE — H&P
ORTHOPAEDIC SURGERY HISTORY & PHYSICAL  HPI:  Patient is a 88 y.o. Not  or  female who presents with a chronic wound on her lateral malleolar incision 3 months status post ORIF right trimalleolar ankle fracture.  Due to her chronic wound on her left heel, she is unable to fully heal her incisions.  Although the bone seems to have healed, her soft tissue has not yet.  I told her probably the best way to get her to heal would be to remove the hardware so that the infection could be eradicated.  She agreed with this plan and is here today for elective right ankle hardware removal.      Past Medical History:   Diagnosis Date   • Anemia    • At risk for falls    • At risk for sleep apnea 11/17/2018   • Atrial fibrillation (CMS/HCC)    • Bulging lumbar disc    • Cellulitis     BILATERAL LEGS   • Chronic back pain    • Chronic cough    • Chronic UTI    • Chronic venous insufficiency    • 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HCC)    • DDD (degenerative disc disease), lumbosacral    • Dementia without behavioral disturbance     moderate   • Depression, endogenous (CMS/HCC)    • Disc degeneration, lumbar    • Dysphagia    • GERD (gastroesophageal reflux disease)    • Hiatal hernia    • History of anxiety    • History of aspiration pneumonitis    • History of cerebral artery occlusion     CVA (following TIA), 10/10, treated with tPA   • History of herpes zoster    • History of ingrowing nail    • History of osteoporosis    • History of poliomyelitis     child   • History of sciatica    • History of sick sinus syndrome     s/p PPM   • History of transient cerebral ischemia     followed by stroke in 10/2010. BIBI was normal.   • History of Zenker's diverticulum removal 2016   • HX: long term anticoagulant use    • Hyperlipidemia    • Hypertension     NO CURRENT MEDICATION   • Hyponatremia    • Intertrigo    • Lumbar radiculopathy    • Morbid obesity (CMS/HCC)    • MRSA (methicillin resistant Staphylococcus aureus)     LEFT  FOOT SPREAD TO RIGHT ANKLE; DR TUBBS AWARE   • Neuralgia     with diplopia secondary to facial shingles   • Nonepileptic episode (CMS/HCC)    • Osteoarthritis of right knee    • Pacemaker    • Pneumonia    • Seeing double     secondary to shingles on the face also with neuralgia   • Seizures (CMS/HCC)    • 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)    • Spinal stenosis    • Vitamin D deficiency    • Zenker's diverticulum      Past Surgical History:   Procedure Laterality Date   • ANKLE OPEN REDUCTION INTERNAL FIXATION Right 11/17/2018    Procedure: ANKLE OPEN REDUCTION INTERNAL FIXATION;  Surgeon: Cristian Tubbs II, MD;  Location: [facility];  Service: Orthopedics   • CARDIAC PACEMAKER PLACEMENT      secondary to SSS, placed in 2004, with generator change in 2014   • CATARACT EXTRACTION W/ INTRAOCULAR LENS IMPLANT Bilateral    • COLONOSCOPY     • HAND SURGERY Right    • KNEE ARTHROPLASTY Left    • LAMINECTOMY FOR IMPLANTATION / PLACEMENT NEUROSTIMULATOR ELECTRODES     • LUMBAR LAMINECTOMY      L-3 L4 L4 L5   • TONSILLECTOMY     • ZENKERS DIVERTICULECTOMY N/A 9/27/2016    Procedure: ENDOSCOPIC REMOVAL OF ZENKERS DIVERTICULUM W/ WERDASCOPE;  Surgeon: Oswald Barth MD;  Location: [facility];  Service:    • ZENKERS DIVERTICULECTOMY N/A 4/14/2017    Procedure: ESOPHAGOSCOPY;  Surgeon: Oswald Barth MD;  Location: [facility];  Service:      Prior to Admission medications    Medication Sig Start Date End Date Taking? Authorizing Provider   acetaminophen (TYLENOL) 325 MG tablet Take 650 mg by mouth Every 6 (Six) Hours As Needed for Mild Pain .   Yes Provider, MD Sammy   amLODIPine (NORVASC) 5 MG tablet Take 1 tablet by mouth Daily for 30 days. 2/2/19 3/4/19 Yes Juan Carlos Harper MD   artificial tears (LUBRIFRESH P.M.) ointment ophthalmic ointment Administer 1 application to both eyes every night at bedtime.   Yes ProviderSammy MD B Complex-Biotin-FA (SUPER B-50  COMPLEX) capsule Take 1 capsule by mouth Every Morning.   Yes Sammy Veronica MD   escitalopram (LEXAPRO) 10 MG tablet Take 10 mg by mouth Every Night.   Yes Sammy Veronica MD   ferrous sulfate 325 (65 FE) MG tablet Take 325 mg by mouth Daily With Breakfast.   Yes Sammy Veronica MD   gabapentin (NEURONTIN) 600 MG tablet Take 600 mg by mouth 3 (Three) Times a Day.   Yes Sammy Veronica MD   levETIRAcetam (KEPPRA) 750 MG tablet Take 1 tablet by mouth 2 (Two) Times a Day. 10/18/18  Yes Arsalan Velez Jr., MD   mirtazapine (REMERON) 15 MG tablet Take 15 mg by mouth Every Night.   Yes Sammy Veronica MD   Multiple Vitamins-Minerals (MULTIVITAMIN ADULT PO) Take 1 tablet by mouth Daily.   Yes Sammy Veronica MD   NON FORMULARY Apply 1 application topically to the appropriate area as directed Daily. Barrier cream applied to buttock after bowel movements.   Yes Sammy Veronica MD   nystatin (MYCOSTATIN) 414093 UNIT/GM powder Apply  topically to the appropriate area as directed 2 (Two) Times a Day. Apply under breast folds as directed by MD   Yes Sammy Veronica MD   pantoprazole (PROTONIX) 40 MG EC tablet Take 40 mg by mouth Daily.   Yes Sammy Veronica MD   polyethylene glycol (MIRALAX) packet Take 17 g by mouth Daily.   Yes Sammy Veronica MD   Calcium-Vitamin D-Vitamin K (VIACTIV) 500-500-40 MG-UNT-MCG chewable tablet Chew 1 tablet Daily.    Sammy Veronica MD   clopidogrel (PLAVIX) 75 MG tablet Take 75 mg by mouth Daily.    Sammy Veronica MD     Allergies   Allergen Reactions   • Lipitor [Atorvastatin] Confusion   • Penicillins Hives     Has previously tolerated ceftriaxone     Most Recent Immunizations   Administered Date(s) Administered   • Flu Mist 10/05/2015   • Flu Vaccine High Dose PF 65YR+ 10/09/2017   • Flu Vaccine Quad PF >18YRS 09/28/2016     Social History     Tobacco Use   • Smoking status: Former Smoker     Packs/day: 1.00      "Years: 40.00     Pack years: 40.00     Types: Cigarettes     Last attempt to quit:      Years since quittin.1   • Smokeless tobacco: Never Used   • Tobacco comment: caffeine use: one cup of coffee in the morning.    Substance Use Topics   • Alcohol use: Yes     Comment: 2 PER WEEK      Social History     Substance and Sexual Activity   Drug Use No     REVIEW OF SYSTEMS:  Head: negative for headache  Respiratory: negative for shortness of breath.   Cardiovascular: negative for chest pain.   Gastrointestinal: negative abdominal pain.   Neurological: negative for LOC  Psychiatric/Behavioral: negative for memory loss.   All other systems reviewed and are negative  VITALS: /77 (BP Location: Right arm, Patient Position: Lying)   Pulse 78   Temp 97.6 °F (36.4 °C) (Oral)   Resp 18   Ht 160 cm (63\")   Wt 107 kg (235 lb 7 oz)   SpO2 96%   BMI 41.71 kg/m²  Body mass index is 41.71 kg/m².  EXAM:   CONSTITUTIONAL: A&Ox3, No acute distress  LUNGS: Equal chest rise, no shortness of air  CARDIOVASCULAR: palpable peripheral pulses  SKIN: no skin lesions in the area examined  LYMPH: no lymphadenopathy in the area examined  EXTREMITY: Right Lower Extremity   Tenderness to Palpation: No tenderness to palpation   Gross Deformity: Lateral malleolar incision is a small area of wound breakdown with chronic drainage.   Pulses:  Brisk Capillary Refill   Sensation: Intact to Saphenous, Sural, Deep Peroneal, Superficial Peroneal, and Tibial Nerves and grossly throughout extremity   Motor: 5/5 EHL/FHL/TA/GS motor complexes    DATA REVIEW:   No radiology results for the last 7 days  Labs:   Results for the past 24 hours: No results found for this or any previous visit (from the past 24 hour(s)).          IMPRESSION:  Patient is a 88 y.o. Not  or  female with <principal problem not specified>  PLAN:   - Admited to: Cristian Hill II, MD  - Diet: NPO Now  - Weight Bearing:Right Lower Extremity Non " Weight Bearing  - Labs: CBC, CRP and ESR  - Surgery: Removal of ankle hardware from the right ankle  - Consent: The risks and benefits of operative versus nonoperative treatment were discussed.  The patient elected to undergo operative treatment of their injury.  The risks discussed included but were not limited to blood clots, MI, stroke, other medical complications, infection, damage to neurovascular structures,, fracture, , loss of range of motion, stiffness,, infection, need for further procedures,, Inability to remove all of the hardware, broken hardware,  and and risk of anesthesia..  No guarantees were made   - Disposition: Surgery today    Cristian Hill II, MD  Orthopaedic Surgery  [facility]

## 2019-02-19 NOTE — ANESTHESIA POSTPROCEDURE EVALUATION
"Patient: Kim Feldman    Procedure Summary     Date:  02/19/19 Room / Location:  [facility] OR [address] [place] [address] [facility] OR    Anesthesia Start:  1419 Anesthesia Stop:  1539    Procedure:  RT ANKLE HARDWARE REMOVAL (Right Ankle) Diagnosis:      Surgeon:  Cristian Hill II, MD Provider:  Willie Olivera MD    Anesthesia Type:  general ASA Status:  3          Anesthesia Type: general  Last vitals  BP   160/81 (02/19/19 1635)   Temp   36.7 °C (98 °F) (02/19/19 1535)   Pulse   77 (02/19/19 1635)   Resp   16 (02/19/19 1635)     SpO2   93 % (02/19/19 1635)     Post Anesthesia Care and Evaluation    Patient location during evaluation: bedside  Patient participation: complete - patient participated  Level of consciousness: awake and alert  Pain management: adequate  Airway patency: patent  Anesthetic complications: No anesthetic complications    Cardiovascular status: acceptable  Respiratory status: acceptable  Hydration status: acceptable    Comments: /81   Pulse 77   Temp 36.7 °C (98 °F) (Oral)   Resp 16   Ht 160 cm (63\")   Wt 107 kg (235 lb 7 oz)   SpO2 93%   BMI 41.71 kg/m²       "

## 2019-02-19 NOTE — ANESTHESIA PROCEDURE NOTES
Airway  Urgency: elective    Date/Time: 2/19/2019 2:31 PM  Airway not difficult    General Information and Staff    Patient location during procedure: OR  Anesthesiologist: Willie Olivera MD  CRNA: Tabatha Park CRNA    Indications and Patient Condition  Indications for airway management: airway protection    Preoxygenated: yes  MILS not maintained throughout  Mask difficulty assessment: 0 - not attempted    Final Airway Details  Final airway type: supraglottic airway      Successful airway: unique  Size 4    Number of attempts at approach: 1    Additional Comments  LMA inserted with ease, assist to SV

## 2019-02-20 VITALS
OXYGEN SATURATION: 96 % | SYSTOLIC BLOOD PRESSURE: 144 MMHG | WEIGHT: 235.44 LBS | HEIGHT: 63 IN | DIASTOLIC BLOOD PRESSURE: 76 MMHG | BODY MASS INDEX: 41.71 KG/M2 | RESPIRATION RATE: 18 BRPM | HEART RATE: 87 BPM | TEMPERATURE: 98.2 F

## 2019-02-20 LAB
ANION GAP SERPL CALCULATED.3IONS-SCNC: 11 MMOL/L
BASOPHILS # BLD AUTO: 0.01 10*3/MM3 (ref 0–0.2)
BASOPHILS NFR BLD AUTO: 0.2 % (ref 0–1.5)
BUN BLD-MCNC: 15 MG/DL (ref 8–23)
BUN/CREAT SERPL: 13.5 (ref 7–25)
CALCIUM SPEC-SCNC: 9 MG/DL (ref 8.6–10.5)
CHLORIDE SERPL-SCNC: 104 MMOL/L (ref 98–107)
CO2 SERPL-SCNC: 26 MMOL/L (ref 22–29)
CREAT BLD-MCNC: 1.11 MG/DL (ref 0.57–1)
DEPRECATED RDW RBC AUTO: 49.1 FL (ref 37–54)
EOSINOPHIL # BLD AUTO: 0 10*3/MM3 (ref 0–0.4)
EOSINOPHIL NFR BLD AUTO: 0 % (ref 0.3–6.2)
ERYTHROCYTE [DISTWIDTH] IN BLOOD BY AUTOMATED COUNT: 12.8 % (ref 12.3–15.4)
GFR SERPL CREATININE-BSD FRML MDRD: 46 ML/MIN/1.73
GLUCOSE BLD-MCNC: 139 MG/DL (ref 65–99)
HCT VFR BLD AUTO: 31.1 % (ref 34–46.6)
HGB BLD-MCNC: 9.7 G/DL (ref 12–15.9)
IMM GRANULOCYTES # BLD AUTO: 0.01 10*3/MM3 (ref 0–0.05)
IMM GRANULOCYTES NFR BLD AUTO: 0.2 % (ref 0–0.5)
LYMPHOCYTES # BLD AUTO: 0.89 10*3/MM3 (ref 0.7–3.1)
LYMPHOCYTES NFR BLD AUTO: 20.5 % (ref 19.6–45.3)
MCH RBC QN AUTO: 32.7 PG (ref 26.6–33)
MCHC RBC AUTO-ENTMCNC: 31.2 G/DL (ref 31.5–35.7)
MCV RBC AUTO: 104.7 FL (ref 79–97)
MONOCYTES # BLD AUTO: 0.11 10*3/MM3 (ref 0.1–0.9)
MONOCYTES NFR BLD AUTO: 2.5 % (ref 5–12)
NEUTROPHILS # BLD AUTO: 3.32 10*3/MM3 (ref 1.4–7)
NEUTROPHILS NFR BLD AUTO: 76.6 % (ref 42.7–76)
NRBC BLD AUTO-RTO: 0 /100 WBC (ref 0–0)
PLATELET # BLD AUTO: 169 10*3/MM3 (ref 140–450)
PMV BLD AUTO: 9.1 FL (ref 6–12)
POTASSIUM BLD-SCNC: 5.1 MMOL/L (ref 3.5–5.2)
RBC # BLD AUTO: 2.97 10*6/MM3 (ref 3.77–5.28)
SODIUM BLD-SCNC: 141 MMOL/L (ref 136–145)
WBC NRBC COR # BLD: 4.34 10*3/MM3 (ref 3.4–10.8)

## 2019-02-20 PROCEDURE — A9270 NON-COVERED ITEM OR SERVICE: HCPCS | Performed by: ORTHOPAEDIC SURGERY

## 2019-02-20 PROCEDURE — 97110 THERAPEUTIC EXERCISES: CPT

## 2019-02-20 PROCEDURE — 63710000001 ACETAMINOPHEN 325 MG TABLET: Performed by: ORTHOPAEDIC SURGERY

## 2019-02-20 PROCEDURE — 63710000001 CLOPIDOGREL 75 MG TABLET: Performed by: ORTHOPAEDIC SURGERY

## 2019-02-20 PROCEDURE — 63710000001 FERROUS SULFATE 325 (65 FE) MG TABLET: Performed by: ORTHOPAEDIC SURGERY

## 2019-02-20 PROCEDURE — 94799 UNLISTED PULMONARY SVC/PX: CPT

## 2019-02-20 PROCEDURE — 97162 PT EVAL MOD COMPLEX 30 MIN: CPT

## 2019-02-20 PROCEDURE — 63710000001 LEVETIRACETAM 250 MG TABLET: Performed by: ORTHOPAEDIC SURGERY

## 2019-02-20 PROCEDURE — 85025 COMPLETE CBC W/AUTO DIFF WBC: CPT | Performed by: ORTHOPAEDIC SURGERY

## 2019-02-20 PROCEDURE — 80048 BASIC METABOLIC PNL TOTAL CA: CPT | Performed by: ORTHOPAEDIC SURGERY

## 2019-02-20 PROCEDURE — 63710000001 GABAPENTIN 300 MG CAPSULE: Performed by: ORTHOPAEDIC SURGERY

## 2019-02-20 PROCEDURE — 63710000001 PANTOPRAZOLE 40 MG TABLET DELAYED-RELEASE: Performed by: ORTHOPAEDIC SURGERY

## 2019-02-20 RX ADMIN — FERROUS SULFATE TAB 325 MG (65 MG ELEMENTAL FE) 325 MG: 325 (65 FE) TAB at 08:45

## 2019-02-20 RX ADMIN — IPRATROPIUM BROMIDE AND ALBUTEROL SULFATE 3 ML: 2.5; .5 SOLUTION RESPIRATORY (INHALATION) at 12:03

## 2019-02-20 RX ADMIN — CLINDAMYCIN PHOSPHATE 900 MG: 900 INJECTION, SOLUTION INTRAVENOUS at 06:14

## 2019-02-20 RX ADMIN — PANTOPRAZOLE SODIUM 40 MG: 40 TABLET, DELAYED RELEASE ORAL at 08:44

## 2019-02-20 RX ADMIN — GABAPENTIN 600 MG: 300 CAPSULE ORAL at 15:45

## 2019-02-20 RX ADMIN — LEVETIRACETAM 750 MG: 500 TABLET, FILM COATED ORAL at 08:44

## 2019-02-20 RX ADMIN — IPRATROPIUM BROMIDE AND ALBUTEROL SULFATE 3 ML: 2.5; .5 SOLUTION RESPIRATORY (INHALATION) at 15:28

## 2019-02-20 RX ADMIN — GABAPENTIN 600 MG: 300 CAPSULE ORAL at 08:44

## 2019-02-20 RX ADMIN — ACETAMINOPHEN 650 MG: 325 TABLET, FILM COATED ORAL at 06:21

## 2019-02-20 RX ADMIN — CLOPIDOGREL 75 MG: 75 TABLET, FILM COATED ORAL at 08:45

## 2019-02-20 RX ADMIN — IPRATROPIUM BROMIDE AND ALBUTEROL SULFATE 3 ML: 2.5; .5 SOLUTION RESPIRATORY (INHALATION) at 07:32

## 2019-02-20 RX ADMIN — CLINDAMYCIN PHOSPHATE 900 MG: 900 INJECTION, SOLUTION INTRAVENOUS at 14:32

## 2019-02-20 NOTE — DISCHARGE PLACEMENT REQUEST
"Kim Feldman (88 y.o. Female)     Date of Birth Social Security Number Address Home Phone MRN    03/13/1930  [address] [place] [address]   [facility] [place] [address] [phone] [id number]    [place] Marital Status          [employer]        Admission Date Admission Type Admitting Provider Attending Provider Department, Room/Bed    2/19/19 Elective Cristian Hill II, MD Sweet, Richard Alexander II, MD [address], P797/1    Discharge Date Discharge Disposition Discharge Destination         Home or Self Care              Attending Provider:  Cristian Hill II, MD    Allergies:  Lipitor [Atorvastatin], Penicillins    Isolation:  Contact   Infection:  MRSA (01/31/19)   Code Status:  Prior    Ht:  160 cm (63\")   Wt:  107 kg (235 lb 7 oz)    Admission Cmt:  None   Principal Problem:  None                Active Insurance as of 2/19/2019     Primary Coverage     Payor Plan Insurance Group Employer/Plan Group    AETNA MEDICARE REPLACEMENT AETNA [id number]     Payor Plan Address Payor Plan Phone Number Payor Plan Fax Number Effective Dates    [address] [phone]  1/1/2018 - None Entered    [facility] [id number]       Subscriber Name Subscriber Birth Date Member ID       KIM FELDMAN 3/13/1930 [id number]                 Emergency Contacts      (Rel.) Home Phone Work Phone Mobile Phone    Leandra Feldman (Daughter) [phone] -- [phone]              "

## 2019-02-20 NOTE — OP NOTE
HARDWARE REMOVAL OPERATIVE NOTE  PATIENT NAME: Kim Feldman  MRN: [id number]  : 3/13/1930 AGE: 88 y.o. GENDER: female  DATE OF OPERATION: 2019  PREOPERATIVE DIAGNOSIS: Wound Healing Problems Right Ankle  POSTOPERATIVE DIAGNOSIS: Same  OPERATION PERFORMED: Hardware Removal  SURGEON: Cristian Hill MD  Circulator: Sarah Marvin RN  Radiology Technologist: Luh Bojorquez  Scrub Person: Faith Schmidt  Vendor Representative: Baron Luigi  ANESTHESIA: General  ESTIMATED BLOOD LOSS: 50cc  SPONGE AND NEEDLE COUNT: Correct  INDICATIONS:  Wound Healing: This patient was noted to have superficial wound healing problems and infection post-operatively that required hardware removal for better healing conditions after the bone was healed.    DETAILS OF PROCEDURE:  The patient was met in the preoperative area. The site was marked. The consent and H&P were reviewed. The patient was then wheeled back to the operative suite and transferred to the operative table. The patient underwent anesthesia. Surgical alcohol was used to thoroughly clean the entire operative extremity.    The extremity was then prepped in the normal sterile fashion, which included Chloroprep and multiple layers of sterile drapes. After a surgical timeout in which administration of preoperative antibiotics and the surgical site were confirmed, the surgery was begun.     The old surgical incisions were used and dissection was carried down to the hardware.  Fluoroscopy was used as needed to locate hardware that was difficult to find.  All hardware was freed up of any soft tissue or bony overgrowth.  As each piece was removed, it was inspected to ensure that it was not broken.      After all hardware had been removed, fluoroscopy imaging demonstrated that no metal remained.  The wounds were then closed in layers and a sterile dressing was applied.    The patient was awoken from anesthesia, moved to the [facility] and taken to the recovery room in stable  condition. Sponge and needle count was correct. There were no complications. Patient tolerated the procedure well.    Cristian Hill II, MD  Orthopaedic Surgery  [facility]

## 2019-02-20 NOTE — THERAPY EVALUATION
Acute Care - Physical Therapy Initial Evaluation  [facility]     Patient Name: Kim Feldman  : 3/13/1930  MRN: [id number]  Today's Date: 2019   Onset of Illness/Injury or Date of Surgery: 19  Date of Referral to PT: 19  Referring Physician: Cristian Hill II, MD      Admit Date: 2019    Visit Dx:     ICD-10-CM ICD-9-CM   1. Impaired transfers R29.91 781.99   2. 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)   • Vitamin D deficiency   • History of sick sinus syndrome   • Intertrigo   • Generalized convulsive seizures (CMS/HCC)   • Change in bowel habits   • Zenker diverticulum   • History of anxiety   • 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HCC)   • Thrombocytopenia (CMS/HCC)   • B12 deficiency   • Cardiac pacemaker in situ   • Chronic venous insufficiency   • Arthritis   • S/P knee replacement   • Chronic bilateral low back pain without sciatica   • Essential hypertension   • Hiatal hernia   • Zenker's diverticulum   • Chronic idiopathic constipation   • Pressure sore on buttocks   • Somnolence   • Closed trimalleolar fracture of right ankle   • Surgical wound infection   • History of failed arthroplasty of ankle     Past Medical History:   Diagnosis Date   • Anemia    • At risk for falls    • At risk for sleep apnea 2018   • Atrial fibrillation (CMS/HCC)    • Bulging lumbar disc    • Cellulitis     BILATERAL LEGS   • Chronic back pain    • Chronic cough    • Chronic UTI    • Chronic venous insufficiency    • 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HCC)    • DDD (degenerative disc disease), lumbosacral    • Dementia without behavioral disturbance     moderate    • Depression, endogenous (CMS/HCC)    • Disc degeneration, lumbar    • Dysphagia    • GERD (gastroesophageal reflux disease)    • Hiatal hernia    • History of anxiety    • History of aspiration pneumonitis    • History of cerebral artery occlusion     CVA (following TIA), 10/10, treated with tPA   • History of herpes zoster    • History of ingrowing nail    • History of osteoporosis    • History of poliomyelitis     child   • History of sciatica    • History of sick sinus syndrome     s/p PPM   • History of transient cerebral ischemia     followed by stroke in 10/2010. BIBI was normal.   • History of Zenker's diverticulum removal 2016   • HX: long term anticoagulant use    • Hyperlipidemia    • Hypertension     NO CURRENT MEDICATION   • Hyponatremia    • Intertrigo    • Lumbar radiculopathy    • Morbid obesity (CMS/HCC)    • MRSA (methicillin resistant Staphylococcus aureus)     LEFT FOOT SPREAD TO RIGHT ANKLE; DR TUBBS AWARE   • Neuralgia     with diplopia secondary to facial shingles   • Nonepileptic episode (CMS/HCC)    • Osteoarthritis of right knee    • Pacemaker    • Pneumonia    • Seeing double     secondary to shingles on the face also with neuralgia   • Seizures (CMS/HCC)    • 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)    • Spinal stenosis    • Vitamin D deficiency    • Zenker's diverticulum      Past Surgical History:   Procedure Laterality Date   • ANKLE OPEN REDUCTION INTERNAL FIXATION Right 11/17/2018    Procedure: ANKLE OPEN REDUCTION INTERNAL FIXATION;  Surgeon: Cristian Tubbs II, MD;  Location: [facility];  Service: Orthopedics   • CARDIAC PACEMAKER PLACEMENT      secondary to SSS, placed in 2004, with gen chng 2014; [employer] dual DOO   • CATARACT EXTRACTION W/ INTRAOCULAR LENS IMPLANT Bilateral    • COLONOSCOPY     • HAND SURGERY Right    • HARDWARE REMOVAL Right 2/19/2019    Procedure: RT ANKLE HARDWARE REMOVAL;  Surgeon: Cristian Tubbs II, MD;  Location:  [facility] OR;  Service: Orthopedics   • KNEE ARTHROPLASTY Left    • LAMINECTOMY FOR IMPLANTATION / PLACEMENT NEUROSTIMULATOR ELECTRODES     • LUMBAR LAMINECTOMY      L-3 L4 L4 L5   • TONSILLECTOMY     • ZENKERS DIVERTICULECTOMY N/A 9/27/2016    Procedure: ENDOSCOPIC REMOVAL OF ZENKERS DIVERTICULUM W/ WERDASCOPE;  Surgeon: Oswald Barth MD;  Location: [facility];  Service:    • ZENKERS DIVERTICULECTOMY N/A 4/14/2017    Procedure: ESOPHAGOSCOPY;  Surgeon: Oswald Barth MD;  Location: [facility];  Service:         PT ASSESSMENT (last 12 hours)      Physical Therapy Evaluation     Row Name 02/20/19 1154          PT Evaluation Time/Intention    Subjective Information  no complaints  -CA     Document Type  evaluation  -CA     Mode of Treatment  individual therapy;physical therapy  -CA     Patient Effort  adequate  -CA     Symptoms Noted During/After Treatment  none  -CA     Row Name 02/20/19 1153          General Information    Patient Profile Reviewed?  yes  -CA     Onset of Illness/Injury or Date of Surgery  02/19/19  -CA     Referring Physician  Cristian Hill II, MD  -CA     Patient Observations  cooperative;alert;agree to therapy  -CA     Equipment Currently Used at Home  walker, rolling;shower chair  -CA     Pertinent History of Current Functional Problem  3 mo s/p R ankle ORIF, now POD1 R ankle hardware removal secondary to poor superficial wound healing likely 2' to cellulitis.   -CA     Existing Precautions/Restrictions  non-weight bearing;fall  (Significant)   -CA     Row Name 02/20/19 1153          Relationship/Environment    Lives With  facility resident  -CA     Row Name 02/20/19 1154          Resource/Environmental Concerns    Current Living Arrangements  extended care facility  -CA     Row Name 02/20/19 1151          Cognitive Assessment/Intervention- PT/OT    Affect/Mental Status (Cognitive)  confused  (Significant)   -CA     Orientation Status (Cognition)  oriented to;person  -CA      Follows Commands (Cognition)  WFL  -CA     Personal Safety Interventions  fall prevention program maintained;gait belt;nonskid shoes/slippers when out of bed  -CA     Row Name 02/20/19 1154          Mobility Assessment/Treatment    Extremity Weight-bearing Status  right lower extremity  -CA     Right Lower Extremity (Weight-bearing Status)  non weight-bearing (NWB)  (Significant)   -CA     Row Name 02/20/19 1154          Bed Mobility Assessment/Treatment    Bed Mobility Assessment/Treatment  supine-sit;sit-supine;scooting/bridging  -CA     Scooting/Bridging [place] (Bed Mobility)  dependent (less than 25% patient effort);2 person assist  -CA     Supine-Sit [place] (Bed Mobility)  minimum assist (75% patient effort);moderate assist (50% patient effort)  -CA     Sit-Supine [place] (Bed Mobility)  maximum assist (25% patient effort);2 person assist  -CA     Bed Mobility, Safety Issues  decreased use of legs for bridging/pushing  -CA     Assistive Device (Bed Mobility)  bed rails;head of bed elevated;draw sheet  -CA     Row Name 02/20/19 1154          Transfer Assessment/Treatment    Transfer Assessment/Treatment  sit-stand transfer;stand-sit transfer  -CA     Comment (Transfers)  attempted x 2 from elevated bed, but unable to clear buttocks  -CA     Sit-Stand [place] (Transfers)  dependent (less than 25% patient effort);2 person assist  -CA     Stand-Sit [place] (Transfers)  dependent (less than 25% patient effort);2 person assist  -CA     Row Name 02/20/19 1154          Sit-Stand Transfer    Assistive Device (Sit-Stand Transfers)  walker, front-wheeled  -CA     Row Name 02/20/19 1154          Stand-Sit Transfer    Assistive Device (Stand-Sit Transfers)  walker, front-wheeled  -CA     Row Name 02/20/19 1154          General ROM    GENERAL ROM COMMENTS  grossly wfl, R ankle NT  -CA     Row Name 02/20/19 1154          MMT (Manual Muscle Testing)    General MMT Comments  L LE grossly 3/5, R LE  NT  -CA     Row Name 02/20/19 1154          Motor Assessment/Intervention    Additional Documentation  Therapeutic Exercise Interventions (Group);Balance (Group)  -CA     Row Name 02/20/19 1154          Therapeutic Exercise    Comment (Therapeutic Exercise)  L LE LAQ x 10  -CA     Row Name 02/20/19 1154          Balance    Balance  static sitting balance  -CA     Row Name 02/20/19 1154          Static Sitting Balance    Level of [place] (Unsupported Sitting, Static Balance)  supervision  -CA     Sitting Position (Unsupported Sitting, Static Balance)  sitting on edge of bed  -CA     Row Name 02/20/19 1154          Sensory Assessment/Intervention    Sensory General Assessment  no sensation deficits identified  -CA     Row Name             Wound 01/28/19 Left heel pressure injury    Wound - Properties Group Date first assessed: 01/28/19  -BF Present On Admission : yes  -BF Side: Left  -BF Location: heel  -BF Type: pressure injury  -BF Stage, Pressure Injury: unstageable  -BF    Row Name             Wound 02/19/19 1515 Right ankle incision    Wound - Properties Group Date first assessed: 02/19/19  -VB Time first assessed: 1515  -VB Side: Right  -VB Location: ankle  -VB Type: incision  -VB    Row Name 02/20/19 1154          Physical Therapy Clinical Impression    Date of Referral to PT  02/19/19  -CA     Criteria for Skilled Interventions Met (PT Clinical Impression)  yes;treatment indicated  -CA     Pathology/Pathophysiology Noted (Describe Specifically for Each System)  musculoskeletal  -CA     Rehab Potential (PT Clinical Summary)  good, to achieve stated therapy goals  -CA     Row Name 02/20/19 1154          Physical Therapy Goals    Bed Mobility Goal Selection (PT)  bed mobility, PT goal 1  -CA     Transfer Goal Selection (PT)  transfer, PT goal 1;transfer, PT goal 2  -CA     Row Name 02/20/19 1154          Bed Mobility Goal 1 (PT)    Activity/Assistive Device (Bed Mobility Goal 1, PT)  bed mobility activities,  all  -CA     Grant Level/Cues Needed (Bed Mobility Goal 1, PT)  contact guard assist  -CA     Time Frame (Bed Mobility Goal 1, PT)  1 week  -CA     Row Name 02/20/19 1154          Transfer Goal 1 (PT)    Activity/Assistive Device (Transfer Goal 1, PT)  sit-to-stand/stand-to-sit  -CA     [place] Level/Cues Needed (Transfer Goal 1, PT)  moderate assist (50-74% patient effort)  -CA     Time Frame (Transfer Goal 1, PT)  1 week  -CA     Barriers (Transfers Goal 1, PT)  R LE NWB  -CA     Row Name 02/20/19 1154          Transfer Goal 2 (PT)    Activity/Assistive Device (Transfer Goal 2, PT)  bed-to-chair/chair-to-bed  -CA     [place] Level/Cues Needed (Transfer Goal 2, PT)  moderate assist (50-74% patient effort)  -CA     Time Frame (Transfer Goal 2, PT)  1 week  -CA     Barriers (Transfers Goal 2, PT)  R LE NWB  -CA     Row Name 02/20/19 1154          Positioning and Restraints    Pre-Treatment Position  in bed  -CA     Post Treatment Position  bed  -CA     In Bed  notified nsg;supine;call light within reach;encouraged to call for assist;exit alarm on;side rails up x2;L waffle boot R LE boot  -CA       User Key  (r) = Recorded By, (t) = Taken By, (c) = Cosigned By    Initials Name Provider Type    Venus Jensen RN, CWOCN Registered Nurse    Sarah Simmons RN Registered Nurse    Therese Marquez, PT Physical Therapist        Physical Therapy Education     Title: PT OT SLP Therapies (Not Started)     Topic: Physical Therapy (In Progress)     Point: Mobility training (In Progress)     Learning Progress Summary           Patient Acceptance, E, NR by CA at 2/20/2019 12:00 PM                   Point: Home exercise program (In Progress)     Learning Progress Summary           Patient Acceptance, E, NR by CA at 2/20/2019 12:00 PM                   Point: Body mechanics (In Progress)     Learning Progress Summary           Patient Acceptance, E, NR by CA at 2/20/2019 12:00 PM                   Point:  Precautions (In Progress)     Learning Progress Summary           Patient Acceptance, E, NR by CA at 2/20/2019 12:00 PM                               User Key     Initials Effective Dates Name Provider Type Discipline    CA 06/13/18 -  Therese Machuca, PT Physical Therapist PT              PT Recommendation and Plan  Anticipated Discharge Disposition (PT): skilled nursing facility  Planned Therapy Interventions (PT Eval): balance training, bed mobility training, gait training, home exercise program, manual therapy techniques, neuromuscular re-education, patient/family education, ROM (range of motion), stair training, strengthening, stretching, transfer training  Therapy Frequency (PT Clinical Impression): 5 times/wk  Outcome Summary/Treatment Plan (PT)  Anticipated Discharge Disposition (PT): skilled nursing facility  Plan of Care Reviewed With: patient  Outcome Summary: Pt is a pleasantly confused 88 y.o. female  S/p R ankle hardware removal on 2/19/2019. Per chart pt has been nonambulatory since previous R ankle ORIF 3 months ago, pt is a poor historian and cannot remember if she has been walking since previous surgery or not. Pt presents today with confusion, weakness, pain, and decreased functional mobility. Pt required min to mod A for bed mobility, dependent x 2 for STS transfers and unable to clear buttocks from bed secondary to weakness and difficulty maintaining NWB status of R LE. Pt will benefit from skilled PT to address the previous deficits and improve overall safety with functional mobility.  Anticipate pt will D/C to SNF once medically appropriate.  Outcome Measures     Row Name 02/20/19 1200             How much help from another person do you currently need...    Turning from your back to your side while in flat bed without using bedrails?  3  -CA      Moving from lying on back to sitting on the side of a flat bed without bedrails?  2  -CA      Moving to and from a bed to a chair (including a  wheelchair)?  1  -CA      Standing up from a chair using your arms (e.g., wheelchair, bedside chair)?  1  -CA      Climbing 3-5 steps with a railing?  1  -CA      To walk in hospital room?  1  -CA      AM-PAC 6 Clicks Score  9  -CA         Functional Assessment    Outcome Measure Options  AM-PAC 6 Clicks Basic Mobility (PT)  -CA        User Key  (r) = Recorded By, (t) = Taken By, (c) = Cosigned By    Initials Name Provider Type    Therese Marquez, JENNIFER Physical Therapist         Time Calculation:   PT Charges     Row Name 02/20/19 1203             Time Calculation    Start Time  1129  -CA      Stop Time  1145  -CA      Time Calculation (min)  16 min  -CA      PT Received On  02/20/19  -CA      PT - Next Appointment  02/21/19  -CA      PT Goal Re-Cert Due Date  02/27/19  -CA         Time Calculation- PT    Total Timed Code Minutes- PT  10 minute(s)  -CA        User Key  (r) = Recorded By, (t) = Taken By, (c) = Cosigned By    Initials Name Provider Type    Therese Marquez, JENNIFER Physical Therapist        Therapy Suggested Charges     Code   Minutes Charges    None           Therapy Charges for Today     Code Description Service Date Service Provider Modifiers Qty    44771088606 HC PT EVAL MOD COMPLEXITY 2 2/20/2019 Therese Machuca, PT GP 1    87922301690 HC PT THER PROC EA 15 MIN 2/20/2019 Therese Machuca, PT GP 1    77930336276 HC PT THER SUPP EA 15 MIN 2/20/2019 Therese Machuca, PT GP 1          PT G-Codes  Outcome Measure Options: AM-PAC 6 Clicks Basic Mobility (PT)  AM-PAC 6 Clicks Score: 9      Therese Machuca PT  2/20/2019

## 2019-02-20 NOTE — PLAN OF CARE
Problem: Patient Care Overview  Goal: Plan of Care Review  Outcome: Ongoing (interventions implemented as appropriate)   02/20/19 1556   Coping/Psychosocial   Plan of Care Reviewed With patient   Plan of Care Review   Progress improving   OTHER   Outcome Summary Patient has been resting comfortably throughout shift today. Denies any pain and has refused offered tylenol. Vitals WNL. [facility] seen patient today, optifoam dressing applied to left heel and skincare prevention measures implemented. Discharging back to [facility] for rehab today.         Problem: Fall Risk (Adult)  Goal: Absence of Fall  Outcome: Ongoing (interventions implemented as appropriate)   02/20/19 1556   Fall Risk (Adult)   Absence of Fall achieves outcome       Problem: Skin Injury Risk (Adult)  Goal: Skin Health and Integrity  Outcome: Ongoing (interventions implemented as appropriate)   02/20/19 1556   Skin Injury Risk (Adult)   Skin Health and Integrity making progress toward outcome

## 2019-02-20 NOTE — CONSULTS
Name: Kim Feldman ADMIT: 2019   : 3/13/1930  PCP: Grady Villeda MD    MRN: [id number] LOS: 0 days   AGE/SEX: 88 y.o. female  ROOM: [facility]     No chief complaint on file.  Consult for hypoxia    Subjective   Ms. Feldman is a 88 y.o. female admitted to orthopedic surgery and underwent right ankle hardware removal today.  Postoperatively she has had worsened hypoxia and required 2 L of supplemental oxygen to maintain her saturation.  She reports no cough or acute dyspnea currently.  She does report she has noticed some increased wheezing.  She does not use inhalers at home.  She has not had any chest pain and denies palpitations.  No orthopnea PND or edema.  No fevers or chills and no productive cough.  Her main concern now that the right foot is taken care of is what to do about her left foot currently.  She has a rash/pressure spot on her heel which currently is dressed.    History of Present Illness    Past Medical History:   Diagnosis Date   • Anemia    • At risk for falls    • At risk for sleep apnea 2018   • Atrial fibrillation (CMS/HCC)    • Bulging lumbar disc    • Cellulitis     BILATERAL LEGS   • Chronic back pain    • Chronic cough    • Chronic UTI    • Chronic venous insufficiency    • 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HCC)    • DDD (degenerative disc disease), lumbosacral    • Dementia without behavioral disturbance     moderate   • Depression, endogenous (CMS/HCC)    • Disc degeneration, lumbar    • Dysphagia    • GERD (gastroesophageal reflux disease)    • Hiatal hernia    • History of anxiety    • History of aspiration pneumonitis    • History of cerebral artery occlusion     CVA (following TIA), 10/10, treated with tPA   • History of herpes zoster    • History of ingrowing nail    • History of osteoporosis    • History of poliomyelitis     child   • History of sciatica    • History of sick sinus syndrome     s/p PPM   • History of transient cerebral ischemia     followed by stroke  in 10/2010. BIBI was normal.   • History of Zenker's diverticulum removal 2016   • HX: long term anticoagulant use    • Hyperlipidemia    • Hypertension     NO CURRENT MEDICATION   • Hyponatremia    • Intertrigo    • Lumbar radiculopathy    • Morbid obesity (CMS/HCC)    • MRSA (methicillin resistant Staphylococcus aureus)     LEFT FOOT SPREAD TO RIGHT ANKLE; DR TUBBS AWARE   • Neuralgia     with diplopia secondary to facial shingles   • Nonepileptic episode (CMS/HCC)    • Osteoarthritis of right knee    • Pacemaker    • Pneumonia    • Seeing double     secondary to shingles on the face also with neuralgia   • Seizures (CMS/HCC)    • 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)    • Spinal stenosis    • Vitamin D deficiency    • Zenker's diverticulum      Past Surgical History:   Procedure Laterality Date   • ANKLE OPEN REDUCTION INTERNAL FIXATION Right 11/17/2018    Procedure: ANKLE OPEN REDUCTION INTERNAL FIXATION;  Surgeon: Cristian Tubbs II, MD;  Location: [facility];  Service: Orthopedics   • CARDIAC PACEMAKER PLACEMENT      secondary to SSS, placed in 2004, with gen chng 2014; [employer] dual DOO   • CATARACT EXTRACTION W/ INTRAOCULAR LENS IMPLANT Bilateral    • COLONOSCOPY     • HAND SURGERY Right    • KNEE ARTHROPLASTY Left    • LAMINECTOMY FOR IMPLANTATION / PLACEMENT NEUROSTIMULATOR ELECTRODES     • LUMBAR LAMINECTOMY      L-3 L4 L4 L5   • TONSILLECTOMY     • ZENKERS DIVERTICULECTOMY N/A 9/27/2016    Procedure: ENDOSCOPIC REMOVAL OF ZENKERS DIVERTICULUM W/ WERDASCOPE;  Surgeon: Oswald Barth MD;  Location: [facility];  Service:    • ZENKERS DIVERTICULECTOMY N/A 4/14/2017    Procedure: ESOPHAGOSCOPY;  Surgeon: Oswald Barth MD;  Location: [facility] OR;  Service:      Family History   Problem Relation Age of Onset   • Cancer Daughter    • Malig Hyperthermia Neg Hx      Social History     Tobacco Use   • Smoking status: Former Smoker     Packs/day: 1.00     Years: 40.00      Pack years: 40.00     Types: Cigarettes     Last attempt to quit:      Years since quittin.1   • Smokeless tobacco: Never Used   • Tobacco comment: caffeine use: one cup of coffee in the morning.    Substance Use Topics   • Alcohol use: Yes     Comment: 2 PER WEEK   • Drug use: No     Medications Prior to Admission   Medication Sig Dispense Refill Last Dose   • acetaminophen (TYLENOL) 325 MG tablet Take 650 mg by mouth Every 6 (Six) Hours As Needed for Mild Pain .   2019   • amLODIPine (NORVASC) 5 MG tablet Take 1 tablet by mouth Daily for 30 days. 30 tablet 0 2019 at 0725   • artificial tears (LUBRIFRESH P.M.) ointment ophthalmic ointment Administer 1 application to both eyes every night at bedtime.   2/15/2019 at 0725   • B Complex-Biotin-FA (SUPER B-50 COMPLEX) capsule Take 1 capsule by mouth Every Morning.   2019 at Unknown time   • escitalopram (LEXAPRO) 10 MG tablet Take 10 mg by mouth Every Night.   2019 at Unknown time   • ferrous sulfate 325 (65 FE) MG tablet Take 325 mg by mouth Daily With Breakfast.   2019 at Unknown time   • gabapentin (NEURONTIN) 600 MG tablet Take 600 mg by mouth 3 (Three) Times a Day.   2019 at Unknown time   • levETIRAcetam (KEPPRA) 750 MG tablet Take 1 tablet by mouth 2 (Two) Times a Day. 60 tablet 11 2019 at 0830   • mirtazapine (REMERON) 15 MG tablet Take 15 mg by mouth Every Night.   2019 at Unknown time   • Multiple Vitamins-Minerals (MULTIVITAMIN ADULT PO) Take 1 tablet by mouth Daily.   2019 at Unknown time   • NON FORMULARY Apply 1 application topically to the appropriate area as directed Daily. Barrier cream applied to buttock after bowel movements.      • nystatin (MYCOSTATIN) 402095 UNIT/GM powder Apply  topically to the appropriate area as directed 2 (Two) Times a Day. Apply under breast folds as directed by MD   2019 at Unknown time   • pantoprazole (PROTONIX) 40 MG EC tablet Take 40 mg by mouth Daily.    2/18/2019 at Unknown time   • polyethylene glycol (MIRALAX) packet Take 17 g by mouth Daily.   2/18/2019 at Unknown time   • Calcium-Vitamin D-Vitamin K (VIACTIV) 500-500-40 MG-UNT-MCG chewable tablet Chew 1 tablet Daily.   2/17/2019   • clopidogrel (PLAVIX) 75 MG tablet Take 75 mg by mouth Daily.   2/10/2019     Allergies:    Allergies   Allergen Reactions   • Lipitor [Atorvastatin] Confusion   • Penicillins Hives     Has previously tolerated ceftriaxone       Review of Systems   Constitutional: Negative for chills and fever.   HENT: Negative for sore throat and trouble swallowing.    Eyes: Negative for pain and visual disturbance.   Respiratory: Positive for wheezing. Negative for cough and shortness of breath.    Cardiovascular: Negative for chest pain, palpitations and leg swelling.   Gastrointestinal: Negative for constipation, diarrhea, nausea and vomiting.   Endocrine: Negative for cold intolerance and heat intolerance.   Genitourinary: Negative for difficulty urinating and dysuria.   Musculoskeletal: Negative for neck pain and neck stiffness.   Skin: Positive for rash. Negative for pallor.   Allergic/Immunologic: Negative for environmental allergies and food allergies.   Neurological: Negative for seizures and syncope.   Hematological: Negative for adenopathy. Does not bruise/bleed easily.   Psychiatric/Behavioral: Negative for agitation and confusion.        Objective    Vital Signs  Temp:  [97.6 °F (36.4 °C)-98.6 °F (37 °C)] 98.6 °F (37 °C)  Heart Rate:  [76-85] 77  Resp:  [16-20] 16  BP: (112-166)/() 112/65  SpO2:  [88 %-98 %] 96 %  on  Flow (L/min):  [2-6] 2;   Device (Oxygen Therapy): nasal cannula  Body mass index is 41.71 kg/m².    Physical Exam   Constitutional: She appears well-developed. No distress.   HENT:   Head: Atraumatic.   Nose: Nose normal.   Eyes: Conjunctivae and EOM are normal. Pupils are equal, round, and reactive to light.   Neck: Normal range of motion. Neck supple.    Cardiovascular: Normal rate, regular rhythm and intact distal pulses.   Pulmonary/Chest: Effort normal. She has wheezes (end expiratory, left sided). She has no rales.   Abdominal: Soft. There is no tenderness. There is no rebound and no guarding.   Musculoskeletal: Normal range of motion.   Bilateral feet dressed   Neurological: She is alert. No cranial nerve deficit.   Skin: Skin is warm and dry. She is not diaphoretic.   Psychiatric: She has a normal mood and affect. Her behavior is normal.   Nursing note and vitals reviewed.      Results Review:  I reviewed the patient's new clinical results.  I reviewed imaging, agree with interpretation.  I reviewed prior records.    Lab Results (last 24 hours)     Procedure Component Value Units Date/Time    Anaerobic Culture - Wound, Ankle, Right [[id number]] Collected:  02/19/19 1449    Specimen:  Wound from Ankle, Right Updated:  02/19/19 1516    Wound Culture - Wound, Ankle, Right [[id number]] Collected:  02/19/19 1449    Specimen:  Wound from Ankle, Right Updated:  02/19/19 1516    Anaerobic Culture - Wound, Ankle, Right [[id number]] Collected:  02/19/19 1455    Specimen:  Wound from Ankle, Right Updated:  02/19/19 1516    Wound Culture - Wound, Ankle, Right [[id number]] Collected:  02/19/19 1455    Specimen:  Wound from Ankle, Right Updated:  02/19/19 1516    Anaerobic Culture - Wound, Ankle, Right [[id number]] Collected:  02/19/19 1455    Specimen:  Wound from Ankle, Right Updated:  02/19/19 1516    Wound Culture - Wound, Ankle, Right [[id number]] Collected:  02/19/19 1455    Specimen:  Wound from Ankle, Right Updated:  02/19/19 1516          XR Ankle 2 View Right   Final Result      FL C Arm During Surgery   Final Result      XR Chest 1 View    (Results Pending)     Assessment/Plan      Active Hospital Problems    Diagnosis Date Noted   • History of failed arthroplasty of ankle [Z98.890] 02/19/2019      Resolved Hospital Problems   No resolved problems to display.      · Hypoxia/wheezing: She does have some mild end expiratory wheezes so will schedule some breathing treatments.  Check chest x-ray although I did not hear rales on exam.  Monitor for fever or other infectious signs overnight.  Continue incentive spirometry.  · Seizure disorder: Continue Keppra  · Hypertension: Monitor with current regimen.  Will check labs in morning.  · Failed arthroplasty of ankle: Per primary service    Thank you for the consult and allowing me to participate in Ms. Feldman's care.  [facility] will continue to follow.  Please call with any questions or concerns.  I discussed the patients findings and my recommendations with patient.      Ezra Alvarez MD  [facility]  02/19/19  8:23 PM

## 2019-02-20 NOTE — NURSING NOTE
CWON consult received.  Patient with unstageable PI to left heel that was present on admission.  Wound measures 1.0 cm x 3.0 cm and is with dry scab/eschar to base, no drainage and no outward s/s of infection noted. Recommend keeping wound clean and dry by painting with betadine and covering with Optifoam dressing, changing once daily. Recommend placing accumax pump to bed and continuing to offload with heel boots. Please see orders. Discussed with CATRINA French. Thank you for consult and please don't hesitate to call with any questions.

## 2019-02-20 NOTE — PLAN OF CARE
Problem: Patient Care Overview  Goal: Plan of Care Review  Outcome: Ongoing (interventions implemented as appropriate)   02/20/19 0334   Coping/Psychosocial   Plan of Care Reviewed With patient   Plan of Care Review   Progress improving   OTHER   Outcome Summary Pt has been stable through the night. Boot in place to right ankle with NWB orders. Pt has not been OOB and has refused to reposition all night. Monitoring skin for breakdown. No c/o pain at this time. CXR was WNL, breathing txs given for wheezing. Pt is incontinent and voiding per brief. [facility] consulted for wound to left heel. Will continue to monitor.      Goal: Individualization and Mutuality  Outcome: Ongoing (interventions implemented as appropriate)    Goal: Discharge Needs Assessment  Outcome: Ongoing (interventions implemented as appropriate)   02/19/19 1147 02/19/19 1824   Disability   Equipment Currently Used at Home wheelchair;grab bar;commode  (live at assisted living) --    Discharge Needs Assessment   Patient/Family Anticipates Transition to --  inpatient rehabilitation facility   Patient/Family Anticipated Services at Transition --     Transportation Anticipated --  agency  (needs wheelchair van)     Goal: Interprofessional Rounds/Family Conf  Outcome: Ongoing (interventions implemented as appropriate)   02/20/19 0334   Interdisciplinary Rounds/Family Conf   Participants nursing;patient       Problem: Fall Risk (Adult)  Goal: Identify Related Risk Factors and Signs and Symptoms  Outcome: Outcome(s) achieved Date Met: 02/20/19 02/20/19 0334   Fall Risk (Adult)   Related Risk Factors (Fall Risk) age-related changes;culprit medication(s);gait/mobility problems;environment unfamiliar   Signs and Symptoms (Fall Risk) presence of risk factors     Goal: Absence of Fall  Outcome: Ongoing (interventions implemented as appropriate)      Problem: Skin Injury Risk (Adult)  Goal: Identify Related Risk Factors and Signs and Symptoms  Outcome:  Outcome(s) achieved Date Met: 02/20/19 02/20/19 0334   Skin Injury Risk (Adult)   Related Risk Factors (Skin Injury Risk) advanced age;body weight extremes;edema;mobility impaired     Goal: Skin Health and Integrity  Outcome: Ongoing (interventions implemented as appropriate)   02/20/19 0334   Skin Injury Risk (Adult)   Skin Health and Integrity making progress toward outcome       Problem: Mobility, Physical Impaired (Adult)  Goal: Identify Related Risk Factors and Signs and Symptoms  Outcome: Outcome(s) achieved Date Met: 02/20/19 02/20/19 0334   Mobility, Physical Impaired (Adult)   Related Risk Factors (Physical Mobility, Impaired) activity intolerance;surgery/procedure   Signs and Symptoms (Physical Mobility Impaired) decreased balance;unsafe transfers/ambulation     Goal: Enhanced Mobility Skills  Outcome: Ongoing (interventions implemented as appropriate)   02/20/19 0334   Mobility, Physical Impaired (Adult)   Enhanced Mobility Skills making progress toward outcome     Goal: Enhanced Functional Ability  Outcome: Ongoing (interventions implemented as appropriate)   02/20/19 0334   Mobility, Physical Impaired (Adult)   Enhanced Functional Ability making progress toward outcome

## 2019-02-20 NOTE — PROGRESS NOTES
This patient is postop day 1 right ankle hardware removal.  She is 3 months status post ORIF of right trimalleolar ankle fracture.  Although the bone healed up very well, she has had problems with superficial wound healing.  I believe this is probably due to her left foot chronic ulcers with cellulitis.  She has very poor healing potential.  After several rounds of IV and oral antibiotics, her right surgical incisions were still not fully healed.  As the bone had fully healed, I believe that the most prudent course of action would be removal of hardware.  The surgery was performed yesterday without any incident.  I was going to discharge her back to her SNF facility yesterday, but postoperatively she was requiring at least 2 L of oxygen.  Therefore, I kept her overnight for observation and had a hospitalist consult.  I very much appreciate the hospitalist comanagement of her conditions.  This morning she is somewhat confused, but she is off all oxygen and looking good.  Her labs are okay except for her creatinine is slightly elevated.  I was going to order a wound care consult for her left foot/heel, but it seems that the hospitalist has already taken care of this which I appreciate.  I would like to send her back to her SNF facility as soon as today if she is cleared medically by the hospitalist.  It would be nice to have some wound care recommendations for the left foot per the wound care team.  I need to see her back in a couple weeks to remove her sutures.  She is to remain nonweightbearing on the right lower extremity probably for 4-6 weeks to allow the bone to heal further from just having all the hardware taken out.    Cristian Hill II, MD  Orthopaedic Surgery  [facility]

## 2019-02-20 NOTE — PLAN OF CARE
Problem: Patient Care Overview  Goal: Plan of Care Review  Outcome: Ongoing (interventions implemented as appropriate)   02/19/19 1904   Coping/Psychosocial   Plan of Care Reviewed With patient   Plan of Care Review   Progress improving   OTHER   Outcome Summary Patient admitted from pacu s/p right ankle hardware removal. Denies any pain. Tolerating regular diet. 2L NC needed to maintain sats. Vitals stable and voiding function intact. NWB RLE. Patient currently is nonambulatory and uses wheelchair to get around at rehab facility. [employer] prevention measures implemented. Cont to monitor. Anticipates discharge tomorrow back to [facility].        Problem: Knee Arthroplasty (Total, Partial) (Adult)  Goal: Signs and Symptoms of Listed Potential Problems Will be Absent, Minimized or Managed (Knee Arthroplasty)  Outcome: Ongoing (interventions implemented as appropriate)   02/19/19 1904   Goal/Outcome Evaluation   Problems Assessed (Knee Arthroplasty) all   Problems Present (Knee Arthroplasty) pain;functional deficit;wound healing impaired     Goal: Anesthesia/Sedation Recovery  Outcome: Ongoing (interventions implemented as appropriate)   02/19/19 1904   Goal/Outcome Evaluation   Anesthesia/Sedation Recovery progressing toward baseline       Problem: Fall Risk (Adult)  Goal: Identify Related Risk Factors and Signs and Symptoms  Outcome: Ongoing (interventions implemented as appropriate)    Goal: Absence of Fall  Outcome: Ongoing (interventions implemented as appropriate)   02/19/19 1904   Fall Risk (Adult)   Absence of Fall achieves outcome

## 2019-02-20 NOTE — DISCHARGE SUMMARY
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)   • Vitamin D deficiency   • History of sick sinus syndrome   • Intertrigo   • Generalized convulsive seizures (CMS/HCC)   • Change in bowel habits   • Zenker diverticulum   • History of anxiety   • 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HCC)   • Thrombocytopenia (CMS/HCC)   • B12 deficiency   • Cardiac pacemaker in situ   • Chronic venous insufficiency   • Arthritis   • S/P knee replacement   • Chronic bilateral low back pain without sciatica   • Essential hypertension   • Hiatal hernia   • Zenker's diverticulum   • Chronic idiopathic constipation   • Pressure sore on buttocks   • Somnolence   • Closed trimalleolar fracture of right ankle   • Surgical wound infection   • History of failed arthroplasty of ankle     Status Post: RT ANKLE HARDWARE REMOVAL      Allergies   Allergen Reactions   • Lipitor [Atorvastatin] Confusion   • Penicillins Hives     Has previously tolerated ceftriaxone       Current Medications:     Discharge Medications      New Medications      Instructions Start Date   aspirin 325 MG tablet  Commonly known as:  TEDDY ASPIRIN   325 mg, Oral, Daily      clindamycin 300 MG capsule  Commonly known as:  CLEOCIN   300 mg, Oral, 3 Times Daily      HYDROcodone-acetaminophen 5-325 MG per tablet  Commonly known as:  NORCO   1 tablet, Oral, Every 4 Hours PRN         Continue These Medications       Instructions Start Date   acetaminophen 325 MG tablet  Commonly known as:  TYLENOL   650 mg, Oral, Every 6 Hours PRN      amLODIPine 5 MG tablet  Commonly known as:  NORVASC   5 mg, Oral, Every 24 Hours Scheduled      artificial tears ointment ophthalmic ointment   1 application, Both Eyes, Every Night at Bedtime      clopidogrel 75 MG tablet  Commonly known as:  PLAVIX   75 mg, Oral, Daily      escitalopram 10 MG tablet  Commonly known as:  LEXAPRO   10 mg, Oral, Nightly      ferrous sulfate 325 (65 FE) MG tablet   325 mg, Oral, Daily With Breakfast      gabapentin 600 MG tablet  Commonly known as:  NEURONTIN   600 mg, Oral, 3 Times Daily      levETIRAcetam 750 MG tablet  Commonly known as:  KEPPRA   750 mg, Oral, 2 Times Daily      mirtazapine 15 MG tablet  Commonly known as:  REMERON   15 mg, Oral, Nightly      MULTIVITAMIN ADULT PO   1 tablet, Oral, Daily      NON FORMULARY   1 application, Topical, Daily, Barrier cream applied to buttock after bowel movements.       nystatin 121887 UNIT/GM powder  Commonly known as:  MYCOSTATIN   Topical, 2 Times Daily, Apply under breast folds as directed by MD      pantoprazole 40 MG EC tablet  Commonly known as:  PROTONIX   40 mg, Oral, Daily      polyethylene glycol packet  Commonly known as:  MIRALAX   17 g, Oral, Daily      SUPER B-50 COMPLEX capsule   1 capsule, Oral, Every Morning      VIACTIV 500-500-40 MG-UNT-MCG chewable tablet  Generic drug:  Calcium-Vitamin D-Vitamin K   1 tablet, Oral, Daily                 Past Medical History:   Diagnosis Date   • Anemia    • At risk for falls    • At risk for sleep apnea 11/17/2018   • Atrial fibrillation (CMS/HCC)    • Bulging lumbar disc    • Cellulitis     BILATERAL LEGS   • Chronic back pain    • Chronic cough    • Chronic UTI    • Chronic venous insufficiency    • 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HCC)    • DDD (degenerative disc disease), lumbosacral    • Dementia without behavioral disturbance     moderate   • Depression,  endogenous (CMS/HCC)    • Disc degeneration, lumbar    • Dysphagia    • GERD (gastroesophageal reflux disease)    • Hiatal hernia    • History of anxiety    • History of aspiration pneumonitis    • History of cerebral artery occlusion     CVA (following TIA), 10/10, treated with tPA   • History of herpes zoster    • History of ingrowing nail    • History of osteoporosis    • History of poliomyelitis     child   • History of sciatica    • History of sick sinus syndrome     s/p PPM   • History of transient cerebral ischemia     followed by stroke in 10/2010. BIBI was normal.   • History of Zenker's diverticulum removal 2016   • HX: long term anticoagulant use    • Hyperlipidemia    • Hypertension     NO CURRENT MEDICATION   • Hyponatremia    • Intertrigo    • Lumbar radiculopathy    • Morbid obesity (CMS/HCC)    • MRSA (methicillin resistant Staphylococcus aureus)     LEFT FOOT SPREAD TO RIGHT ANKLE; DR TUBBS AWARE   • Neuralgia     with diplopia secondary to facial shingles   • Nonepileptic episode (CMS/HCC)    • Osteoarthritis of right knee    • Pacemaker    • Pneumonia    • Seeing double     secondary to shingles on the face also with neuralgia   • Seizures (CMS/HCC)    • 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)    • Spinal stenosis    • Vitamin D deficiency    • Zenker's diverticulum      Past Surgical History:   Procedure Laterality Date   • ANKLE OPEN REDUCTION INTERNAL FIXATION Right 11/17/2018    Procedure: ANKLE OPEN REDUCTION INTERNAL FIXATION;  Surgeon: Cristian Tubbs II, MD;  Location: [facility];  Service: Orthopedics   • CARDIAC PACEMAKER PLACEMENT      secondary to SSS, placed in 2004, with gen chng 2014; [employer] dual DOO   • CATARACT EXTRACTION W/ INTRAOCULAR LENS IMPLANT Bilateral    • COLONOSCOPY     • HAND SURGERY Right    • KNEE ARTHROPLASTY Left    • LAMINECTOMY FOR IMPLANTATION / PLACEMENT NEUROSTIMULATOR ELECTRODES     • LUMBAR LAMINECTOMY      L-3 L4 L4 L5   •  TONSILLECTOMY     • ZENKERS DIVERTICULECTOMY N/A 2016    Procedure: ENDOSCOPIC REMOVAL OF ZENKERS DIVERTICULUM W/ WERDASCOPE;  Surgeon: Oswald Barth MD;  Location: [facility] OR;  Service:    • ZENKERS DIVERTICULECTOMY N/A 2017    Procedure: ESOPHAGOSCOPY;  Surgeon: Oswald Barth MD;  Location: [facility] OR;  Service:      Social History     Occupational History   • Occupation: Retired   Tobacco Use   • Smoking status: Former Smoker     Packs/day: 1.00     Years: 40.00     Pack years: 40.00     Types: Cigarettes     Last attempt to quit:      Years since quittin.1   • Smokeless tobacco: Never Used   • Tobacco comment: caffeine use: one cup of coffee in the morning.    Substance and Sexual Activity   • Alcohol use: Yes     Comment: 2 PER WEEK   • Drug use: No   • Sexual activity: Defer      Social History     Social History Narrative   • Not on file     Family History   Problem Relation Age of Onset   • Cancer Daughter    • Malig Hyperthermia Neg Hx          Physical Exam: 88 y.o. female  General Appearance:    Alert, cooperative, in no acute distress                      Vitals:    19 1919 19 2044 19 2244 19 0352   BP: 112/65 134/77 123/69 119/71   BP Location: Left arm Left arm Left arm Left arm   Patient Position: Lying Lying Lying Lying   Pulse: 77 74 72 77   Resp: 16 16 16 16   Temp: 98.6 °F (37 °C)  97.4 °F (36.3 °C) 97.2 °F (36.2 °C)   TempSrc: Oral  Oral Oral   SpO2: 96% 98% 97% 94%   Weight:       Height:                                                                                  Hospital Course:  88 y.o. female admitted to [facility] to services of Cristian Hill II, MD with History of failed arthroplasty of ankle [Z98.890] on 2019 and underwent RT ANKLE HARDWARE REMOVAL  Per Cristian Hill II, MD.  In PACU she was requiring oxygen to maintain her O2 sats.  It was decided at that time to keep her overnight. Opioids were  titrated to achieve appropriate pain management to allow for participation in mobilization exercises. Vital signs are now stable. Operative extremity neurovascular status remains intact.     HOSPITAL COURSE AND TREATMENT: (Please refer to patient's medical record for comprehensive details.)  A hospitalist consult was placed for management of her low O2 sats, which resolved overnight.  Additionally, a wound care consult was placed for her chronic left heel wound.  She was cleared for discharge back to her SNF facility on postop day #1 and was sent in good condition.  Appropriate education re: incision care, activity levels, medications, and follow up visits was completed and all questions were answered. The patient is now deemed stable for discharge.      DIAGNOSTIC TESTS:     Admission on 02/19/2019   Component Date Value Ref Range Status   • Gram Stain 02/19/2019 Many (4+) WBCs seen   Preliminary   • Gram Stain 02/19/2019 No organisms seen   Preliminary   • Gram Stain 02/19/2019 Many (4+) WBCs seen   Preliminary   • Gram Stain 02/19/2019 No organisms seen   Preliminary   • Gram Stain 02/19/2019 Many (4+) WBCs seen   Preliminary   • Gram Stain 02/19/2019 No organisms seen   Preliminary   • Glucose 02/20/2019 139* 65 - 99 mg/dL Final   • BUN 02/20/2019 15  8 - 23 mg/dL Final   • Creatinine 02/20/2019 1.11* 0.57 - 1.00 mg/dL Final   • Sodium 02/20/2019 141  136 - 145 mmol/L Final   • Potassium 02/20/2019 5.1  3.5 - 5.2 mmol/L Final   • Chloride 02/20/2019 104  98 - 107 mmol/L Final   • CO2 02/20/2019 26.0  22.0 - 29.0 mmol/L Final   • Calcium 02/20/2019 9.0  8.6 - 10.5 mg/dL Final   • eGFR Non African Amer 02/20/2019 46* >60 mL/min/1.73 Final   • BUN/Creatinine Ratio 02/20/2019 13.5  7.0 - 25.0 Final   • Anion Gap 02/20/2019 11.0  mmol/L Final   • WBC 02/20/2019 4.34  3.40 - 10.80 10*3/mm3 Final   • RBC 02/20/2019 2.97* 3.77 - 5.28 10*6/mm3 Final   • Hemoglobin 02/20/2019 9.7* 12.0 - 15.9 g/dL Final   • Hematocrit  02/20/2019 31.1* 34.0 - 46.6 % Final   • MCV 02/20/2019 104.7* 79.0 - 97.0 fL Final   • MCH 02/20/2019 32.7  26.6 - 33.0 pg Final   • MCHC 02/20/2019 31.2* 31.5 - 35.7 g/dL Final   • RDW 02/20/2019 12.8  12.3 - 15.4 % Final   • RDW-SD 02/20/2019 49.1  37.0 - 54.0 fl Final   • MPV 02/20/2019 9.1  6.0 - 12.0 fL Final   • Platelets 02/20/2019 169  140 - 450 10*3/mm3 Final   • Neutrophil % 02/20/2019 76.6* 42.7 - 76.0 % Final   • Lymphocyte % 02/20/2019 20.5  19.6 - 45.3 % Final   • Monocyte % 02/20/2019 2.5* 5.0 - 12.0 % Final   • Eosinophil % 02/20/2019 0.0* 0.3 - 6.2 % Final   • Basophil % 02/20/2019 0.2  0.0 - 1.5 % Final   • Immature Grans % 02/20/2019 0.2  0.0 - 0.5 % Final   • Neutrophils, Absolute 02/20/2019 3.32  1.40 - 7.00 10*3/mm3 Final   • Lymphocytes, Absolute 02/20/2019 0.89  0.70 - 3.10 10*3/mm3 Final   • Monocytes, Absolute 02/20/2019 0.11  0.10 - 0.90 10*3/mm3 Final   • Eosinophils, Absolute 02/20/2019 0.00  0.00 - 0.40 10*3/mm3 Final   • Basophils, Absolute 02/20/2019 0.01  0.00 - 0.20 10*3/mm3 Final   • Immature Grans, Absolute 02/20/2019 0.01  0.00 - 0.05 10*3/mm3 Final   • nRBC 02/20/2019 0.0  0.0 - 0.0 /100 WBC Final       No results found.    Discharge and Follow up Instructions:   Ankle/Foot Discharge Instructions:    I. ACTIVITIES:  1. Exercises:  ? Physical therapy will not begin immediately.  You will have to wait until all wounds are healed.  If there was a fracture, you will also have to wait for some bony healing before getting therapy.  ? Elevate the affected leg most of the day the first week post operatively. Caution must be taken to avoid pillow placement directly under the heel of the leg, as this can cause pressure ulcers even with a soft pillow.  All pillows and blankets should be placed underneath of the thigh and calf so that the heel is free-floating.  ? Use cold packs 20-30 minutes approximately 5 times per day.     2. Activities of Daily Living:  ? No tub baths, hot tubs, or  swimming pools for 4 weeks. If there is a fracture, you may have to wait longer than this for good bony healing for her to be safe to go into a pool.  ? May shower and let water run over the incision on post-operative day #5 if no drainage. However if you still have a splint on, you will be unable to take this down. You we'll have to keep the splint clean dry and intact.    II. Restrictions  ? Do not put weight on the leg.  ? Your surgeon will discuss with you when you will be able to drive again. As well as when he will be able to put weight on the leg.    IV. INCISION CARE:  ? Wash your hands prior to dressing changes  ? Change the dressing as needed to keep incision clean and dry. Utilize dry gauze and paper tape. Avoid touching the side of the gauze that goes against the incision with your hands.  ? No creams or ointments to the incision  ? May remove dressing once the incision is free of drainage. But need to wait at least a week after surgery.  ? Do not touch or pick at the incision  ? If you are not in a splint or a cast, check incision every day and notify surgeon immediately if any of the following signs or symptoms are noted:  o Increase in redness  o Increase in swelling around the incision and of the entire extremity  o Increase in pain  o Drainage oozing from the incision  o Pulling apart of the edges of the incision  o Increase in overall body temperature (greater than 100.5 degrees)  ? Your surgeon will instruct you regarding suture or staple removal    V. Medications:   1. Anticoagulants: You will be discharged on an anticoagulant. This is a prophylactic medication that helps prevent blood clots during your post-operative period. The type and length of dosage varies based on your individual needs, procedure performed, and surgeon’s preference.  ? While taking the anticoagulant, you should avoid taking any additional aspirin, ibuprofen (Advil or Motrin), Aleve (Naprosyn) or other non-steroidal  anti-inflammatory medications.   ? Notify surgeon immediately if any cristina bleeding is noted in the urine, stool, emesis, or from the nose or the incision. Blood in the stool will often appear as black rather than red. Blood in urine may appear as pink. Blood in emesis may appear as brown/black like coffee grounds.  ? You will need to apply pressure for longer periods of time to any cuts or abrasions to stop bleeding  ? Avoid alcohol while taking anticoagulants    2. Stool Softeners: You will be at greater risk of constipation after surgery due to being less mobile and the pain medications.   ? Take stool softeners as instructed by your surgeon while on pain medications. Over the counter Colace 100 mg 1-2 capsules twice daily.   ? If stools become too loose or too frequent, please decreases the dosage or stop the stool softener.  ? If constipation occurs despite use of stool softeners, you are to continue the stool softeners and add a laxative (Milk of Magnesia 1 ounce daily as needed)  ? Drink plenty of fluids, and eat fruits and vegetables during your recovery time    3. Pain Medications utilized after surgery are narcotics and the law requires that the following information be given to all patients that are prescribed narcotics:  ? CLASSIFICATION: Pain medications are called Opioids and are narcotics  ? LEGALITIES: It is illegal to share narcotics with others and to drive within 24 hours of taking narcotics  ? POTENTIAL SIDE EFFECTS: Potential side effects of opioids include: nausea, vomiting, itching, dizziness, drowsiness, dry mouth, constipation, and difficulty urinating.  ? POTENTIAL ADVERSE EFFECTS:   o Opioid tolerance can develop with use of pain medications and this simply means that it requires more and more of the medication to control pain; however, this is seen more in patients that use opioids for longer periods of time.  o Opioid dependence can develop with use of Opioids and this simply means that  to stop the medication can cause withdrawal symptoms; however, this is seen with patients that use Opioids for longer periods of time.  o Opioid addiction can develop with use of Opioids and the incidence of this is very unlikely in patients who take the medications as ordered and stop the medications as instructed.  o Opioid overdose can be dangerous, but is unlikely when the medication is taken as ordered and stopped when ordered. It is important not to mix opioids with alcohol or with and type of sedative such as Benadryl as this can lead to over sedation and respiratory difficulty.  ? DOSAGE:   o After the initial surgical pain begins to resolve, you may begin to decrease the pain medication. By the end of a couple weeks, you should be off of pain medications.  o Refills will not be given by the office during evening hours, on weekends, or after 6 weeks post-op.  o To seek refills on pain medications during the initial 6 week post-operative period, you must call the office 48 hours in advance to request the refill. The office will then notify you when to  the prescription. DO NOT wait until you are out of the medication to request a refill.    V. FOLLOW-UP VISITS:  ? You will need to follow up in the office with your surgeon in 1-2 weeks, as discussed preoperatively. Please call this number [phone] to schedule this appointment.  ? If you have any concerns or suspected complications prior to your follow up visit, please call your surgeons office. Do not wait until your appointment time if you suspect complications. These will need to be addressed in the office promptly.            Date: 2/20/2019    Cristian Hill II, MD

## 2019-02-20 NOTE — DISCHARGE INSTRUCTIONS
.Ankle Fracture Discharge Instructions:    I. ACTIVITIES:  1. Exercises:  ? Physical therapy will not begin immediately.  You will have to wait until all wounds are healed.  If there was a fracture, you will also have to wait for some bony healing before getting therapy.  ? Elevate the affected leg most of the day the first week post operatively. Caution must be taken to avoid pillow placement directly under the heel of the leg, as this can cause pressure ulcers even with a soft pillow.  All pillows and blankets should be placed underneath of the thigh and calf so that the heel is free-floating.  ? Use cold packs 20-30 minutes approximately 5 times per day.     2. Activities of Daily Living:  ? No tub baths, hot tubs, or swimming pools for 4 weeks. If there is a fracture, you may have to wait longer than this for good bony healing for her to be safe to go into a pool.  ? May shower and let water run over the incision on post-operative day #5 if no drainage. However if you still have a splint on, you will be unable to take this down. You we'll have to keep the splint clean dry and intact.    II. Restrictions  ? Do not put weight on the leg.  ? Your surgeon will discuss with you when you will be able to drive again. As well as when he will be able to put weight on the leg.    IV. INCISION CARE:  ? Wash your hands prior to dressing changes  ? Change the dressing as needed to keep incision clean and dry. Utilize dry gauze and paper tape. Avoid touching the side of the gauze that goes against the incision with your hands.  ? No creams or ointments to the incision  ? May remove dressing once the incision is free of drainage. But need to wait at least a week after surgery.  ? Do not touch or pick at the incision  ? If you are not in a splint or a cast, check incision every day and notify surgeon immediately if any of the following signs or symptoms are noted:  o Increase in redness  o Increase in swelling around the  incision and of the entire extremity  o Increase in pain  o Drainage oozing from the incision  o Pulling apart of the edges of the incision  o Increase in overall body temperature (greater than 100.5 degrees)  ? Your surgeon will instruct you regarding suture or staple removal    V. Medications:   1. Anticoagulants: You will be discharged on an anticoagulant. This is a prophylactic medication that helps prevent blood clots during your post-operative period. The type and length of dosage varies based on your individual needs, procedure performed, and surgeon’s preference.  ? While taking the anticoagulant, you should avoid taking any additional aspirin, ibuprofen (Advil or Motrin), Aleve (Naprosyn) or other non-steroidal anti-inflammatory medications.   ? Notify surgeon immediately if any cristina bleeding is noted in the urine, stool, emesis, or from the nose or the incision. Blood in the stool will often appear as black rather than red. Blood in urine may appear as pink. Blood in emesis may appear as brown/black like coffee grounds.  ? You will need to apply pressure for longer periods of time to any cuts or abrasions to stop bleeding  ? Avoid alcohol while taking anticoagulants    2. Stool Softeners: You will be at greater risk of constipation after surgery due to being less mobile and the pain medications.   ? Take stool softeners as instructed by your surgeon while on pain medications. Over the counter Colace 100 mg 1-2 capsules twice daily.   ? If stools become too loose or too frequent, please decreases the dosage or stop the stool softener.  ? If constipation occurs despite use of stool softeners, you are to continue the stool softeners and add a laxative (Milk of Magnesia 1 ounce daily as needed)  ? Drink plenty of fluids, and eat fruits and vegetables during your recovery time    3. Pain Medications utilized after surgery are narcotics and the law requires that the following information be given to all  patients that are prescribed narcotics:  ? CLASSIFICATION: Pain medications are called Opioids and are narcotics  ? LEGALITIES: It is illegal to share narcotics with others and to drive within 24 hours of taking narcotics  ? POTENTIAL SIDE EFFECTS: Potential side effects of opioids include: nausea, vomiting, itching, dizziness, drowsiness, dry mouth, constipation, and difficulty urinating.  ? POTENTIAL ADVERSE EFFECTS:   o Opioid tolerance can develop with use of pain medications and this simply means that it requires more and more of the medication to control pain; however, this is seen more in patients that use opioids for longer periods of time.  o Opioid dependence can develop with use of Opioids and this simply means that to stop the medication can cause withdrawal symptoms; however, this is seen with patients that use Opioids for longer periods of time.  o Opioid addiction can develop with use of Opioids and the incidence of this is very unlikely in patients who take the medications as ordered and stop the medications as instructed.  o Opioid overdose can be dangerous, but is unlikely when the medication is taken as ordered and stopped when ordered. It is important not to mix opioids with alcohol or with and type of sedative such as Benadryl as this can lead to over sedation and respiratory difficulty.  ? DOSAGE:   o After the initial surgical pain begins to resolve, you may begin to decrease the pain medication. By the end of a couple weeks, you should be off of pain medications.  o Refills will not be given by the office during evening hours, on weekends, or after 6 weeks post-op.  o To seek refills on pain medications during the initial 6 week post-operative period, you must call the office 48 hours in advance to request the refill. The office will then notify you when to  the prescription. DO NOT wait until you are out of the medication to request a refill.    V. FOLLOW-UP VISITS:  ? You will need  to follow up in the office with your surgeon in 2 weeks, or is instructed elsewhere in her discharge paperwork. Please call this number [phone] to schedule this appointment.  ? If you have any concerns or suspected complications prior to your follow up visit, please call your surgeons office. Do not wait until your appointment time if you suspect complications. These will need to be addressed in the office promptly.

## 2019-02-20 NOTE — PLAN OF CARE
Problem: Patient Care Overview  Goal: Plan of Care Review   02/20/19 1200   Coping/Psychosocial   Plan of Care Reviewed With patient   OTHER   Outcome Summary Pt is a pleasantly confused 88 y.o. female S/p R ankle hardware removal on 2/19/2019. Per chart pt has been nonambulatory since previous R ankle ORIF 3 months ago, pt is a poor historian and cannot remember if she has been walking since previous surgery or not. Pt presents today with confusion, weakness, pain, and decreased functional mobility. Pt required min to mod A for bed mobility, dependent x 2 for STS transfers and unable to clear buttocks from bed secondary to weakness and difficulty maintaining NWB status of R LE. Pt will benefit from skilled PT to address the previous deficits and improve overall safety with functional mobility. Anticipate pt will D/C to SNF once medically appropriate.

## 2019-02-20 NOTE — PROGRESS NOTES
Discharge Planning Assessment  [facility]     Patient Name: Kim Feldman  MRN: [id number]  Today's Date: 2/20/2019    Admit Date: 2/19/2019    Discharge Needs Assessment    No documentation.       Discharge Plan     Row Name 02/20/19 1412       Plan    Plan  return to [facility]     Patient/Family in Agreement with Plan  yes    Plan Comments  Pt was admitted from [facility], she is ready to return today . [facility] notified, pt's bed is ready. Family at the bedside, they would like wc van to tranport.     Final Discharge Disposition Code  03 - skilled nursing facility (SNF)        Destination - Selection Complete      Service Provider Request Status Selected Services Address Phone Number Fax Number    [facility] Selected Skilled Nursing [address] [address] [phone]      Durable Medical Equipment      No service coordination in this encounter.      Dialysis/Infusion      No service coordination in this encounter.      Home Medical Care      No service coordination in this encounter.      Community Resources      No service coordination in this encounter.        Expected Discharge Date and Time     Expected Discharge Date Expected Discharge Time    Feb 19, 2019         Demographic Summary    No documentation.       Functional Status     Row Name 02/20/19 1410       Functional Status    Usual Activity Tolerance  fair    Current Activity Tolerance  fair       Functional Status, IADL    Medications  -- admitted from SNF         Psychosocial    No documentation.       Abuse/Neglect    No documentation.       Legal    No documentation.       Substance Abuse    No documentation.       Patient Forms     Row Name 02/20/19 1410       Patient Forms    Provider Choice List  Delivered    Delivered to  Patient    Method of delivery  In person            Madhuri Saucedo RN

## 2019-02-22 LAB
BACTERIA SPEC AEROBE CULT: NORMAL
GRAM STN SPEC: NORMAL

## 2019-02-24 LAB
BACTERIA SPEC ANAEROBE CULT: NORMAL

## 2019-03-22 ENCOUNTER — LAB REQUISITION (OUTPATIENT)
Dept: LAB | Facility: HOSPITAL | Age: 84
End: 2019-03-22

## 2019-03-22 DIAGNOSIS — Z00.00 ROUTINE GENERAL MEDICAL EXAMINATION AT A HEALTH CARE FACILITY: ICD-10-CM

## 2019-03-22 LAB
BASOPHILS # BLD AUTO: 0.06 10*3/MM3 (ref 0–0.2)
BASOPHILS NFR BLD AUTO: 1 % (ref 0–1.5)
DEPRECATED RDW RBC AUTO: 53.4 FL (ref 37–54)
EOSINOPHIL # BLD AUTO: 0.31 10*3/MM3 (ref 0–0.4)
EOSINOPHIL NFR BLD AUTO: 5.2 % (ref 0.3–6.2)
ERYTHROCYTE [DISTWIDTH] IN BLOOD BY AUTOMATED COUNT: 13.6 % (ref 12.3–15.4)
HCT VFR BLD AUTO: 28.4 % (ref 34–46.6)
HGB BLD-MCNC: 8.6 G/DL (ref 12–15.9)
IMM GRANULOCYTES # BLD AUTO: 0.01 10*3/MM3 (ref 0–0.05)
IMM GRANULOCYTES NFR BLD AUTO: 0.2 % (ref 0–0.5)
LYMPHOCYTES # BLD AUTO: 2 10*3/MM3 (ref 0.7–3.1)
LYMPHOCYTES NFR BLD AUTO: 33.3 % (ref 19.6–45.3)
MCH RBC QN AUTO: 32.7 PG (ref 26.6–33)
MCHC RBC AUTO-ENTMCNC: 30.3 G/DL (ref 31.5–35.7)
MCV RBC AUTO: 108 FL (ref 79–97)
MONOCYTES # BLD AUTO: 0.61 10*3/MM3 (ref 0.1–0.9)
MONOCYTES NFR BLD AUTO: 10.1 % (ref 5–12)
NEUTROPHILS # BLD AUTO: 3.02 10*3/MM3 (ref 1.4–7)
NEUTROPHILS NFR BLD AUTO: 50.2 % (ref 42.7–76)
NRBC BLD AUTO-RTO: 0 /100 WBC (ref 0–0)
PLAT MORPH BLD: NORMAL
PLATELET # BLD AUTO: 227 10*3/MM3 (ref 140–450)
PMV BLD AUTO: 9.8 FL (ref 6–12)
RBC # BLD AUTO: 2.63 10*6/MM3 (ref 3.77–5.28)
RBC MORPH BLD: NORMAL
WBC MORPH BLD: NORMAL
WBC NRBC COR # BLD: 6.01 10*3/MM3 (ref 3.4–10.8)

## 2019-03-22 PROCEDURE — 85025 COMPLETE CBC W/AUTO DIFF WBC: CPT

## 2019-03-22 PROCEDURE — 85007 BL SMEAR W/DIFF WBC COUNT: CPT

## 2019-04-03 ENCOUNTER — APPOINTMENT (OUTPATIENT)
Dept: GENERAL RADIOLOGY | Facility: HOSPITAL | Age: 84
End: 2019-04-03

## 2019-04-03 ENCOUNTER — APPOINTMENT (OUTPATIENT)
Dept: CT IMAGING | Facility: HOSPITAL | Age: 84
End: 2019-04-03

## 2019-04-03 ENCOUNTER — HOSPITAL ENCOUNTER (INPATIENT)
Facility: HOSPITAL | Age: 84
LOS: 5 days | Discharge: HOME | End: 2019-04-08
Attending: EMERGENCY MEDICINE | Admitting: HOSPITALIST

## 2019-04-03 DIAGNOSIS — Z74.09 IMPAIRED FUNCTIONAL MOBILITY, BALANCE, GAIT, AND ENDURANCE: ICD-10-CM

## 2019-04-03 DIAGNOSIS — S01.21XA LACERATION OF NOSE, INITIAL ENCOUNTER: ICD-10-CM

## 2019-04-03 DIAGNOSIS — S02.2XXB OPEN FRACTURE OF NASAL BONE, INITIAL ENCOUNTER: ICD-10-CM

## 2019-04-03 DIAGNOSIS — D64.9 ANEMIA, UNSPECIFIED TYPE: Primary | ICD-10-CM

## 2019-04-03 DIAGNOSIS — S90.32XA CONTUSION OF LEFT FOOT, INITIAL ENCOUNTER: ICD-10-CM

## 2019-04-03 DIAGNOSIS — S00.93XA CONTUSION OF HEAD, UNSPECIFIED PART OF HEAD, INITIAL ENCOUNTER: ICD-10-CM

## 2019-04-03 DIAGNOSIS — S20.212A CHEST WALL CONTUSION, LEFT, INITIAL ENCOUNTER: ICD-10-CM

## 2019-04-03 DIAGNOSIS — S40.012A CONTUSION OF LEFT SHOULDER, INITIAL ENCOUNTER: ICD-10-CM
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
## 2019-04-03 LAB
ABO GROUP BLD: NORMAL
ALBUMIN SERPL-MCNC: 3.4 G/DL (ref 3.5–5.2)
ALBUMIN/GLOB SERPL: 1.1 G/DL
ALP SERPL-CCNC: 57 U/L (ref 39–117)
ALT SERPL W P-5'-P-CCNC: 11 U/L (ref 1–33)
ANION GAP SERPL CALCULATED.3IONS-SCNC: 10.7 MMOL/L
ANTI-M: NORMAL
AST SERPL-CCNC: 18 U/L (ref 1–32)
BACTERIA UR QL AUTO: ABNORMAL /HPF
BASOPHILS # BLD AUTO: 0.03 10*3/MM3 (ref 0–0.2)
BASOPHILS NFR BLD AUTO: 0.5 % (ref 0–1.5)
BIG S ANTIGEN: NEGATIVE
BILIRUB SERPL-MCNC: 0.3 MG/DL (ref 0.2–1.2)
BILIRUB UR QL STRIP: NEGATIVE
BLD GP AB SCN SERPL QL: POSITIVE
BUN BLD-MCNC: 18 MG/DL (ref 8–23)
BUN/CREAT SERPL: 17.5 (ref 7–25)
CALCIUM SPEC-SCNC: 8.8 MG/DL (ref 8.6–10.5)
CHLORIDE SERPL-SCNC: 103 MMOL/L (ref 98–107)
CLARITY UR: CLEAR
CO2 SERPL-SCNC: 24.3 MMOL/L (ref 22–29)
COLOR UR: YELLOW
CREAT BLD-MCNC: 1.03 MG/DL (ref 0.57–1)
DAT POLY-SP REAG RBC QL: NEGATIVE
DEPRECATED RDW RBC AUTO: 51.8 FL (ref 37–54)
EOSINOPHIL # BLD AUTO: 0.25 10*3/MM3 (ref 0–0.4)
EOSINOPHIL NFR BLD AUTO: 4.1 % (ref 0.3–6.2)
ERYTHROCYTE [DISTWIDTH] IN BLOOD BY AUTOMATED COUNT: 13.2 % (ref 12.3–15.4)
FERRITIN SERPL-MCNC: 103 NG/ML (ref 13–150)
FOLATE SERPL-MCNC: >20 NG/ML (ref 4.78–24.2)
GFR SERPL CREATININE-BSD FRML MDRD: 50 ML/MIN/1.73
GLOBULIN UR ELPH-MCNC: 3 GM/DL
GLUCOSE BLD-MCNC: 92 MG/DL (ref 65–99)
GLUCOSE UR STRIP-MCNC: NEGATIVE MG/DL
HCT VFR BLD AUTO: 23.8 % (ref 34–46.6)
HEMOCCULT STL QL: NEGATIVE
HGB BLD-MCNC: 7.5 G/DL (ref 12–15.9)
HGB UR QL STRIP.AUTO: ABNORMAL
HYALINE CASTS UR QL AUTO: ABNORMAL /LPF
IMM GRANULOCYTES # BLD AUTO: 0.04 10*3/MM3 (ref 0–0.05)
IMM GRANULOCYTES NFR BLD AUTO: 0.6 % (ref 0–0.5)
IRON 24H UR-MRATE: 59 MCG/DL (ref 37–145)
IRON SATN MFR SERPL: 20 % (ref 20–50)
KETONES UR QL STRIP: NEGATIVE
LDH SERPL-CCNC: 157 U/L (ref 135–214)
LEUKOCYTE ESTERASE UR QL STRIP.AUTO: NEGATIVE
LYMPHOCYTES # BLD AUTO: 1.21 10*3/MM3 (ref 0.7–3.1)
LYMPHOCYTES NFR BLD AUTO: 19.6 % (ref 19.6–45.3)
MCH RBC QN AUTO: 33.5 PG (ref 26.6–33)
MCHC RBC AUTO-ENTMCNC: 31.5 G/DL (ref 31.5–35.7)
MCV RBC AUTO: 106.3 FL (ref 79–97)
MONOCYTES # BLD AUTO: 0.51 10*3/MM3 (ref 0.1–0.9)
MONOCYTES NFR BLD AUTO: 8.3 % (ref 5–12)
NEUTROPHILS # BLD AUTO: 4.12 10*3/MM3 (ref 1.4–7)
NEUTROPHILS NFR BLD AUTO: 66.9 % (ref 42.7–76)
NITRITE UR QL STRIP: NEGATIVE
NRBC BLD AUTO-RTO: 0.3 /100 WBC (ref 0–0)
PH UR STRIP.AUTO: 6.5 [PH] (ref 5–8)
PLATELET # BLD AUTO: 154 10*3/MM3 (ref 140–450)
PMV BLD AUTO: 9 FL (ref 6–12)
POTASSIUM BLD-SCNC: 4.7 MMOL/L (ref 3.5–5.2)
PRE-WARM SCREEN: NEGATIVE
PROT SERPL-MCNC: 6.4 G/DL (ref 6–8.5)
PROT UR QL STRIP: NEGATIVE
RBC # BLD AUTO: 2.24 10*6/MM3 (ref 3.77–5.28)
RBC # UR: ABNORMAL /HPF
REF LAB TEST METHOD: ABNORMAL
RETICS # AUTO: 0.02 10*6/MM3 (ref 0.02–0.13)
RETICS/RBC NFR AUTO: 1.09 % (ref 0.5–1.5)
RH BLD: POSITIVE
SODIUM BLD-SCNC: 138 MMOL/L (ref 136–145)
SP GR UR STRIP: 1.01 (ref 1–1.03)
SQUAMOUS #/AREA URNS HPF: ABNORMAL /HPF
T&S EXPIRATION DATE: NORMAL
TIBC SERPL-MCNC: 289 MCG/DL (ref 298–536)
TRANSFERRIN SERPL-MCNC: 194 MG/DL (ref 200–360)
UROBILINOGEN UR QL STRIP: ABNORMAL
VIT B12 BLD-MCNC: 592 PG/ML (ref 211–946)
WBC NRBC COR # BLD: 6.16 10*3/MM3 (ref 3.4–10.8)
WBC UR QL AUTO: ABNORMAL /HPF

## 2019-04-03 PROCEDURE — 86880 COOMBS TEST DIRECT: CPT | Performed by: EMERGENCY MEDICINE

## 2019-04-03 PROCEDURE — 90715 TDAP VACCINE 7 YRS/> IM: CPT | Performed by: NURSE PRACTITIONER

## 2019-04-03 PROCEDURE — 25010000002 MORPHINE PER 10 MG: Performed by: NURSE PRACTITIONER

## 2019-04-03 PROCEDURE — 86870 RBC ANTIBODY IDENTIFICATION: CPT | Performed by: EMERGENCY MEDICINE

## 2019-04-03 PROCEDURE — 73630 X-RAY EXAM OF FOOT: CPT

## 2019-04-03 PROCEDURE — 71101 X-RAY EXAM UNILAT RIBS/CHEST: CPT

## 2019-04-03 PROCEDURE — 86920 COMPATIBILITY TEST SPIN: CPT

## 2019-04-03 PROCEDURE — 80053 COMPREHEN METABOLIC PANEL: CPT | Performed by: NURSE PRACTITIONER

## 2019-04-03 PROCEDURE — 82607 VITAMIN B-12: CPT | Performed by: HOSPITALIST

## 2019-04-03 PROCEDURE — 85025 COMPLETE CBC W/AUTO DIFF WBC: CPT | Performed by: NURSE PRACTITIONER

## 2019-04-03 PROCEDURE — 25010000002 TDAP 5-2.5-18.5 LF-MCG/0.5 SUSPENSION: Performed by: NURSE PRACTITIONER

## 2019-04-03 PROCEDURE — 86850 RBC ANTIBODY SCREEN: CPT | Performed by: EMERGENCY MEDICINE

## 2019-04-03 PROCEDURE — 73030 X-RAY EXAM OF SHOULDER: CPT

## 2019-04-03 PROCEDURE — 83615 LACTATE (LD) (LDH) ENZYME: CPT | Performed by: HOSPITALIST

## 2019-04-03 PROCEDURE — 70486 CT MAXILLOFACIAL W/O DYE: CPT

## 2019-04-03 PROCEDURE — 81001 URINALYSIS AUTO W/SCOPE: CPT | Performed by: NURSE PRACTITIONER

## 2019-04-03 PROCEDURE — 82728 ASSAY OF FERRITIN: CPT | Performed by: HOSPITALIST

## 2019-04-03 PROCEDURE — 90471 IMMUNIZATION ADMIN: CPT | Performed by: NURSE PRACTITIONER

## 2019-04-03 PROCEDURE — 86900 BLOOD TYPING SEROLOGIC ABO: CPT

## 2019-04-03 PROCEDURE — 86922 COMPATIBILITY TEST ANTIGLOB: CPT

## 2019-04-03 PROCEDURE — 70450 CT HEAD/BRAIN W/O DYE: CPT

## 2019-04-03 PROCEDURE — 86905 BLOOD TYPING RBC ANTIGENS: CPT | Performed by: EMERGENCY MEDICINE

## 2019-04-03 PROCEDURE — 86902 BLOOD TYPE ANTIGEN DONOR EA: CPT

## 2019-04-03 PROCEDURE — 86900 BLOOD TYPING SEROLOGIC ABO: CPT | Performed by: EMERGENCY MEDICINE

## 2019-04-03 PROCEDURE — 85045 AUTOMATED RETICULOCYTE COUNT: CPT | Performed by: HOSPITALIST

## 2019-04-03 PROCEDURE — P9016 RBC LEUKOCYTES REDUCED: HCPCS

## 2019-04-03 PROCEDURE — 36430 TRANSFUSION BLD/BLD COMPNT: CPT

## 2019-04-03 PROCEDURE — 83540 ASSAY OF IRON: CPT | Performed by: HOSPITALIST

## 2019-04-03 PROCEDURE — 82746 ASSAY OF FOLIC ACID SERUM: CPT | Performed by: HOSPITALIST

## 2019-04-03 PROCEDURE — 99285 EMERGENCY DEPT VISIT HI MDM: CPT

## 2019-04-03 PROCEDURE — 82272 OCCULT BLD FECES 1-3 TESTS: CPT | Performed by: EMERGENCY MEDICINE

## 2019-04-03 PROCEDURE — 25010000002 ONDANSETRON PER 1 MG: Performed by: NURSE PRACTITIONER

## 2019-04-03 PROCEDURE — 84165 PROTEIN E-PHORESIS SERUM: CPT | Performed by: HOSPITALIST

## 2019-04-03 PROCEDURE — 84466 ASSAY OF TRANSFERRIN: CPT | Performed by: HOSPITALIST

## 2019-04-03 PROCEDURE — 86901 BLOOD TYPING SEROLOGIC RH(D): CPT | Performed by: EMERGENCY MEDICINE

## 2019-04-03 RX ORDER — IRON ASPGLY,PS/C/SUCCINIC ACID 150-50-50
1 CAPSULE ORAL 3 TIMES DAILY
COMMUNITY
End: 2020-01-01

## 2019-04-03 RX ORDER — GABAPENTIN 300 MG/1
300 CAPSULE ORAL EVERY 8 HOURS SCHEDULED
Status: DISCONTINUED | OUTPATIENT
Start: 2019-04-03 | End: 2019-04-08 | Stop reason: HOSPADM

## 2019-04-03 RX ORDER — ACETAMINOPHEN 325 MG/1
650 TABLET ORAL EVERY 4 HOURS PRN
Status: DISCONTINUED | OUTPATIENT
Start: 2019-04-03 | End: 2019-04-08

## 2019-04-03 RX ORDER — LIDOCAINE HYDROCHLORIDE 10 MG/ML
10 INJECTION, SOLUTION EPIDURAL; INFILTRATION; INTRACAUDAL; PERINEURAL ONCE
Status: COMPLETED | OUTPATIENT
Start: 2019-04-03 | End: 2019-04-03

## 2019-04-03 RX ORDER — AMLODIPINE BESYLATE 2.5 MG/1
2.5 TABLET ORAL DAILY
COMMUNITY
End: 2020-01-01 | Stop reason: HOSPADM

## 2019-04-03 RX ORDER — ONDANSETRON 2 MG/ML
4 INJECTION INTRAMUSCULAR; INTRAVENOUS ONCE
Status: DISCONTINUED | OUTPATIENT
Start: 2019-04-03 | End: 2019-04-03

## 2019-04-03 RX ORDER — AMLODIPINE BESYLATE 5 MG/1
5 TABLET ORAL DAILY
Status: DISCONTINUED | OUTPATIENT
Start: 2019-04-03 | End: 2019-04-08 | Stop reason: HOSPADM

## 2019-04-03 RX ORDER — VITAMIN B COMPLEX
1 CAPSULE ORAL DAILY
COMMUNITY
End: 2020-01-01

## 2019-04-03 RX ORDER — PANTOPRAZOLE SODIUM 40 MG/1
40 TABLET, DELAYED RELEASE ORAL DAILY
Status: DISCONTINUED | OUTPATIENT
Start: 2019-04-03 | End: 2019-04-08 | Stop reason: HOSPADM

## 2019-04-03 RX ORDER — MULTIPLE VITAMINS W/ MINERALS TAB 9MG-400MCG
1 TAB ORAL DAILY
Status: DISCONTINUED | OUTPATIENT
Start: 2019-04-03 | End: 2019-04-08 | Stop reason: HOSPADM

## 2019-04-03 RX ORDER — ACETAMINOPHEN 500 MG
1000 TABLET ORAL ONCE
Status: COMPLETED | OUTPATIENT
Start: 2019-04-03 | End: 2019-04-03

## 2019-04-03 RX ORDER — ONDANSETRON 2 MG/ML
4 INJECTION INTRAMUSCULAR; INTRAVENOUS ONCE
Status: COMPLETED | OUTPATIENT
Start: 2019-04-03 | End: 2019-04-03

## 2019-04-03 RX ORDER — MORPHINE SULFATE 2 MG/ML
1 INJECTION, SOLUTION INTRAMUSCULAR; INTRAVENOUS EVERY 4 HOURS PRN
Status: DISCONTINUED | OUTPATIENT
Start: 2019-04-03 | End: 2019-04-08

## 2019-04-03 RX ORDER — ACETAMINOPHEN 500 MG
1000 TABLET ORAL ONCE
Status: DISCONTINUED | OUTPATIENT
Start: 2019-04-03 | End: 2019-04-03

## 2019-04-03 RX ORDER — LIDOCAINE HYDROCHLORIDE 10 MG/ML
10 INJECTION, SOLUTION EPIDURAL; INFILTRATION; INTRACAUDAL; PERINEURAL ONCE
Status: DISCONTINUED | OUTPATIENT
Start: 2019-04-03 | End: 2019-04-03

## 2019-04-03 RX ORDER — CEPHALEXIN 500 MG/1
500 CAPSULE ORAL 4 TIMES DAILY
COMMUNITY
Start: 2019-04-02 | End: 2019-04-08 | Stop reason: HOSPADM

## 2019-04-03 RX ORDER — ONDANSETRON 2 MG/ML
4 INJECTION INTRAMUSCULAR; INTRAVENOUS EVERY 6 HOURS PRN
Status: DISCONTINUED | OUTPATIENT
Start: 2019-04-03 | End: 2019-04-08

## 2019-04-03 RX ORDER — NYSTATIN 100000 U/G
1 OINTMENT TOPICAL 2 TIMES DAILY
COMMUNITY
End: 2020-01-01

## 2019-04-03 RX ORDER — NYSTATIN 100000 U/G
1 OINTMENT TOPICAL EVERY 12 HOURS SCHEDULED
Status: DISCONTINUED | OUTPATIENT
Start: 2019-04-03 | End: 2019-04-08 | Stop reason: HOSPADM

## 2019-04-03 RX ORDER — POLYETHYLENE GLYCOL 3350 17 G/17G
17 POWDER, FOR SOLUTION ORAL DAILY
Status: DISCONTINUED | OUTPATIENT
Start: 2019-04-03 | End: 2019-04-08 | Stop reason: HOSPADM

## 2019-04-03 RX ORDER — MORPHINE SULFATE 2 MG/ML
2 INJECTION, SOLUTION INTRAMUSCULAR; INTRAVENOUS ONCE
Status: COMPLETED | OUTPATIENT
Start: 2019-04-03 | End: 2019-04-03

## 2019-04-03 RX ORDER — FOLIC ACID/MV,IRON,MIN/LUTEIN 0.4-18-25
1 TABLET ORAL DAILY
COMMUNITY
End: 2020-01-01

## 2019-04-03 RX ORDER — MIRTAZAPINE 15 MG/1
15 TABLET, FILM COATED ORAL NIGHTLY
Status: DISCONTINUED | OUTPATIENT
Start: 2019-04-03 | End: 2019-04-08 | Stop reason: HOSPADM

## 2019-04-03 RX ORDER — IRON ASPGLY,PS/C/SUCCINIC ACID 150-50-50
1 CAPSULE ORAL DAILY
Status: DISCONTINUED | OUTPATIENT
Start: 2019-04-03 | End: 2019-04-08 | Stop reason: HOSPADM

## 2019-04-03 RX ORDER — ESCITALOPRAM OXALATE 10 MG/1
10 TABLET ORAL DAILY
Status: DISCONTINUED | OUTPATIENT
Start: 2019-04-03 | End: 2019-04-08 | Stop reason: HOSPADM

## 2019-04-03 RX ORDER — SODIUM CHLORIDE 0.9 % (FLUSH) 0.9 %
10 SYRINGE (ML) INJECTION AS NEEDED
Status: DISCONTINUED | OUTPATIENT
Start: 2019-04-03 | End: 2019-04-08 | Stop reason: HOSPADM

## 2019-04-03 RX ORDER — CLOPIDOGREL BISULFATE 75 MG/1
75 TABLET ORAL DAILY
Status: DISCONTINUED | OUTPATIENT
Start: 2019-04-03 | End: 2019-04-05

## 2019-04-03 RX ADMIN — CALCIUM CARBONATE-VITAMIN D TAB 500 MG-200 UNIT 1000 MG: 500-200 TAB at 17:37

## 2019-04-03 RX ADMIN — CLOPIDOGREL 75 MG: 75 TABLET, FILM COATED ORAL at 17:37

## 2019-04-03 RX ADMIN — AMLODIPINE BESYLATE 5 MG: 5 TABLET ORAL at 17:37

## 2019-04-03 RX ADMIN — PANTOPRAZOLE SODIUM 40 MG: 40 TABLET, DELAYED RELEASE ORAL at 17:38

## 2019-04-03 RX ADMIN — TETANUS TOXOID, REDUCED DIPHTHERIA TOXOID AND ACELLULAR PERTUSSIS VACCINE, ADSORBED 0.5 ML: 5; 2.5; 8; 8; 2.5 SUSPENSION INTRAMUSCULAR at 08:36

## 2019-04-03 RX ADMIN — ONDANSETRON 4 MG: 2 INJECTION INTRAMUSCULAR; INTRAVENOUS at 08:50

## 2019-04-03 RX ADMIN — MULTIPLE VITAMINS W/ MINERALS TAB 1 TABLET: TAB at 17:37

## 2019-04-03 RX ADMIN — Medication 1 CAPSULE: at 17:39

## 2019-04-03 RX ADMIN — GABAPENTIN 300 MG: 300 CAPSULE ORAL at 21:24

## 2019-04-03 RX ADMIN — GABAPENTIN 300 MG: 300 CAPSULE ORAL at 17:37

## 2019-04-03 RX ADMIN — ACETAMINOPHEN 1000 MG: 500 TABLET, FILM COATED ORAL at 13:13

## 2019-04-03 RX ADMIN — ESCITALOPRAM 10 MG: 10 TABLET, FILM COATED ORAL at 17:37

## 2019-04-03 RX ADMIN — SODIUM CHLORIDE 1000 ML: 9 INJECTION, SOLUTION INTRAVENOUS at 13:11

## 2019-04-03 RX ADMIN — MORPHINE SULFATE 2 MG: 2 INJECTION, SOLUTION INTRAMUSCULAR; INTRAVENOUS at 08:50

## 2019-04-03 RX ADMIN — ACETAMINOPHEN 650 MG: 325 TABLET ORAL at 20:56

## 2019-04-03 RX ADMIN — LEVETIRACETAM 750 MG: 500 TABLET, FILM COATED ORAL at 20:56

## 2019-04-03 RX ADMIN — LIDOCAINE HYDROCHLORIDE 10 ML: 10 INJECTION, SOLUTION EPIDURAL; INFILTRATION; INTRACAUDAL; PERINEURAL at 08:36

## 2019-04-03 RX ADMIN — MIRTAZAPINE 15 MG: 15 TABLET, FILM COATED ORAL at 20:56

## 2019-04-03 RX ADMIN — NYSTATIN 1 APPLICATION: 100000 OINTMENT TOPICAL at 21:24

## 2019-04-03 NOTE — ED PROVIDER NOTES
Pt presents to the ED c/o fall that happened earlier this morning when she was getting out of bed. Pt states she struck her face when she fell and now presents with a nose laceration.      PHYSICAL EXAM  HENT: significant bilateral maxillary and periorbital ecchymosis with a repaired laceration  CV: regular rhythm, regular rate  RESPIRATORY: normal effort, lungs clear  ABDOMEN: soft    Vital signs and nursing notes reviewed.    LAB RESULTS AND RADIOLOGY  I have reviewed the patient's labs and imaging studies.    PROGRESS NOTES    1230 Spoke to midlevel provider JONA Chaney, about the pt. After performing my own physical exam, I agree with the plan to admit the pt.    Attestation:    The MIKAELA and I have discussed this patient's history, physical exam, and treatment plan.  I have reviewed the documentation and personally had a face to face interaction with the patient. I affirm the documentation and agree with the treatment and plan.  The attached note describes my personal findings.    Documentation assistance provided by smita Lozoya for Dr. Thompson. Information recorded by the opheliaibe was done at my direction and has been verified and validated by me.       Momo Lozoya  04/03/19 7084       Aldo Thompson MD  04/03/19 8862

## 2019-04-03 NOTE — PLAN OF CARE
Problem: Patient Care Overview  Goal: Plan of Care Review  Outcome: Ongoing (interventions implemented as appropriate)   04/03/19 1923   Coping/Psychosocial   Patient Agreement with Plan of Care agrees   Plan of Care Review   Progress no change   OTHER   Outcome Summary Vitals as charted, admitted w/ anemia and open nasal fx from [facility], pt has multiple skin issues as charted, hgb 7.5 and 2 units of PRBC to be given tonight, occult stool (-), on RA, purewick in place, waiting for PT to see pt before ambulating, no c/o at this time, will continue to monitor.

## 2019-04-03 NOTE — ED NOTES
ERT cleaned the nasal laceration with sterile saline and surgical soap. Pt's bleeding is controlled at this time and sutures are in place by JONA Patel. Pt tolerated well. ABX ointment placed over the repaired laceration with a loose gauze bandage at this time, to be replaced with a fixed bandage prior to disposition. Pt's left hand skin tear was cleaned with sterile saline and wrapped with Kerlix gauze. No bleeding from this site at this time.      Jesse Jackson  04/03/19 0894

## 2019-04-03 NOTE — PROGRESS NOTES
Clinical Pharmacy Services: Medication History    Kim Feldman is a 89 y.o. female presenting to [facility] for   Chief Complaint   Patient presents with   • Fall       She  has a past medical history of Anemia, At risk for falls, At risk for sleep apnea (11/17/2018), Atrial fibrillation (CMS/[facility]), Bulging lumbar disc, Cellulitis, Chronic back pain, Chronic cough, Chronic UTI, Chronic venous insufficiency, 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HCC), DDD (degenerative disc disease), lumbosacral, Dementia without behavioral disturbance, Depression, endogenous (CMS/HCC), Disc degeneration, lumbar, Dysphagia, GERD (gastroesophageal reflux disease), Hiatal hernia, History of anxiety, History of aspiration pneumonitis, History of cerebral artery occlusion, History of herpes zoster, History of ingrowing nail, History of osteoporosis, History of poliomyelitis, History of sciatica, History of sick sinus syndrome, History of transient cerebral ischemia, History of Zenker's diverticulum removal (2016), long term anticoagulant use, Hyperlipidemia, Hypertension, Hyponatremia, Intertrigo, Lumbar radiculopathy, Morbid obesity (CMS/HCC), MRSA (methicillin resistant Staphylococcus aureus), Neuralgia, Nonepileptic episode (CMS/[facility]), Osteoarthritis of right knee, Pacemaker, Pneumonia, Seeing double, Seizures (CMS/[facility]), SIADH (syndrome of inappropriate ADH production) (CMS/Former[facility]), Spinal stenosis, Vitamin D deficiency, and Zenker's diverticulum.    Allergies as of 04/03/2019 - Reviewed 04/03/2019   Allergen Reaction Noted   • Lipitor [atorvastatin] Confusion 03/08/2018   • Penicillins Hives 01/05/2016       Medication information was obtained from: Nursing home  Pharmacy and Phone Number:     Prior to Admission Medications     Prescriptions Last Dose Informant Patient Reported? Taking?    acetaminophen (TYLENOL) 325 MG tablet  Nursing Home Yes Yes    Take 325 mg by mouth Every 6 (Six) Hours As Needed for Mild Pain .     amLODIPine (NORVASC) 5 MG tablet  Nursing Home Yes Yes    Take 5 mg by mouth Daily. Hold for BP <100/60    artificial tears (LUBRIFRESH P.M.) ointment ophthalmic ointment  Nursing Home Yes Yes    Administer 1 application to both eyes every night at bedtime.    B Complex Vitamins (VITAMIN B COMPLEX) capsule capsule  Nursing Home Yes Yes    Take 1 capsule by mouth Daily.    Calcium-Vitamin D-Vitamin K (VIACTIV) 500-500-40 MG-UNT-MCG chewable tablet  Nursing Home Yes Yes    Chew 1 tablet Daily.    cephalexin (KEFLEX) 500 MG capsule  Nursing Home Yes Yes    Take 500 mg by mouth 4 (Four) Times a Day.    clopidogrel (PLAVIX) 75 MG tablet  Nursing Home Yes Yes    Take 75 mg by mouth Daily.    escitalopram (LEXAPRO) 10 MG tablet  Nursing Home Yes Yes    Take 10 mg by mouth Daily.    Fe Asp Gly-Fe Polysac-Suc Ac-C (FERREX 150 PLUS) 150-50-50 MG capsule  Nursing Home Yes Yes    Take 1 capsule by mouth 3 (Three) Times a Day.    gabapentin (NEURONTIN) 600 MG tablet  Nursing Home Yes Yes    Take 600 mg by mouth 3 (Three) Times a Day.    levETIRAcetam (KEPPRA) 750 MG tablet  Nursing Home No Yes    Take 1 tablet by mouth 2 (Two) Times a Day.    mirtazapine (REMERON) 15 MG tablet  Nursing Home Yes Yes    Take 15 mg by mouth Every Night.    Multiple Vitamins-Minerals (CERTAVITE/ANTIOXIDANTS) tablet  Nursing Home Yes Yes    Take 1 tablet by mouth Daily.    nystatin (MYCOSTATIN) 882847 UNIT/GM ointment  Nursing Home Yes Yes    Apply 1 application topically to the appropriate area as directed 2 (Two) Times a Day. Apply under bilateral breasts    pantoprazole (PROTONIX) 40 MG EC tablet  Nursing Home Yes Yes    Take 40 mg by mouth Daily.    polyethylene glycol (MIRALAX) packet  Nursing Home Yes Yes    Take 17 g by mouth Daily As Needed.            Medication notes: Removed: Aspirin, Clindamycin, Ferrous Sulfate, Norco    Added: Amlodipine, Ferrex 150, Keflex    Per facility records    This medication list is complete to the best of my  knowledge as of 4/3/2019    Please call if questions.    Hiral Reynolds, Medication History Technician  4/3/2019 1:14 PM

## 2019-04-03 NOTE — ED NOTES
Pt to ED via ambulance from [facility]. Pt fell tonight this morning. Pt with laceration on nose. Pt also c/o left shoulder pain. Pt on plavix. Pt alert and oriented. Pt denies LOC.        Shreya Garza, CATRINA  04/03/19 0705

## 2019-04-03 NOTE — H&P
History and physical    Primary CARE physician  Dr. Villeda    Chief complaint  Status post fall    History of present illness  89-year-old white female who is well-known to our service with multiple medical problems.  Patient is a nursing home resident with history of hypertension seizure disorder depression history of CVA osteoarthritis degenerative disc disease gastroesophageal reflux disease who was recently discharged from the hospital after right ankle wound infection and surrounding cellulitis with recent fracture and surgery presented to [facility] emergency room after the fall after she lost her balance and trying to ambulate independently.  Patient hit on the face and sustaining facial bruises and laceration requiring stitches on the nose.  Patient denies any loss of consciousness and fully alert oriented following commands and family at the bedside.  Patient also found to have low hemoglobin with heme negative on stool test.  Patient is taking iron tablets for chronic anemia.    PAST MEDICAL HISTORY   • Anemia    • At risk for falls    • At risk for sleep apnea 11/17/2018   • Atrial fibrillation (CMS/HCC)    • Bulging lumbar disc    • Cellulitis    • Chronic back pain    • Chronic cough    • Chronic UTI    • Chronic venous insufficiency    • 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HCC)    • DDD (degenerative disc disease), lumbosacral    • Dementia without behavioral disturbance    • Depression, endogenous (CMS/HCC)    • Disc degeneration, lumbar    • Dysphagia    • GERD (gastroesophageal reflux disease)    • Hiatal hernia    • History of anxiety    • History of aspiration pneumonitis    • History of cerebral artery occlusion    • History of herpes zoster    • History of ingrowing nail    • History of osteoporosis    • History of poliomyelitis    • History of sciatica    • History of sick sinus syndrome    • History of transient cerebral ischemia    • History of Zenker's diverticulum removal 2016   • HX:  long term anticoagulant use    • Hyperlipidemia    • Hypertension    • Hyponatremia    • Intertrigo    • Lumbar radiculopathy    • Morbid obesity (CMS/HCC)    • MRSA (methicillin resistant Staphylococcus aureus)    • Neuralgia    • Nonepileptic episode (CMS/HCC)    • Osteoarthritis of right knee    • Pacemaker    • Pneumonia    • Seeing double    • Seizures (CMS/HCC)    • SIADH (syndrome of inappropriate ADH production) (CMS/[facility])    • Spinal stenosis    • Vitamin D deficiency    • Zenker's diverticulum      PAST SURGICAL HISTORY   Procedure Laterality Date   • ANKLE OPEN REDUCTION INTERNAL FIXATION Right 11/17/2018     Procedure: ANKLE OPEN REDUCTION INTERNAL FIXATION;  Surgeon: Cristian Hill II, MD;  Location: [facility] OR;  Service: Orthopedics   • CARDIAC PACEMAKER PLACEMENT         secondary to SSS, placed in 2004, with gen chng 2014; [employer] dual DOO   • CATARACT EXTRACTION W/ INTRAOCULAR LENS IMPLANT Bilateral     • COLONOSCOPY       • HAND SURGERY Right     • HARDWARE REMOVAL Right 2/19/2019     Procedure: RT ANKLE HARDWARE REMOVAL;  Surgeon: Cristian Hill II, MD;  Location: [facility] OR;  Service: Orthopedics   • KNEE ARTHROPLASTY Left     • LAMINECTOMY FOR IMPLANTATION / PLACEMENT NEUROSTIMULATOR ELECTRODES       • LUMBAR LAMINECTOMY         L-3 L4 L4 L5   • TONSILLECTOMY       • ZENKERS DIVERTICULECTOMY N/A 9/27/2016     Procedure: ENDOSCOPIC REMOVAL OF ZENKERS DIVERTICULUM W/ WERDASCOPE;  Surgeon: Oswald Barth MD;  Location: [facility] OR;  Service:    • ZENKERS DIVERTICULECTOMY N/A 4/14/2017     Procedure: ESOPHAGOSCOPY;  Surgeon: Oswald Barth MD;  Location: [facility] OR;  Service     FAMILY HISTORY         Family History   Problem Relation Age of Onset   • Cancer Daughter    • Malig Hyperthermia Neg Hx      SOCIAL HISTORY   Denies any recent tobacco alcohol drug abuse    ALLERGIES   Lipitor [atorvastatin] and Penicillins   Nursing home medications  "reviewed    REVIEW OF SYSTEMS   Review of Systems   Constitutional: Negative for chills and fever.   HENT: Negative for sore throat.   Eyes: Negative for visual disturbance.   Respiratory: Negative for shortness of breath.   Cardiovascular: Positive for chest pain (left chest wall pain).   Gastrointestinal: Negative for nausea and vomiting.   Genitourinary: Negative for difficulty urinating and dysuria.   Musculoskeletal: Negative for back pain and neck pain.   Left foot pain, left shoulder pain   Skin: Positive for wound (nose laceration, left hand skin tear (sustained yesterday)). Negative for rash.   Neurological: Negative for dizziness and headaches.   Psychiatric/Behavioral: Positive for confusion (mild). The patient is not nervous/anxious.     PHYSICAL EXAM   Blood pressure 124/59, pulse 76, temperature 98.3 °F (36.8 °C), temperature source Oral, resp. rate 16, height 162.6 cm (64\"), weight 100 kg (221 lb 1.9 oz), SpO2 96 %, not currently breastfeeding.    Constitutional: She is oriented to person, place, and time. No distress.   Head: Normocephalic.   Mouth/Throat: Mucous membranes are normal.   Hematoma to the mid forehead, 3cm jagged laceration to the left side of the nose with active bleeding, otherwise no epistaxis, nasal swelling, bruising above left upper lip, otherwise no facial tenderness   Eyes: EOM are normal. Pupils are equal, round, and reactive to light.   Neck: Normal range of motion. Neck supple. No c-spine tenderness   Cardiovascular: Normal rate, regular rhythm and normal heart sounds.   Pulmonary/Chest: Effort normal and breath sounds normal. No respiratory distress. She has no decreased breath sounds. She has no wheezes. She has no rhonchi. She has no rales.   Abdominal: Soft. There is no tenderness. There is no rebound and no guarding.   Musculoskeletal: No t-spine or l-spine tenderness, left shoulder tenderness with FROM, left foot tenderness, mild swelling and erythema to left lower leg " (pt is currently being treated for LLE cellulitis), nickel sized healing pressure ulcer to the left heel, healed surgical incision above the right lateral malleolus without signs of infection, skin tear with steri strips in place to left dorsal hand from previous incident, no bony left hand tenderness, NV intact distally to all extremities   Neurological: She is alert and oriented to person, place, and time. She has normal sensation.   nonfocal neuro exam, answers questions appropriately, follows commands   Skin: Skin is warm and dry. There is pallor.   Psychiatric: Affect normal.     LAB RESULTS  Lab Results (last 24 hours)     Procedure Component Value Units Date/Time    Urinalysis With Microscopic If Indicated (No Culture) - Urine, Catheter [[id number]]  (Abnormal) Collected:  04/03/19 1011    Specimen:  Urine, Catheter Updated:  04/03/19 1030     Color, UA Yellow     Appearance, UA Clear     pH, UA 6.5     Specific Gravity, UA 1.014     Glucose, UA Negative     Ketones, UA Negative     Bilirubin, UA Negative     Blood, UA Trace     Protein, UA Negative     Leuk Esterase, UA Negative     Nitrite, UA Negative     Urobilinogen, UA 0.2 E.U./dL    Urinalysis, Microscopic Only - Urine, Catheter [[id number]]  (Abnormal) Collected:  04/03/19 1011    Specimen:  Urine, Catheter Updated:  04/03/19 1030     RBC, UA 6-12 /HPF      WBC, UA 0-2 /HPF      Bacteria, UA None Seen /HPF      Squamous Epithelial Cells, UA 0-2 /HPF      Hyaline Casts, UA 0-2 /LPF      Methodology Automated Microscopy    Occult Blood X 1, Stool - Stool, Per Rectum [[id number]]  (Normal) Collected:  04/03/19 1012    Specimen:  Stool from Per Rectum Updated:  04/03/19 1023     Fecal Occult Blood Negative    Comprehensive Metabolic Panel [[id number]]  (Abnormal) Collected:  04/03/19 0846    Specimen:  Blood Updated:  04/03/19 0950     Glucose 92 mg/dL      BUN 18 mg/dL      Creatinine 1.03 mg/dL      Sodium 138 mmol/L      Potassium 4.7 mmol/L      Chloride  103 mmol/L      CO2 24.3 mmol/L      Calcium 8.8 mg/dL      Total Protein 6.4 g/dL      Albumin 3.40 g/dL      ALT (SGPT) 11 U/L      AST (SGOT) 18 U/L      Alkaline Phosphatase 57 U/L      Total Bilirubin 0.3 mg/dL      eGFR Non African Amer 50 mL/min/1.73      Globulin 3.0 gm/dL      A/G Ratio 1.1 g/dL      BUN/Creatinine Ratio 17.5     Anion Gap 10.7 mmol/L     Narrative:       GFR Normal >60  Chronic Kidney Disease <60  Kidney Failure <15    CBC & Differential [[id number]] Collected:  04/03/19 0846    Specimen:  Blood Updated:  04/03/19 0912    Narrative:       The following orders were created for panel order CBC & Differential.  Procedure                               Abnormality         Status                     ---------                               -----------         ------                     CBC Auto Differential[[id number]]        Abnormal            Final result                 Please view results for these tests on the individual orders.    CBC Auto Differential [[id number]]  (Abnormal) Collected:  04/03/19 0846    Specimen:  Blood Updated:  04/03/19 0912     WBC 6.16 10*3/mm3      RBC 2.24 10*6/mm3      Hemoglobin 7.5 g/dL      Hematocrit 23.8 %      .3 fL      MCH 33.5 pg      MCHC 31.5 g/dL      RDW 13.2 %      RDW-SD 51.8 fl      MPV 9.0 fL      Platelets 154 10*3/mm3      Neutrophil % 66.9 %      Lymphocyte % 19.6 %      Monocyte % 8.3 %      Eosinophil % 4.1 %      Basophil % 0.5 %      Immature Grans % 0.6 %      Neutrophils, Absolute 4.12 10*3/mm3      Lymphocytes, Absolute 1.21 10*3/mm3      Monocytes, Absolute 0.51 10*3/mm3      Eosinophils, Absolute 0.25 10*3/mm3      Basophils, Absolute 0.03 10*3/mm3      Immature Grans, Absolute 0.04 10*3/mm3      nRBC 0.3 /100 WBC       ,  Imaging Results (last 24 hours)     Procedure Component Value Units Date/Time    XR Foot 3+ View Left [[id number]] Collected:  04/03/19 0950     Updated:  04/03/19 0957    Narrative:       LEFT FOOT AND LEFT RIBS  AND LEFT SHOULDER X-RAYS     CLINICAL HISTORY: Patient fell. Foot and shoulder and rib pain.     Left foot:     4 views of the left foot demonstrate moderate osteopenia and moderate  arthritic changes within the interphalangeal joints, and are otherwise  unremarkable. There is no evidence of fracture or subluxation.     Left rib detail x-rays:     An AP view the chest and multiple oblique views of the ribs of left  hemithorax were obtained. No bony abnormality is identified. There is no  evidence of recent or old rib fracture. The lungs are fairly  well-expanded and clear. There is no pneumothorax. A dual-chamber  pacemaker is noted in the left subclavian vein in satisfactory position.  Spinal cord stimulator electrodes are also noted centered at the T8  level.     Left shoulder:     3 views the left shoulder demonstrate no evidence of fracture or  subluxation. The AC joint is intact.     This report was finalized on 4/3/2019 9:54 AM by Dr. Eriberto Goode M.D.       XR Shoulder 2+ View Left [[id number]] Collected:  04/03/19 0950     Updated:  04/03/19 0957    Narrative:       LEFT FOOT AND LEFT RIBS AND LEFT SHOULDER X-RAYS     CLINICAL HISTORY: Patient fell. Foot and shoulder and rib pain.     Left foot:     4 views of the left foot demonstrate moderate osteopenia and moderate  arthritic changes within the interphalangeal joints, and are otherwise  unremarkable. There is no evidence of fracture or subluxation.     Left rib detail x-rays:     An AP view the chest and multiple oblique views of the ribs of left  hemithorax were obtained. No bony abnormality is identified. There is no  evidence of recent or old rib fracture. The lungs are fairly  well-expanded and clear. There is no pneumothorax. A dual-chamber  pacemaker is noted in the left subclavian vein in satisfactory position.  Spinal cord stimulator electrodes are also noted centered at the T8  level.     Left shoulder:     3 views the left shoulder demonstrate  no evidence of fracture or  subluxation. The AC joint is intact.     This report was finalized on 4/3/2019 9:54 AM by Dr. Eriberto Goode M.D.       XR Ribs Left With PA Chest [[id number]] Collected:  04/03/19 0950     Updated:  04/03/19 0957    Narrative:       LEFT FOOT AND LEFT RIBS AND LEFT SHOULDER X-RAYS     CLINICAL HISTORY: Patient fell. Foot and shoulder and rib pain.     Left foot:     4 views of the left foot demonstrate moderate osteopenia and moderate  arthritic changes within the interphalangeal joints, and are otherwise  unremarkable. There is no evidence of fracture or subluxation.     Left rib detail x-rays:     An AP view the chest and multiple oblique views of the ribs of left  hemithorax were obtained. No bony abnormality is identified. There is no  evidence of recent or old rib fracture. The lungs are fairly  well-expanded and clear. There is no pneumothorax. A dual-chamber  pacemaker is noted in the left subclavian vein in satisfactory position.  Spinal cord stimulator electrodes are also noted centered at the T8  level.     Left shoulder:     3 views the left shoulder demonstrate no evidence of fracture or  subluxation. The AC joint is intact.     This report was finalized on 4/3/2019 9:54 AM by Dr. Eriberto Goode M.D.       CT Head Without Contrast [[id number]] Collected:  04/03/19 0937     Updated:  04/03/19 0949    Narrative:       CT HEAD AND MAXILLOFACIAL REGION WITHOUT CONTRAST     CLINICAL HISTORY: Fell hitting head. On Plavix.     TECHNIQUE: CT scan of the head was obtained with 2 mm axial bone  algorithm and 3 mm axial soft tissue algorithm images. No intravenous  contrast was administered.     FINDINGS:  There is no evidence for calvarial fracture. However, there are  bilateral displaced nasal bone fractures with overlying soft tissue  swelling. There is no evidence for an acute extra-axial hemorrhage. The  ventricles, sulci, and cisterns are age appropriate. The basal ganglia  and  thalami are unremarkable in appearance. The posterior fossa  structures are within normal limits.     Incidental note is made of mild mucosal thickening within the right  maxillary sinus. Mucosal thickening is identified within the sphenoid  sinus and ethmoid air cells.       Impression:       No evidence for acute traumatic intracranial pathology.     Bilateral comminuted and displaced nasal bone fractures with overlying  soft tissue swelling. Small anterior frontal scalp hematoma is  additionally appreciated.     TECHNIQUE: CT scan of the maxillofacial region was obtained with 2 mm  axial, coronal and sagittal images.     FINDINGS:  There are bilateral displaced and comminuted nasal bone fractures with  overlying soft tissue swelling.     Mild mucosal thickening is identified within the maxillary sinuses, the  ethmoid air cells, and the sphenoid sinus.     IMPRESSION:  Moderately displaced and comminuted bilateral nasal bone fractures.     Findings were discussed with Marilyn Frederick of the emergency room  on 04/03/2019 at approximately 9:15 AM.     Radiation dose reduction techniques were utilized, including automated  exposure control and exposure modulation based on body size.     This report was finalized on 4/3/2019 9:46 AM by Dr. Demarcus Zepeda M.D.       CT Facial Bones Without Contrast [[id number]] Collected:  04/03/19 0937     Updated:  04/03/19 0949    Narrative:       CT HEAD AND MAXILLOFACIAL REGION WITHOUT CONTRAST     CLINICAL HISTORY: Fell hitting head. On Plavix.     TECHNIQUE: CT scan of the head was obtained with 2 mm axial bone  algorithm and 3 mm axial soft tissue algorithm images. No intravenous  contrast was administered.     FINDINGS:  There is no evidence for calvarial fracture. However, there are  bilateral displaced nasal bone fractures with overlying soft tissue  swelling. There is no evidence for an acute extra-axial hemorrhage. The  ventricles, sulci, and cisterns are age  appropriate. The basal ganglia  and thalami are unremarkable in appearance. The posterior fossa  structures are within normal limits.     Incidental note is made of mild mucosal thickening within the right  maxillary sinus. Mucosal thickening is identified within the sphenoid  sinus and ethmoid air cells.       Impression:       No evidence for acute traumatic intracranial pathology.     Bilateral comminuted and displaced nasal bone fractures with overlying  soft tissue swelling. Small anterior frontal scalp hematoma is  additionally appreciated.     TECHNIQUE: CT scan of the maxillofacial region was obtained with 2 mm  axial, coronal and sagittal images.     FINDINGS:  There are bilateral displaced and comminuted nasal bone fractures with  overlying soft tissue swelling.     Mild mucosal thickening is identified within the maxillary sinuses, the  ethmoid air cells, and the sphenoid sinus.     IMPRESSION:  Moderately displaced and comminuted bilateral nasal bone fractures.     Findings were discussed with Marilyn Frederick of the emergency room  on 04/03/2019 at approximately 9:15 AM.     Radiation dose reduction techniques were utilized, including automated  exposure control and exposure modulation based on body size.     This report was finalized on 4/3/2019 9:46 AM by Dr. Demarcus Zepeda M.D.             Current Facility-Administered Medications:   •  amLODIPine (NORVASC) tablet 5 mg, 5 mg, Oral, Daily, Juan Carlos Harper MD  •  calcium-vitamin D 500-200 MG-UNIT tablet 1,000 mg, 1,000 mg, Oral, Daily, Juan Carlos Harper MD  •  clopidogrel (PLAVIX) tablet 75 mg, 75 mg, Oral, Daily, Juan Carlos Harper MD  •  escitalopram (LEXAPRO) tablet 10 mg, 10 mg, Oral, Daily, Juan Carlos Harper MD  •  FERREX 150 PLUS 150-50-50 MG capsule 1 capsule, 1 capsule, Oral, Daily, Juan Carlos Harper MD  •  gabapentin (NEURONTIN) capsule 300 mg, 300 mg, Oral, Q8H, Juan Carlos Harper MD  •  levETIRAcetam (KEPPRA) tablet 750 mg, 750 mg, Oral, BID, Juan Carlos aHrper MD  •   mirtazapine (REMERON) tablet 15 mg, 15 mg, Oral, Nightly, Gaetano Harper MD  •  morphine injection 1 mg, 1 mg, Intravenous, Q4H PRN, Gaetano Harper MD  •  multivitamin with minerals 1 tablet, 1 tablet, Oral, Daily, Gaetano Harper MD  •  nystatin (MYCOSTATIN) ointment 1 application, 1 application, Topical, Q12H, Gaetano Harper MD  •  ondansetron (ZOFRAN) injection 4 mg, 4 mg, Intravenous, Q6H PRN, Gaetano Harper MD  •  pantoprazole (PROTONIX) EC tablet 40 mg, 40 mg, Oral, Daily, Gaetano Harper MD  •  polyethylene glycol (MIRALAX) powder 17 g, 17 g, Oral, Daily, Gaetano Harper MD  •  [COMPLETED] Insert peripheral IV, , , Once **AND** sodium chloride 0.9 % flush 10 mL, 10 mL, Intravenous, PRN, Marilyn Frederick, APRN    ASSESSMENT  Fracture and laceration of nose status post suturing  Contusion of head chest left shoulder and right foot  Status post fall  Chronic anemia with drop in H&H  Right ankle wound with recent fracture and surgery  Hypertension  Seizure disorder  Status post CVA  Depression  SSS status post permanent pacemaker  Osteoarthritis  Degenerative disc disease  Gastroesophageal reflux disease    PLAN  Admit  Supportive care  Local wound care  Transfuse 2 units packed RBCs  Anemia workup  Continue nursing home medication and adjust the doses  Stress ulcer DVT prophylaxis  Discussed with family  DNR  Follow closely further recommendation according to hospital course    GAETANO HARPER MD

## 2019-04-03 NOTE — ED NOTES
Patient has reddened skin in bilateral hip skin folds extending to her labia with white discharge.  Notified provider.      Mariluz Cerrato RN  04/03/19 2799

## 2019-04-03 NOTE — ED PROVIDER NOTES
EMERGENCY DEPARTMENT ENCOUNTER    CHIEF COMPLAINT  Chief Complaint: pain post fall  History given by: patient, family  History limited by: N/A  Time Seen: 0723  Room Number: 32/32  PMD: Grady Villeda MD      HPI:  Pt is a 89 y.o. female who is a nursing home resident and has hx of frequent falls. Per family, pt fractured her right ankle in 11/2018 (required surgical repair), is not supposed to be ambulating independently (uses walker), and was switched from skilled care to personal care at her facility yesterday. Pt reports that today, prior to ED arrival, while she was getting out of bed (was not using walker), she lost her balance and fell, sustaining blow to her head and face. She states that she hollered for help and nursing home staff arrived on scene shortly afterwards. She notes that she did not have any prodrome before the fall (denies dizziness, syncope, chest pain, trouble breathing, abd pain, or any other sx). Since the incident, she has had a nose laceration, left foot pain, left shoulder pain, and left chest wall pain. Additionally, family reports that pt has had mild confusion for the past few days (similar to previous UTIs) and sustained a left hand skin tear yesterday which nursing home staff repaired. Pt denies loss of consciousness, headache, neck pain, back pain, abd pain, left hand pain, dyspnea, vision changes, N/V/D, dizziness, fever, chills, pain and difficulty with urination, and sustaining any other injury. According to family, pt is currently being treated for LLE cellulitis and left foot pressure ulcer with abx. Tetanus status is unknown. Past Medical History of anxiety, seizures, UTI, hyperlipidemia, MRSA, frequent falls, HTN, anemia, and SSS.     Duration: fall occurred today prior to ED arrival  Timing: intermittent  Location: left foot, left shoulder, left chest wall  Radiation: none  Quality: pain  Intensity/Severity: moderate  Progression: unchanged  Associated Symptoms: nose  laceration, left foot pain, left shoulder pain, left chest wall pain, mild confusion (for past few days), left hand skin tear (sustained yesterday)  Aggravating Factors: none mentioned  Alleviating Factors: none mentioned  Previous Episodes: Pt states that she has hx of frequent falls.   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) 02/05/2016   • Vitamin D deficiency 02/05/2016   • History of sick sinus syndrome    • Intertrigo 03/25/2016   • Generalized convulsive seizures (CMS/HCC) 04/07/2016   • Change in bowel habits 05/20/2016   • 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   • History of anxiety 10/18/2016   • 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HCC) 01/04/2017   • Thrombocytopenia (CMS/HCC) 01/05/2017   • B12 deficiency 01/16/2017   • Cardiac pacemaker in situ 03/08/2017   • Chronic venous insufficiency 03/09/2017   • Arthritis 03/09/2017   • S/P knee replacement 03/09/2017   • Chronic bilateral low back pain without sciatica 03/09/2017   • Essential hypertension 03/09/2017   • Hiatal hernia 03/16/2017   • Zenker's diverticulum 04/14/2017   • Chronic idiopathic constipation 05/19/2017   • Pressure sore on buttocks 05/19/2017   • Somnolence 12/13/2017   • Closed trimalleolar fracture of right ankle 11/13/2018   • Surgical wound infection 01/29/2019   • History of failed arthroplasty of ankle 02/19/2019     Resolved Ambulatory Problems     Diagnosis Date Noted   • Hyponatremia 02/05/2016   • Urine frequency 05/20/2016   • Urinary tract infection 07/08/2016   • Pneumonia 07/08/2016   • Aspiration pneumonia  (CMS/[facility]) 07/17/2016   • Urinary tract infection in female 01/03/2017   • History of aspiration pneumonitis 01/04/2017   • Pain of toe of right foot 01/16/2017   • Right lower lobe pneumonia (CMS/[facility]) 02/02/2017   • Sepsis due to pneumonia (CMS/[facility]) 02/02/2017   • Weakness 02/03/2017   • UTI (urinary tract infection) 03/15/2017   • Acute kidney injury (CMS/[facility]) 03/17/2017   • Acute vaginitis 03/23/2017   • Acute pain of left knee 05/19/2017     Past Medical History:   Diagnosis Date   • Anemia    • At risk for falls    • At risk for sleep apnea 11/17/2018   • Atrial fibrillation (CMS/[facility])    • Bulging lumbar disc    • Cellulitis    • Chronic back pain    • Chronic cough    • Chronic UTI    • Chronic venous insufficiency    • 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[facility])    • DDD (degenerative disc disease), lumbosacral    • Dementia without behavioral disturbance    • Depression, endogenous (CMS/HCC)    • Disc degeneration, lumbar    • Dysphagia    • GERD (gastroesophageal reflux disease)    • Hiatal hernia    • History of anxiety    • History of aspiration pneumonitis    • History of cerebral artery occlusion    • History of herpes zoster    • History of ingrowing nail    • History of osteoporosis    • History of poliomyelitis    • History of sciatica    • History of sick sinus syndrome    • History of transient cerebral ischemia    • History of Zenker's diverticulum removal 2016   • HX: long term anticoagulant use    • Hyperlipidemia    • Hypertension    • Hyponatremia    • Intertrigo    • Lumbar radiculopathy    • Morbid obesity (CMS/[facility])    • MRSA (methicillin resistant Staphylococcus aureus)    • Neuralgia    • Nonepileptic episode (CMS/[facility])    • Osteoarthritis of right knee    • Pacemaker    • Pneumonia    • Seeing double    • Seizures (CMS/HCC)    • 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)    • Spinal stenosis    • Vitamin D deficiency    • Zenker's diverticulum        PAST SURGICAL HISTORY  Past  Surgical History:   Procedure Laterality Date   • ANKLE OPEN REDUCTION INTERNAL FIXATION Right 2018    Procedure: ANKLE OPEN REDUCTION INTERNAL FIXATION;  Surgeon: Cristian Hill II, MD;  Location: [facility] OR;  Service: Orthopedics   • CARDIAC PACEMAKER PLACEMENT      secondary to SSS, placed in , with gen chng ; [employer] dual DOO   • CATARACT EXTRACTION W/ INTRAOCULAR LENS IMPLANT Bilateral    • COLONOSCOPY     • HAND SURGERY Right    • HARDWARE REMOVAL Right 2019    Procedure: RT ANKLE HARDWARE REMOVAL;  Surgeon: Cristian Hill II, MD;  Location: [facility] OR;  Service: Orthopedics   • KNEE ARTHROPLASTY Left    • LAMINECTOMY FOR IMPLANTATION / PLACEMENT NEUROSTIMULATOR ELECTRODES     • LUMBAR LAMINECTOMY      L-3 L4 L4 L5   • TONSILLECTOMY     • ZENKERS DIVERTICULECTOMY N/A 2016    Procedure: ENDOSCOPIC REMOVAL OF ZENKERS DIVERTICULUM W/ WERDASCOPE;  Surgeon: Oswald Barth MD;  Location: [facility] OR;  Service:    • ZENKERS DIVERTICULECTOMY N/A 2017    Procedure: ESOPHAGOSCOPY;  Surgeon: Oswald Barth MD;  Location: [facility] OR;  Service:        FAMILY HISTORY  Family History   Problem Relation Age of Onset   • Cancer Daughter    • Malig Hyperthermia Neg Hx        SOCIAL HISTORY  Social History     Socioeconomic History   • Marital status:      Spouse name: Not on file   • Number of children: 3   • Years of education: Not on file   • Highest education level: Not on file   Occupational History   • Occupation: Retired   Tobacco Use   • Smoking status: Former Smoker     Packs/day: 1.00     Years: 40.00     Pack years: 40.00     Types: Cigarettes     Last attempt to quit:      Years since quittin.2   • Smokeless tobacco: Never Used   • Tobacco comment: caffeine use: one cup of coffee in the morning.    Substance and Sexual Activity   • Alcohol use: Yes     Comment: 2 PER WEEK   • Drug use: No   • Sexual activity: Defer          ALLERGIES  Lipitor [atorvastatin] and Penicillins    REVIEW OF SYSTEMS  Review of Systems   Constitutional: Negative for chills and fever.   HENT: Negative for sore throat.    Eyes: Negative for visual disturbance.   Respiratory: Negative for shortness of breath.    Cardiovascular: Positive for chest pain (left chest wall pain).   Gastrointestinal: Negative for nausea and vomiting.   Genitourinary: Negative for difficulty urinating and dysuria.   Musculoskeletal: Negative for back pain and neck pain.        Left foot pain, left shoulder pain   Skin: Positive for wound (nose laceration, left hand skin tear (sustained yesterday)). Negative for rash.   Neurological: Negative for dizziness and headaches.   Psychiatric/Behavioral: Positive for confusion (mild). The patient is not nervous/anxious.        PHYSICAL EXAM  ED Triage Vitals [04/03/19 0705]   Temp Heart Rate Resp BP SpO2   97.8 °F (36.6 °C) 85 18 136/64 96 % WNL     Physical Exam   Constitutional: She is oriented to person, place, and time. No distress.   Chronically ill appearing   HENT:   Head: Normocephalic.   Mouth/Throat: Mucous membranes are normal.   Hematoma to the mid forehead, 3cm jagged laceration to the left side of the nose with active bleeding, otherwise no epistaxis, nasal swelling, bruising above left upper lip, otherwise no facial tenderness   Eyes: EOM are normal. Pupils are equal, round, and reactive to light.   Neck: Normal range of motion. Neck supple.   No c-spine tenderness   Cardiovascular: Normal rate, regular rhythm and normal heart sounds.   Pulmonary/Chest: Effort normal and breath sounds normal. No respiratory distress. She has no decreased breath sounds. She has no wheezes. She has no rhonchi. She has no rales. She exhibits tenderness (left anterior rib tenderness).   Bruise above left breast   Abdominal: Soft. There is no tenderness. There is no rebound and no guarding.   Genitourinary:   Genitourinary Comments: Performed  rectal exam with RN at pt's bedside-> brown stool in rectal vault without gross blood   Musculoskeletal:   No t-spine or l-spine tenderness, left shoulder tenderness with FROM, left foot tenderness, mild swelling and erythema to left lower leg (pt is currently being treated for LLE cellulitis), nickel sized healing pressure ulcer to the left heel, healed surgical incision above the right lateral malleolus without signs of infection, skin tear with steri strips in place to left dorsal hand from previous incident, no bony left hand tenderness, NV intact distally to all extremities   Neurological: She is alert and oriented to person, place, and time. She has normal sensation.   nonfocal neuro exam, answers questions appropriately, follows commands   Skin: Skin is warm and dry. There is pallor.   Psychiatric: Affect normal.   Nursing note and vitals reviewed.      LAB RESULTS  Recent Results (from the past 24 hour(s))   Comprehensive Metabolic Panel    Collection Time: 04/03/19  8:46 AM   Result Value Ref Range    Glucose 92 65 - 99 mg/dL    BUN 18 8 - 23 mg/dL    Creatinine 1.03 (H) 0.57 - 1.00 mg/dL    Sodium 138 136 - 145 mmol/L    Potassium 4.7 3.5 - 5.2 mmol/L    Chloride 103 98 - 107 mmol/L    CO2 24.3 22.0 - 29.0 mmol/L    Calcium 8.8 8.6 - 10.5 mg/dL    Total Protein 6.4 6.0 - 8.5 g/dL    Albumin 3.40 (L) 3.50 - 5.20 g/dL    ALT (SGPT) 11 1 - 33 U/L    AST (SGOT) 18 1 - 32 U/L    Alkaline Phosphatase 57 39 - 117 U/L    Total Bilirubin 0.3 0.2 - 1.2 mg/dL    eGFR Non African Amer 50 (L) >60 mL/min/1.73    Globulin 3.0 gm/dL    A/G Ratio 1.1 g/dL    BUN/Creatinine Ratio 17.5 7.0 - 25.0    Anion Gap 10.7 mmol/L   CBC Auto Differential    Collection Time: 04/03/19  8:46 AM   Result Value Ref Range    WBC 6.16 3.40 - 10.80 10*3/mm3    RBC 2.24 (L) 3.77 - 5.28 10*6/mm3    Hemoglobin 7.5 (L) 12.0 - 15.9 g/dL    Hematocrit 23.8 (L) 34.0 - 46.6 %    .3 (H) 79.0 - 97.0 fL    MCH 33.5 (H) 26.6 - 33.0 pg    MCHC 31.5  31.5 - 35.7 g/dL    RDW 13.2 12.3 - 15.4 %    RDW-SD 51.8 37.0 - 54.0 fl    MPV 9.0 6.0 - 12.0 fL    Platelets 154 140 - 450 10*3/mm3    Neutrophil % 66.9 42.7 - 76.0 %    Lymphocyte % 19.6 19.6 - 45.3 %    Monocyte % 8.3 5.0 - 12.0 %    Eosinophil % 4.1 0.3 - 6.2 %    Basophil % 0.5 0.0 - 1.5 %    Immature Grans % 0.6 (H) 0.0 - 0.5 %    Neutrophils, Absolute 4.12 1.40 - 7.00 10*3/mm3    Lymphocytes, Absolute 1.21 0.70 - 3.10 10*3/mm3    Monocytes, Absolute 0.51 0.10 - 0.90 10*3/mm3    Eosinophils, Absolute 0.25 0.00 - 0.40 10*3/mm3    Basophils, Absolute 0.03 0.00 - 0.20 10*3/mm3    Immature Grans, Absolute 0.04 0.00 - 0.05 10*3/mm3    nRBC 0.3 (H) 0.0 - 0.0 /100 WBC   Type & Screen    Collection Time: 04/03/19  9:46 AM   Result Value Ref Range    ABO Type A     RH type Positive    Urinalysis With Microscopic If Indicated (No Culture) - Urine, Catheter    Collection Time: 04/03/19 10:11 AM   Result Value Ref Range    Color, UA Yellow Yellow, Straw    Appearance, UA Clear Clear    pH, UA 6.5 5.0 - 8.0    Specific Gravity, UA 1.014 1.005 - 1.030    Glucose, UA Negative Negative    Ketones, UA Negative Negative    Bilirubin, UA Negative Negative    Blood, UA Trace (A) Negative    Protein, UA Negative Negative    Leuk Esterase, UA Negative Negative    Nitrite, UA Negative Negative    Urobilinogen, UA 0.2 E.U./dL 0.2 - 1.0 E.U./dL   Urinalysis, Microscopic Only - Urine, Catheter    Collection Time: 04/03/19 10:11 AM   Result Value Ref Range    RBC, UA 6-12 (A) None Seen, 0-2 /HPF    WBC, UA 0-2 None Seen, 0-2 /HPF    Bacteria, UA None Seen None Seen /HPF    Squamous Epithelial Cells, UA 0-2 None Seen, 0-2 /HPF    Hyaline Casts, UA 0-2 None Seen /LPF    Methodology Automated Microscopy    Occult Blood X 1, Stool - Stool, Per Rectum    Collection Time: 04/03/19 10:12 AM   Result Value Ref Range    Fecal Occult Blood Negative Negative       I ordered the above labs and reviewed the results      RADIOLOGY  XR Foot 3+ View  Left (Final result)   Result time 04/03/19 09:54:05   Final result by Eriberto Goode MD (04/03/19 09:54:05)                Narrative:    LEFT FOOT AND LEFT RIBS AND LEFT SHOULDER X-RAYS     CLINICAL HISTORY: Patient fell. Foot and shoulder and rib pain.     Left foot:     4 views of the left foot demonstrate moderate osteopenia and moderate  arthritic changes within the interphalangeal joints, and are otherwise  unremarkable. There is no evidence of fracture or subluxation.     Left rib detail x-rays:     An AP view the chest and multiple oblique views of the ribs of left  hemithorax were obtained. No bony abnormality is identified. There is no  evidence of recent or old rib fracture. The lungs are fairly  well-expanded and clear. There is no pneumothorax. A dual-chamber  pacemaker is noted in the left subclavian vein in satisfactory position.  Spinal cord stimulator electrodes are also noted centered at the T8  level.     Left shoulder:     3 views the left shoulder demonstrate no evidence of fracture or  subluxation. The AC joint is intact.     This report was finalized on 4/3/2019 9:54 AM by Dr. Eriberto Goode M.D.                       CT Head Without Contrast (Final result)   Result time 04/03/19 09:46:22   Final result by Demarcus Zepeda MD (04/03/19 09:46:22)                Impression:    No evidence for acute traumatic intracranial pathology.     Bilateral comminuted and displaced nasal bone fractures with overlying  soft tissue swelling. Small anterior frontal scalp hematoma is  additionally appreciated.     TECHNIQUE: CT scan of the maxillofacial region was obtained with 2 mm  axial, coronal and sagittal images.     FINDINGS:  There are bilateral displaced and comminuted nasal bone fractures with  overlying soft tissue swelling.     Mild mucosal thickening is identified within the maxillary sinuses, the  ethmoid air cells, and the sphenoid sinus.     IMPRESSION:  Moderately displaced and comminuted  bilateral nasal bone fractures.     Findings were discussed with Marilyn Frederick of the emergency room  on 04/03/2019 at approximately 9:15 AM.     Radiation dose reduction techniques were utilized, including automated  exposure control and exposure modulation based on body size.     This report was finalized on 4/3/2019 9:46 AM by Dr. Demarcus Zepeda M.D.               Narrative:    CT HEAD AND MAXILLOFACIAL REGION WITHOUT CONTRAST     CLINICAL HISTORY: Fell hitting head. On Plavix.     TECHNIQUE: CT scan of the head was obtained with 2 mm axial bone  algorithm and 3 mm axial soft tissue algorithm images. No intravenous  contrast was administered.     FINDINGS:  There is no evidence for calvarial fracture. However, there are  bilateral displaced nasal bone fractures with overlying soft tissue  swelling. There is no evidence for an acute extra-axial hemorrhage. The  ventricles, sulci, and cisterns are age appropriate. The basal ganglia  and thalami are unremarkable in appearance. The posterior fossa  structures are within normal limits.     Incidental note is made of mild mucosal thickening within the right  maxillary sinus. Mucosal thickening is identified within the sphenoid  sinus and ethmoid air cells.                   I ordered the above noted radiological studies and reviewed the images on the PACS system.  Spoke with Dr. Zepeda (radiologist) regarding CT head and CT facial bones        MEDICAL RECORD REVIEW    2 months ago, hgb was 10. 1 month ago, hgb was 9.7. 12 days ago, hgb was 8.6.      PROCEDURES    Laceration Repair  Date/Time: 4/3/2019 8:20 AM  Performed by: Marilyn Frederick APRN  Authorized by: Aldo Thompson MD     Consent:     Consent obtained:  Verbal    Consent given by:  Patient  Anesthesia (see MAR for exact dosages):     Anesthesia method:  Local infiltration    Local anesthetic:  Lidocaine 1% w/o epi (6ml)  Laceration details:     Location:  Face    Face location:  Nose    Length  (cm):  3  Repair type:     Repair type:  Simple  Pre-procedure details:     Preparation:  Patient was prepped and draped in usual sterile fashion  Exploration:     Hemostasis achieved with:  Direct pressure    Contaminated: no    Treatment:     Area cleansed with:  Shur-Clens and saline    Amount of cleaning:  Extensive    Irrigation solution:  Sterile saline    Irrigation method:  Syringe  Skin repair:     Repair method:  Sutures    Suture size:  6-0    Suture material:  Nylon    Suture technique:  Simple interrupted    Number of sutures:  5  Approximation:     Approximation:  Close  Post-procedure details:     Dressing:  Antibiotic ointment (gauze dressing)    Patient tolerance of procedure:  Tolerated well, no immediate complications            PROGRESS AND CONSULTS    0802- Ordered tdap since tetanus status is unknown. Ordered left shoulder xray, left ribs and chest xray, CT facial bones, left foot xray, CT head, blood work, and UA for further evaluation. Nursing staff to perform wound care.     0818- Rechecked pt. She is resting comfortably and is in no acute distress. Informed pt and family about plan to repair pt's nasal laceration. They verbalize understanding and agreement with plan.     0820- Performed laceration repair. See procedure note for details.     0920- Obtained CT head results-> no acute intracranial process. Obtained CT facial bones results-> mildly displaced bilateral nasal bone fractures.     0944- Reviewed labs-> 2 months ago, hgb was 10. 1 month ago, hgb was 9.7. 12 days ago, hgb was 8.6. Today, hgb has decreased to 7.5. Ordered type and screen.     0946- Reviewed pt's history and workup with Dr. Thompson.  At bedside evaluation, they agree with the plan of care.    1000- Rechecked pt. She is resting comfortably and is in no acute distress. Discussed with pt and family about all pertinent results obtained so far including CT head findings (no acute intracranial process), CT facial bones results  (mildly displaced bilateral nasal bone fractures), left foot xray findings (no acute fracture), left ribs and chest xray findings (no acute process), left shoulder xray findings (no acute fracture), normal WBC count, and hgb of 7.5 which has progressively decreased over time. Pt denies noticing black or bloody stools. Informed pt and family about plan to perform rectal exam in order to determine if rectal bleed/GI bleed is contributing to pt's worsening anemia. Discussed pt admission for further care and observation. Pt and family verbalize understanding and agreement with plan.     1002- Performed rectal exam with RN at pt's bedside-> brown stool in rectal vault without gross blood. Ordered occult blood stool per rectum-> heme negative stool.     1121- Sent call out to [facility]. Admission decision time= 1121    1130- Discussed case with Dr Harper ([facility] hospitalist)  Reviewed history, exam, results and treatments.  Discussed concerns and plan of care. Dr Harper accepts pt to be admitted to telemetry.    1253- Pt now c/o headache. Ordered tylenol.       ADMISSION    Discussed treatment plan and reason for admission with pt/family and admitting physician.  Pt/family voiced understanding of the plan for admission for further testing/treatment as needed.      DIAGNOSIS  Final diagnoses:   Anemia, unspecified type   Open fracture of nasal bone, initial encounter   Laceration of nose, initial encounter   Contusion of head, unspecified part of head, initial encounter   Chest wall contusion, left, initial encounter   Contusion of left shoulder, initial encounter   Contusion of left foot, initial encounter   Fall, initial encounter         COURSE & MEDICAL DECISION MAKING  Pertinent Labs and Imaging studies that were ordered and reviewed are noted above.  Results were reviewed/discussed with the patient and family and they were also made aware of online assess.   Pt and family also made aware that some labs, such as cultures,  "will not be resulted during ER visit and follow up with PMD is necessary.     MEDICATIONS GIVEN IN ER  Medications   sodium chloride 0.9 % flush 10 mL (not administered)   miconazole nitrate (ALOE VESTA) 2 % ointment (not administered)   Tdap (BOOSTRIX) injection 0.5 mL (0.5 mL Intramuscular Given 4/3/19 0836)   lidocaine PF 1% (XYLOCAINE) injection 10 mL (10 mL Infiltration Given by Other 4/3/19 0836)   morphine injection 2 mg (2 mg Intravenous Given 4/3/19 0850)   ondansetron (ZOFRAN) injection 4 mg (4 mg Intravenous Given 4/3/19 0850)       BP 93/58   Pulse 71   Temp 97.8 °F (36.6 °C) (Tympanic)   Resp 16   Ht 167.6 cm (66\")   Wt 107 kg (235 lb 6.4 oz)   SpO2 98%   BMI 37.99 kg/m²       I personally reviewed the past medical history, past surgical history, social history, family history, current medications and allergies as they appear in this chart.  The scribe's note accurately reflects the work and decisions made by me.     Documentation assistance provided by smita Stoddard for SILVINA Chaney on 4/3/2019 at 10:02 AM. Information recorded by the scribe was done at my direction and has been verified and validated by me.     Benito Stoddard  04/03/19 1126       Marilyn Frederick APRN  04/03/19 1444    "

## 2019-04-04 LAB
ABO + RH BLD: NORMAL
ABO + RH BLD: NORMAL
ALBUMIN SERPL-MCNC: 3.2 G/DL (ref 3.5–5.2)
ALBUMIN/GLOB SERPL: 1 G/DL
ALP SERPL-CCNC: 55 U/L (ref 39–117)
ALT SERPL W P-5'-P-CCNC: 13 U/L (ref 1–33)
ANION GAP SERPL CALCULATED.3IONS-SCNC: 8.7 MMOL/L
AST SERPL-CCNC: 19 U/L (ref 1–32)
BASOPHILS # BLD AUTO: 0.03 10*3/MM3 (ref 0–0.2)
BASOPHILS NFR BLD AUTO: 0.7 % (ref 0–1.5)
BH BB BLOOD EXPIRATION DATE: NORMAL
BH BB BLOOD EXPIRATION DATE: NORMAL
BH BB BLOOD TYPE BARCODE: 5100
BH BB BLOOD TYPE BARCODE: 5100
BH BB DISPENSE STATUS: NORMAL
BH BB DISPENSE STATUS: NORMAL
BH BB PRODUCT CODE: NORMAL
BH BB PRODUCT CODE: NORMAL
BH BB UNIT NUMBER: NORMAL
BH BB UNIT NUMBER: NORMAL
BILIRUB SERPL-MCNC: 0.2 MG/DL (ref 0.2–1.2)
BUN BLD-MCNC: 18 MG/DL (ref 8–23)
BUN/CREAT SERPL: 18.6 (ref 7–25)
CALCIUM SPEC-SCNC: 9.1 MG/DL (ref 8.6–10.5)
CHLORIDE SERPL-SCNC: 104 MMOL/L (ref 98–107)
CHOLEST SERPL-MCNC: 130 MG/DL (ref 0–200)
CO2 SERPL-SCNC: 23.3 MMOL/L (ref 22–29)
CREAT BLD-MCNC: 0.97 MG/DL (ref 0.57–1)
CROSSMATCH INTERPRETATION: NORMAL
CROSSMATCH INTERPRETATION: NORMAL
DEPRECATED RDW RBC AUTO: 63.8 FL (ref 37–54)
EOSINOPHIL # BLD AUTO: 0.32 10*3/MM3 (ref 0–0.4)
EOSINOPHIL NFR BLD AUTO: 7.2 % (ref 0.3–6.2)
ERYTHROCYTE [DISTWIDTH] IN BLOOD BY AUTOMATED COUNT: 17.4 % (ref 12.3–15.4)
GFR SERPL CREATININE-BSD FRML MDRD: 54 ML/MIN/1.73
GLOBULIN UR ELPH-MCNC: 3.1 GM/DL
GLUCOSE BLD-MCNC: 85 MG/DL (ref 65–99)
HBA1C MFR BLD: 4.9 % (ref 4.8–5.6)
HCT VFR BLD AUTO: 27.5 % (ref 34–46.6)
HDLC SERPL-MCNC: 46 MG/DL (ref 40–60)
HGB BLD-MCNC: 8.8 G/DL (ref 12–15.9)
IMM GRANULOCYTES # BLD AUTO: 0 10*3/MM3 (ref 0–0.05)
IMM GRANULOCYTES NFR BLD AUTO: 0 % (ref 0–0.5)
LDLC SERPL CALC-MCNC: 70 MG/DL (ref 0–100)
LDLC/HDLC SERPL: 1.52 {RATIO}
LYMPHOCYTES # BLD AUTO: 1.74 10*3/MM3 (ref 0.7–3.1)
LYMPHOCYTES NFR BLD AUTO: 39.1 % (ref 19.6–45.3)
MCH RBC QN AUTO: 32 PG (ref 26.6–33)
MCHC RBC AUTO-ENTMCNC: 32 G/DL (ref 31.5–35.7)
MCV RBC AUTO: 100 FL (ref 79–97)
MONOCYTES # BLD AUTO: 0.48 10*3/MM3 (ref 0.1–0.9)
MONOCYTES NFR BLD AUTO: 10.8 % (ref 5–12)
NEUTROPHILS # BLD AUTO: 1.88 10*3/MM3 (ref 1.4–7)
NEUTROPHILS NFR BLD AUTO: 42.2 % (ref 42.7–76)
NRBC BLD AUTO-RTO: 0 /100 WBC (ref 0–0)
NT-PROBNP SERPL-MCNC: 831.1 PG/ML (ref 5–1800)
PLATELET # BLD AUTO: 129 10*3/MM3 (ref 140–450)
PMV BLD AUTO: 9.1 FL (ref 6–12)
POTASSIUM BLD-SCNC: 4.5 MMOL/L (ref 3.5–5.2)
PROT SERPL-MCNC: 6.3 G/DL (ref 6–8.5)
RBC # BLD AUTO: 2.75 10*6/MM3 (ref 3.77–5.28)
SODIUM BLD-SCNC: 136 MMOL/L (ref 136–145)
TRIGL SERPL-MCNC: 71 MG/DL (ref 0–150)
TSH SERPL DL<=0.05 MIU/L-ACNC: 5.98 MIU/ML (ref 0.27–4.2)
UNIT  ABO: NORMAL
UNIT  ABO: NORMAL
UNIT  RH: NORMAL
UNIT  RH: NORMAL
VLDLC SERPL-MCNC: 14.2 MG/DL (ref 5–40)
WBC NRBC COR # BLD: 4.45 10*3/MM3 (ref 3.4–10.8)

## 2019-04-04 PROCEDURE — 85025 COMPLETE CBC W/AUTO DIFF WBC: CPT | Performed by: HOSPITALIST

## 2019-04-04 PROCEDURE — 83880 ASSAY OF NATRIURETIC PEPTIDE: CPT | Performed by: HOSPITALIST

## 2019-04-04 PROCEDURE — 84443 ASSAY THYROID STIM HORMONE: CPT | Performed by: HOSPITALIST

## 2019-04-04 PROCEDURE — 83036 HEMOGLOBIN GLYCOSYLATED A1C: CPT | Performed by: HOSPITALIST

## 2019-04-04 PROCEDURE — 97110 THERAPEUTIC EXERCISES: CPT

## 2019-04-04 PROCEDURE — 97535 SELF CARE MNGMENT TRAINING: CPT | Performed by: OCCUPATIONAL THERAPIST

## 2019-04-04 PROCEDURE — 80061 LIPID PANEL: CPT | Performed by: HOSPITALIST

## 2019-04-04 PROCEDURE — 97166 OT EVAL MOD COMPLEX 45 MIN: CPT | Performed by: OCCUPATIONAL THERAPIST

## 2019-04-04 PROCEDURE — 97162 PT EVAL MOD COMPLEX 30 MIN: CPT

## 2019-04-04 PROCEDURE — 80053 COMPREHEN METABOLIC PANEL: CPT | Performed by: HOSPITALIST

## 2019-04-04 RX ORDER — PETROLATUM 42 G/100G
OINTMENT TOPICAL EVERY 12 HOURS SCHEDULED
Status: DISCONTINUED | OUTPATIENT
Start: 2019-04-04 | End: 2019-04-08 | Stop reason: HOSPADM

## 2019-04-04 RX ORDER — HYDROCODONE BITARTRATE AND ACETAMINOPHEN 5; 325 MG/1; MG/1
1 TABLET ORAL EVERY 6 HOURS PRN
Status: DISCONTINUED | OUTPATIENT
Start: 2019-04-04 | End: 2019-04-08 | Stop reason: HOSPADM

## 2019-04-04 RX ADMIN — ACETAMINOPHEN 650 MG: 325 TABLET ORAL at 11:58

## 2019-04-04 RX ADMIN — ESCITALOPRAM 10 MG: 10 TABLET, FILM COATED ORAL at 08:29

## 2019-04-04 RX ADMIN — AMLODIPINE BESYLATE 5 MG: 5 TABLET ORAL at 08:29

## 2019-04-04 RX ADMIN — PETROLATUM: 42 OINTMENT TOPICAL at 16:00

## 2019-04-04 RX ADMIN — LEVETIRACETAM 750 MG: 500 TABLET, FILM COATED ORAL at 08:29

## 2019-04-04 RX ADMIN — LEVETIRACETAM 750 MG: 500 TABLET, FILM COATED ORAL at 21:22

## 2019-04-04 RX ADMIN — MULTIPLE VITAMINS W/ MINERALS TAB 1 TABLET: TAB at 08:29

## 2019-04-04 RX ADMIN — MIRTAZAPINE 15 MG: 15 TABLET, FILM COATED ORAL at 21:22

## 2019-04-04 RX ADMIN — GABAPENTIN 300 MG: 300 CAPSULE ORAL at 14:14

## 2019-04-04 RX ADMIN — MICONAZOLE NITRATE: 2 POWDER TOPICAL at 18:12

## 2019-04-04 RX ADMIN — CALCIUM CARBONATE-VITAMIN D TAB 500 MG-200 UNIT 1000 MG: 500-200 TAB at 08:29

## 2019-04-04 RX ADMIN — Medication 1 CAPSULE: at 08:30

## 2019-04-04 RX ADMIN — GABAPENTIN 300 MG: 300 CAPSULE ORAL at 06:50

## 2019-04-04 RX ADMIN — PANTOPRAZOLE SODIUM 40 MG: 40 TABLET, DELAYED RELEASE ORAL at 08:29

## 2019-04-04 RX ADMIN — CLOPIDOGREL 75 MG: 75 TABLET, FILM COATED ORAL at 08:29

## 2019-04-04 RX ADMIN — PETROLATUM: 42 OINTMENT TOPICAL at 21:23

## 2019-04-04 RX ADMIN — GABAPENTIN 300 MG: 300 CAPSULE ORAL at 21:22

## 2019-04-04 RX ADMIN — ACETAMINOPHEN 650 MG: 325 TABLET ORAL at 17:36

## 2019-04-04 RX ADMIN — NYSTATIN 1 APPLICATION: 100000 OINTMENT TOPICAL at 21:23

## 2019-04-04 RX ADMIN — NYSTATIN 1 APPLICATION: 100000 OINTMENT TOPICAL at 08:30

## 2019-04-04 RX ADMIN — MICONAZOLE NITRATE: 2 POWDER TOPICAL at 21:23

## 2019-04-04 NOTE — THERAPY EVALUATION
Acute Care - Physical Therapy Initial Evaluation  [facility]     Patient Name: Kim Feldman  : 3/13/1930  MRN: [id number]  Today's Date: 2019      Date of Referral to PT: 19  Referring Physician: Dr. Harper      Admit Date: 4/3/2019    Visit Dx:     ICD-10-CM ICD-9-CM   1. Anemia, unspecified type D64.9 285.9   2. Open fracture of nasal bone, initial encounter S02.2XXB 802.1   3. Laceration of nose, initial encounter S01.21XA 873.20   4. Contusion of head, unspecified part of head, initial encounter S00.93XA 920   5. Chest wall contusion, left, initial encounter S20.212A 922.1   6. Contusion of left shoulder, initial encounter S40.012A 923.00   7. Contusion of left foot, initial encounter S90.32XA 924.20   8. Fall, initial encounter W19.XXXA E888.9   9. 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)   • Vitamin D deficiency   • History of sick sinus syndrome   • Intertrigo   • Generalized convulsive seizures (CMS/HCC)   • Change in bowel habits   • Zenker diverticulum   • History of anxiety   • 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HCC)   • Thrombocytopenia (CMS/HCC)   • B12 deficiency   • Cardiac pacemaker in situ   • Chronic venous insufficiency   • Arthritis   • S/P knee replacement   • Chronic bilateral low back pain without sciatica   • Essential hypertension   • Hiatal hernia   • Zenker's diverticulum   • Chronic idiopathic constipation   • Pressure sore on buttocks   • Somnolence   • Closed trimalleolar fracture of right ankle   • Surgical wound infection   • History of failed arthroplasty of ankle     Past Medical History:   Diagnosis Date   • Anemia     • At risk for falls    • At risk for sleep apnea 11/17/2018   • Atrial fibrillation (CMS/HCC)    • Bulging lumbar disc    • Cellulitis     BILATERAL LEGS   • Chronic back pain    • Chronic cough    • Chronic UTI    • Chronic venous insufficiency    • 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HCC)    • DDD (degenerative disc disease), lumbosacral    • Dementia without behavioral disturbance     moderate   • Depression, endogenous (CMS/HCC)    • Disc degeneration, lumbar    • Dysphagia    • GERD (gastroesophageal reflux disease)    • Hiatal hernia    • History of anxiety    • History of aspiration pneumonitis    • History of cerebral artery occlusion     CVA (following TIA), 10/10, treated with tPA   • History of herpes zoster    • History of ingrowing nail    • History of osteoporosis    • History of poliomyelitis     child   • History of sciatica    • History of sick sinus syndrome     s/p PPM   • History of transient cerebral ischemia     followed by stroke in 10/2010. BIBI was normal.   • History of Zenker's diverticulum removal 2016   • HX: long term anticoagulant use    • Hyperlipidemia    • Hypertension     NO CURRENT MEDICATION   • Hyponatremia    • Intertrigo    • Lumbar radiculopathy    • Morbid obesity (CMS/HCC)    • MRSA (methicillin resistant Staphylococcus aureus)     LEFT FOOT SPREAD TO RIGHT ANKLE; DR TUBBS AWARE   • Neuralgia     with diplopia secondary to facial shingles   • Nonepileptic episode (CMS/HCC)    • Osteoarthritis of right knee    • Pacemaker    • Pneumonia    • Seeing double     secondary to shingles on the face also with neuralgia   • Seizures (CMS/HCC)    • 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)    • Spinal stenosis    • Vitamin D deficiency    • Zenker's diverticulum      Past Surgical History:   Procedure Laterality Date   • ANKLE OPEN REDUCTION INTERNAL FIXATION Right 11/17/2018    Procedure: ANKLE OPEN REDUCTION INTERNAL FIXATION;  Surgeon: Cristian Tubbs II, MD;   Location: [facility] MAIN OR;  Service: Orthopedics   • CARDIAC PACEMAKER PLACEMENT      secondary to SSS, placed in 2004, with gen chng 2014; [employer] dual DOO   • CATARACT EXTRACTION W/ INTRAOCULAR LENS IMPLANT Bilateral    • COLONOSCOPY     • HAND SURGERY Right    • HARDWARE REMOVAL Right 2/19/2019    Procedure: RT ANKLE HARDWARE REMOVAL;  Surgeon: Cristian Hill II, MD;  Location: [facility] MAIN OR;  Service: Orthopedics   • KNEE ARTHROPLASTY Left    • LAMINECTOMY FOR IMPLANTATION / PLACEMENT NEUROSTIMULATOR ELECTRODES     • LUMBAR LAMINECTOMY      L-3 L4 L4 L5   • TONSILLECTOMY     • ZENKERS DIVERTICULECTOMY N/A 9/27/2016    Procedure: ENDOSCOPIC REMOVAL OF ZENKERS DIVERTICULUM W/ WERDASCOPE;  Surgeon: Oswald Barth MD;  Location: [facility] OR;  Service:    • ZENKERS DIVERTICULECTOMY N/A 4/14/2017    Procedure: ESOPHAGOSCOPY;  Surgeon: Oswald Barth MD;  Location: [facility] OR;  Service:         PT ASSESSMENT (last 12 hours)      Physical Therapy Evaluation     Row Name 04/04/19 1318          PT Evaluation Time/Intention    Subjective Information  complains of;weakness;fatigue;pain  -MS     Document Type  evaluation  -MS     Mode of Treatment  group therapy  -MS     Patient Effort  good  -MS     Symptoms Noted During/After Treatment  fatigue;increased pain  -MS     Row Name 04/04/19 1318          General Information    Patient Profile Reviewed?  yes  -MS     Referring Physician  Dr. Harper  -MS     Patient Observations  alert;cooperative;agree to therapy  -MS     General Observations of Patient  Supine in bed with HOB elevated, NAD, pure wick on, no exit alarm  -MS     Prior Level of Function  min assist:;bed mobility;transfer;gait ambulating with PT at SNF  -MS     Equipment Currently Used at Home  walker, rolling  -MS     Pertinent History of Current Functional Problem  Admission from SNF for fall and anemia- recent R ankle fracture this past Novemeber from fall. Awaiting clarification for WBing  status of R LE- conflicting statements as patient states PWB 50% but chart review states WBAT with CAM boot.  -MS     Existing Precautions/Restrictions  fall CAM boot on R LE when up- PWBing 50%? awaiting clarification  -MS     Limitations/Impairments  safety/cognitive  -MS     Risks Reviewed  patient:  -MS     Benefits Reviewed  patient:  -MS     Barriers to Rehab  medically complex;previous functional deficit  -MS     Row Name 04/04/19 1318          Cognitive Assessment/Intervention- PT/OT    Orientation Status (Cognition)  oriented x 4  -MS     Follows Commands (Cognition)  WFL  -MS     Safety Deficit (Cognitive)  mild deficit;at risk behavior observed;awareness of need for assistance  -MS     Row Name 04/04/19 1318          Mobility Assessment/Treatment    Extremity Weight-bearing Status  right lower extremity  -MS     Right Lower Extremity (Weight-bearing Status)  partial weight-bearing (PWB) 50%??? Awaiting clarification  -MS     Row Name 04/04/19 1318          Bed Mobility Assessment/Treatment    Bed Mobility Assessment/Treatment  --  -MS     Supine-Sit Davis (Bed Mobility)  contact guard;verbal cues  -MS     Sit-Supine Davis (Bed Mobility)  not tested  -MS     Bed Mobility, Safety Issues  decreased use of legs for bridging/pushing;impaired trunk control for bed mobility  -MS     Assistive Device (Bed Mobility)  bed rails;head of bed elevated  -MS     Row Name 04/04/19 1318          Transfer Assessment/Treatment    Transfer Assessment/Treatment  bed-chair transfer  -MS     Maintains Weight-bearing Status (Transfers)  able to maintain;verbal cues to maintain;nonverbal cues (demo/gesture) to maintain  -MS     Comment (Transfers)  Sequencing cues for bed<>chair. Use of RW for UE support.  -MS     Bed-Chair Davis (Transfers)  minimum assist (75% patient effort);moderate assist (50% patient effort);2 person assist;verbal cues;nonverbal cues (demo/gesture)  -MS     Assistive Device (Bed-Chair  Transfers)  walker, front-wheeled  -MS     Row Name 04/04/19 1318          General ROM    GENERAL ROM COMMENTS  B LE WFL  -MS     Row Name 04/04/19 1318          MMT (Manual Muscle Testing)    General MMT Comments  B LEs grossly 4/5  -MS     Row Name 04/04/19 1318          Motor Assessment/Intervention    Additional Documentation  Balance (Group);Balance Interventions (Group)  -MS     Row Name 04/04/19 1318          Balance    Balance  static sitting balance;static standing balance  -MS     Row Name 04/04/19 1318          Static Sitting Balance    Level of Ceres (Unsupported Sitting, Static Balance)  supervision  -MS     Sitting Position (Unsupported Sitting, Static Balance)  sitting on edge of bed  -MS     Row Name 04/04/19 1318          Static Standing Balance    Level of Ceres (Supported Standing, Static Balance)  minimal assist, 75% patient effort;moderate assist, 50 to 74% patient effort  -MS     Assistive Device Utilized (Supported Standing, Static Balance)  walker, rolling  -MS     Row Name 04/04/19 1318          Sensory Assessment/Intervention    Sensory General Assessment  no sensation deficits identified  -MS     Row Name 04/04/19 1318          Vision Assessment/Intervention    Visual Impairment/Limitations  WFL  -MS     Row Name 04/04/19 1318          Pain Assessment    Additional Documentation  -- No pain reported during PT evaluation.  -MS     Row Name             Wound 04/03/19 1400 Left posterior ankle pressure injury    Wound - Properties Group Date first assessed: 04/03/19  -MP Time first assessed: 1400  -MP Present On Admission : yes;picture taken  -MP Side: Left  -MP Orientation: posterior  -MP Location: ankle  -MP Type: pressure injury  -MP    Row Name             Wound 04/03/19 1400 medial coccyx MASD (moisture associated skin damage)    Wound - Properties Group Date first assessed: 04/03/19  -MP Time first assessed: 1400  -MP Orientation: medial  -MP Location: coccyx  -MP Type:  MASD (moisture associated skin damage)  -MP    Row Name             Wound 04/03/19 1400 Right lower;lateral leg    Wound - Properties Group Date first assessed: 04/03/19  -MP Time first assessed: 1400  -MP Side: Right  -MP Orientation: lower;lateral  -MP Location: leg  -MP    Row Name             Wound 04/03/19 1400 Left hand    Wound - Properties Group Date first assessed: 04/03/19  -MP Time first assessed: 1400  -MP Side: Left  -MP Location: hand  -MP    Row Name             Wound 01/28/19 Left heel pressure injury    Wound - Properties Group Date first assessed: 01/28/19  -BF Present On Admission : yes  -BF Side: Left  -BF Location: heel  -BF Type: pressure injury  -BF Stage, Pressure Injury: unstageable  -BF    Row Name 04/04/19 1318          Plan of Care Review    Plan of Care Reviewed With  patient  -MS     Row Name 04/04/19 1318          Physical Therapy Clinical Impression    Date of Referral to PT  04/04/19  -MS     PT Diagnosis (PT Clinical Impression)  impaired functional mobility and endurance  -MS     Patient/Family Goals Statement (PT Clinical Impression)  Return back to [facility]  -MS     Criteria for Skilled Interventions Met (PT Clinical Impression)  yes;treatment indicated  -MS     Pathology/Pathophysiology Noted (Describe Specifically for Each System)  musculoskeletal  -MS     Impairments Found (describe specific impairments)  aerobic capacity/endurance;ergonomics and body mechanics;gait, locomotion, and balance;posture;ROM  -MS     Rehab Potential (PT Clinical Summary)  good, to achieve stated therapy goals  -MS     Care Plan Review (PT)  patient/other agree to care plan  -MS     Row Name 04/04/19 1318          Vital Signs    O2 Delivery Pre Treatment  room air  -MS     Row Name 04/04/19 1318          Physical Therapy Goals    Bed Mobility Goal Selection (PT)  bed mobility, PT goal 1  -MS     Transfer Goal Selection (PT)  transfer, PT goal 1  -MS     Gait Training Goal Selection (PT)  gait  training, PT goal 1  -MS     Row Name 04/04/19 1318          Bed Mobility Goal 1 (PT)    Activity/Assistive Device (Bed Mobility Goal 1, PT)  bed mobility activities, all  -MS     Calvin Level/Cues Needed (Bed Mobility Goal 1, PT)  supervision required  -MS     Time Frame (Bed Mobility Goal 1, PT)  1 week  -MS     Progress/Outcomes (Bed Mobility Goal 1, PT)  goal ongoing  -MS     Row Name 04/04/19 1318          Transfer Goal 1 (PT)    Activity/Assistive Device (Transfer Goal 1, PT)  transfers, all;walker, rolling  -MS     Calvin Level/Cues Needed (Transfer Goal 1, PT)  supervision required  -MS     Time Frame (Transfer Goal 1, PT)  1 week  -MS     Progress/Outcome (Transfer Goal 1, PT)  goal ongoing  -MS     Row Name 04/04/19 1318          Gait Training Goal 1 (PT)    Activity/Assistive Device (Gait Training Goal 1, PT)  gait (walking locomotion);walker, rolling  -MS     Calvin Level (Gait Training Goal 1, PT)  supervision required  -MS     Distance (Gait Goal 1, PT)  50  -MS     Time Frame (Gait Training Goal 1, PT)  1 week  -MS     Progress/Outcome (Gait Training Goal 1, PT)  goal ongoing  -MS       User Key  (r) = Recorded By, (t) = Taken By, (c) = Cosigned By    Initials Name Provider Type    BF Venus Dior RN, CWOCN Registered Nurse    Graciela Kelley, PT Physical Therapist    Olga Chaudhari RN Registered Nurse        Physical Therapy Education     Title: PT OT SLP Therapies (In Progress)     Topic: Physical Therapy (In Progress)     Point: Mobility training (In Progress)     Learning Progress Summary           Patient Acceptance, E, NR by MS at 4/4/2019  1:29 PM                   Point: Body mechanics (In Progress)     Learning Progress Summary           Patient Acceptance, E, NR by MS at 4/4/2019  1:29 PM                   Point: Precautions (In Progress)     Learning Progress Summary           Patient Acceptance, E, NR by MS at 4/4/2019  1:29 PM                                User Key     Initials Effective Dates Name Provider Type Discipline    MS 03/04/19 -  Graciela Starr, PT Physical Therapist PT              PT Recommendation and Plan  Anticipated Discharge Disposition (PT): skilled nursing facility  Therapy Frequency (PT Clinical Impression): daily  Outcome Summary/Treatment Plan (PT)  Anticipated Discharge Disposition (PT): skilled nursing facility  Plan of Care Reviewed With: patient, family  Outcome Summary: Patient is a pleasant 89 y.o. female admitted to [facility] for fall at SNF and anemia on 4/3/2019. PMHx includes ankle fracture this past November-patient has been residing at [facility] for rehab and working towards ambulating with a RW. Awaiting WBing status clarification as family reports PWBing 50% but chart review states WBAT with CAM boot on. Today, patient performed bed mobility with Srinath and required up to modA for transfers. Strength and endurance deficits noted. Patient may benefit from skilled PT services acutely to address functional deficits as well as improve level of independence prior to discharge. Anticipate returning back to SNF upon DC.  Outcome Measures     Row Name 04/04/19 1300 04/04/19 1200          How much help from another person do you currently need...    Turning from your back to your side while in flat bed without using bedrails?  3  -MS  --     Moving from lying on back to sitting on the side of a flat bed without bedrails?  3  -MS  --     Moving to and from a bed to a chair (including a wheelchair)?  2  -MS  --     Standing up from a chair using your arms (e.g., wheelchair, bedside chair)?  2  -MS  --     Climbing 3-5 steps with a railing?  1  -MS  --     To walk in hospital room?  1  -MS  --     AM-PAC 6 Clicks Score  12  -MS  --        How much help from another is currently needed...    Putting on and taking off regular lower body clothing?  --  1  -SG     Bathing (including washing, rinsing, and drying)  --  2  -SG     Toileting (which  includes using toilet bed pan or urinal)  --  2  -SG     Putting on and taking off regular upper body clothing  --  2  -SG     Taking care of personal grooming (such as brushing teeth)  --  2  -SG     Eating meals  --  3  -SG     Score  --  12  -SG        Functional Assessment    Outcome Measure Options  AM-PAC 6 Clicks Basic Mobility (PT)  -MS  AM-PAC 6 Clicks Daily Activity (OT)  -SG       User Key  (r) = Recorded By, (t) = Taken By, (c) = Cosigned By    Initials Name Provider Type    Danay Deshpande, OTR Occupational Therapist    MS RodriguezsGraciela, PT Physical Therapist         Time Calculation:   PT Charges     Row Name 04/04/19 1310             Time Calculation    Start Time  1119  -MS      Stop Time  1135  -MS      Time Calculation (min)  16 min  -MS      PT Received On  04/04/19  -MS      PT - Next Appointment  04/05/19  -MS      PT Goal Re-Cert Due Date  04/11/19  -MS         Time Calculation- PT    Total Timed Code Minutes- PT  13 minute(s)  -MS        User Key  (r) = Recorded By, (t) = Taken By, (c) = Cosigned By    Initials Name Provider Type    MS StarrGraciela, PT Physical Therapist        Therapy Charges for Today     Code Description Service Date Service Provider Modifiers Qty    16450128127  PT EVAL MOD COMPLEXITY 2 4/4/2019 Graciela Starr, PT GP 1    26337278712 HC PT THER PROC EA 15 MIN 4/4/2019 Graciela Starr, PT GP 1    06201140075 HC PT THER SUPP EA 15 MIN 4/4/2019 Graciela Starr, PT GP 1          PT G-Codes  Outcome Measure Options: AM-PAC 6 Clicks Basic Mobility (PT)  AM-PAC 6 Clicks Score: 12  Score: 12      Graciela Starr, PT  4/4/2019

## 2019-04-04 NOTE — PROGRESS NOTES
Discharge Planning Assessment  [facility]     Patient Name: Kim Feldman  MRN: [id number]  Today's Date: 4/4/2019    Admit Date: 4/3/2019    Discharge Needs Assessment     Row Name 04/04/19 1104       Living Environment    Lives With  alone    Current Living Arrangements  independent/assisted living facility    Primary Care Provided by  other (see comments)    Provides Primary Care For  no one    Family Caregiver if Needed  child(mary), adult;other (see comments)    Quality of Family Relationships  supportive    Able to Return to Prior Arrangements  yes    Living Arrangement Comments  Personal care at [facility]       Resource/Environmental Concerns    Resource/Environmental Concerns  none    Transportation Concerns  car, none       Transition Planning    Patient/Family Anticipates Transition to  home    Patient/Family Anticipated Services at Transition  none    Transportation Anticipated  family or friend will provide       Discharge Needs Assessment    Concerns to be Addressed  no discharge needs identified    Equipment Currently Used at Home  wheelchair;walker, standard    Anticipated Changes Related to Illness  none    Equipment Needed After Discharge  none        Discharge Plan     Row Name 04/04/19 1106       Plan    Plan  Return to [facility].  Pt can go skilled bed if needed    Plan Comments  IMM letter signed.  CCP met with pt and daughter Leandra, [phone],to discuss d/c planning. Face sheet verified. CCP role explained. Pt resides in personal care at [facility].   She has assists that assist her with ADL's.  Pt emergency contact is her daughter Leandra.  Pt uses a walker to ambulate.  Pt reports no history of home health.  Pt has been to rehab at [facility] in the past.  Pt obtains his medications from [facility] at [facility].  The facility passes the medications.   Pt denies additional needs at this time.  Spoke to [facility]/ [facility].  She will follow pt and if pt  needs a skilled bed they can accommodate her.   CCP to follow to assist with any d/c planning needs        Destination      Service Provider Request Status Selected Services Address Phone Number Fax Number    [facility] Pending - Request Sent N/A [address] [place] [address] [phone]      Durable Medical Equipment      No service coordination in this encounter.      Dialysis/Infusion      No service coordination in this encounter.      Home Medical Care      No service coordination in this encounter.      Therapy      No service coordination in this encounter.      Community Resources      No service coordination in this encounter.          Demographic Summary    No documentation.       Functional Status     Row Name 04/04/19 1103       Functional Status    Current Activity Tolerance  fair       Functional Status, IADL    Medications  completely dependent    Meal Preparation  completely dependent    Housekeeping  assistive person    Laundry  assistive person    Shopping  assistive person    IADL Comments  Pt lives in personal care and has an assistant for ADL's       Mental Status    General Appearance WDL  WDL       Mental Status Summary    Recent Changes in Mental Status/Cognitive Functioning  no changes       Employment/    Employment Status  retired        Psychosocial    No documentation.       Abuse/Neglect    No documentation.       Legal    No documentation.       Substance Abuse    No documentation.       Patient Forms    No documentation.           Graciela Borrego RN

## 2019-04-04 NOTE — PLAN OF CARE
Problem: Patient Care Overview  Goal: Plan of Care Review  Outcome: Ongoing (interventions implemented as appropriate)   04/04/19 1613   Plan of Care Review   Progress no change   OTHER   Outcome Summary Vitals as charted, new orders per wound for skin issues, c/o slight pain that is relieved w/ prn tylenol, bruises on rt should and lt flank that MD are aware of, pt spilled a hot cup of coffee on her this morning, MD aware and safe report completed, Dr. Hill office called and stated that pt is 50% weight bearing on the rt foot with the boot, the pt will be weight bearing as tolerated starting 4/8/19, recent hgb 8.8, will cotinue to monitor.    Coping/Psychosocial   Plan of Care Reviewed With patient

## 2019-04-04 NOTE — PLAN OF CARE
Problem: Patient Care Overview  Goal: Plan of Care Review   04/04/19 1240   OTHER   Outcome Summary Pt presents to OT eval following fall with facial laceration from SNF. Pt was in rehab for R foot fx. This date pt refused to sit up EOB but ADL was performed in supine and pt required min A for UBB/UBD. Pt would benefit from OT to address deficits and would recommend d/c back to SNF.   Coping/Psychosocial   Plan of Care Reviewed With patient

## 2019-04-04 NOTE — NURSING NOTE
Dr. Hill office called and stated that patient is 50% weight bearing on the rt foot w/ boot on. Pt is weight bearing as tolerated starting 4/8/19.

## 2019-04-04 NOTE — PROGRESS NOTES
"Daily progress note    Chief complaint  Doing little better  Complaining of multiple bruises and pain    History of present illness  89-year-old white female who is well-known to our service with multiple medical problems.  Patient is a nursing home resident with history of hypertension seizure disorder depression history of CVA osteoarthritis degenerative disc disease gastroesophageal reflux disease who was recently discharged from the hospital after right ankle wound infection and surrounding cellulitis with recent fracture and surgery presented to [facility] emergency room after the fall after she lost her balance and trying to ambulate independently.  Patient hit on the face and sustaining facial bruises and laceration requiring stitches on the nose.  Patient denies any loss of consciousness and fully alert oriented following commands and family at the bedside.  Patient also found to have low hemoglobin with heme negative on stool test.  Patient is taking iron tablets for chronic anemia.    REVIEW OF SYSTEMS   Review of Systems   Constitutional: Negative for chills and fever.   HENT: Negative for sore throat.   Eyes: Negative for visual disturbance.   Respiratory: Negative for shortness of breath.   Cardiovascular: Positive for chest pain (left chest wall pain).   Gastrointestinal: Negative for nausea and vomiting.   Genitourinary: Negative for difficulty urinating and dysuria.   Musculoskeletal: Negative for back pain and neck pain.   Left foot pain, left shoulder pain   Skin: Positive for wound (nose laceration, left hand skin tear (sustained yesterday)). Negative for rash.   Neurological: Negative for dizziness and headaches.   Psychiatric/Behavioral: Positive for confusion (mild). The patient is not nervous/anxious.     PHYSICAL EXAM   Blood pressure 130/60, pulse 79, temperature 98 °F (36.7 °C), temperature source Oral, resp. rate 20, height 162.6 cm (64\"), weight 100 kg (221 lb 1.9 oz), SpO2 94 " %, not currently breastfeeding.    Constitutional: She is oriented to person, place, and time. No distress.   Head: Normocephalic.   Mouth/Throat: Mucous membranes are normal.   Hematoma to the mid forehead, 3cm jagged laceration to the left side of the nose with active bleeding, otherwise no epistaxis, nasal swelling, bruising above left upper lip, otherwise no facial tenderness   Eyes: EOM are normal. Pupils are equal, round, and reactive to light.   Neck: Normal range of motion. Neck supple. No c-spine tenderness   Cardiovascular: Normal rate, regular rhythm and normal heart sounds.   Pulmonary/Chest: Effort normal and breath sounds normal. No respiratory distress. She has no decreased breath sounds. She has no wheezes. She has no rhonchi. She has no rales.   Abdominal: Soft. There is no tenderness. There is no rebound and no guarding.   Musculoskeletal: No t-spine or l-spine tenderness, left shoulder tenderness with FROM, left foot tenderness, mild swelling and erythema to left lower leg (pt is currently being treated for LLE cellulitis), nickel sized healing pressure ulcer to the left heel, healed surgical incision above the right lateral malleolus without signs of infection, skin tear with steri strips in place to left dorsal hand from previous incident, no bony left hand tenderness, NV intact distally to all extremities   Neurological: She is alert and oriented to person, place, and time. She has normal sensation.   nonfocal neuro exam, answers questions appropriately, follows commands   Skin: Skin is warm and dry. There is pallor.   Psychiatric: Affect normal.     LAB RESULTS  Lab Results (last 24 hours)     Procedure Component Value Units Date/Time    Hemoglobin A1c [[id number]]  (Normal) Collected:  04/04/19 0423    Specimen:  Blood Updated:  04/04/19 0559     Hemoglobin A1C 4.90 %     Narrative:       Hemoglobin A1C Ranges:    Increased Risk for Diabetes  5.7% to 6.4%  Diabetes                     >=  6.5%  Diabetic Goal                < 7.0%    BNP [[id number]]  (Normal) Collected:  04/04/19 0423    Specimen:  Blood Updated:  04/04/19 0528     proBNP 831.1 pg/mL     Narrative:       Among patients with dyspnea, NT-proBNP is highly sensitive for the detection of acute congestive heart failure. In addition NT-proBNP of <300 pg/ml effectively rules out acute congestive heart failure with 99% negative predictive value.    TSH [[id number]]  (Abnormal) Collected:  04/04/19 0423    Specimen:  Blood Updated:  04/04/19 0528     TSH 5.980 mIU/mL     Comprehensive Metabolic Panel [[id number]]  (Abnormal) Collected:  04/04/19 0423    Specimen:  Blood Updated:  04/04/19 0516     Glucose 85 mg/dL      BUN 18 mg/dL      Creatinine 0.97 mg/dL      Sodium 136 mmol/L      Potassium 4.5 mmol/L      Chloride 104 mmol/L      CO2 23.3 mmol/L      Calcium 9.1 mg/dL      Total Protein 6.3 g/dL      Albumin 3.20 g/dL      ALT (SGPT) 13 U/L      AST (SGOT) 19 U/L      Alkaline Phosphatase 55 U/L      Total Bilirubin 0.2 mg/dL      eGFR Non African Amer 54 mL/min/1.73      Globulin 3.1 gm/dL      A/G Ratio 1.0 g/dL      BUN/Creatinine Ratio 18.6     Anion Gap 8.7 mmol/L     Narrative:       GFR Normal >60  Chronic Kidney Disease <60  Kidney Failure <15    Lipid Panel [[id number]] Collected:  04/04/19 0423    Specimen:  Blood Updated:  04/04/19 0516     Total Cholesterol 130 mg/dL      Triglycerides 71 mg/dL      HDL Cholesterol 46 mg/dL      LDL Cholesterol  70 mg/dL      VLDL Cholesterol 14.2 mg/dL      LDL/HDL Ratio 1.52    Narrative:       Cholesterol Reference Ranges  (U.S. Department of Health and Human Services ATP III Classifications)    Desirable          <200 mg/dL  Borderline High    200-239 mg/dL  High Risk          >240 mg/dL      Triglyceride Reference Ranges  (U.S. Department of Health and Human Services ATP III Classifications)    Normal           <150 mg/dL  Borderline High  150-199 mg/dL  High             200-499 mg/dL  Very  High        >500 mg/dL    HDL Reference Ranges  (U.S. Department of Health and Human Services ATP III Classifcations)    Low     <40 mg/dl (major risk factor for CHD)  High    >60 mg/dl ('negative' risk factor for CHD)        LDL Reference Ranges  (U.S. Department of Health and Human Services ATP III Classifcations)    Optimal          <100 mg/dL  Near Optimal     100-129 mg/dL  Borderline High  130-159 mg/dL  High             160-189 mg/dL  Very High        >189 mg/dL    CBC & Differential [[id number]] Collected:  04/04/19 0423    Specimen:  Blood Updated:  04/04/19 0508    Narrative:       The following orders were created for panel order CBC & Differential.  Procedure                               Abnormality         Status                     ---------                               -----------         ------                     CBC Auto Differential[[id number]]        Abnormal            Final result                 Please view results for these tests on the individual orders.    CBC Auto Differential [[id number]]  (Abnormal) Collected:  04/04/19 0423    Specimen:  Blood Updated:  04/04/19 0508     WBC 4.45 10*3/mm3      RBC 2.75 10*6/mm3      Hemoglobin 8.8 g/dL      Hematocrit 27.5 %      .0 fL      MCH 32.0 pg      MCHC 32.0 g/dL      RDW 17.4 %      RDW-SD 63.8 fl      MPV 9.1 fL      Platelets 129 10*3/mm3      Neutrophil % 42.2 %      Lymphocyte % 39.1 %      Monocyte % 10.8 %      Eosinophil % 7.2 %      Basophil % 0.7 %      Immature Grans % 0.0 %      Neutrophils, Absolute 1.88 10*3/mm3      Lymphocytes, Absolute 1.74 10*3/mm3      Monocytes, Absolute 0.48 10*3/mm3      Eosinophils, Absolute 0.32 10*3/mm3      Basophils, Absolute 0.03 10*3/mm3      Immature Grans, Absolute 0.00 10*3/mm3      nRBC 0.0 /100 WBC     Vitamin B12 [[id number]]  (Normal) Collected:  04/03/19 1633    Specimen:  Blood Updated:  04/03/19 1749     Vitamin B-12 592 pg/mL     Folate [[id number]]  (Normal) Collected:  04/03/19 1633     Specimen:  Blood Updated:  04/03/19 1749     Folate >20.00 ng/mL     Ferritin [[id number]]  (Normal) Collected:  04/03/19 1633    Specimen:  Blood Updated:  04/03/19 1749     Ferritin 103.00 ng/mL     Iron Profile [[id number]]  (Abnormal) Collected:  04/03/19 1633    Specimen:  Blood Updated:  04/03/19 1729     Iron 59 mcg/dL      Iron Saturation 20 %      Transferrin 194 mg/dL      TIBC 289 mcg/dL     Lactate Dehydrogenase [[id number]]  (Normal) Collected:  04/03/19 1633    Specimen:  Blood Updated:  04/03/19 1729      U/L     Reticulocytes [[id number]]  (Normal) Collected:  04/03/19 1633    Specimen:  Blood Updated:  04/03/19 1710     Reticulocyte % 1.09 %      Reticulocyte Absolute 0.0227 10*6/mm3     Protein Electrophoresis, Total [[id number]] Collected:  04/03/19 1633    Specimen:  Blood Updated:  04/03/19 1657      ,  Imaging Results (last 24 hours)     ** No results found for the last 24 hours. **          Current Facility-Administered Medications:   •  acetaminophen (TYLENOL) tablet 650 mg, 650 mg, Oral, Q4H PRN, Juan Carlos Harper MD, 650 mg at 04/04/19 1158  •  amLODIPine (NORVASC) tablet 5 mg, 5 mg, Oral, Daily, Juan Carlos Harper MD, 5 mg at 04/04/19 0829  •  calcium-vitamin D 500-200 MG-UNIT tablet 1,000 mg, 1,000 mg, Oral, Daily, Juan Carlos Harper MD, 1,000 mg at 04/04/19 0829  •  clopidogrel (PLAVIX) tablet 75 mg, 75 mg, Oral, Daily, Juan Carlos Harper MD, 75 mg at 04/04/19 0829  •  escitalopram (LEXAPRO) tablet 10 mg, 10 mg, Oral, Daily, Juan Carlos Harper MD, 10 mg at 04/04/19 0829  •  FERREX 150 PLUS 150-50-50 MG capsule 1 capsule, 1 capsule, Oral, Daily, Juan Carlos Harper MD, 1 capsule at 04/04/19 0830  •  gabapentin (NEURONTIN) capsule 300 mg, 300 mg, Oral, Q8H, Juan Carlos Harper MD, 300 mg at 04/04/19 1414  •  hydrophor (AQUAPHOR) ointment, , Topical, Q12H, Juan Carlos Harper MD  •  levETIRAcetam (KEPPRA) tablet 750 mg, 750 mg, Oral, BID, Juan Carlos Harper MD, 750 mg at 04/04/19 0814  •  miconazole (MICOTIN) 2 % powder, , Topical,  Q12H, Gaetano Harper MD  •  mirtazapine (REMERON) tablet 15 mg, 15 mg, Oral, Nightly, Gaetano Harper MD, 15 mg at 04/03/19 2056  •  morphine injection 1 mg, 1 mg, Intravenous, Q4H PRN, Gaetano Harper MD  •  multivitamin with minerals 1 tablet, 1 tablet, Oral, Daily, Gaetano Harper MD, 1 tablet at 04/04/19 0829  •  nystatin (MYCOSTATIN) ointment 1 application, 1 application, Topical, Q12H, Gaetano Harepr MD, 1 application at 04/04/19 0830  •  ondansetron (ZOFRAN) injection 4 mg, 4 mg, Intravenous, Q6H PRN, Gaetano Harper MD  •  pantoprazole (PROTONIX) EC tablet 40 mg, 40 mg, Oral, Daily, Gaetano Harper MD, 40 mg at 04/04/19 0829  •  polyethylene glycol (MIRALAX) powder 17 g, 17 g, Oral, Daily, Gaetano Harper MD  •  [COMPLETED] Insert peripheral IV, , , Once **AND** sodium chloride 0.9 % flush 10 mL, 10 mL, Intravenous, PRN, Marilyn Frederick, APRN    ASSESSMENT  Fracture and laceration of nose status post suturing  Contusion of head chest left shoulder and right foot  Status post fall  Chronic anemia   Right ankle wound with recent fracture and surgery  Hypertension  Seizure disorder  Status post CVA  Depression  SSS status post permanent pacemaker  Osteoarthritis  Degenerative disc disease  Gastroesophageal reflux disease    PLAN  CPM  Supportive care  Local wound care  Transfuse PRN  Anemia workup in progress  Continue nursing home medication and adjust the doses  Stress ulcer DVT prophylaxis  Discussed with family  DNR  Follow closely further recommendation according to hospital course    GAETANO HARPER MD

## 2019-04-04 NOTE — PLAN OF CARE
Problem: Fall Risk (Adult)  Goal: Identify Related Risk Factors and Signs and Symptoms  Outcome: Outcome(s) achieved Date Met: 04/04/19    Goal: Absence of Fall  Outcome: Ongoing (interventions implemented as appropriate)      Problem: Skin Injury Risk (Adult)  Goal: Identify Related Risk Factors and Signs and Symptoms  Outcome: Outcome(s) achieved Date Met: 04/04/19    Goal: Skin Health and Integrity  Outcome: Ongoing (interventions implemented as appropriate)      Problem: Patient Care Overview  Goal: Plan of Care Review  Outcome: Ongoing (interventions implemented as appropriate)   04/04/19 0127   Coping/Psychosocial   Patient Agreement with Plan of Care agrees   Plan of Care Review   Progress no change   OTHER   Outcome Summary VSS, NO ACUTE DISTRESS NOTED THIS SHIFT, PT RECEIVED 2 UNITS OF PRBCS THIS SHIFT AND TOLERATED WELL, SAFETY MAINTAINED, CONTINUE PLAN OF CARE   Coping/Psychosocial   Plan of Care Reviewed With patient     Goal: Individualization and Mutuality  Outcome: Ongoing (interventions implemented as appropriate)    Goal: Discharge Needs Assessment  Outcome: Ongoing (interventions implemented as appropriate)

## 2019-04-04 NOTE — THERAPY EVALUATION
Acute Care - Occupational Therapy Initial Evaluation  [facility]     Patient Name: Kim Feldman  : 3/13/1930  MRN: [id number]  Today's Date: 2019     Date of Referral to OT: 19  Referring Physician: Leroy    Admit Date: 4/3/2019       ICD-10-CM ICD-9-CM   1. Anemia, unspecified type D64.9 285.9   2. Open fracture of nasal bone, initial encounter S02.2XXB 802.1   3. Laceration of nose, initial encounter S01.21XA 873.20   4. Contusion of head, unspecified part of head, initial encounter S00.93XA 920   5. Chest wall contusion, left, initial encounter S20.212A 922.1   6. Contusion of left shoulder, initial encounter S40.012A 923.00   7. Contusion of left foot, initial encounter S90.32XA 924.20   8. 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)   • Vitamin D deficiency   • History of sick sinus syndrome   • Intertrigo   • Generalized convulsive seizures (CMS/HCC)   • Change in bowel habits   • Zenker diverticulum   • History of anxiety   • 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HCC)   • Thrombocytopenia (CMS/HCC)   • B12 deficiency   • Cardiac pacemaker in situ   • Chronic venous insufficiency   • Arthritis   • S/P knee replacement   • Chronic bilateral low back pain without sciatica   • Essential hypertension   • Hiatal hernia   • Zenker's diverticulum   • Chronic idiopathic constipation   • Pressure sore on buttocks   • Somnolence   • Closed trimalleolar fracture of right ankle   • Surgical wound infection   • History of failed arthroplasty of ankle     Past Medical History:   Diagnosis Date   • Anemia    • At risk for falls    • At risk for sleep apnea 2018   • Atrial fibrillation  (CMS/HCC)    • Bulging lumbar disc    • Cellulitis     BILATERAL LEGS   • Chronic back pain    • Chronic cough    • Chronic UTI    • Chronic venous insufficiency    • 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HCC)    • DDD (degenerative disc disease), lumbosacral    • Dementia without behavioral disturbance     moderate   • Depression, endogenous (CMS/HCC)    • Disc degeneration, lumbar    • Dysphagia    • GERD (gastroesophageal reflux disease)    • Hiatal hernia    • History of anxiety    • History of aspiration pneumonitis    • History of cerebral artery occlusion     CVA (following TIA), 10/10, treated with tPA   • History of herpes zoster    • History of ingrowing nail    • History of osteoporosis    • History of poliomyelitis     child   • History of sciatica    • History of sick sinus syndrome     s/p PPM   • History of transient cerebral ischemia     followed by stroke in 10/2010. BIBI was normal.   • History of Zenker's diverticulum removal 2016   • HX: long term anticoagulant use    • Hyperlipidemia    • Hypertension     NO CURRENT MEDICATION   • Hyponatremia    • Intertrigo    • Lumbar radiculopathy    • Morbid obesity (CMS/HCC)    • MRSA (methicillin resistant Staphylococcus aureus)     LEFT FOOT SPREAD TO RIGHT ANKLE; DR TUBBS AWARE   • Neuralgia     with diplopia secondary to facial shingles   • Nonepileptic episode (CMS/HCC)    • Osteoarthritis of right knee    • Pacemaker    • Pneumonia    • Seeing double     secondary to shingles on the face also with neuralgia   • Seizures (CMS/HCC)    • 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)    • Spinal stenosis    • Vitamin D deficiency    • Zenker's diverticulum      Past Surgical History:   Procedure Laterality Date   • ANKLE OPEN REDUCTION INTERNAL FIXATION Right 11/17/2018    Procedure: ANKLE OPEN REDUCTION INTERNAL FIXATION;  Surgeon: Cristian Tubbs II, MD;  Location: [facility];  Service: Orthopedics   • CARDIAC PACEMAKER PLACEMENT       secondary to SSS, placed in 2004, with gen chng 2014; [employer] dual DOO   • CATARACT EXTRACTION W/ INTRAOCULAR LENS IMPLANT Bilateral    • COLONOSCOPY     • HAND SURGERY Right    • HARDWARE REMOVAL Right 2/19/2019    Procedure: RT ANKLE HARDWARE REMOVAL;  Surgeon: Cristian Hill II, MD;  Location: [facility];  Service: Orthopedics   • KNEE ARTHROPLASTY Left    • LAMINECTOMY FOR IMPLANTATION / PLACEMENT NEUROSTIMULATOR ELECTRODES     • LUMBAR LAMINECTOMY      L-3 L4 L4 L5   • TONSILLECTOMY     • ZENKERS DIVERTICULECTOMY N/A 9/27/2016    Procedure: ENDOSCOPIC REMOVAL OF ZENKERS DIVERTICULUM W/ WERDASCOPE;  Surgeon: Oswald Barth MD;  Location: [facility];  Service:    • ZENKERS DIVERTICULECTOMY N/A 4/14/2017    Procedure: ESOPHAGOSCOPY;  Surgeon: Oswald Barth MD;  Location: [facility];  Service:           OT ASSESSMENT FLOWSHEET (last 12 hours)      Occupational Therapy Evaluation     Row Name 04/04/19 1235                   OT Evaluation Time/Intention    Subjective Information  complains of;weakness  -SG        Document Type  evaluation  -SG        Mode of Treatment  individual therapy;occupational therapy  -SG        Patient Effort  adequate  -SG        Symptoms Noted During/After Treatment  none  -SG        Comment  Pt was recently at SNF for R foot fx, has walking boot and is 50% WB.  -SG           General Information    Patient Profile Reviewed?  yes  -SG        Referring Physician  Leroy  -SG        Patient Observations  alert;cooperative;agree to therapy  -SG        General Observations of Patient  Pt supine in bed  -SG        Prior Level of Function  min assist:;ADL's  -SG        Equipment Currently Used at Home  walker, rolling;shower chair  -SG        Pertinent History of Current Functional Problem  Fall at SNF with laceration to the face and now anemia  -SG        Existing Precautions/Restrictions  fall  -SG        Barriers to Rehab  previous functional deficit  -SG            Relationship/Environment    Lives With  facility resident [facility] for rehab  -SG           Cognitive Assessment/Intervention- PT/OT    Orientation Status (Cognition)  oriented x 3  -SG        Follows Commands (Cognition)  WNL  -SG           Bed Mobility Assessment/Treatment    Bed Mobility Assessment/Treatment  rolling left;rolling right  -SG        Rolling Left [place] (Bed Mobility)  moderate assist (50% patient effort);2 person assist  -SG        Rolling Right [place] (Bed Mobility)  moderate assist (50% patient effort);2 person assist  -SG        Comment (Bed Mobility)  Initially agreed but then refused to sit up EOB  -SG           ADL Assessment/Intervention    BADL Assessment/Intervention  bathing;upper body dressing;grooming  -SG           Bathing Assessment/Intervention    Bathing [place] Level  upper body;minimum assist (75% patient effort);lower body;dependent (less than 25% patient effort)  -SG        Bathing Position  supine  -SG           Upper Body Dressing Assessment/Training    Upper Body Dressing [place] Level  doff;don;pajama/robe;minimum assist (75% patient effort);hand over hand  -SG        Upper Body Dressing Position  supine  -SG           Grooming Assessment/Training    [place] Level (Grooming)  grooming skills;hair care, combing/brushing;oral care regimen;minimum assist (75% patient effort);verbal cues  -SG        Grooming Position  supine  -SG           General ROM    GENERAL ROM COMMENTS  Appear WFL  -SG           MMT (Manual Muscle Testing)    General MMT Comments  Appear WFL  -SG           Pain Assessment    Additional Documentation  Pain Scale: Numbers Pre/Post-Treatment (Group)  -SG           Pain Scale: Numbers Pre/Post-Treatment    Pain Scale: Numbers, Pretreatment  6/10  -SG        Pain Location  face  -SG           Wound 04/03/19 1400 Left posterior ankle pressure injury    Wound - Properties Group Date first assessed: 04/03/19  -MP Time first  assessed: 1400  -MP Present On Admission : yes;picture taken  -MP Side: Left  -MP Orientation: posterior  -MP Location: ankle  -MP Type: pressure injury  -MP       Wound 04/03/19 1400 medial coccyx MASD (moisture associated skin damage)    Wound - Properties Group Date first assessed: 04/03/19  -MP Time first assessed: 1400  -MP Orientation: medial  -MP Location: coccyx  -MP Type: MASD (moisture associated skin damage)  -MP       Wound 04/03/19 1400 Right lower;lateral leg    Wound - Properties Group Date first assessed: 04/03/19  -MP Time first assessed: 1400  -MP Side: Right  -MP Orientation: lower;lateral  -MP Location: leg  -MP       Wound 04/03/19 1400 Left hand    Wound - Properties Group Date first assessed: 04/03/19  -MP Time first assessed: 1400  -MP Side: Left  -MP Location: hand  -MP       Wound 01/28/19 Left heel pressure injury    Wound - Properties Group Date first assessed: 01/28/19  -BF Present On Admission : yes  -BF Side: Left  -BF Location: heel  -BF Type: pressure injury  -BF Stage, Pressure Injury: unstageable  -BF       Plan of Care Review    Plan of Care Reviewed With  patient  -SG           Clinical Impression (OT)    Date of Referral to OT  04/03/19  -SG        OT Diagnosis  Need for assist with personal care  -SG        Criteria for Skilled Therapeutic Interventions Met (OT Eval)  yes;treatment indicated  -SG        Rehab Potential (OT Eval)  good, to achieve stated therapy goals  -SG        Therapy Frequency (OT Eval)  5 times/wk  -SG        Care Plan Review (OT)  evaluation/treatment results reviewed;care plan/treatment goals reviewed;patient/other agree to care plan  -SG        Care Plan Review, Other Participant (OT Eval)  daughter  -SG        Anticipated Discharge Disposition (OT)  skilled nursing facility  -SG           OT Goals    Transfer Goal Selection (OT)  transfer, OT goal 1  -SG        Grooming Goal Selection (OT)  grooming, OT goal 1  -SG        Strength Goal Selection (OT)   strength, OT goal 1  -SG        Additional Documentation  Grooming Goal Selection (OT) (Row);Strength Goal Selection (OT) (Row)  -SG           Transfer Goal 1 (OT)    Activity/Assistive Device (Transfer Goal 1, OT)  sit-to-stand/stand-to-sit;toilet  -SG        [place] Level/Cues Needed (Transfer Goal 1, OT)  minimum assist (75% or more patient effort)  -SG        Time Frame (Transfer Goal 1, OT)  short term goal (STG);1 week  -SG        Progress/Outcome (Transfer Goal 1, OT)  goal ongoing  -SG           Grooming Goal 1 (OT)    Activity/Device (Grooming Goal 1, OT)  grooming skills, all  -SG        [place] (Grooming Goal 1, OT)  contact guard assist Sitting up EOB  -SG        Time Frame (Grooming Goal 1, OT)  short term goal (STG);1 week  -SG        Progress/Outcome (Grooming Goal 1, OT)  goal ongoing  -SG           Strength Goal 1 (OT)    Strength Goal 1 (OT)  Pt to increase UE strength by 1/2 muscle grade to assist with functional transfers.  -SG        Time Frame (Strength Goal 1, OT)  short term goal (STG);1 week  -SG        Progress/Outcome (Strength Goal 1, OT)  goal ongoing  -SG          User Key  (r) = Recorded By, (t) = Taken By, (c) = Cosigned By    Initials Name Effective Dates    BF Venus Dior RN, CWOCN 06/16/16 -     Danay Deshpande, OTR 12/26/18 -     Olga Chaudhari RN 04/14/17 -          Occupational Therapy Education     Title: PT OT SLP Therapies (In Progress)     Topic: Occupational Therapy (In Progress)     Point: ADL training (Done)     Description: Instruct learner(s) on proper safety adaptation and remediation techniques during self care or transfers.   Instruct in proper use of assistive devices.    Learning Progress Summary           Patient Acceptance, E,TB, VU by  at 4/4/2019 12:40 PM                               User Key     Initials Effective Dates Name Provider Type Discipline     12/26/18 -  Danay Grider, OTLAWANDA Occupational Therapist OT                  OT  Recommendation and Plan  Outcome Summary/Treatment Plan (OT)  Anticipated Discharge Disposition (OT): skilled nursing facility  Therapy Frequency (OT Eval): 5 times/wk  Plan of Care Review  Plan of Care Reviewed With: patient  Plan of Care Reviewed With: patient  Outcome Summary: Pt presents to OT eval following fall with facial laceration from SNF. Pt was in rehab for R foot fx. This date pt refused to sit up EOB but ADL was performed in supine and pt required min A for UBB/UBD. Pt would benefit from OT to address deficits and would recommend d/c back to SNF.    Outcome Measures     Row Name 04/04/19 1200             How much help from another is currently needed...    Putting on and taking off regular lower body clothing?  1  -SG      Bathing (including washing, rinsing, and drying)  2  -SG      Toileting (which includes using toilet bed pan or urinal)  2  -SG      Putting on and taking off regular upper body clothing  2  -SG      Taking care of personal grooming (such as brushing teeth)  2  -SG      Eating meals  3  -SG      Score  12  -SG         Functional Assessment    Outcome Measure Options  AM-PAC 6 Clicks Daily Activity (OT)  -SG        User Key  (r) = Recorded By, (t) = Taken By, (c) = Cosigned By    Initials Name Provider Type    Danay Deshpande OTR Occupational Therapist          Time Calculation:   Time Calculation- OT     Row Name 04/04/19 1242             Time Calculation- OT    OT Start Time  1000  -SG      OT Stop Time  1021  -SG      OT Time Calculation (min)  21 min  -SG      Total Timed Code Minutes- OT  10 minute(s)  -SG      OT Received On  04/04/19  -      OT Goal Re-Cert Due Date  04/11/19  -        User Key  (r) = Recorded By, (t) = Taken By, (c) = Cosigned By    Initials Name Provider Type    Danay Deshpande OTR Occupational Therapist        Therapy Charges for Today     Code Description Service Date Service Provider Modifiers Qty    [id number]  OT SELF CARE/MGMT/TRAIN EA  15 MIN 4/4/2019 Danay Grider, OTR GO 1    [id number] HC OT EVAL MOD COMPLEXITY 2 4/4/2019 Danay Grider OTR GO 1               RUBY Stewart  4/4/2019

## 2019-04-04 NOTE — NURSING NOTE
04/04/19 1155   Wound 01/28/19 Left heel pressure injury   Date first assessed: 01/28/19   Present On Admission : yes  Side: Left  Location: heel  Type: pressure injury  Stage, Pressure Injury: unstageable   Dressing Appearance open to air   Closure None   Base dry;scab;black eschar   Periwound intact;dry;other (see comments);blanchable;redness;swelling  (flaky)   Periwound Temperature warm   Periwound Skin Turgor soft   Wound Length (cm) 1 cm   Wound Width (cm) 1 cm   Drainage Amount none   Care, Wound other (see comments)  (recommend betadine with dry gauze dsg, offload)   Periwound Care, Wound other (see comments)  (recommend aquaphor )   Skin Interventions   Pressure Reduction Devices heel offloading device utilized;chair cushion utilized   Pressure Reduction Techniques positioned off wounds;weight shift assistance provided;sit time limited to 2 hours;frequent weight shift encouraged     CWON consult received.  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 Wound with dry, stable eschar that is without s/s of infection. No bogginess with palpation and no drainage noted. Recommend betadine paint and dry gauze dressing and offloading with heel protective boot. Patient with MASD to gluteal/perineal skin r/t moisture.  Skin is intact but with moist, blanchable erythema. Recommend using Z guard for moisture barrier protection, turn protocol, waffle cushion to chair and accumax pump to bed. Patient also with intertriginous moist rash to breast and skin folds.  Nystatin ointment has been ordered but d/t areas being so moist, would recommend antifungal powder vs ointment. Discussed all with patient and daughter at bedside and they are agreeable to treatment plan. Thank you for consult and please call with any questions.

## 2019-04-04 NOTE — PLAN OF CARE
Problem: Patient Care Overview  Goal: Plan of Care Review   04/04/19 1327   OTHER   Outcome Summary Patient is a pleasant 89 y.o. female admitted to [facility] for fall at SNF and anemia on 4/3/2019. PMHx includes ankle fracture this past November-patient has been residing at [facility] for rehab and working towards ambulating with a RW. Awaiting WBing status clarification as family reports PWBing 50% but chart review states WBAT with CAM boot on. Today, patient performed bed mobility with Srinath and required up to modA for transfers. Strength and endurance deficits noted. Patient may benefit from skilled PT services acutely to address functional deficits as well as improve level of independence prior to discharge. Anticipate returning back to SNF upon DC.   Coping/Psychosocial   Plan of Care Reviewed With patient;family

## 2019-04-04 NOTE — DISCHARGE PLACEMENT REQUEST
"Kim Feldman (89 y.o. Female)     Date of Birth Social Security Number Address Home Phone MRN    03/13/1930  [address] [facility] [phone] [id number]    [place] Marital Status          Mos[place]        Admission Date Admission Type Admitting Provider Attending Provider Department, Room/Bed    4/3/19 Emergency Juan Carlos Harper MD Ahmed, Aftab, MD [address], [address]    Discharge Date Discharge Disposition Discharge Destination                       Attending Provider:  Juan Carlos Harper MD    Allergies:  Lipitor [Atorvastatin], Penicillins    Isolation:  Contact   Infection:  MRSA (01/31/19)   Code Status:  No CPR    Ht:  162.6 cm (64\")   Wt:  100 kg (221 lb 1.9 oz)    Admission Cmt:  None   Principal Problem:  None                Active Insurance as of 4/3/2019     Primary Coverage     Payor Plan Insurance Group Employer/Plan Group    A[employer] MEDICARE REPLACEMENT AETNA [id number]     Payor Plan Address Payor Plan Phone Number Payor Plan Fax Number Effective Dates    [address] [phone]  1/1/2018 - None Entered    [facility] [id number]       Subscriber Name Subscriber Birth Date Member ID       KIM FELDMAN 3/13/1930 [id number]                 Emergency Contacts      (Rel.) Home Phone Work Phone Mobile Phone    Leandra Feldman (Daughter) [phone] -- [phone]              "

## 2019-04-05 ENCOUNTER — APPOINTMENT (OUTPATIENT)
Dept: GENERAL RADIOLOGY | Facility: HOSPITAL | Age: 84
End: 2019-04-05

## 2019-04-05 PROBLEM — Z86.73 HISTORY OF CVA IN ADULTHOOD: Chronic | Status: ACTIVE | Noted: 2019-04-05

## 2019-04-05 LAB
ALBUMIN SERPL-MCNC: 2.9 G/DL (ref 2.9–4.4)
ALBUMIN/GLOB SERPL: 1 {RATIO} (ref 0.7–1.7)
ALPHA1 GLOB FLD ELPH-MCNC: 0.2 G/DL (ref 0–0.4)
ALPHA2 GLOB SERPL ELPH-MCNC: 0.7 G/DL (ref 0.4–1)
ANION GAP SERPL CALCULATED.3IONS-SCNC: 11.2 MMOL/L
B-GLOBULIN SERPL ELPH-MCNC: 0.9 G/DL (ref 0.7–1.3)
BASOPHILS # BLD AUTO: 0.03 10*3/MM3 (ref 0–0.2)
BASOPHILS NFR BLD AUTO: 0.7 % (ref 0–1.5)
BUN BLD-MCNC: 14 MG/DL (ref 8–23)
BUN/CREAT SERPL: 15.9 (ref 7–25)
CALCIUM SPEC-SCNC: 8.4 MG/DL (ref 8.6–10.5)
CHLORIDE SERPL-SCNC: 103 MMOL/L (ref 98–107)
CO2 SERPL-SCNC: 24.8 MMOL/L (ref 22–29)
CREAT BLD-MCNC: 0.88 MG/DL (ref 0.57–1)
DEPRECATED RDW RBC AUTO: 63.3 FL (ref 37–54)
EOSINOPHIL # BLD AUTO: 0.35 10*3/MM3 (ref 0–0.4)
EOSINOPHIL NFR BLD AUTO: 8.2 % (ref 0.3–6.2)
ERYTHROCYTE [DISTWIDTH] IN BLOOD BY AUTOMATED COUNT: 17 % (ref 12.3–15.4)
GAMMA GLOB SERPL ELPH-MCNC: 1.1 G/DL (ref 0.4–1.8)
GFR SERPL CREATININE-BSD FRML MDRD: 61 ML/MIN/1.73
GLOBULIN SER CALC-MCNC: 3 G/DL (ref 2.2–3.9)
GLUCOSE BLD-MCNC: 95 MG/DL (ref 65–99)
HCT VFR BLD AUTO: 28.2 % (ref 34–46.6)
HGB BLD-MCNC: 8.9 G/DL (ref 12–15.9)
IMM GRANULOCYTES # BLD AUTO: 0.01 10*3/MM3 (ref 0–0.05)
IMM GRANULOCYTES NFR BLD AUTO: 0.2 % (ref 0–0.5)
LYMPHOCYTES # BLD AUTO: 1.91 10*3/MM3 (ref 0.7–3.1)
LYMPHOCYTES NFR BLD AUTO: 44.6 % (ref 19.6–45.3)
Lab: ABNORMAL
M-SPIKE: ABNORMAL G/DL
MCH RBC QN AUTO: 31.9 PG (ref 26.6–33)
MCHC RBC AUTO-ENTMCNC: 31.6 G/DL (ref 31.5–35.7)
MCV RBC AUTO: 101.1 FL (ref 79–97)
MONOCYTES # BLD AUTO: 0.47 10*3/MM3 (ref 0.1–0.9)
MONOCYTES NFR BLD AUTO: 11 % (ref 5–12)
NEUTROPHILS # BLD AUTO: 1.51 10*3/MM3 (ref 1.4–7)
NEUTROPHILS NFR BLD AUTO: 35.3 % (ref 42.7–76)
NRBC BLD AUTO-RTO: 0 /100 WBC (ref 0–0)
PLATELET # BLD AUTO: 129 10*3/MM3 (ref 140–450)
PMV BLD AUTO: 9.4 FL (ref 6–12)
POTASSIUM BLD-SCNC: 4.3 MMOL/L (ref 3.5–5.2)
PROT SERPL-MCNC: 5.9 G/DL (ref 6–8.5)
RBC # BLD AUTO: 2.79 10*6/MM3 (ref 3.77–5.28)
SODIUM BLD-SCNC: 139 MMOL/L (ref 136–145)
WBC NRBC COR # BLD: 4.28 10*3/MM3 (ref 3.4–10.8)

## 2019-04-05 PROCEDURE — 73600 X-RAY EXAM OF ANKLE: CPT

## 2019-04-05 PROCEDURE — 85025 COMPLETE CBC W/AUTO DIFF WBC: CPT | Performed by: HOSPITALIST

## 2019-04-05 PROCEDURE — 80048 BASIC METABOLIC PNL TOTAL CA: CPT | Performed by: HOSPITALIST

## 2019-04-05 PROCEDURE — 97110 THERAPEUTIC EXERCISES: CPT

## 2019-04-05 PROCEDURE — 99221 1ST HOSP IP/OBS SF/LOW 40: CPT | Performed by: INTERNAL MEDICINE

## 2019-04-05 RX ADMIN — MIRTAZAPINE 15 MG: 15 TABLET, FILM COATED ORAL at 21:24

## 2019-04-05 RX ADMIN — NYSTATIN 1 APPLICATION: 100000 OINTMENT TOPICAL at 09:56

## 2019-04-05 RX ADMIN — LEVETIRACETAM 750 MG: 500 TABLET, FILM COATED ORAL at 09:57

## 2019-04-05 RX ADMIN — POLYETHYLENE GLYCOL 3350 17 G: 17 POWDER, FOR SOLUTION ORAL at 09:57

## 2019-04-05 RX ADMIN — PETROLATUM: 42 OINTMENT TOPICAL at 09:56

## 2019-04-05 RX ADMIN — PETROLATUM: 42 OINTMENT TOPICAL at 21:25

## 2019-04-05 RX ADMIN — CALCIUM CARBONATE-VITAMIN D TAB 500 MG-200 UNIT 1000 MG: 500-200 TAB at 09:57

## 2019-04-05 RX ADMIN — GABAPENTIN 300 MG: 300 CAPSULE ORAL at 21:29

## 2019-04-05 RX ADMIN — AMLODIPINE BESYLATE 5 MG: 5 TABLET ORAL at 09:57

## 2019-04-05 RX ADMIN — GABAPENTIN 300 MG: 300 CAPSULE ORAL at 15:16

## 2019-04-05 RX ADMIN — Medication 1 CAPSULE: at 09:57

## 2019-04-05 RX ADMIN — ESCITALOPRAM 10 MG: 10 TABLET, FILM COATED ORAL at 09:56

## 2019-04-05 RX ADMIN — GABAPENTIN 300 MG: 300 CAPSULE ORAL at 05:35

## 2019-04-05 RX ADMIN — MICONAZOLE NITRATE: 2 POWDER TOPICAL at 09:57

## 2019-04-05 RX ADMIN — PANTOPRAZOLE SODIUM 40 MG: 40 TABLET, DELAYED RELEASE ORAL at 09:57

## 2019-04-05 RX ADMIN — MULTIPLE VITAMINS W/ MINERALS TAB 1 TABLET: TAB at 09:57

## 2019-04-05 RX ADMIN — NYSTATIN 1 APPLICATION: 100000 OINTMENT TOPICAL at 21:24

## 2019-04-05 RX ADMIN — LEVETIRACETAM 750 MG: 500 TABLET, FILM COATED ORAL at 21:24

## 2019-04-05 RX ADMIN — MICONAZOLE NITRATE: 2 POWDER TOPICAL at 21:25

## 2019-04-05 RX ADMIN — SODIUM CHLORIDE, PRESERVATIVE FREE 10 ML: 5 INJECTION INTRAVENOUS at 21:25

## 2019-04-05 NOTE — CONSULTS
Orthopaedic Surgery  Consult Note  Dr. LAWANDA Hill II  [phone]    HPI:  Patient is a 89 y.o. Not  or  female who presents after a fall injuring her head.  This patient is well known to me with a right trimalleolar ankle fracture treated with ORIF months ago.  This was complicated by an acute infection that ended up requiring hardware removal.  She has been 50% weightbearing to the right ankle.  When she fell, she did not injure the right ankle, I am mostly being consulted to make sure there is nothing wrong with the ankle and to help with orders on mobilization.    MEDICAL HISTORY  Past Medical History:   Diagnosis Date   • Anemia    • At risk for falls    • At risk for sleep apnea 11/17/2018   • Atrial fibrillation (CMS/HCC)    • Bulging lumbar disc    • Cellulitis     BILATERAL LEGS   • Chronic back pain    • Chronic cough    • Chronic UTI    • Chronic venous insufficiency    • 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HCC)    • DDD (degenerative disc disease), lumbosacral    • Dementia without behavioral disturbance     moderate   • Depression, endogenous (CMS/HCC)    • Disc degeneration, lumbar    • Dysphagia    • GERD (gastroesophageal reflux disease)    • Hiatal hernia    • History of anxiety    • History of aspiration pneumonitis    • History of cerebral artery occlusion     CVA (following TIA), 10/10, treated with tPA   • History of herpes zoster    • History of ingrowing nail    • History of osteoporosis    • History of poliomyelitis     child   • History of sciatica    • History of sick sinus syndrome     s/p PPM   • History of transient cerebral ischemia     followed by stroke in 10/2010. BIBI was normal.   • History of Zenker's diverticulum removal 2016   • HX: long term anticoagulant use    • Hyperlipidemia    • Hypertension     NO CURRENT MEDICATION   • Hyponatremia    • Intertrigo    • Lumbar radiculopathy    • Morbid obesity (CMS/HCC)    • MRSA (methicillin resistant Staphylococcus aureus)      LEFT FOOT SPREAD TO RIGHT ANKLE; DR TUBBS AWARE   • Neuralgia     with diplopia secondary to facial shingles   • Nonepileptic episode (CMS/HCC)    • Osteoarthritis of right knee    • Pacemaker    • Pneumonia    • Seeing double     secondary to shingles on the face also with neuralgia   • Seizures (CMS/HCC)    • 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)    • Spinal stenosis    • Vitamin D deficiency    • Zenker's diverticulum    ·   Past Surgical History:   Procedure Laterality Date   • ANKLE OPEN REDUCTION INTERNAL FIXATION Right 11/17/2018    Procedure: ANKLE OPEN REDUCTION INTERNAL FIXATION;  Surgeon: Cristian Tubbs II, MD;  Location: [facility] OR;  Service: Orthopedics   • CARDIAC PACEMAKER PLACEMENT      secondary to SSS, placed in 2004, with gen chng 2014; [employer] dual DOO   • CATARACT EXTRACTION W/ INTRAOCULAR LENS IMPLANT Bilateral    • COLONOSCOPY     • HAND SURGERY Right    • HARDWARE REMOVAL Right 2/19/2019    Procedure: RT ANKLE HARDWARE REMOVAL;  Surgeon: Cristian Tubbs II, MD;  Location: [facility] OR;  Service: Orthopedics   • KNEE ARTHROPLASTY Left    • LAMINECTOMY FOR IMPLANTATION / PLACEMENT NEUROSTIMULATOR ELECTRODES     • LUMBAR LAMINECTOMY      L-3 L4 L4 L5   • TONSILLECTOMY     • ZENKERS DIVERTICULECTOMY N/A 9/27/2016    Procedure: ENDOSCOPIC REMOVAL OF ZENKERS DIVERTICULUM W/ WERDASCOPE;  Surgeon: Oswald Barth MD;  Location: [facility] OR;  Service:    • ZENKERS DIVERTICULECTOMY N/A 4/14/2017    Procedure: ESOPHAGOSCOPY;  Surgeon: Oswald Barth MD;  Location: [facility] OR;  Service:    ·   Prior to Admission medications    Medication Sig Start Date End Date Taking? Authorizing Provider   acetaminophen (TYLENOL) 325 MG tablet Take 325 mg by mouth Every 6 (Six) Hours As Needed for Mild Pain .   Yes Sammy Veronica MD   amLODIPine (NORVASC) 5 MG tablet Take 5 mg by mouth Daily. Hold for BP <100/60   Yes Sammy Veronica MD    artificial tears (LUBRIFRESH P.M.) ointment ophthalmic ointment Administer 1 application to both eyes every night at bedtime.   Yes Sammy Veronica MD   B Complex Vitamins (VITAMIN B COMPLEX) capsule capsule Take 1 capsule by mouth Daily.   Yes Sammy Veronica MD   Calcium-Vitamin D-Vitamin K (VIACTIV) 500-500-40 MG-UNT-MCG chewable tablet Chew 1 tablet Daily.   Yes Sammy Veronica MD   cephalexin (KEFLEX) 500 MG capsule Take 500 mg by mouth 4 (Four) Times a Day. 4/2/19 4/4/19 Yes Sammy Veronica MD   clopidogrel (PLAVIX) 75 MG tablet Take 75 mg by mouth Daily.   Yes Sammy Veronica MD   escitalopram (LEXAPRO) 10 MG tablet Take 10 mg by mouth Daily.   Yes Sammy Veronica MD Fe Asp Gly-Fe Polysac-Suc Ac-C (FERREX 150 PLUS) 150-50-50 MG capsule Take 1 capsule by mouth 3 (Three) Times a Day.   Yes Sammy Veronica MD   gabapentin (NEURONTIN) 600 MG tablet Take 600 mg by mouth 3 (Three) Times a Day.   Yes Sammy Veronica MD   levETIRAcetam (KEPPRA) 750 MG tablet Take 1 tablet by mouth 2 (Two) Times a Day. 10/18/18  Yes Arsalan Velez Jr., MD   mirtazapine (REMERON) 15 MG tablet Take 15 mg by mouth Every Night.   Yes Sammy Veronica MD   Multiple Vitamins-Minerals (CERTAVITE/ANTIOXIDANTS) tablet Take 1 tablet by mouth Daily.   Yes Sammy Veronica MD   nystatin (MYCOSTATIN) 331391 UNIT/GM ointment Apply 1 application topically to the appropriate area as directed 2 (Two) Times a Day. Apply under bilateral breasts   Yes Sammy Veronica MD   pantoprazole (PROTONIX) 40 MG EC tablet Take 40 mg by mouth Daily.   Yes Sammy Veronica MD   polyethylene glycol (MIRALAX) packet Take 17 g by mouth Daily As Needed.   Yes Sammy Veronica MD   ·   Allergies   Allergen Reactions   • Lipitor [Atorvastatin] Confusion   • Penicillins Hives     Has previously tolerated ceftriaxone   ·   Most Recent Immunizations   Administered Date(s) Administered   • Flu  "Mist 10/05/2015   • Flu Vaccine High Dose PF 65YR+ 10/09/2017   • Flu Vaccine Quad PF >18YRS 2016   • Tdap 2019   ·   Social History     Tobacco Use   • Smoking status: Former Smoker     Packs/day: 1.00     Years: 40.00     Pack years: 40.00     Types: Cigarettes     Last attempt to quit: 1992     Years since quittin.2   • Smokeless tobacco: Never Used   • Tobacco comment: caffeine use: one cup of coffee in the morning.    Substance Use Topics   • Alcohol use: Yes     Comment: 2 PER WEEK   ·    Social History     Substance and Sexual Activity   Drug Use No   ·     VITALS: /67 (BP Location: Right arm, Patient Position: Lying)   Pulse 63   Temp 97.8 °F (36.6 °C) (Oral)   Resp 16   Ht 162.6 cm (64\")   Wt 100 kg (221 lb 1.9 oz)   SpO2 96%   BMI 37.96 kg/m²  Body mass index is 37.96 kg/m².    PHYSICAL EXAM:   · CONSTITUTIONAL: A&Ox3, No acute distress  · LUNGS: Equal chest rise, no shortness of air  · CARDIOVASCULAR: palpable peripheral pulses  · SKIN: no skin lesions in the area examined  · LYMPH: no lymphadenopathy in the area examined  · EXTREMITY: Right Lower Extremity  · Tenderness to Palpation: No tenderness to palpation  · Gross Deformity: No Gross Deformity  · Pulses:  Brisk Capillary Refill  · Sensation: Intact to Saphenous, Sural, Deep Peroneal, Superficial Peroneal, and Tibial Nerves and grossly throughout extremity  · Motor: 5/5 EHL/FHL/TA/GS motor complexes    RADIOLOGY REVIEW:   Ct Head Without Contrast    Result Date: 4/3/2019  No evidence for acute traumatic intracranial pathology.  Bilateral comminuted and displaced nasal bone fractures with overlying soft tissue swelling. Small anterior frontal scalp hematoma is additionally appreciated.  TECHNIQUE: CT scan of the maxillofacial region was obtained with 2 mm axial, coronal and sagittal images.  FINDINGS: There are bilateral displaced and comminuted nasal bone fractures with overlying soft tissue swelling.  Mild mucosal " thickening is identified within the maxillary sinuses, the ethmoid air cells, and the sphenoid sinus.  IMPRESSION: Moderately displaced and comminuted bilateral nasal bone fractures.  Findings were discussed with Marilyn Frederick of the emergency room on 04/03/2019 at approximately 9:15 AM.  Radiation dose reduction techniques were utilized, including automated exposure control and exposure modulation based on body size.  This report was finalized on 4/3/2019 9:46 AM by Dr. Demarcus Zepeda M.D.      Ct Facial Bones Without Contrast    Result Date: 4/3/2019  No evidence for acute traumatic intracranial pathology.  Bilateral comminuted and displaced nasal bone fractures with overlying soft tissue swelling. Small anterior frontal scalp hematoma is additionally appreciated.  TECHNIQUE: CT scan of the maxillofacial region was obtained with 2 mm axial, coronal and sagittal images.  FINDINGS: There are bilateral displaced and comminuted nasal bone fractures with overlying soft tissue swelling.  Mild mucosal thickening is identified within the maxillary sinuses, the ethmoid air cells, and the sphenoid sinus.  IMPRESSION: Moderately displaced and comminuted bilateral nasal bone fractures.  Findings were discussed with Marilyn Frederick of the emergency room on 04/03/2019 at approximately 9:15 AM.  Radiation dose reduction techniques were utilized, including automated exposure control and exposure modulation based on body size.  This report was finalized on 4/3/2019 9:46 AM by Dr. Demarcus Zepeda M.D.        LABS:   Results for the past 24 hours:   Recent Results (from the past 24 hour(s))   Prepare RBC, 2 Units    Collection Time: 04/04/19  6:00 PM   Result Value Ref Range    Product Code [id number]     Unit Number [id number]     UNIT  ABO O     UNIT  RH POS     Crossmatch Interpretation Compatible     Dispense Status PT     Blood Type OPOS     Blood Expiration Date 912687227589     Blood Type Barcode 5100     Product  Code W5621H04     Unit Number [id number]     UNIT  ABO O     UNIT  RH POS     Crossmatch Interpretation Compatible     Dispense Status PT     Blood Type OPOS     Blood Expiration Date 201905102359     Blood Type Barcode 5100    Basic Metabolic Panel    Collection Time: 04/05/19  4:15 AM   Result Value Ref Range    Glucose 95 65 - 99 mg/dL    BUN 14 8 - 23 mg/dL    Creatinine 0.88 0.57 - 1.00 mg/dL    Sodium 139 136 - 145 mmol/L    Potassium 4.3 3.5 - 5.2 mmol/L    Chloride 103 98 - 107 mmol/L    CO2 24.8 22.0 - 29.0 mmol/L    Calcium 8.4 (L) 8.6 - 10.5 mg/dL    eGFR Non African Amer 61 >60 mL/min/1.73    BUN/Creatinine Ratio 15.9 7.0 - 25.0    Anion Gap 11.2 mmol/L   CBC Auto Differential    Collection Time: 04/05/19  4:15 AM   Result Value Ref Range    WBC 4.28 3.40 - 10.80 10*3/mm3    RBC 2.79 (L) 3.77 - 5.28 10*6/mm3    Hemoglobin 8.9 (L) 12.0 - 15.9 g/dL    Hematocrit 28.2 (L) 34.0 - 46.6 %    .1 (H) 79.0 - 97.0 fL    MCH 31.9 26.6 - 33.0 pg    MCHC 31.6 31.5 - 35.7 g/dL    RDW 17.0 (H) 12.3 - 15.4 %    RDW-SD 63.3 (H) 37.0 - 54.0 fl    MPV 9.4 6.0 - 12.0 fL    Platelets 129 (L) 140 - 450 10*3/mm3    Neutrophil % 35.3 (L) 42.7 - 76.0 %    Lymphocyte % 44.6 19.6 - 45.3 %    Monocyte % 11.0 5.0 - 12.0 %    Eosinophil % 8.2 (H) 0.3 - 6.2 %    Basophil % 0.7 0.0 - 1.5 %    Immature Grans % 0.2 0.0 - 0.5 %    Neutrophils, Absolute 1.51 1.40 - 7.00 10*3/mm3    Lymphocytes, Absolute 1.91 0.70 - 3.10 10*3/mm3    Monocytes, Absolute 0.47 0.10 - 0.90 10*3/mm3    Eosinophils, Absolute 0.35 0.00 - 0.40 10*3/mm3    Basophils, Absolute 0.03 0.00 - 0.20 10*3/mm3    Immature Grans, Absolute 0.01 0.00 - 0.05 10*3/mm3    nRBC 0.0 0.0 - 0.0 /100 WBC       IMPRESSION:  Patient is a 89 y.o. Not  or  female with a fall in the setting of 6 weeks postop from right ankle hardware removal for infected trimalleolar ORIF    PLAN:   · Admited to: Juan Carlos Harper MD  · Weight Bearing:Right Lower Extremity pending  x-rays today, possibly weight-bear as tolerated  · Imaging: X-Ray of Right ankle  · Disposition: I was about to make this patient weight-bear as tolerated on her right ankle.  I am going to order an x-ray today, and if that looks fine I will probably allow her to bear full weight on the ankle in a cam boot.    Cristian Hill II, MD  Orthopaedic Surgery  [facility]

## 2019-04-05 NOTE — THERAPY TREATMENT NOTE
Acute Care - Physical Therapy Treatment Note  [facility]     Patient Name: Kim Feldman  : 3/13/1930  MRN: [id number]  Today's Date: 2019     Date of Referral to PT: 19  Referring Physician: Dr. Harper    Admit Date: 4/3/2019    Visit Dx:    ICD-10-CM ICD-9-CM   1. Anemia, unspecified type D64.9 285.9   2. Open fracture of nasal bone, initial encounter S02.2XXB 802.1   3. Laceration of nose, initial encounter S01.21XA 873.20   4. Contusion of head, unspecified part of head, initial encounter S00.93XA 920   5. Chest wall contusion, left, initial encounter S20.212A 922.1   6. Contusion of left shoulder, initial encounter S40.012A 923.00   7. Contusion of left foot, initial encounter S90.32XA 924.20   8. Fall, initial encounter W19.XXXA E888.9   9. 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)   • Vitamin D deficiency   • History of sick sinus syndrome   • Intertrigo   • Generalized convulsive seizures (CMS/HCC)   • Change in bowel habits   • Zenker diverticulum   • History of anxiety   • 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HCC)   • Thrombocytopenia (CMS/HCC)   • B12 deficiency   • Cardiac pacemaker in situ   • Chronic venous insufficiency   • Arthritis   • S/P knee replacement   • Chronic bilateral low back pain without sciatica   • Essential hypertension   • Hiatal hernia   • Zenker's diverticulum   • Chronic idiopathic constipation   • Pressure sore on buttocks   • Somnolence   • Closed trimalleolar fracture of right ankle   • Surgical wound infection   • History of failed arthroplasty of ankle       Therapy Treatment    Rehabilitation Treatment Summary      Row Name 04/05/19 1340             Treatment Time/Intention    Discipline  physical therapy assistant  -      Document Type  therapy note (daily note)  -      Subjective Information  complains of;pain  -      Mode of Treatment  physical therapy  -SM      Patient Effort  good  -SM      Existing Precautions/Restrictions  fall CAM boot when up; still awaiting xray for updated WB status  -SM      Recorded by [] Joann Meyers PTA 04/05/19 1424      Row Name 04/05/19 1340             Cognitive Assessment/Intervention    Additional Documentation  Cognitive Assessment/Intervention (Group)  -SM      Recorded by [] Joann Meyers PTA 04/05/19 1424      Row Name 04/05/19 1340             Cognitive Assessment/Intervention- PT/OT    Orientation Status (Cognition)  oriented x 4  -SM      Follows Commands (Cognition)  WFL  -      Safety Deficit (Cognitive)  mild deficit  -      Personal Safety Interventions  fall prevention program maintained;gait belt;nonskid shoes/slippers when out of bed  -SM      Recorded by [] Joann Meyers PTA 04/05/19 1424      Row Name 04/05/19 1340             Bed Mobility Assessment/Treatment    Bed Mobility Assessment/Treatment  supine-sit  -      Supine-Sit Russell (Bed Mobility)  contact guard;minimum assist (75% patient effort)  -      Sit-Supine Russell (Bed Mobility)  not tested  -      Bed Mobility, Safety Issues  decreased use of arms for pushing/pulling;decreased use of legs for bridging/pushing  -      Assistive Device (Bed Mobility)  bed rails;head of bed elevated  -SM      Recorded by [] Joann Meyers PTA 04/05/19 1424      Row Name 04/05/19 1340             Transfer Assessment/Treatment    Transfer Assessment/Treatment  sit-stand transfer;stand-sit transfer  -      Recorded by [] Joann Meyers PTA 04/05/19 1424      Row Name 04/05/19 1340             Bed-Chair Transfer    Bed-Chair Russell (Transfers)  minimum  assist (75% patient effort)  -      Assistive Device (Bed-Chair Transfers)  -- HHA  -SM      Recorded by [] Joann Meyers [facility] 04/05/19 1424      Row Name 04/05/19 1340             Sit-Stand Transfer    Sit-Stand [place] (Transfers)  minimum assist (75% patient effort)  -      Assistive Device (Sit-Stand Transfers)  -- HHA  -SM      Recorded by [] Joann Meyers [facility] 04/05/19 1424      Row Name 04/05/19 1340             Stand-Sit Transfer    Stand-Sit [place] (Transfers)  minimum assist (75% patient effort)  -      Assistive Device (Stand-Sit Transfers)  -- HHA  -SM      Recorded by [] Joann Meyers [facility] 04/05/19 1424      Row Name 04/05/19 1340             Gait/Stairs Assessment/Training    Comment (Gait/Stairs)  awaiting xray results for updated WB status; currently 50% PWB on R LE  -SM      Recorded by [] Joann Meyers [facility] 04/05/19 1424      Row Name 04/05/19 1340             Motor Skills Assessment/Interventions    Additional Documentation  Therapeutic Exercise (Group)  -SM      Recorded by [] Joann Meyers [facility] 04/05/19 1424      Row Name 04/05/19 1340             Therapeutic Exercise    Lower Extremity (Therapeutic Exercise)  gluteal sets;LAQ (long arc quad), bilateral;marching while seated;quad sets, bilateral  -      Lower Extremity Range of Motion (Therapeutic Exercise)  ankle dorsiflexion/plantar flexion, bilateral  -      Exercise Type (Therapeutic Exercise)  AROM (active range of motion)  -      Position (Therapeutic Exercise)  seated  -      Sets/Reps (Therapeutic Exercise)  10 reps  -SM      Recorded by [] Joann Meyers [facility] 04/05/19 1424      Row Name 04/05/19 1340             Positioning and Restraints    Pre-Treatment Position  in bed  -SM      Post Treatment Position  chair  -SM      In Chair  reclined;call light within reach;encouraged to call for assist  -SM      Recorded by [] Joann Meyers, [facility] 04/05/19 1422       Row Name 04/05/19 1340             Pain Assessment    Additional Documentation  Pain Scale: Numbers Pre/Post-Treatment (Group)  -      Recorded by [] Joann Meyers PTA 04/05/19 1424      Row Name 04/05/19 1340             Pain Scale: Numbers Pre/Post-Treatment    Pain Scale: Numbers, Pretreatment  0/10 - no pain  -SM      Pain Scale: Numbers, Post-Treatment  0/10 - no pain  -SM      Pre/Post Treatment Pain Comment  tingling/numbness in R LE below knee  -      Recorded by [] Joann Meyers PTA 04/05/19 1424      Row Name                Wound 04/03/19 1400 Left posterior ankle pressure injury    Wound - Properties Group Date first assessed: 04/03/19 [MP] Time first assessed: 1400 [MP] Present On Admission : yes;picture taken [MP] Side: Left [MP] Orientation: posterior [MP] Location: ankle [MP] Type: pressure injury [MP] Recorded by:  [MP] Olga Rubin RN 04/03/19 1705    Row Name                Wound 04/03/19 1400 medial coccyx MASD (moisture associated skin damage)    Wound - Properties Group Date first assessed: 04/03/19 [MP] Time first assessed: 1400 [MP] Orientation: medial [MP] Location: coccyx [MP] Type: MASD (moisture associated skin damage) [MP] Recorded by:  [MP] Olga Rubin RN 04/03/19 1707    Row Name                Wound 04/03/19 1400 Right lower;lateral leg    Wound - Properties Group Date first assessed: 04/03/19 [MP] Time first assessed: 1400 [MP] Side: Right [MP] Orientation: lower;lateral [MP] Location: leg [MP] Recorded by:  [MP] Olga Rubin RN 04/03/19 1723    Row Name                Wound 04/03/19 1400 Left hand    Wound - Properties Group Date first assessed: 04/03/19 [MP] Time first assessed: 1400 [MP] Side: Left [MP] Location: hand [MP] Recorded by:  [GERMAINE] Olga Rubin RN 04/1934      User Key  (r) = Recorded By, (t) = Taken By, (c) = Cosigned By    Initials Name Effective Dates Discipline     Joann Meyers PTA 03/07/18 -  PT    Olga Chaudhari,  RN 04/14/17 -  Nurse          Wound 04/03/19 1400 Left posterior ankle pressure injury (Active)   Dressing Appearance open to air 4/5/2019  1:29 PM   Drainage Amount none 4/5/2019  1:29 PM   Dressing Care, Wound low-adherent 4/5/2019  9:33 AM       Wound 04/03/19 1400 medial coccyx MASD (moisture associated skin damage) (Active)   Dressing Appearance open to air 4/5/2019  1:29 PM   Drainage Amount none 4/5/2019  1:29 PM   Care, Wound other (see comments) 4/5/2019  9:33 AM       Wound 04/03/19 1400 Right lower;lateral leg (Active)   Dressing Appearance dry;intact 4/5/2019  1:29 PM   Drainage Amount none 4/5/2019  1:29 PM       Wound 04/03/19 1400 Left hand (Active)   Dressing Appearance dry;intact 4/5/2019  1:29 PM   Periwound intact 4/5/2019  1:29 PM   Periwound Temperature warm 4/5/2019  1:29 PM   Drainage Amount none 4/5/2019  1:29 PM           Physical Therapy Education     Title: PT OT SLP Therapies (In Progress)     Topic: Physical Therapy (Done)     Point: Mobility training (Done)     Learning Progress Summary           Patient Acceptance, E,D, VU,NR by  at 4/5/2019  2:26 PM    Acceptance, E, NR by MS at 4/4/2019  1:29 PM                   Point: Home exercise program (Done)     Learning Progress Summary           Patient Acceptance, E,D, VU,NR by  at 4/5/2019  2:26 PM                   Point: Body mechanics (Done)     Learning Progress Summary           Patient Acceptance, E,D, VU,NR by  at 4/5/2019  2:26 PM    Acceptance, E, NR by MS at 4/4/2019  1:29 PM                   Point: Precautions (Done)     Learning Progress Summary           Patient Acceptance, E,D, VU,NR by  at 4/5/2019  2:26 PM    Acceptance, E, NR by MS at 4/4/2019  1:29 PM                               User Key     Initials Effective Dates Name Provider Type Discipline     03/07/18 -  Joann Meyers, PTA Physical Therapy Assistant PT    MS 03/04/19 -  Graciela Starr PT Physical Therapist PT                PT Recommendation  and Plan     Plan of Care Reviewed With: patient  Progress: improving  Outcome Summary: Pt tolerated treatment well this date. Required min A for bed mobility and to transfer from bed to chair w/ CAM boot donned. Still awaiting x-ray results for updated WB status, though transferring w/ 50% WB on R LE. PT will continue to address functional mobility deficits as tolerated.  Outcome Measures     Row Name 04/05/19 1400 04/04/19 1300 04/04/19 1200       How much help from another person do you currently need...    Turning from your back to your side while in flat bed without using bedrails?  3  -SM  3  -MS  --    Moving from lying on back to sitting on the side of a flat bed without bedrails?  3  -SM  3  -MS  --    Moving to and from a bed to a chair (including a wheelchair)?  3  -SM  2  -MS  --    Standing up from a chair using your arms (e.g., wheelchair, bedside chair)?  3  -SM  2  -MS  --    Climbing 3-5 steps with a railing?  1  -SM  1  -MS  --    To walk in hospital room?  2  -SM  1  -MS  --    AM-PAC 6 Clicks Score  15  -SM  12  -MS  --       How much help from another is currently needed...    Putting on and taking off regular lower body clothing?  --  --  1  -SG    Bathing (including washing, rinsing, and drying)  --  --  2  -SG    Toileting (which includes using toilet bed pan or urinal)  --  --  2  -SG    Putting on and taking off regular upper body clothing  --  --  2  -SG    Taking care of personal grooming (such as brushing teeth)  --  --  2  -SG    Eating meals  --  --  3  -SG    Score  --  --  12  -SG       Functional Assessment    Outcome Measure Options  AM-PAC 6 Clicks Basic Mobility (PT)  -SM  AM-PAC 6 Clicks Basic Mobility (PT)  -MS  AM-PAC 6 Clicks Daily Activity (OT)  -SG      User Key  (r) = Recorded By, (t) = Taken By, (c) = Cosigned By    Initials Name Provider Type    Danay Deshpande, OTR Occupational Therapist    Joann Hernandez, PTA Physical Therapy Assistant    MS Starr,  Graciela GARCIA PT Physical Therapist         Time Calculation:   PT Charges     Row Name 04/05/19 1432             Time Calculation    Start Time  1340  -      Stop Time  1406  -      Time Calculation (min)  26 min  -      PT Received On  04/05/19  -      PT - Next Appointment  04/06/19  -        User Key  (r) = Recorded By, (t) = Taken By, (c) = Cosigned By    Initials Name Provider Type     Joann Meyers PTA Physical Therapy Assistant        Therapy Charges for Today     Code Description Service Date Service Provider Modifiers Qty    81641447213 HC PT THER PROC EA 15 MIN 4/5/2019 Joann Meyers PTA GP 2    10035668582 HC PT THER SUPP EA 15 MIN 4/5/2019 Joann Meyers PTA GP 1          PT G-Codes  Outcome Measure Options: AM-PAC 6 Clicks Basic Mobility (PT)  AM-PAC 6 Clicks Score: 15  Score: 12    Joann Meyers PTA  4/5/2019

## 2019-04-05 NOTE — PLAN OF CARE
Problem: Fall Risk (Adult)  Goal: Absence of Fall  Outcome: Ongoing (interventions implemented as appropriate)      Problem: Skin Injury Risk (Adult)  Goal: Skin Health and Integrity  Outcome: Ongoing (interventions implemented as appropriate)      Problem: Patient Care Overview  Goal: Plan of Care Review  Outcome: Ongoing (interventions implemented as appropriate)   04/05/19 0426   Plan of Care Review   Progress no change   OTHER   Outcome Summary VSS, NO ACUTE DISTRESS NOTED THIS SHIFT, SAFETY MAINTAINED, CONTINUE PLAN OF CARE   Coping/Psychosocial   Plan of Care Reviewed With patient     Goal: Individualization and Mutuality  Outcome: Ongoing (interventions implemented as appropriate)    Goal: Discharge Needs Assessment  Outcome: Ongoing (interventions implemented as appropriate)

## 2019-04-05 NOTE — PLAN OF CARE
Problem: Fall Risk (Adult)  Goal: Absence of Fall  Outcome: Ongoing (interventions implemented as appropriate)      Problem: Skin Injury Risk (Adult)  Goal: Skin Health and Integrity  Outcome: Ongoing (interventions implemented as appropriate)      Problem: Patient Care Overview  Goal: Plan of Care Review  Outcome: Ongoing (interventions implemented as appropriate)   04/05/19 1426 04/05/19 1631   Coping/Psychosocial   Patient Agreement with Plan of Care --  agrees   Plan of Care Review   Progress --  improving   OTHER   Outcome Summary --  VSS; no complaints of pain this shift; x-ray for ankle; cardiology consulted and plavix d/c'd; continue to monitor   Coping/Psychosocial   Plan of Care Reviewed With patient --

## 2019-04-05 NOTE — PLAN OF CARE
Problem: Patient Care Overview  Goal: Plan of Care Review  Outcome: Ongoing (interventions implemented as appropriate)   04/05/19 8490   Plan of Care Review   Progress improving   OTHER   Outcome Summary Pt tolerated treatment well this date. Required min A for bed mobility and to transfer from bed to chair w/ CAM boot donned. Still awaiting x-ray results for updated WB status, though transferring w/ 50% WB on R LE. PT will continue to address functional mobility deficits as tolerated.   Coping/Psychosocial   Plan of Care Reviewed With patient

## 2019-04-05 NOTE — PROGRESS NOTES
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 Encouraged adequate protein for wound healing. She agreed to boost daily.  RD to continue to follow/monitor    Reason for Assessment     Row Name 04/05/19 1326          Reason for Assessment    Reason For Assessment  identified at risk by screening criteria     Diagnosis   Primary Problem:  Anemia, GERD     Identified At Risk by Screening Criteria  large or nonhealing wound, burn or pressure injury L ankle unstageable pressure injury         Nutrition/Diet History     Row Name 04/05/19 1327          Nutrition/Diet History    Typical Food/Fluid Intake  great appetite         Anthropometrics     Row Name 04/05/19 1327          Body Mass Index (BMI)    BMI Assessment  BMI 35-39.9: obesity grade II         Labs/Tests/Procedures/Meds     Row Name 04/05/19 1327          Labs/Procedures/Meds    Lab Results Reviewed  reviewed, pertinent        Diagnostic Tests/Procedures    Diagnostic Test/Procedure Reviewed  reviewed, pertinent        Medications    Pertinent Medications Reviewed  reviewed, pertinent     Pertinent Medications Comments  Ca-vitamin D, remeron, multivitamin, PPI, miralax         Physical Findings     Row Name 04/05/19 1327          Physical Findings    Overall Physical Appearance  -- B=15     Skin  pressure injury L ankle unstageable         Estimated/Assessed Needs     Row Name 04/05/19 1328          Calculation Measurements    Weight Used For Calculations  100 kg (220 lb 7.4 oz)        Estimated/Assessed Needs    Additional Documentation  KCAL/KG (Group);Protein Requirements (Group);Fluid Requirements (Group)        KCAL/KG                                                                kcal/kg (Specify)  -- 7290-6195         Omega-St. Jeor Equation    RMR (Omega-St. Jeor Equation)  1410        Protein Requirements    Est Protein Requirement Amount (gms/kg)  1.2 gm protein     Estimated Protein Requirements (gms/day)  120        Fluid Requirements    Estimated Fluid Requirements (mL/day)  2500     RDA Method (mL)  2500     Jamar-Segar Method (over 20 kg)  3500         Nutrition Prescription Ordered     Row Name 04/05/19 1328          Nutrition Prescription PO    Current PO Diet  Regular         Evaluation of Received Nutrient/Fluid Intake     Row Name 04/05/19 1328          Calculation Measurements    Weight Used For Calculations  100 kg (220 lb 7.4 oz)        PO Evaluation    Number of Meals  3     % PO Intake  50         Evaluation of Prescribed Nutrient/Fluid Intake     Row Name 04/05/19 1328          Calculation Measurements    Weight Used For Calculations  100 kg (220 lb 7.4 oz)             Problem/Interventions:  Problem 1     Row Name 04/05/19 1328          Nutrition Diagnoses Problem 1    Problem 1  Increased Nutrient Needs     Macronutrient  Protein     Etiology (related to)  MNT for Treatment/Condition     Signs/Symptoms (evidenced by)  -- skin integrity-wound healing                 Intervention Goal     Row Name 04/05/19 1328          Intervention Goal    General  Maintain nutrition;Meet nutritional needs for age/condition     PO  Tolerate PO;Maintain intake     Weight  Maintain weight         Nutrition Intervention     Row Name 04/05/19 1329          Nutrition Intervention    RD/Tech Action  Interview for preference;Follow Tx progress;Care plan reviewd;Encourage intake;Recommend/ordered;Supplement provided     Recommended/Ordered  Supplement         Nutrition Prescription     Row Name 04/05/19 1329          Nutrition Prescription PO    PO Prescription  Begin/change supplement     Supplement  Boost     Supplement Frequency  Daily     New PO Prescription Ordered?  Yes         Education/Evaluation     Row Name  04/05/19 1329          Education    Education  Will Instruct as appropriate        Monitor/Evaluation    Monitor  Per protocol           Electronically signed by:  Maddison Ortega RD  04/05/19 1:29 PM

## 2019-04-05 NOTE — PAYOR COMM NOTE
"Kim Feldman (89 y.o. Female)     PLEASE SEE ATTACHED CLINICAL REVIEW. PT. WAS ADMITTED TO [facility] ON 4/3/19 TO A MONITORED TELEM FLOOR.    REF#REF#[id number]    PLEASE CALL   OR  [phone] WITH INPT AUTH AND DAYS APPROVED.     THANK YOU    ASHISH BONNER N [facility]    Date of Birth Social Security Number Address Home Phone MRN    03/13/1930  [id number] [facility] [phone] [id number]    Jehovah's witness Marital Status          Jain        Admission Date Admission Type Admitting Provider Attending Provider Department, Room/Bed    4/3/19 Emergency Juan Carlos Harper MD Ahmed, Aftab, MD [address], [address]    Discharge Date Discharge Disposition Discharge Destination                       Attending Provider:  Juan Carlos Harper MD    Allergies:  Lipitor [Atorvastatin], Penicillins    Isolation:  Contact   Infection:  MRSA (01/31/19)   Code Status:  No CPR    Ht:  162.6 cm (64\")   Wt:  100 kg (221 lb 1.9 oz)    Admission Cmt:  None   Principal Problem:  None                Active Insurance as of 4/3/2019     Primary Coverage     Payor Plan Insurance Group Employer/Plan Group    AETNA MEDICARE REPLACEMENT AETNA [id number]     Payor Plan Address Payor Plan Phone Number Payor Plan Fax Number Effective Dates    [address] [phone]  1/1/2018 - None Entered    [facility] [id number]       Subscriber Name Subscriber Birth Date Member ID       KIM FELDMAN 3/13/1930 [id number]                 Emergency Contacts      (Rel.) Home Phone Work Phone Mobile Phone    Leandra Feldman (Daughter) [phone] -- [phone]               History & Physical      Juan Carlos Harper MD at 4/3/2019  3:13 PM          History and physical    Primary CARE physician  Dr. Villeda    Chief complaint  Status post fall    History of present illness  89-year-old white female who is well-known to our service with multiple medical " problems.  Patient is a nursing home resident with history of hypertension seizure disorder depression history of CVA osteoarthritis degenerative disc disease gastroesophageal reflux disease who was recently discharged from the hospital after right ankle wound infection and surrounding cellulitis with recent fracture and surgery presented to [facility] emergency room after the fall after she lost her balance and trying to ambulate independently.  Patient hit on the face and sustaining facial bruises and laceration requiring stitches on the nose.  Patient denies any loss of consciousness and fully alert oriented following commands and family at the bedside.  Patient also found to have low hemoglobin with heme negative on stool test.  Patient is taking iron tablets for chronic anemia.    PAST MEDICAL HISTORY   • Anemia    • At risk for falls    • At risk for sleep apnea 11/17/2018   • Atrial fibrillation (CMS/HCC)    • Bulging lumbar disc    • Cellulitis    • Chronic back pain    • Chronic cough    • Chronic UTI    • Chronic venous insufficiency    • 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HCC)    • DDD (degenerative disc disease), lumbosacral    • Dementia without behavioral disturbance    • Depression, endogenous (CMS/HCC)    • Disc degeneration, lumbar    • Dysphagia    • GERD (gastroesophageal reflux disease)    • Hiatal hernia    • History of anxiety    • History of aspiration pneumonitis    • History of cerebral artery occlusion    • History of herpes zoster    • History of ingrowing nail    • History of osteoporosis    • History of poliomyelitis    • History of sciatica    • History of sick sinus syndrome    • History of transient cerebral ischemia    • History of Zenker's diverticulum removal 2016   • HX: long term anticoagulant use    • Hyperlipidemia    • Hypertension    • Hyponatremia    • Intertrigo    • Lumbar radiculopathy    • Morbid obesity (CMS/HCC)    • MRSA (methicillin resistant Staphylococcus  aureus)    • Neuralgia    • Nonepileptic episode (CMS/HCC)    • Osteoarthritis of right knee    • Pacemaker    • Pneumonia    • Seeing double    • Seizures (CMS/HCC)    • 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)    • Spinal stenosis    • Vitamin D deficiency    • Zenker's diverticulum      PAST SURGICAL HISTORY   Procedure Laterality Date   • ANKLE OPEN REDUCTION INTERNAL FIXATION Right 11/17/2018     Procedure: ANKLE OPEN REDUCTION INTERNAL FIXATION;  Surgeon: Cristian Hill II, MD;  Location: [facility];  Service: Orthopedics   • CARDIAC PACEMAKER PLACEMENT         secondary to SSS, placed in 2004, with gen chng 2014; Medtronic dual DOO   • CATARACT EXTRACTION W/ INTRAOCULAR LENS IMPLANT Bilateral     • COLONOSCOPY       • HAND SURGERY Right     • HARDWARE REMOVAL Right 2/19/2019     Procedure: RT ANKLE HARDWARE REMOVAL;  Surgeon: Cristian Hill II, MD;  Location: [facility];  Service: Orthopedics   • KNEE ARTHROPLASTY Left     • LAMINECTOMY FOR IMPLANTATION / PLACEMENT NEUROSTIMULATOR ELECTRODES       • LUMBAR LAMINECTOMY         L-3 L4 L4 L5   • TONSILLECTOMY       • ZENKERS DIVERTICULECTOMY N/A 9/27/2016     Procedure: ENDOSCOPIC REMOVAL OF ZENKERS DIVERTICULUM W/ WERDASCOPE;  Surgeon: Oswald Barth MD;  Location: [facility];  Service:    • ZENKERS DIVERTICULECTOMY N/A 4/14/2017     Procedure: ESOPHAGOSCOPY;  Surgeon: Oswald Barth MD;  Location: [facility] OR;  Service     FAMILY HISTORY         Family History   Problem Relation Age of Onset   • Cancer Daughter    • Malig Hyperthermia Neg Hx      SOCIAL HISTORY   Denies any recent tobacco alcohol drug abuse    ALLERGIES   Lipitor [atorvastatin] and Penicillins   Nursing home medications reviewed    REVIEW OF SYSTEMS   Review of Systems   Constitutional: Negative for chills and fever.   HENT: Negative for sore throat.   Eyes: Negative for visual disturbance.   Respiratory: Negative for shortness of  "breath.   Cardiovascular: Positive for chest pain (left chest wall pain).   Gastrointestinal: Negative for nausea and vomiting.   Genitourinary: Negative for difficulty urinating and dysuria.   Musculoskeletal: Negative for back pain and neck pain.   Left foot pain, left shoulder pain   Skin: Positive for wound (nose laceration, left hand skin tear (sustained yesterday)). Negative for rash.   Neurological: Negative for dizziness and headaches.   Psychiatric/Behavioral: Positive for confusion (mild). The patient is not nervous/anxious.     PHYSICAL EXAM   Blood pressure 124/59, pulse 76, temperature 98.3 °F (36.8 °C), temperature source Oral, resp. rate 16, height 162.6 cm (64\"), weight 100 kg (221 lb 1.9 oz), SpO2 96 %, not currently breastfeeding.    Constitutional: She is oriented to person, place, and time. No distress.   Head: Normocephalic.   Mouth/Throat: Mucous membranes are normal.   Hematoma to the mid forehead, 3cm jagged laceration to the left side of the nose with active bleeding, otherwise no epistaxis, nasal swelling, bruising above left upper lip, otherwise no facial tenderness   Eyes: EOM are normal. Pupils are equal, round, and reactive to light.   Neck: Normal range of motion. Neck supple. No c-spine tenderness   Cardiovascular: Normal rate, regular rhythm and normal heart sounds.   Pulmonary/Chest: Effort normal and breath sounds normal. No respiratory distress. She has no decreased breath sounds. She has no wheezes. She has no rhonchi. She has no rales.   Abdominal: Soft. There is no tenderness. There is no rebound and no guarding.   Musculoskeletal: No t-spine or l-spine tenderness, left shoulder tenderness with FROM, left foot tenderness, mild swelling and erythema to left lower leg (pt is currently being treated for LLE cellulitis), nickel sized healing pressure ulcer to the left heel, healed surgical incision above the right lateral malleolus without signs of infection, skin tear with steri " strips in place to left dorsal hand from previous incident, no bony left hand tenderness, NV intact distally to all extremities   Neurological: She is alert and oriented to person, place, and time. She has normal sensation.   nonfocal neuro exam, answers questions appropriately, follows commands   Skin: Skin is warm and dry. There is pallor.   Psychiatric: Affect normal.       ASSESSMENT  Fracture and laceration of nose status post suturing  Contusion of head chest left shoulder and right foot  Status post fall  Chronic anemia with drop in H&H  Right ankle wound with recent fracture and surgery  Hypertension  Seizure disorder  Status post CVA  Depression  SSS status post permanent pacemaker  Osteoarthritis  Degenerative disc disease  Gastroesophageal reflux disease    PLAN  Admit  Supportive care  Local wound care  Transfuse 2 units packed RBCs  Anemia workup  Continue nursing home medication and adjust the doses  Stress ulcer DVT prophylaxis  Discussed with family  DNR  Follow closely further recommendation according to hospital course    GAETANO HARPER MD      Electronically signed by Gaetano Harper MD at 4/3/2019  3:24 PM          Emergency Department Notes      Shreya Garza RN at 4/3/2019  7:03 AM        Pt to ED via ambulance from [facility]. Pt fell tonight this morning. Pt with laceration on nose. Pt also c/o left shoulder pain. Pt on plavix. Pt alert and oriented. Pt denies LOC.        Shreya Garza RN  04/03/19 0705      Electronically signed by Shreya Garza RN at 4/3/2019  7:05 AM     Marilyn Frederick APRN at 4/3/2019  7:23 AM      Procedure Orders    1. Laceration Repair [[id number]] ordered by Marilyn Frederick APRN at 04/03/19 0833           Attestation signed by Aldo Thompson MD at 4/4/2019  8:27 AM          MD Attestation Note    I supervised care provided by the midlevel provider.    The MIKAELA and I have discussed this patient's history, physical exam, and treatment plan. I have  reviewed the documentation and personally had a face to face interaction with the patient  I affirm the documentation and agree with the treatment and plan.   My personal findings are documented in a separate note.    Aldo Thompson MD 4/4/2019 8:27 AM                    EMERGENCY DEPARTMENT ENCOUNTER    CHIEF COMPLAINT  Chief Complaint: pain post fall  History given by: patient, family  History limited by: N/A  Time Seen: 0723  Room Number: 32/32  PMD: Grady Villeda MD      HPI:  Pt is a 89 y.o. female who is a nursing home resident and has hx of frequent falls. Per family, pt fractured her right ankle in 11/2018 (required surgical repair), is not supposed to be ambulating independently (uses walker), and was switched from skilled care to personal care at her facility yesterday. Pt reports that today, prior to ED arrival, while she was getting out of bed (was not using walker), she lost her balance and fell, sustaining blow to her head and face. She states that she hollered for help and nursing home staff arrived on scene shortly afterwards. She notes that she did not have any prodrome before the fall (denies dizziness, syncope, chest pain, trouble breathing, abd pain, or any other sx). Since the incident, she has had a nose laceration, left foot pain, left shoulder pain, and left chest wall pain. Additionally, family reports that pt has had mild confusion for the past few days (similar to previous UTIs) and sustained a left hand skin tear yesterday which nursing home staff repaired. Pt denies loss of consciousness, headache, neck pain, back pain, abd pain, left hand pain, dyspnea, vision changes, N/V/D, dizziness, fever, chills, pain and difficulty with urination, and sustaining any other injury. According to family, pt is currently being treated for LLE cellulitis and left foot pressure ulcer with abx. Tetanus status is unknown. Past Medical History of anxiety, seizures, UTI, hyperlipidemia, MRSA, frequent  falls, HTN, anemia, and SSS.     Duration: fall occurred today prior to ED arrival  Timing: intermittent  Location: left foot, left shoulder, left chest wall  Radiation: none  Quality: pain  Intensity/Severity: moderate  Progression: unchanged  Associated Symptoms: nose laceration, left foot pain, left shoulder pain, left chest wall pain, mild confusion (for past few days), left hand skin tear (sustained yesterday)  Aggravating Factors: none mentioned  Alleviating Factors: none mentioned  Previous Episodes: Pt states that she has hx of frequent falls.   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) 02/05/2016   • Vitamin D deficiency 02/05/2016   • History of sick sinus syndrome    • Intertrigo 03/25/2016   • Generalized convulsive seizures (CMS/[facility]) 04/07/2016   • Change in bowel habits 05/20/2016   • 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   • History of anxiety 10/18/2016   • 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[facility]) 01/04/2017   • Thrombocytopenia (CMS/[facility]) 01/05/2017   • B12 deficiency 01/16/2017   • Cardiac pacemaker in situ 03/08/2017   • Chronic venous insufficiency 03/09/2017   • Arthritis 03/09/2017   • S/P knee replacement 03/09/2017   • Chronic bilateral low back pain without sciatica 03/09/2017   • Essential hypertension 03/09/2017   • Hiatal hernia 03/16/2017   • Zenker's diverticulum 04/14/2017   • Chronic idiopathic constipation 05/19/2017   • Pressure sore on buttocks 05/19/2017   • Somnolence 12/13/2017   • Closed trimalleolar fracture of right ankle 11/13/2018   • Surgical wound infection  01/29/2019   • History of failed arthroplasty of ankle 02/19/2019     Resolved Ambulatory Problems     Diagnosis Date Noted   • Hyponatremia 02/05/2016   • Urine frequency 05/20/2016   • Urinary tract infection 07/08/2016   • Pneumonia 07/08/2016   • Aspiration pneumonia (CMS/[facility]) 07/17/2016   • Urinary tract infection in female 01/03/2017   • History of aspiration pneumonitis 01/04/2017   • Pain of toe of right foot 01/16/2017   • Right lower lobe pneumonia (CMS/[facility]) 02/02/2017   • Sepsis due to pneumonia (CMS/[facility]) 02/02/2017   • Weakness 02/03/2017   • UTI (urinary tract infection) 03/15/2017   • Acute kidney injury (CMS/[facility]) 03/17/2017   • Acute vaginitis 03/23/2017   • Acute pain of left knee 05/19/2017     Past Medical History:   Diagnosis Date   • Anemia    • At risk for falls    • At risk for sleep apnea 11/17/2018   • Atrial fibrillation (CMS/[facility])    • Bulging lumbar disc    • Cellulitis    • Chronic back pain    • Chronic cough    • Chronic UTI    • Chronic venous insufficiency    • 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HCC)    • DDD (degenerative disc disease), lumbosacral    • Dementia without behavioral disturbance    • Depression, endogenous (CMS/[facility])    • Disc degeneration, lumbar    • Dysphagia    • GERD (gastroesophageal reflux disease)    • Hiatal hernia    • History of anxiety    • History of aspiration pneumonitis    • History of cerebral artery occlusion    • History of herpes zoster    • History of ingrowing nail    • History of osteoporosis    • History of poliomyelitis    • History of sciatica    • History of sick sinus syndrome    • History of transient cerebral ischemia    • History of Zenker's diverticulum removal 2016   • HX: long term anticoagulant use    • Hyperlipidemia    • Hypertension    • Hyponatremia    • Intertrigo    • Lumbar radiculopathy    • Morbid obesity (CMS/[facility])    • MRSA (methicillin resistant Staphylococcus aureus)    • Neuralgia    • Nonepileptic episode (CMS/[facility])    •  Osteoarthritis of right knee    • Pacemaker    • Pneumonia    • Seeing double    • Seizures (CMS/HCC)    • 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)    • Spinal stenosis    • Vitamin D deficiency    • Zenker's diverticulum        PAST SURGICAL HISTORY  Past Surgical History:   Procedure Laterality Date   • ANKLE OPEN REDUCTION INTERNAL FIXATION Right 11/17/2018    Procedure: ANKLE OPEN REDUCTION INTERNAL FIXATION;  Surgeon: Cristian Hill II, MD;  Location: [facility] OR;  Service: Orthopedics   • CARDIAC PACEMAKER PLACEMENT      secondary to SSS, placed in 2004, with gen chng 2014; [employer] dual DOO   • CATARACT EXTRACTION W/ INTRAOCULAR LENS IMPLANT Bilateral    • COLONOSCOPY     • HAND SURGERY Right    • HARDWARE REMOVAL Right 2/19/2019    Procedure: RT ANKLE HARDWARE REMOVAL;  Surgeon: Cristian Hill II, MD;  Location: [facility] OR;  Service: Orthopedics   • KNEE ARTHROPLASTY Left    • LAMINECTOMY FOR IMPLANTATION / PLACEMENT NEUROSTIMULATOR ELECTRODES     • LUMBAR LAMINECTOMY      L-3 L4 L4 L5   • TONSILLECTOMY     • ZENKERS DIVERTICULECTOMY N/A 9/27/2016    Procedure: ENDOSCOPIC REMOVAL OF ZENKERS DIVERTICULUM W/ WERDASCOPE;  Surgeon: Oswald Barth MD;  Location: [facility]  Service:    • ZENKERS DIVERTICULECTOMY N/A 4/14/2017    Procedure: ESOPHAGOSCOPY;  Surgeon: Oswald Barth MD;  Location: [facility] OR;  Service:        FAMILY HISTORY  Family History   Problem Relation Age of Onset   • Cancer Daughter    • Malig Hyperthermia Neg Hx        SOCIAL HISTORY  Social History     Socioeconomic History   • Marital status:      Spouse name: Not on file   • Number of children: 3   • Years of education: Not on file   • Highest education level: Not on file   Occupational History   • Occupation: Retired   Tobacco Use   • Smoking status: Former Smoker     Packs/day: 1.00     Years: 40.00     Pack years: 40.00     Types: Cigarettes     Last attempt to quit:  1992     Years since quittin.2   • Smokeless tobacco: Never Used   • Tobacco comment: caffeine use: one cup of coffee in the morning.    Substance and Sexual Activity   • Alcohol use: Yes     Comment: 2 PER WEEK   • Drug use: No   • Sexual activity: Defer         ALLERGIES  Lipitor [atorvastatin] and Penicillins    REVIEW OF SYSTEMS  Review of Systems   Constitutional: Negative for chills and fever.   HENT: Negative for sore throat.    Eyes: Negative for visual disturbance.   Respiratory: Negative for shortness of breath.    Cardiovascular: Positive for chest pain (left chest wall pain).   Gastrointestinal: Negative for nausea and vomiting.   Genitourinary: Negative for difficulty urinating and dysuria.   Musculoskeletal: Negative for back pain and neck pain.        Left foot pain, left shoulder pain   Skin: Positive for wound (nose laceration, left hand skin tear (sustained yesterday)). Negative for rash.   Neurological: Negative for dizziness and headaches.   Psychiatric/Behavioral: Positive for confusion (mild). The patient is not nervous/anxious.        PHYSICAL EXAM  ED Triage Vitals [19 0705]   Temp Heart Rate Resp BP SpO2   97.8 °F (36.6 °C) 85 18 136/64 96 % WNL     Physical Exam   Constitutional: She is oriented to person, place, and time. No distress.   Chronically ill appearing   HENT:   Head: Normocephalic.   Mouth/Throat: Mucous membranes are normal.   Hematoma to the mid forehead, 3cm jagged laceration to the left side of the nose with active bleeding, otherwise no epistaxis, nasal swelling, bruising above left upper lip, otherwise no facial tenderness   Eyes: EOM are normal. Pupils are equal, round, and reactive to light.   Neck: Normal range of motion. Neck supple.   No c-spine tenderness   Cardiovascular: Normal rate, regular rhythm and normal heart sounds.   Pulmonary/Chest: Effort normal and breath sounds normal. No respiratory distress. She has no decreased breath sounds. She has no  wheezes. She has no rhonchi. She has no rales. She exhibits tenderness (left anterior rib tenderness).   Bruise above left breast   Abdominal: Soft. There is no tenderness. There is no rebound and no guarding.   Genitourinary:   Genitourinary Comments: Performed rectal exam with RN at pt's bedside-> brown stool in rectal vault without gross blood   Musculoskeletal:   No t-spine or l-spine tenderness, left shoulder tenderness with FROM, left foot tenderness, mild swelling and erythema to left lower leg (pt is currently being treated for LLE cellulitis), nickel sized healing pressure ulcer to the left heel, healed surgical incision above the right lateral malleolus without signs of infection, skin tear with steri strips in place to left dorsal hand from previous incident, no bony left hand tenderness, NV intact distally to all extremities   Neurological: She is alert and oriented to person, place, and time. She has normal sensation.   nonfocal neuro exam, answers questions appropriately, follows commands   Skin: Skin is warm and dry. There is pallor.   Psychiatric: Affect normal.   Nursing note and vitals reviewed.            MEDICAL RECORD REVIEW    2 months ago, hgb was 10. 1 month ago, hgb was 9.7. 12 days ago, hgb was 8.6.      PROCEDURES    Laceration Repair  Date/Time: 4/3/2019 8:20 AM  Performed by: Marilyn Frederick APRN  Authorized by: Aldo Thompson MD     Consent:     Consent obtained:  Verbal    Consent given by:  Patient  Anesthesia (see MAR for exact dosages):     Anesthesia method:  Local infiltration    Local anesthetic:  Lidocaine 1% w/o epi (6ml)  Laceration details:     Location:  Face    Face location:  Nose    Length (cm):  3  Repair type:     Repair type:  Simple  Pre-procedure details:     Preparation:  Patient was prepped and draped in usual sterile fashion  Exploration:     Hemostasis achieved with:  Direct pressure    Contaminated: no    Treatment:     Area cleansed with:  Monica  and saline    Amount of cleaning:  Extensive    Irrigation solution:  Sterile saline    Irrigation method:  Syringe  Skin repair:     Repair method:  Sutures    Suture size:  6-0    Suture material:  Nylon    Suture technique:  Simple interrupted    Number of sutures:  5  Approximation:     Approximation:  Close  Post-procedure details:     Dressing:  Antibiotic ointment (gauze dressing)    Patient tolerance of procedure:  Tolerated well, no immediate complications            PROGRESS AND CONSULTS    0802- Ordered tdap since tetanus status is unknown. Ordered left shoulder xray, left ribs and chest xray, CT facial bones, left foot xray, CT head, blood work, and UA for further evaluation. Nursing staff to perform wound care.     0818- Rechecked pt. She is resting comfortably and is in no acute distress. Informed pt and family about plan to repair pt's nasal laceration. They verbalize understanding and agreement with plan.     0820- Performed laceration repair. See procedure note for details.     0920- Obtained CT head results-> no acute intracranial process. Obtained CT facial bones results-> mildly displaced bilateral nasal bone fractures.     0944- Reviewed labs-> 2 months ago, hgb was 10. 1 month ago, hgb was 9.7. 12 days ago, hgb was 8.6. Today, hgb has decreased to 7.5. Ordered type and screen.     0946- Reviewed pt's history and workup with Dr. Thompson.  At bedside evaluation, they agree with the plan of care.    1000- Rechecked pt. She is resting comfortably and is in no acute distress. Discussed with pt and family about all pertinent results obtained so far including CT head findings (no acute intracranial process), CT facial bones results (mildly displaced bilateral nasal bone fractures), left foot xray findings (no acute fracture), left ribs and chest xray findings (no acute process), left shoulder xray findings (no acute fracture), normal WBC count, and hgb of 7.5 which has progressively decreased over time.  Pt denies noticing black or bloody stools. Informed pt and family about plan to perform rectal exam in order to determine if rectal bleed/GI bleed is contributing to pt's worsening anemia. Discussed pt admission for further care and observation. Pt and family verbalize understanding and agreement with plan.     1002- Performed rectal exam with RN at pt's bedside-> brown stool in rectal vault without gross blood. Ordered occult blood stool per rectum-> heme negative stool.     1121- Sent call out to [facility]. Admission decision time= 1121    1130- Discussed case with Dr Harper ([facility] hospitalist)  Reviewed history, exam, results and treatments.  Discussed concerns and plan of care. Dr Harper accepts pt to be admitted to telemetry.    1253- Pt now c/o headache. Ordered tylenol.       ADMISSION    Discussed treatment plan and reason for admission with pt/family and admitting physician.  Pt/family voiced understanding of the plan for admission for further testing/treatment as needed.      DIAGNOSIS  Final diagnoses:   Anemia, unspecified type   Open fracture of nasal bone, initial encounter   Laceration of nose, initial encounter   Contusion of head, unspecified part of head, initial encounter   Chest wall contusion, left, initial encounter   Contusion of left shoulder, initial encounter   Contusion of left foot, initial encounter   Fall, initial encounter         COURSE & MEDICAL DECISION MAKING  Pertinent Labs and Imaging studies that were ordered and reviewed are noted above.  Results were reviewed/discussed with the patient and family and they were also made aware of online assess.   Pt and family also made aware that some labs, such as cultures, will not be resulted during ER visit and follow up with PMD is necessary.     MEDICATIONS GIVEN IN ER  Medications   sodium chloride 0.9 % flush 10 mL (not administered)   miconazole nitrate (ALOE VESTA) 2 % ointment (not administered)   Tdap (BOOSTRIX) injection 0.5 mL (0.5 mL  "Intramuscular Given 4/3/19 0836)   lidocaine PF 1% (XYLOCAINE) injection 10 mL (10 mL Infiltration Given by Other 4/3/19 0836)   morphine injection 2 mg (2 mg Intravenous Given 4/3/19 0850)   ondansetron (ZOFRAN) injection 4 mg (4 mg Intravenous Given 4/3/19 0850)       BP 93/58   Pulse 71   Temp 97.8 °F (36.6 °C) (Tympanic)   Resp 16   Ht 167.6 cm (66\")   Wt 107 kg (235 lb 6.4 oz)   SpO2 98%   BMI 37.99 kg/m²        I personally reviewed the past medical history, past surgical history, social history, family history, current medications and allergies as they appear in this chart.  The scribe's note accurately reflects the work and decisions made by me.     Documentation assistance provided by smita Stoddard for SILVINA Chaney on 4/3/2019 at 10:02 AM. Information recorded by the scribe was done at my direction and has been verified and validated by me.     Benito Stoddard  04/03/19 1316       Marilyn Frederick APRN  04/03/19 1444      Electronically signed by Aldo Thompson MD at 4/4/2019  8:27 AM     Jesse Jackson at 4/3/2019  8:49 AM        ERT cleaned the nasal laceration with sterile saline and surgical soap. Pt's bleeding is controlled at this time and sutures are in place by JONA Patel. Pt tolerated well. ABX ointment placed over the repaired laceration with a loose gauze bandage at this time, to be replaced with a fixed bandage prior to disposition. Pt's left hand skin tear was cleaned with sterile saline and wrapped with Kerlix gauze. No bleeding from this site at this time.      Jesse Jackson  04/03/19 4407      Electronically signed by Jesse Jackson at 4/3/2019  8:51 AM     Mariluz Cerrato, RN at 4/3/2019 11:18 AM        Patient has reddened skin in bilateral hip skin folds extending to her labia with white discharge.  Notified provider.      Mariluz Cerrato RN  04/03/19 1115      Electronically signed by Mariluz Cerrato RN at 4/3/2019 11:19 AM     Aldo Thompson " MD RODRIGO at 4/3/2019 12:47 PM        Pt presents to the ED c/o fall that happened earlier this morning when she was getting out of bed. Pt states she struck her face when she fell and now presents with a nose laceration.      PHYSICAL EXAM  HENT: significant bilateral maxillary and periorbital ecchymosis with a repaired laceration  CV: regular rhythm, regular rate  RESPIRATORY: normal effort, lungs clear  ABDOMEN: soft    Vital signs and nursing notes reviewed.    LAB RESULTS AND RADIOLOGY  I have reviewed the patient's labs and imaging studies.    PROGRESS NOTES    1230 Spoke to midlevel provider JONA Chaney, about the pt. After performing my own physical exam, I agree with the plan to admit the pt.    Attestation:    The MIKAELA and I have discussed this patient's history, physical exam, and treatment plan.  I have reviewed the documentation and personally had a face to face interaction with the patient. I affirm the documentation and agree with the treatment and plan.  The attached note describes my personal findings.    Documentation assistance provided by smita Lozoya for Dr. Thompson. Information recorded by the scribe was done at my direction and has been verified and validated by me.       Momo Lozoya  04/03/19 1403       Aldo Thompson MD  04/03/19 1603      Electronically signed by Aldo Thompson MD at 4/3/2019  4:03 PM       Intake & Output (last 3 days)       04/02 0701 - 04/03 0700 04/03 0701 - 04/04 0700 04/04 0701 - 04/05 0700 04/05 0701 - 04/06 0700    P.O.   830     Blood  900      IV Piggyback  1000      Total Intake(mL/kg)  1900 (19) 830 (8.3)     Urine (mL/kg/hr)  200 500 (0.2)     Total Output  200 500     Net  +1700 +330             Urine Unmeasured Occurrence   2 x         Lines, Drains & Airways    Active LDAs     Name:   Placement date:   Placement time:   Site:   Days:    Peripheral IV 04/03/19 0845 Right Antecubital   04/03/19 0845    Antecubital   1    External Urinary  Catheter   04/03/19    0815    --   1         Inactive LDAs     Name:   Placement date:   Placement time:   Removal date:   Removal time:   Site:   Days:    [REMOVED] PICC Single Lumen 01/30/19 Right Basilic   01/30/19    1515    04/04/19    1604    Basilic   63                Blood Administration Record (From admission, onward)    Completed transfusions     Ordered     Start    04/03/19 1511  Transfuse RBC, 2 Units Infuse Each Unit Over: 3.5H  Transfusion     Released Time Blood Unit Number Status   04/03/19 1949   19  [id number]  [id number] Completed 04/03/19 2255   04/03/19 2300   19  [id number]  [id number] Completed 04/04/19 0201       04/03/19 1511                Lab Results (all)     Procedure Component Value Units Date/Time    Basic Metabolic Panel [[id number]]  (Abnormal) Collected:  04/05/19 0415    Specimen:  Blood Updated:  04/05/19 0613     Glucose 95 mg/dL      BUN 14 mg/dL      Creatinine 0.88 mg/dL      Sodium 139 mmol/L      Potassium 4.3 mmol/L      Chloride 103 mmol/L      CO2 24.8 mmol/L      Calcium 8.4 mg/dL      eGFR Non African Amer 61 mL/min/1.73      BUN/Creatinine Ratio 15.9     Anion Gap 11.2 mmol/L     Narrative:       CBC & Differential [[id number]] Collected:  04/05/19 0415    Specimen:  Blood Updated:  04/05/19 0534    Narrative:       CBC Auto Differential [[id number]]  (Abnormal) Collected:  04/05/19 0415    Specimen:  Blood Updated:  04/05/19 0534     WBC 4.28 10*3/mm3      RBC 2.79 10*6/mm3      Hemoglobin 8.9 g/dL      Hematocrit 28.2 %      .1 fL      MCH 31.9 pg      MCHC 31.6 g/dL      RDW 17.0 %      RDW-SD 63.3 fl      MPV 9.4 fL      Platelets 129 10*3/mm3      Neutrophil % 35.3 %      Lymphocyte % 44.6 %      Monocyte % 11.0 %      Eosinophil % 8.2 %      Basophil % 0.7 %      Immature Grans % 0.2 %      Neutrophils, Absolute 1.51 10*3/mm3      Lymphocytes, Absolute 1.91 10*3/mm3      Monocytes, Absolute 0.47 10*3/mm3      Eosinophils, Absolute 0.35 10*3/mm3       Basophils, Absolute 0.03 10*3/mm3      Immature Grans, Absolute 0.01 10*3/mm3      nRBC 0.0 /100 WBC     Hemoglobin A1c [[id number]]  (Normal) Collected:  04/04/19 0423    Specimen:  Blood Updated:  04/04/19 0559     Hemoglobin A1C 4.90 %     Narrative:       BNP [[id number]]  (Normal) Collected:  04/04/19 0423    Specimen:  Blood Updated:  04/04/19 0528     proBNP 831.1 pg/mL     Narrative:       TSH [[id number]]  (Abnormal) Collected:  04/04/19 0423    Specimen:  Blood Updated:  04/04/19 0528     TSH 5.980 mIU/mL     Comprehensive Metabolic Panel [[id number]]  (Abnormal) Collected:  04/04/19 0423    Specimen:  Blood Updated:  04/04/19 0516     Glucose 85 mg/dL      BUN 18 mg/dL      Creatinine 0.97 mg/dL      Sodium 136 mmol/L      Potassium 4.5 mmol/L      Chloride 104 mmol/L      CO2 23.3 mmol/L      Calcium 9.1 mg/dL      Total Protein 6.3 g/dL      Albumin 3.20 g/dL      ALT (SGPT) 13 U/L      AST (SGOT) 19 U/L      Alkaline Phosphatase 55 U/L      Total Bilirubin 0.2 mg/dL      eGFR Non African Amer 54 mL/min/1.73      Globulin 3.1 gm/dL      A/G Ratio 1.0 g/dL      BUN/Creatinine Ratio 18.6     Anion Gap 8.7 mmol/L     Narrative:       GFR Normal >60  Chronic Kidney Disease <60  Kidney Failure <15    Lipid Panel [[id number]] Collected:  04/04/19 0423    Specimen:  Blood Updated:  04/04/19 0516     Total Cholesterol 130 mg/dL      Triglycerides 71 mg/dL      HDL Cholesterol 46 mg/dL      LDL Cholesterol  70 mg/dL      VLDL Cholesterol 14.2 mg/dL      LDL/HDL Ratio 1.52    Narrative:       CBC & Differential [[id number]] Collected:  04/04/19 0423    Specimen:  Blood Updated:  04/04/19 0508    Narrative:       CBC Auto Differential [[id number]]  (Abnormal) Collected:  04/04/19 0423    Specimen:  Blood Updated:  04/04/19 0508     WBC 4.45 10*3/mm3      RBC 2.75 10*6/mm3      Hemoglobin 8.8 g/dL      Hematocrit 27.5 %      .0 fL      MCH 32.0 pg      MCHC 32.0 g/dL      RDW 17.4 %      RDW-SD 63.8 fl      MPV 9.1  fL      Platelets 129 10*3/mm3      Neutrophil % 42.2 %      Lymphocyte % 39.1 %      Monocyte % 10.8 %      Eosinophil % 7.2 %      Basophil % 0.7 %      Immature Grans % 0.0 %      Neutrophils, Absolute 1.88 10*3/mm3      Lymphocytes, Absolute 1.74 10*3/mm3      Monocytes, Absolute 0.48 10*3/mm3      Eosinophils, Absolute 0.32 10*3/mm3      Basophils, Absolute 0.03 10*3/mm3      Immature Grans, Absolute 0.00 10*3/mm3      nRBC 0.0 /100 WBC     Vitamin B12 [[id number]]  (Normal) Collected:  04/03/19 1633    Specimen:  Blood Updated:  04/03/19 1749     Vitamin B-12 592 pg/mL     Folate [[id number]]  (Normal) Collected:  04/03/19 1633    Specimen:  Blood Updated:  04/03/19 1749     Folate >20.00 ng/mL     Ferritin [[id number]]  (Normal) Collected:  04/03/19 1633    Specimen:  Blood Updated:  04/03/19 1749     Ferritin 103.00 ng/mL     Iron Profile [[id number]]  (Abnormal) Collected:  04/03/19 1633    Specimen:  Blood Updated:  04/03/19 1729     Iron 59 mcg/dL      Iron Saturation 20 %      Transferrin 194 mg/dL      TIBC 289 mcg/dL     Lactate Dehydrogenase [[id number]]  (Normal) Collected:  04/03/19 1633    Specimen:  Blood Updated:  04/03/19 1729      U/L     Reticulocytes [[id number]]  (Normal) Collected:  04/03/19 1633    Specimen:  Blood Updated:  04/03/19 1710     Reticulocyte % 1.09 %      Reticulocyte Absolute 0.0227 10*6/mm3     Protein Electrophoresis, Total [[id number]] Collected:  04/03/19 1633    Specimen:  Blood Updated:  04/03/19 1657    Urinalysis With Microscopic If Indicated (No Culture) - Urine, Catheter [[id number]]  (Abnormal) Collected:  04/03/19 1011    Specimen:  Urine, Catheter Updated:  04/03/19 1030     Color, UA Yellow     Appearance, UA Clear     pH, UA 6.5     Specific Gravity, UA 1.014     Glucose, UA Negative     Ketones, UA Negative     Bilirubin, UA Negative     Blood, UA Trace     Protein, UA Negative     Leuk Esterase, UA Negative     Nitrite, UA Negative     Urobilinogen, UA 0.2  E.U./dL    Urinalysis, Microscopic Only - Urine, Catheter [[id number]]  (Abnormal) Collected:  04/03/19 1011    Specimen:  Urine, Catheter Updated:  04/03/19 1030     RBC, UA 6-12 /HPF      WBC, UA 0-2 /HPF      Bacteria, UA None Seen /HPF      Squamous Epithelial Cells, UA 0-2 /HPF      Hyaline Casts, UA 0-2 /LPF      Methodology Automated Microscopy    Occult Blood X 1, Stool - Stool, Per Rectum [[id number]]  (Normal) Collected:  04/03/19 1012    Specimen:  Stool from Per Rectum Updated:  04/03/19 1023     Fecal Occult Blood Negative    Comprehensive Metabolic Panel [[id number]]  (Abnormal) Collected:  04/03/19 0846    Specimen:  Blood Updated:  04/03/19 0950     Glucose 92 mg/dL      BUN 18 mg/dL      Creatinine 1.03 mg/dL      Sodium 138 mmol/L      Potassium 4.7 mmol/L      Chloride 103 mmol/L      CO2 24.3 mmol/L      Calcium 8.8 mg/dL      Total Protein 6.4 g/dL      Albumin 3.40 g/dL      ALT (SGPT) 11 U/L      AST (SGOT) 18 U/L      Alkaline Phosphatase 57 U/L      Total Bilirubin 0.3 mg/dL      eGFR Non African Amer 50 mL/min/1.73      Globulin 3.0 gm/dL      A/G Ratio 1.1 g/dL      BUN/Creatinine Ratio 17.5     Anion Gap 10.7 mmol/L     Narrative:       CBC & Differential [[id number]] Collected:  04/03/19 0846    Specimen:  Blood Updated:  04/03/19 0912    Narrative:       CBC Auto Differential [[id number]]  (Abnormal) Collected:  04/03/19 0846    Specimen:  Blood Updated:  04/03/19 0912     WBC 6.16 10*3/mm3      RBC 2.24 10*6/mm3      Hemoglobin 7.5 g/dL      Hematocrit 23.8 %      .3 fL      MCH 33.5 pg      MCHC 31.5 g/dL      RDW 13.2 %      RDW-SD 51.8 fl      MPV 9.0 fL      Platelets 154 10*3/mm3      Neutrophil % 66.9 %      Lymphocyte % 19.6 %      Monocyte % 8.3 %      Eosinophil % 4.1 %      Basophil % 0.5 %      Immature Grans % 0.6 %      Neutrophils, Absolute 4.12 10*3/mm3      Lymphocytes, Absolute 1.21 10*3/mm3      Monocytes, Absolute 0.51 10*3/mm3      Eosinophils, Absolute 0.25  10*3/mm3      Basophils, Absolute 0.03 10*3/mm3      Immature Grans, Absolute 0.04 10*3/mm3      nRBC 0.3 /100 WBC           Imaging Results (all)     Procedure Component Value Units Date/Time    XR Foot 3+ View Left [[id number]] Collected:  04/03/19 0950     Updated:  04/03/19 0957    Narrative:       LEFT FOOT AND LEFT RIBS AND LEFT SHOULDER X-RAYS     CLINICAL HISTORY: Patient fell. Foot and shoulder and rib pain.     Left foot:     4 views of the left foot demonstrate moderate osteopenia and moderate  arthritic changes within the interphalangeal joints, and are otherwise  unremarkable. There is no evidence of fracture or subluxation.     Left rib detail x-rays:     An AP view the chest and multiple oblique views of the ribs of left  hemithorax were obtained. No bony abnormality is identified. There is no  evidence of recent or old rib fracture. The lungs are fairly  well-expanded and clear. There is no pneumothorax. A dual-chamber  pacemaker is noted in the left subclavian vein in satisfactory position.  Spinal cord stimulator electrodes are also noted centered at the T8  level.     Left shoulder:     3 views the left shoulder demonstrate no evidence of fracture or  subluxation. The AC joint is intact.     This report was finalized on 4/3/2019 9:54 AM by Dr. Eriberto Goode M.D.       XR Shoulder 2+ View Left [[id number]] Collected:  04/03/19 0950     Updated:  04/03/19 0957    Narrative:       LEFT FOOT AND LEFT RIBS AND LEFT SHOULDER X-RAYS     CLINICAL HISTORY: Patient fell. Foot and shoulder and rib pain.     Left foot:     4 views of the left foot demonstrate moderate osteopenia and moderate  arthritic changes within the interphalangeal joints, and are otherwise  unremarkable. There is no evidence of fracture or subluxation.     Left rib detail x-rays:     An AP view the chest and multiple oblique views of the ribs of left  hemithorax were obtained. No bony abnormality is identified. There is no  evidence of  recent or old rib fracture. The lungs are fairly  well-expanded and clear. There is no pneumothorax. A dual-chamber  pacemaker is noted in the left subclavian vein in satisfactory position.  Spinal cord stimulator electrodes are also noted centered at the T8  level.     Left shoulder:     3 views the left shoulder demonstrate no evidence of fracture or  subluxation. The AC joint is intact.     This report was finalized on 4/3/2019 9:54 AM by Dr. Eriberto Goode M.D.       XR Ribs Left With PA Chest [[id number]] Collected:  04/03/19 0950     Updated:  04/03/19 0957    Narrative:       LEFT FOOT AND LEFT RIBS AND LEFT SHOULDER X-RAYS     CLINICAL HISTORY: Patient fell. Foot and shoulder and rib pain.     Left foot:     4 views of the left foot demonstrate moderate osteopenia and moderate  arthritic changes within the interphalangeal joints, and are otherwise  unremarkable. There is no evidence of fracture or subluxation.     Left rib detail x-rays:     An AP view the chest and multiple oblique views of the ribs of left  hemithorax were obtained. No bony abnormality is identified. There is no  evidence of recent or old rib fracture. The lungs are fairly  well-expanded and clear. There is no pneumothorax. A dual-chamber  pacemaker is noted in the left subclavian vein in satisfactory position.  Spinal cord stimulator electrodes are also noted centered at the T8  level.     Left shoulder:     3 views the left shoulder demonstrate no evidence of fracture or  subluxation. The AC joint is intact.     This report was finalized on 4/3/2019 9:54 AM by Dr. Eriberto Goode M.D.       CT Head Without Contrast [[id number]] Collected:  04/03/19 0937     Updated:  04/03/19 0949    Narrative:       CT HEAD AND MAXILLOFACIAL REGION WITHOUT CONTRAST     CLINICAL HISTORY: Fell hitting head. On Plavix.     TECHNIQUE: CT scan of the head was obtained with 2 mm axial bone  algorithm and 3 mm axial soft tissue algorithm images. No  intravenous  contrast was administered.     FINDINGS:  There is no evidence for calvarial fracture. However, there are  bilateral displaced nasal bone fractures with overlying soft tissue  swelling. There is no evidence for an acute extra-axial hemorrhage. The  ventricles, sulci, and cisterns are age appropriate. The basal ganglia  and thalami are unremarkable in appearance. The posterior fossa  structures are within normal limits.     Incidental note is made of mild mucosal thickening within the right  maxillary sinus. Mucosal thickening is identified within the sphenoid  sinus and ethmoid air cells.       Impression:       No evidence for acute traumatic intracranial pathology.     Bilateral comminuted and displaced nasal bone fractures with overlying  soft tissue swelling. Small anterior frontal scalp hematoma is  additionally appreciated.     TECHNIQUE: CT scan of the maxillofacial region was obtained with 2 mm  axial, coronal and sagittal images.     FINDINGS:  There are bilateral displaced and comminuted nasal bone fractures with  overlying soft tissue swelling.     Mild mucosal thickening is identified within the maxillary sinuses, the  ethmoid air cells, and the sphenoid sinus.     IMPRESSION:  Moderately displaced and comminuted bilateral nasal bone fractures.     Findings were discussed with Marilyn Frederick of the emergency room  on 04/03/2019 at approximately 9:15 AM.     Radiation dose reduction techniques were utilized, including automated  exposure control and exposure modulation based on body size.     This report was finalized on 4/3/2019 9:46 AM by Dr. Demarcus Zepeda M.D.       CT Facial Bones Without Contrast [[id number]] Collected:  04/03/19 0937     Updated:  04/03/19 0949    Narrative:       CT HEAD AND MAXILLOFACIAL REGION WITHOUT CONTRAST     CLINICAL HISTORY: Fell hitting head. On Plavix.     TECHNIQUE: CT scan of the head was obtained with 2 mm axial bone  algorithm and 3 mm axial soft  tissue algorithm images. No intravenous  contrast was administered.     FINDINGS:  There is no evidence for calvarial fracture. However, there are  bilateral displaced nasal bone fractures with overlying soft tissue  swelling. There is no evidence for an acute extra-axial hemorrhage. The  ventricles, sulci, and cisterns are age appropriate. The basal ganglia  and thalami are unremarkable in appearance. The posterior fossa  structures are within normal limits.     Incidental note is made of mild mucosal thickening within the right  maxillary sinus. Mucosal thickening is identified within the sphenoid  sinus and ethmoid air cells.       Impression:       No evidence for acute traumatic intracranial pathology.     Bilateral comminuted and displaced nasal bone fractures with overlying  soft tissue swelling. Small anterior frontal scalp hematoma is  additionally appreciated.     TECHNIQUE: CT scan of the maxillofacial region was obtained with 2 mm  axial, coronal and sagittal images.     FINDINGS:  There are bilateral displaced and comminuted nasal bone fractures with  overlying soft tissue swelling.     Mild mucosal thickening is identified within the maxillary sinuses, the  ethmoid air cells, and the sphenoid sinus.     IMPRESSION:  Moderately displaced and comminuted bilateral nasal bone fractures.     Findings were discussed with Marilyn Frederick of the emergency room  on 04/03/2019 at approximately 9:15 AM.     Radiation dose reduction techniques were utilized, including automated  exposure control and exposure modulation based on body size.     This report was finalized on 4/3/2019 9:46 AM by Dr. Demarcus Zepeda M.D.             Orders (all)     Start     Ordered    04/04/19 1423  Inpatient Orthopedic Surgery Consult  Once     Specialty:  Orthopedic Surgery  Provider:  Cristian Hill MD    04/04/19 1423    04/04/19 1154  Wound Care  Every Other Day      04/04/19 1155    04/03/19 1743  Wound Ostomy Eval & Treat   Once      04/03/19 1744    04/03/19 1527  OT Consult: Eval & Treat GW  Once      04/03/19 1526    04/03/19 1526  PT Consult: Eval & Treat  Once      04/03/19 1526    04/03/19 1513  Diet Regular  Diet Effective Now      04/03/19 1512    04/03/19 1512  Verify Informed Consent for Blood Product Administration  Once      04/03/19 1511    04/03/19 1512  Prepare RBC, 2 Units  Blood - Once      04/03/19 1511    04/03/19 1512  Code Status and Medical Interventions:  Continuous      04/03/19 1511    04/03/19 1511  Transfuse RBC, 2 Units Infuse Each Unit Over: 3.5H  Transfusion      04/03/19 1511    04/03/19 1511  Place Sequential Compression Device  Daily      04/03/19 1510    04/03/19 1133  Inpatient Admission  Once      04/03/19 1133    04/03/19 1133  Tele Bed Request  Once      04/03/19 1133    04/03/19 1122  LIPPS (on-call MD unless specified)  Once     Specialty:  Internal Medicine  Provider:  Juan Carlos Harper MD    04/03/19 1121    04/03/19 1028  Urinalysis, Microscopic Only - Urine, Clean Catch  Once      04/03/19 1027    04/03/19 0950  Prepare RBC, 2 Units  Blood - Once      04/03/19 0949    04/03/19 0834  Laceration Repair  Once     Comments:  This order was created via procedure documentation    04/03/19 0833    04/03/19 0803  Wound care  Daily      04/03/19 0802             Physician Progress Notes (all)      Juan Carlos Harper MD at 4/4/2019  2:17 PM          Daily progress note    Chief complaint  Doing little better  Complaining of multiple bruises and pain    History of present illness  89-year-old white female who is well-known to our service with multiple medical problems.  Patient is a nursing home resident with history of hypertension seizure disorder depression history of CVA osteoarthritis degenerative disc disease gastroesophageal reflux disease who was recently discharged from the hospital after right ankle wound infection and surrounding cellulitis with recent fracture and surgery [facility] [place]  [facility] emergency room after the fall after she lost her balance and trying to ambulate independently.  Patient hit on the face and sustaining facial bruises and laceration requiring stitches on the nose.  Patient denies any loss of consciousness and fully alert oriented following commands and family at the bedside.  Patient also found to have low hemoglobin with heme negative on stool test.  Patient is taking iron tablets for chronic anemia.    REVIEW OF SYSTEMS   Review of Systems   Constitutional: Negative for chills and fever.   HENT: Negative for sore throat.   Eyes: Negative for visual disturbance.   Respiratory: Negative for shortness of breath.   Cardiovascular: Positive for chest pain (left chest wall pain).   Gastrointestinal: Negative for nausea and vomiting.   Genitourinary: Negative for difficulty urinating and dysuria.   Musculoskeletal: Negative for back pain and neck pain.   Left foot pain, left shoulder pain   Skin: Positive for wound (nose laceration, left hand skin tear (sustained yesterday)). Negative for rash.   Neurological: Negative for dizziness and headaches.   Psychiatric/Behavioral: Positive for confusion (mild). The patient is not nervous/anxious.     PHYSICAL EXAM   Blood pressure 130/60, pulse 79, temperature 98 °F (36.7 °C), temperature source Oral, resp. rate 20, height 162.6 cm (64\"), weight 100 kg (221 lb 1.9 oz), SpO2 94 %, not currently breastfeeding.    Constitutional: She is oriented to person, place, and time. No distress.   Head: Normocephalic.   Mouth/Throat: Mucous membranes are normal.   Hematoma to the mid forehead, 3cm jagged laceration to the left side of the nose with active bleeding, otherwise no epistaxis, nasal swelling, bruising above left upper lip, otherwise no facial tenderness   Eyes: EOM are normal. Pupils are equal, round, and reactive to light.   Neck: Normal range of motion. Neck supple. No c-spine tenderness   Cardiovascular: Normal rate, regular " rhythm and normal heart sounds.   Pulmonary/Chest: Effort normal and breath sounds normal. No respiratory distress. She has no decreased breath sounds. She has no wheezes. She has no rhonchi. She has no rales.   Abdominal: Soft. There is no tenderness. There is no rebound and no guarding.   Musculoskeletal: No t-spine or l-spine tenderness, left shoulder tenderness with FROM, left foot tenderness, mild swelling and erythema to left lower leg (pt is currently being treated for LLE cellulitis), nickel sized healing pressure ulcer to the left heel, healed surgical incision above the right lateral malleolus without signs of infection, skin tear with steri strips in place to left dorsal hand from previous incident, no bony left hand tenderness, NV intact distally to all extremities   Neurological: She is alert and oriented to person, place, and time. She has normal sensation.   nonfocal neuro exam, answers questions appropriately, follows commands   Skin: Skin is warm and dry. There is pallor.   Psychiatric: Affect normal.       ASSESSMENT  Fracture and laceration of nose status post suturing  Contusion of head chest left shoulder and right foot  Status post fall  Chronic anemia   Right ankle wound with recent fracture and surgery  Hypertension  Seizure disorder  Status post CVA  Depression  SSS status post permanent pacemaker  Osteoarthritis  Degenerative disc disease  Gastroesophageal reflux disease    PLAN  CPM  Supportive care  Local wound care  Transfuse PRN  Anemia workup in progress  Continue nursing home medication and adjust the doses  Stress ulcer DVT prophylaxis  Discussed with family  DNR  Follow closely further recommendation according to hospital course    GAETANO ACOSTA MD      Electronically signed by Gaetano Acosta MD at 4/4/2019  2:19 PM          Consult Notes (all)      Cristian Hill II, MD at 4/5/2019  7:14 AM      Consult Orders    1. Inpatient Orthopedic Surgery Consult [[id number]] ordered by  Juan Carlos Harper MD at 04/04/19 7584                  Orthopaedic Surgery  Consult Note  Dr. LAWANDA Lebron [place] II  [phone]    HPI:  Patient is a 89 y.o. Not  or  female who presents after a fall injuring her head.  This patient is well known to me with a right trimalleolar ankle fracture treated with ORIF months ago.  This was complicated by an acute infection that ended up requiring hardware removal.  She has been 50% weightbearing to the right ankle.  When she fell, she did not injure the right ankle, I am mostly being consulted to make sure there is nothing wrong with the ankle and to help with orders on mobilization.    MEDICAL HISTORY  Past Medical History:   Diagnosis Date   • Anemia    • At risk for falls    • At risk for sleep apnea 11/17/2018   • Atrial fibrillation (CMS/HCC)    • Bulging lumbar disc    • Cellulitis     BILATERAL LEGS   • Chronic back pain    • Chronic cough    • Chronic UTI    • Chronic venous insufficiency    • 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HCC)    • DDD (degenerative disc disease), lumbosacral    • Dementia without behavioral disturbance     moderate   • Depression, endogenous (CMS/HCC)    • Disc degeneration, lumbar    • Dysphagia    • GERD (gastroesophageal reflux disease)    • Hiatal hernia    • History of anxiety    • History of aspiration pneumonitis    • History of cerebral artery occlusion     CVA (following TIA), 10/10, treated with tPA   • History of herpes zoster    • History of ingrowing nail    • History of osteoporosis    • History of poliomyelitis     child   • History of sciatica    • History of sick sinus syndrome     s/p PPM   • History of transient cerebral ischemia     followed by stroke in 10/2010. BIBI was normal.   • History of Zenker's diverticulum removal 2016   • HX: long term anticoagulant use    • Hyperlipidemia    • Hypertension     NO CURRENT MEDICATION   • Hyponatremia    • Intertrigo    • Lumbar radiculopathy    • Morbid obesity (CMS/HCC)    •  MRSA (methicillin resistant Staphylococcus aureus)     LEFT FOOT SPREAD TO RIGHT ANKLE; DR TUBBS AWARE   • Neuralgia     with diplopia secondary to facial shingles   • Nonepileptic episode (CMS/HCC)    • Osteoarthritis of right knee    • Pacemaker    • Pneumonia    • Seeing double     secondary to shingles on the face also with neuralgia   • Seizures (CMS/HCC)    • 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)    • Spinal stenosis    • Vitamin D deficiency    • Zenker's diverticulum    ·   Past Surgical History:   Procedure Laterality Date   • ANKLE OPEN REDUCTION INTERNAL FIXATION Right 11/17/2018    Procedure: ANKLE OPEN REDUCTION INTERNAL FIXATION;  Surgeon: Cristian Tubbs II, MD;  Location: [facility] OR;  Service: Orthopedics   • CARDIAC PACEMAKER PLACEMENT      secondary to SSS, placed in 2004, with gen chng 2014; [employer] dual DOO   • CATARACT EXTRACTION W/ INTRAOCULAR LENS IMPLANT Bilateral    • COLONOSCOPY     • HAND SURGERY Right    • HARDWARE REMOVAL Right 2/19/2019    Procedure: RT ANKLE HARDWARE REMOVAL;  Surgeon: Cristian Tubbs II, MD;  Location: [facility] OR;  Service: Orthopedics   • KNEE ARTHROPLASTY Left    • LAMINECTOMY FOR IMPLANTATION / PLACEMENT NEUROSTIMULATOR ELECTRODES     • LUMBAR LAMINECTOMY      L-3 L4 L4 L5   • TONSILLECTOMY     • ZENKERS DIVERTICULECTOMY N/A 9/27/2016    Procedure: ENDOSCOPIC REMOVAL OF ZENKERS DIVERTICULUM W/ WERDASCOPE;  Surgeon: Oswadl Barth MD;  Location: [facility] OR;  Service:    • ZENKERS DIVERTICULECTOMY N/A 4/14/2017    Procedure: ESOPHAGOSCOPY;  Surgeon: Oswald Barth MD;  Location: [facility] OR;  Service:    ·   Prior to Admission medications    Medication Sig Start Date End Date Taking? Authorizing Provider   acetaminophen (TYLENOL) 325 MG tablet Take 325 mg by mouth Every 6 (Six) Hours As Needed for Mild Pain .   Yes Provider, MD Sammy   amLODIPine (NORVASC) 5 MG tablet Take 5 mg by mouth Daily. Hold for  BP <100/60   Yes Sammy Veronica MD   artificial tears (LUBRIFRESH P.M.) ointment ophthalmic ointment Administer 1 application to both eyes every night at bedtime.   Yes Sammy Veronica MD   B Complex Vitamins (VITAMIN B COMPLEX) capsule capsule Take 1 capsule by mouth Daily.   Yes Sammy Veronica MD   Calcium-Vitamin D-Vitamin K (VIACTIV) 500-500-40 MG-UNT-MCG chewable tablet Chew 1 tablet Daily.   Yes Sammy Veronica MD   cephalexin (KEFLEX) 500 MG capsule Take 500 mg by mouth 4 (Four) Times a Day. 4/2/19 4/4/19 Yes Sammy Veronica MD   clopidogrel (PLAVIX) 75 MG tablet Take 75 mg by mouth Daily.   Yes Sammy Veronica MD   escitalopram (LEXAPRO) 10 MG tablet Take 10 mg by mouth Daily.   Yes Sammy Veronica MD Fe Asp Gly-Fe Polysac-Suc Ac-C (FERREX 150 PLUS) 150-50-50 MG capsule Take 1 capsule by mouth 3 (Three) Times a Day.   Yes Sammy Veronica MD   gabapentin (NEURONTIN) 600 MG tablet Take 600 mg by mouth 3 (Three) Times a Day.   Yes Sammy Veronica MD   levETIRAcetam (KEPPRA) 750 MG tablet Take 1 tablet by mouth 2 (Two) Times a Day. 10/18/18  Yes Arsalan Velez Jr., MD   mirtazapine (REMERON) 15 MG tablet Take 15 mg by mouth Every Night.   Yes Sammy Veronica MD   Multiple Vitamins-Minerals (CERTAVITE/ANTIOXIDANTS) tablet Take 1 tablet by mouth Daily.   Yes Sammy Veronica MD   nystatin (MYCOSTATIN) 568449 UNIT/GM ointment Apply 1 application topically to the appropriate area as directed 2 (Two) Times a Day. Apply under bilateral breasts   Yes Sammy Veronica MD   pantoprazole (PROTONIX) 40 MG EC tablet Take 40 mg by mouth Daily.   Yes Sammy Veronica MD   polyethylene glycol (MIRALAX) packet Take 17 g by mouth Daily As Needed.   Yes Sammy Veronica MD   ·   Allergies   Allergen Reactions   • Lipitor [Atorvastatin] Confusion   • Penicillins Hives     Has previously tolerated ceftriaxone   ·   Most Recent Immunizations  "  Administered Date(s) Administered   • Flu Mist 10/05/2015   • Flu Vaccine High Dose PF 65YR+ 10/09/2017   • Flu Vaccine Quad PF >18YRS 2016   • Tdap 2019   ·   Social History     Tobacco Use   • Smoking status: Former Smoker     Packs/day: 1.00     Years: 40.00     Pack years: 40.00     Types: Cigarettes     Last attempt to quit: 1992     Years since quittin.2   • Smokeless tobacco: Never Used   • Tobacco comment: caffeine use: one cup of coffee in the morning.    Substance Use Topics   • Alcohol use: Yes     Comment: 2 PER WEEK   ·    Social History     Substance and Sexual Activity   Drug Use No   ·     VITALS: /67 (BP Location: Right arm, Patient Position: Lying)   Pulse 63   Temp 97.8 °F (36.6 °C) (Oral)   Resp 16   Ht 162.6 cm (64\")   Wt 100 kg (221 lb 1.9 oz)   SpO2 96%   BMI 37.96 kg/m²   Body mass index is 37.96 kg/m².    PHYSICAL EXAM:   · CONSTITUTIONAL: A&Ox3, No acute distress  · LUNGS: Equal chest rise, no shortness of air  · CARDIOVASCULAR: palpable peripheral pulses  · SKIN: no skin lesions in the area examined  · LYMPH: no lymphadenopathy in the area examined  · EXTREMITY: Right Lower Extremity  · Tenderness to Palpation: No tenderness to palpation  · Gross Deformity: No Gross Deformity  · Pulses:  Brisk Capillary Refill  · Sensation: Intact to Saphenous, Sural, Deep Peroneal, Superficial Peroneal, and Tibial Nerves and grossly throughout extremity  · Motor: 5/5 EHL/FHL/TA/GS motor complexes    RADIOLOGY REVIEW:   Ct Head Without Contrast    Result Date: 4/3/2019  No evidence for acute traumatic intracranial pathology.  Bilateral comminuted and displaced nasal bone fractures with overlying soft tissue swelling. Small anterior frontal scalp hematoma is additionally appreciated.  TECHNIQUE: CT scan of the maxillofacial region was obtained with 2 mm axial, coronal and sagittal images.  FINDINGS: There are bilateral displaced and comminuted nasal bone fractures with " overlying soft tissue swelling.  Mild mucosal thickening is identified within the maxillary sinuses, the ethmoid air cells, and the sphenoid sinus.  IMPRESSION: Moderately displaced and comminuted bilateral nasal bone fractures.  Findings were discussed with Marilyn Frederick of the emergency room on 04/03/2019 at approximately 9:15 AM.  Radiation dose reduction techniques were utilized, including automated exposure control and exposure modulation based on body size.  This report was finalized on 4/3/2019 9:46 AM by Dr. Demarcus Zepeda M.D.      Ct Facial Bones Without Contrast    Result Date: 4/3/2019  No evidence for acute traumatic intracranial pathology.  Bilateral comminuted and displaced nasal bone fractures with overlying soft tissue swelling. Small anterior frontal scalp hematoma is additionally appreciated.  TECHNIQUE: CT scan of the maxillofacial region was obtained with 2 mm axial, coronal and sagittal images.  FINDINGS: There are bilateral displaced and comminuted nasal bone fractures with overlying soft tissue swelling.  Mild mucosal thickening is identified within the maxillary sinuses, the ethmoid air cells, and the sphenoid sinus.  IMPRESSION: Moderately displaced and comminuted bilateral nasal bone fractures.  Findings were discussed with Marilyn Frederick of the emergency room on 04/03/2019 at approximately 9:15 AM.  Radiation dose reduction techniques were utilized, including automated exposure control and exposure modulation based on body size.  This report was finalized on 4/3/2019 9:46 AM by Dr. Demarcus Zepeda M.D.        LABS:   Results for the past 24 hours:   Recent Results (from the past 24 hour(s))   Prepare RBC, 2 Units    Collection Time: 04/04/19  6:00 PM   Result Value Ref Range    Product Code B8624L71     Unit Number [id number]     UNIT  ABO O     UNIT  RH POS     Crossmatch Interpretation Compatible     Dispense Status PT     Blood Type OPOS     Blood Expiration Date  227302392033     Blood Type Barcode 5100     Product Code M2217Q37     Unit Number [id number]     UNIT  ABO O     UNIT  RH POS     Crossmatch Interpretation Compatible     Dispense Status PT     Blood Type OPOS     Blood Expiration Date [id number]     Blood Type Barcode 5100    Basic Metabolic Panel    Collection Time: 04/05/19  4:15 AM   Result Value Ref Range    Glucose 95 65 - 99 mg/dL    BUN 14 8 - 23 mg/dL    Creatinine 0.88 0.57 - 1.00 mg/dL    Sodium 139 136 - 145 mmol/L    Potassium 4.3 3.5 - 5.2 mmol/L    Chloride 103 98 - 107 mmol/L    CO2 24.8 22.0 - 29.0 mmol/L    Calcium 8.4 (L) 8.6 - 10.5 mg/dL    eGFR Non African Amer 61 >60 mL/min/1.73    BUN/Creatinine Ratio 15.9 7.0 - 25.0    Anion Gap 11.2 mmol/L   CBC Auto Differential    Collection Time: 04/05/19  4:15 AM   Result Value Ref Range    WBC 4.28 3.40 - 10.80 10*3/mm3    RBC 2.79 (L) 3.77 - 5.28 10*6/mm3    Hemoglobin 8.9 (L) 12.0 - 15.9 g/dL    Hematocrit 28.2 (L) 34.0 - 46.6 %    .1 (H) 79.0 - 97.0 fL    MCH 31.9 26.6 - 33.0 pg    MCHC 31.6 31.5 - 35.7 g/dL    RDW 17.0 (H) 12.3 - 15.4 %    RDW-SD 63.3 (H) 37.0 - 54.0 fl    MPV 9.4 6.0 - 12.0 fL    Platelets 129 (L) 140 - 450 10*3/mm3    Neutrophil % 35.3 (L) 42.7 - 76.0 %    Lymphocyte % 44.6 19.6 - 45.3 %    Monocyte % 11.0 5.0 - 12.0 %    Eosinophil % 8.2 (H) 0.3 - 6.2 %    Basophil % 0.7 0.0 - 1.5 %    Immature Grans % 0.2 0.0 - 0.5 %    Neutrophils, Absolute 1.51 1.40 - 7.00 10*3/mm3    Lymphocytes, Absolute 1.91 0.70 - 3.10 10*3/mm3    Monocytes, Absolute 0.47 0.10 - 0.90 10*3/mm3    Eosinophils, Absolute 0.35 0.00 - 0.40 10*3/mm3    Basophils, Absolute 0.03 0.00 - 0.20 10*3/mm3    Immature Grans, Absolute 0.01 0.00 - 0.05 10*3/mm3    nRBC 0.0 0.0 - 0.0 /100 WBC       IMPRESSION:  Patient is a 89 y.o. Not  or  female with a fall in the setting of 6 weeks postop from right ankle hardware removal for infected trimalleolar ORIF    PLAN:   · Admited to: Juan Carlos Harper,  MD  · Weight Bearing:Right Lower Extremity pending x-rays today, possibly weight-bear as tolerated  · Imaging: X-Ray of Right ankle  · Disposition: I was about to make this patient weight-bear as tolerated on her right ankle.  I am going to order an x-ray today, and if that looks fine I will probably allow her to bear full weight on the ankle in a cam boot.    Cristian Hill II, MD  Orthopaedic Surgery  [facility]    Electronically signed by Cristian Hill II, MD at 4/5/2019  7:17 AM         All medication doses during the admission are shown, including meds that are no longer on order.   Scheduled Meds Sorted by Name   for Kim Feldman as of 4/3/19 through 4/5/19     1 Day 3 Days 7 Days 10 Days < Today >    Legend:                          Inactive     Active     Other Encounter    Linked               Medications 04/03/19 04/04/19 04/05/19   acetaminophen (TYLENOL) tablet 1,000 mg   Dose: 1,000 mg  Freq: Once Route: PO  Start: 04/03/19 1600 End: 04/03/19 1507    Admin Instructions:   Do not exceed 4 grams of acetaminophen in a 24 hr period.    If given for pain, use the following pain scale:   Mild Pain = Pain Score of 1-3, CPOT 1-2  Moderate Pain = Pain Score of 4-6, CPOT 3-4  Severe Pain = Pain Score of 7-10, CPOT 5-8    1507-D/C'd            acetaminophen (TYLENOL) tablet 1,000 mg   Dose: 1,000 mg  Freq: Once Route: PO  Start: 04/03/19 1254 End: 04/03/19 1313    Admin Instructions:   Do not exceed 4 grams of acetaminophen in a 24 hr period.    If given for pain, use the following pain scale:   Mild Pain = Pain Score of 1-3, CPOT 1-2  Moderate Pain = Pain Score of 4-6, CPOT 3-4  Severe Pain = Pain Score of 7-10, CPOT 5-8    1313              amLODIPine (NORVASC) tablet 5 mg   Dose: 5 mg  Freq: Daily Route: PO  Start: 04/03/19 1600    Admin Instructions:   Caution: Look alike/sound alike drug alert. Avoid grapefruit juice.    1737          1044 0837             calcium-vitamin D 500-200 MG-UNIT tablet 1,000 mg   Dose: 1,000 mg  Freq: Daily Route: PO  Start: 04/03/19 1600    1737 0829 0900            clopidogrel (PLAVIX) tablet 75 mg   Dose: 75 mg  Freq: Daily Route: PO  Start: 04/03/19 1600    1737 0829 0900            escitalopram (LEXAPRO) tablet 10 mg   Dose: 10 mg  Freq: Daily Route: PO  Start: 04/03/19 1600    Admin Instructions:   Caution: Look alike/sound alike drug alert.    1737 0829 0900            FERREX 150 PLUS 150-50-50 MG capsule 1 capsule   Dose: 1 capsule  Freq: Daily Route: PO  Start: 04/03/19 1600    1739 0830 0900            gabapentin (NEURONTIN) capsule 300 mg   Dose: 300 mg  Freq: Every 8 Hours Scheduled Route: PO  Start: 04/03/19 1600    Admin Instructions:       1737   2124        0650   1414   2122      0535   1400   2200        hydrophor (AQUAPHOR) ointment   Freq: Every 12 Hours Scheduled Route: TOP  Start: 04/04/19 1245    Admin Instructions:   Apply to BLE.     1600   2123 0900 2100          levETIRAcetam (KEPPRA) tablet 750 mg   Dose: 750 mg  Freq: 2 Times Daily Route: PO  Start: 04/03/19 2100    Admin Instructions:   Swallow whole; do not crush, chew, or split tablet.    2056 0829 2122 0900   2100          lidocaine PF 1% (XYLOCAINE) injection 10 mL   Dose: 10 mL  Freq: Once Route: INFILTRATION  Start: 04/03/19 1600 End: 04/03/19 1507    1507-D/C'd            lidocaine PF 1% (XYLOCAINE) injection 10 mL   Dose: 10 mL  Freq: Once Route: INFILTRATION  Start: 04/03/19 0803 End: 04/03/19 0836    0836              miconazole (MICOTIN) 2 % powder   Freq: Every 12 Hours Scheduled Route: TOP  Start: 04/04/19 1245    Admin Instructions:   Apply light dusting to breast and skin folds.     1812 [C]   2123 0900   2100          miconazole nitrate (ALOE VESTA) 2 % ointment   Freq: Every 12 Hours Scheduled Route: TOP  Start: 04/03/19 1122 End:  04/03/19 1511    Admin Instructions:   Apply to skin folds    1122   1511-D/C'd          mirtazapine (REMERON) tablet 15 mg   Dose: 15 mg  Freq: Nightly Route: PO  Start: 04/03/19 2100    2056 2122 2100            morphine injection 2 mg   Dose: 2 mg  Freq: Once Route: IV  Start: 04/03/19 0839 End: 04/03/19 0850    Admin Instructions:   If given for pain, use the following pain scale:  Mild Pain = Pain Score of 1-3, CPOT 1-2  Moderate Pain = Pain Score of 4-6, CPOT 3-4  Severe Pain = Pain Score of 7-10, CPOT 5-8    0850              multivitamin with minerals 1 tablet   Dose: 1 tablet  Freq: Daily Route: PO  Start: 04/03/19 1600    Admin Instructions:       1737 1349          0904            nystatin (MYCOSTATIN) ointment 1 application   Dose: 1 application  Freq: Every 12 Hours Scheduled Route: TOP  Start: 04/03/19 2100    Admin Instructions:   Apply to affected area    2124 0830 2123 0900   2100          ondansetron (ZOFRAN) injection 4 mg   Dose: 4 mg  Freq: Once Route: IV  Start: 04/03/19 1600 End: 04/03/19 1507    1507-D/C'd            ondansetron (ZOFRAN) injection 4 mg   Dose: 4 mg  Freq: Once Route: IV  Start: 04/03/19 0839 End: 04/03/19 0850    0850              pantoprazole (PROTONIX) EC tablet 40 mg   Dose: 40 mg  Freq: Daily Route: PO  Start: 04/03/19 1600    Admin Instructions:   Swallow whole; do not crush, split, or chew.    1736 8890          0976            polyethylene glycol (MIRALAX) powder 17 g   Dose: 17 g  Freq: Daily Route: PO  Start: 04/03/19 1600    Admin Instructions:   Mix dose in 6-8 ounces of water.    1736) (1132)          0954            sodium chloride 0.9 % bolus 1,000 mL   Dose: 1,000 mL  Freq: Once Route: IV  Start: 04/03/19 1600 End: 04/03/19 1507    1507-D/C'd            sodium chloride 0.9 % bolus 1,000 mL   Dose: 1,000 mL  Freq: Once Route: IV  Last Dose: 1,000 mL (04/03/19 1311)  Start: 04/03/19 1254 End:  04/03/19 1341    1311              Tdap (BOOSTRIX) injection 0.5 mL   Dose: 0.5 mL  Freq: Once Route: IM  Start: 04/03/19 1600 End: 04/03/19 1507    Admin Instructions:       1507-D/C'd            Tdap (BOOSTRIX) injection 0.5 mL   Dose: 0.5 mL  Freq: Once Route: IM  Start: 04/03/19 0804 End: 04/03/19 0836    Admin Instructions:       0836              Medications 04/03/19 04/04/19 04/05/19       Continuous Meds Sorted by Name   for Kim Feldman as of 4/3/19 through 4/5/19    Legend:                          Inactive     Active     Other Encounter    Linked               Medications 04/03/19 04/04/19 04/05/19       PRN Meds Sorted by Name   for Kim Feldman as of 4/3/19 through 4/5/19    Legend:                          Inactive     Active     Other Encounter    Linked               Medications 04/03/19 04/04/19 04/05/19   acetaminophen (TYLENOL) tablet 650 mg   Dose: 650 mg  Freq: Every 4 Hours PRN Route: PO  PRN Reasons: Mild Pain ,Fever  Start: 04/03/19 2034    Admin Instructions:   Do not exceed 4 grams of acetaminophen in a 24 hr period.    If given for pain, use the following pain scale:   Mild Pain = Pain Score of 1-3, CPOT 1-2  Moderate Pain = Pain Score of 4-6, CPOT 3-4  Severe Pain = Pain Score of 7-10, CPOT 5-8    6726          6823 7996           HYDROcodone-acetaminophen (NORCO) 5-325 MG per tablet 1 tablet   Dose: 1 tablet  Freq: Every 6 Hours PRN Route: PO  PRN Reason: Moderate Pain   Start: 04/04/19 1422 End: 04/14/19 1421    Admin Instructions:   [YAN]    Do not exceed 4 grams of acetaminophen in a 24 hr period.    If given for pain, use the following pain scale:   Mild Pain = Pain Score of 1-3, CPOT 1-2  Moderate Pain = Pain Score of 4-6, CPOT 3-4  Severe Pain = Pain Score of 7-10, CPOT 5-8         morphine injection 1 mg   Dose: 1 mg  Freq: Every 4 Hours PRN Route: IV  PRN Reason: Severe Pain   Start: 04/03/19 1506    Admin Instructions:   If given for pain, use the following pain  scale:  Mild Pain = Pain Score of 1-3, CPOT 1-2  Moderate Pain = Pain Score of 4-6, CPOT 3-4  Severe Pain = Pain Score of 7-10, CPOT 5-8         ondansetron (ZOFRAN) injection 4 mg   Dose: 4 mg  Freq: Every 6 Hours PRN Route: IV  PRN Reasons: Nausea,Vomiting  Start: 04/03/19 1507         sodium chloride 0.9 % flush 10 mL   Dose: 10 mL  Freq: As Needed Route: IV  PRN Reason: Line Care  Start: 04/03/19 0837         Medications 04/03/19 04/04/19 04/05/19         Danay Grider OTR   Occupational Therapist   Occupational Therapy   Plan of Care   Signed   Date of Service:  4/4/2019 12:42 PM   Creation Time:  4/4/2019 12:42 PM            Signed           Problem: Patient Care Overview  Goal: Plan of Care Review    04/04/19 1240   OTHER   Outcome Summary Pt presents to OT eval following fall with facial laceration from SNF. Pt was in rehab for R foot fx. This date pt refused to sit up EOB but ADL was performed in supine and pt required min A for UBB/UBD. Pt would benefit from OT to address deficits and would recommend d/c back to SNF.   Coping/Psychosocial   Plan of Care Reviewed With patient                        Graciela Starr, PT   Physical Therapist   Physical Therapy   Plan of Care   Signed   Date of Service:  4/4/2019  1:29 PM   Creation Time:  4/4/2019  1:29 PM            Signed           Problem: Patient Care Overview  Goal: Plan of Care Review    04/04/19 1327   OTHER   Outcome Summary Patient is a pleasant 89 y.o. female admitted to [facility] for fall at SNF and anemia on 4/3/2019. PMHx includes ankle fracture this past November-patient has been residing at [facility] for rehab and working towards ambulating with a RW. Awaiting WBing status clarification as family reports PWBing 50% but chart review states WBAT with CAM boot on. Today, patient performed bed mobility with Srinath and required up to modA for transfers. Strength and endurance deficits noted. Patient may benefit from skilled PT services acutely to  address functional deficits as well as improve level of independence prior to discharge. Anticipate returning back to SNF upon DC.   Coping/Psychosocial   Plan of Care Reviewed With patient;family

## 2019-04-05 NOTE — PROGRESS NOTES
"Daily progress note    Chief complaint  Doing same  Bruising unchanged    History of present illness  89-year-old white female who is well-known to our service with multiple medical problems.  Patient is a nursing home resident with history of hypertension seizure disorder depression history of CVA osteoarthritis degenerative disc disease gastroesophageal reflux disease who was recently discharged from the hospital after right ankle wound infection and surrounding cellulitis with recent fracture and surgery presented to [facility] emergency room after the fall after she lost her balance and trying to ambulate independently.  Patient hit on the face and sustaining facial bruises and laceration requiring stitches on the nose.  Patient denies any loss of consciousness and fully alert oriented following commands and family at the bedside.  Patient also found to have low hemoglobin with heme negative on stool test.  Patient is taking iron tablets for chronic anemia.    REVIEW OF SYSTEMS   Review of Systems   Constitutional: Negative for chills and fever.   HENT: Negative for sore throat.   Eyes: Negative for visual disturbance.   Respiratory: Negative for shortness of breath.   Cardiovascular: Positive for chest pain (left chest wall pain).   Gastrointestinal: Negative for nausea and vomiting.   Genitourinary: Negative for difficulty urinating and dysuria.   Musculoskeletal: Negative for back pain and neck pain.   Left foot pain, left shoulder pain   Skin: Positive for wound (nose laceration, left hand skin tear (sustained yesterday)). Negative for rash.   Neurological: Negative for dizziness and headaches.   Psychiatric/Behavioral: Positive for confusion (mild). The patient is not nervous/anxious.     PHYSICAL EXAM   Blood pressure 149/80, pulse 72, temperature 97.8 °F (36.6 °C), temperature source Oral, resp. rate 16, height 162.6 cm (64\"), weight 100 kg (221 lb 1.9 oz), SpO2 95 %, not currently " breastfeeding.    Constitutional: She is oriented to person, place, and time. No distress.   Head: Normocephalic.   Mouth/Throat: Mucous membranes are normal.   Hematoma to the mid forehead, 3cm jagged laceration to the left side of the nose with active bleeding, otherwise no epistaxis, nasal swelling, bruising above left upper lip, otherwise no facial tenderness   Eyes: EOM are normal. Pupils are equal, round, and reactive to light.   Neck: Normal range of motion. Neck supple. No c-spine tenderness   Cardiovascular: Normal rate, regular rhythm and normal heart sounds.   Pulmonary/Chest: Effort normal and breath sounds normal. No respiratory distress. She has no decreased breath sounds. She has no wheezes. She has no rhonchi. She has no rales.   Abdominal: Soft. There is no tenderness. There is no rebound and no guarding.   Musculoskeletal: No t-spine or l-spine tenderness, left shoulder tenderness with FROM, left foot tenderness, mild swelling and erythema to left lower leg (pt is currently being treated for LLE cellulitis), nickel sized healing pressure ulcer to the left heel, healed surgical incision above the right lateral malleolus without signs of infection, skin tear with steri strips in place to left dorsal hand from previous incident, no bony left hand tenderness, NV intact distally to all extremities   Neurological: She is alert and oriented to person, place, and time. She has normal sensation.   nonfocal neuro exam, answers questions appropriately, follows commands   Skin: Skin is warm and dry. There is pallor.   Psychiatric: Affect normal.     LAB RESULTS  Lab Results (last 24 hours)     Procedure Component Value Units Date/Time    Basic Metabolic Panel [[id number]]  (Abnormal) Collected:  04/05/19 0415    Specimen:  Blood Updated:  04/05/19 0613     Glucose 95 mg/dL      BUN 14 mg/dL      Creatinine 0.88 mg/dL      Sodium 139 mmol/L      Potassium 4.3 mmol/L      Chloride 103 mmol/L      CO2 24.8 mmol/L       Calcium 8.4 mg/dL      eGFR Non African Amer 61 mL/min/1.73      BUN/Creatinine Ratio 15.9     Anion Gap 11.2 mmol/L     Narrative:       GFR Normal >60  Chronic Kidney Disease <60  Kidney Failure <15    CBC & Differential [[id number]] Collected:  04/05/19 0415    Specimen:  Blood Updated:  04/05/19 0534    Narrative:       The following orders were created for panel order CBC & Differential.  Procedure                               Abnormality         Status                     ---------                               -----------         ------                     CBC Auto Differential[[id number]]        Abnormal            Final result                 Please view results for these tests on the individual orders.    CBC Auto Differential [[id number]]  (Abnormal) Collected:  04/05/19 0415    Specimen:  Blood Updated:  04/05/19 0534     WBC 4.28 10*3/mm3      RBC 2.79 10*6/mm3      Hemoglobin 8.9 g/dL      Hematocrit 28.2 %      .1 fL      MCH 31.9 pg      MCHC 31.6 g/dL      RDW 17.0 %      RDW-SD 63.3 fl      MPV 9.4 fL      Platelets 129 10*3/mm3      Neutrophil % 35.3 %      Lymphocyte % 44.6 %      Monocyte % 11.0 %      Eosinophil % 8.2 %      Basophil % 0.7 %      Immature Grans % 0.2 %      Neutrophils, Absolute 1.51 10*3/mm3      Lymphocytes, Absolute 1.91 10*3/mm3      Monocytes, Absolute 0.47 10*3/mm3      Eosinophils, Absolute 0.35 10*3/mm3      Basophils, Absolute 0.03 10*3/mm3      Immature Grans, Absolute 0.01 10*3/mm3      nRBC 0.0 /100 WBC       ,  Imaging Results (last 24 hours)     ** No results found for the last 24 hours. **          Current Facility-Administered Medications:   •  acetaminophen (TYLENOL) tablet 650 mg, 650 mg, Oral, Q4H PRN, Juan Carlos Harper MD, 650 mg at 04/04/19 1736  •  amLODIPine (NORVASC) tablet 5 mg, 5 mg, Oral, Daily, Juan Carlos Harper MD, 5 mg at 04/05/19 0957  •  calcium-vitamin D 500-200 MG-UNIT tablet 1,000 mg, 1,000 mg, Oral, Daily, Juan Carlos Harper MD, 1,000 mg at  04/05/19 0957  •  escitalopram (LEXAPRO) tablet 10 mg, 10 mg, Oral, Daily, Juan Carlos Harper MD, 10 mg at 04/05/19 0956  •  FERREX 150 PLUS 150-50-50 MG capsule 1 capsule, 1 capsule, Oral, Daily, Juan Carlos Harper MD, 1 capsule at 04/05/19 0957  •  gabapentin (NEURONTIN) capsule 300 mg, 300 mg, Oral, Q8H, Juan Carlos Harper MD, 300 mg at 04/05/19 0535  •  HYDROcodone-acetaminophen (NORCO) 5-325 MG per tablet 1 tablet, 1 tablet, Oral, Q6H PRN, Juan Carlos Harper MD  •  hydrophor (AQUAPHOR) ointment, , Topical, Q12H, Juan Carlos Harper MD  •  levETIRAcetam (KEPPRA) tablet 750 mg, 750 mg, Oral, BID, Juan Carlos Harper MD, 750 mg at 04/05/19 0957  •  miconazole (MICOTIN) 2 % powder, , Topical, Q12H, Juan Carlos Harper MD  •  mirtazapine (REMERON) tablet 15 mg, 15 mg, Oral, Nightly, Juan Carlos Harper MD, 15 mg at 04/04/19 2122  •  morphine injection 1 mg, 1 mg, Intravenous, Q4H PRN, Juan Carlos Harper MD  •  multivitamin with minerals 1 tablet, 1 tablet, Oral, Daily, Juan Carlos Harper MD, 1 tablet at 04/05/19 0957  •  nystatin (MYCOSTATIN) ointment 1 application, 1 application, Topical, Q12H, Juan Carlos Harper MD, 1 application at 04/05/19 0956  •  ondansetron (ZOFRAN) injection 4 mg, 4 mg, Intravenous, Q6H PRN, Juan Carlos Harper MD  •  pantoprazole (PROTONIX) EC tablet 40 mg, 40 mg, Oral, Daily, Juan Carlos Harper MD, 40 mg at 04/05/19 0957  •  polyethylene glycol (MIRALAX) powder 17 g, 17 g, Oral, Daily, Juan Carlos Harper MD, 17 g at 04/05/19 0957  •  [COMPLETED] Insert peripheral IV, , , Once **AND** sodium chloride 0.9 % flush 10 mL, 10 mL, Intravenous, PRN, Marilyn Frederick, APRN    ASSESSMENT  Fracture and laceration of nose status post suturing  Contusion of head chest left shoulder and right foot  Status post fall  Chronic anemia   Right ankle wound with recent fracture and surgery  Hypertension  Seizure disorder  Status post CVA  Depression  SSS status post permanent pacemaker  Osteoarthritis  Degenerative disc disease  Gastroesophageal reflux  disease    PLAN  CPM  Hold Plavix  Supportive care  Local wound care  Transfuse PRN  Anemia workup in progress  Orthopedic surgery input appreciated  Cardiology consult  Continue nursing home medication and adjust the doses  Stress ulcer DVT prophylaxis  Discussed with family  DNR  Follow closely further recommendation according to hospital course    GAETANO ACOSTA MD

## 2019-04-05 NOTE — CONSULTS
"    Patient Name: Kim Feldman  :3/13/1930  89 y.o.    Date of Admission: 4/3/2019  Date of Consultation:  19  Encounter Provider: Feng Machuca III, MD  Place of Service: [facility] CARDIOLOGY  Referring Provider: lAdo Thompson MD  Patient Care Team:  Grady Villeda MD as PCP - General (Family Medicine)  Bandar Ruiz MD as Consulting Physician (Cardiology)      Chief complaint: Anemia    History of Present Illness:  Mrs. Feldman is an 87 year old pt with a history of sick sinus syndrome and permanent pacemaker, HTN. Been in hospital several times over the past three years with PNA and UTI's. She was diagnosed with aspiration due to Zenker's diverticulm, which was repaired. She is a pt of Dr. Ruiz who she last saw on 3/8/18 for follow up. She has chronic lower extremity edema. Pacemaker was checked at this time and she had no episodes and BP good and she is not on meds for it.     She also has a history of prior CVA.  She had a CVA in  treated with TPA.  She was placed on Plavix at that time and has been on it ever since.    Pt presented to ER on 4/3/19 from NH where she fell and hit her head and face after losing her balance history of frequent falls). Pt also having confusion and has been treated currently for LLE cellulitis and left foot pressure ulcer. VS in ER were HR 85, /64, sats 96%.BMP unremarkable, HGb 7.5. CT of head no acute iintracranial process, and mildly displaced bilateral nasal bone fractures. Pt admitted for further evaluation.      We were consulted for \"management of medication due to bruising.\"  Patient states that she wonders whether she can stop the Plavix.  She did receive 2 units of blood on .    She denies any cardiac complaints.   She denies any chest pain, pressure, tightness, squeezing, or heartburn.  She has not experienced any feeling of palpitations, tachycardia or heart racing and no presyncope or syncope. There have " not been any orthopnea or PND, and no problems with lower extremity edema.  She denies any shortness of breath at rest or with activity and has not had any wheezing.  She has not had any problems with unexplained nausea or vomiting.           Pacemaker check 7/3/18      ECHO 12/14/17    · Left ventricular systolic function is normal. Calculated EF = 68.6%. Estimated EF was in agreement with the calculated EF. Estimated EF = 69%. Normal left ventricular cavity size noted. All left ventricular wall segments contract normally. Left ventricular wall thickness is consistent with mild concentric hypertrophy. Left ventricular diastolic function was indeterminate.  · Normal right ventricular cavity size, wall thickness, systolic function and septal motion noted. Electronic lead present in the ventricle.  · Left atrial volume is moderately increased.  · The mitral valve is abnormal in structure. Mitral annular calcification is present. Mild mitral valve regurgitation is present. Mild mitral valve stenosis is present. The mitral valve mean gradient is 6 mmHg.  · The tricuspid valve is grossly normal. Mild tricuspid valve regurgitation is present. Estimated right ventricular systolic pressure from tricuspid regurgitation is mildly elevated (35-45 mmHg). Calculated right ventricular systolic pressure from tricuspid regurgitation is 36.2 mmHg      Past Medical History:   Diagnosis Date   • Anemia    • At risk for falls    • At risk for sleep apnea 11/17/2018   • Atrial fibrillation (CMS/HCC)    • Bulging lumbar disc    • Cellulitis     BILATERAL LEGS   • Chronic back pain    • Chronic cough    • Chronic UTI    • Chronic venous insufficiency    • 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HCC)    • DDD (degenerative disc disease), lumbosacral    • Dementia without behavioral disturbance     moderate   • Depression, endogenous (CMS/HCC)    • Disc degeneration, lumbar    • Dysphagia    • GERD (gastroesophageal reflux disease)    • Hiatal hernia     • History of anxiety    • History of aspiration pneumonitis    • History of cerebral artery occlusion     CVA (following TIA), 10/10, treated with tPA   • History of herpes zoster    • History of ingrowing nail    • History of osteoporosis    • History of poliomyelitis     child   • History of sciatica    • History of sick sinus syndrome     s/p PPM   • History of transient cerebral ischemia     followed by stroke in 10/2010. BIBI was normal.   • History of Zenker's diverticulum removal 2016   • HX: long term anticoagulant use    • Hyperlipidemia    • Hypertension     NO CURRENT MEDICATION   • Hyponatremia    • Intertrigo    • Lumbar radiculopathy    • Morbid obesity (CMS/HCC)    • MRSA (methicillin resistant Staphylococcus aureus)     LEFT FOOT SPREAD TO RIGHT ANKLE; DR TUBBS AWARE   • Neuralgia     with diplopia secondary to facial shingles   • Nonepileptic episode (CMS/HCC)    • Osteoarthritis of right knee    • Pacemaker    • Pneumonia    • Seeing double     secondary to shingles on the face also with neuralgia   • Seizures (CMS/HCC)    • 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)    • Spinal stenosis    • Vitamin D deficiency    • Zenker's diverticulum        Past Surgical History:   Procedure Laterality Date   • ANKLE OPEN REDUCTION INTERNAL FIXATION Right 11/17/2018    Procedure: ANKLE OPEN REDUCTION INTERNAL FIXATION;  Surgeon: Cristian Tubbs II, MD;  Location: [facility] OR;  Service: Orthopedics   • CARDIAC PACEMAKER PLACEMENT      secondary to SSS, placed in 2004, with gen chng 2014; [employer] dual DOO   • CATARACT EXTRACTION W/ INTRAOCULAR LENS IMPLANT Bilateral    • COLONOSCOPY     • HAND SURGERY Right    • HARDWARE REMOVAL Right 2/19/2019    Procedure: RT ANKLE HARDWARE REMOVAL;  Surgeon: Cristian Tubbs II, MD;  Location: [facility];  Service: Orthopedics   • KNEE ARTHROPLASTY Left    • LAMINECTOMY FOR IMPLANTATION / PLACEMENT NEUROSTIMULATOR ELECTRODES     •  LUMBAR LAMINECTOMY      L-3 L4 L4 L5   • TONSILLECTOMY     • ZENKERS DIVERTICULECTOMY N/A 9/27/2016    Procedure: ENDOSCOPIC REMOVAL OF ZENKERS DIVERTICULUM W/ WERDASCOPE;  Surgeon: Oswald Barth MD;  Location: [facility];  Service:    • ZENKERS DIVERTICULECTOMY N/A 4/14/2017    Procedure: ESOPHAGOSCOPY;  Surgeon: Oswald Barth MD;  Location: [facility];  Service:          Prior to Admission medications    Medication Sig Start Date End Date Taking? Authorizing Provider   acetaminophen (TYLENOL) 325 MG tablet Take 325 mg by mouth Every 6 (Six) Hours As Needed for Mild Pain .   Yes Sammy Veronica MD   amLODIPine (NORVASC) 5 MG tablet Take 5 mg by mouth Daily. Hold for BP <100/60   Yes Sammy Veronica MD   artificial tears (LUBRIFRESH P.M.) ointment ophthalmic ointment Administer 1 application to both eyes every night at bedtime.   Yes Sammy Veronica MD   B Complex Vitamins (VITAMIN B COMPLEX) capsule capsule Take 1 capsule by mouth Daily.   Yes Sammy Veronica MD   Calcium-Vitamin D-Vitamin K (VIACTIV) 500-500-40 MG-UNT-MCG chewable tablet Chew 1 tablet Daily.   Yes Sammy Veronica MD   cephalexin (KEFLEX) 500 MG capsule Take 500 mg by mouth 4 (Four) Times a Day. 4/2/19 4/4/19 Yes Sammy Veronica MD   clopidogrel (PLAVIX) 75 MG tablet Take 75 mg by mouth Daily.   Yes Sammy Vreonica MD   escitalopram (LEXAPRO) 10 MG tablet Take 10 mg by mouth Daily.   Yes ProviderSammy MD   Fe Asp Gly-Fe Polysac-Suc Ac-C (FERREX 150 PLUS) 150-50-50 MG capsule Take 1 capsule by mouth 3 (Three) Times a Day.   Yes Sammy Veronica MD   gabapentin (NEURONTIN) 600 MG tablet Take 600 mg by mouth 3 (Three) Times a Day.   Yes Sammy Veronica MD   levETIRAcetam (KEPPRA) 750 MG tablet Take 1 tablet by mouth 2 (Two) Times a Day. 10/18/18  Yes Arsalan Velez Jr., MD   mirtazapine (REMERON) 15 MG tablet Take 15 mg by mouth Every Night.   Yes Sammy Veronica MD    Multiple Vitamins-Minerals (CERTAVITE/ANTIOXIDANTS) tablet Take 1 tablet by mouth Daily.   Yes Provider, MD Sammy   nystatin (MYCOSTATIN) 502339 UNIT/GM ointment Apply 1 application topically to the appropriate area as directed 2 (Two) Times a Day. Apply under bilateral breasts   Yes ProviderSammy MD   pantoprazole (PROTONIX) 40 MG EC tablet Take 40 mg by mouth Daily.   Yes ProviderSammy MD   polyethylene glycol (MIRALAX) packet Take 17 g by mouth Daily As Needed.   Yes Provider, MD Sammy       Allergies   Allergen Reactions   • Lipitor [Atorvastatin] Confusion   • Penicillins Hives     Has previously tolerated ceftriaxone       Social History     Socioeconomic History   • Marital status:      Spouse name: Not on file   • Number of children: 3   • Years of education: Not on file   • Highest education level: Not on file   Occupational History   • Occupation: Retired   Tobacco Use   • Smoking status: Former Smoker     Packs/day: 1.00     Years: 40.00     Pack years: 40.00     Types: Cigarettes     Last attempt to quit:      Years since quittin.2   • Smokeless tobacco: Never Used   • Tobacco comment: caffeine use: one cup of coffee in the morning.    Substance and Sexual Activity   • Alcohol use: Yes     Comment: 2 PER WEEK   • Drug use: No   • Sexual activity: Defer       Family History   Problem Relation Age of Onset   • Cancer Daughter    • Malig Hyperthermia Neg Hx        REVIEW OF SYSTEMS:   All systems reviewed.  Pertinent positives identified in HPI.  All other systems are negative.      Objective:     Vitals:    19 1304 19 1951 19 2308 19 0752   BP: 130/60 104/56 150/67 149/80   BP Location: Right arm Right arm Right arm Right arm   Patient Position: Sitting Lying Lying Lying   Pulse: 79 62 63 72   Resp:  20 16 16   Temp: 98 °F (36.7 °C) 98.3 °F (36.8 °C) 97.8 °F (36.6 °C)    TempSrc: Oral Oral Oral Oral   SpO2: 94% 96%  95%   Weight:        Height:         Body mass index is 37.96 kg/m².    Physical Exam:  General Appearance:    Alert, cooperative, in no acute distress   Head:    Normocephalic, without obvious abnormality, atraumatic   Eyes:            Lids and lashes normal, conjunctivae and sclerae normal, no   icterus, no pallor, corneas clear, PERRLA   Ears:    Ears appear intact with no abnormalities noted   Throat:   No oral lesions, no thrush, oral mucosa moist   Neck:   No adenopathy, supple, trachea midline, no thyromegaly, no   carotid bruit, no JVD   Back:     No kyphosis present, no scoliosis present, no skin lesions, erythema or scars, no tenderness to percussion or palpation, range of motion normal   Lungs:     Clear to auscultation,respirations regular, even and unlabored    Heart:    Regular rhythm and normal rate, normal S1 and S2, no murmur, no gallop, no rub, no click   Chest Wall:    No abnormalities observed   Abdomen:     Normal bowel sounds, no masses, no organomegaly, soft        non-tender, non-distended, no guarding, no rebound  tenderness   Extremities:   Moves all extremities well, no edema, no cyanosis, no redness   Pulses:   Pulses palpable and equal bilaterally. Normal radial, carotid, femoral, dorsalis pedis and posterior tibial pulses bilaterally. Normal abdominal aorta   Skin:  Psychiatric:   No bleeding, bruising or rash    Alert and oriented x 3, normal mood and affect         Lab Review:     Results from last 7 days   Lab Units 04/05/19 0415 04/04/19  0423   SODIUM mmol/L 139 136   POTASSIUM mmol/L 4.3 4.5   CHLORIDE mmol/L 103 104   CO2 mmol/L 24.8 23.3   BUN mg/dL 14 18   CREATININE mg/dL 0.88 0.97   CALCIUM mg/dL 8.4* 9.1   BILIRUBIN mg/dL  --  0.2   ALK PHOS U/L  --  55   ALT (SGPT) U/L  --  13   AST (SGOT) U/L  --  19   GLUCOSE mg/dL 95 85         Results from last 7 days   Lab Units 04/05/19 0415   WBC 10*3/mm3 4.28   HEMOGLOBIN g/dL 8.9*   HEMATOCRIT % 28.2*   PLATELETS 10*3/mm3 129*             Results  from last 7 days   Lab Units 04/04/19  0423   CHOLESTEROL mg/dL 130   TRIGLYCERIDES mg/dL 71   HDL CHOL mg/dL 46   LDL CHOL mg/dL 70                             I personally viewed and interpreted the patient's EKG/Telemetry data.      Current Facility-Administered Medications:   •  acetaminophen (TYLENOL) tablet 650 mg, 650 mg, Oral, Q4H PRN, Juan Carlos Harper MD, 650 mg at 04/04/19 1736  •  amLODIPine (NORVASC) tablet 5 mg, 5 mg, Oral, Daily, Juan Carlos Harper MD, 5 mg at 04/05/19 0957  •  calcium-vitamin D 500-200 MG-UNIT tablet 1,000 mg, 1,000 mg, Oral, Daily, Juan Carlos Harper MD, 1,000 mg at 04/05/19 0957  •  escitalopram (LEXAPRO) tablet 10 mg, 10 mg, Oral, Daily, Juan Carlos Harper MD, 10 mg at 04/05/19 0956  •  FERREX 150 PLUS 150-50-50 MG capsule 1 capsule, 1 capsule, Oral, Daily, Juan Carlos Harper MD, 1 capsule at 04/05/19 0957  •  gabapentin (NEURONTIN) capsule 300 mg, 300 mg, Oral, Q8H, Juan Carlos Harper MD, 300 mg at 04/05/19 1516  •  HYDROcodone-acetaminophen (NORCO) 5-325 MG per tablet 1 tablet, 1 tablet, Oral, Q6H PRN, Juan Carlos Harper MD  •  hydrophor (AQUAPHOR) ointment, , Topical, Q12H, Juan Carlos Harper MD  •  levETIRAcetam (KEPPRA) tablet 750 mg, 750 mg, Oral, BID, Juan Carlos Harper MD, 750 mg at 04/05/19 0957  •  miconazole (MICOTIN) 2 % powder, , Topical, Q12H, Juan Carlos Harper MD  •  mirtazapine (REMERON) tablet 15 mg, 15 mg, Oral, Nightly, Juan Carlos Harper MD, 15 mg at 04/04/19 2122  •  morphine injection 1 mg, 1 mg, Intravenous, Q4H PRN, Juan Carlos Harper MD  •  multivitamin with minerals 1 tablet, 1 tablet, Oral, Daily, Juan Carlos Harper MD, 1 tablet at 04/05/19 0957  •  nystatin (MYCOSTATIN) ointment 1 application, 1 application, Topical, Q12H, Juan Carlos Harper MD, 1 application at 04/05/19 0956  •  ondansetron (ZOFRAN) injection 4 mg, 4 mg, Intravenous, Q6H PRN, Juan Carlos Harper MD  •  pantoprazole (PROTONIX) EC tablet 40 mg, 40 mg, Oral, Daily, Juan Carlos Harper MD, 40 mg at 04/05/19 0957  •  polyethylene glycol (MIRALAX) powder 17 g, 17 g,  Oral, Daily, Juan Carlos Harper MD, 17 g at 04/05/19 0957  •  [COMPLETED] Insert peripheral IV, , , Once **AND** sodium chloride 0.9 % flush 10 mL, 10 mL, Intravenous, PRN, Marilyn Frederick APRN    Assessment and Plan:       Active Hospital Problems    Diagnosis  POA   • **Anemia [D64.9]  Yes   • History of CVA in adulthood [Z86.73]  Not Applicable   • Cardiac pacemaker in situ [Z95.0]  Yes   • History of sick sinus syndrome [Z86.79]  Not Applicable      Resolved Hospital Problems   No resolved problems to display.     1.  Sick sinus syndrome/pacemaker in place-normal function  2.  History of CVA- treated with TPA in 2010, and has been on Plavix ever since.  We have been asked to comment on whether the patient's Plavix can be discontinued.  She is not on this for any cardiac indication, and we cannot define her neurologic risk for repeat stroke since her stroke was not cardioembolic.    No active cardiac issues-we will follow on an as-needed basis.    Feng Machuca III, MD  04/05/19  4:01 PM

## 2019-04-06 LAB
ANION GAP SERPL CALCULATED.3IONS-SCNC: 7.5 MMOL/L
BASOPHILS # BLD AUTO: 0.02 10*3/MM3 (ref 0–0.2)
BASOPHILS NFR BLD AUTO: 0.4 % (ref 0–1.5)
BUN BLD-MCNC: 13 MG/DL (ref 8–23)
BUN/CREAT SERPL: 17.8 (ref 7–25)
CALCIUM SPEC-SCNC: 8.6 MG/DL (ref 8.6–10.5)
CHLORIDE SERPL-SCNC: 99 MMOL/L (ref 98–107)
CO2 SERPL-SCNC: 27.5 MMOL/L (ref 22–29)
CREAT BLD-MCNC: 0.73 MG/DL (ref 0.57–1)
DEPRECATED RDW RBC AUTO: 61.1 FL (ref 37–54)
EOSINOPHIL # BLD AUTO: 0.37 10*3/MM3 (ref 0–0.4)
EOSINOPHIL NFR BLD AUTO: 7.7 % (ref 0.3–6.2)
ERYTHROCYTE [DISTWIDTH] IN BLOOD BY AUTOMATED COUNT: 16.4 % (ref 12.3–15.4)
GFR SERPL CREATININE-BSD FRML MDRD: 75 ML/MIN/1.73
GLUCOSE BLD-MCNC: 95 MG/DL (ref 65–99)
GLUCOSE BLDC GLUCOMTR-MCNC: 102 MG/DL (ref 70–130)
HCT VFR BLD AUTO: 28.7 % (ref 34–46.6)
HGB BLD-MCNC: 9.2 G/DL (ref 12–15.9)
IMM GRANULOCYTES # BLD AUTO: 0.02 10*3/MM3 (ref 0–0.05)
IMM GRANULOCYTES NFR BLD AUTO: 0.4 % (ref 0–0.5)
LYMPHOCYTES # BLD AUTO: 1.62 10*3/MM3 (ref 0.7–3.1)
LYMPHOCYTES NFR BLD AUTO: 33.8 % (ref 19.6–45.3)
MCH RBC QN AUTO: 32.3 PG (ref 26.6–33)
MCHC RBC AUTO-ENTMCNC: 32.1 G/DL (ref 31.5–35.7)
MCV RBC AUTO: 100.7 FL (ref 79–97)
MONOCYTES # BLD AUTO: 0.44 10*3/MM3 (ref 0.1–0.9)
MONOCYTES NFR BLD AUTO: 9.2 % (ref 5–12)
NEUTROPHILS # BLD AUTO: 2.33 10*3/MM3 (ref 1.4–7)
NEUTROPHILS NFR BLD AUTO: 48.5 % (ref 42.7–76)
NRBC BLD AUTO-RTO: 0 /100 WBC (ref 0–0)
PLATELET # BLD AUTO: 141 10*3/MM3 (ref 140–450)
PMV BLD AUTO: 9 FL (ref 6–12)
POTASSIUM BLD-SCNC: 4.4 MMOL/L (ref 3.5–5.2)
RBC # BLD AUTO: 2.85 10*6/MM3 (ref 3.77–5.28)
SODIUM BLD-SCNC: 134 MMOL/L (ref 136–145)
WBC NRBC COR # BLD: 4.8 10*3/MM3 (ref 3.4–10.8)

## 2019-04-06 PROCEDURE — 82962 GLUCOSE BLOOD TEST: CPT

## 2019-04-06 PROCEDURE — 97110 THERAPEUTIC EXERCISES: CPT

## 2019-04-06 PROCEDURE — 80048 BASIC METABOLIC PNL TOTAL CA: CPT | Performed by: HOSPITALIST

## 2019-04-06 PROCEDURE — 85025 COMPLETE CBC W/AUTO DIFF WBC: CPT | Performed by: HOSPITALIST

## 2019-04-06 RX ORDER — CLOPIDOGREL BISULFATE 75 MG/1
75 TABLET ORAL DAILY
Status: DISCONTINUED | OUTPATIENT
Start: 2019-04-06 | End: 2019-04-08 | Stop reason: HOSPADM

## 2019-04-06 RX ADMIN — MIRTAZAPINE 15 MG: 15 TABLET, FILM COATED ORAL at 21:16

## 2019-04-06 RX ADMIN — GABAPENTIN 300 MG: 300 CAPSULE ORAL at 21:16

## 2019-04-06 RX ADMIN — PETROLATUM: 42 OINTMENT TOPICAL at 09:37

## 2019-04-06 RX ADMIN — NYSTATIN 1 APPLICATION: 100000 OINTMENT TOPICAL at 21:16

## 2019-04-06 RX ADMIN — SODIUM CHLORIDE, PRESERVATIVE FREE 10 ML: 5 INJECTION INTRAVENOUS at 21:16

## 2019-04-06 RX ADMIN — AMLODIPINE BESYLATE 5 MG: 5 TABLET ORAL at 09:36

## 2019-04-06 RX ADMIN — PETROLATUM: 42 OINTMENT TOPICAL at 21:17

## 2019-04-06 RX ADMIN — ESCITALOPRAM 10 MG: 10 TABLET, FILM COATED ORAL at 09:36

## 2019-04-06 RX ADMIN — CALCIUM CARBONATE-VITAMIN D TAB 500 MG-200 UNIT 1000 MG: 500-200 TAB at 09:36

## 2019-04-06 RX ADMIN — GABAPENTIN 300 MG: 300 CAPSULE ORAL at 06:24

## 2019-04-06 RX ADMIN — CLOPIDOGREL 75 MG: 75 TABLET, FILM COATED ORAL at 15:49

## 2019-04-06 RX ADMIN — PANTOPRAZOLE SODIUM 40 MG: 40 TABLET, DELAYED RELEASE ORAL at 09:36

## 2019-04-06 RX ADMIN — MULTIPLE VITAMINS W/ MINERALS TAB 1 TABLET: TAB at 09:36

## 2019-04-06 RX ADMIN — NYSTATIN 1 APPLICATION: 100000 OINTMENT TOPICAL at 09:36

## 2019-04-06 RX ADMIN — MICONAZOLE NITRATE: 2 POWDER TOPICAL at 09:37

## 2019-04-06 RX ADMIN — LEVETIRACETAM 750 MG: 500 TABLET, FILM COATED ORAL at 09:36

## 2019-04-06 RX ADMIN — LEVETIRACETAM 750 MG: 500 TABLET, FILM COATED ORAL at 21:16

## 2019-04-06 RX ADMIN — SODIUM CHLORIDE, PRESERVATIVE FREE 10 ML: 5 INJECTION INTRAVENOUS at 09:36

## 2019-04-06 RX ADMIN — MICONAZOLE NITRATE: 2 POWDER TOPICAL at 21:15

## 2019-04-06 RX ADMIN — Medication 1 CAPSULE: at 09:36

## 2019-04-06 RX ADMIN — GABAPENTIN 300 MG: 300 CAPSULE ORAL at 15:49

## 2019-04-06 NOTE — PLAN OF CARE
Problem: Fall Risk (Adult)  Goal: Absence of Fall  Outcome: Ongoing (interventions implemented as appropriate)      Problem: Skin Injury Risk (Adult)  Goal: Skin Health and Integrity  Outcome: Ongoing (interventions implemented as appropriate)      Problem: Patient Care Overview  Goal: Plan of Care Review  Outcome: Ongoing (interventions implemented as appropriate)   04/05/19 2114 04/06/19 0544   Coping/Psychosocial   Patient Agreement with Plan of Care --  agrees   Plan of Care Review   Progress --  no change   OTHER   Outcome Summary --  Pt denies pain. Xray to right ankle completed. Excoriation noted to breast folds, groin & abdominal folds. VSS, safety maintained, will continue to monitor.   Coping/Psychosocial   Plan of Care Reviewed With patient --
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
## 2019-04-06 NOTE — PLAN OF CARE
Problem: Patient Care Overview  Goal: Plan of Care Review  Outcome: Ongoing (interventions implemented as appropriate)   04/06/19 4614   Plan of Care Review   Progress improving   OTHER   Outcome Summary Pt tolerated treatment well this date. Per RN, pt is now FWB on R LE w/ CAM boot donned. Required min A for bed mobility, then min A x2 to stand. Ambulated 25ft w/ standard walker and CGA-min A x2. PT will continue to address functional mobility deficits as tolerated.   Coping/Psychosocial   Plan of Care Reviewed With patient

## 2019-04-06 NOTE — THERAPY TREATMENT NOTE
Acute Care - Physical Therapy Treatment Note  [facility]     Patient Name: Kim Fedlman  : 3/13/1930  MRN: [id number]  Today's Date: 2019     Date of Referral to PT: 19  Referring Physician: Dr. Harper    Admit Date: 4/3/2019    Visit Dx:    ICD-10-CM ICD-9-CM   1. Anemia, unspecified type D64.9 285.9   2. Open fracture of nasal bone, initial encounter S02.2XXB 802.1   3. Laceration of nose, initial encounter S01.21XA 873.20   4. Contusion of head, unspecified part of head, initial encounter S00.93XA 920   5. Chest wall contusion, left, initial encounter S20.212A 922.1   6. Contusion of left shoulder, initial encounter S40.012A 923.00   7. Contusion of left foot, initial encounter S90.32XA 924.20   8. Fall, initial encounter W19.XXXA E888.9   9. 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)   • Vitamin D deficiency   • History of sick sinus syndrome   • Intertrigo   • Generalized convulsive seizures (CMS/HCC)   • Change in bowel habits   • Zenker diverticulum   • History of anxiety   • 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HCC)   • Thrombocytopenia (CMS/HCC)   • B12 deficiency   • Cardiac pacemaker in situ   • Chronic venous insufficiency   • Arthritis   • S/P knee replacement   • Chronic bilateral low back pain without sciatica   • Essential hypertension   • Hiatal hernia   • Zenker's diverticulum   • Chronic idiopathic constipation   • Pressure sore on buttocks   • Somnolence   • Closed trimalleolar fracture of right ankle   • Surgical wound infection   • History of failed arthroplasty of ankle   • History of CVA in adulthood       Therapy  Treatment    Rehabilitation Treatment Summary     Row Name 04/06/19 1600             Treatment Time/Intention    Discipline  physical therapy assistant  -      Document Type  therapy note (daily note)  -      Subjective Information  complains of;pain  -SM      Mode of Treatment  physical therapy  -SM      Patient Effort  good  -SM      Existing Precautions/Restrictions  fall  (Significant)  CAM boot on when up, FULL WB W/ BOOT  -SM      Recorded by [SM] Joann Meyers PTA 04/06/19 1639      Row Name 04/06/19 1600             Cognitive Assessment/Intervention- PT/OT    Orientation Status (Cognition)  oriented x 4  -SM      Follows Commands (Cognition)  WNL  -SM      Personal Safety Interventions  fall prevention program maintained;gait belt;nonskid shoes/slippers when out of bed  -SM      Recorded by [SM] Joann Meyers PTA 04/06/19 1639      Row Name 04/06/19 1600             Bed Mobility Assessment/Treatment    Bed Mobility Assessment/Treatment  supine-sit  -SM      Supine-Sit [place] (Bed Mobility)  minimum assist (75% patient effort)  -SM      Sit-Supine [place] (Bed Mobility)  not tested  -SM      Bed Mobility, Safety Issues  decreased use of arms for pushing/pulling;decreased use of legs for bridging/pushing  -SM      Assistive Device (Bed Mobility)  bed rails;head of bed elevated  -SM      Recorded by [SM] Joann Meyers PTA 04/06/19 1639      Row Name 04/06/19 1600             Transfer Assessment/Treatment    Transfer Assessment/Treatment  sit-stand transfer;stand-sit transfer  -SM      Recorded by [SM] Joann Meyers PTA 04/06/19 1639      Row Name 04/06/19 1600             Sit-Stand Transfer    Sit-Stand [place] (Transfers)  minimum assist (75% patient effort);2 person assist  -SM      Assistive Device (Sit-Stand Transfers)  walker, standard  -SM      Recorded by [SM] Joann Meyers PTA 04/06/19 1639      Row Name 04/06/19 1600             Stand-Sit Transfer     Stand-Sit [place] (Transfers)  minimum assist (75% patient effort)  -SM      Assistive Device (Stand-Sit Transfers)  walker, standard  -      Recorded by [] Joann Meyers [facility] 04/06/19 1639      Row Name 04/06/19 1600             Gait/Stairs Assessment/Training    [place] Level (Gait)  contact guard;minimum assist (75% patient effort);2 person assist  -SM      Assistive Device (Gait)  walker, standard  -      Distance in Feet (Gait)  25  -SM      Pattern (Gait)  step-to  -SM      Deviations/Abnormal Patterns (Gait)  antalgic;jose decreased;stride length decreased  -SM      Bilateral Gait Deviations  forward flexed posture  -SM      Right Sided Gait Deviations  weight shift ability decreased CAM boot donned  -SM      Recorded by [] Joann Meyers [facility] 04/06/19 1639      Row Name 04/06/19 1600             Motor Skills Assessment/Interventions    Additional Documentation  Therapeutic Exercise (Group)  -SM      Recorded by [] Joann Meyers [facility] 04/06/19 1639      Row Name 04/06/19 1600             Therapeutic Exercise    Lower Extremity (Therapeutic Exercise)  quad sets, bilateral  -SM      Lower Extremity Range of Motion (Therapeutic Exercise)  ankle dorsiflexion/plantar flexion, bilateral  -SM      Exercise Type (Therapeutic Exercise)  AROM (active range of motion)  -      Position (Therapeutic Exercise)  seated  -SM      Sets/Reps (Therapeutic Exercise)  10 reps  -      Recorded by [] Joann Meyers [facility] 04/06/19 1639      Row Name 04/06/19 1600             Positioning and Restraints    Pre-Treatment Position  in bed  -SM      Post Treatment Position  chair  -SM      In Chair  reclined;call light within reach;encouraged to call for assist  -SM      Recorded by [] Joann Meyers [facility] 04/06/19 1639      Row Name 04/06/19 1600             Pain Scale: Numbers Pre/Post-Treatment    Pain Scale: Numbers, Pretreatment  2/10  -SM      Pain Scale: Numbers,  Post-Treatment  2/10  -SM      Pain Location - Side  Right  -SM      Pain Location  ankle  -SM      Pain Intervention(s)  Repositioned;Ambulation/increased activity;Rest  -SM      Recorded by [SM] Joann Meyers PTA 04/06/19 1639      Row Name                Wound 04/03/19 1400 Left posterior ankle pressure injury    Wound - Properties Group Date first assessed: 04/03/19 [MP] Time first assessed: 1400 [MP] Present On Admission : yes;picture taken [MP] Side: Left [MP] Orientation: posterior [MP] Location: ankle [MP] Type: pressure injury [MP] Recorded by:  [MP] Olga Rubin RN 04/03/19 1705    Row Name                Wound 04/03/19 1400 medial coccyx MASD (moisture associated skin damage)    Wound - Properties Group Date first assessed: 04/03/19 [MP] Time first assessed: 1400 [MP] Orientation: medial [MP] Location: coccyx [MP] Type: MASD (moisture associated skin damage) [MP] Recorded by:  [MP] Olga Rubin RN 04/03/19 1707    Row Name                Wound 04/03/19 1400 Right lower;lateral leg    Wound - Properties Group Date first assessed: 04/03/19 [MP] Time first assessed: 1400 [MP] Side: Right [MP] Orientation: lower;lateral [MP] Location: leg [MP] Recorded by:  [GERMAINE] Olga Rubin RN 04/03/19 1723    Row Name                Wound 04/03/19 1400 Left hand    Wound - Properties Group Date first assessed: 04/03/19 [MP] Time first assessed: 1400 [MP] Side: Left [MP] Location: hand [MP] Recorded by:  [GERMAINE] Olga Rubin RN 04/1934      User Key  (r) = Recorded By, (t) = Taken By, (c) = Cosigned By    Initials Name Effective Dates Discipline    SM Joann Meyers PTA 03/07/18 -  PT    MP Olga Rubin RN 04/14/17 -  Nurse          Wound 04/03/19 1400 Left posterior ankle pressure injury (Active)   Dressing Appearance open to air 4/6/2019 12:47 AM   Drainage Amount none 4/6/2019 12:47 AM       Wound 04/03/19 1400 medial coccyx MASD (moisture associated skin damage) (Active)   Dressing  Appearance open to air 4/6/2019  9:35 AM   Drainage Amount none 4/6/2019  9:35 AM   Care, Wound barrier applied 4/6/2019  9:35 AM   Dressing Care, Wound open to air 4/6/2019  9:35 AM       Wound 04/03/19 1400 Right lower;lateral leg (Active)   Dressing Appearance dry;intact 4/6/2019  9:35 AM   Closure JULIANNE 4/6/2019 12:47 AM   Base yellow;granulating 4/6/2019  9:35 AM   Drainage Amount none 4/6/2019  9:35 AM   Dressing Care, Wound low-adherent 4/6/2019  9:35 AM       Wound 04/03/19 1400 Left hand (Active)   Dressing Appearance dry;intact 4/6/2019  9:35 AM   Closure JULIANNE 4/6/2019  9:35 AM   Base dressing in place, unable to visualize 4/6/2019  9:35 AM   Drainage Amount none 4/6/2019  9:35 AM           Physical Therapy Education     Title: PT OT SLP Therapies (In Progress)     Topic: Physical Therapy (Done)     Point: Mobility training (Done)     Learning Progress Summary           Patient Acceptance, E,TB, VU,NR by  at 4/6/2019  4:39 PM    Acceptance, E,D, VU,NR by  at 4/5/2019  2:26 PM    Acceptance, E, NR by MS at 4/4/2019  1:29 PM                   Point: Home exercise program (Done)     Learning Progress Summary           Patient Acceptance, E,TB, VU,NR by  at 4/6/2019  4:39 PM    Acceptance, E,D, VU,NR by  at 4/5/2019  2:26 PM                   Point: Body mechanics (Done)     Learning Progress Summary           Patient Acceptance, E,TB, VU,NR by  at 4/6/2019  4:39 PM    Acceptance, E,D, VU,NR by  at 4/5/2019  2:26 PM    Acceptance, E, NR by MS at 4/4/2019  1:29 PM                   Point: Precautions (Done)     Learning Progress Summary           Patient Acceptance, E,TB, VU,NR by  at 4/6/2019  4:39 PM    Acceptance, E,D, VU,NR by  at 4/5/2019  2:26 PM    Acceptance, E, NR by MS at 4/4/2019  1:29 PM                               User Key     Initials Effective Dates Name Provider Type Discipline     03/07/18 -  Joann Meyers, YOBANY Physical Therapy Assistant PT    MS 03/04/19 -  Matty,  Graciela GARCIA PT Physical Therapist PT                PT Recommendation and Plan     Plan of Care Reviewed With: patient  Progress: improving  Outcome Summary: Pt tolerated treatment well this date. Per RN, pt is now FWB on R LE w/ CAM boot donned. Required min A for bed mobility, then min A x2 to stand. Ambulated 25ft w/ standard walker and CGA-min A x2. PT will continue to address functional mobility deficits as tolerated.  Outcome Measures     Row Name 04/06/19 1600 04/05/19 1400 04/04/19 1300       How much help from another person do you currently need...    Turning from your back to your side while in flat bed without using bedrails?  3  -SM  3  -SM  3  -MS    Moving from lying on back to sitting on the side of a flat bed without bedrails?  3  -SM  3  -SM  3  -MS    Moving to and from a bed to a chair (including a wheelchair)?  3  -SM  3  -SM  2  -MS    Standing up from a chair using your arms (e.g., wheelchair, bedside chair)?  3  -SM  3  -SM  2  -MS    Climbing 3-5 steps with a railing?  1  -SM  1  -SM  1  -MS    To walk in hospital room?  2  -SM  2  -SM  1  -MS    AM-PAC 6 Clicks Score  15  -SM  15  -SM  12  -MS       Functional Assessment    Outcome Measure Options  AM-PAC 6 Clicks Basic Mobility (PT)  -SM  AM-PAC 6 Clicks Basic Mobility (PT)  -SM  AM-PAC 6 Clicks Basic Mobility (PT)  -MS    Row Name 04/04/19 1200             How much help from another is currently needed...    Putting on and taking off regular lower body clothing?  1  -SG      Bathing (including washing, rinsing, and drying)  2  -SG      Toileting (which includes using toilet bed pan or urinal)  2  -SG      Putting on and taking off regular upper body clothing  2  -SG      Taking care of personal grooming (such as brushing teeth)  2  -SG      Eating meals  3  -SG      Score  12  -SG         Functional Assessment    Outcome Measure Options  AM-PAC 6 Clicks Daily Activity (OT)  -SG        User Key  (r) = Recorded By, (t) = Taken By, (c) =  Cosigned By    Initials Name Provider Type    Danay Deshpande, OTR Occupational Therapist    Joann Hernandez, PTA Physical Therapy Assistant    Graciela Kelley, PT Physical Therapist         Time Calculation:   PT Charges     Row Name 04/06/19 1641             Time Calculation    Start Time  1600  -      Stop Time  1624  -      Time Calculation (min)  24 min  -      PT Received On  04/06/19  -      PT - Next Appointment  04/07/19  -        User Key  (r) = Recorded By, (t) = Taken By, (c) = Cosigned By    Initials Name Provider Type    DANIELLE Luigi Joann Lambert, PTA Physical Therapy Assistant        Therapy Charges for Today     Code Description Service Date Service Provider Modifiers Qty    57018184808 HC PT THER PROC EA 15 MIN 4/5/2019 Joann Meyers, PTA GP 2    08120882277 HC PT THER SUPP EA 15 MIN 4/5/2019 Joann Meyers, YOBANY GP 1    30027856349 HC PT THER PROC EA 15 MIN 4/6/2019 Joann Meyers, YOBANY GP 2    12143616715 HC PT THER SUPP EA 15 MIN 4/6/2019 Joann Meyers, PTA GP 1          PT G-Codes  Outcome Measure Options: AM-PAC 6 Clicks Basic Mobility (PT)  AM-PAC 6 Clicks Score: 15  Score: 12    Joann Meyers PTA  4/6/2019

## 2019-04-07 LAB
ANION GAP SERPL CALCULATED.3IONS-SCNC: 13 MMOL/L
BASOPHILS # BLD AUTO: 0.03 10*3/MM3 (ref 0–0.2)
BASOPHILS NFR BLD AUTO: 0.6 % (ref 0–1.5)
BUN BLD-MCNC: 18 MG/DL (ref 8–23)
BUN/CREAT SERPL: 21.4 (ref 7–25)
CALCIUM SPEC-SCNC: 9 MG/DL (ref 8.6–10.5)
CHLORIDE SERPL-SCNC: 102 MMOL/L (ref 98–107)
CO2 SERPL-SCNC: 24 MMOL/L (ref 22–29)
CREAT BLD-MCNC: 0.84 MG/DL (ref 0.57–1)
DEPRECATED RDW RBC AUTO: 59.8 FL (ref 37–54)
EOSINOPHIL # BLD AUTO: 0.39 10*3/MM3 (ref 0–0.4)
EOSINOPHIL NFR BLD AUTO: 7.4 % (ref 0.3–6.2)
ERYTHROCYTE [DISTWIDTH] IN BLOOD BY AUTOMATED COUNT: 16.2 % (ref 12.3–15.4)
GFR SERPL CREATININE-BSD FRML MDRD: 64 ML/MIN/1.73
GLUCOSE BLD-MCNC: 90 MG/DL (ref 65–99)
HCT VFR BLD AUTO: 26.6 % (ref 34–46.6)
HGB BLD-MCNC: 8.6 G/DL (ref 12–15.9)
IMM GRANULOCYTES # BLD AUTO: 0 10*3/MM3 (ref 0–0.05)
IMM GRANULOCYTES NFR BLD AUTO: 0 % (ref 0–0.5)
LYMPHOCYTES # BLD AUTO: 2 10*3/MM3 (ref 0.7–3.1)
LYMPHOCYTES NFR BLD AUTO: 37.8 % (ref 19.6–45.3)
MCH RBC QN AUTO: 32.3 PG (ref 26.6–33)
MCHC RBC AUTO-ENTMCNC: 32.3 G/DL (ref 31.5–35.7)
MCV RBC AUTO: 100 FL (ref 79–97)
MONOCYTES # BLD AUTO: 0.53 10*3/MM3 (ref 0.1–0.9)
MONOCYTES NFR BLD AUTO: 10 % (ref 5–12)
NEUTROPHILS # BLD AUTO: 2.34 10*3/MM3 (ref 1.4–7)
NEUTROPHILS NFR BLD AUTO: 44.2 % (ref 42.7–76)
NRBC BLD AUTO-RTO: 0 /100 WBC (ref 0–0)
PLATELET # BLD AUTO: 155 10*3/MM3 (ref 140–450)
PMV BLD AUTO: 9.3 FL (ref 6–12)
POTASSIUM BLD-SCNC: 4.2 MMOL/L (ref 3.5–5.2)
RBC # BLD AUTO: 2.66 10*6/MM3 (ref 3.77–5.28)
SODIUM BLD-SCNC: 139 MMOL/L (ref 136–145)
WBC NRBC COR # BLD: 5.29 10*3/MM3 (ref 3.4–10.8)

## 2019-04-07 PROCEDURE — 80048 BASIC METABOLIC PNL TOTAL CA: CPT | Performed by: HOSPITALIST

## 2019-04-07 PROCEDURE — 85025 COMPLETE CBC W/AUTO DIFF WBC: CPT | Performed by: HOSPITALIST

## 2019-04-07 PROCEDURE — 97110 THERAPEUTIC EXERCISES: CPT

## 2019-04-07 RX ADMIN — Medication 1 CAPSULE: at 09:42

## 2019-04-07 RX ADMIN — MULTIPLE VITAMINS W/ MINERALS TAB 1 TABLET: TAB at 09:42

## 2019-04-07 RX ADMIN — MICONAZOLE NITRATE: 2 POWDER TOPICAL at 21:48

## 2019-04-07 RX ADMIN — AMLODIPINE BESYLATE 5 MG: 5 TABLET ORAL at 09:43

## 2019-04-07 RX ADMIN — PETROLATUM: 42 OINTMENT TOPICAL at 09:43

## 2019-04-07 RX ADMIN — CLOPIDOGREL 75 MG: 75 TABLET, FILM COATED ORAL at 09:42

## 2019-04-07 RX ADMIN — LEVETIRACETAM 750 MG: 500 TABLET, FILM COATED ORAL at 09:51

## 2019-04-07 RX ADMIN — GABAPENTIN 300 MG: 300 CAPSULE ORAL at 06:00

## 2019-04-07 RX ADMIN — PANTOPRAZOLE SODIUM 40 MG: 40 TABLET, DELAYED RELEASE ORAL at 09:49

## 2019-04-07 RX ADMIN — NYSTATIN 1 APPLICATION: 100000 OINTMENT TOPICAL at 21:48

## 2019-04-07 RX ADMIN — ESCITALOPRAM 10 MG: 10 TABLET, FILM COATED ORAL at 09:42

## 2019-04-07 RX ADMIN — CALCIUM CARBONATE-VITAMIN D TAB 500 MG-200 UNIT 1000 MG: 500-200 TAB at 09:43

## 2019-04-07 RX ADMIN — LEVETIRACETAM 750 MG: 500 TABLET, FILM COATED ORAL at 21:46

## 2019-04-07 RX ADMIN — NYSTATIN 1 APPLICATION: 100000 OINTMENT TOPICAL at 09:43

## 2019-04-07 RX ADMIN — GABAPENTIN 300 MG: 300 CAPSULE ORAL at 17:11

## 2019-04-07 RX ADMIN — MICONAZOLE NITRATE: 2 POWDER TOPICAL at 09:49

## 2019-04-07 RX ADMIN — GABAPENTIN 300 MG: 300 CAPSULE ORAL at 21:46

## 2019-04-07 RX ADMIN — ACETAMINOPHEN 650 MG: 325 TABLET ORAL at 21:46

## 2019-04-07 RX ADMIN — MIRTAZAPINE 15 MG: 15 TABLET, FILM COATED ORAL at 21:47

## 2019-04-07 RX ADMIN — PETROLATUM: 42 OINTMENT TOPICAL at 21:48

## 2019-04-07 NOTE — PLAN OF CARE
Problem: Patient Care Overview  Goal: Plan of Care Review  Outcome: Ongoing (interventions implemented as appropriate)   04/07/19 6252   OTHER   Outcome Summary Pt walked further today, 50' in CAM boot WBAT. Tolerated with mild complaints of weakness.    Coping/Psychosocial   Plan of Care Reviewed With patient

## 2019-04-07 NOTE — PROGRESS NOTES
"Ankle x-ray looks good.  Wound well healing.  This patient is now okay for weightbearing as tolerated in a cam walking boot.  Cam walking boot will have to be on for all ambulation for the next 4 weeks and then can be discontinued.  I can see her back in my office in 4 weeks.    R \"Michael\" Sergio BRANTLEY MD  Orthopaedic Surgery  [facility]  [phone]    "

## 2019-04-07 NOTE — THERAPY TREATMENT NOTE
Acute Care - Physical Therapy Treatment Note  [facility]     Patient Name: Kim Feldman  : 3/13/1930  MRN: [id number]  Today's Date: 2019     Date of Referral to PT: 19  Referring Physician: Dr. Harper    Admit Date: 4/3/2019    Visit Dx:    ICD-10-CM ICD-9-CM   1. Anemia, unspecified type D64.9 285.9   2. Open fracture of nasal bone, initial encounter S02.2XXB 802.1   3. Laceration of nose, initial encounter S01.21XA 873.20   4. Contusion of head, unspecified part of head, initial encounter S00.93XA 920   5. Chest wall contusion, left, initial encounter S20.212A 922.1   6. Contusion of left shoulder, initial encounter S40.012A 923.00   7. Contusion of left foot, initial encounter S90.32XA 924.20   8. Fall, initial encounter W19.XXXA E888.9   9. 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)   • Vitamin D deficiency   • History of sick sinus syndrome   • Intertrigo   • Generalized convulsive seizures (CMS/HCC)   • Change in bowel habits   • Zenker diverticulum   • History of anxiety   • 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HCC)   • Thrombocytopenia (CMS/HCC)   • B12 deficiency   • Cardiac pacemaker in situ   • Chronic venous insufficiency   • Arthritis   • S/P knee replacement   • Chronic bilateral low back pain without sciatica   • Essential hypertension   • Hiatal hernia   • Zenker's diverticulum   • Chronic idiopathic constipation   • Pressure sore on buttocks   • Somnolence   • Closed trimalleolar fracture of right ankle   • Surgical wound infection   • History of failed arthroplasty of ankle   • History of CVA in adulthood       Therapy  Treatment    Rehabilitation Treatment Summary     Row Name 04/07/19 1446             Treatment Time/Intention    Discipline  physical therapist  -CS      Document Type  therapy note (daily note)  -CS      Subjective Information  complains of;pain  -CS      Mode of Treatment  physical therapy  -CS      Therapy Frequency (PT Clinical Impression)  daily  -CS      Patient Effort  good  -CS      Existing Precautions/Restrictions  fall;other (see comments) CAM when OOB  -CS      Recorded by [CS] Dillon Nickerson, PT 04/07/19 1449      Row Name 04/07/19 1446             Cognitive Assessment/Intervention- PT/OT    Orientation Status (Cognition)  oriented x 4  -CS      Follows Commands (Cognition)  WNL  -CS      Safety Deficit (Cognitive)  mild deficit  -CS      Personal Safety Interventions  fall prevention program maintained;gait belt;nonskid shoes/slippers when out of bed;supervised activity  -CS      Recorded by [CS] Dillon Nickerson, PT 04/07/19 1449      Row Name 04/07/19 1446             Mobility Assessment/Intervention    Extremity Weight-bearing Status  right lower extremity  -CS      Right Lower Extremity (Weight-bearing Status)  weight-bearing as tolerated (WBAT)  -CS      Recorded by [CS] Dillon Nickerson, PT 04/07/19 1449      Row Name 04/07/19 1446             Transfer Assessment/Treatment    Transfer Assessment/Treatment  sit-stand transfer;stand-sit transfer  -CS      Recorded by [CS] Dillon Nickerson, PT 04/07/19 1449      Row Name 04/07/19 1446             Sit-Stand Transfer    Sit-Stand [place] (Transfers)  minimum assist (75% patient effort)  -CS      Assistive Device (Sit-Stand Transfers)  walker, standard  -CS      Recorded by [CS] Dillon Nickerson, PT 04/07/19 1449      Row Name 04/07/19 1446             Stand-Sit Transfer    Stand-Sit [place] (Transfers)  minimum assist (75% patient effort)  -CS      Assistive Device (Stand-Sit Transfers)  walker, standard  -CS      Recorded by [CS] Krishan  Dillon WALLACE, PT 04/07/19 1449      Row Name 04/07/19 1446             Gait/Stairs Assessment/Training    [place] Level (Gait)  contact guard;minimum assist (75% patient effort)  -CS      Assistive Device (Gait)  walker, standard  -CS      Distance in Feet (Gait)  50  -CS      Pattern (Gait)  step-to  -CS      Deviations/Abnormal Patterns (Gait)  antalgic;jose decreased;stride length decreased  -CS      Bilateral Gait Deviations  forward flexed posture  -CS      Right Sided Gait Deviations  weight shift ability decreased  -CS      Recorded by [CS] Dillon Nickerson, PT 04/07/19 1449      Row Name 04/07/19 1446             Positioning and Restraints    Pre-Treatment Position  sitting in chair/recliner  -CS      Post Treatment Position  chair  -CS      In Chair  reclined;call light within reach;encouraged to call for assist  -CS      Recorded by [CS] Dillon Nickerson, PT 04/07/19 1449      Row Name 04/07/19 1446             Pain Scale: Numbers Pre/Post-Treatment    Pain Scale: Numbers, Pretreatment  2/10  -CS      Pain Scale: Numbers, Post-Treatment  2/10  -CS      Pain Location - Side  Right  -CS      Pain Location  ankle  -CS      Pain Intervention(s)  Repositioned;Ambulation/increased activity  -CS      Recorded by [CS] Dillon Nickerson, PT 04/07/19 1449      Row Name                Wound 04/03/19 1400 Left posterior ankle pressure injury    Wound - Properties Group Date first assessed: 04/03/19 [MP] Time first assessed: 1400 [MP] Present On Admission : yes;picture taken [MP] Side: Left [MP] Orientation: posterior [MP] Location: ankle [MP] Type: pressure injury [MP] Recorded by:  [MP] Olga Rubin RN 04/03/19 1705    Row Name                Wound 04/03/19 1400 medial coccyx MASD (moisture associated skin damage)    Wound - Properties Group Date first assessed: 04/03/19 [MP] Time first assessed: 1400 [MP] Orientation: medial [MP] Location: coccyx [MP] Type: MASD (moisture associated skin damage) [MP] Recorded  by:  [MP] Olga Rubin RN 04/03/19 1707    Row Name                Wound 04/03/19 1400 Right lower;lateral leg    Wound - Properties Group Date first assessed: 04/03/19 [MP] Time first assessed: 1400 [MP] Side: Right [MP] Orientation: lower;lateral [MP] Location: leg [MP] Recorded by:  [MP] Olga Rubin RN 04/03/19 1723    Row Name                Wound 04/03/19 1400 Left hand    Wound - Properties Group Date first assessed: 04/03/19 [MP] Time first assessed: 1400 [MP] Side: Left [MP] Location: hand [MP] Recorded by:  [MP] Olga Rubin RN 04/1934    Row Name 04/07/19 1446             Coping    Observed Emotional State  cooperative;calm  -CS      Verbalized Emotional State  acceptance  -CS      Recorded by [CS] Dillon Nickerson, PT 04/07/19 1449      Row Name 04/07/19 1446             Plan of Care Review    Plan of Care Reviewed With  patient  -CS      Recorded by [CS] Dillon Nickerson, PT 04/07/19 1449      Row Name 04/07/19 1446             Outcome Summary/Treatment Plan (PT)    Anticipated Discharge Disposition (PT)  skilled nursing facility  -CS      Recorded by [CS] Dillon Nickerson, PT 04/07/19 1449        User Key  (r) = Recorded By, (t) = Taken By, (c) = Cosigned By    Initials Name Effective Dates Discipline    MP Olga Rubin RN 04/14/17 -  Nurse    CS Dillon Nickerson, PT 05/14/18 -  PT          Wound 04/03/19 1400 Left posterior ankle pressure injury (Active)   Dressing Appearance open to air 4/7/2019 12:00 AM   Drainage Amount none 4/7/2019 12:00 AM       Wound 04/03/19 1400 medial coccyx MASD (moisture associated skin damage) (Active)   Dressing Appearance open to air 4/7/2019  9:41 AM   Drainage Amount none 4/7/2019  9:41 AM   Dressing Care, Wound open to air 4/7/2019  9:41 AM   Periwound Care, Wound dry periwound area maintained;barrier ointment applied 4/7/2019  9:41 AM       Wound 04/03/19 1400 Right lower;lateral leg (Active)   Dressing Appearance dry;intact 4/7/2019  9:41 AM    Closure JULIANNE 4/7/2019  9:41 AM   Base dressing in place, unable to visualize 4/7/2019  9:41 AM   Drainage Amount none 4/7/2019  9:41 AM   Dressing Care, Wound low-adherent 4/7/2019  9:41 AM       Wound 04/03/19 1400 Left hand (Active)   Dressing Appearance dry;intact 4/7/2019  9:41 AM   Closure JULIANNE 4/7/2019  9:41 AM   Base dressing in place, unable to visualize 4/7/2019  9:41 AM   Drainage Amount none 4/7/2019  9:41 AM           Physical Therapy Education     Title: PT OT SLP Therapies (In Progress)     Topic: Physical Therapy (Done)     Point: Mobility training (Done)     Learning Progress Summary           Patient Acceptance, E,TB, VU by  at 4/7/2019  2:49 PM    Acceptance, E,TB, VU,NR by  at 4/6/2019  4:39 PM    Acceptance, E,D, VU,NR by  at 4/5/2019  2:26 PM    Acceptance, E, NR by MS at 4/4/2019  1:29 PM                   Point: Home exercise program (Done)     Learning Progress Summary           Patient Acceptance, E,TB, VU by  at 4/7/2019  2:49 PM    Acceptance, E,TB, VU,NR by  at 4/6/2019  4:39 PM    Acceptance, E,D, VU,NR by  at 4/5/2019  2:26 PM                   Point: Body mechanics (Done)     Learning Progress Summary           Patient Acceptance, E,TB, VU by  at 4/7/2019  2:49 PM    Acceptance, E,TB, VU,NR by  at 4/6/2019  4:39 PM    Acceptance, E,D, VU,NR by  at 4/5/2019  2:26 PM    Acceptance, E, NR by MS at 4/4/2019  1:29 PM                   Point: Precautions (Done)     Learning Progress Summary           Patient Acceptance, E,TB, VU by  at 4/7/2019  2:49 PM    Acceptance, E,TB, VU,NR by  at 4/6/2019  4:39 PM    Acceptance, E,D, VU,NR by  at 4/5/2019  2:26 PM    Acceptance, E, NR by MS at 4/4/2019  1:29 PM                               User Key     Initials Effective Dates Name Provider Type Discipline     03/07/18 -  Joann Meyers, PTA Physical Therapy Assistant PT    MS 03/04/19 -  Matty, Graciela GARCIA, PT Physical Therapist PT     05/14/18 -  Krishan, Dillon WALLACE  PT Physical Therapist PT                PT Recommendation and Plan  Anticipated Discharge Disposition (PT): skilled nursing facility  Therapy Frequency (PT Clinical Impression): daily  Outcome Summary/Treatment Plan (PT)  Anticipated Discharge Disposition (PT): skilled nursing facility  Plan of Care Reviewed With: patient  Outcome Summary: Pt walked further today, 50' in CAM boot WBAT. Tolerated with mild complaints of weakness.   Outcome Measures     Row Name 04/07/19 1400 04/06/19 1600 04/05/19 1400       How much help from another person do you currently need...    Turning from your back to your side while in flat bed without using bedrails?  3  -CS  3  -SM  3  -SM    Moving from lying on back to sitting on the side of a flat bed without bedrails?  3  -CS  3  -SM  3  -SM    Moving to and from a bed to a chair (including a wheelchair)?  3  -CS  3  -SM  3  -SM    Standing up from a chair using your arms (e.g., wheelchair, bedside chair)?  3  -CS  3  -SM  3  -SM    Climbing 3-5 steps with a railing?  1  -CS  1  -SM  1  -SM    To walk in hospital room?  3  -CS  2  -SM  2  -SM    AM-PAC 6 Clicks Score  16  -CS  15  -SM  15  -SM       Functional Assessment    Outcome Measure Options  AM-PAC 6 Clicks Basic Mobility (PT)  -CS  AM-PAC 6 Clicks Basic Mobility (PT)  -SM  AM-PAC 6 Clicks Basic Mobility (PT)  -SM      User Key  (r) = Recorded By, (t) = Taken By, (c) = Cosigned By    Initials Name Provider Type    Joann Hernandez, PTA Physical Therapy Assistant    Dillon Ojeda, PT Physical Therapist         Time Calculation:   PT Charges     Row Name 04/07/19 1450             Time Calculation    Start Time  1426  -CS      Stop Time  1436  -CS      Time Calculation (min)  10 min  -CS      PT Received On  04/07/19  -CS      PT - Next Appointment  04/08/19  -LOUIE        User Key  (r) = Recorded By, (t) = Taken By, (c) = Cosigned By    Initials Name Provider Type    Dillon Ojeda, PT Physical Therapist         Therapy Charges for Today     Code Description Service Date Service Provider Modifiers Qty    90693115198 HC PT THER PROC EA 15 MIN 4/7/2019 Dillon Nickerson, PT GP 1          PT G-Codes  Outcome Measure Options: AM-PAC 6 Clicks Basic Mobility (PT)  AM-PAC 6 Clicks Score: 16  Score: 12    Dillon Nickerson, PT  4/7/2019

## 2019-04-07 NOTE — PLAN OF CARE
Problem: Fall Risk (Adult)  Goal: Absence of Fall  Outcome: Ongoing (interventions implemented as appropriate)      Problem: Skin Injury Risk (Adult)  Goal: Skin Health and Integrity  Outcome: Ongoing (interventions implemented as appropriate)      Problem: Patient Care Overview  Goal: Plan of Care Review  Outcome: Ongoing (interventions implemented as appropriate)   04/06/19 0544 04/06/19 2116 04/07/19 0747   Coping/Psychosocial   Patient Agreement with Plan of Care agrees --  --    OTHER   Outcome Summary --  --  Pt irritable at the start of shift due to she was not able to get in to bed during shift change and did not want to wear the boot to her RLE when she was assisted to bed. Mood improved. Pure wick did not work as well last HS, pt had several soaked briefs during the night. VSS, safety maintained, will continue to monitor.   Coping/Psychosocial   Plan of Care Reviewed With --  patient --
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
## 2019-04-08 VITALS
HEIGHT: 64 IN | TEMPERATURE: 97.3 F | SYSTOLIC BLOOD PRESSURE: 139 MMHG | DIASTOLIC BLOOD PRESSURE: 74 MMHG | OXYGEN SATURATION: 96 % | BODY MASS INDEX: 37.75 KG/M2 | RESPIRATION RATE: 20 BRPM | HEART RATE: 78 BPM | WEIGHT: 221.12 LBS

## 2019-04-08 LAB
BASOPHILS # BLD AUTO: 0.04 10*3/MM3 (ref 0–0.2)
BASOPHILS NFR BLD AUTO: 0.8 % (ref 0–1.5)
DEPRECATED RDW RBC AUTO: 60 FL (ref 37–54)
EOSINOPHIL # BLD AUTO: 0.3 10*3/MM3 (ref 0–0.4)
EOSINOPHIL NFR BLD AUTO: 5.8 % (ref 0.3–6.2)
ERYTHROCYTE [DISTWIDTH] IN BLOOD BY AUTOMATED COUNT: 16.4 % (ref 12.3–15.4)
HCT VFR BLD AUTO: 27.1 % (ref 34–46.6)
HGB BLD-MCNC: 8.6 G/DL (ref 12–15.9)
IMM GRANULOCYTES # BLD AUTO: 0.01 10*3/MM3 (ref 0–0.05)
IMM GRANULOCYTES NFR BLD AUTO: 0.2 % (ref 0–0.5)
LYMPHOCYTES # BLD AUTO: 2.14 10*3/MM3 (ref 0.7–3.1)
LYMPHOCYTES NFR BLD AUTO: 41.5 % (ref 19.6–45.3)
MCH RBC QN AUTO: 31.9 PG (ref 26.6–33)
MCHC RBC AUTO-ENTMCNC: 31.7 G/DL (ref 31.5–35.7)
MCV RBC AUTO: 100.4 FL (ref 79–97)
MONOCYTES # BLD AUTO: 0.49 10*3/MM3 (ref 0.1–0.9)
MONOCYTES NFR BLD AUTO: 9.5 % (ref 5–12)
NEUTROPHILS # BLD AUTO: 2.18 10*3/MM3 (ref 1.4–7)
NEUTROPHILS NFR BLD AUTO: 42.2 % (ref 42.7–76)
NRBC BLD AUTO-RTO: 0 /100 WBC (ref 0–0)
PLATELET # BLD AUTO: 163 10*3/MM3 (ref 140–450)
PMV BLD AUTO: 9.3 FL (ref 6–12)
RBC # BLD AUTO: 2.7 10*6/MM3 (ref 3.77–5.28)
WBC NRBC COR # BLD: 5.16 10*3/MM3 (ref 3.4–10.8)

## 2019-04-08 PROCEDURE — 97110 THERAPEUTIC EXERCISES: CPT

## 2019-04-08 PROCEDURE — 85025 COMPLETE CBC W/AUTO DIFF WBC: CPT | Performed by: HOSPITALIST

## 2019-04-08 RX ORDER — HYDROCODONE BITARTRATE AND ACETAMINOPHEN 5; 325 MG/1; MG/1
1 TABLET ORAL EVERY 6 HOURS PRN
Qty: 10 TABLET | Refills: 0 | Status: SHIPPED | OUTPATIENT
Start: 2019-04-08 | End: 2019-04-11

## 2019-04-08 RX ORDER — GABAPENTIN 600 MG/1
600 TABLET ORAL 3 TIMES DAILY
Qty: 9 TABLET | Refills: 0 | Status: SHIPPED | OUTPATIENT
Start: 2019-04-08 | End: 2019-04-11

## 2019-04-08 RX ADMIN — MICONAZOLE NITRATE: 2 POWDER TOPICAL at 09:23

## 2019-04-08 RX ADMIN — Medication 1 CAPSULE: at 09:21

## 2019-04-08 RX ADMIN — AMLODIPINE BESYLATE 5 MG: 5 TABLET ORAL at 09:21

## 2019-04-08 RX ADMIN — PETROLATUM: 42 OINTMENT TOPICAL at 09:21

## 2019-04-08 RX ADMIN — LEVETIRACETAM 750 MG: 500 TABLET, FILM COATED ORAL at 09:21

## 2019-04-08 RX ADMIN — NYSTATIN 1 APPLICATION: 100000 OINTMENT TOPICAL at 09:21

## 2019-04-08 RX ADMIN — POLYETHYLENE GLYCOL 3350 17 G: 17 POWDER, FOR SOLUTION ORAL at 09:21

## 2019-04-08 RX ADMIN — ESCITALOPRAM 10 MG: 10 TABLET, FILM COATED ORAL at 09:21

## 2019-04-08 RX ADMIN — MULTIPLE VITAMINS W/ MINERALS TAB 1 TABLET: TAB at 09:21

## 2019-04-08 RX ADMIN — CLOPIDOGREL 75 MG: 75 TABLET, FILM COATED ORAL at 09:21

## 2019-04-08 RX ADMIN — CALCIUM CARBONATE-VITAMIN D TAB 500 MG-200 UNIT 1000 MG: 500-200 TAB at 09:21

## 2019-04-08 RX ADMIN — PANTOPRAZOLE SODIUM 40 MG: 40 TABLET, DELAYED RELEASE ORAL at 09:21

## 2019-04-08 RX ADMIN — GABAPENTIN 300 MG: 300 CAPSULE ORAL at 06:18

## 2019-04-08 NOTE — PROGRESS NOTES
"Daily progress note    Chief complaint  Doing same  No new problems    History of present illness  89-year-old white female who is well-known to our service with multiple medical problems.  Patient is a nursing home resident with history of hypertension seizure disorder depression history of CVA osteoarthritis degenerative disc disease gastroesophageal reflux disease who was recently discharged from the hospital after right ankle wound infection and surrounding cellulitis with recent fracture and surgery presented to [facility] emergency room after the fall after she lost her balance and trying to ambulate independently.  Patient hit on the face and sustaining facial bruises and laceration requiring stitches on the nose.  Patient denies any loss of consciousness and fully alert oriented following commands and family at the bedside.  Patient also found to have low hemoglobin with heme negative on stool test.  Patient is taking iron tablets for chronic anemia.    REVIEW OF SYSTEMS   Review of Systems   Constitutional: Negative for chills and fever.   HENT: Negative for sore throat.   Eyes: Negative for visual disturbance.   Respiratory: Negative for shortness of breath.   Cardiovascular: Positive for chest pain (left chest wall pain).   Gastrointestinal: Negative for nausea and vomiting.   Genitourinary: Negative for difficulty urinating and dysuria.   Musculoskeletal: Negative for back pain and neck pain.   Left foot pain, left shoulder pain   Skin: Positive for wound (nose laceration, left hand skin tear (sustained yesterday)). Negative for rash.   Neurological: Negative for dizziness and headaches.   Psychiatric/Behavioral: Positive for confusion (mild). The patient is not nervous/anxious.     PHYSICAL EXAM   Blood pressure 139/74, pulse 78, temperature 97.3 °F (36.3 °C), temperature source Oral, resp. rate 20, height 162.6 cm (64\"), weight 100 kg (221 lb 1.9 oz), SpO2 96 %, not currently " breastfeeding.    Constitutional: She is oriented to person, place, and time. No distress.   Head: Normocephalic.   Mouth/Throat: Mucous membranes are normal.   Hematoma to the mid forehead, 3cm jagged laceration to the left side of the nose with active bleeding, otherwise no epistaxis, nasal swelling, bruising above left upper lip, otherwise no facial tenderness   Eyes: EOM are normal. Pupils are equal, round, and reactive to light.   Neck: Normal range of motion. Neck supple. No c-spine tenderness   Cardiovascular: Normal rate, regular rhythm and normal heart sounds.   Pulmonary/Chest: Effort normal and breath sounds normal. No respiratory distress. She has no decreased breath sounds. She has no wheezes. She has no rhonchi. She has no rales.   Abdominal: Soft. There is no tenderness. There is no rebound and no guarding.   Musculoskeletal: No t-spine or l-spine tenderness, left shoulder tenderness with FROM, left foot tenderness, mild swelling and erythema to left lower leg (pt is currently being treated for LLE cellulitis), nickel sized healing pressure ulcer to the left heel, healed surgical incision above the right lateral malleolus without signs of infection, skin tear with steri strips in place to left dorsal hand from previous incident, no bony left hand tenderness, NV intact distally to all extremities   Neurological: She is alert and oriented to person, place, and time. She has normal sensation.   nonfocal neuro exam, answers questions appropriately, follows commands   Skin: Skin is warm and dry. There is pallor.   Psychiatric: Affect normal.     LAB RESULTS  Lab Results (last 24 hours)     Procedure Component Value Units Date/Time    CBC & Differential [[id number]] Collected:  04/08/19 0249    Specimen:  Blood Updated:  04/08/19 0403    Narrative:       The following orders were created for panel order CBC & Differential.  Procedure                               Abnormality         Status                      ---------                               -----------         ------                     CBC Auto Differential[[id number]]        Abnormal            Final result                 Please view results for these tests on the individual orders.    CBC Auto Differential [[id number]]  (Abnormal) Collected:  04/08/19 0249    Specimen:  Blood Updated:  04/08/19 0403     WBC 5.16 10*3/mm3      RBC 2.70 10*6/mm3      Hemoglobin 8.6 g/dL      Hematocrit 27.1 %      .4 fL      MCH 31.9 pg      MCHC 31.7 g/dL      RDW 16.4 %      RDW-SD 60.0 fl      MPV 9.3 fL      Platelets 163 10*3/mm3      Neutrophil % 42.2 %      Lymphocyte % 41.5 %      Monocyte % 9.5 %      Eosinophil % 5.8 %      Basophil % 0.8 %      Immature Grans % 0.2 %      Neutrophils, Absolute 2.18 10*3/mm3      Lymphocytes, Absolute 2.14 10*3/mm3      Monocytes, Absolute 0.49 10*3/mm3      Eosinophils, Absolute 0.30 10*3/mm3      Basophils, Absolute 0.04 10*3/mm3      Immature Grans, Absolute 0.01 10*3/mm3      nRBC 0.0 /100 WBC       ,  Imaging Results (last 24 hours)     ** No results found for the last 24 hours. **          Current Facility-Administered Medications:   •  acetaminophen (TYLENOL) tablet 650 mg, 650 mg, Oral, Q4H PRN, Juan Carlos Harper MD, 650 mg at 04/07/19 2146  •  amLODIPine (NORVASC) tablet 5 mg, 5 mg, Oral, Daily, Juan Carlos Harper MD, 5 mg at 04/08/19 0921  •  calcium-vitamin D 500-200 MG-UNIT tablet 1,000 mg, 1,000 mg, Oral, Daily, Juan Carlos Harper MD, 1,000 mg at 04/08/19 0921  •  clopidogrel (PLAVIX) tablet 75 mg, 75 mg, Oral, Daily, Juan Carlos Harper MD, 75 mg at 04/08/19 0921  •  escitalopram (LEXAPRO) tablet 10 mg, 10 mg, Oral, Daily, Juan Carlos Harper MD, 10 mg at 04/08/19 0921  •  FERREX 150 PLUS 150-50-50 MG capsule 1 capsule, 1 capsule, Oral, Daily, Juan Carlos Harper MD, 1 capsule at 04/08/19 0921  •  gabapentin (NEURONTIN) capsule 300 mg, 300 mg, Oral, Q8H, Juan Carlos Harper MD, 300 mg at 04/08/19 0618  •  HYDROcodone-acetaminophen (NORCO) 5-325 MG per  tablet 1 tablet, 1 tablet, Oral, Q6H PRN, Gaetano Harper MD  •  hydrophor (AQUAPHOR) ointment, , Topical, Q12H, Gaetano Harper MD  •  levETIRAcetam (KEPPRA) tablet 750 mg, 750 mg, Oral, BID, Gaetano Harper MD, 750 mg at 04/08/19 0921  •  miconazole (MICOTIN) 2 % powder, , Topical, Q12H, Gaetano Harper MD  •  mirtazapine (REMERON) tablet 15 mg, 15 mg, Oral, Nightly, Gaetano Harper MD, 15 mg at 04/07/19 2147  •  morphine injection 1 mg, 1 mg, Intravenous, Q4H PRN, Gaetano Harper MD  •  multivitamin with minerals 1 tablet, 1 tablet, Oral, Daily, Gaetano Harper MD, 1 tablet at 04/08/19 0921  •  nystatin (MYCOSTATIN) ointment 1 application, 1 application, Topical, Q12H, Gaetano Harper MD, 1 application at 04/08/19 0921  •  ondansetron (ZOFRAN) injection 4 mg, 4 mg, Intravenous, Q6H PRN, Gaetano Harper MD  •  pantoprazole (PROTONIX) EC tablet 40 mg, 40 mg, Oral, Daily, Gaetano Harper MD, 40 mg at 04/08/19 0921  •  polyethylene glycol (MIRALAX) powder 17 g, 17 g, Oral, Daily, Gaetano Harper MD, 17 g at 04/08/19 0921  •  [COMPLETED] Insert peripheral IV, , , Once **AND** sodium chloride 0.9 % flush 10 mL, 10 mL, Intravenous, PRN, Marilyn Frederick, APRN, 10 mL at 04/06/19 2116    ASSESSMENT  Fracture and laceration of nose status post suturing  Contusion of head chest left shoulder and right foot  Status post fall  Chronic anemia   Right ankle wound with recent fracture and surgery  Hypertension  Seizure disorder  Status post CVA  Depression  SSS status post permanent pacemaker  Osteoarthritis  Degenerative disc disease  Gastroesophageal reflux disease    PLAN  Discharge to subacute rehab  Discharge summary dictated    GAETANO HARPER MD

## 2019-04-08 NOTE — PROGRESS NOTES
Continued Stay Note  [facility]     Patient Name: Kim Feldman  MRN: [id number]  Today's Date: 4/8/2019    Admit Date: 4/3/2019    Discharge Plan     Row Name 04/08/19 0757       Plan    Plan  skilled bed at [facility]    Plan Comments  Spoke to patient about DC plan  She states that she wants to go back to [facility] but is worried about it.  Offered referral to [facility] for skilled care as Irene/Radha/[facility] has stated pt could return skilled if needed.  Pt agrees.  message to physician that pt needs to go for short term rehab and that an [employer] pre cert is required.  Notified Irene of pt decision.  [facility] following for pre cert        Discharge Codes    No documentation.             Graciela Borrego RN

## 2019-04-08 NOTE — PROGRESS NOTES
Case Management Discharge Note    Final Note: To personal care apartment at [facility]      No service has been selected for the patient.      Durable Medical Equipment      No service has been selected for the patient.      Dialysis/Infusion      No service has been selected for the patient.      Home Medical Care      No service has been selected for the patient.      Therapy      No service has been selected for the patient.      Community Resources      No service has been selected for the patient.             Final Discharge Disposition Code: 01 - home or self-care

## 2019-04-08 NOTE — DISCHARGE INSTRUCTIONS
Dr. LAWANDA Hill II  [phone]  You should follow-up with Dr. LAWANDA Hill II at the [facility]. The phone number is [phone]. Please call to schedule an appointment at your convenience within 4 weeks.         Follow up with PCP for stitch removal in 1 week

## 2019-04-08 NOTE — DISCHARGE SUMMARY
Discharge summary    Date of admission 4/3/2019  Date of discharge 4/8/2019    Final diagnosis  Fracture and laceration of nose status post suturing  Contusion of head chest left shoulder and right foot  Status post fall  Chronic anemia   Right ankle wound with recent fracture and surgery  Hypertension  Seizure disorder  Status post CVA  Depression  SSS status post permanent pacemaker  Osteoarthritis  Degenerative disc disease  Gastroesophageal reflux disease    Discharge medications    Current Facility-Administered Medications:   •  amLODIPine (NORVASC) tablet 5 mg, 5 mg, Oral, Daily, Juan Carlos Harper MD, 5 mg at 04/08/19 0921  •  calcium-vitamin D 500-200 MG-UNIT tablet 1,000 mg, 1,000 mg, Oral, Daily, Juan Carlos Harper MD, 1,000 mg at 04/08/19 0921  •  clopidogrel (PLAVIX) tablet 75 mg, 75 mg, Oral, Daily, Juan Carlos Harper MD, 75 mg at 04/08/19 0921  •  escitalopram (LEXAPRO) tablet 10 mg, 10 mg, Oral, Daily, Juan Carlos Harper MD, 10 mg at 04/08/19 0921  •  FERREX 150 PLUS 150-50-50 MG capsule 1 capsule, 1 capsule, Oral, Daily, Juan Carlos Harper MD, 1 capsule at 04/08/19 0921  •  gabapentin (NEURONTIN) capsule 300 mg, 300 mg, Oral, Q8H, Juan Carlos Harper MD, 300 mg at 04/08/19 0618  •  HYDROcodone-acetaminophen (NORCO) 5-325 MG per tablet 1 tablet, 1 tablet, Oral, Q6H PRN, Juan Carlos Harper MD  •  hydrophor (AQUAPHOR) ointment, , Topical, Q12H, Juan Carlos Harper MD  •  levETIRAcetam (KEPPRA) tablet 750 mg, 750 mg, Oral, BID, Juan Carlos Harper MD, 750 mg at 04/08/19 0921  •  miconazole (MICOTIN) 2 % powder, , Topical, Q12H, Juan Carlos Harper MD  •  mirtazapine (REMERON) tablet 15 mg, 15 mg, Oral, Nightly, Juan Carlos Harper MD, 15 mg at 04/07/19 2147  •  multivitamin with minerals 1 tablet, 1 tablet, Oral, Daily, Juan Carlos Harper MD, 1 tablet at 04/08/19 0921  •  nystatin (MYCOSTATIN) ointment 1 application, 1 application, Topical, Q12H, Juan Carlos Harper MD, 1 application at 04/08/19 0921  •  pantoprazole (PROTONIX) EC tablet 40 mg, 40 mg, Oral, Daily, Leroy  MD Gaetano, 40 mg at 04/08/19 0921  •  polyethylene glycol (MIRALAX) powder 17 g, 17 g, Oral, Daily, Gaetano Harper MD, 17 g at 04/08/19 0921  •  [COMPLETED] Insert peripheral IV, , , Once **AND** sodium chloride 0.9 % flush 10 mL, 10 mL, Intravenous, PRN, Marilyn Frederick, APRN, 10 mL at 04/06/19 2116     Consult obtained  Cardiology  Orthopedic surgery    Procedures  None    Hospital course  59-year-old white female who is well-known to our service with multiple medical problems admitted through emergency room after the fall.  According to patient she lost her balance and fell and suffered from facial laceration bruises.  Patient evaluated in ER admit for management.  Patient has history of stroke on Plavix also have a pacemaker in place.  Patient required blood transfusion but she is Hemoccult negative and her Plavix was held for few days and restarted and there is no drop in hemoglobin.  Patient bruises healing slowly and also evaluated by orthopedic surgery to change her weightbearing status on right lower extremity and she will be discharged to subacute rehab for PT OT nursing care.  Patient remained DNR throughout hospital course.  Patient hemoglobin at the time of discharge is 8.6    Discharge diet regular    Activity per PT OT    Medication as above    Further care per accepting physician at subacute rehab    GAETANO HARPER MD

## 2019-04-08 NOTE — PLAN OF CARE
Problem: Patient Care Overview  Goal: Plan of Care Review  Outcome: Ongoing (interventions implemented as appropriate)   04/08/19 2168   Plan of Care Review   Progress improving   OTHER   Outcome Summary Pt tolerated treatment well this date. Increased gait distance to 65ft w/ standard walker and CGA x2. Pt c/o LBP after about long-term during ambulation. instructed on several exercises when returning to chair. PT will continue to address functional mobility deficits as tolerated.   Coping/Psychosocial   Plan of Care Reviewed With patient

## 2019-04-08 NOTE — PLAN OF CARE
Problem: Fall Risk (Adult)  Goal: Absence of Fall  Outcome: Ongoing (interventions implemented as appropriate)      Problem: Skin Injury Risk (Adult)  Goal: Skin Health and Integrity  Outcome: Ongoing (interventions implemented as appropriate)      Problem: Patient Care Overview  Goal: Plan of Care Review  Outcome: Ongoing (interventions implemented as appropriate)   04/08/19 0324   Plan of Care Review   Progress improving   OTHER   Outcome Summary VSS, NO ACUTE DISTRESS NOTED, PT UP WITH ONE ASSIST, TOLERATING WELL, SAFETY MAINTAINED, CONTINUE PLAN OF CARE   Coping/Psychosocial   Plan of Care Reviewed With patient     Goal: Individualization and Mutuality  Outcome: Ongoing (interventions implemented as appropriate)    Goal: Discharge Needs Assessment  Outcome: Ongoing (interventions implemented as appropriate)

## 2019-04-08 NOTE — THERAPY TREATMENT NOTE
Acute Care - Physical Therapy Treatment Note  [facility]     Patient Name: Kim Feldman  : 3/13/1930  MRN: [id number]  Today's Date: 2019     Date of Referral to PT: 19  Referring Physician: Dr. Harper    Admit Date: 4/3/2019    Visit Dx:    ICD-10-CM ICD-9-CM   1. Anemia, unspecified type D64.9 285.9   2. Open fracture of nasal bone, initial encounter S02.2XXB 802.1   3. Laceration of nose, initial encounter S01.21XA 873.20   4. Contusion of head, unspecified part of head, initial encounter S00.93XA 920   5. Chest wall contusion, left, initial encounter S20.212A 922.1   6. Contusion of left shoulder, initial encounter S40.012A 923.00   7. Contusion of left foot, initial encounter S90.32XA 924.20   8. Fall, initial encounter W19.XXXA E888.9   9. 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)   • Vitamin D deficiency   • History of sick sinus syndrome   • Intertrigo   • Generalized convulsive seizures (CMS/HCC)   • Change in bowel habits   • Zenker diverticulum   • History of anxiety   • 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HCC)   • Thrombocytopenia (CMS/HCC)   • B12 deficiency   • Cardiac pacemaker in situ   • Chronic venous insufficiency   • Arthritis   • S/P knee replacement   • Chronic bilateral low back pain without sciatica   • Essential hypertension   • Hiatal hernia   • Zenker's diverticulum   • Chronic idiopathic constipation   • Pressure sore on buttocks   • Somnolence   • Closed trimalleolar fracture of right ankle   • Surgical wound infection   • History of failed arthroplasty of ankle   • History of CVA in adulthood       Therapy  Treatment    Rehabilitation Treatment Summary     Row Name 04/08/19 1025             Treatment Time/Intention    Discipline  physical therapy assistant  -SM      Document Type  therapy note (daily note)  -SM      Subjective Information  complains of;pain  -SM      Mode of Treatment  physical therapy  -SM      Patient Effort  good  -SM      Existing Precautions/Restrictions  fall;brace worn when out of bed CAM boot on R LE when OOB; FWB  -SM      Recorded by [] Joann Meyers [facility] 04/08/19 1058      Row Name 04/08/19 1025             Cognitive Assessment/Intervention- PT/OT    Orientation Status (Cognition)  oriented x 4  -SM      Follows Commands (Cognition)  WNL  -SM      Personal Safety Interventions  fall prevention program maintained;gait belt;nonskid shoes/slippers when out of bed  -SM      Recorded by [] Joann Meyers [facility] 04/08/19 1058      Row Name 04/08/19 1025             Bed Mobility Assessment/Treatment    Bed Mobility Assessment/Treatment  supine-sit  -SM      Supine-Sit [place] (Bed Mobility)  supervision  -SM      Sit-Supine [place] (Bed Mobility)  not tested  -SM      Assistive Device (Bed Mobility)  bed rails;head of bed elevated  -SM      Recorded by [] Joann Meyers [facility] 04/08/19 1058      Row Name 04/08/19 1025             Transfer Assessment/Treatment    Transfer Assessment/Treatment  sit-stand transfer;stand-sit transfer;toilet transfer  -SM      Recorded by [] Joann Meyers [facility] 04/08/19 1058      Row Name 04/08/19 1025             Sit-Stand Transfer    Sit-Stand [place] (Transfers)  minimum assist (75% patient effort)  -      Assistive Device (Sit-Stand Transfers)  walker, standard  -SM      Recorded by [SM] Joann Meyers [facility] 04/08/19 1058      Row Name 04/08/19 1025             Stand-Sit Transfer    Stand-Sit [place] (Transfers)  contact guard  -      Assistive Device (Stand-Sit Transfers)  walker, standard  -SM      Recorded by  [] Joann Meyers \[facility]\"" 04/08/19 1058      Row Name 04/08/19 1025             Toilet Transfer    Type (Toilet Transfer)  sit-stand;stand-sit  -SM      [place] Level (Toilet Transfer)  minimum assist (75% patient effort)  -      Assistive Device (Toilet Transfer)  commode, bedside without drop arms;walker, standard  -SM      Recorded by [] Joann Meyers \[facility]\"" 04/08/19 1058      Row Name 04/08/19 1025             Gait/Stairs Assessment/Training    [place] Level (Gait)  contact guard  -      Assistive Device (Gait)  walker, standard  -      Distance in Feet (Gait)  65  -SM      Pattern (Gait)  step-to  -SM      Deviations/Abnormal Patterns (Gait)  antalgic;jose decreased;stride length decreased  -SM      Bilateral Gait Deviations  forward flexed posture  -SM      Right Sided Gait Deviations  weight shift ability decreased  -      Comment (Gait/Stairs)  c/o LBP  -SM      Recorded by [] Joann Meyers \[facility]\"" 04/08/19 1058      Row Name 04/08/19 1025             Motor Skills Assessment/Interventions    Additional Documentation  Therapeutic Exercise (Group)  -SM      Recorded by [] Joann Meyers \[facility]\"" 04/08/19 1058      Row Name 04/08/19 1025             Therapeutic Exercise    Lower Extremity (Therapeutic Exercise)  gluteal sets;LAQ (long arc quad), bilateral;marching while seated;quad sets, bilateral  -SM      Lower Extremity Range of Motion (Therapeutic Exercise)  ankle dorsiflexion/plantar flexion, bilateral  -      Exercise Type (Therapeutic Exercise)  AROM (active range of motion)  -      Position (Therapeutic Exercise)  seated  -      Sets/Reps (Therapeutic Exercise)  15 reps  -SM      Recorded by [] Joann Meyers \[facility]\"" 04/08/19 1058      Row Name 04/08/19 1025             Positioning and Restraints    Pre-Treatment Position  in bed  -SM      Post Treatment Position  chair  -SM      In Chair  reclined;call light within reach;encouraged to call for assist   -SM      Recorded by [SM] Joann Meyers PTA 04/08/19 1058      Row Name 04/08/19 1025             Pain Scale: Numbers Pre/Post-Treatment    Pain Scale: Numbers, Pretreatment  0/10 - no pain  -SM      Pain Scale: Numbers, Post-Treatment  4/10  -SM      Pain Location - Orientation  lower  -SM      Pain Location  back  -SM      Pain Intervention(s)  Repositioned;Ambulation/increased activity;Rest  -SM      Recorded by [SM] Joann Meyers PTA 04/08/19 1058      Row Name                Wound 04/03/19 1400 Left posterior ankle pressure injury    Wound - Properties Group Date first assessed: 04/03/19 [MP] Time first assessed: 1400 [MP] Present On Admission : yes;picture taken [MP] Side: Left [MP] Orientation: posterior [MP] Location: ankle [MP] Type: pressure injury [MP] Recorded by:  [MP] Olga Rubin RN 04/03/19 1705    Row Name                Wound 04/03/19 1400 medial coccyx MASD (moisture associated skin damage)    Wound - Properties Group Date first assessed: 04/03/19 [MP] Time first assessed: 1400 [MP] Orientation: medial [MP] Location: coccyx [MP] Type: MASD (moisture associated skin damage) [MP] Recorded by:  [GERMAINE] Olga Rubin RN 04/03/19 1707    Row Name                Wound 04/03/19 1400 Right lower;lateral leg    Wound - Properties Group Date first assessed: 04/03/19 [MP] Time first assessed: 1400 [MP] Side: Right [MP] Orientation: lower;lateral [MP] Location: leg [MP] Recorded by:  [GERMAINE] Olga Rubin RN 04/03/19 1723    Row Name                Wound 04/03/19 1400 Left hand    Wound - Properties Group Date first assessed: 04/03/19 [MP] Time first assessed: 1400 [MP] Side: Left [MP] Location: hand [MP] Recorded by:  [GERMAINE] Olga Rubin RN 04/1934      User Key  (r) = Recorded By, (t) = Taken By, (c) = Cosigned By    Initials Name Effective Dates Discipline     Joann Meyers PTA 03/07/18 -  PT    MP Olga Rubin RN 04/14/17 -  Nurse          Wound 04/03/19 1400 Left  posterior ankle pressure injury (Active)   Dressing Appearance open to air 4/8/2019  8:04 AM       Wound 04/03/19 1400 medial coccyx MASD (moisture associated skin damage) (Active)   Dressing Appearance open to air 4/8/2019  8:04 AM   Drainage Amount none 4/8/2019  8:04 AM   Care, Wound barrier applied 4/8/2019  8:04 AM   Dressing Care, Wound open to air 4/8/2019  8:04 AM       Wound 04/03/19 1400 Right lower;lateral leg (Active)   Dressing Appearance dry;intact 4/8/2019  8:04 AM   Closure JULIANNE 4/8/2019  8:04 AM   Base dressing in place, unable to visualize 4/8/2019  8:04 AM   Drainage Amount none 4/8/2019  8:04 AM   Care, Wound cleansed with;soap and water 4/8/2019  8:04 AM       Wound 04/03/19 1400 Left hand (Active)   Dressing Appearance dry;intact 4/8/2019  8:04 AM   Closure JULIANNE 4/8/2019 12:12 AM   Base dressing in place, unable to visualize 4/8/2019 12:12 AM   Drainage Amount none 4/8/2019  8:04 AM           Physical Therapy Education     Title: PT OT SLP Therapies (In Progress)     Topic: Physical Therapy (Done)     Point: Mobility training (Done)     Learning Progress Summary           Patient Acceptance, E,TB,D, VU,NR by  at 4/8/2019 10:58 AM    Acceptance, E,TB, VU by LOUIE at 4/7/2019  2:49 PM    Acceptance, E,TB, VU,NR by  at 4/6/2019  4:39 PM    Acceptance, E,D, VU,NR by  at 4/5/2019  2:26 PM    Acceptance, E, NR by MS at 4/4/2019  1:29 PM                   Point: Home exercise program (Done)     Learning Progress Summary           Patient Acceptance, E,TB,D, VU,NR by  at 4/8/2019 10:58 AM    Acceptance, E,TB, VU by CS at 4/7/2019  2:49 PM    Acceptance, E,TB, VU,NR by  at 4/6/2019  4:39 PM    Acceptance, E,D, VU,NR by  at 4/5/2019  2:26 PM                   Point: Body mechanics (Done)     Learning Progress Summary           Patient Acceptance, E,TB,D, VU,NR by  at 4/8/2019 10:58 AM    Acceptance, DYAN CAMP VU by  at 4/7/2019  2:49 PM    Acceptance, DYAN CAMP VU, NR by DANIELLE at 4/6/2019  4:39 PM     Acceptance, E,D, VU,NR by  at 4/5/2019  2:26 PM    Acceptance, E, NR by MS at 4/4/2019  1:29 PM                   Point: Precautions (Done)     Learning Progress Summary           Patient Acceptance, E,TB,D, VU,NR by  at 4/8/2019 10:58 AM    Acceptance, E,TB, VU by  at 4/7/2019  2:49 PM    Acceptance, E,TB, VU,NR by  at 4/6/2019  4:39 PM    Acceptance, E,D, VU,NR by  at 4/5/2019  2:26 PM    Acceptance, E, NR by MS at 4/4/2019  1:29 PM                               User Key     Initials Effective Dates Name Provider Type Discipline     03/07/18 -  Joann Meyers, PTA Physical Therapy Assistant PT    MS 03/04/19 -  Graciela Starr PT Physical Therapist PT     05/14/18 -  Dillon Nickerson, PT Physical Therapist PT                PT Recommendation and Plan     Plan of Care Reviewed With: patient  Progress: improving  Outcome Summary: Pt tolerated treatment well this date. Increased gait distance to 65ft w/ standard walker and CGA x2. Pt c/o LBP after about alf during ambulation. instructed on several exercises when returning to chair. PT will continue to address functional mobility deficits as tolerated.  Outcome Measures     Row Name 04/08/19 1100 04/07/19 1400 04/06/19 1600       How much help from another person do you currently need...    Turning from your back to your side while in flat bed without using bedrails?  3  -SM  3  -CS  3  -SM    Moving from lying on back to sitting on the side of a flat bed without bedrails?  3  -SM  3  -CS  3  -SM    Moving to and from a bed to a chair (including a wheelchair)?  3  -SM  3  -CS  3  -SM    Standing up from a chair using your arms (e.g., wheelchair, bedside chair)?  3  -SM  3  -CS  3  -SM    Climbing 3-5 steps with a railing?  1  -SM  1  -CS  1  -SM    To walk in hospital room?  3  -SM  3  -CS  2  -SM    AM-PAC 6 Clicks Score  16  -SM  16  -CS  15  -SM       Functional Assessment    Outcome Measure Options  AM-PAC 6 Clicks Basic Mobility (PT)   -SM  AM-PAC 6 Clicks Basic Mobility (PT)  -  AM-PAC 6 Clicks Basic Mobility (PT)  -    Row Name 04/05/19 1400             How much help from another person do you currently need...    Turning from your back to your side while in flat bed without using bedrails?  3  -SM      Moving from lying on back to sitting on the side of a flat bed without bedrails?  3  -SM      Moving to and from a bed to a chair (including a wheelchair)?  3  -SM      Standing up from a chair using your arms (e.g., wheelchair, bedside chair)?  3  -SM      Climbing 3-5 steps with a railing?  1  -SM      To walk in hospital room?  2  -SM      AM-PAC 6 Clicks Score  15  -         Functional Assessment    Outcome Measure Options  AM-PAC 6 Clicks Basic Mobility (PT)  -        User Key  (r) = Recorded By, (t) = Taken By, (c) = Cosigned By    Initials Name Provider Type     Joann Meyers PTA Physical Therapy Assistant    Dillon Ojeda, PT Physical Therapist         Time Calculation:   PT Charges     Row Name 04/08/19 1101             Time Calculation    Start Time  1025  -      Stop Time  1052  -      Time Calculation (min)  27 min  -      PT Received On  04/08/19  -      PT - Next Appointment  04/09/19  -        User Key  (r) = Recorded By, (t) = Taken By, (c) = Cosigned By    Initials Name Provider Type    Joann Hernandez PTA Physical Therapy Assistant        Therapy Charges for Today     Code Description Service Date Service Provider Modifiers Qty    47543807676 HC PT THER PROC EA 15 MIN 4/8/2019 Joann Meyers PTA GP 2    72471668462 HC PT THER SUPP EA 15 MIN 4/8/2019 Joann Meyers PTA GP 1          PT G-Codes  Outcome Measure Options: AM-PAC 6 Clicks Basic Mobility (PT)  AM-PAC 6 Clicks Score: 16  Score: 12    Joann Meyers PTA  4/8/2019

## 2019-04-22 ENCOUNTER — OFFICE VISIT (OUTPATIENT)
Dept: CARDIOLOGY | Facility: CLINIC | Age: 84
End: 2019-04-22

## 2019-04-22 ENCOUNTER — CLINICAL SUPPORT NO REQUIREMENTS (OUTPATIENT)
Dept: CARDIOLOGY | Facility: CLINIC | Age: 84
End: 2019-04-22

## 2019-04-22 VITALS
HEART RATE: 69 BPM | SYSTOLIC BLOOD PRESSURE: 132 MMHG | HEIGHT: 64 IN | WEIGHT: 210 LBS | DIASTOLIC BLOOD PRESSURE: 72 MMHG | BODY MASS INDEX: 35.85 KG/M2

## 2019-04-22 DIAGNOSIS — I87.2 CHRONIC VENOUS INSUFFICIENCY: ICD-10-CM

## 2019-04-22 DIAGNOSIS — Z95.0 CARDIAC PACEMAKER IN SITU: ICD-10-CM

## 2019-04-22 DIAGNOSIS — Z86.79 HISTORY OF SICK SINUS SYNDROME: Primary | ICD-10-CM

## 2019-04-22 DIAGNOSIS — I10 ESSENTIAL HYPERTENSION: ICD-10-CM

## 2019-04-22 DIAGNOSIS — I49.5 SICK SINUS SYNDROME (HCC): Primary | ICD-10-CM

## 2019-04-22 PROCEDURE — 93280 PM DEVICE PROGR EVAL DUAL: CPT | Performed by: INTERNAL MEDICINE

## 2019-04-22 PROCEDURE — 99213 OFFICE O/P EST LOW 20 MIN: CPT | Performed by: INTERNAL MEDICINE

## 2019-04-22 RX ORDER — GABAPENTIN 600 MG/1
600 TABLET ORAL 3 TIMES DAILY
Status: ON HOLD | COMMUNITY
End: 2020-01-01 | Stop reason: SDUPTHER

## 2019-04-22 RX ORDER — AZITHROMYCIN 250 MG/1
250 TABLET, FILM COATED ORAL DAILY
COMMUNITY
End: 2020-01-01

## 2019-04-22 RX ORDER — AMOXICILLIN AND CLAVULANATE POTASSIUM 875; 125 MG/1; MG/1
1 TABLET, FILM COATED ORAL 2 TIMES DAILY
COMMUNITY
End: 2020-01-01

## 2019-04-22 NOTE — PROGRESS NOTES
Date of Office Visit: 2019  Encounter Provider: Bandar Ruiz MD  Place of Service: [facility] CARDIOLOGY  Patient Name: Kim Feldman  :3/13/1930    Chief Complaint   Patient presents with   • Irregular Heart Beat   :     HPI: Kim Feldman is a 89 y.o. female who presents today to follow-up.     She has a history of sick sinus syndrome and has a permanent pacemaker. She has a history of hypertension but has had her medications slowly decreased due to improvement in her BP.  She has been admitted several times with pneumonia and UTIss.  She has been diagnosed with aspiration which was due to a Zenker's diverticulum, which was repaired in .  In 2017 she was admitted and there was concern for a stroke, but this was not the case.    In 2018 she was admitted after a mechanical fall.  She required several surgeries for this.  Then she fell and broke her nose. She required two units of RBCs.    She has chronic lower extremity edema, which is unchanged.  She has not had orthopnea, PND, palpitations, or chest pain.  She is bruising and bleeding easily.    Past Medical History:   Diagnosis Date   • Anemia    • At risk for falls    • At risk for sleep apnea 2018   • Atrial fibrillation (CMS/HCC)    • Bulging lumbar disc    • Cellulitis     BILATERAL LEGS   • Chronic back pain    • Chronic cough    • Chronic UTI    • Chronic venous insufficiency    • 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HCC)    • DDD (degenerative disc disease), lumbosacral    • Dementia without behavioral disturbance     moderate   • Depression, endogenous (CMS/HCC)    • Disc degeneration, lumbar    • Dysphagia    • GERD (gastroesophageal reflux disease)    • Hiatal hernia    • History of anxiety    • History of aspiration pneumonitis    • History of cerebral artery occlusion     CVA (following TIA), 10/10, treated with tPA   • History of CVA in adulthood 2019   • History of herpes zoster     • History of ingrowing nail    • History of osteoporosis    • History of poliomyelitis     child   • History of sciatica    • History of sick sinus syndrome     s/p PPM   • History of transient cerebral ischemia     followed by stroke in 10/2010. BIBI was normal.   • History of Zenker's diverticulum removal 2016   • HX: long term anticoagulant use    • Hyperlipidemia    • Hypertension     NO CURRENT MEDICATION   • Hyponatremia    • Intertrigo    • Lumbar radiculopathy    • Morbid obesity (CMS/HCC)    • MRSA (methicillin resistant Staphylococcus aureus)     LEFT FOOT SPREAD TO RIGHT ANKLE; DR TUBBS AWARE   • Neuralgia     with diplopia secondary to facial shingles   • Nonepileptic episode (CMS/HCC)    • Osteoarthritis of right knee    • Pacemaker    • Pneumonia    • Seeing double     secondary to shingles on the face also with neuralgia   • Seizures (CMS/HCC)    • 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)    • Spinal stenosis    • Vitamin D deficiency    • Zenker's diverticulum        Past Surgical History:   Procedure Laterality Date   • ANKLE OPEN REDUCTION INTERNAL FIXATION Right 11/17/2018    Procedure: ANKLE OPEN REDUCTION INTERNAL FIXATION;  Surgeon: Cristian Tubbs II, MD;  Location: [facility];  Service: Orthopedics   • CARDIAC PACEMAKER PLACEMENT      secondary to SSS, placed in 2004, with gen chng 2014; [employer] dual DOO   • CATARACT EXTRACTION W/ INTRAOCULAR LENS IMPLANT Bilateral    • COLONOSCOPY     • HAND SURGERY Right    • HARDWARE REMOVAL Right 2/19/2019    Procedure: RT ANKLE HARDWARE REMOVAL;  Surgeon: Cristian Tubbs II, MD;  Location: [facility];  Service: Orthopedics   • KNEE ARTHROPLASTY Left    • LAMINECTOMY FOR IMPLANTATION / PLACEMENT NEUROSTIMULATOR ELECTRODES     • LUMBAR LAMINECTOMY      L-3 L4 L4 L5   • TONSILLECTOMY     • ZENKERS DIVERTICULECTOMY N/A 9/27/2016    Procedure: ENDOSCOPIC REMOVAL OF ZENKERS DIVERTICULUM W/ WERDASCOPE;  Surgeon:  Oswald Barht MD;  Location: [facility] OR;  Service:    • ZENKERS DIVERTICULECTOMY N/A 2017    Procedure: ESOPHAGOSCOPY;  Surgeon: Oswald Barth MD;  Location: [facility] OR;  Service:        Social History     Socioeconomic History   • Marital status:      Spouse name: Not on file   • Number of children: 3   • Years of education: Not on file   • Highest education level: Not on file   Occupational History   • Occupation: Retired   Tobacco Use   • Smoking status: Former Smoker     Packs/day: 1.00     Years: 40.00     Pack years: 40.00     Types: Cigarettes     Last attempt to quit:      Years since quittin.3   • Smokeless tobacco: Never Used   • Tobacco comment: caffeine use: one cup of coffee in the morning.    Substance and Sexual Activity   • Alcohol use: Yes     Comment: 2 PER WEEK   • Drug use: No   • Sexual activity: Defer       Family History   Problem Relation Age of Onset   • Cancer Daughter    • Malig Hyperthermia Neg Hx        Review of Systems   Constitution: Positive for malaise/fatigue.   Eyes: Positive for double vision.   Cardiovascular: Positive for dyspnea on exertion and leg swelling. Negative for chest pain and palpitations.   Respiratory: Positive for wheezing.    Endocrine: Positive for cold intolerance.   Musculoskeletal: Positive for joint pain.   Neurological: Positive for excessive daytime sleepiness. Negative for dizziness and light-headedness.   Psychiatric/Behavioral: Positive for depression.   All other systems reviewed and are negative.      Allergies   Allergen Reactions   • Lipitor [Atorvastatin] Confusion   • Penicillins Hives     Has previously tolerated ceftriaxone         Current Outpatient Medications:   •  amLODIPine (NORVASC) 5 MG tablet, Take 5 mg by mouth Daily. Hold for BP <100/60, Disp: , Rfl:   •  artificial tears (LUBRIFRESH P.M.) ointment ophthalmic ointment, Administer 1 application to both eyes every night at bedtime., Disp: , Rfl:   •  B  Complex Vitamins (VITAMIN B COMPLEX) capsule capsule, Take 1 capsule by mouth Daily., Disp: , Rfl:   •  Calcium-Vitamin D-Vitamin K (VIACTIV) 500-500-40 MG-UNT-MCG chewable tablet, Chew 1 tablet Daily., Disp: , Rfl:   •  clopidogrel (PLAVIX) 75 MG tablet, Take 75 mg by mouth Daily., Disp: , Rfl:   •  escitalopram (LEXAPRO) 10 MG tablet, Take 10 mg by mouth Daily., Disp: , Rfl:   •  Fe Asp Gly-Fe Polysac-Suc Ac-C (FERREX 150 PLUS) 150-50-50 MG capsule, Take 1 capsule by mouth 3 (Three) Times a Day., Disp: , Rfl:   •  levETIRAcetam (KEPPRA) 750 MG tablet, Take 1 tablet by mouth 2 (Two) Times a Day., Disp: 60 tablet, Rfl: 11  •  mirtazapine (REMERON) 15 MG tablet, Take 15 mg by mouth Every Night., Disp: , Rfl:   •  Multiple Vitamins-Minerals (CERTAVITE/ANTIOXIDANTS) tablet, Take 1 tablet by mouth Daily., Disp: , Rfl:   •  nystatin (MYCOSTATIN) 643820 UNIT/GM ointment, Apply 1 application topically to the appropriate area as directed 2 (Two) Times a Day. Apply under bilateral breasts, Disp: , Rfl:   •  pantoprazole (PROTONIX) 40 MG EC tablet, Take 40 mg by mouth Daily., Disp: , Rfl:   •  polyethylene glycol (MIRALAX) packet, Take 17 g by mouth Daily As Needed., Disp: , Rfl:      Objective:     There were no vitals filed for this visit.  There is no height or weight on file to calculate BMI.    Physical Exam   Constitutional: She is oriented to person, place, and time.   Obese, in a wheelchair     HENT:   Head: Normocephalic.   Nose: Nose normal.   Mouth/Throat: Oropharynx is clear and moist.   Eyes: Conjunctivae and EOM are normal. Pupils are equal, round, and reactive to light.   Neck: Normal range of motion.   Cannot assess for JVD due to habitus   Cardiovascular: Normal rate, regular rhythm, normal heart sounds and intact distal pulses.   No murmur heard.  Pulmonary/Chest: Effort normal.   Abdominal: Soft.   Obesity limits abdominal exam   Musculoskeletal: Normal range of motion. She exhibits edema (doughy nonpitting  edema).   Neurological: She is alert and oriented to person, place, and time. No cranial nerve deficit.   Skin: Skin is warm and dry. No rash noted.   Psychiatric: She has a normal mood and affect. Her behavior is normal. Judgment and thought content normal.   Vitals reviewed.      Procedures      Assessment:       Diagnosis Plan   1. History of sick sinus syndrome     2. Cardiac pacemaker in situ     3. Chronic venous insufficiency     4. Essential hypertension            Plan:       1/2.  She has SSS s/p PPM.  It was checked today; there were three atrial high rates but the longest is 1.5 minutes.  No changes are being made based on this.    3.  This is stable.     4.  She has a history of HTN but her BP has steadily declined over the years and she is not medicated.    I stopped clopidogrel and will use aspirin 81mg daily instead, as she's just too bruised.    Sincerely,       Bandar Ruiz MD

## 2019-07-11 ENCOUNTER — OFFICE VISIT (OUTPATIENT)
Dept: ORTHOPEDIC SURGERY | Facility: CLINIC | Age: 84
End: 2019-07-11

## 2019-07-11 VITALS — TEMPERATURE: 98.1 F | HEIGHT: 64 IN | WEIGHT: 235 LBS | BODY MASS INDEX: 40.12 KG/M2

## 2019-07-11 DIAGNOSIS — M25.552 HIP PAIN, LEFT: Primary | ICD-10-CM

## 2019-07-11 DIAGNOSIS — M70.62 TROCHANTERIC BURSITIS OF LEFT HIP: ICD-10-CM

## 2019-07-11 PROCEDURE — 20610 DRAIN/INJ JOINT/BURSA W/O US: CPT | Performed by: ORTHOPAEDIC SURGERY

## 2019-07-11 PROCEDURE — 99213 OFFICE O/P EST LOW 20 MIN: CPT | Performed by: ORTHOPAEDIC SURGERY

## 2019-07-11 PROCEDURE — 73502 X-RAY EXAM HIP UNI 2-3 VIEWS: CPT | Performed by: ORTHOPAEDIC SURGERY

## 2019-07-11 RX ORDER — METHYLPREDNISOLONE ACETATE 80 MG/ML
80 INJECTION, SUSPENSION INTRA-ARTICULAR; INTRALESIONAL; INTRAMUSCULAR; SOFT TISSUE
Status: COMPLETED | OUTPATIENT
Start: 2019-07-11 | End: 2019-07-11

## 2019-07-11 RX ORDER — FUROSEMIDE 40 MG/1
20 TABLET ORAL 2 TIMES DAILY
COMMUNITY
End: 2020-01-01 | Stop reason: HOSPADM

## 2019-07-11 RX ORDER — METOLAZONE 5 MG/1
5 TABLET ORAL DAILY
COMMUNITY
End: 2020-01-01 | Stop reason: HOSPADM

## 2019-07-11 RX ADMIN — METHYLPREDNISOLONE ACETATE 80 MG: 80 INJECTION, SUSPENSION INTRA-ARTICULAR; INTRALESIONAL; INTRAMUSCULAR; SOFT TISSUE at 15:11

## 2019-07-11 NOTE — PROGRESS NOTES
Patient: Kim Feldman  YOB: 1930 89 y.o. female  Medical Record Number: [id number]    Chief Complaints:   Chief Complaint   Patient presents with   • Left Hip - Establish Care       History of Present Illness:Kim Feldman is a 89 y.o. female who presents with  Left lateral hip pain-  Worse over last 2 months, moderate too severe later ache worse when laying on side    Allergies:   Allergies   Allergen Reactions   • Lipitor [Atorvastatin] Confusion   • Penicillins Hives     Has previously tolerated ceftriaxone       Medications:   Current Outpatient Medications   Medication Sig Dispense Refill   • amLODIPine (NORVASC) 5 MG tablet Take 5 mg by mouth Daily. Hold for BP <100/60     • artificial tears (LUBRIFRESH P.M.) ointment ophthalmic ointment Administer 1 application to both eyes every night at bedtime.     • B Complex Vitamins (VITAMIN B COMPLEX) capsule capsule Take 1 capsule by mouth Daily.     • Calcium-Vitamin D-Vitamin K (VIACTIV) 500-500-40 MG-UNT-MCG chewable tablet Chew 1 tablet Daily.     • clopidogrel (PLAVIX) 75 MG tablet Take 75 mg by mouth Daily.     • escitalopram (LEXAPRO) 10 MG tablet Take 10 mg by mouth Daily.     • Fe Asp Gly-Fe Polysac-Suc Ac-C (FERREX 150 PLUS) 150-50-50 MG capsule Take 1 capsule by mouth 3 (Three) Times a Day.     • furosemide (LASIX) 40 MG tablet Take 40 mg by mouth 2 (Two) Times a Day.     • gabapentin (NEURONTIN) 300 MG capsule Take 300 mg by mouth Daily.     • levETIRAcetam (KEPPRA) 750 MG tablet Take 1 tablet by mouth 2 (Two) Times a Day. 60 tablet 11   • metOLazone (ZAROXOLYN) 10 MG tablet Take 10 mg by mouth Daily.     • mirtazapine (REMERON) 15 MG tablet Take 15 mg by mouth Every Night.     • Multiple Vitamins-Minerals (CERTAVITE/ANTIOXIDANTS) tablet Take 1 tablet by mouth Daily.     • nystatin (MYCOSTATIN) 640145 UNIT/GM ointment Apply 1 application topically to the appropriate area as directed 2 (Two) Times a Day. Apply under bilateral breasts  "    • pantoprazole (PROTONIX) 40 MG EC tablet Take 40 mg by mouth Daily.     • polyethylene glycol (MIRALAX) packet Take 17 g by mouth Daily As Needed.     • amoxicillin-clavulanate (AUGMENTIN) 875-125 MG per tablet Take 1 tablet by mouth 2 (Two) Times a Day.     • azithromycin (ZITHROMAX) 250 MG tablet Take 250 mg by mouth Daily. Take 2 tablets the first day, then 1 tablet daily for 4 days.       No current facility-administered medications for this visit.          The following portions of the patient's history were reviewed and updated as appropriate: allergies, current medications, past family history, past medical history, past social history, past surgical history and problem list.    Review of Systems:   A 14 point review of systems was performed. All systems negative except pertinent positives/negative listed in HPI above    Physical Exam:   Vitals:    07/11/19 1428   Temp: 98.1 °F (36.7 °C)   TempSrc: Temporal   Weight: 107 kg (235 lb)  Comment: per nursing home Cont care document   Height: 162.6 cm (64\")       General: A and O x 3, ASA, NAD    SCLERA:    Normal    DENTITION:   Normal   Hip:  left    LEG ALIGNMENT:     Neutral   ,    equal leg lengths    GAIT:     Nonantalgic    SKIN:     No abnormality    RANGE OF MOTION:      Full without joint irritability    STRENGTH:     5 / 5    hip flexion and abduction    DISTAL PULSES:    Paplable    DISTAL SENSATION :   Intact    LYMPHATICS:     No   lymphadenopathy    OTHER:          - Negative Stinchfeld test      - Negative log roll      +Tenderness to palpation trochanteric bursa       Radiology:  Xrays 2viewsleft hip  (AP bilateral hips, and lateral of the hip) ordered and reviewed for evaluation of hip pain  demonstrating  no abnormality. There are no previous films for comparision.    Assessment/Plan: left hip bursitis. Injected as below, will send to PT, RTO PRN.  Large Joint Arthrocentesis: L greater trochanteric bursa  Date/Time: 7/11/2019 3:11 " PM  Consent given by: patient  Site marked: site marked  Timeout: Immediately prior to procedure a time out was called to verify the correct patient, procedure, equipment, support staff and site/side marked as required   Supporting Documentation  Indications: pain   Procedure Details  Location: hip - L greater trochanteric bursa  Needle size: 22 G  Approach: lateral  Medications administered: 2 mL lidocaine (cardiac); 80 mg methylPREDNISolone acetate 80 MG/ML                Richie Betancur MD  7/11/2019

## 2019-07-26 ENCOUNTER — CLINICAL SUPPORT NO REQUIREMENTS (OUTPATIENT)
Dept: CARDIOLOGY | Facility: CLINIC | Age: 84
End: 2019-07-26

## 2019-07-26 DIAGNOSIS — I49.5 SICK SINUS SYNDROME (HCC): Primary | ICD-10-CM

## 2019-07-26 PROCEDURE — 93294 REM INTERROG EVL PM/LDLS PM: CPT | Performed by: INTERNAL MEDICINE

## 2019-07-26 PROCEDURE — 93296 REM INTERROG EVL PM/IDS: CPT | Performed by: INTERNAL MEDICINE

## 2019-11-01 ENCOUNTER — CLINICAL SUPPORT NO REQUIREMENTS (OUTPATIENT)
Dept: CARDIOLOGY | Facility: CLINIC | Age: 84
End: 2019-11-01

## 2019-11-01 DIAGNOSIS — I49.5 SICK SINUS SYNDROME (HCC): Primary | ICD-10-CM

## 2019-11-01 PROCEDURE — 93280 PM DEVICE PROGR EVAL DUAL: CPT | Performed by: INTERNAL MEDICINE

## 2019-11-17 ENCOUNTER — HOSPITAL ENCOUNTER (EMERGENCY)
Facility: HOSPITAL | Age: 84
End: 2019-11-17

## 2019-11-17 ENCOUNTER — APPOINTMENT (OUTPATIENT)
Dept: CT IMAGING | Facility: HOSPITAL | Age: 84
End: 2019-11-17

## 2019-11-17 ENCOUNTER — HOSPITAL ENCOUNTER (EMERGENCY)
Facility: HOSPITAL | Age: 84
Discharge: HOME | End: 2019-11-17
Attending: EMERGENCY MEDICINE | Admitting: EMERGENCY MEDICINE

## 2019-11-17 VITALS
DIASTOLIC BLOOD PRESSURE: 102 MMHG | SYSTOLIC BLOOD PRESSURE: 177 MMHG | RESPIRATION RATE: 16 BRPM | BODY MASS INDEX: 44.16 KG/M2 | OXYGEN SATURATION: 98 % | TEMPERATURE: 97.7 F | HEART RATE: 82 BPM | WEIGHT: 240 LBS | HEIGHT: 62 IN

## 2019-11-17 DIAGNOSIS — S16.1XXA STRAIN OF NECK MUSCLE, INITIAL ENCOUNTER: ICD-10-CM

## 2019-11-17 DIAGNOSIS — S00.03XA CONTUSION OF OCCIPITAL REGION OF SCALP, INITIAL ENCOUNTER: Primary | ICD-10-CM

## 2019-11-17 PROCEDURE — 72125 CT NECK SPINE W/O DYE: CPT

## 2019-11-17 PROCEDURE — 70450 CT HEAD/BRAIN W/O DYE: CPT

## 2019-11-17 PROCEDURE — 99284 EMERGENCY DEPT VISIT MOD MDM: CPT

## 2019-11-17 NOTE — ED NOTES
Pt reports that she was helped into her bed but then wanted to get up and her wheel chair was too far away and she fell hitting her head on her nightstand.   Pt notes normally having left leg difficulties and double vision from previous shingles.   Pt complains of back of the head and neck pain from the fall today.  Pt was on Plavix but is currently only taking aspirin. Pt denies any new numbness, no chest pain.  Pt didn't want to come to the hospital, but staff sent her.       Edith Burroughs, RN  11/17/19 9591

## 2019-11-17 NOTE — DISCHARGE INSTRUCTIONS
Ice the back of your head up to 20 minutes at a time as needed.  You can use over-the-counter Tylenol as needed for pain.  Please return to the emergency department if you develop worsening headache, vomiting or if you develop new weakness or numbness.

## 2019-11-17 NOTE — ED TRIAGE NOTES
EMS reports pt is from [facility]. Pt has blurry vision in the left eye. Pt reports it is always blurry but today it is more blurry. Pt reports she fell today which made the vision change she thinks.

## 2019-11-17 NOTE — ED PROVIDER NOTES
EMERGENCY DEPARTMENT ENCOUNTER    CHIEF COMPLAINT  Chief Complaint: headache  History given by: patient, EMS, pt's daughter  History limited by: nothing  Room Number: 25/25  PMD: Grady Villeda MD      HPI:  Pt is a 89 y.o. female who presents to the ED after she had a mechanical fall while she transferring from her bed to a wheelchair when she struck the back of the left side of her head on a night stand. The patient reports she developed a headache while she was en route to the ER. The patient also complains of neck pain and chronic, unchanged blurry vision in the left eye. The patient reports she developed blurry vision in her left eye r/t shingles in the past. The patient denies LOC, weakness, numbness/tingling, back pain, chest pain, abdominal pain, speech difficulty, trouble swallowing, or any other sustaining injuries from the fall. The patient's daughter reports the patient is currently taking ASA daily. The patient's daughter denies that she is taking any other anticoagulants. There are no other complaints at this time. Pt's daughter at bedside.    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   • Gastroesophageal reflux disease 02/05/2016   • Hyperlipidemia 02/05/2016   • Intermittent claudication (CMS/[facility]) 02/05/2016   • Neuropathy 02/05/2016   • Osteoarthritis of knee 02/05/2016   • Syndrome of inappropriate secretion of antidiuretic hormone (CMS/[facility]) 02/05/2016   • Vitamin D deficiency 02/05/2016   •  History of sick sinus syndrome    • Intertrigo 03/25/2016   • Generalized convulsive seizures (CMS/[facility]) 04/07/2016   • Change in bowel habits 05/20/2016   • 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   • History of anxiety 10/18/2016   • 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[facility]) 01/04/2017   • Thrombocytopenia (CMS/HCC) 01/05/2017   • B12 deficiency 01/16/2017   • Cardiac pacemaker in situ 03/08/2017   • Chronic venous insufficiency 03/09/2017   • Arthritis 03/09/2017   • S/P knee replacement 03/09/2017   • Chronic bilateral low back pain without sciatica 03/09/2017   • Essential hypertension 03/09/2017   • Hiatal hernia 03/16/2017   • Zenker's diverticulum 04/14/2017   • Chronic idiopathic constipation 05/19/2017   • Pressure sore on buttocks 05/19/2017   • Somnolence 12/13/2017   • Closed trimalleolar fracture of right ankle 11/13/2018   • Surgical wound infection 01/29/2019   • History of failed arthroplasty of ankle 02/19/2019   • History of CVA in adulthood 04/05/2019     Resolved Ambulatory Problems     Diagnosis Date Noted   • Hyponatremia 02/05/2016   • Urine frequency 05/20/2016   • Urinary tract infection 07/08/2016   • Pneumonia 07/08/2016   • Aspiration pneumonia (CMS/[facility]) 07/17/2016   • Urinary tract infection in female 01/03/2017   • History of aspiration pneumonitis 01/04/2017   • Pain of toe of right foot 01/16/2017   • Right lower lobe pneumonia (CMS/[facility]) 02/02/2017   • Sepsis due to pneumonia (CMS/[facility]) 02/02/2017   • Weakness 02/03/2017   • UTI (urinary tract infection) 03/15/2017   • Acute kidney injury (CMS/[facility]) 03/17/2017   • Acute vaginitis 03/23/2017   • Acute pain of left knee 05/19/2017     Past Medical History:   Diagnosis Date   • Anemia    • At risk for falls    • At risk for sleep apnea 11/17/2018   • Atrial fibrillation (CMS/[facility])    • Bulging lumbar disc    • Cellulitis    • Chronic back pain    • Chronic cough    • Chronic UTI    • Chronic venous insufficiency    • 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HCC)    •  DDD (degenerative disc disease), lumbosacral    • Dementia without behavioral disturbance (CMS/HCC)    • Depression, endogenous (CMS/HCC)    • Disc degeneration, lumbar    • Dysphagia    • GERD (gastroesophageal reflux disease)    • Hiatal hernia    • History of anxiety    • History of aspiration pneumonitis    • History of cerebral artery occlusion    • History of CVA in adulthood 4/5/2019   • History of herpes zoster    • History of ingrowing nail    • History of osteoporosis    • History of poliomyelitis    • History of sciatica    • History of sick sinus syndrome    • History of transient cerebral ischemia    • History of Zenker's diverticulum removal 2016   • HX: long term anticoagulant use    • Hyperlipidemia    • Hypertension    • Hyponatremia    • Intertrigo    • Lumbar radiculopathy    • Morbid obesity (CMS/HCC)    • MRSA (methicillin resistant Staphylococcus aureus)    • Neuralgia    • Nonepileptic episode (CMS/HCC)    • Osteoarthritis of right knee    • Pacemaker    • Pneumonia    • Seeing double    • Seizures (CMS/HCC)    • SIADH (syndrome of inappropriate ADH production) (CMS/[facility])    • Spinal stenosis    • Vitamin D deficiency    • Zenker's diverticulum        PAST SURGICAL HISTORY  Past Surgical History:   Procedure Laterality Date   • ANKLE OPEN REDUCTION INTERNAL FIXATION Right 11/17/2018    Procedure: ANKLE OPEN REDUCTION INTERNAL FIXATION;  Surgeon: Cristian Hill II, MD;  Location: [facility];  Service: Orthopedics   • CARDIAC PACEMAKER PLACEMENT      secondary to SSS, placed in 2004, with gen chng 2014; [employer] dual DOO   • CATARACT EXTRACTION W/ INTRAOCULAR LENS IMPLANT Bilateral    • COLONOSCOPY     • HAND SURGERY Right    • HARDWARE REMOVAL Right 2/19/2019    Procedure: RT ANKLE HARDWARE REMOVAL;  Surgeon: Cristian Hill II, MD;  Location: [facility];  Service: Orthopedics   • KNEE ARTHROPLASTY Left    • LAMINECTOMY FOR IMPLANTATION / PLACEMENT NEUROSTIMULATOR  ELECTRODES     • LUMBAR LAMINECTOMY      L-3 L4 L4 L5   • TONSILLECTOMY     • ZENKERS DIVERTICULECTOMY N/A 2016    Procedure: ENDOSCOPIC REMOVAL OF ZENKERS DIVERTICULUM W/ WERDASCOPE;  Surgeon: Oswald Barth MD;  Location: [facility];  Service:    • ZENKERS DIVERTICULECTOMY N/A 2017    Procedure: ESOPHAGOSCOPY;  Surgeon: Oswald Barth MD;  Location: [facility];  Service:        FAMILY HISTORY  Family History   Problem Relation Age of Onset   • Cancer Daughter    • Malig Hyperthermia Neg Hx        SOCIAL HISTORY  Social History     Socioeconomic History   • Marital status:      Spouse name: Not on file   • Number of children: 3   • Years of education: Not on file   • Highest education level: Not on file   Occupational History   • Occupation: Retired   Tobacco Use   • Smoking status: Former Smoker     Packs/day: 1.00     Years: 40.00     Pack years: 40.00     Types: Cigarettes     Last attempt to quit:      Years since quittin.8   • Smokeless tobacco: Never Used   • Tobacco comment: caffeine use: 2 cup of coffee in the morning and 1 in the afternoon.    Substance and Sexual Activity   • Alcohol use: Yes     Comment: 1 PER WEEK   • Drug use: No   • Sexual activity: Defer       ALLERGIES  Lipitor [atorvastatin] and Penicillins    REVIEW OF SYSTEMS  Review of Systems   Constitutional: Negative for fever.   HENT: Negative for sore throat and trouble swallowing.    Eyes: Positive for visual disturbance (blurry vision in the left eye).   Respiratory: Negative for cough and shortness of breath.    Cardiovascular: Negative for chest pain.   Gastrointestinal: Negative for abdominal pain, diarrhea and vomiting.   Genitourinary: Negative for dysuria.   Musculoskeletal: Positive for neck pain. Negative for back pain.   Skin: Negative for rash.   Allergic/Immunologic: Negative.    Neurological: Positive for headaches. Negative for syncope, speech difficulty, weakness and numbness.    Hematological: Negative.    Psychiatric/Behavioral: Negative.    All other systems reviewed and are negative.      PHYSICAL EXAM  ED Triage Vitals [11/17/19 1057]   Temp Heart Rate Resp BP SpO2   97.3 °F (36.3 °C) 80 18 132/80 95 %      Temp src Heart Rate Source Patient Position BP Location FiO2 (%)   Tympanic -- -- -- --       Physical Exam   Constitutional: She is oriented to person, place, and time. She appears distressed (mild).   HENT:   Head: Normocephalic.   Mouth/Throat: Oropharynx is clear and moist.   There is tenderness on the base of the left occiput.   Eyes: EOM are normal. Pupils are equal, round, and reactive to light.   Peripheral vision is normal.   Neck: Normal range of motion. Neck supple.   Cardiovascular: Normal rate, regular rhythm and normal heart sounds. Exam reveals no gallop and no friction rub.   No murmur heard.  Pulmonary/Chest: Effort normal and breath sounds normal. No respiratory distress. She has no decreased breath sounds. She has no wheezes. She has no rhonchi. She has no rales. She exhibits no tenderness.   Abdominal: Soft. Bowel sounds are normal. She exhibits no distension and no mass. There is no tenderness. There is no rebound and no guarding.   Musculoskeletal: Normal range of motion. She exhibits edema (3+ pedal edema bilaterally which the patient states is normal for her).        Cervical back: She exhibits no tenderness.        Thoracic back: She exhibits no tenderness.        Lumbar back: She exhibits no tenderness.   Neurological: She is alert and oriented to person, place, and time. She has normal sensation and normal strength. She displays facial symmetry.   5/5 strength of BUE. 3/5 strength of LLE s/t pain. 5/5 strength of RLE.   Skin: Skin is warm and dry. No rash noted.   Psychiatric: Mood and affect normal.   Nursing note and vitals reviewed.      RADIOLOGY  CT Head Without Contrast   Final Result   1. No evidence for acute intracranial abnormality.   2.  Advanced multilevel degenerative disc disease and facet arthritis of   the cervical spine similar to previous CT cervical spine 11/13/2018.   3. Extensive carotid vascular calcifications with tortuosity and medial   course of the carotid vasculature nearly contacting the midline anterior   to C3, similar to the previous exam.       Discussed with Dr. Evans in the emergency department on 11/17/2019 at   12:45 PM.       This report was finalized on 11/17/2019 1:21 PM by Dr. Janes Aldridge M.D.          CT Cervical Spine Without Contrast   Final Result   1. No evidence for acute intracranial abnormality.   2. Advanced multilevel degenerative disc disease and facet arthritis of   the cervical spine similar to previous CT cervical spine 11/13/2018.   3. Extensive carotid vascular calcifications with tortuosity and medial   course of the carotid vasculature nearly contacting the midline anterior   to C3, similar to the previous exam.       Discussed with Dr. Evans in the emergency department on 11/17/2019 at   12:45 PM.       This report was finalized on 11/17/2019 1:21 PM by Dr. Janes Aldridge M.D.               I ordered the above noted radiological studies. Interpreted by radiologist. Discussed with radiologist (Dr. Aldridge). Reviewed by me in PACS.       PROCEDURES  Procedures      PROGRESS AND CONSULTS  1112 Ordered CT Head and CT C Spine for further evaluation.    1257 Received a call from Dr. Aldridge, radiologist, who stated that the CT Head and CT C Spine shows nothing acute.    1300 Rechecked the patient who is resting comfortably and in NAD. Patient is stable. BP- 154/94 HR- 79 Temp- 97.3 °F (36.3 °C) (Tympanic) O2 sat- 97%. Informed the patient of her negative acute CT Head and CT C Spine. Discussed the plan for discharge with instructions to f/u with her PMD for further evaluation and management. Advised the patient to ice the back of her head. Strict RTER warnings given. Pt understands and agrees with  the plan, all questions answered.      MEDICAL DECISION MAKING  Results were reviewed/discussed with the patient and they were also made aware of online access. Pt also made aware that some labs, such as cultures, will not be resulted during ER visit and follow up with PMD is necessary.     MDM  Number of Diagnoses or Management Options     Amount and/or Complexity of Data Reviewed  Tests in the radiology section of CPT®: reviewed and ordered (CT Head and CT C Spine show nothing acute.)  Discussion of test results with the performing providers: yes (Dr. Aldridge (Radiologist))  Decide to obtain previous medical records or to obtain history from someone other than the patient: yes  Obtain history from someone other than the patient: yes (EMS and patient's daughter)  Review and summarize past medical records: yes (The patient was last admitted at [facility] in 04/2019 for anemia and a fall.)  Independent visualization of images, tracings, or specimens: yes    Patient Progress  Patient progress: stable         DIAGNOSIS  Final diagnoses:   Contusion of occipital region of scalp, initial encounter   Strain of neck muscle, initial encounter       DISPOSITION  DISCHARGE    Patient discharged in stable condition.    Reviewed implications of results, diagnosis, meds, responsibility to follow up, warning signs and symptoms of possible worsening, potential complications and reasons to return to ER, including any new or worsening symptoms.    Patient/Family voiced understanding of above instructions.    Discussed plan for discharge, as there is no emergent indication for admission. Patient referred to primary care provider for BP management due to today's BP. Pt/family is agreeable and understands need for follow up and repeat testing.  Pt is aware that discharge does not mean that nothing is wrong but it indicates no emergency is present that requires admission and they must continue care with follow-up as given below or physician of  their choice.     FOLLOW-UP  Grady Villeda MD  [address] [facility] [address]  [phone]    Schedule an appointment as soon as possible for a visit            Medication List      No changes were made to your prescriptions during this visit.           Latest Documented Vital Signs:  As of 1:47 PM  BP- (!) 177/102 HR- 82 Temp- 97.7 °F (36.5 °C) (Oral) O2 sat- 98%    --  Documentation assistance provided by smita Downing for Dr. Nathan MD.  Information recorded by the scribe was done at my direction and has been verified and validated by me.     Domi Downing  11/17/19 8891       Ravindra Evans MD  11/17/19 1704

## 2020-01-01 ENCOUNTER — TELEPHONE (OUTPATIENT)
Dept: CARDIOLOGY | Facility: CLINIC | Age: 85
End: 2020-01-01

## 2020-01-01 ENCOUNTER — HOSPITAL ENCOUNTER (EMERGENCY)
Facility: HOSPITAL | Age: 85
Discharge: NURSING, REHAB OR LONG-TERM CARE | End: 2020-06-30
Attending: EMERGENCY MEDICINE | Admitting: EMERGENCY MEDICINE

## 2020-01-01 ENCOUNTER — APPOINTMENT (OUTPATIENT)
Dept: CT IMAGING | Facility: HOSPITAL | Age: 85
End: 2020-01-01

## 2020-01-01 ENCOUNTER — TRANSCRIBE ORDERS (OUTPATIENT)
Dept: LAB | Facility: HOSPITAL | Age: 85
End: 2020-01-01

## 2020-01-01 ENCOUNTER — CLINICAL SUPPORT NO REQUIREMENTS (OUTPATIENT)
Dept: CARDIOLOGY | Facility: CLINIC | Age: 85
End: 2020-01-01

## 2020-01-01 ENCOUNTER — APPOINTMENT (OUTPATIENT)
Dept: GENERAL RADIOLOGY | Facility: HOSPITAL | Age: 85
End: 2020-01-01

## 2020-01-01 ENCOUNTER — APPOINTMENT (OUTPATIENT)
Dept: CARDIOLOGY | Facility: HOSPITAL | Age: 85
End: 2020-01-01

## 2020-01-01 ENCOUNTER — HOSPITAL ENCOUNTER (OUTPATIENT)
Dept: CARDIOLOGY | Facility: HOSPITAL | Age: 85
Discharge: HOME | End: 2020-02-21
Admitting: ORTHOPAEDIC SURGERY

## 2020-01-01 ENCOUNTER — OFFICE VISIT (OUTPATIENT)
Dept: CARDIOLOGY | Facility: CLINIC | Age: 85
End: 2020-01-01

## 2020-01-01 ENCOUNTER — HOSPITAL ENCOUNTER (OUTPATIENT)
Dept: GENERAL RADIOLOGY | Facility: HOSPITAL | Age: 85
Discharge: HOME | End: 2020-02-21

## 2020-01-01 ENCOUNTER — HOSPITAL ENCOUNTER (INPATIENT)
Facility: HOSPITAL | Age: 85
LOS: 4 days | Discharge: NURSING, REHAB OR LONG-TERM CARE | End: 2020-07-09
Attending: EMERGENCY MEDICINE | Admitting: HOSPITALIST

## 2020-01-01 ENCOUNTER — LAB REQUISITION (OUTPATIENT)
Dept: LAB | Facility: HOSPITAL | Age: 85
End: 2020-01-01

## 2020-01-01 ENCOUNTER — HOSPITAL ENCOUNTER (INPATIENT)
Facility: HOSPITAL | Age: 85
LOS: 6 days | End: 2020-12-22
Attending: EMERGENCY MEDICINE | Admitting: HOSPITALIST

## 2020-01-01 ENCOUNTER — LAB (OUTPATIENT)
Dept: LAB | Facility: HOSPITAL | Age: 85
End: 2020-01-01

## 2020-01-01 ENCOUNTER — HOSPITAL ENCOUNTER (INPATIENT)
Facility: HOSPITAL | Age: 85
LOS: 1 days | End: 2020-12-23
Attending: HOSPITALIST | Admitting: HOSPITALIST

## 2020-01-01 ENCOUNTER — HOSPITAL ENCOUNTER (INPATIENT)
Facility: HOSPITAL | Age: 85
LOS: 6 days | Discharge: NURSING, REHAB OR LONG-TERM CARE | End: 2020-03-02
Attending: EMERGENCY MEDICINE | Admitting: HOSPITALIST

## 2020-01-01 VITALS
DIASTOLIC BLOOD PRESSURE: 82 MMHG | SYSTOLIC BLOOD PRESSURE: 148 MMHG | HEART RATE: 84 BPM | RESPIRATION RATE: 16 BRPM | TEMPERATURE: 98.1 F | OXYGEN SATURATION: 93 %

## 2020-01-01 VITALS
DIASTOLIC BLOOD PRESSURE: 59 MMHG | WEIGHT: 222.22 LBS | SYSTOLIC BLOOD PRESSURE: 96 MMHG | HEART RATE: 82 BPM | TEMPERATURE: 99.5 F | RESPIRATION RATE: 20 BRPM | BODY MASS INDEX: 37.02 KG/M2 | HEIGHT: 65 IN | OXYGEN SATURATION: 95 %

## 2020-01-01 VITALS
HEIGHT: 65 IN | OXYGEN SATURATION: 90 % | WEIGHT: 220 LBS | BODY MASS INDEX: 36.65 KG/M2 | SYSTOLIC BLOOD PRESSURE: 123 MMHG | HEART RATE: 79 BPM | DIASTOLIC BLOOD PRESSURE: 81 MMHG | TEMPERATURE: 98.8 F

## 2020-01-01 VITALS
BODY MASS INDEX: 43.61 KG/M2 | DIASTOLIC BLOOD PRESSURE: 62 MMHG | RESPIRATION RATE: 14 BRPM | SYSTOLIC BLOOD PRESSURE: 82 MMHG | WEIGHT: 237 LBS | HEART RATE: 74 BPM | HEIGHT: 62 IN | TEMPERATURE: 98.6 F | OXYGEN SATURATION: 94 %

## 2020-01-01 VITALS
WEIGHT: 237.44 LBS | RESPIRATION RATE: 15 BRPM | OXYGEN SATURATION: 96 % | HEIGHT: 62 IN | BODY MASS INDEX: 43.69 KG/M2 | HEART RATE: 82 BPM | SYSTOLIC BLOOD PRESSURE: 118 MMHG | DIASTOLIC BLOOD PRESSURE: 75 MMHG | TEMPERATURE: 97.5 F

## 2020-01-01 VITALS — TEMPERATURE: 100.9 F

## 2020-01-01 DIAGNOSIS — R44.3 HALLUCINATION: ICD-10-CM

## 2020-01-01 DIAGNOSIS — Z45.010 ELECTIVE REPLACEMENT INDICATED FOR CARDIAC PACEMAKER BATTERY AT END OF LIFESPAN: ICD-10-CM

## 2020-01-01 DIAGNOSIS — N39.0 ACUTE UTI: Primary | ICD-10-CM

## 2020-01-01 DIAGNOSIS — U07.1 COVID-19: Primary | ICD-10-CM

## 2020-01-01 DIAGNOSIS — S93.402A SPRAIN OF LEFT ANKLE, UNSPECIFIED LIGAMENT, INITIAL ENCOUNTER: ICD-10-CM

## 2020-01-01 DIAGNOSIS — Z01.818 PRE-OP EXAM: ICD-10-CM

## 2020-01-01 DIAGNOSIS — R79.1 ABNORMAL COAGULATION PROFILE: ICD-10-CM

## 2020-01-01 DIAGNOSIS — R09.02 HYPOXIA: ICD-10-CM

## 2020-01-01 DIAGNOSIS — R94.5 ABNORMAL RESULTS OF LIVER FUNCTION STUDIES: ICD-10-CM

## 2020-01-01 DIAGNOSIS — Z95.0 CARDIAC PACEMAKER IN SITU: ICD-10-CM

## 2020-01-01 DIAGNOSIS — I10 ESSENTIAL HYPERTENSION: ICD-10-CM

## 2020-01-01 DIAGNOSIS — G93.41 ACUTE METABOLIC ENCEPHALOPATHY: ICD-10-CM

## 2020-01-01 DIAGNOSIS — Z95.0 PRESENCE OF PERMANENT CARDIAC PACEMAKER: ICD-10-CM

## 2020-01-01 DIAGNOSIS — I49.5 SSS (SICK SINUS SYNDROME) (HCC): Primary | ICD-10-CM

## 2020-01-01 DIAGNOSIS — R29.6 RECURRENT FALLS: ICD-10-CM

## 2020-01-01 DIAGNOSIS — I87.2 CHRONIC VENOUS INSUFFICIENCY: ICD-10-CM

## 2020-01-01 DIAGNOSIS — Z00.00 ROUTINE GENERAL MEDICAL EXAMINATION AT A HEALTH CARE FACILITY: ICD-10-CM

## 2020-01-01 DIAGNOSIS — I49.5 SICK SINUS SYNDROME (HCC): Primary | ICD-10-CM

## 2020-01-01 DIAGNOSIS — Z01.818 PREOP EXAMINATION: ICD-10-CM

## 2020-01-01 DIAGNOSIS — Z01.818 PRE-OP EXAM: Primary | ICD-10-CM

## 2020-01-01 DIAGNOSIS — J18.9 PNEUMONIA OF BOTH LOWER LOBES DUE TO INFECTIOUS ORGANISM: Primary | ICD-10-CM

## 2020-01-01 DIAGNOSIS — R09.02 HYPOXEMIA: ICD-10-CM

## 2020-01-01 DIAGNOSIS — Z86.79 HISTORY OF SICK SINUS SYNDROME: Primary | ICD-10-CM

## 2020-01-01 DIAGNOSIS — S62.652A CLOSED NONDISPLACED FRACTURE OF MIDDLE PHALANX OF RIGHT MIDDLE FINGER, INITIAL ENCOUNTER: Primary | ICD-10-CM

## 2020-01-01 DIAGNOSIS — N17.9 AKI (ACUTE KIDNEY INJURY) (HCC): ICD-10-CM

## 2020-01-01 LAB
ALBUMIN SERPL-MCNC: 3.3 G/DL (ref 3.5–5.2)
ALBUMIN SERPL-MCNC: 3.5 G/DL (ref 3.5–5.2)
ALBUMIN SERPL-MCNC: 3.6 G/DL (ref 3.5–5.2)
ALBUMIN SERPL-MCNC: 3.6 G/DL (ref 3.5–5.2)
ALBUMIN SERPL-MCNC: 3.7 G/DL (ref 3.5–5.2)
ALBUMIN SERPL-MCNC: 3.8 G/DL (ref 3.5–5.2)
ALBUMIN SERPL-MCNC: 3.9 G/DL (ref 3.5–5.2)
ALBUMIN SERPL-MCNC: 4 G/DL (ref 3.5–5.2)
ALBUMIN/GLOB SERPL: 1 G/DL
ALBUMIN/GLOB SERPL: 1 G/DL
ALBUMIN/GLOB SERPL: 1.1 G/DL
ALBUMIN/GLOB SERPL: 1.2 G/DL
ALBUMIN/GLOB SERPL: 1.5 G/DL
ALP SERPL-CCNC: 56 U/L (ref 39–117)
ALP SERPL-CCNC: 58 U/L (ref 39–117)
ALP SERPL-CCNC: 60 U/L (ref 39–117)
ALP SERPL-CCNC: 62 U/L (ref 39–117)
ALP SERPL-CCNC: 62 U/L (ref 39–117)
ALP SERPL-CCNC: 63 U/L (ref 39–117)
ALP SERPL-CCNC: 63 U/L (ref 39–117)
ALP SERPL-CCNC: 64 U/L (ref 39–117)
ALP SERPL-CCNC: 72 U/L (ref 39–117)
ALP SERPL-CCNC: 73 U/L (ref 39–117)
ALP SERPL-CCNC: 74 U/L (ref 39–117)
ALT SERPL W P-5'-P-CCNC: 11 U/L (ref 1–33)
ALT SERPL W P-5'-P-CCNC: 13 U/L (ref 1–33)
ALT SERPL W P-5'-P-CCNC: 13 U/L (ref 1–33)
ALT SERPL W P-5'-P-CCNC: 14 U/L (ref 1–33)
ALT SERPL W P-5'-P-CCNC: 17 U/L (ref 1–33)
ALT SERPL W P-5'-P-CCNC: 19 U/L (ref 1–33)
ALT SERPL W P-5'-P-CCNC: 23 U/L (ref 1–33)
ANION GAP SERPL CALCULATED.3IONS-SCNC: 11 MMOL/L (ref 5–15)
ANION GAP SERPL CALCULATED.3IONS-SCNC: 12.3 MMOL/L (ref 5–15)
ANION GAP SERPL CALCULATED.3IONS-SCNC: 12.4 MMOL/L (ref 5–15)
ANION GAP SERPL CALCULATED.3IONS-SCNC: 12.4 MMOL/L (ref 5–15)
ANION GAP SERPL CALCULATED.3IONS-SCNC: 12.5 MMOL/L (ref 5–15)
ANION GAP SERPL CALCULATED.3IONS-SCNC: 12.5 MMOL/L (ref 5–15)
ANION GAP SERPL CALCULATED.3IONS-SCNC: 13 MMOL/L (ref 5–15)
ANION GAP SERPL CALCULATED.3IONS-SCNC: 13.4 MMOL/L (ref 5–15)
ANION GAP SERPL CALCULATED.3IONS-SCNC: 13.4 MMOL/L (ref 5–15)
ANION GAP SERPL CALCULATED.3IONS-SCNC: 13.6 MMOL/L (ref 5–15)
ANION GAP SERPL CALCULATED.3IONS-SCNC: 13.9 MMOL/L (ref 5–15)
ANION GAP SERPL CALCULATED.3IONS-SCNC: 14.2 MMOL/L (ref 5–15)
ANION GAP SERPL CALCULATED.3IONS-SCNC: 16.6 MMOL/L (ref 5–15)
ANION GAP SERPL CALCULATED.3IONS-SCNC: 16.7 MMOL/L (ref 5–15)
ANION GAP SERPL CALCULATED.3IONS-SCNC: 6.6 MMOL/L (ref 5–15)
ANION GAP SERPL CALCULATED.3IONS-SCNC: 7.8 MMOL/L (ref 5–15)
ANION GAP SERPL CALCULATED.3IONS-SCNC: 8.8 MMOL/L (ref 5–15)
ANION GAP SERPL CALCULATED.3IONS-SCNC: 9 MMOL/L (ref 5–15)
ANION GAP SERPL CALCULATED.3IONS-SCNC: 9.2 MMOL/L (ref 5–15)
AORTIC DIMENSIONLESS INDEX: 0.7 (DI)
APTT PPP: 35.8 SECONDS (ref 22.7–35.4)
ARTERIAL PATENCY WRIST A: POSITIVE
AST SERPL-CCNC: 18 U/L (ref 1–32)
AST SERPL-CCNC: 18 U/L (ref 1–32)
AST SERPL-CCNC: 20 U/L (ref 1–32)
AST SERPL-CCNC: 22 U/L (ref 1–32)
AST SERPL-CCNC: 27 U/L (ref 1–32)
AST SERPL-CCNC: 28 U/L (ref 1–32)
AST SERPL-CCNC: 29 U/L (ref 1–32)
AST SERPL-CCNC: 38 U/L (ref 1–32)
AST SERPL-CCNC: 39 U/L (ref 1–32)
ATMOSPHERIC PRESS: 739.9 MMHG
B PARAPERT DNA SPEC QL NAA+PROBE: NOT DETECTED
B PARAPERT DNA SPEC QL NAA+PROBE: NOT DETECTED
B PERT DNA SPEC QL NAA+PROBE: NOT DETECTED
B PERT DNA SPEC QL NAA+PROBE: NOT DETECTED
BACTERIA BLD CULT: ABNORMAL
BACTERIA SPEC AEROBE CULT: ABNORMAL
BACTERIA SPEC AEROBE CULT: ABNORMAL
BACTERIA SPEC AEROBE CULT: NO GROWTH
BACTERIA SPEC AEROBE CULT: NORMAL
BACTERIA UR QL AUTO: ABNORMAL /HPF
BASE EXCESS BLDA CALC-SCNC: 4.7 MMOL/L (ref 0–2)
BASOPHILS # BLD AUTO: 0 10*3/MM3 (ref 0–0.2)
BASOPHILS # BLD AUTO: 0.01 10*3/MM3 (ref 0–0.2)
BASOPHILS # BLD AUTO: 0.02 10*3/MM3 (ref 0–0.2)
BASOPHILS # BLD AUTO: 0.02 10*3/MM3 (ref 0–0.2)
BASOPHILS # BLD AUTO: 0.03 10*3/MM3 (ref 0–0.2)
BASOPHILS # BLD AUTO: 0.04 10*3/MM3 (ref 0–0.2)
BASOPHILS # BLD AUTO: 0.05 10*3/MM3 (ref 0–0.2)
BASOPHILS # BLD AUTO: 0.05 10*3/MM3 (ref 0–0.2)
BASOPHILS # BLD AUTO: 0.06 10*3/MM3 (ref 0–0.2)
BASOPHILS # BLD MANUAL: 0.07 10*3/MM3 (ref 0–0.2)
BASOPHILS NFR BLD AUTO: 0 % (ref 0–1.5)
BASOPHILS NFR BLD AUTO: 0.1 % (ref 0–1.5)
BASOPHILS NFR BLD AUTO: 0.1 % (ref 0–1.5)
BASOPHILS NFR BLD AUTO: 0.2 % (ref 0–1.5)
BASOPHILS NFR BLD AUTO: 0.3 % (ref 0–1.5)
BASOPHILS NFR BLD AUTO: 0.4 % (ref 0–1.5)
BASOPHILS NFR BLD AUTO: 0.6 % (ref 0–1.5)
BASOPHILS NFR BLD AUTO: 0.7 % (ref 0–1.5)
BASOPHILS NFR BLD AUTO: 0.8 % (ref 0–1.5)
BASOPHILS NFR BLD AUTO: 0.8 % (ref 0–1.5)
BASOPHILS NFR BLD AUTO: 0.9 % (ref 0–1.5)
BASOPHILS NFR BLD AUTO: 1 % (ref 0–1.5)
BDY SITE: ABNORMAL
BH CV ECHO MEAS - ACS: 0.9 CM
BH CV ECHO MEAS - AO MAX PG: 7 MMHG
BH CV ECHO MEAS - AO MEAN PG (FULL): 1 MMHG
BH CV ECHO MEAS - AO MEAN PG: 3 MMHG
BH CV ECHO MEAS - AO ROOT AREA (BSA CORRECTED): 1.6
BH CV ECHO MEAS - AO ROOT AREA: 8.6 CM^2
BH CV ECHO MEAS - AO ROOT DIAM: 3.3 CM
BH CV ECHO MEAS - AO V2 MAX: 128 CM/SEC
BH CV ECHO MEAS - AO V2 MEAN: 86.4 CM/SEC
BH CV ECHO MEAS - AO V2 VTI: 23.1 CM
BH CV ECHO MEAS - AVA(I,A): 3.9 CM^2
BH CV ECHO MEAS - AVA(I,D): 3.9 CM^2
BH CV ECHO MEAS - BSA(HAYCOCK): 2.3 M^2
BH CV ECHO MEAS - BSA: 2.1 M^2
BH CV ECHO MEAS - BZI_BMI: 39.1 KILOGRAMS/M^2
BH CV ECHO MEAS - BZI_METRIC_HEIGHT: 165.1 CM
BH CV ECHO MEAS - BZI_METRIC_WEIGHT: 106.6 KG
BH CV ECHO MEAS - EDV(CUBED): 132.7 ML
BH CV ECHO MEAS - EDV(MOD-SP2): 39 ML
BH CV ECHO MEAS - EDV(MOD-SP4): 55 ML
BH CV ECHO MEAS - EDV(TEICH): 123.8 ML
BH CV ECHO MEAS - EF(CUBED): 67.7 %
BH CV ECHO MEAS - EF(MOD-BP): 66 %
BH CV ECHO MEAS - EF(MOD-SP2): 59 %
BH CV ECHO MEAS - EF(MOD-SP4): 74.5 %
BH CV ECHO MEAS - EF(TEICH): 58.9 %
BH CV ECHO MEAS - ESV(CUBED): 42.9 ML
BH CV ECHO MEAS - ESV(MOD-SP2): 16 ML
BH CV ECHO MEAS - ESV(MOD-SP4): 14 ML
BH CV ECHO MEAS - ESV(TEICH): 50.9 ML
BH CV ECHO MEAS - FS: 31.4 %
BH CV ECHO MEAS - IVS/LVPW: 0.73
BH CV ECHO MEAS - IVSD: 0.8 CM
BH CV ECHO MEAS - LV DIASTOLIC VOL/BSA (35-75): 26 ML/M^2
BH CV ECHO MEAS - LV MASS(C)D: 175.6 GRAMS
BH CV ECHO MEAS - LV MASS(C)DI: 82.9 GRAMS/M^2
BH CV ECHO MEAS - LV MAX PG: 3 MMHG
BH CV ECHO MEAS - LV MEAN PG: 2 MMHG
BH CV ECHO MEAS - LV SYSTOLIC VOL/BSA (12-30): 6.6 ML/M^2
BH CV ECHO MEAS - LV V1 MAX: 92 CM/SEC
BH CV ECHO MEAS - LV V1 MEAN: 66.2 CM/SEC
BH CV ECHO MEAS - LV V1 VTI: 16.9 CM
BH CV ECHO MEAS - LVIDD: 5.1 CM
BH CV ECHO MEAS - LVIDS: 3.5 CM
BH CV ECHO MEAS - LVLD AP2: 5.9 CM
BH CV ECHO MEAS - LVLD AP4: 6.2 CM
BH CV ECHO MEAS - LVLS AP2: 5.7 CM
BH CV ECHO MEAS - LVLS AP4: 4.9 CM
BH CV ECHO MEAS - LVOT AREA (M): 5.3 CM^2
BH CV ECHO MEAS - LVOT AREA: 5.3 CM^2
BH CV ECHO MEAS - LVOT DIAM: 2.6 CM
BH CV ECHO MEAS - LVPWD: 1.1 CM
BH CV ECHO MEAS - MV A DUR: 0.12 SEC
BH CV ECHO MEAS - MV A MAX VEL: 110 CM/SEC
BH CV ECHO MEAS - MV DEC SLOPE: 360 CM/SEC^2
BH CV ECHO MEAS - MV DEC TIME: 100 SEC
BH CV ECHO MEAS - MV E MAX VEL: 82.3 CM/SEC
BH CV ECHO MEAS - MV E/A: 0.75
BH CV ECHO MEAS - MV MEAN PG: 3 MMHG
BH CV ECHO MEAS - MV P1/2T MAX VEL: 93.7 CM/SEC
BH CV ECHO MEAS - MV P1/2T: 76.2 MSEC
BH CV ECHO MEAS - MV V2 MEAN: 85.6 CM/SEC
BH CV ECHO MEAS - MV V2 VTI: 24.8 CM
BH CV ECHO MEAS - MVA P1/2T LCG: 2.3 CM^2
BH CV ECHO MEAS - MVA(P1/2T): 2.9 CM^2
BH CV ECHO MEAS - MVA(VTI): 3.6 CM^2
BH CV ECHO MEAS - PA ACC SLOPE: 722 CM/SEC^2
BH CV ECHO MEAS - PA ACC TIME: 0.11 SEC
BH CV ECHO MEAS - PA MAX PG: 2.6 MMHG
BH CV ECHO MEAS - PA PR(ACCEL): 29.1 MMHG
BH CV ECHO MEAS - PA V2 MAX: 80.1 CM/SEC
BH CV ECHO MEAS - QP/QS: 0.53
BH CV ECHO MEAS - RAP SYSTOLE: 8 MMHG
BH CV ECHO MEAS - RV MEAN PG: 1 MMHG
BH CV ECHO MEAS - RV V1 MEAN: 40.7 CM/SEC
BH CV ECHO MEAS - RV V1 VTI: 10.5 CM
BH CV ECHO MEAS - RVOT AREA: 4.5 CM^2
BH CV ECHO MEAS - RVOT DIAM: 2.4 CM
BH CV ECHO MEAS - SI(AO): 93.3 ML/M^2
BH CV ECHO MEAS - SI(CUBED): 42.4 ML/M^2
BH CV ECHO MEAS - SI(LVOT): 42.4 ML/M^2
BH CV ECHO MEAS - SI(MOD-SP2): 10.9 ML/M^2
BH CV ECHO MEAS - SI(MOD-SP4): 19.4 ML/M^2
BH CV ECHO MEAS - SI(TEICH): 34.4 ML/M^2
BH CV ECHO MEAS - SV(AO): 197.6 ML
BH CV ECHO MEAS - SV(CUBED): 89.8 ML
BH CV ECHO MEAS - SV(LVOT): 89.7 ML
BH CV ECHO MEAS - SV(MOD-SP2): 23 ML
BH CV ECHO MEAS - SV(MOD-SP4): 41 ML
BH CV ECHO MEAS - SV(RVOT): 47.5 ML
BH CV ECHO MEAS - SV(TEICH): 72.9 ML
BH CV ECHO MEAS - TAPSE (>1.6): 2.6 CM2
BH CV VAS BP RIGHT ARM: NORMAL MMHG
BH CV XLRA - RV BASE: 2.2 CM
BILIRUB CONJ SERPL-MCNC: 0.2 MG/DL (ref 0–0.3)
BILIRUB CONJ SERPL-MCNC: 0.3 MG/DL (ref 0–0.3)
BILIRUB CONJ SERPL-MCNC: 0.3 MG/DL (ref 0–0.3)
BILIRUB INDIRECT SERPL-MCNC: 0.3 MG/DL
BILIRUB SERPL-MCNC: 0.3 MG/DL (ref 0–1.2)
BILIRUB SERPL-MCNC: 0.4 MG/DL (ref 0.2–1.2)
BILIRUB SERPL-MCNC: 0.4 MG/DL (ref 0–1.2)
BILIRUB SERPL-MCNC: 0.4 MG/DL (ref 0–1.2)
BILIRUB SERPL-MCNC: 0.5 MG/DL (ref 0.2–1.2)
BILIRUB SERPL-MCNC: 0.5 MG/DL (ref 0–1.2)
BILIRUB SERPL-MCNC: 0.6 MG/DL (ref 0.2–1.2)
BILIRUB SERPL-MCNC: 0.7 MG/DL (ref 0.2–1.2)
BILIRUB UR QL STRIP: NEGATIVE
BOTTLE TYPE: ABNORMAL
BUN BLD-MCNC: 18 MG/DL (ref 8–23)
BUN BLD-MCNC: 22 MG/DL (ref 8–23)
BUN BLD-MCNC: 24 MG/DL (ref 8–23)
BUN BLD-MCNC: 26 MG/DL (ref 8–23)
BUN BLD-MCNC: 27 MG/DL (ref 8–23)
BUN BLD-MCNC: 28 MG/DL (ref 8–23)
BUN SERPL-MCNC: 15 MG/DL (ref 8–23)
BUN SERPL-MCNC: 23 MG/DL (ref 8–23)
BUN SERPL-MCNC: 25 MG/DL (ref 8–23)
BUN SERPL-MCNC: 25 MG/DL (ref 8–23)
BUN SERPL-MCNC: 26 MG/DL (ref 8–23)
BUN SERPL-MCNC: 30 MG/DL (ref 8–23)
BUN SERPL-MCNC: 30 MG/DL (ref 8–23)
BUN SERPL-MCNC: 9 MG/DL (ref 8–23)
BUN SERPL-MCNC: 9 MG/DL (ref 8–23)
BUN/CREAT SERPL: 10.5 (ref 7–25)
BUN/CREAT SERPL: 10.7 (ref 7–25)
BUN/CREAT SERPL: 12.7 (ref 7–25)
BUN/CREAT SERPL: 15.2 (ref 7–25)
BUN/CREAT SERPL: 15.7 (ref 7–25)
BUN/CREAT SERPL: 16 (ref 7–25)
BUN/CREAT SERPL: 16.2 (ref 7–25)
BUN/CREAT SERPL: 17.2 (ref 7–25)
BUN/CREAT SERPL: 17.5 (ref 7–25)
BUN/CREAT SERPL: 20.8 (ref 7–25)
BUN/CREAT SERPL: 22.2 (ref 7–25)
BUN/CREAT SERPL: 22.2 (ref 7–25)
BUN/CREAT SERPL: 22.4 (ref 7–25)
BUN/CREAT SERPL: 22.5 (ref 7–25)
BUN/CREAT SERPL: 22.9 (ref 7–25)
BUN/CREAT SERPL: 23.9 (ref 7–25)
BUN/CREAT SERPL: 24.8 (ref 7–25)
BUN/CREAT SERPL: 27.5 (ref 7–25)
BUN/CREAT SERPL: 28.8 (ref 7–25)
C PNEUM DNA NPH QL NAA+NON-PROBE: NOT DETECTED
C PNEUM DNA NPH QL NAA+NON-PROBE: NOT DETECTED
CALCIUM SPEC-SCNC: 7.5 MG/DL (ref 8.2–9.6)
CALCIUM SPEC-SCNC: 8.3 MG/DL (ref 8.2–9.6)
CALCIUM SPEC-SCNC: 8.4 MG/DL (ref 8.2–9.6)
CALCIUM SPEC-SCNC: 8.5 MG/DL (ref 8.2–9.6)
CALCIUM SPEC-SCNC: 8.6 MG/DL (ref 8.2–9.6)
CALCIUM SPEC-SCNC: 8.7 MG/DL (ref 8.2–9.6)
CALCIUM SPEC-SCNC: 8.7 MG/DL (ref 8.6–10.5)
CALCIUM SPEC-SCNC: 8.8 MG/DL (ref 8.2–9.6)
CALCIUM SPEC-SCNC: 8.8 MG/DL (ref 8.6–10.5)
CALCIUM SPEC-SCNC: 8.8 MG/DL (ref 8.6–10.5)
CALCIUM SPEC-SCNC: 8.9 MG/DL (ref 8.2–9.6)
CALCIUM SPEC-SCNC: 8.9 MG/DL (ref 8.6–10.5)
CALCIUM SPEC-SCNC: 9 MG/DL (ref 8.6–10.5)
CALCIUM SPEC-SCNC: 9 MG/DL (ref 8.6–10.5)
CALCIUM SPEC-SCNC: 9.1 MG/DL (ref 8.2–9.6)
CALCIUM SPEC-SCNC: 9.1 MG/DL (ref 8.6–10.5)
CALCIUM SPEC-SCNC: 9.2 MG/DL (ref 8.6–10.5)
CHLORIDE SERPL-SCNC: 100 MMOL/L (ref 98–107)
CHLORIDE SERPL-SCNC: 100 MMOL/L (ref 98–107)
CHLORIDE SERPL-SCNC: 101 MMOL/L (ref 98–107)
CHLORIDE SERPL-SCNC: 101 MMOL/L (ref 98–107)
CHLORIDE SERPL-SCNC: 103 MMOL/L (ref 98–107)
CHLORIDE SERPL-SCNC: 105 MMOL/L (ref 98–107)
CHLORIDE SERPL-SCNC: 105 MMOL/L (ref 98–107)
CHLORIDE SERPL-SCNC: 106 MMOL/L (ref 98–107)
CHLORIDE SERPL-SCNC: 106 MMOL/L (ref 98–107)
CHLORIDE SERPL-SCNC: 107 MMOL/L (ref 98–107)
CHLORIDE SERPL-SCNC: 107 MMOL/L (ref 98–107)
CHLORIDE SERPL-SCNC: 92 MMOL/L (ref 98–107)
CHLORIDE SERPL-SCNC: 96 MMOL/L (ref 98–107)
CHLORIDE SERPL-SCNC: 96 MMOL/L (ref 98–107)
CHLORIDE SERPL-SCNC: 98 MMOL/L (ref 98–107)
CHLORIDE SERPL-SCNC: 99 MMOL/L (ref 98–107)
CHLORIDE SERPL-SCNC: 99 MMOL/L (ref 98–107)
CHOLEST SERPL-MCNC: 128 MG/DL (ref 0–200)
CHOLEST SERPL-MCNC: 152 MG/DL (ref 0–200)
CHOLEST SERPL-MCNC: 159 MG/DL (ref 0–200)
CLARITY UR: ABNORMAL
CO2 SERPL-SCNC: 22.4 MMOL/L (ref 22–29)
CO2 SERPL-SCNC: 26.2 MMOL/L (ref 22–29)
CO2 SERPL-SCNC: 26.3 MMOL/L (ref 22–29)
CO2 SERPL-SCNC: 26.4 MMOL/L (ref 22–29)
CO2 SERPL-SCNC: 26.6 MMOL/L (ref 22–29)
CO2 SERPL-SCNC: 27 MMOL/L (ref 22–29)
CO2 SERPL-SCNC: 27.1 MMOL/L (ref 22–29)
CO2 SERPL-SCNC: 28.4 MMOL/L (ref 22–29)
CO2 SERPL-SCNC: 28.6 MMOL/L (ref 22–29)
CO2 SERPL-SCNC: 28.8 MMOL/L (ref 22–29)
CO2 SERPL-SCNC: 29 MMOL/L (ref 22–29)
CO2 SERPL-SCNC: 30.5 MMOL/L (ref 22–29)
CO2 SERPL-SCNC: 30.6 MMOL/L (ref 22–29)
CO2 SERPL-SCNC: 31.2 MMOL/L (ref 22–29)
CO2 SERPL-SCNC: 31.5 MMOL/L (ref 22–29)
CO2 SERPL-SCNC: 31.8 MMOL/L (ref 22–29)
CO2 SERPL-SCNC: 33.7 MMOL/L (ref 22–29)
CO2 SERPL-SCNC: 34 MMOL/L (ref 22–29)
CO2 SERPL-SCNC: 35.6 MMOL/L (ref 22–29)
COLOR UR: YELLOW
CREAT BLD-MCNC: 1.07 MG/DL (ref 0.57–1)
CREAT BLD-MCNC: 1.13 MG/DL (ref 0.57–1)
CREAT BLD-MCNC: 1.17 MG/DL (ref 0.57–1)
CREAT BLD-MCNC: 1.18 MG/DL (ref 0.57–1)
CREAT BLD-MCNC: 1.2 MG/DL (ref 0.57–1)
CREAT BLD-MCNC: 1.26 MG/DL (ref 0.57–1)
CREAT BLD-MCNC: 1.36 MG/DL (ref 0.57–1)
CREAT BLD-MCNC: 1.42 MG/DL (ref 0.57–1)
CREAT SERPL-MCNC: 0.84 MG/DL (ref 0.57–1)
CREAT SERPL-MCNC: 0.86 MG/DL (ref 0.57–1)
CREAT SERPL-MCNC: 0.91 MG/DL (ref 0.57–1)
CREAT SERPL-MCNC: 0.99 MG/DL (ref 0.57–1)
CREAT SERPL-MCNC: 1.01 MG/DL (ref 0.57–1)
CREAT SERPL-MCNC: 1.04 MG/DL (ref 0.57–1)
CREAT SERPL-MCNC: 1.25 MG/DL (ref 0.57–1)
CREAT SERPL-MCNC: 1.34 MG/DL (ref 0.57–1)
CREAT SERPL-MCNC: 1.35 MG/DL (ref 0.57–1)
CREAT SERPL-MCNC: 1.62 MG/DL (ref 0.57–1)
CREAT SERPL-MCNC: 1.66 MG/DL (ref 0.57–1)
CREAT SERPL-MCNC: 1.71 MG/DL (ref 0.57–1)
CRP SERPL-MCNC: 1.61 MG/DL (ref 0–0.5)
CRP SERPL-MCNC: 2.51 MG/DL (ref 0–0.5)
CRP SERPL-MCNC: 4.36 MG/DL (ref 0–0.5)
CRP SERPL-MCNC: 7.78 MG/DL (ref 0–0.5)
D DIMER PPP FEU-MCNC: 1.18 MCGFEU/ML (ref 0–0.49)
D-LACTATE SERPL-SCNC: 0.9 MMOL/L (ref 0.5–2)
D-LACTATE SERPL-SCNC: 0.9 MMOL/L (ref 0.5–2)
D-LACTATE SERPL-SCNC: 1.2 MMOL/L (ref 0.5–2)
D-LACTATE SERPL-SCNC: 1.7 MMOL/L (ref 0.5–2)
DEPRECATED RDW RBC AUTO: 43.3 FL (ref 37–54)
DEPRECATED RDW RBC AUTO: 43.4 FL (ref 37–54)
DEPRECATED RDW RBC AUTO: 43.5 FL (ref 37–54)
DEPRECATED RDW RBC AUTO: 43.7 FL (ref 37–54)
DEPRECATED RDW RBC AUTO: 44.1 FL (ref 37–54)
DEPRECATED RDW RBC AUTO: 44.6 FL (ref 37–54)
DEPRECATED RDW RBC AUTO: 44.6 FL (ref 37–54)
DEPRECATED RDW RBC AUTO: 44.8 FL (ref 37–54)
DEPRECATED RDW RBC AUTO: 45.1 FL (ref 37–54)
DEPRECATED RDW RBC AUTO: 45.3 FL (ref 37–54)
DEPRECATED RDW RBC AUTO: 45.4 FL (ref 37–54)
DEPRECATED RDW RBC AUTO: 45.6 FL (ref 37–54)
DEPRECATED RDW RBC AUTO: 45.7 FL (ref 37–54)
DEPRECATED RDW RBC AUTO: 46.7 FL (ref 37–54)
DEPRECATED RDW RBC AUTO: 46.8 FL (ref 37–54)
DEPRECATED RDW RBC AUTO: 47.6 FL (ref 37–54)
EOSINOPHIL # BLD AUTO: 0 10*3/MM3 (ref 0–0.4)
EOSINOPHIL # BLD AUTO: 0.02 10*3/MM3 (ref 0–0.4)
EOSINOPHIL # BLD AUTO: 0.03 10*3/MM3 (ref 0–0.4)
EOSINOPHIL # BLD AUTO: 0.08 10*3/MM3 (ref 0–0.4)
EOSINOPHIL # BLD AUTO: 0.22 10*3/MM3 (ref 0–0.4)
EOSINOPHIL # BLD AUTO: 0.22 10*3/MM3 (ref 0–0.4)
EOSINOPHIL # BLD AUTO: 0.29 10*3/MM3 (ref 0–0.4)
EOSINOPHIL # BLD AUTO: 0.3 10*3/MM3 (ref 0–0.4)
EOSINOPHIL # BLD AUTO: 0.3 10*3/MM3 (ref 0–0.4)
EOSINOPHIL # BLD AUTO: 0.32 10*3/MM3 (ref 0–0.4)
EOSINOPHIL # BLD AUTO: 0.36 10*3/MM3 (ref 0–0.4)
EOSINOPHIL # BLD AUTO: 0.38 10*3/MM3 (ref 0–0.4)
EOSINOPHIL # BLD MANUAL: 0.15 10*3/MM3 (ref 0–0.4)
EOSINOPHIL NFR BLD AUTO: 0 % (ref 0.3–6.2)
EOSINOPHIL NFR BLD AUTO: 0.3 % (ref 0.3–6.2)
EOSINOPHIL NFR BLD AUTO: 0.4 % (ref 0.3–6.2)
EOSINOPHIL NFR BLD AUTO: 0.8 % (ref 0.3–6.2)
EOSINOPHIL NFR BLD AUTO: 3.1 % (ref 0.3–6.2)
EOSINOPHIL NFR BLD AUTO: 3.5 % (ref 0.3–6.2)
EOSINOPHIL NFR BLD AUTO: 4.2 % (ref 0.3–6.2)
EOSINOPHIL NFR BLD AUTO: 4.6 % (ref 0.3–6.2)
EOSINOPHIL NFR BLD AUTO: 4.9 % (ref 0.3–6.2)
EOSINOPHIL NFR BLD AUTO: 4.9 % (ref 0.3–6.2)
EOSINOPHIL NFR BLD AUTO: 5.1 % (ref 0.3–6.2)
EOSINOPHIL NFR BLD AUTO: 5.6 % (ref 0.3–6.2)
EOSINOPHIL NFR BLD MANUAL: 2.1 % (ref 0.3–6.2)
ERYTHROCYTE [DISTWIDTH] IN BLOOD BY AUTOMATED COUNT: 11.8 % (ref 12.3–15.4)
ERYTHROCYTE [DISTWIDTH] IN BLOOD BY AUTOMATED COUNT: 12 % (ref 12.3–15.4)
ERYTHROCYTE [DISTWIDTH] IN BLOOD BY AUTOMATED COUNT: 12 % (ref 12.3–15.4)
ERYTHROCYTE [DISTWIDTH] IN BLOOD BY AUTOMATED COUNT: 12.1 % (ref 12.3–15.4)
ERYTHROCYTE [DISTWIDTH] IN BLOOD BY AUTOMATED COUNT: 12.2 % (ref 12.3–15.4)
ERYTHROCYTE [DISTWIDTH] IN BLOOD BY AUTOMATED COUNT: 12.3 % (ref 12.3–15.4)
ERYTHROCYTE [DISTWIDTH] IN BLOOD BY AUTOMATED COUNT: 12.3 % (ref 12.3–15.4)
ERYTHROCYTE [DISTWIDTH] IN BLOOD BY AUTOMATED COUNT: 12.4 % (ref 12.3–15.4)
ERYTHROCYTE [DISTWIDTH] IN BLOOD BY AUTOMATED COUNT: 12.5 % (ref 12.3–15.4)
FERRITIN SERPL-MCNC: 853 NG/ML (ref 13–150)
FLUAV AG NPH QL: NEGATIVE
FLUAV H1 2009 PAND RNA NPH QL NAA+PROBE: NOT DETECTED
FLUAV H1 HA GENE NPH QL NAA+PROBE: NOT DETECTED
FLUAV H3 RNA NPH QL NAA+PROBE: NOT DETECTED
FLUAV SUBTYP SPEC NAA+PROBE: NOT DETECTED
FLUAV SUBTYP SPEC NAA+PROBE: NOT DETECTED
FLUBV AG NPH QL IA: NEGATIVE
FLUBV RNA ISLT QL NAA+PROBE: NOT DETECTED
FLUBV RNA ISLT QL NAA+PROBE: NOT DETECTED
GFR SERPL CREATININE-BSD FRML MDRD: 28 ML/MIN/1.73
GFR SERPL CREATININE-BSD FRML MDRD: 29 ML/MIN/1.73
GFR SERPL CREATININE-BSD FRML MDRD: 30 ML/MIN/1.73
GFR SERPL CREATININE-BSD FRML MDRD: 35 ML/MIN/1.73
GFR SERPL CREATININE-BSD FRML MDRD: 37 ML/MIN/1.73
GFR SERPL CREATININE-BSD FRML MDRD: 40 ML/MIN/1.73
GFR SERPL CREATININE-BSD FRML MDRD: 40 ML/MIN/1.73
GFR SERPL CREATININE-BSD FRML MDRD: 42 ML/MIN/1.73
GFR SERPL CREATININE-BSD FRML MDRD: 43 ML/MIN/1.73
GFR SERPL CREATININE-BSD FRML MDRD: 44 ML/MIN/1.73
GFR SERPL CREATININE-BSD FRML MDRD: 45 ML/MIN/1.73
GFR SERPL CREATININE-BSD FRML MDRD: 48 ML/MIN/1.73
GFR SERPL CREATININE-BSD FRML MDRD: 50 ML/MIN/1.73
GFR SERPL CREATININE-BSD FRML MDRD: 51 ML/MIN/1.73
GFR SERPL CREATININE-BSD FRML MDRD: 53 ML/MIN/1.73
GFR SERPL CREATININE-BSD FRML MDRD: 58 ML/MIN/1.73
GFR SERPL CREATININE-BSD FRML MDRD: 62 ML/MIN/1.73
GFR SERPL CREATININE-BSD FRML MDRD: 64 ML/MIN/1.73
GLOBULIN UR ELPH-MCNC: 2.7 GM/DL
GLOBULIN UR ELPH-MCNC: 2.9 GM/DL
GLOBULIN UR ELPH-MCNC: 3 GM/DL
GLOBULIN UR ELPH-MCNC: 3 GM/DL
GLOBULIN UR ELPH-MCNC: 3.1 GM/DL
GLOBULIN UR ELPH-MCNC: 3.3 GM/DL
GLOBULIN UR ELPH-MCNC: 3.3 GM/DL
GLOBULIN UR ELPH-MCNC: 3.4 GM/DL
GLOBULIN UR ELPH-MCNC: 3.4 GM/DL
GLOBULIN UR ELPH-MCNC: 3.5 GM/DL
GLUCOSE BLD-MCNC: 100 MG/DL (ref 65–99)
GLUCOSE BLD-MCNC: 104 MG/DL (ref 65–99)
GLUCOSE BLD-MCNC: 106 MG/DL (ref 65–99)
GLUCOSE BLD-MCNC: 109 MG/DL (ref 65–99)
GLUCOSE BLD-MCNC: 141 MG/DL (ref 65–99)
GLUCOSE BLD-MCNC: 91 MG/DL (ref 65–99)
GLUCOSE BLD-MCNC: 95 MG/DL (ref 65–99)
GLUCOSE BLD-MCNC: 97 MG/DL (ref 65–99)
GLUCOSE SERPL-MCNC: 101 MG/DL (ref 65–99)
GLUCOSE SERPL-MCNC: 102 MG/DL (ref 65–99)
GLUCOSE SERPL-MCNC: 102 MG/DL (ref 65–99)
GLUCOSE SERPL-MCNC: 104 MG/DL (ref 65–99)
GLUCOSE SERPL-MCNC: 106 MG/DL (ref 65–99)
GLUCOSE SERPL-MCNC: 134 MG/DL (ref 65–99)
GLUCOSE SERPL-MCNC: 82 MG/DL (ref 65–99)
GLUCOSE SERPL-MCNC: 84 MG/DL (ref 65–99)
GLUCOSE SERPL-MCNC: 86 MG/DL (ref 65–99)
GLUCOSE SERPL-MCNC: 88 MG/DL (ref 65–99)
GLUCOSE SERPL-MCNC: 94 MG/DL (ref 65–99)
GLUCOSE UR STRIP-MCNC: NEGATIVE MG/DL
GRAM STN SPEC: ABNORMAL
HADV DNA SPEC NAA+PROBE: NOT DETECTED
HADV DNA SPEC NAA+PROBE: NOT DETECTED
HBA1C MFR BLD: 5.59 % (ref 4.8–5.6)
HBA1C MFR BLD: 5.6 % (ref 4.8–5.6)
HBA1C MFR BLD: 5.9 % (ref 4.8–5.6)
HCO3 BLDA-SCNC: 30.9 MMOL/L (ref 22–28)
HCOV 229E RNA SPEC QL NAA+PROBE: NOT DETECTED
HCOV 229E RNA SPEC QL NAA+PROBE: NOT DETECTED
HCOV HKU1 RNA SPEC QL NAA+PROBE: NOT DETECTED
HCOV HKU1 RNA SPEC QL NAA+PROBE: NOT DETECTED
HCOV NL63 RNA SPEC QL NAA+PROBE: NOT DETECTED
HCOV NL63 RNA SPEC QL NAA+PROBE: NOT DETECTED
HCOV OC43 RNA SPEC QL NAA+PROBE: NOT DETECTED
HCOV OC43 RNA SPEC QL NAA+PROBE: NOT DETECTED
HCT VFR BLD AUTO: 29.4 % (ref 34–46.6)
HCT VFR BLD AUTO: 29.8 % (ref 34–46.6)
HCT VFR BLD AUTO: 30 % (ref 34–46.6)
HCT VFR BLD AUTO: 30.4 % (ref 34–46.6)
HCT VFR BLD AUTO: 30.6 % (ref 34–46.6)
HCT VFR BLD AUTO: 31 % (ref 34–46.6)
HCT VFR BLD AUTO: 31.1 % (ref 34–46.6)
HCT VFR BLD AUTO: 31.1 % (ref 34–46.6)
HCT VFR BLD AUTO: 31.2 % (ref 34–46.6)
HCT VFR BLD AUTO: 31.5 % (ref 34–46.6)
HCT VFR BLD AUTO: 31.6 % (ref 34–46.6)
HCT VFR BLD AUTO: 31.9 % (ref 34–46.6)
HCT VFR BLD AUTO: 32.6 % (ref 34–46.6)
HCT VFR BLD AUTO: 33 % (ref 34–46.6)
HCT VFR BLD AUTO: 33.2 % (ref 34–46.6)
HCT VFR BLD AUTO: 34.2 % (ref 34–46.6)
HCT VFR BLD AUTO: 36.6 % (ref 34–46.6)
HCT VFR BLD AUTO: 38.6 % (ref 34–46.6)
HDLC SERPL-MCNC: 32 MG/DL (ref 40–60)
HDLC SERPL-MCNC: 35 MG/DL (ref 40–60)
HDLC SERPL-MCNC: 52 MG/DL (ref 40–60)
HGB BLD-MCNC: 10.2 G/DL (ref 12–15.9)
HGB BLD-MCNC: 10.3 G/DL (ref 12–15.9)
HGB BLD-MCNC: 10.3 G/DL (ref 12–15.9)
HGB BLD-MCNC: 10.4 G/DL (ref 12–15.9)
HGB BLD-MCNC: 10.4 G/DL (ref 12–15.9)
HGB BLD-MCNC: 10.6 G/DL (ref 12–15.9)
HGB BLD-MCNC: 10.6 G/DL (ref 12–15.9)
HGB BLD-MCNC: 10.7 G/DL (ref 12–15.9)
HGB BLD-MCNC: 10.7 G/DL (ref 12–15.9)
HGB BLD-MCNC: 10.8 G/DL (ref 12–15.9)
HGB BLD-MCNC: 10.8 G/DL (ref 12–15.9)
HGB BLD-MCNC: 11 G/DL (ref 12–15.9)
HGB BLD-MCNC: 11 G/DL (ref 12–15.9)
HGB BLD-MCNC: 11.8 G/DL (ref 12–15.9)
HGB BLD-MCNC: 12.5 G/DL (ref 12–15.9)
HGB BLD-MCNC: 13.1 G/DL (ref 12–15.9)
HGB BLD-MCNC: 9.8 G/DL (ref 12–15.9)
HGB BLD-MCNC: 9.9 G/DL (ref 12–15.9)
HGB UR QL STRIP.AUTO: ABNORMAL
HMPV RNA NPH QL NAA+NON-PROBE: DETECTED
HMPV RNA NPH QL NAA+NON-PROBE: NOT DETECTED
HOLD SPECIMEN: NORMAL
HOLD SPECIMEN: NORMAL
HOROWITZ INDEX BLD+IHG-RTO: 50 %
HPIV1 RNA SPEC QL NAA+PROBE: NOT DETECTED
HPIV1 RNA SPEC QL NAA+PROBE: NOT DETECTED
HPIV2 RNA SPEC QL NAA+PROBE: NOT DETECTED
HPIV2 RNA SPEC QL NAA+PROBE: NOT DETECTED
HPIV3 RNA NPH QL NAA+PROBE: NOT DETECTED
HPIV3 RNA NPH QL NAA+PROBE: NOT DETECTED
HPIV4 P GENE NPH QL NAA+PROBE: NOT DETECTED
HPIV4 P GENE NPH QL NAA+PROBE: NOT DETECTED
HYALINE CASTS UR QL AUTO: ABNORMAL /LPF
HYPOCHROMIA BLD QL: NORMAL
IMM GRANULOCYTES # BLD AUTO: 0.01 10*3/MM3 (ref 0–0.05)
IMM GRANULOCYTES # BLD AUTO: 0.02 10*3/MM3 (ref 0–0.05)
IMM GRANULOCYTES # BLD AUTO: 0.03 10*3/MM3 (ref 0–0.05)
IMM GRANULOCYTES # BLD AUTO: 0.03 10*3/MM3 (ref 0–0.05)
IMM GRANULOCYTES # BLD AUTO: 0.04 10*3/MM3 (ref 0–0.05)
IMM GRANULOCYTES # BLD AUTO: 0.04 10*3/MM3 (ref 0–0.05)
IMM GRANULOCYTES # BLD AUTO: 0.05 10*3/MM3 (ref 0–0.05)
IMM GRANULOCYTES NFR BLD AUTO: 0.2 % (ref 0–0.5)
IMM GRANULOCYTES NFR BLD AUTO: 0.3 % (ref 0–0.5)
IMM GRANULOCYTES NFR BLD AUTO: 0.4 % (ref 0–0.5)
IMM GRANULOCYTES NFR BLD AUTO: 0.4 % (ref 0–0.5)
IMM GRANULOCYTES NFR BLD AUTO: 0.5 % (ref 0–0.5)
IMM GRANULOCYTES NFR BLD AUTO: 0.6 % (ref 0–0.5)
IMM GRANULOCYTES NFR BLD AUTO: 0.6 % (ref 0–0.5)
INR PPP: 0.97 (ref 0.9–1.1)
INR PPP: 1.09 (ref 0.9–1.1)
KETONES UR QL STRIP: ABNORMAL
KETONES UR QL STRIP: NEGATIVE
LDH SERPL-CCNC: 248 U/L (ref 135–214)
LDLC SERPL CALC-MCNC: 75 MG/DL (ref 0–100)
LDLC SERPL CALC-MCNC: 93 MG/DL (ref 0–100)
LDLC SERPL CALC-MCNC: 99 MG/DL (ref 0–100)
LDLC/HDLC SERPL: 1.8 {RATIO}
LDLC/HDLC SERPL: 2.28 {RATIO}
LDLC/HDLC SERPL: 2.83 {RATIO}
LEFT ATRIUM VOLUME INDEX: 18 ML/M2
LEUKOCYTE ESTERASE UR QL STRIP.AUTO: ABNORMAL
LYMPHOCYTES # BLD AUTO: 1.23 10*3/MM3 (ref 0.7–3.1)
LYMPHOCYTES # BLD AUTO: 1.31 10*3/MM3 (ref 0.7–3.1)
LYMPHOCYTES # BLD AUTO: 1.64 10*3/MM3 (ref 0.7–3.1)
LYMPHOCYTES # BLD AUTO: 1.69 10*3/MM3 (ref 0.7–3.1)
LYMPHOCYTES # BLD AUTO: 1.72 10*3/MM3 (ref 0.7–3.1)
LYMPHOCYTES # BLD AUTO: 1.73 10*3/MM3 (ref 0.7–3.1)
LYMPHOCYTES # BLD AUTO: 1.78 10*3/MM3 (ref 0.7–3.1)
LYMPHOCYTES # BLD AUTO: 1.83 10*3/MM3 (ref 0.7–3.1)
LYMPHOCYTES # BLD AUTO: 1.84 10*3/MM3 (ref 0.7–3.1)
LYMPHOCYTES # BLD AUTO: 1.93 10*3/MM3 (ref 0.7–3.1)
LYMPHOCYTES # BLD AUTO: 1.93 10*3/MM3 (ref 0.7–3.1)
LYMPHOCYTES # BLD AUTO: 1.98 10*3/MM3 (ref 0.7–3.1)
LYMPHOCYTES # BLD AUTO: 1.99 10*3/MM3 (ref 0.7–3.1)
LYMPHOCYTES # BLD AUTO: 2.04 10*3/MM3 (ref 0.7–3.1)
LYMPHOCYTES # BLD AUTO: 2.06 10*3/MM3 (ref 0.7–3.1)
LYMPHOCYTES # BLD AUTO: 2.24 10*3/MM3 (ref 0.7–3.1)
LYMPHOCYTES # BLD MANUAL: 1.37 10*3/MM3 (ref 0.7–3.1)
LYMPHOCYTES NFR BLD AUTO: 13.6 % (ref 19.6–45.3)
LYMPHOCYTES NFR BLD AUTO: 18.8 % (ref 19.6–45.3)
LYMPHOCYTES NFR BLD AUTO: 21.1 % (ref 19.6–45.3)
LYMPHOCYTES NFR BLD AUTO: 21.9 % (ref 19.6–45.3)
LYMPHOCYTES NFR BLD AUTO: 22.8 % (ref 19.6–45.3)
LYMPHOCYTES NFR BLD AUTO: 24.9 % (ref 19.6–45.3)
LYMPHOCYTES NFR BLD AUTO: 26 % (ref 19.6–45.3)
LYMPHOCYTES NFR BLD AUTO: 27.3 % (ref 19.6–45.3)
LYMPHOCYTES NFR BLD AUTO: 29.2 % (ref 19.6–45.3)
LYMPHOCYTES NFR BLD AUTO: 30.6 % (ref 19.6–45.3)
LYMPHOCYTES NFR BLD AUTO: 31.7 % (ref 19.6–45.3)
LYMPHOCYTES NFR BLD AUTO: 33.9 % (ref 19.6–45.3)
LYMPHOCYTES NFR BLD AUTO: 35.9 % (ref 19.6–45.3)
LYMPHOCYTES NFR BLD AUTO: 36.9 % (ref 19.6–45.3)
LYMPHOCYTES NFR BLD AUTO: 39.5 % (ref 19.6–45.3)
LYMPHOCYTES NFR BLD AUTO: 42.5 % (ref 19.6–45.3)
LYMPHOCYTES NFR BLD MANUAL: 19.6 % (ref 19.6–45.3)
LYMPHOCYTES NFR BLD MANUAL: 6.2 % (ref 5–12)
M PNEUMO IGG SER IA-ACNC: NOT DETECTED
M PNEUMO IGG SER IA-ACNC: NOT DETECTED
MAXIMAL PREDICTED HEART RATE: 131 BPM
MCH RBC QN AUTO: 32.8 PG (ref 26.6–33)
MCH RBC QN AUTO: 33.1 PG (ref 26.6–33)
MCH RBC QN AUTO: 33.3 PG (ref 26.6–33)
MCH RBC QN AUTO: 33.4 PG (ref 26.6–33)
MCH RBC QN AUTO: 33.4 PG (ref 26.6–33)
MCH RBC QN AUTO: 33.8 PG (ref 26.6–33)
MCH RBC QN AUTO: 33.9 PG (ref 26.6–33)
MCH RBC QN AUTO: 33.9 PG (ref 26.6–33)
MCH RBC QN AUTO: 34.1 PG (ref 26.6–33)
MCH RBC QN AUTO: 34.1 PG (ref 26.6–33)
MCH RBC QN AUTO: 34.2 PG (ref 26.6–33)
MCH RBC QN AUTO: 34.3 PG (ref 26.6–33)
MCH RBC QN AUTO: 34.6 PG (ref 26.6–33)
MCH RBC QN AUTO: 34.7 PG (ref 26.6–33)
MCH RBC QN AUTO: 34.8 PG (ref 26.6–33)
MCH RBC QN AUTO: 35 PG (ref 26.6–33)
MCH RBC QN AUTO: 35 PG (ref 26.6–33)
MCH RBC QN AUTO: 35.4 PG (ref 26.6–33)
MCHC RBC AUTO-ENTMCNC: 32.6 G/DL (ref 31.5–35.7)
MCHC RBC AUTO-ENTMCNC: 32.8 G/DL (ref 31.5–35.7)
MCHC RBC AUTO-ENTMCNC: 33.1 G/DL (ref 31.5–35.7)
MCHC RBC AUTO-ENTMCNC: 33.1 G/DL (ref 31.5–35.7)
MCHC RBC AUTO-ENTMCNC: 33.2 G/DL (ref 31.5–35.7)
MCHC RBC AUTO-ENTMCNC: 33.3 G/DL (ref 31.5–35.7)
MCHC RBC AUTO-ENTMCNC: 33.3 G/DL (ref 31.5–35.7)
MCHC RBC AUTO-ENTMCNC: 33.4 G/DL (ref 31.5–35.7)
MCHC RBC AUTO-ENTMCNC: 33.9 G/DL (ref 31.5–35.7)
MCHC RBC AUTO-ENTMCNC: 34 G/DL (ref 31.5–35.7)
MCHC RBC AUTO-ENTMCNC: 34 G/DL (ref 31.5–35.7)
MCHC RBC AUTO-ENTMCNC: 34.2 G/DL (ref 31.5–35.7)
MCHC RBC AUTO-ENTMCNC: 34.3 G/DL (ref 31.5–35.7)
MCHC RBC AUTO-ENTMCNC: 34.5 G/DL (ref 31.5–35.7)
MCHC RBC AUTO-ENTMCNC: 34.6 G/DL (ref 31.5–35.7)
MCHC RBC AUTO-ENTMCNC: 34.9 G/DL (ref 31.5–35.7)
MCV RBC AUTO: 100.5 FL (ref 79–97)
MCV RBC AUTO: 100.7 FL (ref 79–97)
MCV RBC AUTO: 100.9 FL (ref 79–97)
MCV RBC AUTO: 100.9 FL (ref 79–97)
MCV RBC AUTO: 101 FL (ref 79–97)
MCV RBC AUTO: 101 FL (ref 79–97)
MCV RBC AUTO: 101.7 FL (ref 79–97)
MCV RBC AUTO: 102 FL (ref 79–97)
MCV RBC AUTO: 102.5 FL (ref 79–97)
MCV RBC AUTO: 102.6 FL (ref 79–97)
MCV RBC AUTO: 102.6 FL (ref 79–97)
MCV RBC AUTO: 103 FL (ref 79–97)
MCV RBC AUTO: 104.3 FL (ref 79–97)
MCV RBC AUTO: 104.5 FL (ref 79–97)
MCV RBC AUTO: 96.8 FL (ref 79–97)
MCV RBC AUTO: 97.4 FL (ref 79–97)
MCV RBC AUTO: 98.4 FL (ref 79–97)
MCV RBC AUTO: 99.7 FL (ref 79–97)
MODALITY: ABNORMAL
MONOCYTES # BLD AUTO: 0.19 10*3/MM3 (ref 0.1–0.9)
MONOCYTES # BLD AUTO: 0.3 10*3/MM3 (ref 0.1–0.9)
MONOCYTES # BLD AUTO: 0.43 10*3/MM3 (ref 0.1–0.9)
MONOCYTES # BLD AUTO: 0.47 10*3/MM3 (ref 0.1–0.9)
MONOCYTES # BLD AUTO: 0.54 10*3/MM3 (ref 0.1–0.9)
MONOCYTES # BLD AUTO: 0.56 10*3/MM3 (ref 0.1–0.9)
MONOCYTES # BLD AUTO: 0.58 10*3/MM3 (ref 0.1–0.9)
MONOCYTES # BLD AUTO: 0.58 10*3/MM3 (ref 0.1–0.9)
MONOCYTES # BLD AUTO: 0.59 10*3/MM3 (ref 0.1–0.9)
MONOCYTES # BLD AUTO: 0.6 10*3/MM3 (ref 0.1–0.9)
MONOCYTES # BLD AUTO: 0.64 10*3/MM3 (ref 0.1–0.9)
MONOCYTES # BLD AUTO: 0.64 10*3/MM3 (ref 0.1–0.9)
MONOCYTES # BLD AUTO: 0.65 10*3/MM3 (ref 0.1–0.9)
MONOCYTES # BLD AUTO: 0.66 10*3/MM3 (ref 0.1–0.9)
MONOCYTES # BLD AUTO: 0.66 10*3/MM3 (ref 0.1–0.9)
MONOCYTES # BLD AUTO: 0.71 10*3/MM3 (ref 0.1–0.9)
MONOCYTES # BLD AUTO: 0.75 10*3/MM3 (ref 0.1–0.9)
MONOCYTES NFR BLD AUTO: 10.2 % (ref 5–12)
MONOCYTES NFR BLD AUTO: 10.6 % (ref 5–12)
MONOCYTES NFR BLD AUTO: 11.6 % (ref 5–12)
MONOCYTES NFR BLD AUTO: 4.2 % (ref 5–12)
MONOCYTES NFR BLD AUTO: 6 % (ref 5–12)
MONOCYTES NFR BLD AUTO: 6.9 % (ref 5–12)
MONOCYTES NFR BLD AUTO: 7.1 % (ref 5–12)
MONOCYTES NFR BLD AUTO: 7.3 % (ref 5–12)
MONOCYTES NFR BLD AUTO: 7.9 % (ref 5–12)
MONOCYTES NFR BLD AUTO: 9.1 % (ref 5–12)
MONOCYTES NFR BLD AUTO: 9.2 % (ref 5–12)
MONOCYTES NFR BLD AUTO: 9.2 % (ref 5–12)
MONOCYTES NFR BLD AUTO: 9.3 % (ref 5–12)
MONOCYTES NFR BLD AUTO: 9.3 % (ref 5–12)
MONOCYTES NFR BLD AUTO: 9.4 % (ref 5–12)
MONOCYTES NFR BLD AUTO: 9.7 % (ref 5–12)
NEUTROPHILS # BLD AUTO: 4.19 10*3/MM3 (ref 1.7–7)
NEUTROPHILS # BLD AUTO: 4.5 10*3/MM3 (ref 1.7–7)
NEUTROPHILS # BLD AUTO: 4.98 10*3/MM3 (ref 1.7–7)
NEUTROPHILS # BLD AUTO: 5.72 10*3/MM3 (ref 1.7–7)
NEUTROPHILS # BLD AUTO: 7.11 10*3/MM3 (ref 1.7–7)
NEUTROPHILS # BLD AUTO: 7.41 10*3/MM3 (ref 1.7–7)
NEUTROPHILS NFR BLD AUTO: 2.17 10*3/MM3 (ref 1.7–7)
NEUTROPHILS NFR BLD AUTO: 2.47 10*3/MM3 (ref 1.7–7)
NEUTROPHILS NFR BLD AUTO: 2.5 10*3/MM3 (ref 1.7–7)
NEUTROPHILS NFR BLD AUTO: 2.72 10*3/MM3 (ref 1.7–7)
NEUTROPHILS NFR BLD AUTO: 2.94 10*3/MM3 (ref 1.7–7)
NEUTROPHILS NFR BLD AUTO: 3.07 10*3/MM3 (ref 1.7–7)
NEUTROPHILS NFR BLD AUTO: 3.41 10*3/MM3 (ref 1.7–7)
NEUTROPHILS NFR BLD AUTO: 3.52 10*3/MM3 (ref 1.7–7)
NEUTROPHILS NFR BLD AUTO: 4.02 10*3/MM3 (ref 1.7–7)
NEUTROPHILS NFR BLD AUTO: 4.65 10*3/MM3 (ref 1.7–7)
NEUTROPHILS NFR BLD AUTO: 44.1 % (ref 42.7–76)
NEUTROPHILS NFR BLD AUTO: 5.62 10*3/MM3 (ref 1.7–7)
NEUTROPHILS NFR BLD AUTO: 50.2 % (ref 42.7–76)
NEUTROPHILS NFR BLD AUTO: 50.6 % (ref 42.7–76)
NEUTROPHILS NFR BLD AUTO: 51.1 % (ref 42.7–76)
NEUTROPHILS NFR BLD AUTO: 53.3 % (ref 42.7–76)
NEUTROPHILS NFR BLD AUTO: 54.2 % (ref 42.7–76)
NEUTROPHILS NFR BLD AUTO: 54.6 % (ref 42.7–76)
NEUTROPHILS NFR BLD AUTO: 59.1 % (ref 42.7–76)
NEUTROPHILS NFR BLD AUTO: 59.2 % (ref 42.7–76)
NEUTROPHILS NFR BLD AUTO: 59.2 % (ref 42.7–76)
NEUTROPHILS NFR BLD AUTO: 62.6 % (ref 42.7–76)
NEUTROPHILS NFR BLD AUTO: 65.8 % (ref 42.7–76)
NEUTROPHILS NFR BLD AUTO: 70 % (ref 42.7–76)
NEUTROPHILS NFR BLD AUTO: 71.4 % (ref 42.7–76)
NEUTROPHILS NFR BLD AUTO: 72.3 % (ref 42.7–76)
NEUTROPHILS NFR BLD AUTO: 78.9 % (ref 42.7–76)
NEUTROPHILS NFR BLD MANUAL: 71.1 % (ref 42.7–76)
NITRITE UR QL STRIP: NEGATIVE
NITRITE UR QL STRIP: NEGATIVE
NITRITE UR QL STRIP: POSITIVE
NITRITE UR QL STRIP: POSITIVE
NRBC BLD AUTO-RTO: 0 /100 WBC (ref 0–0.2)
NT-PROBNP SERPL-MCNC: 1035 PG/ML (ref 5–1800)
NT-PROBNP SERPL-MCNC: 1058 PG/ML (ref 0–1800)
NT-PROBNP SERPL-MCNC: 3306 PG/ML (ref 5–1800)
NT-PROBNP SERPL-MCNC: 3307 PG/ML (ref 0–1800)
NT-PROBNP SERPL-MCNC: 922.9 PG/ML (ref 5–1800)
O2 A-A PPRESDIFF RESPIRATORY: 0.4 MMHG
PCO2 BLDA: 51.8 MM HG (ref 35–45)
PH BLDA: 7.38 PH UNITS (ref 7.35–7.45)
PH UR STRIP.AUTO: 6 [PH] (ref 5–8)
PH UR STRIP.AUTO: 6 [PH] (ref 5–8)
PH UR STRIP.AUTO: 6.5 [PH] (ref 5–8)
PH UR STRIP.AUTO: 7 [PH] (ref 5–8)
PLAT MORPH BLD: NORMAL
PLATELET # BLD AUTO: 108 10*3/MM3 (ref 140–450)
PLATELET # BLD AUTO: 129 10*3/MM3 (ref 140–450)
PLATELET # BLD AUTO: 136 10*3/MM3 (ref 140–450)
PLATELET # BLD AUTO: 138 10*3/MM3 (ref 140–450)
PLATELET # BLD AUTO: 141 10*3/MM3 (ref 140–450)
PLATELET # BLD AUTO: 144 10*3/MM3 (ref 140–450)
PLATELET # BLD AUTO: 144 10*3/MM3 (ref 140–450)
PLATELET # BLD AUTO: 147 10*3/MM3 (ref 140–450)
PLATELET # BLD AUTO: 148 10*3/MM3 (ref 140–450)
PLATELET # BLD AUTO: 148 10*3/MM3 (ref 140–450)
PLATELET # BLD AUTO: 167 10*3/MM3 (ref 140–450)
PLATELET # BLD AUTO: 170 10*3/MM3 (ref 140–450)
PLATELET # BLD AUTO: 171 10*3/MM3 (ref 140–450)
PLATELET # BLD AUTO: 180 10*3/MM3 (ref 140–450)
PLATELET # BLD AUTO: 89 10*3/MM3 (ref 140–450)
PLATELET # BLD AUTO: 92 10*3/MM3 (ref 140–450)
PLATELET # BLD AUTO: 95 10*3/MM3 (ref 140–450)
PLATELET # BLD AUTO: 97 10*3/MM3 (ref 140–450)
PMV BLD AUTO: 10 FL (ref 6–12)
PMV BLD AUTO: 10 FL (ref 6–12)
PMV BLD AUTO: 10.2 FL (ref 6–12)
PMV BLD AUTO: 10.4 FL (ref 6–12)
PMV BLD AUTO: 8.7 FL (ref 6–12)
PMV BLD AUTO: 9.2 FL (ref 6–12)
PMV BLD AUTO: 9.3 FL (ref 6–12)
PMV BLD AUTO: 9.4 FL (ref 6–12)
PMV BLD AUTO: 9.5 FL (ref 6–12)
PMV BLD AUTO: 9.7 FL (ref 6–12)
PMV BLD AUTO: 9.7 FL (ref 6–12)
PMV BLD AUTO: 9.9 FL (ref 6–12)
PO2 BLDA: 127.1 MM HG (ref 80–100)
POTASSIUM BLD-SCNC: 3 MMOL/L (ref 3.5–5.2)
POTASSIUM BLD-SCNC: 3.1 MMOL/L (ref 3.5–5.2)
POTASSIUM BLD-SCNC: 3.2 MMOL/L (ref 3.5–5.2)
POTASSIUM BLD-SCNC: 3.7 MMOL/L (ref 3.5–5.2)
POTASSIUM BLD-SCNC: 3.7 MMOL/L (ref 3.5–5.2)
POTASSIUM BLD-SCNC: 4 MMOL/L (ref 3.5–5.2)
POTASSIUM BLD-SCNC: 4.4 MMOL/L (ref 3.5–5.2)
POTASSIUM BLD-SCNC: 5.1 MMOL/L (ref 3.5–5.2)
POTASSIUM SERPL-SCNC: 3.3 MMOL/L (ref 3.5–5.2)
POTASSIUM SERPL-SCNC: 3.5 MMOL/L (ref 3.5–5.2)
POTASSIUM SERPL-SCNC: 3.6 MMOL/L (ref 3.5–5.2)
POTASSIUM SERPL-SCNC: 3.6 MMOL/L (ref 3.5–5.2)
POTASSIUM SERPL-SCNC: 3.7 MMOL/L (ref 3.5–5.2)
POTASSIUM SERPL-SCNC: 4 MMOL/L (ref 3.5–5.2)
POTASSIUM SERPL-SCNC: 4.2 MMOL/L (ref 3.5–5.2)
PROCALCITONIN SERPL-MCNC: 0.08 NG/ML (ref 0–0.25)
PROCALCITONIN SERPL-MCNC: 0.09 NG/ML (ref 0.1–0.25)
PROCALCITONIN SERPL-MCNC: 0.16 NG/ML (ref 0.1–0.25)
PROCALCITONIN SERPL-MCNC: 0.77 NG/ML (ref 0.1–0.25)
PROT SERPL-MCNC: 6.2 G/DL (ref 6–8.5)
PROT SERPL-MCNC: 6.5 G/DL (ref 6–8.5)
PROT SERPL-MCNC: 6.7 G/DL (ref 6–8.5)
PROT SERPL-MCNC: 6.8 G/DL (ref 6–8.5)
PROT SERPL-MCNC: 6.9 G/DL (ref 6–8.5)
PROT SERPL-MCNC: 7 G/DL (ref 6–8.5)
PROT SERPL-MCNC: 7.2 G/DL (ref 6–8.5)
PROT UR QL STRIP: ABNORMAL
PROT UR QL STRIP: NEGATIVE
PROTHROMBIN TIME: 12.6 SECONDS (ref 11.7–14.2)
PROTHROMBIN TIME: 13.9 SECONDS (ref 11.7–14.2)
QT INTERVAL: 410 MS
RBC # BLD AUTO: 2.89 10*6/MM3 (ref 3.77–5.28)
RBC # BLD AUTO: 3.02 10*6/MM3 (ref 3.77–5.28)
RBC # BLD AUTO: 3.03 10*6/MM3 (ref 3.77–5.28)
RBC # BLD AUTO: 3.03 10*6/MM3 (ref 3.77–5.28)
RBC # BLD AUTO: 3.05 10*6/MM3 (ref 3.77–5.28)
RBC # BLD AUTO: 3.08 10*6/MM3 (ref 3.77–5.28)
RBC # BLD AUTO: 3.11 10*6/MM3 (ref 3.77–5.28)
RBC # BLD AUTO: 3.12 10*6/MM3 (ref 3.77–5.28)
RBC # BLD AUTO: 3.21 10*6/MM3 (ref 3.77–5.28)
RBC # BLD AUTO: 3.22 10*6/MM3 (ref 3.77–5.28)
RBC # BLD AUTO: 3.29 10*6/MM3 (ref 3.77–5.28)
RBC # BLD AUTO: 3.39 10*6/MM3 (ref 3.77–5.28)
RBC # BLD AUTO: 3.64 10*6/MM3 (ref 3.77–5.28)
RBC # BLD AUTO: 3.87 10*6/MM3 (ref 3.77–5.28)
RBC # UR: ABNORMAL /HPF
REF LAB TEST METHOD: ABNORMAL
RHINOVIRUS RNA SPEC NAA+PROBE: NOT DETECTED
RHINOVIRUS RNA SPEC NAA+PROBE: NOT DETECTED
RSV RNA NPH QL NAA+NON-PROBE: NOT DETECTED
RSV RNA NPH QL NAA+NON-PROBE: NOT DETECTED
SAO2 % BLDCOA: 98.8 % (ref 92–99)
SARS-COV-2 N GENE NPH QL NAA+PROBE: NOT DETECTED
SARS-COV-2 RNA NPH QL NAA+NON-PROBE: DETECTED
SET MECH RESP RATE: 16
SODIUM BLD-SCNC: 140 MMOL/L (ref 136–145)
SODIUM BLD-SCNC: 140 MMOL/L (ref 136–145)
SODIUM BLD-SCNC: 141 MMOL/L (ref 136–145)
SODIUM BLD-SCNC: 141 MMOL/L (ref 136–145)
SODIUM BLD-SCNC: 142 MMOL/L (ref 136–145)
SODIUM BLD-SCNC: 143 MMOL/L (ref 136–145)
SODIUM BLD-SCNC: 143 MMOL/L (ref 136–145)
SODIUM BLD-SCNC: 144 MMOL/L (ref 136–145)
SODIUM SERPL-SCNC: 138 MMOL/L (ref 136–145)
SODIUM SERPL-SCNC: 140 MMOL/L (ref 136–145)
SODIUM SERPL-SCNC: 141 MMOL/L (ref 136–145)
SODIUM SERPL-SCNC: 142 MMOL/L (ref 136–145)
SODIUM SERPL-SCNC: 142 MMOL/L (ref 136–145)
SODIUM SERPL-SCNC: 143 MMOL/L (ref 136–145)
SODIUM SERPL-SCNC: 144 MMOL/L (ref 136–145)
SODIUM SERPL-SCNC: 146 MMOL/L (ref 136–145)
SODIUM SERPL-SCNC: 147 MMOL/L (ref 136–145)
SODIUM SERPL-SCNC: 147 MMOL/L (ref 136–145)
SODIUM SERPL-SCNC: 148 MMOL/L (ref 136–145)
SP GR UR STRIP: 1.01 (ref 1–1.03)
SP GR UR STRIP: 1.02 (ref 1–1.03)
SQUAMOUS #/AREA URNS HPF: ABNORMAL /HPF
STRESS TARGET HR: 111 BPM
TOTAL RATE: 22 BREATHS/MINUTE
TRIGL SERPL-MCNC: 115 MG/DL (ref 0–150)
TRIGL SERPL-MCNC: 68 MG/DL (ref 0–150)
TRIGL SERPL-MCNC: 90 MG/DL (ref 0–150)
TROPONIN T SERPL-MCNC: 0.03 NG/ML (ref 0–0.03)
TROPONIN T SERPL-MCNC: 0.04 NG/ML (ref 0–0.03)
TROPONIN T SERPL-MCNC: <0.01 NG/ML (ref 0–0.03)
TSH SERPL DL<=0.05 MIU/L-ACNC: 0.75 UIU/ML (ref 0.27–4.2)
TSH SERPL DL<=0.05 MIU/L-ACNC: 0.89 UIU/ML (ref 0.27–4.2)
TSH SERPL DL<=0.05 MIU/L-ACNC: 1.61 UIU/ML (ref 0.27–4.2)
UROBILINOGEN UR QL STRIP: ABNORMAL
VANCOMYCIN SERPL-MCNC: 15 MCG/ML (ref 5–40)
VANCOMYCIN SERPL-MCNC: 15.4 MCG/ML (ref 5–40)
VANCOMYCIN TROUGH SERPL-MCNC: 12.5 MCG/ML (ref 5–20)
VLDLC SERPL-MCNC: 13.6 MG/DL (ref 5–40)
VLDLC SERPL-MCNC: 18 MG/DL (ref 5–40)
VLDLC SERPL-MCNC: 21 MG/DL (ref 5–40)
VT ON VENT VENT: 500 ML
WBC # BLD AUTO: 4.33 10*3/MM3 (ref 3.4–10.8)
WBC # BLD AUTO: 4.48 10*3/MM3 (ref 3.4–10.8)
WBC # BLD AUTO: 4.83 10*3/MM3 (ref 3.4–10.8)
WBC # BLD AUTO: 5.1 10*3/MM3 (ref 3.4–10.8)
WBC # BLD AUTO: 5.67 10*3/MM3 (ref 3.4–10.8)
WBC # BLD AUTO: 6.07 10*3/MM3 (ref 3.4–10.8)
WBC # BLD AUTO: 6.25 10*3/MM3 (ref 3.4–10.8)
WBC # BLD AUTO: 6.5 10*3/MM3 (ref 3.4–10.8)
WBC # BLD AUTO: 6.79 10*3/MM3 (ref 3.4–10.8)
WBC # BLD AUTO: 7.84 10*3/MM3 (ref 3.4–10.8)
WBC # BLD AUTO: 7.86 10*3/MM3 (ref 3.4–10.8)
WBC MORPH BLD: NORMAL
WBC NRBC COR # BLD: 10.25 10*3/MM3 (ref 3.4–10.8)
WBC NRBC COR # BLD: 6.86 10*3/MM3 (ref 3.4–10.8)
WBC NRBC COR # BLD: 7.01 10*3/MM3 (ref 3.4–10.8)
WBC NRBC COR # BLD: 7.08 10*3/MM3 (ref 3.4–10.8)
WBC NRBC COR # BLD: 7.18 10*3/MM3 (ref 3.4–10.8)
WBC NRBC COR # BLD: 8.18 10*3/MM3 (ref 3.4–10.8)
WBC NRBC COR # BLD: 9.02 10*3/MM3 (ref 3.4–10.8)
WBC UR QL AUTO: ABNORMAL /HPF
WHOLE BLOOD HOLD SPECIMEN: NORMAL
WHOLE BLOOD HOLD SPECIMEN: NORMAL

## 2020-01-01 PROCEDURE — 25010000002 ENOXAPARIN PER 10 MG: Performed by: HOSPITALIST

## 2020-01-01 PROCEDURE — 99284 EMERGENCY DEPT VISIT MOD MDM: CPT

## 2020-01-01 PROCEDURE — 25010000002 FUROSEMIDE PER 20 MG: Performed by: INTERNAL MEDICINE

## 2020-01-01 PROCEDURE — 94799 UNLISTED PULMONARY SVC/PX: CPT

## 2020-01-01 PROCEDURE — 25010000002 CEFTRIAXONE PER 250 MG: Performed by: HOSPITALIST

## 2020-01-01 PROCEDURE — 71045 X-RAY EXAM CHEST 1 VIEW: CPT

## 2020-01-01 PROCEDURE — 83036 HEMOGLOBIN GLYCOSYLATED A1C: CPT | Performed by: HOSPITALIST

## 2020-01-01 PROCEDURE — 80053 COMPREHEN METABOLIC PANEL: CPT

## 2020-01-01 PROCEDURE — 93005 ELECTROCARDIOGRAM TRACING: CPT | Performed by: EMERGENCY MEDICINE

## 2020-01-01 PROCEDURE — 25010000002 MORPHINE PER 10 MG: Performed by: HOSPITALIST

## 2020-01-01 PROCEDURE — 81001 URINALYSIS AUTO W/SCOPE: CPT | Performed by: EMERGENCY MEDICINE

## 2020-01-01 PROCEDURE — 87804 INFLUENZA ASSAY W/OPTIC: CPT | Performed by: EMERGENCY MEDICINE

## 2020-01-01 PROCEDURE — 94640 AIRWAY INHALATION TREATMENT: CPT

## 2020-01-01 PROCEDURE — P9612 CATHETERIZE FOR URINE SPEC: HCPCS

## 2020-01-01 PROCEDURE — 25010000002 LEVOFLOXACIN PER 250 MG: Performed by: HOSPITALIST

## 2020-01-01 PROCEDURE — 36415 COLL VENOUS BLD VENIPUNCTURE: CPT | Performed by: INTERNAL MEDICINE

## 2020-01-01 PROCEDURE — 25010000002 VANCOMYCIN 750 MG RECONSTITUTED SOLUTION: Performed by: HOSPITALIST

## 2020-01-01 PROCEDURE — 72170 X-RAY EXAM OF PELVIS: CPT

## 2020-01-01 PROCEDURE — 94660 CPAP INITIATION&MGMT: CPT

## 2020-01-01 PROCEDURE — 99232 SBSQ HOSP IP/OBS MODERATE 35: CPT | Performed by: INTERNAL MEDICINE

## 2020-01-01 PROCEDURE — 85025 COMPLETE CBC W/AUTO DIFF WBC: CPT | Performed by: HOSPITALIST

## 2020-01-01 PROCEDURE — 99285 EMERGENCY DEPT VISIT HI MDM: CPT

## 2020-01-01 PROCEDURE — 85610 PROTHROMBIN TIME: CPT

## 2020-01-01 PROCEDURE — 92526 ORAL FUNCTION THERAPY: CPT

## 2020-01-01 PROCEDURE — 85027 COMPLETE CBC AUTOMATED: CPT

## 2020-01-01 PROCEDURE — 72125 CT NECK SPINE W/O DYE: CPT

## 2020-01-01 PROCEDURE — 87040 BLOOD CULTURE FOR BACTERIA: CPT | Performed by: NURSE PRACTITIONER

## 2020-01-01 PROCEDURE — 0100U HC BIOFIRE FILMARRAY RESP PANEL 2: CPT | Performed by: HOSPITALIST

## 2020-01-01 PROCEDURE — 72110 X-RAY EXAM L-2 SPINE 4/>VWS: CPT

## 2020-01-01 PROCEDURE — 80053 COMPREHEN METABOLIC PANEL: CPT | Performed by: EMERGENCY MEDICINE

## 2020-01-01 PROCEDURE — 84443 ASSAY THYROID STIM HORMONE: CPT | Performed by: HOSPITALIST

## 2020-01-01 PROCEDURE — 80048 BASIC METABOLIC PNL TOTAL CA: CPT | Performed by: HOSPITALIST

## 2020-01-01 PROCEDURE — 87635 SARS-COV-2 COVID-19 AMP PRB: CPT | Performed by: EMERGENCY MEDICINE

## 2020-01-01 PROCEDURE — 86140 C-REACTIVE PROTEIN: CPT | Performed by: HOSPITALIST

## 2020-01-01 PROCEDURE — 25010000002 LORAZEPAM PER 2 MG: Performed by: HOSPITALIST

## 2020-01-01 PROCEDURE — 82248 BILIRUBIN DIRECT: CPT | Performed by: INTERNAL MEDICINE

## 2020-01-01 PROCEDURE — 93010 ELECTROCARDIOGRAM REPORT: CPT | Performed by: INTERNAL MEDICINE

## 2020-01-01 PROCEDURE — 93005 ELECTROCARDIOGRAM TRACING: CPT | Performed by: NURSE PRACTITIONER

## 2020-01-01 PROCEDURE — 71046 X-RAY EXAM CHEST 2 VIEWS: CPT

## 2020-01-01 PROCEDURE — 97110 THERAPEUTIC EXERCISES: CPT

## 2020-01-01 PROCEDURE — 80053 COMPREHEN METABOLIC PANEL: CPT | Performed by: HOSPITALIST

## 2020-01-01 PROCEDURE — 83615 LACTATE (LD) (LDH) ENZYME: CPT | Performed by: EMERGENCY MEDICINE

## 2020-01-01 PROCEDURE — 25010000002 DEXAMETHASONE SODIUM PHOSPHATE 10 MG/ML SOLUTION: Performed by: HOSPITALIST

## 2020-01-01 PROCEDURE — 87040 BLOOD CULTURE FOR BACTERIA: CPT | Performed by: HOSPITALIST

## 2020-01-01 PROCEDURE — 83880 ASSAY OF NATRIURETIC PEPTIDE: CPT | Performed by: HOSPITALIST

## 2020-01-01 PROCEDURE — 70450 CT HEAD/BRAIN W/O DYE: CPT

## 2020-01-01 PROCEDURE — 80061 LIPID PANEL: CPT | Performed by: HOSPITALIST

## 2020-01-01 PROCEDURE — 83605 ASSAY OF LACTIC ACID: CPT | Performed by: EMERGENCY MEDICINE

## 2020-01-01 PROCEDURE — 80076 HEPATIC FUNCTION PANEL: CPT | Performed by: INTERNAL MEDICINE

## 2020-01-01 PROCEDURE — 87040 BLOOD CULTURE FOR BACTERIA: CPT | Performed by: EMERGENCY MEDICINE

## 2020-01-01 PROCEDURE — 84145 PROCALCITONIN (PCT): CPT | Performed by: EMERGENCY MEDICINE

## 2020-01-01 PROCEDURE — 73140 X-RAY EXAM OF FINGER(S): CPT

## 2020-01-01 PROCEDURE — 81001 URINALYSIS AUTO W/SCOPE: CPT

## 2020-01-01 PROCEDURE — 99441 PR PHYS/QHP TELEPHONE EVALUATION 5-10 MIN: CPT | Performed by: NURSE PRACTITIONER

## 2020-01-01 PROCEDURE — 85730 THROMBOPLASTIN TIME PARTIAL: CPT

## 2020-01-01 PROCEDURE — 97162 PT EVAL MOD COMPLEX 30 MIN: CPT

## 2020-01-01 PROCEDURE — 92610 EVALUATE SWALLOWING FUNCTION: CPT

## 2020-01-01 PROCEDURE — 84484 ASSAY OF TROPONIN QUANT: CPT | Performed by: EMERGENCY MEDICINE

## 2020-01-01 PROCEDURE — 25010000002 VANCOMYCIN 750 MG RECONSTITUTED SOLUTION: Performed by: INTERNAL MEDICINE

## 2020-01-01 PROCEDURE — 36600 WITHDRAWAL OF ARTERIAL BLOOD: CPT

## 2020-01-01 PROCEDURE — 97165 OT EVAL LOW COMPLEX 30 MIN: CPT

## 2020-01-01 PROCEDURE — 83605 ASSAY OF LACTIC ACID: CPT | Performed by: NURSE PRACTITIONER

## 2020-01-01 PROCEDURE — 25010000002 VANCOMYCIN 10 G RECONSTITUTED SOLUTION: Performed by: EMERGENCY MEDICINE

## 2020-01-01 PROCEDURE — 85610 PROTHROMBIN TIME: CPT | Performed by: EMERGENCY MEDICINE

## 2020-01-01 PROCEDURE — 80053 COMPREHEN METABOLIC PANEL: CPT | Performed by: NURSE PRACTITIONER

## 2020-01-01 PROCEDURE — 0202U NFCT DS 22 TRGT SARS-COV-2: CPT | Performed by: EMERGENCY MEDICINE

## 2020-01-01 PROCEDURE — 81001 URINALYSIS AUTO W/SCOPE: CPT | Performed by: NURSE PRACTITIONER

## 2020-01-01 PROCEDURE — 84484 ASSAY OF TROPONIN QUANT: CPT | Performed by: HOSPITALIST

## 2020-01-01 PROCEDURE — 85025 COMPLETE CBC W/AUTO DIFF WBC: CPT | Performed by: EMERGENCY MEDICINE

## 2020-01-01 PROCEDURE — 85379 FIBRIN DEGRADATION QUANT: CPT | Performed by: HOSPITALIST

## 2020-01-01 PROCEDURE — 83605 ASSAY OF LACTIC ACID: CPT | Performed by: HOSPITALIST

## 2020-01-01 PROCEDURE — 25810000003 SODIUM CHLORIDE 0.9 % WITH KCL 20 MEQ 20-0.9 MEQ/L-% SOLUTION: Performed by: HOSPITALIST

## 2020-01-01 PROCEDURE — 84484 ASSAY OF TROPONIN QUANT: CPT | Performed by: NURSE PRACTITIONER

## 2020-01-01 PROCEDURE — 94760 N-INVAS EAR/PLS OXIMETRY 1: CPT

## 2020-01-01 PROCEDURE — 93294 REM INTERROG EVL PM/LDLS PM: CPT | Performed by: INTERNAL MEDICINE

## 2020-01-01 PROCEDURE — 87186 SC STD MICRODIL/AGAR DIL: CPT | Performed by: NURSE PRACTITIONER

## 2020-01-01 PROCEDURE — 36415 COLL VENOUS BLD VENIPUNCTURE: CPT

## 2020-01-01 PROCEDURE — 74220 X-RAY XM ESOPHAGUS 1CNTRST: CPT

## 2020-01-01 PROCEDURE — 80048 BASIC METABOLIC PNL TOTAL CA: CPT

## 2020-01-01 PROCEDURE — 85007 BL SMEAR W/DIFF WBC COUNT: CPT | Performed by: HOSPITALIST

## 2020-01-01 PROCEDURE — 93005 ELECTROCARDIOGRAM TRACING: CPT | Performed by: HOSPITALIST

## 2020-01-01 PROCEDURE — 73110 X-RAY EXAM OF WRIST: CPT

## 2020-01-01 PROCEDURE — 97535 SELF CARE MNGMENT TRAINING: CPT

## 2020-01-01 PROCEDURE — 80202 ASSAY OF VANCOMYCIN: CPT | Performed by: HOSPITALIST

## 2020-01-01 PROCEDURE — 84145 PROCALCITONIN (PCT): CPT | Performed by: NURSE PRACTITIONER

## 2020-01-01 PROCEDURE — 25010000002 DEXAMETHASONE SODIUM PHOSPHATE 10 MG/ML SOLUTION: Performed by: NURSE PRACTITIONER

## 2020-01-01 PROCEDURE — 82728 ASSAY OF FERRITIN: CPT | Performed by: HOSPITALIST

## 2020-01-01 PROCEDURE — BD11YZZ FLUOROSCOPY OF ESOPHAGUS USING OTHER CONTRAST: ICD-10-PCS | Performed by: RADIOLOGY

## 2020-01-01 PROCEDURE — 87147 CULTURE TYPE IMMUNOLOGIC: CPT | Performed by: NURSE PRACTITIONER

## 2020-01-01 PROCEDURE — 99222 1ST HOSP IP/OBS MODERATE 55: CPT | Performed by: INTERNAL MEDICINE

## 2020-01-01 PROCEDURE — 73610 X-RAY EXAM OF ANKLE: CPT

## 2020-01-01 PROCEDURE — 84145 PROCALCITONIN (PCT): CPT | Performed by: HOSPITALIST

## 2020-01-01 PROCEDURE — 93296 REM INTERROG EVL PM/IDS: CPT | Performed by: INTERNAL MEDICINE

## 2020-01-01 PROCEDURE — 82565 ASSAY OF CREATININE: CPT | Performed by: INTERNAL MEDICINE

## 2020-01-01 PROCEDURE — 85025 COMPLETE CBC W/AUTO DIFF WBC: CPT

## 2020-01-01 PROCEDURE — 87086 URINE CULTURE/COLONY COUNT: CPT | Performed by: EMERGENCY MEDICINE

## 2020-01-01 PROCEDURE — 85025 COMPLETE CBC W/AUTO DIFF WBC: CPT | Performed by: NURSE PRACTITIONER

## 2020-01-01 PROCEDURE — 93306 TTE W/DOPPLER COMPLETE: CPT

## 2020-01-01 PROCEDURE — 93306 TTE W/DOPPLER COMPLETE: CPT | Performed by: INTERNAL MEDICINE

## 2020-01-01 PROCEDURE — 93005 ELECTROCARDIOGRAM TRACING: CPT | Performed by: ORTHOPAEDIC SURGERY

## 2020-01-01 PROCEDURE — 82803 BLOOD GASES ANY COMBINATION: CPT

## 2020-01-01 PROCEDURE — 36415 COLL VENOUS BLD VENIPUNCTURE: CPT | Performed by: HOSPITALIST

## 2020-01-01 PROCEDURE — 87150 DNA/RNA AMPLIFIED PROBE: CPT | Performed by: NURSE PRACTITIONER

## 2020-01-01 PROCEDURE — XW033E5 INTRODUCTION OF REMDESIVIR ANTI-INFECTIVE INTO PERIPHERAL VEIN, PERCUTANEOUS APPROACH, NEW TECHNOLOGY GROUP 5: ICD-10-PCS | Performed by: INTERNAL MEDICINE

## 2020-01-01 PROCEDURE — 25010000002 CEFTRIAXONE PER 250 MG: Performed by: EMERGENCY MEDICINE

## 2020-01-01 PROCEDURE — 99441 PR PHYS/QHP TELEPHONE EVALUATION 5-10 MIN: CPT | Performed by: INTERNAL MEDICINE

## 2020-01-01 PROCEDURE — 97530 THERAPEUTIC ACTIVITIES: CPT

## 2020-01-01 PROCEDURE — 83880 ASSAY OF NATRIURETIC PEPTIDE: CPT | Performed by: EMERGENCY MEDICINE

## 2020-01-01 PROCEDURE — 80202 ASSAY OF VANCOMYCIN: CPT | Performed by: INTERNAL MEDICINE

## 2020-01-01 PROCEDURE — 25010000002 LEVOFLOXACIN PER 250 MG: Performed by: EMERGENCY MEDICINE

## 2020-01-01 RX ORDER — FUROSEMIDE 40 MG/1
40 TABLET ORAL
Status: DISCONTINUED | OUTPATIENT
Start: 2020-01-01 | End: 2020-01-01

## 2020-01-01 RX ORDER — FUROSEMIDE 40 MG/1
40 TABLET ORAL 2 TIMES DAILY
COMMUNITY
End: 2020-01-01 | Stop reason: HOSPADM

## 2020-01-01 RX ORDER — LORAZEPAM 2 MG/ML
0.5 INJECTION INTRAMUSCULAR
Status: DISCONTINUED | OUTPATIENT
Start: 2020-01-01 | End: 2020-01-01 | Stop reason: HOSPADM

## 2020-01-01 RX ORDER — LEVOFLOXACIN 5 MG/ML
500 INJECTION, SOLUTION INTRAVENOUS EVERY 24 HOURS
Status: DISCONTINUED | OUTPATIENT
Start: 2020-01-01 | End: 2020-01-01

## 2020-01-01 RX ORDER — ACETAMINOPHEN 160 MG/5ML
650 SOLUTION ORAL EVERY 4 HOURS PRN
Status: DISCONTINUED | OUTPATIENT
Start: 2020-01-01 | End: 2020-01-01 | Stop reason: HOSPADM

## 2020-01-01 RX ORDER — PROMETHAZINE HYDROCHLORIDE 6.25 MG/5ML
6.25 SYRUP ORAL EVERY 4 HOURS PRN
Status: CANCELLED | OUTPATIENT
Start: 2020-01-01

## 2020-01-01 RX ORDER — CEPHALEXIN 250 MG/1
250 CAPSULE ORAL EVERY 8 HOURS SCHEDULED
Status: DISCONTINUED | OUTPATIENT
Start: 2020-01-01 | End: 2020-01-01 | Stop reason: HOSPADM

## 2020-01-01 RX ORDER — DIPHENOXYLATE HYDROCHLORIDE AND ATROPINE SULFATE 2.5; .025 MG/1; MG/1
1 TABLET ORAL
Status: DISCONTINUED | OUTPATIENT
Start: 2020-01-01 | End: 2020-01-01 | Stop reason: HOSPADM

## 2020-01-01 RX ORDER — DIPHENOXYLATE HYDROCHLORIDE AND ATROPINE SULFATE 2.5; .025 MG/1; MG/1
1 TABLET ORAL
Status: CANCELLED | OUTPATIENT
Start: 2020-01-01

## 2020-01-01 RX ORDER — PROMETHAZINE HYDROCHLORIDE 25 MG/1
6.25 TABLET ORAL EVERY 4 HOURS PRN
Status: CANCELLED | OUTPATIENT
Start: 2020-01-01

## 2020-01-01 RX ORDER — IRON ASPGLY,PS/C/SUCCINIC ACID 150-50-50
1 CAPSULE ORAL 3 TIMES DAILY
Status: DISCONTINUED | OUTPATIENT
Start: 2020-01-01 | End: 2020-01-01

## 2020-01-01 RX ORDER — FUROSEMIDE 20 MG/1
20 TABLET ORAL
Status: DISCONTINUED | OUTPATIENT
Start: 2020-01-01 | End: 2020-01-01

## 2020-01-01 RX ORDER — CEPHALEXIN 250 MG/1
250 CAPSULE ORAL EVERY 8 HOURS SCHEDULED
Qty: 9 CAPSULE | Refills: 0 | Status: SHIPPED | OUTPATIENT
Start: 2020-01-01 | End: 2020-01-01

## 2020-01-01 RX ORDER — GABAPENTIN 300 MG/1
300 CAPSULE ORAL DAILY
Status: DISCONTINUED | OUTPATIENT
Start: 2020-01-01 | End: 2020-01-01 | Stop reason: HOSPADM

## 2020-01-01 RX ORDER — HYDROMORPHONE HYDROCHLORIDE 1 MG/ML
0.5 INJECTION, SOLUTION INTRAMUSCULAR; INTRAVENOUS; SUBCUTANEOUS
Status: CANCELLED | OUTPATIENT
Start: 2020-01-01 | End: 2020-12-25

## 2020-01-01 RX ORDER — ZINC SULFATE 50(220)MG
220 CAPSULE ORAL DAILY
Status: DISCONTINUED | OUTPATIENT
Start: 2020-01-01 | End: 2020-01-01

## 2020-01-01 RX ORDER — FERROUS SULFATE 325(65) MG
325 TABLET ORAL DAILY
Status: DISCONTINUED | OUTPATIENT
Start: 2020-01-01 | End: 2020-01-01 | Stop reason: HOSPADM

## 2020-01-01 RX ORDER — LEVETIRACETAM 100 MG/ML
750 SOLUTION ORAL EVERY 12 HOURS SCHEDULED
Status: DISCONTINUED | OUTPATIENT
Start: 2020-01-01 | End: 2020-01-01

## 2020-01-01 RX ORDER — LORAZEPAM 2 MG/ML
2 INJECTION INTRAMUSCULAR
Status: DISCONTINUED | OUTPATIENT
Start: 2020-01-01 | End: 2020-01-01 | Stop reason: HOSPADM

## 2020-01-01 RX ORDER — POTASSIUM CHLORIDE 750 MG/1
30 CAPSULE, EXTENDED RELEASE ORAL
Status: DISCONTINUED | OUTPATIENT
Start: 2020-01-01 | End: 2020-01-01 | Stop reason: HOSPADM

## 2020-01-01 RX ORDER — MORPHINE SULFATE 4 MG/ML
4 INJECTION, SOLUTION INTRAMUSCULAR; INTRAVENOUS
Status: DISCONTINUED | OUTPATIENT
Start: 2020-01-01 | End: 2020-01-01 | Stop reason: HOSPADM

## 2020-01-01 RX ORDER — MORPHINE SULFATE 2 MG/ML
2 INJECTION, SOLUTION INTRAMUSCULAR; INTRAVENOUS
Status: CANCELLED | OUTPATIENT
Start: 2020-01-01 | End: 2020-12-25

## 2020-01-01 RX ORDER — CEFTRIAXONE SODIUM 1 G/50ML
1 INJECTION, SOLUTION INTRAVENOUS ONCE
Status: COMPLETED | OUTPATIENT
Start: 2020-01-01 | End: 2020-01-01

## 2020-01-01 RX ORDER — GUAIFENESIN 600 MG/1
600 TABLET, EXTENDED RELEASE ORAL EVERY 12 HOURS SCHEDULED
Qty: 60 TABLET | Refills: 0 | Status: SHIPPED | OUTPATIENT
Start: 2020-01-01 | End: 2020-01-01

## 2020-01-01 RX ORDER — MORPHINE SULFATE 20 MG/ML
5 SOLUTION ORAL
Status: DISCONTINUED | OUTPATIENT
Start: 2020-01-01 | End: 2020-01-01 | Stop reason: HOSPADM

## 2020-01-01 RX ORDER — KETOROLAC TROMETHAMINE 30 MG/ML
15 INJECTION, SOLUTION INTRAMUSCULAR; INTRAVENOUS EVERY 6 HOURS PRN
Status: DISCONTINUED | OUTPATIENT
Start: 2020-01-01 | End: 2020-01-01 | Stop reason: HOSPADM

## 2020-01-01 RX ORDER — FAMOTIDINE 20 MG/1
20 TABLET, FILM COATED ORAL DAILY
Status: DISCONTINUED | OUTPATIENT
Start: 2020-01-01 | End: 2020-01-01 | Stop reason: ALTCHOICE

## 2020-01-01 RX ORDER — PROMETHAZINE HYDROCHLORIDE 12.5 MG/1
6.25 SUPPOSITORY RECTAL EVERY 4 HOURS PRN
Status: DISCONTINUED | OUTPATIENT
Start: 2020-01-01 | End: 2020-01-01 | Stop reason: HOSPADM

## 2020-01-01 RX ORDER — MORPHINE SULFATE 2 MG/ML
2 INJECTION, SOLUTION INTRAMUSCULAR; INTRAVENOUS
Status: DISCONTINUED | OUTPATIENT
Start: 2020-01-01 | End: 2020-01-01 | Stop reason: HOSPADM

## 2020-01-01 RX ORDER — ASPIRIN 81 MG/1
81 TABLET ORAL DAILY
COMMUNITY
End: 2020-01-01 | Stop reason: ALTCHOICE

## 2020-01-01 RX ORDER — CEFTRIAXONE SODIUM 1 G/50ML
1 INJECTION, SOLUTION INTRAVENOUS EVERY 24 HOURS
Status: DISCONTINUED | OUTPATIENT
Start: 2020-01-01 | End: 2020-01-01

## 2020-01-01 RX ORDER — ACETAMINOPHEN 325 MG/1
650 TABLET ORAL EVERY 6 HOURS PRN
Status: DISCONTINUED | OUTPATIENT
Start: 2020-01-01 | End: 2020-01-01 | Stop reason: HOSPADM

## 2020-01-01 RX ORDER — FAMOTIDINE 20 MG/1
20 TABLET, FILM COATED ORAL EVERY 24 HOURS
Status: DISCONTINUED | OUTPATIENT
Start: 2020-01-01 | End: 2020-01-01

## 2020-01-01 RX ORDER — HYDROMORPHONE HYDROCHLORIDE 1 MG/ML
0.5 INJECTION, SOLUTION INTRAMUSCULAR; INTRAVENOUS; SUBCUTANEOUS
Status: DISCONTINUED | OUTPATIENT
Start: 2020-01-01 | End: 2020-01-01 | Stop reason: HOSPADM

## 2020-01-01 RX ORDER — AMLODIPINE BESYLATE 5 MG/1
5 TABLET ORAL DAILY
Status: DISCONTINUED | OUTPATIENT
Start: 2020-01-01 | End: 2020-01-01

## 2020-01-01 RX ORDER — FERROUS SULFATE 325(65) MG
325 TABLET ORAL DAILY
Status: ON HOLD | COMMUNITY
End: 2020-01-01

## 2020-01-01 RX ORDER — SODIUM CHLORIDE 0.9 % (FLUSH) 0.9 %
10 SYRINGE (ML) INJECTION AS NEEDED
Status: DISCONTINUED | OUTPATIENT
Start: 2020-01-01 | End: 2020-01-01 | Stop reason: HOSPADM

## 2020-01-01 RX ORDER — POLYETHYLENE GLYCOL 3350 17 G/17G
17 POWDER, FOR SOLUTION ORAL DAILY
Status: DISCONTINUED | OUTPATIENT
Start: 2020-01-01 | End: 2020-01-01

## 2020-01-01 RX ORDER — PANTOPRAZOLE SODIUM 40 MG/1
40 TABLET, DELAYED RELEASE ORAL DAILY
Status: DISCONTINUED | OUTPATIENT
Start: 2020-01-01 | End: 2020-01-01 | Stop reason: HOSPADM

## 2020-01-01 RX ORDER — VITAMIN B COMPLEX
1 TABLET ORAL DAILY
COMMUNITY
End: 2020-01-01 | Stop reason: HOSPADM

## 2020-01-01 RX ORDER — CEFDINIR 300 MG/1
300 CAPSULE ORAL 2 TIMES DAILY
COMMUNITY
End: 2020-01-01 | Stop reason: HOSPADM

## 2020-01-01 RX ORDER — NYSTATIN 100000 [USP'U]/G
1 POWDER TOPICAL EVERY 12 HOURS SCHEDULED
Status: DISCONTINUED | OUTPATIENT
Start: 2020-01-01 | End: 2020-01-01 | Stop reason: HOSPADM

## 2020-01-01 RX ORDER — CITALOPRAM 20 MG/1
20 TABLET ORAL NIGHTLY
Status: DISCONTINUED | OUTPATIENT
Start: 2020-01-01 | End: 2020-01-01

## 2020-01-01 RX ORDER — MORPHINE SULFATE 20 MG/ML
5 SOLUTION ORAL
Status: CANCELLED | OUTPATIENT
Start: 2020-01-01 | End: 2020-12-25

## 2020-01-01 RX ORDER — CITALOPRAM 20 MG/1
20 TABLET ORAL DAILY
Status: DISCONTINUED | OUTPATIENT
Start: 2020-01-01 | End: 2020-01-01

## 2020-01-01 RX ORDER — LORAZEPAM 2 MG/ML
2 INJECTION INTRAMUSCULAR
Status: CANCELLED | OUTPATIENT
Start: 2020-01-01 | End: 2020-12-27

## 2020-01-01 RX ORDER — FAMOTIDINE 20 MG/1
20 TABLET, FILM COATED ORAL 2 TIMES DAILY
Status: DISCONTINUED | OUTPATIENT
Start: 2020-01-01 | End: 2020-01-01

## 2020-01-01 RX ORDER — HALOPERIDOL 5 MG/ML
1 INJECTION INTRAMUSCULAR EVERY 4 HOURS PRN
Status: DISCONTINUED | OUTPATIENT
Start: 2020-01-01 | End: 2020-01-01 | Stop reason: HOSPADM

## 2020-01-01 RX ORDER — LORAZEPAM 2 MG/ML
0.5 INJECTION INTRAMUSCULAR
Status: CANCELLED | OUTPATIENT
Start: 2020-01-01 | End: 2020-12-27

## 2020-01-01 RX ORDER — ACETAMINOPHEN 325 MG/1
650 TABLET ORAL EVERY 4 HOURS PRN
Status: DISCONTINUED | OUTPATIENT
Start: 2020-01-01 | End: 2020-01-01 | Stop reason: HOSPADM

## 2020-01-01 RX ORDER — MORPHINE SULFATE 20 MG/ML
10 SOLUTION ORAL
Status: DISCONTINUED | OUTPATIENT
Start: 2020-01-01 | End: 2020-01-01 | Stop reason: HOSPADM

## 2020-01-01 RX ORDER — POLYETHYLENE GLYCOL 3350 17 G/17G
17 POWDER, FOR SOLUTION ORAL DAILY
Status: DISCONTINUED | OUTPATIENT
Start: 2020-01-01 | End: 2020-01-01 | Stop reason: HOSPADM

## 2020-01-01 RX ORDER — LORAZEPAM 2 MG/ML
2 CONCENTRATE ORAL
Status: DISCONTINUED | OUTPATIENT
Start: 2020-01-01 | End: 2020-01-01 | Stop reason: HOSPADM

## 2020-01-01 RX ORDER — BUDESONIDE AND FORMOTEROL FUMARATE DIHYDRATE 160; 4.5 UG/1; UG/1
2 AEROSOL RESPIRATORY (INHALATION)
Status: DISCONTINUED | OUTPATIENT
Start: 2020-01-01 | End: 2020-01-01

## 2020-01-01 RX ORDER — CETIRIZINE HYDROCHLORIDE 10 MG/1
5 TABLET ORAL NIGHTLY
Status: DISCONTINUED | OUTPATIENT
Start: 2020-01-01 | End: 2020-01-01

## 2020-01-01 RX ORDER — LEVOFLOXACIN 5 MG/ML
750 INJECTION, SOLUTION INTRAVENOUS ONCE
Status: COMPLETED | OUTPATIENT
Start: 2020-01-01 | End: 2020-01-01

## 2020-01-01 RX ORDER — IPRATROPIUM BROMIDE AND ALBUTEROL SULFATE 2.5; .5 MG/3ML; MG/3ML
3 SOLUTION RESPIRATORY (INHALATION)
Status: DISCONTINUED | OUTPATIENT
Start: 2020-01-01 | End: 2020-01-01 | Stop reason: HOSPADM

## 2020-01-01 RX ORDER — LORAZEPAM 2 MG/ML
1 INJECTION INTRAMUSCULAR
Status: DISCONTINUED | OUTPATIENT
Start: 2020-01-01 | End: 2020-01-01 | Stop reason: HOSPADM

## 2020-01-01 RX ORDER — ASCORBIC ACID 500 MG
500 TABLET ORAL DAILY
Status: DISCONTINUED | OUTPATIENT
Start: 2020-01-01 | End: 2020-01-01

## 2020-01-01 RX ORDER — LORAZEPAM 2 MG/ML
1 CONCENTRATE ORAL
Status: DISCONTINUED | OUTPATIENT
Start: 2020-01-01 | End: 2020-01-01 | Stop reason: HOSPADM

## 2020-01-01 RX ORDER — ACETAMINOPHEN 650 MG/1
650 SUPPOSITORY RECTAL EVERY 4 HOURS PRN
Status: DISCONTINUED | OUTPATIENT
Start: 2020-01-01 | End: 2020-01-01 | Stop reason: HOSPADM

## 2020-01-01 RX ORDER — MORPHINE SULFATE 20 MG/ML
10 SOLUTION ORAL
Status: CANCELLED | OUTPATIENT
Start: 2020-01-01 | End: 2020-12-27

## 2020-01-01 RX ORDER — NYSTATIN 100000 U/G
OINTMENT TOPICAL 2 TIMES DAILY
COMMUNITY
End: 2020-01-01

## 2020-01-01 RX ORDER — HALOPERIDOL 1 MG/1
1 TABLET ORAL EVERY 4 HOURS PRN
Status: DISCONTINUED | OUTPATIENT
Start: 2020-01-01 | End: 2020-01-01 | Stop reason: HOSPADM

## 2020-01-01 RX ORDER — HALOPERIDOL 2 MG/ML
1 SOLUTION ORAL EVERY 4 HOURS PRN
Status: DISCONTINUED | OUTPATIENT
Start: 2020-01-01 | End: 2020-01-01 | Stop reason: HOSPADM

## 2020-01-01 RX ORDER — POTASSIUM CHLORIDE 750 MG/1
10 TABLET, FILM COATED, EXTENDED RELEASE ORAL 3 TIMES DAILY
COMMUNITY
End: 2020-01-01 | Stop reason: HOSPADM

## 2020-01-01 RX ORDER — MIRTAZAPINE 15 MG/1
15 TABLET, FILM COATED ORAL NIGHTLY
Status: DISCONTINUED | OUTPATIENT
Start: 2020-01-01 | End: 2020-01-01

## 2020-01-01 RX ORDER — GABAPENTIN 600 MG/1
600 TABLET ORAL 3 TIMES DAILY
Status: ON HOLD | COMMUNITY
End: 2020-01-01

## 2020-01-01 RX ORDER — DEXAMETHASONE SODIUM PHOSPHATE 10 MG/ML
6 INJECTION, SOLUTION INTRAMUSCULAR; INTRAVENOUS DAILY
Status: DISCONTINUED | OUTPATIENT
Start: 2020-01-01 | End: 2020-01-01

## 2020-01-01 RX ORDER — CALCIUM CARBONATE 200(500)MG
2 TABLET,CHEWABLE ORAL 3 TIMES DAILY PRN
COMMUNITY
End: 2020-01-01 | Stop reason: HOSPADM

## 2020-01-01 RX ORDER — IRON ASPGLY,PS/C/B12/FA/CA/SUC 150-25-1
1 CAPSULE ORAL
COMMUNITY
End: 2020-01-01 | Stop reason: HOSPADM

## 2020-01-01 RX ORDER — AMMONIUM LACTATE 120 MG/G
CREAM TOPICAL EVERY 12 HOURS PRN
Status: DISCONTINUED | OUTPATIENT
Start: 2020-01-01 | End: 2020-01-01

## 2020-01-01 RX ORDER — DEXTROSE MONOHYDRATE 50 MG/ML
75 INJECTION, SOLUTION INTRAVENOUS CONTINUOUS
Status: DISCONTINUED | OUTPATIENT
Start: 2020-01-01 | End: 2020-01-01

## 2020-01-01 RX ORDER — MELATONIN
1000 DAILY
Status: DISCONTINUED | OUTPATIENT
Start: 2020-01-01 | End: 2020-01-01

## 2020-01-01 RX ORDER — SODIUM CHLORIDE 450 MG/100ML
50 INJECTION, SOLUTION INTRAVENOUS CONTINUOUS
Status: DISCONTINUED | OUTPATIENT
Start: 2020-01-01 | End: 2020-01-01

## 2020-01-01 RX ORDER — BUDESONIDE AND FORMOTEROL FUMARATE DIHYDRATE 160; 4.5 UG/1; UG/1
2 AEROSOL RESPIRATORY (INHALATION)
Status: DISCONTINUED | OUTPATIENT
Start: 2020-01-01 | End: 2020-01-01 | Stop reason: HOSPADM

## 2020-01-01 RX ORDER — CITALOPRAM 20 MG/1
20 TABLET ORAL NIGHTLY
Status: DISCONTINUED | OUTPATIENT
Start: 2020-01-01 | End: 2020-01-01 | Stop reason: HOSPADM

## 2020-01-01 RX ORDER — CITALOPRAM 20 MG/1
20 TABLET ORAL DAILY
Status: ON HOLD | COMMUNITY
End: 2020-01-01

## 2020-01-01 RX ORDER — PANTOPRAZOLE SODIUM 40 MG/1
40 TABLET, DELAYED RELEASE ORAL
Status: DISCONTINUED | OUTPATIENT
Start: 2020-01-01 | End: 2020-01-01

## 2020-01-01 RX ORDER — ESCITALOPRAM OXALATE 10 MG/1
10 TABLET ORAL DAILY
Status: DISCONTINUED | OUTPATIENT
Start: 2020-01-01 | End: 2020-01-01

## 2020-01-01 RX ORDER — KETOROLAC TROMETHAMINE 30 MG/ML
15 INJECTION, SOLUTION INTRAMUSCULAR; INTRAVENOUS EVERY 6 HOURS PRN
Status: CANCELLED | OUTPATIENT
Start: 2020-01-01 | End: 2020-12-25

## 2020-01-01 RX ORDER — FUROSEMIDE 20 MG/1
20 TABLET ORAL DAILY
Status: DISCONTINUED | OUTPATIENT
Start: 2020-01-01 | End: 2020-01-01 | Stop reason: HOSPADM

## 2020-01-01 RX ORDER — GABAPENTIN 300 MG/1
600 CAPSULE ORAL NIGHTLY
Status: DISCONTINUED | OUTPATIENT
Start: 2020-01-01 | End: 2020-01-01 | Stop reason: HOSPADM

## 2020-01-01 RX ORDER — LORATADINE 10 MG/1
10 CAPSULE, LIQUID FILLED ORAL DAILY
COMMUNITY
End: 2020-01-01 | Stop reason: HOSPADM

## 2020-01-01 RX ORDER — DOXYCYCLINE 100 MG/1
100 CAPSULE ORAL EVERY 12 HOURS SCHEDULED
Qty: 10 CAPSULE | Refills: 0 | Status: SHIPPED | OUTPATIENT
Start: 2020-01-01 | End: 2020-01-01

## 2020-01-01 RX ORDER — PROMETHAZINE HYDROCHLORIDE 6.25 MG/5ML
6.25 SYRUP ORAL EVERY 4 HOURS PRN
Status: DISCONTINUED | OUTPATIENT
Start: 2020-01-01 | End: 2020-01-01 | Stop reason: HOSPADM

## 2020-01-01 RX ORDER — CETIRIZINE HYDROCHLORIDE 10 MG/1
10 TABLET ORAL DAILY
COMMUNITY
End: 2020-01-01 | Stop reason: HOSPADM

## 2020-01-01 RX ORDER — MIRTAZAPINE 15 MG/1
15 TABLET, FILM COATED ORAL NIGHTLY
Status: DISCONTINUED | OUTPATIENT
Start: 2020-01-01 | End: 2020-01-01 | Stop reason: HOSPADM

## 2020-01-01 RX ORDER — LORAZEPAM 2 MG/ML
0.5 CONCENTRATE ORAL
Status: DISCONTINUED | OUTPATIENT
Start: 2020-01-01 | End: 2020-01-01 | Stop reason: HOSPADM

## 2020-01-01 RX ORDER — MORPHINE SULFATE 4 MG/ML
4 INJECTION, SOLUTION INTRAMUSCULAR; INTRAVENOUS
Status: CANCELLED | OUTPATIENT
Start: 2020-01-01 | End: 2020-12-27

## 2020-01-01 RX ORDER — DIPHENHYDRAMINE HCL 25 MG
25 CAPSULE ORAL EVERY 6 HOURS PRN
Status: CANCELLED | OUTPATIENT
Start: 2020-01-01

## 2020-01-01 RX ORDER — GABAPENTIN 600 MG/1
600 TABLET ORAL 3 TIMES DAILY
Qty: 9 TABLET | Refills: 0 | Status: SHIPPED | OUTPATIENT
Start: 2020-01-01 | End: 2020-01-01

## 2020-01-01 RX ORDER — PROMETHAZINE HYDROCHLORIDE 25 MG/1
6.25 TABLET ORAL EVERY 4 HOURS PRN
Status: DISCONTINUED | OUTPATIENT
Start: 2020-01-01 | End: 2020-01-01 | Stop reason: HOSPADM

## 2020-01-01 RX ORDER — MULTIPLE VITAMINS W/ MINERALS TAB 9MG-400MCG
1 TAB ORAL DAILY
Status: DISCONTINUED | OUTPATIENT
Start: 2020-01-01 | End: 2020-01-01

## 2020-01-01 RX ORDER — DEXAMETHASONE SODIUM PHOSPHATE 10 MG/ML
6 INJECTION, SOLUTION INTRAMUSCULAR; INTRAVENOUS ONCE
Status: COMPLETED | OUTPATIENT
Start: 2020-01-01 | End: 2020-01-01

## 2020-01-01 RX ORDER — CALCIUM CARBONATE 500(1250)
1000 TABLET ORAL DAILY
Status: DISCONTINUED | OUTPATIENT
Start: 2020-01-01 | End: 2020-01-01 | Stop reason: HOSPADM

## 2020-01-01 RX ORDER — GUAIFENESIN 600 MG/1
600 TABLET, EXTENDED RELEASE ORAL EVERY 12 HOURS SCHEDULED
Status: DISCONTINUED | OUTPATIENT
Start: 2020-01-01 | End: 2020-01-01 | Stop reason: HOSPADM

## 2020-01-01 RX ORDER — CEFTRIAXONE SODIUM 1 G/50ML
1 INJECTION, SOLUTION INTRAVENOUS ONCE
Status: DISCONTINUED | OUTPATIENT
Start: 2020-01-01 | End: 2020-01-01

## 2020-01-01 RX ORDER — TRAMADOL HYDROCHLORIDE 50 MG/1
50 TABLET ORAL EVERY 6 HOURS PRN
COMMUNITY
End: 2020-01-01 | Stop reason: HOSPADM

## 2020-01-01 RX ORDER — ACETAMINOPHEN 160 MG/5ML
650 SOLUTION ORAL EVERY 4 HOURS PRN
Status: CANCELLED | OUTPATIENT
Start: 2020-01-01

## 2020-01-01 RX ORDER — ACETAMINOPHEN 500 MG
1000 TABLET ORAL ONCE
Status: COMPLETED | OUTPATIENT
Start: 2020-01-01 | End: 2020-01-01

## 2020-01-01 RX ORDER — HALOPERIDOL 5 MG/ML
1 INJECTION INTRAMUSCULAR EVERY 4 HOURS PRN
Status: CANCELLED | OUTPATIENT
Start: 2020-01-01

## 2020-01-01 RX ORDER — CLOPIDOGREL BISULFATE 75 MG/1
75 TABLET ORAL DAILY
Status: DISCONTINUED | OUTPATIENT
Start: 2020-01-01 | End: 2020-01-01

## 2020-01-01 RX ORDER — GUAIFENESIN 600 MG/1
1200 TABLET, EXTENDED RELEASE ORAL 2 TIMES DAILY
COMMUNITY
End: 2020-01-01 | Stop reason: HOSPADM

## 2020-01-01 RX ORDER — SODIUM CHLORIDE AND POTASSIUM CHLORIDE 150; 900 MG/100ML; MG/100ML
75 INJECTION, SOLUTION INTRAVENOUS CONTINUOUS
Status: DISCONTINUED | OUTPATIENT
Start: 2020-01-01 | End: 2020-01-01

## 2020-01-01 RX ORDER — PANTOPRAZOLE SODIUM 40 MG/1
40 TABLET, DELAYED RELEASE ORAL DAILY
Status: DISCONTINUED | OUTPATIENT
Start: 2020-01-01 | End: 2020-01-01

## 2020-01-01 RX ORDER — ACETAMINOPHEN 325 MG/1
650 TABLET ORAL EVERY 4 HOURS PRN
Status: CANCELLED | OUTPATIENT
Start: 2020-01-01

## 2020-01-01 RX ORDER — LORAZEPAM 2 MG/ML
0.5 CONCENTRATE ORAL
Status: CANCELLED | OUTPATIENT
Start: 2020-01-01 | End: 2020-12-27

## 2020-01-01 RX ORDER — DOXYCYCLINE 100 MG/1
100 CAPSULE ORAL EVERY 12 HOURS SCHEDULED
Status: DISCONTINUED | OUTPATIENT
Start: 2020-01-01 | End: 2020-01-01 | Stop reason: HOSPADM

## 2020-01-01 RX ORDER — HALOPERIDOL 1 MG/1
1 TABLET ORAL EVERY 4 HOURS PRN
Status: CANCELLED | OUTPATIENT
Start: 2020-01-01

## 2020-01-01 RX ORDER — FUROSEMIDE 10 MG/ML
40 INJECTION INTRAMUSCULAR; INTRAVENOUS EVERY 12 HOURS
Status: COMPLETED | OUTPATIENT
Start: 2020-01-01 | End: 2020-01-01

## 2020-01-01 RX ORDER — NYSTATIN 100000 U/G
1 OINTMENT TOPICAL EVERY 12 HOURS SCHEDULED
Status: DISCONTINUED | OUTPATIENT
Start: 2020-01-01 | End: 2020-01-01

## 2020-01-01 RX ORDER — GABAPENTIN 600 MG/1
600 TABLET ORAL DAILY
Qty: 3 TABLET | Refills: 0 | Status: SHIPPED | OUTPATIENT
Start: 2020-01-01 | End: 2020-01-01

## 2020-01-01 RX ORDER — LORAZEPAM 2 MG/ML
1 INJECTION INTRAMUSCULAR
Status: CANCELLED | OUTPATIENT
Start: 2020-01-01 | End: 2020-12-27

## 2020-01-01 RX ORDER — POTASSIUM CHLORIDE 20 MEQ/1
40 TABLET, EXTENDED RELEASE ORAL 3 TIMES DAILY
COMMUNITY
End: 2020-01-01 | Stop reason: ALTCHOICE

## 2020-01-01 RX ORDER — IRON POLYSACCHARIDE COMPLEX 150 MG
150 CAPSULE ORAL DAILY
Status: DISCONTINUED | OUTPATIENT
Start: 2020-01-01 | End: 2020-01-01 | Stop reason: HOSPADM

## 2020-01-01 RX ORDER — BUDESONIDE AND FORMOTEROL FUMARATE DIHYDRATE 160; 4.5 UG/1; UG/1
2 AEROSOL RESPIRATORY (INHALATION)
Status: ON HOLD | COMMUNITY
End: 2020-01-01

## 2020-01-01 RX ORDER — GABAPENTIN 600 MG/1
600 TABLET ORAL DAILY
Qty: 3 TABLET | Refills: 0 | Status: SHIPPED | OUTPATIENT
Start: 2020-01-01 | End: 2020-01-01 | Stop reason: SDUPTHER

## 2020-01-01 RX ORDER — PROMETHAZINE HYDROCHLORIDE 12.5 MG/1
6.25 SUPPOSITORY RECTAL EVERY 4 HOURS PRN
Status: CANCELLED | OUTPATIENT
Start: 2020-01-01

## 2020-01-01 RX ORDER — ACETAMINOPHEN 325 MG/1
650 TABLET ORAL 3 TIMES DAILY
Status: ON HOLD | COMMUNITY
End: 2020-01-01

## 2020-01-01 RX ORDER — METOLAZONE 5 MG/1
10 TABLET ORAL DAILY
Status: DISCONTINUED | OUTPATIENT
Start: 2020-01-01 | End: 2020-01-01

## 2020-01-01 RX ORDER — DIPHENHYDRAMINE HCL 25 MG
25 CAPSULE ORAL EVERY 6 HOURS PRN
Status: DISCONTINUED | OUTPATIENT
Start: 2020-01-01 | End: 2020-01-01 | Stop reason: HOSPADM

## 2020-01-01 RX ORDER — DIPHENHYDRAMINE HYDROCHLORIDE 50 MG/ML
25 INJECTION INTRAMUSCULAR; INTRAVENOUS EVERY 6 HOURS PRN
Status: DISCONTINUED | OUTPATIENT
Start: 2020-01-01 | End: 2020-01-01 | Stop reason: HOSPADM

## 2020-01-01 RX ORDER — ESCITALOPRAM OXALATE 10 MG/1
10 TABLET ORAL NIGHTLY
Status: DISCONTINUED | OUTPATIENT
Start: 2020-01-01 | End: 2020-01-01 | Stop reason: HOSPADM

## 2020-01-01 RX ORDER — FUROSEMIDE 20 MG/1
20 TABLET ORAL 2 TIMES DAILY
Status: ON HOLD | COMMUNITY
End: 2020-01-01

## 2020-01-01 RX ORDER — POTASSIUM CHLORIDE 750 MG/1
20 CAPSULE, EXTENDED RELEASE ORAL 2 TIMES DAILY
Status: DISCONTINUED | OUTPATIENT
Start: 2020-01-01 | End: 2020-01-01

## 2020-01-01 RX ORDER — SODIUM CHLORIDE 0.9 % (FLUSH) 0.9 %
10 SYRINGE (ML) INJECTION AS NEEDED
Status: DISCONTINUED | OUTPATIENT
Start: 2020-01-01 | End: 2020-01-01

## 2020-01-01 RX ORDER — FERROUS SULFATE 325(65) MG
325 TABLET ORAL DAILY
Status: DISCONTINUED | OUTPATIENT
Start: 2020-01-01 | End: 2020-01-01

## 2020-01-01 RX ORDER — POTASSIUM CHLORIDE 1.5 G/1.77G
20 POWDER, FOR SOLUTION ORAL 2 TIMES DAILY
Status: DISCONTINUED | OUTPATIENT
Start: 2020-01-01 | End: 2020-01-01 | Stop reason: CLARIF

## 2020-01-01 RX ORDER — ACETAMINOPHEN 650 MG/1
650 SUPPOSITORY RECTAL EVERY 4 HOURS PRN
Status: CANCELLED | OUTPATIENT
Start: 2020-01-01

## 2020-01-01 RX ORDER — LORAZEPAM 2 MG/ML
1 CONCENTRATE ORAL
Status: CANCELLED | OUTPATIENT
Start: 2020-01-01 | End: 2020-12-27

## 2020-01-01 RX ORDER — HALOPERIDOL 2 MG/ML
1 SOLUTION ORAL EVERY 4 HOURS PRN
Status: CANCELLED | OUTPATIENT
Start: 2020-01-01

## 2020-01-01 RX ORDER — MORPHINE SULFATE 2 MG/ML
4 INJECTION, SOLUTION INTRAMUSCULAR; INTRAVENOUS
Status: DISCONTINUED | OUTPATIENT
Start: 2020-01-01 | End: 2020-01-01 | Stop reason: HOSPADM

## 2020-01-01 RX ORDER — NYSTATIN 100000 U/G
OINTMENT TOPICAL 2 TIMES DAILY
COMMUNITY
End: 2020-01-01 | Stop reason: HOSPADM

## 2020-01-01 RX ORDER — POTASSIUM CHLORIDE 750 MG/1
20 CAPSULE, EXTENDED RELEASE ORAL
Status: DISCONTINUED | OUTPATIENT
Start: 2020-01-01 | End: 2020-01-01

## 2020-01-01 RX ORDER — DIPHENHYDRAMINE HYDROCHLORIDE 50 MG/ML
25 INJECTION INTRAMUSCULAR; INTRAVENOUS EVERY 6 HOURS PRN
Status: CANCELLED | OUTPATIENT
Start: 2020-01-01

## 2020-01-01 RX ORDER — GABAPENTIN 600 MG/1
600 TABLET ORAL DAILY
Status: ON HOLD | COMMUNITY
End: 2020-01-01 | Stop reason: SDUPTHER

## 2020-01-01 RX ORDER — LORAZEPAM 2 MG/ML
2 CONCENTRATE ORAL
Status: CANCELLED | OUTPATIENT
Start: 2020-01-01 | End: 2020-12-27

## 2020-01-01 RX ORDER — FUROSEMIDE 20 MG/1
20 TABLET ORAL DAILY
Qty: 30 TABLET | Refills: 0 | Status: SHIPPED | OUTPATIENT
Start: 2020-01-01 | End: 2020-01-01

## 2020-01-01 RX ADMIN — BUDESONIDE AND FORMOTEROL FUMARATE DIHYDRATE 2 PUFF: 160; 4.5 AEROSOL RESPIRATORY (INHALATION) at 09:30

## 2020-01-01 RX ADMIN — GUAIFENESIN 600 MG: 600 TABLET, EXTENDED RELEASE ORAL at 16:38

## 2020-01-01 RX ADMIN — LEVETIRACETAM 750 MG: 500 TABLET, FILM COATED ORAL at 05:36

## 2020-01-01 RX ADMIN — IPRATROPIUM BROMIDE AND ALBUTEROL SULFATE 3 ML: 2.5; .5 SOLUTION RESPIRATORY (INHALATION) at 11:35

## 2020-01-01 RX ADMIN — PANTOPRAZOLE SODIUM 40 MG: 40 TABLET, DELAYED RELEASE ORAL at 08:33

## 2020-01-01 RX ADMIN — IPRATROPIUM BROMIDE AND ALBUTEROL SULFATE 3 ML: 2.5; .5 SOLUTION RESPIRATORY (INHALATION) at 20:30

## 2020-01-01 RX ADMIN — MIRTAZAPINE 15 MG: 15 TABLET, FILM COATED ORAL at 20:32

## 2020-01-01 RX ADMIN — SODIUM CHLORIDE, PRESERVATIVE FREE 10 ML: 5 INJECTION INTRAVENOUS at 21:10

## 2020-01-01 RX ADMIN — MORPHINE SULFATE 4 MG: 4 INJECTION, SOLUTION INTRAMUSCULAR; INTRAVENOUS at 09:07

## 2020-01-01 RX ADMIN — LORAZEPAM 0.5 MG: 2 INJECTION INTRAMUSCULAR; INTRAVENOUS at 19:51

## 2020-01-01 RX ADMIN — LORAZEPAM 1 MG: 2 INJECTION INTRAMUSCULAR; INTRAVENOUS at 14:11

## 2020-01-01 RX ADMIN — MORPHINE SULFATE 4 MG: 2 INJECTION, SOLUTION INTRAMUSCULAR; INTRAVENOUS at 03:04

## 2020-01-01 RX ADMIN — MORPHINE SULFATE 4 MG: 2 INJECTION, SOLUTION INTRAMUSCULAR; INTRAVENOUS at 18:52

## 2020-01-01 RX ADMIN — GUAIFENESIN 600 MG: 600 TABLET, EXTENDED RELEASE ORAL at 08:22

## 2020-01-01 RX ADMIN — IPRATROPIUM BROMIDE AND ALBUTEROL SULFATE 3 ML: 2.5; .5 SOLUTION RESPIRATORY (INHALATION) at 19:31

## 2020-01-01 RX ADMIN — IPRATROPIUM BROMIDE AND ALBUTEROL SULFATE 3 ML: 2.5; .5 SOLUTION RESPIRATORY (INHALATION) at 15:09

## 2020-01-01 RX ADMIN — CALCIUM 1000 MG: 500 TABLET ORAL at 09:10

## 2020-01-01 RX ADMIN — ACETAMINOPHEN 650 MG: 325 TABLET ORAL at 16:54

## 2020-01-01 RX ADMIN — PANTOPRAZOLE SODIUM 40 MG: 40 TABLET, DELAYED RELEASE ORAL at 06:40

## 2020-01-01 RX ADMIN — IPRATROPIUM BROMIDE AND ALBUTEROL SULFATE 3 ML: 2.5; .5 SOLUTION RESPIRATORY (INHALATION) at 23:02

## 2020-01-01 RX ADMIN — MORPHINE SULFATE 4 MG: 2 INJECTION, SOLUTION INTRAMUSCULAR; INTRAVENOUS at 16:57

## 2020-01-01 RX ADMIN — CITALOPRAM 20 MG: 20 TABLET, FILM COATED ORAL at 20:17

## 2020-01-01 RX ADMIN — BUDESONIDE AND FORMOTEROL FUMARATE DIHYDRATE 2 PUFF: 160; 4.5 AEROSOL RESPIRATORY (INHALATION) at 19:16

## 2020-01-01 RX ADMIN — LEVETIRACETAM 750 MG: 100 SOLUTION ORAL at 21:14

## 2020-01-01 RX ADMIN — IPRATROPIUM BROMIDE AND ALBUTEROL SULFATE 3 ML: 2.5; .5 SOLUTION RESPIRATORY (INHALATION) at 11:00

## 2020-01-01 RX ADMIN — Medication 1000 UNITS: at 08:04

## 2020-01-01 RX ADMIN — LORAZEPAM 0.5 MG: 2 INJECTION INTRAMUSCULAR; INTRAVENOUS at 04:50

## 2020-01-01 RX ADMIN — CALCIUM CARBONATE-VITAMIN D TAB 500 MG-200 UNIT 500 MG: 500-200 TAB at 15:22

## 2020-01-01 RX ADMIN — FAMOTIDINE 20 MG: 20 TABLET, FILM COATED ORAL at 08:18

## 2020-01-01 RX ADMIN — SODIUM CHLORIDE, POTASSIUM CHLORIDE, SODIUM LACTATE AND CALCIUM CHLORIDE 500 ML: 600; 310; 30; 20 INJECTION, SOLUTION INTRAVENOUS at 20:52

## 2020-01-01 RX ADMIN — MORPHINE SULFATE 4 MG: 2 INJECTION, SOLUTION INTRAMUSCULAR; INTRAVENOUS at 04:50

## 2020-01-01 RX ADMIN — SODIUM CHLORIDE 750 MG: 900 INJECTION, SOLUTION INTRAVENOUS at 09:23

## 2020-01-01 RX ADMIN — LEVOFLOXACIN 500 MG: 5 INJECTION, SOLUTION INTRAVENOUS at 06:24

## 2020-01-01 RX ADMIN — ENOXAPARIN SODIUM 40 MG: 40 INJECTION SUBCUTANEOUS at 22:03

## 2020-01-01 RX ADMIN — LORAZEPAM 2 MG: 2 INJECTION INTRAMUSCULAR; INTRAVENOUS at 18:52

## 2020-01-01 RX ADMIN — Medication 150 MG: at 09:10

## 2020-01-01 RX ADMIN — CEFTRIAXONE SODIUM 1 G: 1 INJECTION, SOLUTION INTRAVENOUS at 17:40

## 2020-01-01 RX ADMIN — GLYCOPYRROLATE 0.4 MG: 0.2 INJECTION, SOLUTION INTRAMUSCULAR; INTRAVITREAL at 10:26

## 2020-01-01 RX ADMIN — BUDESONIDE AND FORMOTEROL FUMARATE DIHYDRATE 2 PUFF: 160; 4.5 AEROSOL RESPIRATORY (INHALATION) at 19:08

## 2020-01-01 RX ADMIN — ACETAMINOPHEN 1000 MG: 500 TABLET, FILM COATED ORAL at 16:25

## 2020-01-01 RX ADMIN — ESCITALOPRAM 10 MG: 10 TABLET, FILM COATED ORAL at 00:04

## 2020-01-01 RX ADMIN — Medication 150 MG: at 12:12

## 2020-01-01 RX ADMIN — ENOXAPARIN SODIUM 40 MG: 40 INJECTION SUBCUTANEOUS at 08:04

## 2020-01-01 RX ADMIN — POTASSIUM CHLORIDE 30 MEQ: 750 CAPSULE, EXTENDED RELEASE ORAL at 08:22

## 2020-01-01 RX ADMIN — SODIUM CHLORIDE 750 MG: 900 INJECTION, SOLUTION INTRAVENOUS at 11:15

## 2020-01-01 RX ADMIN — MIRTAZAPINE 15 MG: 15 TABLET, FILM COATED ORAL at 20:06

## 2020-01-01 RX ADMIN — GABAPENTIN 300 MG: 300 CAPSULE ORAL at 09:15

## 2020-01-01 RX ADMIN — CALCIUM 1000 MG: 500 TABLET ORAL at 09:35

## 2020-01-01 RX ADMIN — FERROUS SULFATE TAB 325 MG (65 MG ELEMENTAL FE) 325 MG: 325 (65 FE) TAB at 08:58

## 2020-01-01 RX ADMIN — GUAIFENESIN 600 MG: 600 TABLET, EXTENDED RELEASE ORAL at 08:59

## 2020-01-01 RX ADMIN — LEVETIRACETAM 750 MG: 500 TABLET, FILM COATED ORAL at 21:09

## 2020-01-01 RX ADMIN — POTASSIUM CHLORIDE 20 MEQ: 750 CAPSULE, EXTENDED RELEASE ORAL at 18:11

## 2020-01-01 RX ADMIN — IPRATROPIUM BROMIDE AND ALBUTEROL SULFATE 3 ML: 2.5; .5 SOLUTION RESPIRATORY (INHALATION) at 00:27

## 2020-01-01 RX ADMIN — MIRTAZAPINE 15 MG: 15 TABLET, FILM COATED ORAL at 20:28

## 2020-01-01 RX ADMIN — IPRATROPIUM BROMIDE AND ALBUTEROL SULFATE 3 ML: 2.5; .5 SOLUTION RESPIRATORY (INHALATION) at 22:43

## 2020-01-01 RX ADMIN — POLYETHYLENE GLYCOL 3350 17 G: 17 POWDER, FOR SOLUTION ORAL at 08:04

## 2020-01-01 RX ADMIN — POTASSIUM CHLORIDE 30 MEQ: 750 CAPSULE, EXTENDED RELEASE ORAL at 17:15

## 2020-01-01 RX ADMIN — BUDESONIDE AND FORMOTEROL FUMARATE DIHYDRATE 2 PUFF: 160; 4.5 AEROSOL RESPIRATORY (INHALATION) at 06:41

## 2020-01-01 RX ADMIN — PANTOPRAZOLE SODIUM 40 MG: 40 TABLET, DELAYED RELEASE ORAL at 15:21

## 2020-01-01 RX ADMIN — IPRATROPIUM BROMIDE AND ALBUTEROL SULFATE 3 ML: 2.5; .5 SOLUTION RESPIRATORY (INHALATION) at 00:26

## 2020-01-01 RX ADMIN — BUDESONIDE AND FORMOTEROL FUMARATE DIHYDRATE 2 PUFF: 160; 4.5 AEROSOL RESPIRATORY (INHALATION) at 07:17

## 2020-01-01 RX ADMIN — GUAIFENESIN 600 MG: 600 TABLET, EXTENDED RELEASE ORAL at 21:09

## 2020-01-01 RX ADMIN — POLYETHYLENE GLYCOL 3350 17 G: 17 POWDER, FOR SOLUTION ORAL at 08:58

## 2020-01-01 RX ADMIN — POTASSIUM CHLORIDE 30 MEQ: 750 CAPSULE, EXTENDED RELEASE ORAL at 12:54

## 2020-01-01 RX ADMIN — FERROUS SULFATE TAB 325 MG (65 MG ELEMENTAL FE) 325 MG: 325 (65 FE) TAB at 15:22

## 2020-01-01 RX ADMIN — ESCITALOPRAM 10 MG: 10 TABLET, FILM COATED ORAL at 20:28

## 2020-01-01 RX ADMIN — IPRATROPIUM BROMIDE AND ALBUTEROL SULFATE 3 ML: 2.5; .5 SOLUTION RESPIRATORY (INHALATION) at 11:29

## 2020-01-01 RX ADMIN — LORAZEPAM 2 MG: 2 INJECTION INTRAMUSCULAR; INTRAVENOUS at 16:57

## 2020-01-01 RX ADMIN — BUDESONIDE AND FORMOTEROL FUMARATE DIHYDRATE 2 PUFF: 160; 4.5 AEROSOL RESPIRATORY (INHALATION) at 19:31

## 2020-01-01 RX ADMIN — Medication 150 MG: at 09:35

## 2020-01-01 RX ADMIN — GABAPENTIN 600 MG: 300 CAPSULE ORAL at 21:27

## 2020-01-01 RX ADMIN — LORAZEPAM 0.5 MG: 2 INJECTION INTRAMUSCULAR; INTRAVENOUS at 10:14

## 2020-01-01 RX ADMIN — NYSTATIN 1 APPLICATION: 100000 POWDER TOPICAL at 09:42

## 2020-01-01 RX ADMIN — CALCIUM CARBONATE-VITAMIN D TAB 500 MG-200 UNIT 500 MG: 500-200 TAB at 08:58

## 2020-01-01 RX ADMIN — GUAIFENESIN 600 MG: 600 TABLET, EXTENDED RELEASE ORAL at 20:32

## 2020-01-01 RX ADMIN — CALCIUM 1000 MG: 500 TABLET ORAL at 16:37

## 2020-01-01 RX ADMIN — CEFTRIAXONE SODIUM 1 G: 1 INJECTION, SOLUTION INTRAVENOUS at 18:36

## 2020-01-01 RX ADMIN — ACETAMINOPHEN 650 MG: 325 TABLET, FILM COATED ORAL at 20:32

## 2020-01-01 RX ADMIN — DEXAMETHASONE SODIUM PHOSPHATE 6 MG: 10 INJECTION, SOLUTION INTRAMUSCULAR; INTRAVENOUS at 08:18

## 2020-01-01 RX ADMIN — DEXTROSE MONOHYDRATE 75 ML/HR: 50 INJECTION, SOLUTION INTRAVENOUS at 18:56

## 2020-01-01 RX ADMIN — CEFTRIAXONE SODIUM 1 G: 1 INJECTION, SOLUTION INTRAVENOUS at 22:03

## 2020-01-01 RX ADMIN — LEVETIRACETAM 750 MG: 500 TABLET, FILM COATED ORAL at 09:09

## 2020-01-01 RX ADMIN — Medication 150 MG: at 08:55

## 2020-01-01 RX ADMIN — MIRTAZAPINE 15 MG: 15 TABLET, FILM COATED ORAL at 00:05

## 2020-01-01 RX ADMIN — IPRATROPIUM BROMIDE AND ALBUTEROL SULFATE 3 ML: 2.5; .5 SOLUTION RESPIRATORY (INHALATION) at 03:45

## 2020-01-01 RX ADMIN — LEVETIRACETAM 750 MG: 500 TABLET, FILM COATED ORAL at 21:49

## 2020-01-01 RX ADMIN — LORAZEPAM 2 MG: 2 INJECTION INTRAMUSCULAR; INTRAVENOUS at 06:33

## 2020-01-01 RX ADMIN — DEXTROSE MONOHYDRATE 75 ML/HR: 50 INJECTION, SOLUTION INTRAVENOUS at 10:19

## 2020-01-01 RX ADMIN — LORAZEPAM 2 MG: 2 INJECTION INTRAMUSCULAR; INTRAVENOUS at 22:45

## 2020-01-01 RX ADMIN — BUDESONIDE AND FORMOTEROL FUMARATE DIHYDRATE 2 PUFF: 160; 4.5 AEROSOL RESPIRATORY (INHALATION) at 09:43

## 2020-01-01 RX ADMIN — BUDESONIDE AND FORMOTEROL FUMARATE DIHYDRATE 2 PUFF: 160; 4.5 AEROSOL RESPIRATORY (INHALATION) at 07:43

## 2020-01-01 RX ADMIN — ENOXAPARIN SODIUM 40 MG: 40 INJECTION SUBCUTANEOUS at 12:55

## 2020-01-01 RX ADMIN — BUDESONIDE AND FORMOTEROL FUMARATE DIHYDRATE 2 PUFF: 160; 4.5 AEROSOL RESPIRATORY (INHALATION) at 21:36

## 2020-01-01 RX ADMIN — LEVETIRACETAM 750 MG: 100 SOLUTION ORAL at 22:04

## 2020-01-01 RX ADMIN — GABAPENTIN 300 MG: 300 CAPSULE ORAL at 09:08

## 2020-01-01 RX ADMIN — NYSTATIN 1 APPLICATION: 100000 POWDER TOPICAL at 08:57

## 2020-01-01 RX ADMIN — MORPHINE SULFATE 4 MG: 2 INJECTION, SOLUTION INTRAMUSCULAR; INTRAVENOUS at 10:26

## 2020-01-01 RX ADMIN — MIRTAZAPINE 15 MG: 15 TABLET, FILM COATED ORAL at 21:49

## 2020-01-01 RX ADMIN — LORAZEPAM 2 MG: 2 INJECTION INTRAMUSCULAR; INTRAVENOUS at 09:07

## 2020-01-01 RX ADMIN — GLYCOPYRROLATE 0.4 MG: 0.2 INJECTION, SOLUTION INTRAMUSCULAR; INTRAVITREAL at 16:56

## 2020-01-01 RX ADMIN — PANTOPRAZOLE SODIUM 40 MG: 40 TABLET, DELAYED RELEASE ORAL at 16:49

## 2020-01-01 RX ADMIN — POLYETHYLENE GLYCOL 3350 17 G: 17 POWDER, FOR SOLUTION ORAL at 09:36

## 2020-01-01 RX ADMIN — MIRTAZAPINE 15 MG: 15 TABLET, FILM COATED ORAL at 21:15

## 2020-01-01 RX ADMIN — LORAZEPAM 2 MG: 2 INJECTION INTRAMUSCULAR; INTRAVENOUS at 04:35

## 2020-01-01 RX ADMIN — FUROSEMIDE 40 MG: 10 INJECTION, SOLUTION INTRAMUSCULAR; INTRAVENOUS at 16:49

## 2020-01-01 RX ADMIN — ZINC SULFATE 220 MG (50 MG) CAPSULE 220 MG: CAPSULE at 12:55

## 2020-01-01 RX ADMIN — MORPHINE SULFATE 2 MG: 2 INJECTION, SOLUTION INTRAMUSCULAR; INTRAVENOUS at 19:51

## 2020-01-01 RX ADMIN — ACETAMINOPHEN 650 MG: 325 TABLET ORAL at 20:17

## 2020-01-01 RX ADMIN — POTASSIUM CHLORIDE 30 MEQ: 750 CAPSULE, EXTENDED RELEASE ORAL at 17:44

## 2020-01-01 RX ADMIN — IPRATROPIUM BROMIDE AND ALBUTEROL SULFATE 3 ML: 2.5; .5 SOLUTION RESPIRATORY (INHALATION) at 21:38

## 2020-01-01 RX ADMIN — GLYCOPYRROLATE 0.4 MG: 0.2 INJECTION, SOLUTION INTRAMUSCULAR; INTRAVITREAL at 01:01

## 2020-01-01 RX ADMIN — LEVOFLOXACIN 500 MG: 5 INJECTION, SOLUTION INTRAVENOUS at 06:54

## 2020-01-01 RX ADMIN — CEFTRIAXONE SODIUM 1 G: 1 INJECTION, SOLUTION INTRAVENOUS at 18:56

## 2020-01-01 RX ADMIN — NYSTATIN 1 APPLICATION: 100000 POWDER TOPICAL at 20:33

## 2020-01-01 RX ADMIN — LORAZEPAM 2 MG: 2 INJECTION INTRAMUSCULAR; INTRAVENOUS at 20:51

## 2020-01-01 RX ADMIN — BUDESONIDE AND FORMOTEROL FUMARATE DIHYDRATE 2 PUFF: 160; 4.5 AEROSOL RESPIRATORY (INHALATION) at 19:33

## 2020-01-01 RX ADMIN — GUAIFENESIN 600 MG: 600 TABLET, EXTENDED RELEASE ORAL at 20:37

## 2020-01-01 RX ADMIN — PANTOPRAZOLE SODIUM 40 MG: 40 TABLET, DELAYED RELEASE ORAL at 06:37

## 2020-01-01 RX ADMIN — PANTOPRAZOLE SODIUM 40 MG: 40 TABLET, DELAYED RELEASE ORAL at 06:41

## 2020-01-01 RX ADMIN — LEVETIRACETAM 750 MG: 100 SOLUTION ORAL at 08:03

## 2020-01-01 RX ADMIN — GUAIFENESIN 600 MG: 600 TABLET, EXTENDED RELEASE ORAL at 09:14

## 2020-01-01 RX ADMIN — POLYETHYLENE GLYCOL 3350 17 G: 17 POWDER, FOR SOLUTION ORAL at 09:11

## 2020-01-01 RX ADMIN — GABAPENTIN 300 MG: 300 CAPSULE ORAL at 09:11

## 2020-01-01 RX ADMIN — CITALOPRAM 20 MG: 20 TABLET, FILM COATED ORAL at 20:06

## 2020-01-01 RX ADMIN — CEPHALEXIN 250 MG: 250 CAPSULE ORAL at 14:48

## 2020-01-01 RX ADMIN — FUROSEMIDE 20 MG: 20 TABLET ORAL at 09:10

## 2020-01-01 RX ADMIN — LEVETIRACETAM 750 MG: 500 TABLET, FILM COATED ORAL at 20:06

## 2020-01-01 RX ADMIN — BUDESONIDE AND FORMOTEROL FUMARATE DIHYDRATE 2 PUFF: 160; 4.5 AEROSOL RESPIRATORY (INHALATION) at 21:09

## 2020-01-01 RX ADMIN — Medication 150 MG: at 09:15

## 2020-01-01 RX ADMIN — POLYETHYLENE GLYCOL 3350 17 G: 17 POWDER, FOR SOLUTION ORAL at 08:54

## 2020-01-01 RX ADMIN — Medication 1000 UNITS: at 16:58

## 2020-01-01 RX ADMIN — BUDESONIDE AND FORMOTEROL FUMARATE DIHYDRATE 2 PUFF: 160; 4.5 AEROSOL RESPIRATORY (INHALATION) at 07:32

## 2020-01-01 RX ADMIN — POTASSIUM CHLORIDE 20 MEQ: 750 CAPSULE, EXTENDED RELEASE ORAL at 09:36

## 2020-01-01 RX ADMIN — REMDESIVIR 100 MG: 100 INJECTION, POWDER, LYOPHILIZED, FOR SOLUTION INTRAVENOUS at 17:02

## 2020-01-01 RX ADMIN — CALCIUM CARBONATE-VITAMIN D TAB 500 MG-200 UNIT 500 MG: 500-200 TAB at 08:33

## 2020-01-01 RX ADMIN — POLYETHYLENE GLYCOL 3350 17 G: 17 POWDER, FOR SOLUTION ORAL at 09:09

## 2020-01-01 RX ADMIN — CITALOPRAM 20 MG: 20 TABLET, FILM COATED ORAL at 21:27

## 2020-01-01 RX ADMIN — ESCITALOPRAM 10 MG: 10 TABLET, FILM COATED ORAL at 20:37

## 2020-01-01 RX ADMIN — LEVETIRACETAM 750 MG: 500 TABLET, FILM COATED ORAL at 08:22

## 2020-01-01 RX ADMIN — PANTOPRAZOLE SODIUM 40 MG: 40 TABLET, DELAYED RELEASE ORAL at 08:58

## 2020-01-01 RX ADMIN — CALCIUM 1000 MG: 500 TABLET ORAL at 09:14

## 2020-01-01 RX ADMIN — PANTOPRAZOLE SODIUM 40 MG: 40 TABLET, DELAYED RELEASE ORAL at 08:39

## 2020-01-01 RX ADMIN — IPRATROPIUM BROMIDE AND ALBUTEROL SULFATE 3 ML: 2.5; .5 SOLUTION RESPIRATORY (INHALATION) at 11:01

## 2020-01-01 RX ADMIN — IPRATROPIUM BROMIDE AND ALBUTEROL SULFATE 3 ML: 2.5; .5 SOLUTION RESPIRATORY (INHALATION) at 19:52

## 2020-01-01 RX ADMIN — DEXAMETHASONE SODIUM PHOSPHATE 6 MG: 10 INJECTION, SOLUTION INTRAMUSCULAR; INTRAVENOUS at 14:00

## 2020-01-01 RX ADMIN — MIRTAZAPINE 15 MG: 15 TABLET, FILM COATED ORAL at 20:37

## 2020-01-01 RX ADMIN — IPRATROPIUM BROMIDE AND ALBUTEROL SULFATE 3 ML: 2.5; .5 SOLUTION RESPIRATORY (INHALATION) at 19:36

## 2020-01-01 RX ADMIN — NYSTATIN 1 APPLICATION: 100000 POWDER TOPICAL at 09:19

## 2020-01-01 RX ADMIN — PANTOPRAZOLE SODIUM 40 MG: 40 TABLET, DELAYED RELEASE ORAL at 06:24

## 2020-01-01 RX ADMIN — ESCITALOPRAM 10 MG: 10 TABLET, FILM COATED ORAL at 21:09

## 2020-01-01 RX ADMIN — MORPHINE SULFATE 4 MG: 2 INJECTION, SOLUTION INTRAMUSCULAR; INTRAVENOUS at 22:45

## 2020-01-01 RX ADMIN — GUAIFENESIN 600 MG: 600 TABLET, EXTENDED RELEASE ORAL at 21:49

## 2020-01-01 RX ADMIN — FERROUS SULFATE TAB 325 MG (65 MG ELEMENTAL FE) 325 MG: 325 (65 FE) TAB at 08:39

## 2020-01-01 RX ADMIN — LEVETIRACETAM 750 MG: 100 SOLUTION ORAL at 08:16

## 2020-01-01 RX ADMIN — GLYCOPYRROLATE 0.4 MG: 0.2 INJECTION, SOLUTION INTRAMUSCULAR; INTRAVITREAL at 20:51

## 2020-01-01 RX ADMIN — FAMOTIDINE 20 MG: 20 TABLET, FILM COATED ORAL at 22:03

## 2020-01-01 RX ADMIN — BUDESONIDE AND FORMOTEROL FUMARATE DIHYDRATE 2 PUFF: 160; 4.5 AEROSOL RESPIRATORY (INHALATION) at 20:52

## 2020-01-01 RX ADMIN — LEVETIRACETAM 750 MG: 500 TABLET, FILM COATED ORAL at 08:33

## 2020-01-01 RX ADMIN — BUDESONIDE AND FORMOTEROL FUMARATE DIHYDRATE 2 PUFF: 160; 4.5 AEROSOL RESPIRATORY (INHALATION) at 23:19

## 2020-01-01 RX ADMIN — POLYETHYLENE GLYCOL 3350 17 G: 17 POWDER, FOR SOLUTION ORAL at 09:08

## 2020-01-01 RX ADMIN — CEFTRIAXONE SODIUM 1 G: 1 INJECTION, SOLUTION INTRAVENOUS at 00:18

## 2020-01-01 RX ADMIN — CITALOPRAM 20 MG: 20 TABLET, FILM COATED ORAL at 22:03

## 2020-01-01 RX ADMIN — IPRATROPIUM BROMIDE AND ALBUTEROL SULFATE 3 ML: 2.5; .5 SOLUTION RESPIRATORY (INHALATION) at 16:10

## 2020-01-01 RX ADMIN — BUDESONIDE AND FORMOTEROL FUMARATE DIHYDRATE 2 PUFF: 160; 4.5 AEROSOL RESPIRATORY (INHALATION) at 07:27

## 2020-01-01 RX ADMIN — MORPHINE SULFATE 4 MG: 2 INJECTION, SOLUTION INTRAMUSCULAR; INTRAVENOUS at 00:16

## 2020-01-01 RX ADMIN — MORPHINE SULFATE 4 MG: 2 INJECTION, SOLUTION INTRAMUSCULAR; INTRAVENOUS at 14:11

## 2020-01-01 RX ADMIN — LEVETIRACETAM 750 MG: 500 TABLET, FILM COATED ORAL at 16:37

## 2020-01-01 RX ADMIN — REMDESIVIR 100 MG: 100 INJECTION, POWDER, LYOPHILIZED, FOR SOLUTION INTRAVENOUS at 15:56

## 2020-01-01 RX ADMIN — IPRATROPIUM BROMIDE AND ALBUTEROL SULFATE 3 ML: 2.5; .5 SOLUTION RESPIRATORY (INHALATION) at 16:03

## 2020-01-01 RX ADMIN — SODIUM CHLORIDE, PRESERVATIVE FREE 10 ML: 5 INJECTION INTRAVENOUS at 21:50

## 2020-01-01 RX ADMIN — FAMOTIDINE 20 MG: 20 TABLET, FILM COATED ORAL at 08:04

## 2020-01-01 RX ADMIN — POTASSIUM CHLORIDE 20 MEQ: 750 CAPSULE, EXTENDED RELEASE ORAL at 17:38

## 2020-01-01 RX ADMIN — LEVOFLOXACIN 500 MG: 5 INJECTION, SOLUTION INTRAVENOUS at 06:41

## 2020-01-01 RX ADMIN — CALCIUM 1000 MG: 500 TABLET ORAL at 08:55

## 2020-01-01 RX ADMIN — FAMOTIDINE 20 MG: 20 TABLET, FILM COATED ORAL at 21:15

## 2020-01-01 RX ADMIN — LEVETIRACETAM 750 MG: 500 TABLET, FILM COATED ORAL at 08:39

## 2020-01-01 RX ADMIN — IPRATROPIUM BROMIDE AND ALBUTEROL SULFATE 3 ML: 2.5; .5 SOLUTION RESPIRATORY (INHALATION) at 03:47

## 2020-01-01 RX ADMIN — ENOXAPARIN SODIUM 40 MG: 40 INJECTION SUBCUTANEOUS at 21:15

## 2020-01-01 RX ADMIN — NYSTATIN 1 APPLICATION: 100000 POWDER TOPICAL at 09:15

## 2020-01-01 RX ADMIN — ACETAMINOPHEN 650 MG: 325 TABLET, FILM COATED ORAL at 12:26

## 2020-01-01 RX ADMIN — ESCITALOPRAM 10 MG: 10 TABLET, FILM COATED ORAL at 20:32

## 2020-01-01 RX ADMIN — SODIUM CHLORIDE, PRESERVATIVE FREE 10 ML: 5 INJECTION INTRAVENOUS at 20:32

## 2020-01-01 RX ADMIN — IPRATROPIUM BROMIDE AND ALBUTEROL SULFATE 3 ML: 2.5; .5 SOLUTION RESPIRATORY (INHALATION) at 10:57

## 2020-01-01 RX ADMIN — IPRATROPIUM BROMIDE AND ALBUTEROL SULFATE 3 ML: 2.5; .5 SOLUTION RESPIRATORY (INHALATION) at 11:54

## 2020-01-01 RX ADMIN — POLYETHYLENE GLYCOL 3350 17 G: 17 POWDER, FOR SOLUTION ORAL at 15:22

## 2020-01-01 RX ADMIN — FUROSEMIDE 20 MG: 20 TABLET ORAL at 08:22

## 2020-01-01 RX ADMIN — DEXTROSE MONOHYDRATE 75 ML/HR: 50 INJECTION, SOLUTION INTRAVENOUS at 10:16

## 2020-01-01 RX ADMIN — MIRTAZAPINE 15 MG: 15 TABLET, FILM COATED ORAL at 20:17

## 2020-01-01 RX ADMIN — FUROSEMIDE 20 MG: 20 TABLET ORAL at 08:54

## 2020-01-01 RX ADMIN — Medication 1000 UNITS: at 08:18

## 2020-01-01 RX ADMIN — NYSTATIN 1 APPLICATION: 100000 POWDER TOPICAL at 12:12

## 2020-01-01 RX ADMIN — MORPHINE SULFATE 4 MG: 4 INJECTION, SOLUTION INTRAMUSCULAR; INTRAVENOUS at 01:01

## 2020-01-01 RX ADMIN — LEVOFLOXACIN 500 MG: 5 INJECTION, SOLUTION INTRAVENOUS at 06:36

## 2020-01-01 RX ADMIN — PANTOPRAZOLE SODIUM 40 MG: 40 TABLET, DELAYED RELEASE ORAL at 09:09

## 2020-01-01 RX ADMIN — MORPHINE SULFATE 2 MG: 2 INJECTION, SOLUTION INTRAMUSCULAR; INTRAVENOUS at 15:18

## 2020-01-01 RX ADMIN — POLYETHYLENE GLYCOL 3350 17 G: 17 POWDER, FOR SOLUTION ORAL at 12:55

## 2020-01-01 RX ADMIN — LEVETIRACETAM 750 MG: 500 TABLET, FILM COATED ORAL at 20:32

## 2020-01-01 RX ADMIN — GUAIFENESIN 600 MG: 600 TABLET, EXTENDED RELEASE ORAL at 09:35

## 2020-01-01 RX ADMIN — LEVETIRACETAM 750 MG: 500 TABLET, FILM COATED ORAL at 08:58

## 2020-01-01 RX ADMIN — LEVETIRACETAM 750 MG: 500 TABLET, FILM COATED ORAL at 20:37

## 2020-01-01 RX ADMIN — BARIUM SULFATE 183 ML: 960 POWDER, FOR SUSPENSION ORAL at 15:15

## 2020-01-01 RX ADMIN — LEVOFLOXACIN 500 MG: 5 INJECTION, SOLUTION INTRAVENOUS at 06:37

## 2020-01-01 RX ADMIN — BUDESONIDE AND FORMOTEROL FUMARATE DIHYDRATE 2 PUFF: 160; 4.5 AEROSOL RESPIRATORY (INHALATION) at 07:10

## 2020-01-01 RX ADMIN — POTASSIUM CHLORIDE 30 MEQ: 750 CAPSULE, EXTENDED RELEASE ORAL at 18:55

## 2020-01-01 RX ADMIN — GABAPENTIN 300 MG: 300 CAPSULE ORAL at 09:09

## 2020-01-01 RX ADMIN — LEVETIRACETAM 750 MG: 500 TABLET, FILM COATED ORAL at 09:35

## 2020-01-01 RX ADMIN — GABAPENTIN 600 MG: 300 CAPSULE ORAL at 20:17

## 2020-01-01 RX ADMIN — ENOXAPARIN SODIUM 40 MG: 40 INJECTION SUBCUTANEOUS at 21:00

## 2020-01-01 RX ADMIN — GLYCOPYRROLATE 0.4 MG: 0.2 INJECTION, SOLUTION INTRAMUSCULAR; INTRAVITREAL at 09:06

## 2020-01-01 RX ADMIN — SODIUM CHLORIDE, PRESERVATIVE FREE 10 ML: 5 INJECTION INTRAVENOUS at 20:37

## 2020-01-01 RX ADMIN — LEVETIRACETAM 750 MG: 500 TABLET, FILM COATED ORAL at 08:55

## 2020-01-01 RX ADMIN — LEVETIRACETAM 750 MG: 500 TABLET, FILM COATED ORAL at 21:27

## 2020-01-01 RX ADMIN — MIRTAZAPINE 15 MG: 15 TABLET, FILM COATED ORAL at 22:03

## 2020-01-01 RX ADMIN — MIRTAZAPINE 15 MG: 15 TABLET, FILM COATED ORAL at 21:27

## 2020-01-01 RX ADMIN — GABAPENTIN 300 MG: 300 CAPSULE ORAL at 16:49

## 2020-01-01 RX ADMIN — GUAIFENESIN 600 MG: 600 TABLET, EXTENDED RELEASE ORAL at 09:10

## 2020-01-01 RX ADMIN — NYSTATIN 1 APPLICATION: 100000 POWDER TOPICAL at 20:38

## 2020-01-01 RX ADMIN — PANTOPRAZOLE SODIUM 40 MG: 40 TABLET, DELAYED RELEASE ORAL at 06:54

## 2020-01-01 RX ADMIN — BUDESONIDE AND FORMOTEROL FUMARATE DIHYDRATE 2 PUFF: 160; 4.5 AEROSOL RESPIRATORY (INHALATION) at 08:06

## 2020-01-01 RX ADMIN — NYSTATIN 1 APPLICATION: 100000 POWDER TOPICAL at 20:28

## 2020-01-01 RX ADMIN — ENOXAPARIN SODIUM 40 MG: 40 INJECTION SUBCUTANEOUS at 08:16

## 2020-01-01 RX ADMIN — POLYETHYLENE GLYCOL 3350 17 G: 17 POWDER, FOR SOLUTION ORAL at 08:39

## 2020-01-01 RX ADMIN — MORPHINE SULFATE 4 MG: 4 INJECTION, SOLUTION INTRAMUSCULAR; INTRAVENOUS at 04:35

## 2020-01-01 RX ADMIN — POTASSIUM CHLORIDE 30 MEQ: 750 CAPSULE, EXTENDED RELEASE ORAL at 08:54

## 2020-01-01 RX ADMIN — IPRATROPIUM BROMIDE AND ALBUTEROL SULFATE 3 ML: 2.5; .5 SOLUTION RESPIRATORY (INHALATION) at 15:01

## 2020-01-01 RX ADMIN — SODIUM CHLORIDE 75 ML/HR: 4.5 INJECTION, SOLUTION INTRAVENOUS at 23:48

## 2020-01-01 RX ADMIN — BUDESONIDE AND FORMOTEROL FUMARATE DIHYDRATE 2 PUFF: 160; 4.5 AEROSOL RESPIRATORY (INHALATION) at 19:56

## 2020-01-01 RX ADMIN — MORPHINE SULFATE 4 MG: 4 INJECTION, SOLUTION INTRAMUSCULAR; INTRAVENOUS at 20:51

## 2020-01-01 RX ADMIN — Medication 150 MG: at 08:22

## 2020-01-01 RX ADMIN — FUROSEMIDE 20 MG: 20 TABLET ORAL at 09:14

## 2020-01-01 RX ADMIN — REMDESIVIR 200 MG: 100 INJECTION, POWDER, LYOPHILIZED, FOR SOLUTION INTRAVENOUS at 22:26

## 2020-01-01 RX ADMIN — CETIRIZINE HYDROCHLORIDE 5 MG: 10 TABLET ORAL at 21:15

## 2020-01-01 RX ADMIN — POTASSIUM CHLORIDE AND SODIUM CHLORIDE 75 ML/HR: 900; 150 INJECTION, SOLUTION INTRAVENOUS at 21:31

## 2020-01-01 RX ADMIN — LEVETIRACETAM 750 MG: 500 TABLET, FILM COATED ORAL at 09:10

## 2020-01-01 RX ADMIN — POLYETHYLENE GLYCOL 3350 17 G: 17 POWDER, FOR SOLUTION ORAL at 08:18

## 2020-01-01 RX ADMIN — GABAPENTIN 300 MG: 300 CAPSULE ORAL at 08:55

## 2020-01-01 RX ADMIN — LEVETIRACETAM 750 MG: 500 TABLET, FILM COATED ORAL at 15:22

## 2020-01-01 RX ADMIN — CALCIUM 1000 MG: 500 TABLET ORAL at 09:09

## 2020-01-01 RX ADMIN — BUDESONIDE AND FORMOTEROL FUMARATE DIHYDRATE 2 PUFF: 160; 4.5 AEROSOL RESPIRATORY (INHALATION) at 20:38

## 2020-01-01 RX ADMIN — BUDESONIDE AND FORMOTEROL FUMARATE DIHYDRATE 2 PUFF: 160; 4.5 AEROSOL RESPIRATORY (INHALATION) at 08:10

## 2020-01-01 RX ADMIN — CEFTRIAXONE SODIUM 1 G: 1 INJECTION, SOLUTION INTRAVENOUS at 17:30

## 2020-01-01 RX ADMIN — LEVOFLOXACIN 750 MG: 5 INJECTION, SOLUTION INTRAVENOUS at 07:09

## 2020-01-01 RX ADMIN — POTASSIUM CHLORIDE AND SODIUM CHLORIDE 75 ML/HR: 900; 150 INJECTION, SOLUTION INTRAVENOUS at 06:09

## 2020-01-01 RX ADMIN — LEVOFLOXACIN 500 MG: 5 INJECTION, SOLUTION INTRAVENOUS at 06:40

## 2020-01-01 RX ADMIN — CEFTRIAXONE SODIUM 1 G: 1 INJECTION, SOLUTION INTRAVENOUS at 17:43

## 2020-01-01 RX ADMIN — POTASSIUM CHLORIDE 20 MEQ: 750 CAPSULE, EXTENDED RELEASE ORAL at 12:58

## 2020-01-01 RX ADMIN — CALCIUM 1000 MG: 500 TABLET ORAL at 08:22

## 2020-01-01 RX ADMIN — ZINC SULFATE 220 MG (50 MG) CAPSULE 220 MG: CAPSULE at 08:18

## 2020-01-01 RX ADMIN — ENOXAPARIN SODIUM 30 MG: 30 INJECTION SUBCUTANEOUS at 22:27

## 2020-01-01 RX ADMIN — FUROSEMIDE 40 MG: 10 INJECTION, SOLUTION INTRAMUSCULAR; INTRAVENOUS at 05:37

## 2020-01-01 RX ADMIN — MORPHINE SULFATE 4 MG: 2 INJECTION, SOLUTION INTRAMUSCULAR; INTRAVENOUS at 06:32

## 2020-01-01 RX ADMIN — GUAIFENESIN 600 MG: 600 TABLET, EXTENDED RELEASE ORAL at 09:09

## 2020-01-01 RX ADMIN — SODIUM CHLORIDE 750 MG: 900 INJECTION, SOLUTION INTRAVENOUS at 12:12

## 2020-01-01 RX ADMIN — CALCIUM CARBONATE-VITAMIN D TAB 500 MG-200 UNIT 500 MG: 500-200 TAB at 08:39

## 2020-01-01 RX ADMIN — CITALOPRAM 20 MG: 20 TABLET, FILM COATED ORAL at 21:15

## 2020-01-01 RX ADMIN — OXYCODONE HYDROCHLORIDE AND ACETAMINOPHEN 500 MG: 500 TABLET ORAL at 08:18

## 2020-01-01 RX ADMIN — VANCOMYCIN HYDROCHLORIDE 2250 MG: 10 INJECTION, POWDER, LYOPHILIZED, FOR SOLUTION INTRAVENOUS at 09:00

## 2020-01-01 RX ADMIN — BUDESONIDE AND FORMOTEROL FUMARATE DIHYDRATE 2 PUFF: 160; 4.5 AEROSOL RESPIRATORY (INHALATION) at 19:28

## 2020-01-01 RX ADMIN — ESCITALOPRAM 10 MG: 10 TABLET, FILM COATED ORAL at 21:49

## 2020-01-01 RX ADMIN — BUDESONIDE AND FORMOTEROL FUMARATE DIHYDRATE 2 PUFF: 160; 4.5 AEROSOL RESPIRATORY (INHALATION) at 07:28

## 2020-01-01 RX ADMIN — FUROSEMIDE 20 MG: 20 TABLET ORAL at 09:35

## 2020-01-01 RX ADMIN — IPRATROPIUM BROMIDE AND ALBUTEROL SULFATE 3 ML: 2.5; .5 SOLUTION RESPIRATORY (INHALATION) at 16:11

## 2020-01-01 RX ADMIN — FUROSEMIDE 20 MG: 20 TABLET ORAL at 12:57

## 2020-01-01 RX ADMIN — LEVETIRACETAM 750 MG: 500 TABLET, FILM COATED ORAL at 20:28

## 2020-01-01 RX ADMIN — FUROSEMIDE 20 MG: 20 TABLET ORAL at 18:11

## 2020-01-01 RX ADMIN — CEFTRIAXONE SODIUM 1 G: 1 INJECTION, SOLUTION INTRAVENOUS at 01:19

## 2020-01-01 RX ADMIN — POLYETHYLENE GLYCOL 3350 17 G: 17 POWDER, FOR SOLUTION ORAL at 08:33

## 2020-01-01 RX ADMIN — FAMOTIDINE 20 MG: 20 TABLET, FILM COATED ORAL at 13:05

## 2020-01-01 RX ADMIN — LORAZEPAM 2 MG: 2 INJECTION INTRAMUSCULAR; INTRAVENOUS at 01:01

## 2020-01-01 RX ADMIN — MORPHINE SULFATE 4 MG: 2 INJECTION, SOLUTION INTRAMUSCULAR; INTRAVENOUS at 10:14

## 2020-01-01 RX ADMIN — LEVETIRACETAM 750 MG: 500 TABLET, FILM COATED ORAL at 20:17

## 2020-01-01 RX ADMIN — NYSTATIN 1 APPLICATION: 100000 POWDER TOPICAL at 08:23

## 2020-01-01 RX ADMIN — POLYETHYLENE GLYCOL 3350 17 G: 17 POWDER, FOR SOLUTION ORAL at 09:15

## 2020-01-01 RX ADMIN — BUDESONIDE AND FORMOTEROL FUMARATE DIHYDRATE 2 PUFF: 160; 4.5 AEROSOL RESPIRATORY (INHALATION) at 18:41

## 2020-01-01 RX ADMIN — GLYCOPYRROLATE 0.4 MG: 0.2 INJECTION, SOLUTION INTRAMUSCULAR; INTRAVITREAL at 04:35

## 2020-01-01 RX ADMIN — NYSTATIN 1 APPLICATION: 100000 POWDER TOPICAL at 21:50

## 2020-01-01 RX ADMIN — POTASSIUM CHLORIDE 20 MEQ: 750 CAPSULE, EXTENDED RELEASE ORAL at 09:14

## 2020-01-01 RX ADMIN — POTASSIUM CHLORIDE 30 MEQ: 750 CAPSULE, EXTENDED RELEASE ORAL at 09:10

## 2020-01-01 RX ADMIN — GABAPENTIN 300 MG: 300 CAPSULE ORAL at 09:36

## 2020-01-01 RX ADMIN — GABAPENTIN 600 MG: 300 CAPSULE ORAL at 20:13

## 2020-01-01 RX ADMIN — DEXAMETHASONE SODIUM PHOSPHATE 6 MG: 10 INJECTION, SOLUTION INTRAMUSCULAR; INTRAVENOUS at 07:57

## 2020-01-01 RX ADMIN — REMDESIVIR 100 MG: 100 INJECTION, POWDER, LYOPHILIZED, FOR SOLUTION INTRAVENOUS at 15:45

## 2020-01-01 RX ADMIN — DEXAMETHASONE SODIUM PHOSPHATE 6 MG: 10 INJECTION, SOLUTION INTRAMUSCULAR; INTRAVENOUS at 08:04

## 2020-01-01 RX ADMIN — FERROUS SULFATE TAB 325 MG (65 MG ELEMENTAL FE) 325 MG: 325 (65 FE) TAB at 08:33

## 2020-01-01 RX ADMIN — OXYCODONE HYDROCHLORIDE AND ACETAMINOPHEN 500 MG: 500 TABLET ORAL at 12:55

## 2020-01-01 RX ADMIN — LORAZEPAM 2 MG: 2 INJECTION INTRAMUSCULAR; INTRAVENOUS at 10:26

## 2020-01-01 RX ADMIN — LEVETIRACETAM 750 MG: 500 TABLET, FILM COATED ORAL at 09:14

## 2020-01-01 RX ADMIN — MIRTAZAPINE 15 MG: 15 TABLET, FILM COATED ORAL at 21:09

## 2020-01-01 RX ADMIN — BUDESONIDE AND FORMOTEROL FUMARATE DIHYDRATE 2 PUFF: 160; 4.5 AEROSOL RESPIRATORY (INHALATION) at 08:40

## 2020-01-01 RX ADMIN — LORAZEPAM 2 MG: 2 INJECTION INTRAMUSCULAR; INTRAVENOUS at 03:04

## 2020-01-01 RX ADMIN — GUAIFENESIN 600 MG: 600 TABLET, EXTENDED RELEASE ORAL at 20:28

## 2020-01-01 RX ADMIN — LORAZEPAM 0.5 MG: 2 INJECTION INTRAMUSCULAR; INTRAVENOUS at 00:16

## 2020-01-01 RX ADMIN — NYSTATIN 1 APPLICATION: 100000 POWDER TOPICAL at 21:10

## 2020-01-31 NOTE — TELEPHONE ENCOUNTER
Date of Office Visit:  2020  Encounter Provider:  Bandar Ruiz MD  Place of Service:  [facility] CARDIOLOGY  Patient Name: Kim Feldman  :  3/13/1930        Dear Dr Taveras,    Ms Feldman has sick sinus syndrome and has a pacemaker.  She does not have a diagnosis of CAD or CHF.  She is elderly and immobile, and thus her perioperative risk of major adverse events is intermediate, but is nonmodifiable.     No cardiac testing is indicated at this point.     Please contact our office with any questions or concerns. As always, it has been a pleasure to participate in your patient's care.      Bandar Ruiz MD  [facility]

## 2020-01-31 NOTE — TELEPHONE ENCOUNTER
----- Message from Prince Fernandes sent at 1/31/2020  9:01 AM EST -----  Regarding: Surgery Clearance  Contact: [phone]  Dr. Ruiz,    Pt needs surgery clearance. Pt was last seen 4/22/2019.    Surgery: Replace battery in the stimulator in pt's back  Surgeon: Eriberto Ham  Surgery date: 3/3/2020    I spoke with Kenya [phone]. I will call her back with your response.     Please advise.     Thanks,  Carroll

## 2020-02-25 PROBLEM — J18.9 PNEUMONIA OF BOTH LOWER LOBES DUE TO INFECTIOUS ORGANISM: Status: ACTIVE | Noted: 2020-01-01

## 2020-03-01 NOTE — PLAN OF CARE
Problem: Patient Care Overview  Goal: Plan of Care Review  Outcome: Ongoing (interventions implemented as appropriate)  Flowsheets (Taken 3/1/2020 1640)  Progress: no change  Plan of Care Reviewed With: patient; daughter  Outcome Summary: Pt got up into chair for a few hours- patient was a 2-3 person assist and had trouble following commands; purewick removed; skin care done in behzad area; Pt eating and drinking a little more today; VSS; pt still on 2L of O2, palliative consult tomorrow.     Problem: Patient Care Overview  Goal: Individualization and Mutuality  Outcome: Ongoing (interventions implemented as appropriate)     Problem: Patient Care Overview  Goal: Discharge Needs Assessment  Outcome: Ongoing (interventions implemented as appropriate)     Problem: Patient Care Overview  Goal: Interprofessional Rounds/Family Conf  Outcome: Ongoing (interventions implemented as appropriate)     Problem: Fall Risk (Adult)  Goal: Identify Related Risk Factors and Signs and Symptoms  Outcome: Ongoing (interventions implemented as appropriate)     Problem: Fall Risk (Adult)  Goal: Absence of Fall  Outcome: Ongoing (interventions implemented as appropriate)     Problem: Skin Injury Risk (Adult)  Goal: Identify Related Risk Factors and Signs and Symptoms  Outcome: Ongoing (interventions implemented as appropriate)     Problem: Skin Injury Risk (Adult)  Goal: Skin Health and Integrity  Outcome: Ongoing (interventions implemented as appropriate)     Problem: Pain, Chronic (Adult)  Goal: Identify Related Risk Factors and Signs and Symptoms  Outcome: Ongoing (interventions implemented as appropriate)     Problem: Pain, Chronic (Adult)  Goal: Acceptable Pain/Comfort Level and Functional Ability  Outcome: Ongoing (interventions implemented as appropriate)

## 2020-03-01 NOTE — PROGRESS NOTES
Efe Wheatley MD                          [phone]      Patient ID:    Name:  Kim Feldman    MRN:  [id number]    3/13/1930   89 y.o.  female            Patient Care Team:  Grady Villeda MD as PCP - General (Family Medicine)  Bandar Ruiz MD as Consulting Physician (Cardiology)    CC/ Reason for visit: Pneumonia, respiratory failure    Subjective: Pt seen and examined this AM. No acute overnight events noted. Doing better.  Patient states that she is doing a little better.  Ate a little more.  No worsening cough or shortness of breath.  No issues today with swallowing or aspiration per patient and family.  Awaiting evaluation with palliative care    ROS: Denies any subjective fevers, syncope or presyncopal events, new neurological deficits, nausea or vomiting currently    Objective     Vital Signs past 24hrs    BP range: BP: (107-135)/(57-81) 133/81  Pulse range: Heart Rate:  [82-94] 86  Resp rate range: Resp:  [16-20] 16  Temp range: Temp (24hrs), Av.7 °F (37.1 °C), Min:97.3 °F (36.3 °C), Max:100.3 °F (37.9 °C)      Ventilator/Non-Invasive Ventilation Settings (From admission, onward)     Start     Ordered    20 0537  NIPPV (CPAP or BIPAP)  Until Discontinued     Question Answer Comment   Type: AutoBIPAP    NIPPV Mask Interface: Per Patient Preference        20 0536                Device (Oxygen Therapy): nasal cannula       101 kg (222 lb 3.6 oz); Body mass index is 36.98 kg/m².      Intake/Output Summary (Last 24 hours) at 3/1/2020 1731  Last data filed at 3/1/2020 1318  Gross per 24 hour   Intake 180 ml   Output 550 ml   Net -370 ml       PHYSICAL EXAM   Constitutional: Middle aged pt in bed, No acute respiratory distress, + accessory muscle use  Head: - NCAT  Eyes: No pallor.  Anicteric sclerae, EOMI.  ENMT:  Mallampati 4, no oral thrush. Dry MM.   NECK: Trachea midline, No thyromegaly, no palpable cervical lymphadenopathy  Heart: RRR, no  murmur. No pedal edema   Lungs: VICTOR HUGO +, bilateral rhonchi, no wheezes/ crackles heard    Abdomen: Soft.  Obese no tenderness, guarding or rigidity. No palpable masses  Extremities: Extremities warm and well perfused. No cyanosis/ clubbing  Neuro: Conscious, answers appropriately, no gross focal neuro deficits  Psych: Mood and affect appropriate    Scheduled meds:      budesonide-formoterol 2 puff Inhalation BID - RT   calcium carbonate (oyster shell) 1,000 mg Oral Daily   escitalopram 10 mg Oral Nightly   furosemide 20 mg Oral Daily   gabapentin 300 mg Oral Daily   guaiFENesin 600 mg Oral Q12H   ipratropium-albuterol 3 mL Nebulization Q4H - RT   iron polysaccharides 150 mg Oral Daily   levETIRAcetam 750 mg Oral BID   levoFLOXacin 500 mg Intravenous Q24H   mirtazapine 15 mg Oral Nightly   nystatin 1 application Topical Q12H   pantoprazole 40 mg Oral Daily   polyethylene glycol 17 g Oral Daily   potassium chloride 30 mEq Oral TID With Meals       IV meds:                           Data Review:      Results from last 7 days   Lab Units 03/01/20  0847 02/29/20  0823 02/28/20  0635 02/27/20  0552 02/26/20  0631 02/25/20  0601   SODIUM mmol/L 141 143 142 140 143 144   POTASSIUM mmol/L 3.7 3.7 3.2* 3.0* 3.1* 4.4   CHLORIDE mmol/L 98 99 96* 92* 96* 101   CO2 mmol/L 30.5* 31.5* 33.7* 35.6* 34.0* 26.3   BUN mg/dL 26* 27* 27* 28* 24* 18   CREATININE mg/dL 1.17* 1.18* 1.13* 1.26* 1.07* 1.42*   CALCIUM mg/dL 8.8 8.7 9.1 9.2 9.0 9.0   BILIRUBIN mg/dL  --   --   --   --  0.5 0.7   ALK PHOS U/L  --   --   --   --  56 74   ALT (SGPT) U/L  --   --   --   --  11 14   AST (SGOT) U/L  --   --   --   --  20 27   GLUCOSE mg/dL 109* 106* 104* 95 100* 141*   WBC 10*3/mm3 7.18 7.08 7.01 8.18 9.02 10.25   HEMOGLOBIN g/dL 10.4* 10.3* 10.6* 10.6* 10.8* 11.0*   PLATELETS 10*3/mm3 144 148 129* 136* 138* 147   PROBNP pg/mL  --   --   --  922.9 3,306.0* 1,035.0   PROCALCITONIN ng/mL  --   --   --   --  0.77* 0.16       Lab Results   Component Value  Date    CALCIUM 8.8 03/01/2020    PHOS 2.9 01/28/2016       Results from last 7 days   Lab Units 02/25/20  0639 02/25/20  0601   BLOODCX  No growth at 5 days No growth at 5 days       Results from last 7 days   Lab Units 02/25/20  0555   PH, ARTERIAL pH units 7.384   PO2 ART mm Hg 127.1*   PCO2, ARTERIAL mm Hg 51.8*   HCO3 ART mmol/L 30.9*        Results Review:    I have reviewed the relevant laboratory results and independently reviewed the chest imaging from this hospitalization including the available echocardiogram reports personally and summarized it if/ when appropriate below    Assessment    Acute hypoxemic respiratory failure  Chronic hypercapnic respiratory failure  Aspiration vs healthcare associated pneumonia  HMPV URTI/pneumonia  Posterior esophageal diverticulum  Diffuse esophageal spasm  Acute kidney injury  Suspected undiagnosed sleep apnea/OHS  Morbid obesity    PLAN:  Patient making slow recovery with regards to her pneumonia.  Ongoing evaluation with regards to esophageal spasm and diverticulum noted.  Symptomatic treatment for viral URI.  Antibiotics per ID  C/w mucus clearance techniques.  Will need to be out of bed and improve mucus clearance to continue to make recovery.    Noted that patient/family is considering palliative care.  This seems to be appropriate given ongoing issues and patient being high risk for recurrent pneumonias and respiratory failure.  Suggested that home or inpatient hospice would be appropriate for    I have discussed my findings and recommendations with patient, family and nursing staff.    Efe Wheatley MD  3/1/2020

## 2020-03-01 NOTE — PROGRESS NOTES
"Daily progress note    Chief complaint  Doing same  No new complaints  Family at bedside    History of present illness  89-year-old white female with history of hypertension seizure disorder depression gastroesophageal reflux disease and old CVA who is a nursing home resident brought to the emergency room with shortness of breath followed by respiratory distress evaluated by EMS found to be hypoxic started on oxygen DuoNeb received steroids and work-up in ER revealed acute hypoxic respiratory failure with bilateral lower lobe pneumonia admit for management.  Patient is on BiPAP fully alert oriented family at bedside and DNR per her wishes admit for management for pneumonia and respiratory distress.  Patient and family agree for BiPAP.     REVIEW OF SYSTEMS  Unremarkable    PHYSICAL EXAM  Blood pressure 135/57, pulse 83, temperature 97.3 °F (36.3 °C), temperature source Oral, resp. rate 16, height 165.1 cm (65\"), weight 101 kg (222 lb 3.6 oz), SpO2 90 %, not currently breastfeeding.    Constitutional: She appears distressed.   Head: Normocephalic and atraumatic.   Eyes: Pupils are equal, round, and reactive to light. EOM are normal.   Neck: Normal range of motion. Neck supple.   Cardiovascular: Normal rate, regular rhythm and normal heart sounds.   Pulmonary/Chest: She is in respiratory distress. She has wheezes (bilaterally). She has rales in the left upper field, the left middle field and the left lower field.   Abdominal: Soft. There is no tenderness. There is no rebound and no guarding.   Musculoskeletal: Normal range of motion. She exhibits no edema.   Neurological: She is alert. She has normal sensation and normal strength. She is disoriented (place and time). She displays weakness (generally).   Skin: Skin is warm and dry. No rash noted.   Psychiatric: Mood and affect normal.     LAB RESULTS  Lab Results (last 24 hours)     Procedure Component Value Units Date/Time    Basic Metabolic Panel [[id number]]  " (Abnormal) Collected:  03/01/20 0847    Specimen:  Blood Updated:  03/01/20 0925     Glucose 109 mg/dL      BUN 26 mg/dL      Creatinine 1.17 mg/dL      Sodium 141 mmol/L      Potassium 3.7 mmol/L      Chloride 98 mmol/L      CO2 30.5 mmol/L      Calcium 8.8 mg/dL      eGFR Non African Amer 44 mL/min/1.73      BUN/Creatinine Ratio 22.2     Anion Gap 12.5 mmol/L     Narrative:       GFR Normal >60  Chronic Kidney Disease <60  Kidney Failure <15      CBC & Differential [[id number]] Collected:  03/01/20 0847    Specimen:  Blood Updated:  03/01/20 0901    Narrative:       The following orders were created for panel order CBC & Differential.  Procedure                               Abnormality         Status                     ---------                               -----------         ------                     CBC Auto Differential[[id number]]        Abnormal            Final result                 Please view results for these tests on the individual orders.    CBC Auto Differential [[id number]]  (Abnormal) Collected:  03/01/20 0847    Specimen:  Blood Updated:  03/01/20 0901     WBC 7.18 10*3/mm3      RBC 3.05 10*6/mm3      Hemoglobin 10.4 g/dL      Hematocrit 31.1 %      .0 fL      MCH 34.1 pg      MCHC 33.4 g/dL      RDW 11.8 %      RDW-SD 43.5 fl      MPV 8.7 fL      Platelets 144 10*3/mm3      Neutrophil % 62.6 %      Lymphocyte % 22.8 %      Monocyte % 9.2 %      Eosinophil % 4.2 %      Basophil % 0.6 %      Immature Grans % 0.6 %      Neutrophils, Absolute 4.50 10*3/mm3      Lymphocytes, Absolute 1.64 10*3/mm3      Monocytes, Absolute 0.66 10*3/mm3      Eosinophils, Absolute 0.30 10*3/mm3      Basophils, Absolute 0.04 10*3/mm3      Immature Grans, Absolute 0.04 10*3/mm3      nRBC 0.0 /100 WBC     Blood Culture - Blood, Arm, Right [[id number]] Collected:  02/25/20 0639    Specimen:  Blood from Arm, Right Updated:  03/01/20 0700     Blood Culture No growth at 5 days    Blood Culture - Blood, Arm, Left  [[id number]] Collected:  02/25/20 0601    Specimen:  Blood from Arm, Left Updated:  03/01/20 0615     Blood Culture No growth at 5 days        Imaging Results (Last 24 Hours)     ** No results found for the last 24 hours. **        EKG                              Rhythm/Rate: sinus tachycardia 114  P waves and ME: normal  QRS, axis: left axis, no conduction delays   ST and T waves: no acute changes       Current Facility-Administered Medications:   •  acetaminophen (TYLENOL) tablet 650 mg, 650 mg, Oral, Q4H PRN, Juan Carlos Harper MD, 650 mg at 02/29/20 2032  •  budesonide-formoterol (SYMBICORT) 160-4.5 MCG/ACT inhaler 2 puff, 2 puff, Inhalation, BID - RT, Juan Carlos Harper MD, 2 puff at 03/01/20 0717  •  calcium carbonate (oyster shell) tablet 1,000 mg, 1,000 mg, Oral, Daily, Juan Carlos Harper MD, 1,000 mg at 03/01/20 0822  •  escitalopram (LEXAPRO) tablet 10 mg, 10 mg, Oral, Nightly, Juan Carlos Harper MD, 10 mg at 02/29/20 2032  •  furosemide (LASIX) tablet 20 mg, 20 mg, Oral, Daily, Juan Carlos Harper MD, 20 mg at 03/01/20 0822  •  gabapentin (NEURONTIN) capsule 300 mg, 300 mg, Oral, Daily, Juan Carlos Harper MD, 300 mg at 03/01/20 0908  •  guaiFENesin (MUCINEX) 12 hr tablet 600 mg, 600 mg, Oral, Q12H, Juan Carlos Harper MD, 600 mg at 03/01/20 0822  •  ipratropium-albuterol (DUO-NEB) nebulizer solution 3 mL, 3 mL, Nebulization, Q4H - RT, Juan Carlos Harper MD, 3 mL at 03/01/20 1057  •  iron polysaccharides (NIFEREX) capsule 150 mg, 150 mg, Oral, Daily, Juan Carlos Harper MD, 150 mg at 03/01/20 0822  •  levETIRAcetam (KEPPRA) tablet 750 mg, 750 mg, Oral, BID, Juan Carlos Harper MD, 750 mg at 03/01/20 0822  •  levoFLOXacin (LEVAQUIN) 500 mg/100 mL D5W (premix) (LEVAQUIN) 500 mg, 500 mg, Intravenous, Q24H, Juan Carlos Harper MD, 500 mg at 03/01/20 0640  •  mirtazapine (REMERON) tablet 15 mg, 15 mg, Oral, Nightly, Juan Carlos Harper MD, 15 mg at 02/29/20 2032  •  nystatin (MYCOSTATIN) powder 1 application, 1 application, Topical, Q12H, Juan Carlos Harper MD, 1 application at  03/01/20 0823  •  pantoprazole (PROTONIX) EC tablet 40 mg, 40 mg, Oral, Daily, Gaetano Harper MD, 40 mg at 03/01/20 0640  •  polyethylene glycol (MIRALAX) powder 17 g, 17 g, Oral, Daily, Gaetano Harper MD, 17 g at 03/01/20 0908  •  potassium chloride (MICRO-K) CR capsule 30 mEq, 30 mEq, Oral, TID With Meals, Gaetano Harper MD, 30 mEq at 03/01/20 0822  •  [COMPLETED] Insert peripheral IV, , , Once **AND** sodium chloride 0.9 % flush 10 mL, 10 mL, Intravenous, PRN, Fidel Parada MD, 10 mL at 02/29/20 2032     ASSESSMENT  Acute hypoxic respiratory failure  Acute human metapneumovirus infection  Bilateral lower lobe pneumonia   Acute UTI with sepsis  Elevated troponin no chest pain  Hypertension  CVA  Seizure disorder  Chronic anemia  Depression  Degenerative disc disease  Sick sinus syndrome status post PPM  Gastroesophageal reflux disease    PLAN  CPM  Supplement oxygen nebulizer  BiPAP as needed  Antibiotics per infectious disease  Cardiology and pulmonary consult  Adjust nursing home medications  Stress ulcer DVT prophylaxis  Supportive care  Discussed with family  DNR  PT/OT  Discharge planning    GAETANO HARPER MD

## 2020-03-02 NOTE — PROGRESS NOTES
Continued Stay Note  [facility]     Patient Name: Kim Feldman  MRN: [id number]  Today's Date: 3/2/2020    Admit Date: 2/25/2020    Discharge Plan     Row Name 03/02/20 1506       Plan    Plan Comments  DC orders in EPIC.  [facility] notified and they will transport.  Transfer packet updated and dc summary faxed.  Script x1 copied and original plaed in transfer packet.  Message left for [facility] to update.  Patient will dc to skilled bed at [place]. Fina Thompson RN        Discharge Codes    No documentation.       Expected Discharge Date and Time     Expected Discharge Date Expected Discharge Time    Mar 2, 2020             Fina Thompson RN

## 2020-03-02 NOTE — NURSING NOTE
Pt to be discharged to [facility] via EMS. Family at bedside. Report called to receiving staff. Spoke to CHRIS Campos.   
Pt was up in chair today for the afternoon. After dinner, patient was unable to transfer from chair to bed without a 4 person assist.  Pt had trouble following commands and was unable to bear weight on legs for more than 2-3 seconds.  Patient now requires the LYFT for all transfers.      TONY Hairston RN  
QTc 448

## 2020-03-02 NOTE — DISCHARGE SUMMARY
Discharge summary    Date of admission 2/25/2020  Date of discharge 3/2/2000    Final diagnosis  Acute hypoxic respiratory failure  Acute human metapneumovirus infection  Bilateral lower lobe pneumonia   Acute UTI with sepsis  Elevated troponin no chest pain  Hypertension  CVA  Seizure disorder  Chronic anemia  Depression  Degenerative disc disease  Sick sinus syndrome status post PPM  Gastroesophageal reflux disease    Discharge medications    Current Facility-Administered Medications:   •  acetaminophen (TYLENOL) tablet 650 mg, 650 mg, Oral, Q4H PRN, Juan Carlos Harper MD, 650 mg at 02/29/20 2032  •  budesonide-formoterol (SYMBICORT) 160-4.5 MCG/ACT inhaler 2 puff, 2 puff, Inhalation, BID - RT, Juan Carlos Harper MD, 2 puff at 03/02/20 0930  •  calcium carbonate (oyster shell) tablet 1,000 mg, 1,000 mg, Oral, Daily, Juan Carlos Harper MD, 1,000 mg at 03/02/20 0855  •  doxycycline (MONODOX) capsule 100 mg, 100 mg, Oral, Q12H, Juan Carlos Harper MD  •  escitalopram (LEXAPRO) tablet 10 mg, 10 mg, Oral, Nightly, Juan Carlos Harper MD, 10 mg at 03/01/20 2149  •  furosemide (LASIX) tablet 20 mg, 20 mg, Oral, Daily, Juan Carlos Harper MD, 20 mg at 03/02/20 0854  •  gabapentin (NEURONTIN) capsule 300 mg, 300 mg, Oral, Daily, Juan Carlos Harper MD, 300 mg at 03/02/20 0855  •  guaiFENesin (MUCINEX) 12 hr tablet 600 mg, 600 mg, Oral, Q12H, Juan Carlos Harper MD, 600 mg at 03/02/20 0859  •  ipratropium-albuterol (DUO-NEB) nebulizer solution 3 mL, 3 mL, Nebulization, Q4H - RT, Juan Carlos Harper MD, 3 mL at 03/02/20 1154  •  iron polysaccharides (NIFEREX) capsule 150 mg, 150 mg, Oral, Daily, Juan Carlos Harper MD, 150 mg at 03/02/20 0855  •  levETIRAcetam (KEPPRA) tablet 750 mg, 750 mg, Oral, BID, Juan Carlos Harper MD, 750 mg at 03/02/20 0855  •  mirtazapine (REMERON) tablet 15 mg, 15 mg, Oral, Nightly, Juan Carlos Harper MD, 15 mg at 03/01/20 2149  •  nystatin (MYCOSTATIN) powder 1 application, 1 application, Topical, Q12H, Juan Carlos Harper MD, 1 application at 03/02/20 0857  •   pantoprazole (PROTONIX) EC tablet 40 mg, 40 mg, Oral, Daily, Gaetano Harper MD, 40 mg at 03/02/20 0637  •  polyethylene glycol (MIRALAX) powder 17 g, 17 g, Oral, Daily, Gaetano Harper MD, 17 g at 03/02/20 0854  •  potassium chloride (MICRO-K) CR capsule 30 mEq, 30 mEq, Oral, TID With Meals, Gaetano Harper MD, 30 mEq at 03/02/20 0854  •  [COMPLETED] Insert peripheral IV, , , Once **AND** sodium chloride 0.9 % flush 10 mL, 10 mL, Intravenous, PRN, Fidel Parada MD, 10 mL at 03/01/20 2150     Consults obtained  Cardiology  Pulmonary  Infectious disease    Procedures  None    Hospital course  89-year-old white female with very complex past medical history who is well-known to our service admitted to emergency room shortness of breath nonproductive cough and respiratory distress.  Patient work-up revealed acute hypoxic respiratory failure with bilateral pneumonia.  Patient also found to have UTI with sepsis.  Patient admitted for management.  Cultures came back positive for human metapneumovirus which is treated with supportive care and symptomatic management.  Patient also treated with broad-spectrum antibiotics and followed by infectious disease and they recommend once she is more stable continue doxycycline for 5 more days.  Patient blood cultures remains negative and she is making slow improvement can be discharged back to nursing home on oral antibiotics.  Patient kidney function at baseline with a BUN 27 creatinine 1.2 white count normal hemoglobin 10.4.  Patient repeated 2D echo still showed normal ejection fraction.  Patient remained DNR throughout hospital course.  Patient is also evaluated by palliative care but patient and family not ready at this time.    Discharge diet regular    Activity per PT OT    Medication as above    Further care per accepting physician at nursing home    GAETANO HARPER MD

## 2020-03-02 NOTE — PLAN OF CARE
Problem: Patient Care Overview  Goal: Plan of Care Review  Outcome: Ongoing (interventions implemented as appropriate)  Flowsheets (Taken 3/2/2020 5542)  Progress: no change  Plan of Care Reviewed With: patient  Outcome Summary: No new issues overnight. Rested comfortably most of shift. Remains on 2L NC. Skin care provided and Q2 turns. Palliative consult tomorrow. VSS, will ct to monitor.

## 2020-03-02 NOTE — PROGRESS NOTES
"Daily progress note    Chief complaint  Doing same  No new complaints  Family at bedside    History of present illness  89-year-old white female with history of hypertension seizure disorder depression gastroesophageal reflux disease and old CVA who is a nursing home resident brought to the emergency room with shortness of breath followed by respiratory distress evaluated by EMS found to be hypoxic started on oxygen DuoNeb received steroids and work-up in ER revealed acute hypoxic respiratory failure with bilateral lower lobe pneumonia admit for management.  Patient is on BiPAP fully alert oriented family at bedside and DNR per her wishes admit for management for pneumonia and respiratory distress.  Patient and family agree for BiPAP.     REVIEW OF SYSTEMS  Unremarkable    PHYSICAL EXAM  Blood pressure 119/53, pulse 78, temperature 98.1 °F (36.7 °C), temperature source Oral, resp. rate 16, height 165.1 cm (65\"), weight 101 kg (222 lb 3.6 oz), SpO2 97 %, not currently breastfeeding.    Constitutional: She appears distressed.   Head: Normocephalic and atraumatic.   Eyes: Pupils are equal, round, and reactive to light. EOM are normal.   Neck: Normal range of motion. Neck supple.   Cardiovascular: Normal rate, regular rhythm and normal heart sounds.   Pulmonary/Chest: She is in respiratory distress. She has wheezes (bilaterally). She has rales in the left upper field, the left middle field and the left lower field.   Abdominal: Soft. There is no tenderness. There is no rebound and no guarding.   Musculoskeletal: Normal range of motion. She exhibits no edema.   Neurological: She is alert. She has normal sensation and normal strength. She is disoriented (place and time). She displays weakness (generally).   Skin: Skin is warm and dry. No rash noted.   Psychiatric: Mood and affect normal.     LAB RESULTS  Lab Results (last 24 hours)     Procedure Component Value Units Date/Time    Basic Metabolic Panel [[id number]]  " (Abnormal) Collected:  03/02/20 0617    Specimen:  Blood Updated:  03/02/20 0725     Glucose 97 mg/dL      BUN 27 mg/dL      Creatinine 1.20 mg/dL      Sodium 141 mmol/L      Potassium 4.0 mmol/L      Chloride 100 mmol/L      CO2 28.6 mmol/L      Calcium 8.8 mg/dL      eGFR Non African Amer 42 mL/min/1.73      BUN/Creatinine Ratio 22.5     Anion Gap 12.4 mmol/L     Narrative:       GFR Normal >60  Chronic Kidney Disease <60  Kidney Failure <15          Imaging Results (Last 24 Hours)     ** No results found for the last 24 hours. **        EKG                              Rhythm/Rate: sinus tachycardia 114  P waves and [place]: normal  QRS, axis: left axis, no conduction delays   ST and T waves: no acute changes       Current Facility-Administered Medications:   •  acetaminophen (TYLENOL) tablet 650 mg, 650 mg, Oral, Q4H PRN, Juan Carlos Harper MD, 650 mg at 02/29/20 2032  •  budesonide-formoterol (SYMBICORT) 160-4.5 MCG/ACT inhaler 2 puff, 2 puff, Inhalation, BID - RT, Juan Carlos Harper MD, 2 puff at 03/02/20 0930  •  calcium carbonate (oyster shell) tablet 1,000 mg, 1,000 mg, Oral, Daily, Juan Carlos Harper MD, 1,000 mg at 03/02/20 0855  •  escitalopram (LEXAPRO) tablet 10 mg, 10 mg, Oral, Nightly, Juan Carlos Harper MD, 10 mg at 03/01/20 2149  •  furosemide (LASIX) tablet 20 mg, 20 mg, Oral, Daily, Juan Carlos Harper MD, 20 mg at 03/02/20 0854  •  gabapentin (NEURONTIN) capsule 300 mg, 300 mg, Oral, Daily, Juan Carlos Harper MD, 300 mg at 03/02/20 0855  •  guaiFENesin (MUCINEX) 12 hr tablet 600 mg, 600 mg, Oral, Q12H, Juan Carlos Harper MD, 600 mg at 03/02/20 0859  •  ipratropium-albuterol (DUO-NEB) nebulizer solution 3 mL, 3 mL, Nebulization, Q4H - RT, Juan Carlos Harper MD, 3 mL at 03/02/20 1154  •  iron polysaccharides (NIFEREX) capsule 150 mg, 150 mg, Oral, Daily, Juan Carlos Harper MD, 150 mg at 03/02/20 0855  •  levETIRAcetam (KEPPRA) tablet 750 mg, 750 mg, Oral, BID, Juan Carlos Harper MD, 750 mg at 03/02/20 0855  •  levoFLOXacin (LEVAQUIN) 500 mg/100 mL D5W  (premix) (LEVAQUIN) 500 mg, 500 mg, Intravenous, Q24H, Gaetano Harper MD, 500 mg at 03/02/20 0637  •  mirtazapine (REMERON) tablet 15 mg, 15 mg, Oral, Nightly, Gaetano Harper MD, 15 mg at 03/01/20 2149  •  nystatin (MYCOSTATIN) powder 1 application, 1 application, Topical, Q12H, Gaetano Harper MD, 1 application at 03/02/20 0857  •  pantoprazole (PROTONIX) EC tablet 40 mg, 40 mg, Oral, Daily, Gaetano Harper MD, 40 mg at 03/02/20 0637  •  polyethylene glycol (MIRALAX) powder 17 g, 17 g, Oral, Daily, Gaetano Harper MD, 17 g at 03/02/20 0854  •  potassium chloride (MICRO-K) CR capsule 30 mEq, 30 mEq, Oral, TID With Meals, Gaetano Harper MD, 30 mEq at 03/02/20 0854  •  [COMPLETED] Insert peripheral IV, , , Once **AND** sodium chloride 0.9 % flush 10 mL, 10 mL, Intravenous, PRN, Fidel Parada MD, 10 mL at 03/01/20 2150     ASSESSMENT  Acute hypoxic respiratory failure  Acute human metapneumovirus infection  Bilateral lower lobe pneumonia   Acute UTI with sepsis  Elevated troponin no chest pain  Hypertension  CVA  Seizure disorder  Chronic anemia  Depression  Degenerative disc disease  Sick sinus syndrome status post PPM  Gastroesophageal reflux disease    PLAN  Discharge back to nursing home on oral antibiotics  Discharge summary dictated    GAETANO HARPER MD

## 2020-03-02 NOTE — PROGRESS NOTES
"  Infectious Diseases Progress Note    Myra Sheppard MD     [facility]  Los: 5 days  Patient Identification:  Name: Kim Feldman  Age: 89 y.o.  Sex: female  :  3/13/1930  MRN: [id number]         Primary Care Physician: Grady Villeda MD            Subjective: Feeling better breathing better.  Interval History: see consultation notes.     Objective:    Scheduled Meds:    budesonide-formoterol 2 puff Inhalation BID - RT   calcium carbonate (oyster shell) 1,000 mg Oral Daily   escitalopram 10 mg Oral Nightly   furosemide 20 mg Oral Daily   gabapentin 300 mg Oral Daily   guaiFENesin 600 mg Oral Q12H   ipratropium-albuterol 3 mL Nebulization Q4H - RT   iron polysaccharides 150 mg Oral Daily   levETIRAcetam 750 mg Oral BID   levoFLOXacin 500 mg Intravenous Q24H   mirtazapine 15 mg Oral Nightly   nystatin 1 application Topical Q12H   pantoprazole 40 mg Oral Daily   polyethylene glycol 17 g Oral Daily   potassium chloride 30 mEq Oral TID With Meals     Continuous Infusions:       Vital signs in last 24 hours:  Temp:  [97.3 °F (36.3 °C)-99.7 °F (37.6 °C)] 97.7 °F (36.5 °C)  Heart Rate:  [81-94] 81  Resp:  [16-20] 18  BP: (113-135)/(57-81) 114/74    Intake/Output:    Intake/Output Summary (Last 24 hours) at 3/1/2020 2052  Last data filed at 3/1/2020 1700  Gross per 24 hour   Intake 360 ml   Output 200 ml   Net 160 ml       Exam:  /74 (BP Location: Right arm, Patient Position: Lying)   Pulse 81   Temp 97.7 °F (36.5 °C) (Oral)   Resp 18   Ht 165.1 cm (65\")   Wt 101 kg (222 lb 3.6 oz)   SpO2 91%   BMI 36.98 kg/m²     General Appearance:    Awake and comfortable and in no acute distress.                          Head:    Normocephalic, without obvious abnormality, atraumatic                           Eyes:    PERRL, conjunctivae/corneas clear, EOM's intact, both eyes                         Throat:   Lips, tongue, gums normal; oral mucosa pink and moist                           Neck:   " Supple, symmetrical, trachea midline, no JVD                         Lungs:    Scattered rhonci and decreased breath sound at the bases.                  Chest Wall:    No tenderness or deformity                          Heart:    Regular rate and rhythm, S1 and S2 normal                  Abdomen:     Soft, non-tender, bowel sounds active, no masses                  Extremities:   Extremities normal, atraumatic, no cyanosis or edema                        Pulses:   Pulses palpable in all extremities                            Skin:   Skin is warm and dry,  no rashes or palpable lesions                  Neurologic:   Grossly non focal     Data Review:    I reviewed the patient's new clinical results.  Results from last 7 days   Lab Units 03/01/20  0847 02/29/20  0823 02/28/20  0635 02/27/20  0552 02/26/20  0631 02/25/20  0601   WBC 10*3/mm3 7.18 7.08 7.01 8.18 9.02 10.25   HEMOGLOBIN g/dL 10.4* 10.3* 10.6* 10.6* 10.8* 11.0*   PLATELETS 10*3/mm3 144 148 129* 136* 138* 147     Results from last 7 days   Lab Units 03/01/20  0847 02/29/20  0823 02/28/20  0635 02/27/20  0552 02/26/20  0631 02/25/20  0601   SODIUM mmol/L 141 143 142 140 143 144   POTASSIUM mmol/L 3.7 3.7 3.2* 3.0* 3.1* 4.4   CHLORIDE mmol/L 98 99 96* 92* 96* 101   CO2 mmol/L 30.5* 31.5* 33.7* 35.6* 34.0* 26.3   BUN mg/dL 26* 27* 27* 28* 24* 18   CREATININE mg/dL 1.17* 1.18* 1.13* 1.26* 1.07* 1.42*   CALCIUM mg/dL 8.8 8.7 9.1 9.2 9.0 9.0   GLUCOSE mg/dL 109* 106* 104* 95 100* 141*       Microbiology Results (last 10 days)     Procedure Component Value - Date/Time    Respiratory Panel, PCR - Swab, Nasopharynx [[id number]]  (Abnormal) Collected:  02/25/20 1331    Lab Status:  Final result Specimen:  Swab from Nasopharynx Updated:  02/25/20 1445     ADENOVIRUS, PCR Not Detected     Coronavirus 229E Not Detected     Coronavirus HKU1 Not Detected     Coronavirus NL63 Not Detected     Coronavirus OC43 Not Detected     Human Metapneumovirus Detected     Human  Rhinovirus/Enterovirus Not Detected     Influenza B PCR Not Detected     Parainfluenza Virus 1 Not Detected     Parainfluenza Virus 2 Not Detected     Parainfluenza Virus 3 Not Detected     Parainfluenza Virus 4 Not Detected     Bordetella pertussis pcr Not Detected     Influenza A H1 2009 PCR Not Detected     Chlamydophila pneumoniae PCR Not Detected     Mycoplasma pneumo by PCR Not Detected     Influenza A PCR Not Detected     Influenza A H3 Not Detected     Influenza A H1 Not Detected     RSV, PCR Not Detected     Bordetella parapertussis PCR Not Detected    Blood Culture - Blood, Arm, Right [[id number]] Collected:  02/25/20 0639    Lab Status:  Final result Specimen:  Blood from Arm, Right Updated:  03/01/20 0700     Blood Culture No growth at 5 days    Influenza Antigen, Rapid - Swab, Nasopharynx [[id number]]  (Normal) Collected:  02/25/20 0603    Lab Status:  Final result Specimen:  Swab from Nasopharynx Updated:  02/25/20 0624     Influenza A Ag, EIA Negative     Influenza B Ag, EIA Negative    Blood Culture - Blood, Arm, Left [[id number]] Collected:  02/25/20 0601    Lab Status:  Final result Specimen:  Blood from Arm, Left Updated:  03/01/20 0615     Blood Culture No growth at 5 days      Fl Esophagram Complete    Result Date: 2/27/2020    1. No aspiration was observed. 2. Redemonstration of posterior cervical esophageal diverticulum. 3. Evidence of diffuse esophageal spasm.  This report was finalized on 2/27/2020 4:49 PM by Dr. Jose Marquez M.D.      Xr Chest 1 View    Result Date: 2/25/2020  Under aeration with basilar opacities as discussed   This report was finalized on 2/25/2020 11:40 PM by Luis Felipe Davila M.D.      Xr Chest Pa & Lateral    Result Date: 2/21/2020  No active disease in the chest.  Left subclavian approach pacer device and thoracic spine stimulator leads as discussed in detail above.  Tortuous thoracic aorta.  Chronic appearing marked compression deformity at T12.  This report was  finalized on 2/21/2020 5:22 PM by Dr. Demarcus Zepeda M.D.          Assessment:    Pneumonia of both lower lobes due to infectious organism (CMS/HCC)  1-healthcare associated pneumonia with superimposed human metapneumovirus infection/bronchitis  2-hypoxemic respiratory failure improving with supportive care  3-possible acute exacerbation of underlying diastolic congestive heart failure history of seizure disorder  5-history of dysphagia  6-history of CVA  7-history of atrial fibrillation on chronic anticoagulation therapy  8-history of penicillin allergy.        Recommendations/Discussions:  · Continue broad-spectrum antibiotic therapy directed towards healthcare associated pneumonia in the setting of superimposed human metapneumovirus bronchitis.    · Continue antibiotics for 5 to 7 days.  · If no specific pathogen is found and she continues to do well her antibiotic regimen can be simplified to either doxycycline or Levaquin with preference towards doxycycline    Myra Sheppard MD  3/1/2020  8:52 PM    Much of this encounter note is an electronic transcription/translation of spoken language to printed text. The electronic translation of spoken language may permit erroneous, or at times, nonsensical words or phrases to be inadvertently transcribed; Although I have reviewed the note for such errors, some may still exist

## 2020-03-02 NOTE — PROGRESS NOTES
"  Infectious Diseases Progress Note    Myra Sheppard MD     [facility]  Los: 6 days  Patient Identification:  Name: Kim Feldman  Age: 89 y.o.  Sex: female  :  3/13/1930  MRN: [id number]         Primary Care Physician: Grady Villeda MD            Subjective:no complaints and feeling better. Per nursing had an uneventful night.  Interval History: see consultation notes.     Objective:    Scheduled Meds:    budesonide-formoterol 2 puff Inhalation BID - RT   calcium carbonate (oyster shell) 1,000 mg Oral Daily   escitalopram 10 mg Oral Nightly   furosemide 20 mg Oral Daily   gabapentin 300 mg Oral Daily   guaiFENesin 600 mg Oral Q12H   ipratropium-albuterol 3 mL Nebulization Q4H - RT   iron polysaccharides 150 mg Oral Daily   levETIRAcetam 750 mg Oral BID   levoFLOXacin 500 mg Intravenous Q24H   mirtazapine 15 mg Oral Nightly   nystatin 1 application Topical Q12H   pantoprazole 40 mg Oral Daily   polyethylene glycol 17 g Oral Daily   potassium chloride 30 mEq Oral TID With Meals     Continuous Infusions:       Vital signs in last 24 hours:  Temp:  [97.7 °F (36.5 °C)-99.7 °F (37.6 °C)] 97.8 °F (36.6 °C)  Heart Rate:  [72-87] 75  Resp:  [16-18] 16  BP: (102-133)/(49-81) 119/53    Intake/Output:    Intake/Output Summary (Last 24 hours) at 3/2/2020 0958  Last data filed at 3/1/2020 2150  Gross per 24 hour   Intake 480 ml   Output --   Net 480 ml       Exam:  /53   Pulse 75   Temp 97.8 °F (36.6 °C) (Oral)   Resp 16   Ht 165.1 cm (65\")   Wt 101 kg (222 lb 3.6 oz)   SpO2 97%   BMI 36.98 kg/m²     General Appearance:    Awake and comfortable and in no acute distress.                          Head:    Normocephalic, without obvious abnormality, atraumatic                           Eyes:    PERRL, conjunctivae/corneas clear, EOM's intact, both eyes                         Throat:   Lips, tongue, gums normal; oral mucosa pink and moist                           Neck:   Supple, symmetrical, " trachea midline, no JVD                         Lungs:    Scattered rhonci and decreased breath sound at the bases.                  Chest Wall:    No tenderness or deformity                          Heart:    Regular rate and rhythm, S1 and S2 normal                  Abdomen:     Soft, non-tender, bowel sounds active, no masses                  Extremities:   Extremities normal, atraumatic, no cyanosis or edema                        Pulses:   Pulses palpable in all extremities                            Skin:   Skin is warm and dry,  no rashes or palpable lesions                  Neurologic:   Grossly non focal     Data Review:    I reviewed the patient's new clinical results.  Results from last 7 days   Lab Units 03/01/20  0847 02/29/20  0823 02/28/20  0635 02/27/20  0552 02/26/20  0631 02/25/20  0601   WBC 10*3/mm3 7.18 7.08 7.01 8.18 9.02 10.25   HEMOGLOBIN g/dL 10.4* 10.3* 10.6* 10.6* 10.8* 11.0*   PLATELETS 10*3/mm3 144 148 129* 136* 138* 147     Results from last 7 days   Lab Units 03/02/20  0617 03/01/20  0847 02/29/20  0823 02/28/20  0635 02/27/20  0552 02/26/20  0631 02/25/20  0601   SODIUM mmol/L 141 141 143 142 140 143 144   POTASSIUM mmol/L 4.0 3.7 3.7 3.2* 3.0* 3.1* 4.4   CHLORIDE mmol/L 100 98 99 96* 92* 96* 101   CO2 mmol/L 28.6 30.5* 31.5* 33.7* 35.6* 34.0* 26.3   BUN mg/dL 27* 26* 27* 27* 28* 24* 18   CREATININE mg/dL 1.20* 1.17* 1.18* 1.13* 1.26* 1.07* 1.42*   CALCIUM mg/dL 8.8 8.8 8.7 9.1 9.2 9.0 9.0   GLUCOSE mg/dL 97 109* 106* 104* 95 100* 141*       Microbiology Results (last 10 days)     Procedure Component Value - Date/Time    Respiratory Panel, PCR - Swab, Nasopharynx [[id number]]  (Abnormal) Collected:  02/25/20 1331    Lab Status:  Final result Specimen:  Swab from Nasopharynx Updated:  02/25/20 1445     ADENOVIRUS, PCR Not Detected     Coronavirus 229E Not Detected     Coronavirus HKU1 Not Detected     Coronavirus NL63 Not Detected     Coronavirus OC43 Not Detected     Human  Metapneumovirus Detected     Human Rhinovirus/Enterovirus Not Detected     Influenza B PCR Not Detected     Parainfluenza Virus 1 Not Detected     Parainfluenza Virus 2 Not Detected     Parainfluenza Virus 3 Not Detected     Parainfluenza Virus 4 Not Detected     Bordetella pertussis pcr Not Detected     Influenza A H1 2009 PCR Not Detected     Chlamydophila pneumoniae PCR Not Detected     Mycoplasma pneumo by PCR Not Detected     Influenza A PCR Not Detected     Influenza A H3 Not Detected     Influenza A H1 Not Detected     RSV, PCR Not Detected     Bordetella parapertussis PCR Not Detected    Blood Culture - Blood, Arm, Right [[id number]] Collected:  02/25/20 0639    Lab Status:  Final result Specimen:  Blood from Arm, Right Updated:  03/01/20 0700     Blood Culture No growth at 5 days    Influenza Antigen, Rapid - Swab, Nasopharynx [[id number]]  (Normal) Collected:  02/25/20 0603    Lab Status:  Final result Specimen:  Swab from Nasopharynx Updated:  02/25/20 0624     Influenza A Ag, EIA Negative     Influenza B Ag, EIA Negative    Blood Culture - Blood, Arm, Left [[id number]] Collected:  02/25/20 0601    Lab Status:  Final result Specimen:  Blood from Arm, Left Updated:  03/01/20 0615     Blood Culture No growth at 5 days      Fl Esophagram Complete    Result Date: 2/27/2020    1. No aspiration was observed. 2. Redemonstration of posterior cervical esophageal diverticulum. 3. Evidence of diffuse esophageal spasm.  This report was finalized on 2/27/2020 4:49 PM by Dr. Jose Marquez M.D.      Xr Chest 1 View    Result Date: 2/25/2020  Under aeration with basilar opacities as discussed   This report was finalized on 2/25/2020 11:40 PM by Luis Felipe Davila M.D.      Xr Chest Pa & Lateral    Result Date: 2/21/2020  No active disease in the chest.  Left subclavian approach pacer device and thoracic spine stimulator leads as discussed in detail above.  Tortuous thoracic aorta.  Chronic appearing marked compression  deformity at T12.  This report was finalized on 2/21/2020 5:22 PM by Dr. Demarcus Zepeda M.D.          Assessment:    Pneumonia of both lower lobes due to infectious organism (CMS/HCC)  1-healthcare associated pneumonia with superimposed human metapneumovirus infection/bronchitis  2-hypoxemic respiratory failure improving with supportive care  3-possible acute exacerbation of underlying diastolic congestive heart failure history of seizure disorder  5-history of dysphagia  6-history of CVA  7-history of atrial fibrillation on chronic anticoagulation therapy  8-history of penicillin allergy.        Recommendations/Discussions:  · Continue broad-spectrum antibiotic therapy directed towards healthcare associated pneumonia in the setting of superimposed human metapneumovirus bronchitis.    · Continue antibiotics for 5 to 7 days.  · If no specific pathogen is found and she continues to do well her antibiotic regimen can be simplified to either doxycycline or Levaquin with preference towards doxycycline    Myra Sheppard MD  3/2/2020  9:58 AM    Much of this encounter note is an electronic transcription/translation of spoken language to printed text. The electronic translation of spoken language may permit erroneous, or at times, nonsensical words or phrases to be inadvertently transcribed; Although I have reviewed the note for such errors, some may still exist

## 2020-03-02 NOTE — PROGRESS NOTES
"Physicians Statement of Medical Necessity for  Ambulance Transportation    GENERAL INFORMATION     Name: Kim Feldman  YOB: 1930  Medicare #: [id number]  Transport Date: 3/3/2020(Valid for round trips this date, or for scheduled repetitive trips for 60 days from the date signed below.)  Origin: [place] [address]  Destination: [address] [address]  Is the Patient's stay covered under Medicare Part A (PPS/DRG?)Yes   Closest appropriate facility? Yes  If this a hosp-hosp transfer? No  Is this a hospice patient? No    MEDICAL NECESSITY QUESTIONAIRE    Ambulance Transportation is medically necessary only if other means of transportation are contraindicated or would be potentially harmful to the patient.  To meet this requirement, the patient must be either \"bed confined\" or suffer from a condition such that transport by means other than an ambulance is contraindicated by the patient's condition.  The following questions must be answered by the healthcare professional signing below for this form to be valid:     1) Describe the MEDICAL CONDITION (physical and/or mental) of this patient AT THE TIME OF AMBULANCE TRANSPORT that requires the patient to be transported in an ambulance, and why transport by other means is contraindicated by the patient's condition:max assist x2 for bed mobility, new o2  Past Medical History:   Diagnosis Date   • Anemia    • At risk for falls    • At risk for sleep apnea 11/17/2018   • Atrial fibrillation (CMS/HCC)    • Bulging lumbar disc    • Cellulitis     BILATERAL LEGS   • Chronic back pain    • Chronic cough    • Chronic UTI    • Chronic venous insufficiency    • 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HCC)    • DDD (degenerative disc disease), lumbosacral    • Dementia without behavioral disturbance (CMS/HCC)     moderate   • Depression, endogenous (CMS/HCC)    • Disc degeneration, lumbar    • Dysphagia    • GERD " (gastroesophageal reflux disease)    • Hiatal hernia    • History of anxiety    • History of aspiration pneumonitis    • History of cerebral artery occlusion     CVA (following TIA), 10/10, treated with tPA   • History of CVA in adulthood 4/5/2019   • History of herpes zoster    • History of ingrowing nail    • History of osteoporosis    • History of poliomyelitis     child   • History of sciatica    • History of sick sinus syndrome     s/p PPM   • History of transient cerebral ischemia     followed by stroke in 10/2010. BIBI was normal.   • History of Zenker's diverticulum removal 2016   • HX: long term anticoagulant use    • Hyperlipidemia    • Hypertension     NO CURRENT MEDICATION   • Hyponatremia    • Intertrigo    • Lumbar radiculopathy    • Morbid obesity (CMS/HCC)    • MRSA (methicillin resistant Staphylococcus aureus)     LEFT FOOT SPREAD TO RIGHT ANKLE; DR TUBBS AWARE   • Neuralgia     with diplopia secondary to facial shingles   • Nonepileptic episode (CMS/HCC)    • Osteoarthritis of right knee    • Pacemaker    • Pneumonia    • Seeing double     secondary to shingles on the face also with neuralgia   • Seizures (CMS/HCC)    • 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)    • Spinal stenosis    • Vitamin D deficiency    • Zenker's diverticulum       Past Surgical History:   Procedure Laterality Date   • ANKLE OPEN REDUCTION INTERNAL FIXATION Right 11/17/2018    Procedure: ANKLE OPEN REDUCTION INTERNAL FIXATION;  Surgeon: Cristian Tubbs II, MD;  Location: [facility];  Service: Orthopedics   • CARDIAC PACEMAKER PLACEMENT      secondary to SSS, placed in 2004, with gen chng 2014; [employer] dual DOO   • CATARACT EXTRACTION W/ INTRAOCULAR LENS IMPLANT Bilateral    • COLONOSCOPY     • HAND SURGERY Right    • HARDWARE REMOVAL Right 2/19/2019    Procedure: RT ANKLE HARDWARE REMOVAL;  Surgeon: Cristian Tubbs II, MD;  Location: [facility];  Service: Orthopedics   • KNEE  "ARTHROPLASTY Left    • LAMINECTOMY FOR IMPLANTATION / PLACEMENT NEUROSTIMULATOR ELECTRODES     • LUMBAR LAMINECTOMY      L-3 L4 L4 L5   • TONSILLECTOMY     • ZENKERS DIVERTICULECTOMY N/A 9/27/2016    Procedure: ENDOSCOPIC REMOVAL OF ZENKERS DIVERTICULUM W/ WERDASCOPE;  Surgeon: Oswald Barth MD;  Location: [facility];  Service:    • ZENKERS DIVERTICULECTOMY N/A 4/14/2017    Procedure: ESOPHAGOSCOPY;  Surgeon: Oswald Barth MD;  Location: [facility];  Service:       2) Is this patient \"bed confined\" as defined below?Yes    To be \"bed confined\" the patient must satisfy all three of the following criteria:  (1) unable to get up from bed without assistance; AND (2) unable to ambulate;  AND (3) unable to sit in a chair or wheelchair.  3) Can this patient safely be transported by car or wheelchair van (I.e., may safely sit during transport, without an attendant or monitoring?)No   4. In addition to completing questions 1-3 above, please check any of the following conditions that apply*:          *Note: supporting documentation for any boxes checked must be maintained in the patient's medical records Requires oxygen - unable to self administer      SIGNATURE OF PHYSICIAN OR OTHER AUTHORIZED HEALTHCARE PROFESSIONAL    I certify that the above information is true and correct based on my evaluation of this patient, and represent that the patient requires transport by ambulance and that other forms of transport are contraindicated.  I understand that this information will be used by the Centers for Medicare and Medicaid Services (CMS) to support the determiniation of medical necessity for ambulance services, and I represent that I have personal knowledge of the patient's condition at the time of transport.       If this box is checked, I also certify that the patient is physically or mentally incapable of signing the ambulance service's claim form and that the institution with which I am affiliated has furnished " care, services or assistance to the patient.  My signature below is made on behalf of the patient pursuant to 42 .36(b)(4). In accordance with 42 .37, the specific reason(s) that the patient is physically or mentally incapable of signing the claim for is as follows:    Signature of Physician or Healthcare Professional  Date/Time:   3/2/2020 @ 1330     (For Scheduled repetitive transport, this form is not valid for transports performed more than 60 days after this date).                                                                                                                                            --------------------------------------------------------------------------------------------  Printed Name and Credentials of Physician or Authorized Healthcare Professional     *Form must be signed by patient's attending physician for scheduled, repetitive transports,.  For non-repetitive ambulance transports, if unable to obtain the signature of the attending physician, any of the following may sign (please select below):     Physician  Clinical Nurse Specialist  Registered Nurse     Physician Assistant  Discharge Planner  Licensed Practical Nurse     Nurse Practitioner

## 2020-03-02 NOTE — PROGRESS NOTES
Efe Wheatley MD                          [phone]      Patient ID:    Name:  Kim Feldman    MRN:  [id number]    3/13/1930   89 y.o.  female            Patient Care Team:  Grady Villeda MD as PCP - General (Family Medicine)  Bandar Ruiz MD as Consulting Physician (Cardiology)    CC/ Reason for visit: Pneumonia, respiratory failure    Subjective: Pt seen and examined this AM. No acute overnight events noted. Doing better.  Patient states that she is doing a little better. Some worsening cough but no shortness of breath.  Noted plan for discharge today.  Awaiting evaluation with palliative care    ROS: Denies any subjective fevers, syncope or presyncopal events, new neurological deficits, nausea or vomiting currently    Objective     Vital Signs past 24hrs    BP range: BP: (102-119)/(49-74) 119/53  Pulse range: Heart Rate:  [72-86] 78  Resp rate range: Resp:  [16-18] 16  Temp range: Temp (24hrs), Av.8 °F (36.6 °C), Min:97.7 °F (36.5 °C), Max:98.1 °F (36.7 °C)      Ventilator/Non-Invasive Ventilation Settings (From admission, onward)     Start     Ordered    20 0537  NIPPV (CPAP or BIPAP)  Until Discontinued     Question Answer Comment   Type: AutoBIPAP    NIPPV Mask Interface: Per Patient Preference        20 0536                Device (Oxygen Therapy): humidified;nasal cannula       101 kg (222 lb 3.6 oz); Body mass index is 36.98 kg/m².      Intake/Output Summary (Last 24 hours) at 3/2/2020 1353  Last data filed at 3/1/2020 2150  Gross per 24 hour   Intake 360 ml   Output --   Net 360 ml       PHYSICAL EXAM   Constitutional: Middle aged pt in bed, No acute respiratory distress, + accessory muscle use  Head: - NCAT  Eyes: No pallor.  Anicteric sclerae, EOMI.  ENMT:  Mallampati 4, no oral thrush. Dry MM.   NECK: Trachea midline, No thyromegaly, no palpable cervical lymphadenopathy  Heart: RRR, no murmur. No pedal edema   Lungs: VICTOR HUGO +, bilateral  rhonchi, no wheezes/ crackles heard    Abdomen: Soft.  Obese no tenderness, guarding or rigidity. No palpable masses  Extremities: Extremities warm and well perfused. No cyanosis/ clubbing  Neuro: Conscious, confused, answers appropriately, no gross focal neuro deficits  Psych: Mood and affect appropriate    Scheduled meds:      budesonide-formoterol 2 puff Inhalation BID - RT   calcium carbonate (oyster shell) 1,000 mg Oral Daily   doxycycline 100 mg Oral Q12H   escitalopram 10 mg Oral Nightly   furosemide 20 mg Oral Daily   gabapentin 300 mg Oral Daily   guaiFENesin 600 mg Oral Q12H   ipratropium-albuterol 3 mL Nebulization Q4H - RT   iron polysaccharides 150 mg Oral Daily   levETIRAcetam 750 mg Oral BID   mirtazapine 15 mg Oral Nightly   nystatin 1 application Topical Q12H   pantoprazole 40 mg Oral Daily   polyethylene glycol 17 g Oral Daily   potassium chloride 30 mEq Oral TID With Meals       IV meds:                           Data Review:      Results from last 7 days   Lab Units 03/02/20  0617 03/01/20  0847 02/29/20  0823 02/28/20  0635 02/27/20  0552 02/26/20  0631 02/25/20  0601   SODIUM mmol/L 141 141 143 142 140 143 144   POTASSIUM mmol/L 4.0 3.7 3.7 3.2* 3.0* 3.1* 4.4   CHLORIDE mmol/L 100 98 99 96* 92* 96* 101   CO2 mmol/L 28.6 30.5* 31.5* 33.7* 35.6* 34.0* 26.3   BUN mg/dL 27* 26* 27* 27* 28* 24* 18   CREATININE mg/dL 1.20* 1.17* 1.18* 1.13* 1.26* 1.07* 1.42*   CALCIUM mg/dL 8.8 8.8 8.7 9.1 9.2 9.0 9.0   BILIRUBIN mg/dL  --   --   --   --   --  0.5 0.7   ALK PHOS U/L  --   --   --   --   --  56 74   ALT (SGPT) U/L  --   --   --   --   --  11 14   AST (SGOT) U/L  --   --   --   --   --  20 27   GLUCOSE mg/dL 97 109* 106* 104* 95 100* 141*   WBC 10*3/mm3  --  7.18 7.08 7.01 8.18 9.02 10.25   HEMOGLOBIN g/dL  --  10.4* 10.3* 10.6* 10.6* 10.8* 11.0*   PLATELETS 10*3/mm3  --  144 148 129* 136* 138* 147   PROBNP pg/mL  --   --   --   --  922.9 3,306.0* 1,035.0   PROCALCITONIN ng/mL  --   --   --   --   --   0.77* 0.16       Lab Results   Component Value Date    CALCIUM 8.8 03/02/2020    PHOS 2.9 01/28/2016       Results from last 7 days   Lab Units 02/25/20  0639 02/25/20  0601   BLOODCX  No growth at 5 days No growth at 5 days       Results from last 7 days   Lab Units 02/25/20  0555   PH, ARTERIAL pH units 7.384   PO2 ART mm Hg 127.1*   PCO2, ARTERIAL mm Hg 51.8*   HCO3 ART mmol/L 30.9*        Results Review:    I have reviewed the relevant laboratory results and independently reviewed the chest imaging from this hospitalization including the available echocardiogram reports personally and summarized it if/ when appropriate below    Assessment    Acute hypoxemic respiratory failure  Chronic hypercapnic respiratory failure  Aspiration vs healthcare associated pneumonia  HMPV URTI/pneumonia  Posterior esophageal diverticulum  Diffuse esophageal spasm  Acute kidney injury  Suspected undiagnosed sleep apnea/OHS  Morbid obesity    PLAN:  Patient making slow recovery with regards to her pneumonia.unfortunately high risk for recurrent pneumonias due to aspiration, reflux in the setting of esophageal spasm and diverticulum.   Symptomatic treatment for viral URI.  Antibiotics per ID  C/w mucus clearance techniques.    Noted plan for discharge.  Discussed with the daughter at bedside and encouraged her to talk to palliative care and consider hospice.  Notified patient about my concerns as well.    I have discussed my findings and recommendations with patient, family and nursing staff.    Efe Wheatley MD  3/2/2020

## 2020-03-03 NOTE — PROGRESS NOTES
Case Management Discharge Note      Final Note: DC to skilled bed at [place]. Fina Thompson RN    Provided Post Acute Provider List?: Refused  Refused Provider List Comment: if needs SNF, wants [facility] since she lives in personal care     Destination - Selection Complete      Service Provider Request Status Selected Services Address Phone Number Fax Number    [facility] Selected Skilled Nursing [address] [id number] [phone]      Durable Medical Equipment      No service has been selected for the patient.      Dialysis/Infusion      No service has been selected for the patient.      Home Medical Care      No service has been selected for the patient.      Therapy      No service has been selected for the patient.      Community Resources      No service has been selected for the patient.        Transportation Services  Ambulance: [facility]    Final Discharge Disposition Code: 03 - skilled nursing facility (SNF)

## 2020-04-16 NOTE — TELEPHONE ENCOUNTER
Pt.s daughter called to clarify upcoming appts.  She has a telephone appt. With you on 4/23/20 and the Pacemaker remote on 5/11/20.  Remote can not be billed if done sooner than 5/11/20.   Daughter wanted to make sure you didn't need results the day of  Your appt.  Thanks. Tarn

## 2020-04-17 NOTE — TELEPHONE ENCOUNTER
I spoke with nurse, Maddison @ [facility], [phone] and confirmed the 2 appts.  Am

## 2020-04-23 NOTE — PROGRESS NOTES
Date of Office Visit: 2020  Encounter Provider: Bandar Ruiz MD  Place of Service: [facility] CARDIOLOGY  Patient Name: Kim Feldman  :3/13/1930    Chief Complaint   Patient presents with   • Irregular Heart Beat   :     HPI: Kim Feldman is a 89 y.o. female who presents today to follow-up via phone.     She has a history of sick sinus syndrome and has a permanent pacemaker. She has a history of hypertension but has had her medications slowly decreased due to improvement in her BP.  She is no longer on any antihypertensive medications.     She has been admitted several times with pneumonia and UTIs.  She has been diagnosed with aspiration which was due to a Zenker's diverticulum, which was repaired in .  In 2017 she was admitted and there was concern for a stroke, but this was not the case.  In 2018 she was admitted after a mechanical fall.  She required several surgeries for this.  Then she fell and broke her nose. She required two units of RBCs.  We most recently saw her in 2020 when she again had pneumonia; we were consulted but I did not feel she had any acute cardiac issues.      She has chronic lower extremity edema, which is unchanged.  She has not had orthopnea, PND, palpitations, or chest pain.  I spoke with her over the phone with her nurse Caren in the room; she lives in a skilled facility.     Past Medical History:   Diagnosis Date   • Anemia    • At risk for falls    • At risk for sleep apnea 2018   • Atrial fibrillation (CMS/HCC)    • Bulging lumbar disc    • Cellulitis     BILATERAL LEGS   • Chronic back pain    • Chronic cough    • Chronic UTI    • Chronic venous insufficiency    • 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HCC)    • DDD (degenerative disc disease), lumbosacral    • Dementia without behavioral disturbance (CMS/HCC)     moderate   • Depression, endogenous (CMS/HCC)    • Disc degeneration, lumbar    • Dysphagia     • GERD (gastroesophageal reflux disease)    • Hiatal hernia    • History of anxiety    • History of aspiration pneumonitis    • History of cerebral artery occlusion     CVA (following TIA), 10/10, treated with tPA   • History of CVA in adulthood 4/5/2019   • History of herpes zoster    • History of ingrowing nail    • History of osteoporosis    • History of poliomyelitis     child   • History of sciatica    • History of sick sinus syndrome     s/p PPM   • History of transient cerebral ischemia     followed by stroke in 10/2010. BIBI was normal.   • History of Zenker's diverticulum removal 2016   • HX: long term anticoagulant use    • Hyperlipidemia    • Hypertension     NO CURRENT MEDICATION   • Hyponatremia    • Intertrigo    • Lumbar radiculopathy    • Morbid obesity (CMS/HCC)    • MRSA (methicillin resistant Staphylococcus aureus)     LEFT FOOT SPREAD TO RIGHT ANKLE; DR TUBBS AWARE   • Neuralgia     with diplopia secondary to facial shingles   • Nonepileptic episode (CMS/HCC)    • Osteoarthritis of right knee    • Pacemaker    • Pneumonia    • Seeing double     secondary to shingles on the face also with neuralgia   • Seizures (CMS/HCC)    • 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)    • Spinal stenosis    • Vitamin D deficiency    • Zenker's diverticulum        Past Surgical History:   Procedure Laterality Date   • ANKLE OPEN REDUCTION INTERNAL FIXATION Right 11/17/2018    Procedure: ANKLE OPEN REDUCTION INTERNAL FIXATION;  Surgeon: Cristian Tubbs II, MD;  Location: [facility];  Service: Orthopedics   • CARDIAC PACEMAKER PLACEMENT      secondary to SSS, placed in 2004, with gen chng 2014; [employer] dual DOO   • CATARACT EXTRACTION W/ INTRAOCULAR LENS IMPLANT Bilateral    • COLONOSCOPY     • HAND SURGERY Right    • HARDWARE REMOVAL Right 2/19/2019    Procedure: RT ANKLE HARDWARE REMOVAL;  Surgeon: Cristian Tubbs II, MD;  Location: [facility];  Service: Orthopedics   •  KNEE ARTHROPLASTY Left    • LAMINECTOMY FOR IMPLANTATION / PLACEMENT NEUROSTIMULATOR ELECTRODES     • LUMBAR LAMINECTOMY      L-3 L4 L4 L5   • TONSILLECTOMY     • ZENKERS DIVERTICULECTOMY N/A 2016    Procedure: ENDOSCOPIC REMOVAL OF ZENKERS DIVERTICULUM W/ WERDASCOPE;  Surgeon: Oswald Barth MD;  Location: [facility];  Service:    • ZENKERS DIVERTICULECTOMY N/A 2017    Procedure: ESOPHAGOSCOPY;  Surgeon: Oswald Barth MD;  Location: [facility] OR;  Service:        Social History     Socioeconomic History   • Marital status:      Spouse name: Not on file   • Number of children: 3   • Years of education: Not on file   • Highest education level: Not on file   Occupational History   • Occupation: Retired   Tobacco Use   • Smoking status: Former Smoker     Packs/day: 1.00     Years: 40.00     Pack years: 40.00     Types: Cigarettes     Last attempt to quit:      Years since quittin.3   • Smokeless tobacco: Never Used   • Tobacco comment: caffeine use: 2 cup of coffee in the morning and 1 in the afternoon.    Substance and Sexual Activity   • Alcohol use: Yes     Comment: 1 PER WEEK   • Drug use: No   • Sexual activity: Defer       Family History   Problem Relation Age of Onset   • Cancer Daughter    • Malig Hyperthermia Neg Hx        Review of Systems   Constitution: Positive for malaise/fatigue.   Cardiovascular: Positive for leg swelling.   All other systems reviewed and are negative.      Allergies   Allergen Reactions   • Lipitor [Atorvastatin] Confusion   • Penicillins Hives     Has previously tolerated ceftriaxone         Current Outpatient Medications:   •  acetaminophen (TYLENOL) 325 MG tablet, Take 650 mg by mouth 3 (Three) Times a Day., Disp: , Rfl:   •  artificial tears (LUBRIFRESH P.M.) ointment ophthalmic ointment, Administer 1 application to both eyes every night at bedtime., Disp: , Rfl:   •  B Complex Vitamins (VITAMIN-B COMPLEX) tablet, Take 1 tablet by mouth Daily.,  "Disp: , Rfl:   •  budesonide-formoterol (SYMBICORT) 160-4.5 MCG/ACT inhaler, Inhale 2 puffs 2 (Two) Times a Day., Disp: , Rfl:   •  calcium carbonate (TUMS) 500 MG chewable tablet, Chew 2 tablets 3 (Three) Times a Day As Needed for Indigestion or Heartburn., Disp: , Rfl:   •  Calcium-Vitamin D-Vitamin K (VIACTIV) 500-500-40 MG-UNT-MCG chewable tablet, Chew 1 tablet Daily., Disp: , Rfl:   •  cetirizine (zyrTEC) 10 MG tablet, Take 10 mg by mouth Daily., Disp: , Rfl:   •  citalopram (CeleXA) 20 MG tablet, Take 20 mg by mouth Daily., Disp: , Rfl:   •  ferrous sulfate 325 (65 FE) MG tablet, Take 325 mg by mouth Daily., Disp: , Rfl:   •  furosemide (LASIX) 40 MG tablet, Take 40 mg by mouth 2 (Two) Times a Day., Disp: , Rfl:   •  gabapentin (NEURONTIN) 600 MG tablet, Take 600 mg by mouth Daily., Disp: , Rfl:   •  guaiFENesin (MUCINEX) 600 MG 12 hr tablet, Take 1,200 mg by mouth 2 (Two) Times a Day., Disp: , Rfl:   •  levETIRAcetam (KEPPRA) 750 MG tablet, Take 1 tablet by mouth 2 (Two) Times a Day., Disp: 60 tablet, Rfl: 11  •  mirtazapine (REMERON) 15 MG tablet, Take 15 mg by mouth Every Night., Disp: , Rfl:   •  pantoprazole (PROTONIX) 40 MG EC tablet, Take 40 mg by mouth Daily., Disp: , Rfl:   •  polyethylene glycol (MIRALAX) packet, Take 17 g by mouth Daily As Needed., Disp: , Rfl:   •  potassium chloride (K-DUR) 10 MEQ CR tablet, Take 10 mEq by mouth 3 (Three) Times a Day., Disp: , Rfl:   •  Propylene Glycol (SYSTANE BALANCE) 0.6 % solution, Administer 1 drop to both eyes 2 (Two) Times a Day., Disp: , Rfl:      Objective:     Vitals:    04/23/20 1448   BP: 123/81   Pulse: 79   Temp: 98.8 °F (37.1 °C)   SpO2: 90%   Weight: 99.8 kg (220 lb)   Height: 165.1 cm (65\")     Body mass index is 36.61 kg/m².    Physical Exam    Procedures      Assessment:       Diagnosis Plan   1. History of sick sinus syndrome     2. Cardiac pacemaker in situ     3. Essential hypertension     4. Chronic venous insufficiency            Plan:     "   1/2.  She has SSS s/p PPM.      3/4.  She has a history of HTN but her BP has steadily declined over the years and she is not medicated (except for furosemide 40mg BID, which is for her chronic edema).    4  This is stable.     This patient has consented to a telehealth visit via phone. The visit was scheduled as a phone visit to comply with patient safety concerns in accordance with CDC recommendations.  All vitals recorded within this visit are reported by the patient.  I spent  12 minutes in total including but not limited to the 6 minutes spent in direct conversation with this patient.       Sincerely,       Bandar Ruiz MD

## 2020-07-05 PROBLEM — N39.0 ACUTE UTI: Status: ACTIVE | Noted: 2020-01-01

## 2020-07-06 NOTE — PLAN OF CARE
Pt positive for UTI. Rocephine IV ordered. CBC and BMP ordered for this morning. No fever observed.

## 2020-07-06 NOTE — PLAN OF CARE
Pt alert and oriented to self and place. Pt anxious on care. Bruises to upper extremities bilat, right hip, and lower extremities. Rash with blisters under bilateral breasts. Folds of abdoment, groin area and between buttocks. Anti fungal powder and cream placed after cleansing area.

## 2020-07-06 NOTE — DISCHARGE PLACEMENT REQUEST
"Kim Feldman (89 y.o. Female)     Date of Birth Social Security Number Address Home Phone MRN    03/13/1930  [address] [address] [phone] [id number]    Jew Marital Status          Muslim        Admission Date Admission Type Admitting Provider Attending Provider Department, Room/Bed    7/5/20 Emergency Juan Carlos Harper MD Ahmed, Aftab, MD [address], [address]    Discharge Date Discharge Disposition Discharge Destination                       Attending Provider:  Juan Carlos Harper MD    Allergies:  Lipitor [Atorvastatin], Penicillins    Isolation:  None   Infection:  MRSA/History Only (06/30/20)   Code Status:  Prior    Ht:  157.5 cm (62\")   Wt:  101 kg (223 lb 12.3 oz)    Admission Cmt:  None   Principal Problem:  None                Active Insurance as of 7/5/2020     Primary Coverage     Payor Plan Insurance Group Employer/Plan Group    MEDICARE MEDICARE A & B      Payor Plan Address Payor Plan Phone Number Payor Plan Fax Number Effective Dates    [address] [phone]  3/1/1995 - None Entered    [facility] [id number]       Subscriber Name Subscriber Birth Date Member ID       KIM FELDMAN 3/13/1930 [id number]           Secondary Coverage     Payor Plan Insurance Group Employer/Plan Group    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

## 2020-07-06 NOTE — H&P
History and physical    Primary care physician  Dr. Villeda    Chief complaint  Generalized weakness  Status post fall    History of present illness  89-year-old white female who is well-known to our service with history of CVA seizure disorder hypertension chronic anemia depression degenerative disc disease sick sinus syndrome status post permanent pacemaker and gastroesophageal disease who is a nursing home resident brought to the emergency room after the fall with increasing weakness.  Patient fell last week hit her head.  Patient work-up has been negative but lately she is having hallucination secondary to pain medications and also found to have UTI in the ER admit for management.  Patient awake and alert answer all question but most of the history obtained from the family.  Patient is DNR per her wishes.    PAST MEDICAL HISTORY  • At risk for sleep apnea 11/17/2018   • Atrial fibrillation (CMS/HCC)     • Cellulitis     • Chronic back pain     • Chronic cough     • Chronic UTI     • DDD (degenerative disc disease), lumbosacral     • Dementia without behavioral disturbance (CMS/HCC)     • Disc degeneration, lumbar     • Dysphagia     • GERD (gastroesophageal reflux disease)     • History of cerebral artery occlusion     • History of herpes zoster     • History of ingrowing nail     • History of osteoporosis     • History of poliomyelitis     • History of sciatica     • History of transient cerebral ischemia     • History of Zenker's diverticulum removal 2016   • HX: long term anticoagulant use     • Hypertension     • Lumbar radiculopathy     • Morbid obesity (CMS/HCC)     • MRSA (methicillin resistant Staphylococcus aureus)     • Neuralgia     • Nonepileptic episode (CMS/HCC)     • Osteoarthritis of right knee     • Pacemaker     • Seeing double     • Seizures (CMS/HCC)     • 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)     • Spinal stenosis        PAST SURGICAL HISTORY  Surgical History            Past Surgical History:   Procedure Laterality Date   • ANKLE OPEN REDUCTION INTERNAL FIXATION Right 2018     Procedure: ANKLE OPEN REDUCTION INTERNAL FIXATION;  Surgeon: Cristian Hill II, MD;  Location: [facility] OR;  Service: Orthopedics   • CARDIAC PACEMAKER PLACEMENT         secondary to SSS, placed in , with gen chng ; [employer] dual DOO   • CATARACT EXTRACTION W/ INTRAOCULAR LENS IMPLANT Bilateral     • COLONOSCOPY       • HAND SURGERY Right     • HARDWARE REMOVAL Right 2019     Procedure: RT ANKLE HARDWARE REMOVAL;  Surgeon: Cristian Hill II, MD;  Location: [facility] OR;  Service: Orthopedics   • KNEE ARTHROPLASTY Left     • LAMINECTOMY FOR IMPLANTATION / PLACEMENT NEUROSTIMULATOR ELECTRODES       • LUMBAR LAMINECTOMY         L-3 L4 L4 L5   • TONSILLECTOMY       • ZENKERS DIVERTICULECTOMY N/A 2016     Procedure: ENDOSCOPIC REMOVAL OF ZENKERS DIVERTICULUM W/ WERDASCOPE;  Surgeon: Oswald Barth MD;  Location: [facility] OR;  Service:    • ZENKERS DIVERTICULECTOMY N/A 2017     Procedure: ESOPHAGOSCOPY;  Surgeon: Oswald Barth MD;  Location: [facility] OR;  Service:          FAMILY HISTORY        Family History   Problem Relation Age of Onset   • Cancer Daughter     • Malig Hyperthermia Neg Hx        SOCIAL HISTORY  Social History   Social History            Socioeconomic History   • Marital status:        Spouse name: Not on file   • Number of children: 3   • Years of education: Not on file   • Highest education level: Not on file   Occupational History   • Occupation: Retired   Tobacco Use   • Smoking status: Former Smoker       Packs/day: 1.00       Years: 40.00       Pack years: 40.00       Types: Cigarettes       Last attempt to quit:        Years since quittin.5   • Smokeless tobacco: Never Used   • Tobacco comment: caffeine use: 2 cup of coffee in the morning and 1 in the afternoon.    Substance and Sexual Activity   • Alcohol  "use: Yes       Comment: 1 PER WEEK   • Drug use: No   • Sexual activity: Defer         ALLERGIES  Lipitor [atorvastatin] and Penicillins  Nursing home medications reviewed     REVIEW OF SYSTEMS  Constitutional: Positive for fatigue. Negative for fever.   HENT: Negative for sore throat.    Eyes: Negative.    Respiratory: Negative for cough and chest tightness.    Cardiovascular: Negative for chest pain.   Gastrointestinal: Negative for abdominal distention, abdominal pain, diarrhea and vomiting.   Genitourinary: Negative for dysuria.   Musculoskeletal: Negative for neck pain.   Skin: Negative for rash.   Allergic/Immunologic: Negative.    Neurological: Positive for weakness. Negative for numbness and headaches.   Hematological: Negative.    Psychiatric/Behavioral: Positive for confusion and hallucinations.     PHYSICAL EXAM  Blood pressure 126/63, pulse 77, temperature 97.5 °F (36.4 °C), temperature source Oral, resp. rate 18, height 157.5 cm (62\"), weight 101 kg (223 lb 12.3 oz), SpO2 97 %, not currently breastfeeding.    Constitutional: She appears distressed (mild).   Head: Normocephalic and atraumatic.   Eyes: EOM are normal.   Neck: Neck supple. Spinous process tenderness and muscular tenderness (left) present. Decreased range of motion present.   Cardiovascular: Normal rate, regular rhythm, normal heart sounds and intact distal pulses.   Pulmonary/Chest: She has decreased breath sounds in the right lower field and the left lower field. She has no wheezes.   Abdominal: Soft. Bowel sounds are normal. She exhibits no distension. There is no tenderness. There is no rebound and no guarding.   Musculoskeletal: She exhibits edema (2+ bilaterally).   Neurological: She is alert. She is not agitated. She displays weakness (GBW). No cranial nerve deficit.   Patient has general body weakness.     LAB RESULTS  Lab Results (last 24 hours)     Procedure Component Value Units Date/Time    Urine Culture - Urine, Urine, Catheter " [[id number]]  (Normal) Collected:  07/05/20 2309    Specimen:  Urine, Catheter Updated:  07/06/20 0949     Urine Culture Culture in progress    CBC & Differential [[id number]] Collected:  07/06/20 0702    Specimen:  Blood Updated:  07/06/20 0802    Narrative:       The following orders were created for panel order CBC & Differential.  Procedure                               Abnormality         Status                     ---------                               -----------         ------                     CBC Auto Differential[[id number]]        Abnormal            Final result                 Please view results for these tests on the individual orders.    CBC Auto Differential [[id number]]  (Abnormal) Collected:  07/06/20 0702    Specimen:  Blood Updated:  07/06/20 0802     WBC 6.79 10*3/mm3      RBC 3.08 10*6/mm3      Hemoglobin 10.4 g/dL      Hematocrit 29.8 %      MCV 96.8 fL      MCH 33.8 pg      MCHC 34.9 g/dL      RDW 12.3 %      RDW-SD 43.4 fl      MPV 10.2 fL      Platelets 141 10*3/mm3      Neutrophil % 59.2 %      Lymphocyte % 24.9 %      Monocyte % 9.4 %      Eosinophil % 5.6 %      Basophil % 0.6 %      Immature Grans % 0.3 %      Neutrophils, Absolute 4.02 10*3/mm3      Lymphocytes, Absolute 1.69 10*3/mm3      Monocytes, Absolute 0.64 10*3/mm3      Eosinophils, Absolute 0.38 10*3/mm3      Basophils, Absolute 0.04 10*3/mm3      Immature Grans, Absolute 0.02 10*3/mm3      nRBC 0.0 /100 WBC     Basic Metabolic Panel [[id number]]  (Abnormal) Collected:  07/06/20 0702    Specimen:  Blood Updated:  07/06/20 0752     Glucose 88 mg/dL      BUN 23 mg/dL      Creatinine 1.34 mg/dL      Sodium 138 mmol/L      Potassium 4.2 mmol/L      Comment: Specimen hemolyzed.  Results may be affected.        Chloride 99 mmol/L      CO2 22.4 mmol/L      Calcium 7.5 mg/dL      eGFR Non African Amer 37 mL/min/1.73      BUN/Creatinine Ratio 17.2     Anion Gap 16.6 mmol/L     Narrative:       GFR Normal >60  Chronic Kidney Disease  <60  Kidney Failure <15      Blood Culture - Blood, Arm, Right [[id number]] Collected:  07/05/20 2149    Specimen:  Blood from Arm, Right Updated:  07/06/20 0716    Troponin [[id number]]  (Abnormal) Collected:  07/05/20 2314    Specimen:  Blood Updated:  07/05/20 2348     Troponin T 0.037 ng/mL     Narrative:       Troponin T Reference Range:  <= 0.03 ng/mL-   Negative for AMI  >0.03 ng/mL-     Abnormal for myocardial necrosis.  Clinicians would have to utilize clinical acumen, EKG, Troponin and serial changes to determine if it is an Acute Myocardial Infarction or myocardial injury due to an underlying chronic condition.       Results may be falsely decreased if patient taking Biotin.      Urinalysis, Microscopic Only - Urine, Catheter [[id number]]  (Abnormal) Collected:  07/05/20 2309    Specimen:  Urine, Catheter Updated:  07/05/20 2341     RBC, UA 0-2 /HPF      WBC, UA 31-50 /HPF      Bacteria, UA Trace /HPF      Squamous Epithelial Cells, UA 0-2 /HPF      Hyaline Casts, UA None Seen /LPF      Methodology Manual Light Microscopy    Urinalysis With Culture If Indicated - Urine, Catheter [[id number]]  (Abnormal) Collected:  07/05/20 2309    Specimen:  Urine, Catheter Updated:  07/05/20 2328     Color, UA Yellow     Appearance, UA Cloudy     pH, UA 6.0     Specific Gravity, UA 1.011     Glucose, UA Negative     Ketones, UA Trace     Bilirubin, UA Negative     Blood, UA Small (1+)     Protein, UA Negative     Leuk Esterase, UA Large (3+)     Nitrite, UA Negative     Urobilinogen, UA 0.2 E.U./dL    COVID-19,[facility], NP SWAB IN TRANSPORT MEDIA 8-12 HR TAT - Swab, Nasopharynx [[id number]]  (Normal) Collected:  07/05/20 2051    Specimen:  Swab from Nasopharynx Updated:  07/05/20 2245     COVID19 Not Detected    Narrative:       Fact sheet for providers: https://www.fda.gov/media/151320/download     Fact sheet for patients: https://www.fda.gov/media/452152/download    Troponin [[id number]]  (Abnormal) Collected:   "07/05/20 2049    Specimen:  Blood Updated:  07/05/20 2142     Troponin T 0.040 ng/mL     Narrative:       Troponin T Reference Range:  <= 0.03 ng/mL-   Negative for AMI  >0.03 ng/mL-     Abnormal for myocardial necrosis.  Clinicians would have to utilize clinical acumen, EKG, Troponin and serial changes to determine if it is an Acute Myocardial Infarction or myocardial injury due to an underlying chronic condition.       Results may be falsely decreased if patient taking Biotin.      Procalcitonin [[id number]]  (Abnormal) Collected:  07/05/20 2049    Specimen:  Blood Updated:  07/05/20 2140     Procalcitonin 0.09 ng/mL     Narrative:       As a Marker for Sepsis (Non-Neonates):   1. <0.5 ng/mL represents a low risk of severe sepsis and/or septic shock.  1. >2 ng/mL represents a high risk of severe sepsis and/or septic shock.    As a Marker for Lower Respiratory Tract Infections that require antibiotic therapy:  PCT on Admission     Antibiotic Therapy             6-12 Hrs later  > 0.5                Strongly Recommended            >0.25 - <0.5         Recommended  0.1 - 0.25           Discouraged                   Remeasure/reassess PCT  <0.1                 Strongly Discouraged          Remeasure/reassess PCT      As 28 day mortality risk marker: \"Change in Procalcitonin Result\" (> 80 % or <=80 %) if Day 0 (or Day 1) and Day 4 values are available. Refer to http://www.[employer]-pct-calculator.com/   Change in PCT <=80 %   A decrease of PCT levels below or equal to 80 % defines a positive change in PCT test result representing a higher risk for 28-day all-cause mortality of patients diagnosed with severe sepsis or septic shock.  Change in PCT > 80 %   A decrease of PCT levels of more than 80 % defines a negative change in PCT result representing a lower risk for 28-day all-cause mortality of patients diagnosed with severe sepsis or septic shock.                Results may be falsely decreased if patient taking Biotin.  "    Comprehensive Metabolic Panel [[id number]]  (Abnormal) Collected:  07/05/20 2049    Specimen:  Blood Updated:  07/05/20 2135     Glucose 104 mg/dL      BUN 26 mg/dL      Creatinine 1.66 mg/dL      Sodium 142 mmol/L      Potassium 3.6 mmol/L      Chloride 101 mmol/L      CO2 27.1 mmol/L      Calcium 9.1 mg/dL      Total Protein 6.7 g/dL      Albumin 4.00 g/dL      ALT (SGPT) 14 U/L      AST (SGOT) 22 U/L      Alkaline Phosphatase 73 U/L      Total Bilirubin 0.6 mg/dL      eGFR Non African Amer 29 mL/min/1.73      Globulin 2.7 gm/dL      A/G Ratio 1.5 g/dL      BUN/Creatinine Ratio 15.7     Anion Gap 13.9 mmol/L     Narrative:       GFR Normal >60  Chronic Kidney Disease <60  Kidney Failure <15      Lactate Dehydrogenase [[id number]]  (Abnormal) Collected:  07/05/20 2049    Specimen:  Blood Updated:  07/05/20 2135      U/L     Lactic Acid, Plasma [[id number]]  (Normal) Collected:  07/05/20 2049    Specimen:  Blood Updated:  07/05/20 2116     Lactate 0.9 mmol/L     Protime-INR [[id number]]  (Normal) Collected:  07/05/20 2049    Specimen:  Blood Updated:  07/05/20 2116     Protime 13.9 Seconds      INR 1.09    CBC & Differential [[id number]] Collected:  07/05/20 2049    Specimen:  Blood Updated:  07/05/20 2106    Narrative:       The following orders were created for panel order CBC & Differential.  Procedure                               Abnormality         Status                     ---------                               -----------         ------                     CBC Auto Differential[[id number]]        Abnormal            Final result                 Please view results for these tests on the individual orders.    CBC Auto Differential [[id number]]  (Abnormal) Collected:  07/05/20 2049    Specimen:  Blood Updated:  07/05/20 2106     WBC 7.84 10*3/mm3      RBC 3.21 10*6/mm3      Hemoglobin 10.7 g/dL      Hematocrit 31.6 %      MCV 98.4 fL      MCH 33.3 pg      MCHC 33.9 g/dL      RDW 12.0 %      RDW-SD 43.5 fl       MPV 9.5 fL      Platelets 167 10*3/mm3      Neutrophil % 59.2 %      Lymphocyte % 26.0 %      Monocyte % 9.1 %      Eosinophil % 4.6 %      Basophil % 0.8 %      Immature Grans % 0.3 %      Neutrophils, Absolute 4.65 10*3/mm3      Lymphocytes, Absolute 2.04 10*3/mm3      Monocytes, Absolute 0.71 10*3/mm3      Eosinophils, Absolute 0.36 10*3/mm3      Basophils, Absolute 0.06 10*3/mm3      Immature Grans, Absolute 0.02 10*3/mm3      nRBC 0.0 /100 WBC     Blood Culture - Blood, Arm, Left [[id number]] Collected:  07/05/20 2049    Specimen:  Blood from Arm, Left Updated:  07/05/20 2100        Imaging Results (Last 24 Hours)     Procedure Component Value Units Date/Time    XR Pelvis 1 or 2 View [[id number]] Collected:  07/05/20 2200     Updated:  07/05/20 2205    Narrative:       CALLUS X-RAYS     CLINICAL HISTORY: Patient fell.     2 AP views of the pelvis were obtained. Both hip joints appear mildly  narrowed but are intact. There is no evidence of fracture or subluxation  involving the hip joints or pelvic bones.     This report was finalized on 7/5/2020 10:02 PM by Dr. Eriberto Goode M.D.       XR Chest AP [[id number]] Collected:  07/05/20 2157     Updated:  07/05/20 2203    Narrative:       PORTABLE CHEST 07/05/2020 AT 9:07 PM     CLINICAL HISTORY: COVID 19 evaluation     Compared to the previous chest x-ray dated 02/25/2020.     The lungs are fairly well-expanded and appear free of infiltrates. There  are no pleural effusions. The heart is normal in size. A dual-chamber  pacemaker is in place in satisfactory position in the left subclavian  vein. Spinal cord stimulator electrodes are noted.     IMPRESSIONS: No evidence of acute disease within the chest.     This report was finalized on 7/5/2020 10:00 PM by Dr. Eriberto Goode M.D.       CT Cervical Spine Without Contrast [[id number]] Collected:  07/05/20 2149     Updated:  07/05/20 2156    Narrative:       CT CERVICAL SPINE WO CONTRAST-     CLINICAL HISTORY:  Patient fell. Neck pain.     TECHNIQUE: Multiple axial 1 mm thick images were obtained through the  cervical spine. Coronal and sagittal reconstructions were produced.     Radiation dose reduction techniques were utilized, including automated  exposure control and exposure modulation based on body size.     COMPARISON: CT scan of the cervical spine dated 06/30/2020.     FINDINGS: There is straightening of the normal cervical lordosis. There  is also slight anterolisthesis of C3 with respect to C4 due to facet  joint arthropathy. This is unchanged. Moderately extensive degenerative  disc changes are present at the C4-C5 and C5-C6 and C6-C7 levels. These  result in no significant encroachment on the spinal canal. No acute  fracture or subluxation is identified. The overall appearance is  unchanged.       Impression:       Degenerative changes as noted. There is no evidence of acute  cervical spine fracture.     This report was finalized on 7/5/2020 9:53 PM by Dr. Eriberto Goode M.D.       CT Head Without Contrast [[id number]] Collected:  07/05/20 2146     Updated:  07/05/20 2151    Narrative:       CT HEAD WO CONTRAST-     CLINICAL HISTORY: Head trauma. Headache.     TECHNIQUE: Transverse 3 mm thick images were acquired from the base of  the skull to the vertex without IV contrast.     Radiation dose reduction techniques were utilized, including automated  exposure control and exposure modulation based on body size.     COMPARISON: CT head dated 06/30/2020.     FINDINGS: The brain and ventricular system appear structurally within  normal limits for patient of this advanced age. There is no evidence of  recent or old intracranial hemorrhage or infarction. There is no mass  effect. Bone window images demonstrate no evidence of skull fracture.  Visualized paranasal sinuses are well aerated.       Impression:       Negative CT scan of the head without contrast.     This report was finalized on 7/5/2020 9:48 PM by Dr. Wei  TERE Goode           ECG 12 Lead        HEART RATE= 79  bpm  RR Interval= 752  ms  OR Interval= 82  ms  P Horizontal Axis=   deg  P Front Axis= 0  deg  QRSD Interval= 100  ms  QT Interval= 438  ms  QRS Axis= -29  deg  T Wave Axis= 12  deg  - ABNORMAL ECG -  Sinus rhythm  Ventricular premature complex  Prolonged OR interval   Borderline left axis deviation  Probable anterior infarct, age indeterminate  Prolonged QT interval  When compared with ECG of 26-Feb-2020 6:09:47,  PVCs and prolonged QT are new             Current Facility-Administered Medications:   •  budesonide-formoterol (SYMBICORT) 160-4.5 MCG/ACT inhaler 2 puff, 2 puff, Inhalation, BID - RT, Juan Carlos Harper MD  •  calcium 500 mg vitamin D 5 mcg (200 UT) per tablet 1 tablet, 1 tablet, Oral, Daily, Juan Carlos Harper MD  •  cefTRIAXone (ROCEPHIN) IVPB 1 g, 1 g, Intravenous, Q24H, Juan Carlos Harper MD  •  citalopram (CeleXA) tablet 20 mg, 20 mg, Oral, Daily, Juan Carlos Harper MD  •  ferrous sulfate tablet 325 mg, 325 mg, Oral, Daily, Juan Carlos Harper MD  •  gabapentin (NEURONTIN) capsule 600 mg, 600 mg, Oral, Nightly, Juan Carlos Harper MD  •  levETIRAcetam (KEPPRA) tablet 750 mg, 750 mg, Oral, BID, Juan Carlos Harper MD  •  mirtazapine (REMERON) tablet 15 mg, 15 mg, Oral, Nightly, Juan Carlos Harper MD  •  pantoprazole (PROTONIX) EC tablet 40 mg, 40 mg, Oral, Daily, Juan Carlos Harper MD  •  polyethylene glycol (MIRALAX) packet 17 g, 17 g, Oral, Daily, Juan Carlos Harper MD  •  [COMPLETED] Insert peripheral IV, , , Once **AND** sodium chloride 0.9 % flush 10 mL, 10 mL, Intravenous, PRN, Ravindra Evans MD  •  sodium chloride 0.9 % with KCl 20 mEq/L infusion, 75 mL/hr, Intravenous, Continuous, Juan Carlos Harper MD, Last Rate: 75 mL/hr at 07/06/20 0609, 75 mL/hr at 07/06/20 0609     ASSESSMENT  Acute recurrent UTI  Dehydration  Status post fall  Hallucination  Metabolic encephalopathy  Hypertension  Elevated troponin with no chest pain  Chronic anemia  Seizure disorder  Depression  Degenerative disc  disease  SSS status post permanent pacemaker  Gastroesophageal reflux disease    PLAN  Admit  IV fluids  IV antibiotics  Discontinue pain medications  Adjust nursing home medications  Stress ulcer DVT prophylaxis  Supportive care  DNR  Discussed with family and nursing staff  Follow closely further recommendation current hospital course    GAETANO ACOSTA MD

## 2020-07-06 NOTE — SIGNIFICANT NOTE
07/06/20 1414   Rehab Time/Intention   Evaluation Not Performed other (see comments)  (Pt admitted from LTC IC level bed.  Plans to return to same.  No neuro/musculoskeletal changes indicating need for acute skilled PT.  Nursing to ensure baseline mobility maintained.)   Rehab Treatment   Discipline physical therapist

## 2020-07-06 NOTE — PLAN OF CARE
Problem: Patient Care Overview  Goal: Plan of Care Review  Outcome: Ongoing (interventions implemented as appropriate)  Flowsheets (Taken 7/6/2020 9386)  Progress: improving  Plan of Care Reviewed With: patient  Outcome Summary: Patient seen for clinical swallow assessment. Pt denies dysphagia. Pt with hx of zenker's (2016) and esophageal (2017) diverticulum repair. Pt is coughing with thins via straw this date, suspected mistiming. Throat clearing noted with mixed. Mild oral residue with solids. Laryngeal elevation appeared age appropriate. Pt able to feed herself this date. SLP recs thins, no straws, and mech soft, no mixed. Meds whole with puree.

## 2020-07-06 NOTE — ED NOTES
Attempt to call report, nurse is unable to take at this time. Will call back     Margy Falcon, RN  07/06/20 0053

## 2020-07-06 NOTE — ED NOTES
"\"  Nursing report ED to floor  Kim Feldman  89 y.o.  female    HPI (triage note):   Chief Complaint   Patient presents with   • Weakness - Generalized   • Fall       Admitting doctor:   Juan Carlos Harper MD    Admitting diagnosis:   The primary encounter diagnosis was Acute UTI. Diagnoses of Acute metabolic encephalopathy and ANDREW (acute kidney injury) (CMS/[facility]) were also pertinent to this visit.    Code status:   Current Code Status     Date Active Code Status Order ID Comments User Context       Prior          Allergies:   Lipitor [atorvastatin] and Penicillins    Weight:       07/05/20 2024   Weight: 95.3 kg (210 lb)       Most recent vitals:   Vitals:    07/05/20 2200 07/05/20 2230 07/05/20 2330 07/06/20 0000   BP: 102/56 106/61 110/80 100/78   Pulse: 79 79 79 76   Resp: 20 20 20 20   Temp:       TempSrc:       SpO2: 95% 96% 97% 99%   Weight:       Height:           Active LDAs/IV Access:   Lines, Drains & Airways    Active LDAs     Name:   Placement date:   Placement time:   Site:   Days:    Peripheral IV 07/05/20 2052 Left Antecubital   07/05/20 2052    Antecubital   less than 1                Labs (abnormal labs have a star):   Labs Reviewed   COMPREHENSIVE METABOLIC PANEL - Abnormal; Notable for the following components:       Result Value    Glucose 104 (*)     BUN 26 (*)     Creatinine 1.66 (*)     eGFR Non  Amer 29 (*)     All other components within normal limits    Narrative:     GFR Normal >60  Chronic Kidney Disease <60  Kidney Failure <15     LACTATE DEHYDROGENASE - Abnormal; Notable for the following components:     (*)     All other components within normal limits   PROCALCITONIN - Abnormal; Notable for the following components:    Procalcitonin 0.09 (*)     All other components within normal limits    Narrative:     As a Marker for Sepsis (Non-Neonates):   1. <0.5 ng/mL represents a low risk of severe sepsis and/or septic shock.  1. >2 ng/mL represents a high risk of severe " "sepsis and/or septic shock.    As a Marker for Lower Respiratory Tract Infections that require antibiotic therapy:  PCT on Admission     Antibiotic Therapy             6-12 Hrs later  > 0.5                Strongly Recommended            >0.25 - <0.5         Recommended  0.1 - 0.25           Discouraged                   Remeasure/reassess PCT  <0.1                 Strongly Discouraged          Remeasure/reassess PCT      As 28 day mortality risk marker: \"Change in Procalcitonin Result\" (> 80 % or <=80 %) if Day 0 (or Day 1) and Day 4 values are available. Refer to [email or web address]/   Change in PCT <=80 %   A decrease of PCT levels below or equal to 80 % defines a positive change in PCT test result representing a higher risk for 28-day all-cause mortality of patients diagnosed with severe sepsis or septic shock.  Change in PCT > 80 %   A decrease of PCT levels of more than 80 % defines a negative change in PCT result representing a lower risk for 28-day all-cause mortality of patients diagnosed with severe sepsis or septic shock.                Results may be falsely decreased if patient taking Biotin.    TROPONIN (IN-HOUSE) - Abnormal; Notable for the following components:    Troponin T 0.040 (*)     All other components within normal limits    Narrative:     Troponin T Reference Range:  <= 0.03 ng/mL-   Negative for AMI  >0.03 ng/mL-     Abnormal for myocardial necrosis.  Clinicians would have to utilize clinical acumen, EKG, Troponin and serial changes to determine if it is an Acute Myocardial Infarction or myocardial injury due to an underlying chronic condition.       Results may be falsely decreased if patient taking Biotin.     CBC WITH AUTO DIFFERENTIAL - Abnormal; Notable for the following components:    RBC 3.21 (*)     Hemoglobin 10.7 (*)     Hematocrit 31.6 (*)     MCV 98.4 (*)     MCH 33.3 (*)     RDW 12.0 (*)     All other components within normal limits   URINALYSIS W/ CULTURE IF " INDICATED - Abnormal; Notable for the following components:    Appearance, UA Cloudy (*)     Ketones, UA Trace (*)     Blood, UA Small (1+) (*)     Leuk Esterase, UA Large (3+) (*)     All other components within normal limits   TROPONIN (IN-HOUSE) - Abnormal; Notable for the following components:    Troponin T 0.037 (*)     All other components within normal limits    Narrative:     Troponin T Reference Range:  <= 0.03 ng/mL-   Negative for AMI  >0.03 ng/mL-     Abnormal for myocardial necrosis.  Clinicians would have to utilize clinical acumen, EKG, Troponin and serial changes to determine if it is an Acute Myocardial Infarction or myocardial injury due to an underlying chronic condition.       Results may be falsely decreased if patient taking Biotin.     URINALYSIS, MICROSCOPIC ONLY - Abnormal; Notable for the following components:    WBC, UA 31-50 (*)     Bacteria, UA Trace (*)     All other components within normal limits   COVID-19,[facility] IN-HOUSE, NP SWAB IN TRANSPORT MEDIA 8-12 HR TAT - Normal    Narrative:     Fact sheet for providers: https://www.fda.gov/media/498197/download     Fact sheet for patients: https://www.fda.gov/media/455240/download   LACTIC ACID, PLASMA - Normal   PROTIME-INR - Normal   BLOOD CULTURE   BLOOD CULTURE   URINE CULTURE   CBC AND DIFFERENTIAL    Narrative:     The following orders were created for panel order CBC & Differential.  Procedure                               Abnormality         Status                     ---------                               -----------         ------                     CBC Auto Differential[[id number]]        Abnormal            Final result                 Please view results for these tests on the individual orders.       EKG:   ECG 12 Lead   Preliminary Result   HEART RATE= 79  bpm   RR Interval= 752  ms   AZ Interval= 82  ms   P Horizontal Axis=   deg   P Front Axis= 0  deg   QRSD Interval= 100  ms   QT Interval= 438  ms   QRS Axis= -29  deg   T  Wave Axis= 12  deg   - ABNORMAL ECG -   Sinus rhythm   Ventricular premature complex   Short TX interval   Borderline left axis deviation   Probable anterior infarct, age indeterminate   Prolonged QT interval   Electronically Signed By:    Date and Time of Study: 2020 21:37:54          Meds given in ED:   Medications   sodium chloride 0.9 % flush 10 mL (has no administration in time range)   cefTRIAXone (ROCEPHIN) IVPB 1 g (1 g Intravenous New Bag 20 0018)   lactated ringers bolus 500 mL (0 mL Intravenous Stopped 20)       Imaging results:  Ct Head Without Contrast    Result Date: 2020  Negative CT scan of the head without contrast.  This report was finalized on 2020 9:48 PM by Dr. Eriberto Goode M.D.      Ct Cervical Spine Without Contrast    Result Date: 2020  Degenerative changes as noted. There is no evidence of acute cervical spine fracture.  This report was finalized on 2020 9:53 PM by Dr. Eriberto Goode M.D.        Ambulatory status:   bedrest    Social issues:   Social History     Socioeconomic History   • Marital status:      Spouse name: Not on file   • Number of children: 3   • Years of education: Not on file   • Highest education level: Not on file   Occupational History   • Occupation: Retired   Tobacco Use   • Smoking status: Former Smoker     Packs/day: 1.00     Years: 40.00     Pack years: 40.00     Types: Cigarettes     Last attempt to quit:      Years since quittin.5   • Smokeless tobacco: Never Used   • Tobacco comment: caffeine use: 2 cup of coffee in the morning and 1 in the afternoon.    Substance and Sexual Activity   • Alcohol use: Yes     Comment: 1 PER WEEK   • Drug use: No   • Sexual activity: Margy Rubio, RN  20 0040

## 2020-07-06 NOTE — PROGRESS NOTES
Discharge Planning Assessment  [facility]     Patient Name: Kim Feldman  MRN: [id number]  Today's Date: 7/6/2020    Admit Date: 7/5/2020    Discharge Needs Assessment     Row Name 07/06/20 1152       Living Environment    Lives With  facility resident    Current Living Arrangements  extended care facility    [facility]    Family Caregiver Names  Leandra Feldman [phone]    Quality of Family Relationships  supportive;involved;helpful    Able to Return to Prior Arrangements  yes       Transition Planning    Patient/Family Anticipates Transition to  long term care facility       Discharge Needs Assessment    Equipment Currently Used at Home  wheelchair        Discharge Plan     Row Name 07/06/20 1154       Plan    Plan  [facility]    Provided Post Acute Provider List?  Refused    Refused Provider List Comment  wants to return to [facility]    Provided Post Acute Provider Quality & Resource List?  Refused    Refused Quality and Resource List Comment  declined wanted to return to [facility]    Patient/Family in Agreement with Plan  yes    Plan Comments  Pt's IMM signed 7/6/2020.  Spoke with pt's daughter Leandra Feldman via phone [phone] to confirm pt's DCP.  Pt daughter did confirm plan will be to return back to [facility] upon D/C.  Call placed to Irene who did confirm pt is from [facility] with a private pay bedhold. Referral to [facility] placed in [facility].  Pt's daughter stated that she would prefer to transport pt by car if pt can go by car.  Pt's transfer packet started and placed in CCP office on 5S.           Destination      Service Provider Request Status Selected Services Address Phone Number Fax Number    [facility] Pending - Request Sent N/A [address] [address] [phone]      Durable Medical Equipment      Coordination has not been started for this encounter.      Dialysis/Infusion       Coordination has not been started for this encounter.      Home Medical Care      Coordination has not been started for this encounter.      Therapy      Coordination has not been started for this encounter.      Community Resources      Coordination has not been started for this encounter.          Demographic Summary     Row Name 07/06/20 1151       General Information    Admission Type  inpatient    Arrived From  home    Referral Source  admission list    Reason for Consult  discharge planning    Preferred Language  English        Functional Status     Row Name 07/06/20 1152       Functional Status    Usual Activity Tolerance  fair    Current Activity Tolerance  fair       Functional Status, IADL    Medications  assistive person    Meal Preparation  assistive person    Housekeeping  assistive person    Laundry  assistive person    Shopping  assistive person        Psychosocial    No documentation.       Abuse/Neglect    No documentation.       Legal    No documentation.       Substance Abuse    No documentation.       Patient Forms    No documentation.           QUYNH Euceda

## 2020-07-06 NOTE — ED TRIAGE NOTES
Pt arrival to ED via [facility] from [facility] c/o generalized weakness and unwitnessed fall at noon - pt found sitting on floor by staff. Pt's only c/o at this time is her chronic lower back pain. Pt A&Ox3, disoriented to time only. Pt recently dx c UTI on July 1st.     Patient was placed in face mask during first look triage.  Patient was wearing a face mask throughout encounter.  EDT, RN's, and I wore personal protective equipment throughout the encounter.  Hand hygiene was performed before and after patient encounter.

## 2020-07-06 NOTE — THERAPY EVALUATION
Acute Care - Speech Language Pathology   Swallow Initial Evaluation [facility]     Patient Name: Kim Feldman  : 3/13/1930  MRN: [id number]  Today's Date: 2020               Admit Date: 2020    Visit Dx:     ICD-10-CM ICD-9-CM   1. Acute UTI N39.0 599.0   2. Acute metabolic encephalopathy G93.41 348.31   3. ANDREW (acute kidney injury) (CMS/[facility]) N17.9 584.9     Patient Active Problem List   Diagnosis   • Anemia   • Bulging lumbar disc   • Depression, endogenous (CMS/HCC)   • Gastroesophageal reflux disease   • Hyperlipidemia   • Intermittent claudication (CMS/HCC)   • Neuropathy   • Osteoarthritis of knee   • Syndrome of inappropriate secretion of antidiuretic hormone (CMS/[facility])   • Vitamin D deficiency   • History of sick sinus syndrome   • Intertrigo   • Generalized convulsive seizures (CMS/[facility])   • Change in bowel habits   • Zenker diverticulum   • History of anxiety   • 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[facility])   • Thrombocytopenia (CMS/[facility])   • B12 deficiency   • Cardiac pacemaker in situ   • Chronic venous insufficiency   • Arthritis   • S/P knee replacement   • Chronic bilateral low back pain without sciatica   • Essential hypertension   • Hiatal hernia   • Zenker's diverticulum   • Chronic idiopathic constipation   • Pressure sore on buttocks   • Somnolence   • Closed trimalleolar fracture of right ankle   • Surgical wound infection   • History of failed arthroplasty of ankle   • History of CVA in adulthood   • Pneumonia of both lower lobes due to infectious organism   • Acute UTI     Past Medical History:   Diagnosis Date   • Anemia    • At risk for falls    • At risk for sleep apnea 2018   • Atrial fibrillation (CMS/[facility])    • Bulging lumbar disc    • Cellulitis     BILATERAL LEGS   • Chronic back pain    • Chronic cough    • Chronic UTI    • Chronic venous insufficiency    • 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[facility])    • DDD (degenerative disc disease), lumbosacral    • Dementia without behavioral  disturbance (CMS/HCC)     moderate   • Depression, endogenous (CMS/HCC)    • Disc degeneration, lumbar    • Dysphagia    • GERD (gastroesophageal reflux disease)    • Hiatal hernia    • History of anxiety    • History of aspiration pneumonitis    • History of cerebral artery occlusion     CVA (following TIA), 10/10, treated with tPA   • History of CVA in adulthood 4/5/2019   • History of herpes zoster    • History of ingrowing nail    • History of osteoporosis    • History of poliomyelitis     child   • History of sciatica    • History of sick sinus syndrome     s/p PPM   • History of transient cerebral ischemia     followed by stroke in 10/2010. BIBI was normal.   • History of Zenker's diverticulum removal 2016   • HX: long term anticoagulant use    • Hyperlipidemia    • Hypertension     NO CURRENT MEDICATION   • Hyponatremia    • Intertrigo    • Lumbar radiculopathy    • Morbid obesity (CMS/HCC)    • MRSA (methicillin resistant Staphylococcus aureus)     LEFT FOOT SPREAD TO RIGHT ANKLE; DR TUBBS AWARE   • Neuralgia     with diplopia secondary to facial shingles   • Nonepileptic episode (CMS/HCC)    • Osteoarthritis of right knee    • Pacemaker    • Pneumonia    • Seeing double     secondary to shingles on the face also with neuralgia   • Seizures (CMS/HCC)    • 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)    • Spinal stenosis    • Vitamin D deficiency    • Zenker's diverticulum      Past Surgical History:   Procedure Laterality Date   • ANKLE OPEN REDUCTION INTERNAL FIXATION Right 11/17/2018    Procedure: ANKLE OPEN REDUCTION INTERNAL FIXATION;  Surgeon: Cristian Tubbs II, MD;  Location: [facility];  Service: Orthopedics   • CARDIAC PACEMAKER PLACEMENT      secondary to SSS, placed in 2004, with gen chng 2014; [employer] dual DOO   • CATARACT EXTRACTION W/ INTRAOCULAR LENS IMPLANT Bilateral    • COLONOSCOPY     • HAND SURGERY Right    • HARDWARE REMOVAL Right 2/19/2019    Procedure: RT  ANKLE HARDWARE REMOVAL;  Surgeon: Cristian Hill II, MD;  Location: [facility] OR;  Service: Orthopedics   • KNEE ARTHROPLASTY Left    • LAMINECTOMY FOR IMPLANTATION / PLACEMENT NEUROSTIMULATOR ELECTRODES     • LUMBAR LAMINECTOMY      L-3 L4 L4 L5   • TONSILLECTOMY     • ZENKERS DIVERTICULECTOMY N/A 9/27/2016    Procedure: ENDOSCOPIC REMOVAL OF ZENKERS DIVERTICULUM W/ WERDASCOPE;  Surgeon: Oswald Barth MD;  Location: [facility] MAIN OR;  Service:    • ZENKERS DIVERTICULECTOMY N/A 4/14/2017    Procedure: ESOPHAGOSCOPY;  Surgeon: Oswald Barth MD;  Location: [facility] OR;  Service:         SWALLOW EVALUATION (last 72 hours)      SLP Adult Swallow Evaluation     Row Name 07/06/20 0900                   Rehab Evaluation    Document Type  evaluation  -SH        Patient Effort  excellent  -           General Information    Patient Profile Reviewed  yes  -SH        Pertinent History Of Current Problem  Pt with hx of Zenker's (repair 2016) and esophageal diverticulum (repair 2017) s/p repair. Pt's daughter reports no asp pna since repair of esophageal diverticulum (completed at [facility]). Most recent esophagram revealed penetration without aspiration with thins. VFSS 3/18/17 revealed no penetration/aspiration, but presence of esophageal diverticulum. VFSS completed after acute L pontine stroke revealed a functional swallow 12/15/17. CXR clear this admit. Pt admitted with acute UTI.   -SH        Current Method of Nutrition  NPO  -SH        Precautions/Limitations, Vision  WFL;for purposes of eval  -        Precautions/Limitations, Hearing  WFL;for purposes of eval  -SH        Prior Level of Function-Communication  WFL  -        Prior Level of Function-Swallowing  no diet consistency restrictions  -        Plans/Goals Discussed with  patient;agreed upon  -        Barriers to Rehab  medically complex  -SH        Patient's Goals for Discharge  patient did not state  -           Oral Motor and Function     Dentition Assessment  upper dentures/partial in place;other (see comments) NEW UPPER DENTURES-MILD ORAL DIFF PER PT  -        Secretion Management  WNL/WFL  -        Volitional Swallow  delayed  -        Volitional Cough  WFL  -           Oral Musculature and Cranial Nerve Assessment    Oral Motor General Assessment  oral labial or buccal impairment  -        Oral Motor, Comment  Slight droop on L  -SH           Clinical Swallow Eval    Clinical Swallow Evaluation Summary  Patient seen for clinical swallow assessment. Pt denies dysphagia. Pt with hx of zenker's (2016) and esophageal (2017) diverticulum repair. Pt is coughing with thins via straw this date, suspected mistiming. Throat clearing noted with mixed. Mild oral residue with solids. Laryngeal elevation appeared age appropriate. Pt able to feed herself this date. SLP recs thins, no straws, and mech soft, no mixed. Meds whole with puree.  -           Clinical Impression    SLP Swallowing Diagnosis  oral dysfunction;suspected pharyngeal dysfunction  -        Functional Impact  risk of aspiration/pneumonia  -        Rehab Potential/Prognosis, Swallowing  good, to achieve stated therapy goals  -        Swallow Criteria for Skilled Therapeutic Interventions Met  demonstrates skilled criteria  -           Recommendations    Therapy Frequency (Swallow)  PRN  -        Predicted Duration Therapy Intervention (Days)  until discharge  -        SLP Diet Recommendation  mechanical soft with no mixed consistencies;thin liquids  -        Recommended Diagnostics  reassess via clinical swallow evaluation  -        Recommended Precautions and Strategies  upright posture during/after eating;small bites of food and sips of liquid;no straw  -        SLP Rec. for Method of Medication Administration  meds whole;with pudding or applesauce;as tolerated  -        Monitor for Signs of Aspiration  yes;notify SLP if any concerns  -        Anticipated  Dischage Disposition (SLP)  unknown  -           Swallow Goals (SLP)    Oral Nutrition/Hydration Goal Selection (SLP)  oral nutrition/hydration, SLP goal 1  -           Oral Nutrition/Hydration Goal 1 (SLP)    Oral Nutrition/Hydration Goal 1, SLP  Pt will damien PO without overt s/s of aspiration.  -        Time Frame (Oral Nutrition/Hydration Goal 1, SLP)  by discharge  -          User Key  (r) = Recorded By, (t) = Taken By, (c) = Cosigned By    Initials Name Effective Dates     Olga Joann A, MS CCC-SLP 03/07/18 -           EDUCATION  The patient has been educated in the following areas:   Dysphagia (Swallowing Impairment).    SLP Recommendation and Plan  SLP Swallowing Diagnosis: oral dysfunction, suspected pharyngeal dysfunction  SLP Diet Recommendation: mechanical soft with no mixed consistencies, thin liquids  Recommended Precautions and Strategies: upright posture during/after eating, small bites of food and sips of liquid, no straw  SLP Rec. for Method of Medication Administration: meds whole, with pudding or applesauce, as tolerated     Monitor for Signs of Aspiration: yes, notify SLP if any concerns  Recommended Diagnostics: reassess via clinical swallow evaluation  Swallow Criteria for Skilled Therapeutic Interventions Met: demonstrates skilled criteria  Anticipated Dischage Disposition (SLP): unknown  Rehab Potential/Prognosis, Swallowing: good, to achieve stated therapy goals  Therapy Frequency (Swallow): PRN  Predicted Duration Therapy Intervention (Days): until discharge       Plan of Care Reviewed With: patient  Progress: improving  Outcome Summary: Patient seen for clinical swallow assessment. Pt denies dysphagia. Pt with hx of zenker's (2016) and esophageal (2017) diverticulum repair. Pt is coughing with thins via straw this date, suspected mistiming. Throat clearing noted with mixed. Mild oral residue with solids. SLP recs thins, no straws, and mech soft, no mixed. Meds whole with  sharon.    SLP GOALS     Row Name 07/06/20 0900             Oral Nutrition/Hydration Goal 1 (SLP)    Oral Nutrition/Hydration Goal 1, SLP  Pt will damien PO without overt s/s of aspiration.  -      Time Frame (Oral Nutrition/Hydration Goal 1, SLP)  by discharge  -        User Key  (r) = Recorded By, (t) = Taken By, (c) = Cosigned By    Initials Name Provider Type     Joann Espinoza MS CCC-SLP Speech and Language Pathologist           SLP Outcome Measures (last 72 hours)      SLP Outcome Measures     Row Name 07/06/20 1000             SLP Outcome Measures    Outcome Measure Used?  Adult NOMS  -         Adult FCM Scores    FCM Chosen  Swallowing  -      Swallowing FCM Score  4  -        User Key  (r) = Recorded By, (t) = Taken By, (c) = Cosigned By    Initials Name Effective Dates     Joann Espinoza MS CCC-SLP 03/07/18 -            Time Calculation:   Time Calculation- SLP     Row Name 07/06/20 1048             Time Calculation- [facility]    SLP Start Time  0900  -      SLP Received On  07/06/20  -        User Key  (r) = Recorded By, (t) = Taken By, (c) = Cosigned By    Initials Name Provider Type     Joann Espinoza MS CCC-SLP Speech and Language Pathologist          Therapy Charges for Today     Code Description Service Date Service Provider Modifiers Qty    [id number] HC ST EVAL ORAL PHARYNG SWALLOW 4 7/6/2020 Joann Espinoza MS CCC-SLP GN 1               Joann Espinoza MS CCC-SLP  7/6/2020

## 2020-07-06 NOTE — ED PROVIDER NOTES
EMERGENCY DEPARTMENT ENCOUNTER    CHIEF COMPLAINT  Chief Complaint: Weakness and falls  History given by: Patient and daughter  History limited by: Confusion  Room Number: 11/11  PMD: Grady Villeda MD      HPI:  Pt is a 89 y.o. female who presents complaining of increasing weakness and falls.  Patient fell last week and hit her head.  She had a negative CT of the head and neck week ago.  She is also been checked for COVID-19 on June 30, 2020 which was negative.  She was found to have a urinary tract infection and has been on cefepime.  Her daughter has noted that patient has been having visual hallucinations and increasing confusion for the past 3 to 4 days.  She is uncertain if this is secondary to the urinary tract infection or for the pain medication she has been receiving.  Patient states she continues to have pain on the left side of her head left side of her neck since the fall.  She denies fever, chills, cough but is mildly short of breath.  Today while transferring from a wheelchair to a chair she slipped and fell on her bottom.  She states she did not hit her head and did not pass out.  Her fall was unwitnessed by nursing home staff and she was found on the floor.    Patient was placed in face mask in first look. Patient was wearing facemask when I entered the room and throughout our encounter. I wore full protective equipment throughout this patient encounter including a face mask and gloves. Hand hygiene was performed before donning protective equipment and after removal when leaving the room.        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) 02/05/2016   • Vitamin D deficiency 02/05/2016   • History of sick sinus syndrome    • Intertrigo 03/25/2016   • Generalized convulsive seizures (CMS/[facility]) 04/07/2016   • Change in bowel habits 05/20/2016   • 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   • History of anxiety 10/18/2016   • 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HCC) 01/04/2017   • Thrombocytopenia (CMS/HCC) 01/05/2017   • B12 deficiency 01/16/2017   • Cardiac pacemaker in situ 03/08/2017   • Chronic venous insufficiency 03/09/2017   • Arthritis 03/09/2017   • S/P knee replacement 03/09/2017   • Chronic bilateral low back pain without sciatica 03/09/2017   • Essential hypertension 03/09/2017   • Hiatal hernia 03/16/2017   • Zenker's diverticulum 04/14/2017   • Chronic idiopathic constipation 05/19/2017   • Pressure sore on buttocks 05/19/2017   • Somnolence 12/13/2017   • Closed trimalleolar fracture of right ankle 11/13/2018   • Surgical wound infection 01/29/2019   • History of failed arthroplasty of ankle 02/19/2019   • History of CVA in adulthood 04/05/2019   • Pneumonia of both lower lobes due to infectious organism 02/25/2020     Resolved Ambulatory Problems     Diagnosis Date Noted   • Hyponatremia 02/05/2016   • Urine frequency 05/20/2016   • Urinary tract infection 07/08/2016   • Pneumonia 07/08/2016   • Aspiration pneumonia (CMS/HCC) 07/17/2016   • Urinary tract infection in female 01/03/2017   • History of aspiration pneumonitis 01/04/2017   • Pain of toe of right foot 01/16/2017   • Right lower lobe pneumonia 02/02/2017   • Sepsis due to pneumonia (CMS/[facility]) 02/02/2017   • Weakness 02/03/2017   • UTI (urinary tract infection) 03/15/2017   • Acute kidney injury (CMS/[facility]) 03/17/2017   • Acute vaginitis 03/23/2017   • Acute pain of left knee 05/19/2017     Past Medical History:   Diagnosis Date   • At risk for falls    • At risk for sleep apnea 11/17/2018   • Atrial fibrillation (CMS/HCC)    • Cellulitis    • Chronic back  pain    • Chronic cough    • Chronic UTI    • DDD (degenerative disc disease), lumbosacral    • Dementia without behavioral disturbance (CMS/HCC)    • Disc degeneration, lumbar    • Dysphagia    • GERD (gastroesophageal reflux disease)    • History of cerebral artery occlusion    • History of herpes zoster    • History of ingrowing nail    • History of osteoporosis    • History of poliomyelitis    • History of sciatica    • History of transient cerebral ischemia    • History of Zenker's diverticulum removal 2016   • HX: long term anticoagulant use    • Hypertension    • Lumbar radiculopathy    • Morbid obesity (CMS/HCC)    • MRSA (methicillin resistant Staphylococcus aureus)    • Neuralgia    • Nonepileptic episode (CMS/HCC)    • Osteoarthritis of right knee    • Pacemaker    • Seeing double    • Seizures (CMS/HCC)    • SIADH (syndrome of inappropriate ADH production) (CMS/[facility])    • Spinal stenosis        PAST SURGICAL HISTORY  Past Surgical History:   Procedure Laterality Date   • ANKLE OPEN REDUCTION INTERNAL FIXATION Right 11/17/2018    Procedure: ANKLE OPEN REDUCTION INTERNAL FIXATION;  Surgeon: Cristian Hill II, MD;  Location: [facility] OR;  Service: Orthopedics   • CARDIAC PACEMAKER PLACEMENT      secondary to SSS, placed in 2004, with gen chng 2014; [employer] dual DOO   • CATARACT EXTRACTION W/ INTRAOCULAR LENS IMPLANT Bilateral    • COLONOSCOPY     • HAND SURGERY Right    • HARDWARE REMOVAL Right 2/19/2019    Procedure: RT ANKLE HARDWARE REMOVAL;  Surgeon: Cristian Hill II, MD;  Location: [facility] OR;  Service: Orthopedics   • KNEE ARTHROPLASTY Left    • LAMINECTOMY FOR IMPLANTATION / PLACEMENT NEUROSTIMULATOR ELECTRODES     • LUMBAR LAMINECTOMY      L-3 L4 L4 L5   • TONSILLECTOMY     • ZENKERS DIVERTICULECTOMY N/A 9/27/2016    Procedure: ENDOSCOPIC REMOVAL OF ZENKERS DIVERTICULUM W/ WERDASCOPE;  Surgeon: Oswald Barth MD;  Location: [facility];  Service:    • ZENKERS  DIVERTICULECTOMY N/A 2017    Procedure: ESOPHAGOSCOPY;  Surgeon: Oswald Barth MD;  Location: [facility] OR;  Service:        FAMILY HISTORY  Family History   Problem Relation Age of Onset   • Cancer Daughter    • Malig Hyperthermia Neg Hx        SOCIAL HISTORY  Social History     Socioeconomic History   • Marital status:      Spouse name: Not on file   • Number of children: 3   • Years of education: Not on file   • Highest education level: Not on file   Occupational History   • Occupation: Retired   Tobacco Use   • Smoking status: Former Smoker     Packs/day: 1.00     Years: 40.00     Pack years: 40.00     Types: Cigarettes     Last attempt to quit:      Years since quittin.5   • Smokeless tobacco: Never Used   • Tobacco comment: caffeine use: 2 cup of coffee in the morning and 1 in the afternoon.    Substance and Sexual Activity   • Alcohol use: Yes     Comment: 1 PER WEEK   • Drug use: No   • Sexual activity: Defer       ALLERGIES  Lipitor [atorvastatin] and Penicillins    REVIEW OF SYSTEMS  Review of Systems   Constitutional: Positive for activity change, appetite change, chills and fatigue. Negative for fever.   HENT: Negative for sore throat.    Eyes: Negative.    Respiratory: Positive for shortness of breath. Negative for cough and chest tightness.    Cardiovascular: Negative for chest pain.   Gastrointestinal: Negative for abdominal distention, abdominal pain, diarrhea and vomiting.   Genitourinary: Negative for dysuria.   Musculoskeletal: Negative for neck pain.   Skin: Negative for rash.   Allergic/Immunologic: Negative.    Neurological: Positive for weakness. Negative for numbness and headaches.   Hematological: Negative.    Psychiatric/Behavioral: Positive for confusion and hallucinations.   All other systems reviewed and are negative.      PHYSICAL EXAM  ED Triage Vitals [20]   Temp Heart Rate Resp BP SpO2   99.6 °F (37.6 °C) 90 18 129/59 91 %      Temp src Heart Rate  Source Patient Position BP Location FiO2 (%)   Tympanic -- -- -- --       Physical Exam   Constitutional: She appears distressed (mild).   HENT:   Head: Normocephalic and atraumatic.   Eyes: EOM are normal.   Neck: Neck supple. Spinous process tenderness and muscular tenderness (left) present. Decreased range of motion present.   Cardiovascular: Normal rate, regular rhythm, normal heart sounds and intact distal pulses.   Pulmonary/Chest: She has decreased breath sounds in the right lower field and the left lower field. She has no wheezes.   Abdominal: Soft. Bowel sounds are normal. She exhibits no distension. There is no tenderness. There is no rebound and no guarding.   Musculoskeletal: She exhibits edema (2+ bilaterally).   Neurological: She is alert. She is not agitated. She displays weakness (GBW). No cranial nerve deficit.   Patient has general body weakness.   Nursing note and vitals reviewed.      LAB RESULTS  Lab Results (last 24 hours)     Procedure Component Value Units Date/Time    CBC & Differential [[id number]] Collected:  07/05/20 2049    Specimen:  Blood Updated:  07/05/20 2106    Narrative:       The following orders were created for panel order CBC & Differential.  Procedure                               Abnormality         Status                     ---------                               -----------         ------                     CBC Auto Differential[[id number]]        Abnormal            Final result                 Please view results for these tests on the individual orders.    Comprehensive Metabolic Panel [[id number]]  (Abnormal) Collected:  07/05/20 2049    Specimen:  Blood Updated:  07/05/20 2135     Glucose 104 mg/dL      BUN 26 mg/dL      Creatinine 1.66 mg/dL      Sodium 142 mmol/L      Potassium 3.6 mmol/L      Chloride 101 mmol/L      CO2 27.1 mmol/L      Calcium 9.1 mg/dL      Total Protein 6.7 g/dL      Albumin 4.00 g/dL      ALT (SGPT) 14 U/L      AST (SGOT) 22 U/L      Alkaline  "Phosphatase 73 U/L      Total Bilirubin 0.6 mg/dL      eGFR Non African Amer 29 mL/min/1.73      Globulin 2.7 gm/dL      A/G Ratio 1.5 g/dL      BUN/Creatinine Ratio 15.7     Anion Gap 13.9 mmol/L     Narrative:       GFR Normal >60  Chronic Kidney Disease <60  Kidney Failure <15      Lactate Dehydrogenase [[id number]]  (Abnormal) Collected:  07/05/20 2049    Specimen:  Blood Updated:  07/05/20 2135      U/L     Procalcitonin [[id number]]  (Abnormal) Collected:  07/05/20 2049    Specimen:  Blood Updated:  07/05/20 2140     Procalcitonin 0.09 ng/mL     Narrative:       As a Marker for Sepsis (Non-Neonates):   1. <0.5 ng/mL represents a low risk of severe sepsis and/or septic shock.  1. >2 ng/mL represents a high risk of severe sepsis and/or septic shock.    As a Marker for Lower Respiratory Tract Infections that require antibiotic therapy:  PCT on Admission     Antibiotic Therapy             6-12 Hrs later  > 0.5                Strongly Recommended            >0.25 - <0.5         Recommended  0.1 - 0.25           Discouraged                   Remeasure/reassess PCT  <0.1                 Strongly Discouraged          Remeasure/reassess PCT      As 28 day mortality risk marker: \"Change in Procalcitonin Result\" (> 80 % or <=80 %) if Day 0 (or Day 1) and Day 4 values are available. Refer to http://www.[employer]-pct-calculator.com/   Change in PCT <=80 %   A decrease of PCT levels below or equal to 80 % defines a positive change in PCT test result representing a higher risk for 28-day all-cause mortality of patients diagnosed with severe sepsis or septic shock.  Change in PCT > 80 %   A decrease of PCT levels of more than 80 % defines a negative change in PCT result representing a lower risk for 28-day all-cause mortality of patients diagnosed with severe sepsis or septic shock.                Results may be falsely decreased if patient taking Biotin.     Lactic Acid, Plasma [[id number]]  (Normal) Collected:  07/05/20 " 2049    Specimen:  Blood Updated:  07/05/20 2116     Lactate 0.9 mmol/L     Protime-INR [[id number]]  (Normal) Collected:  07/05/20 2049    Specimen:  Blood Updated:  07/05/20 2116     Protime 13.9 Seconds      INR 1.09    Troponin [[id number]]  (Abnormal) Collected:  07/05/20 2049    Specimen:  Blood Updated:  07/05/20 2142     Troponin T 0.040 ng/mL     Narrative:       Troponin T Reference Range:  <= 0.03 ng/mL-   Negative for AMI  >0.03 ng/mL-     Abnormal for myocardial necrosis.  Clinicians would have to utilize clinical acumen, EKG, Troponin and serial changes to determine if it is an Acute Myocardial Infarction or myocardial injury due to an underlying chronic condition.       Results may be falsely decreased if patient taking Biotin.      Blood Culture - Blood, Arm, Left [[id number]] Collected:  07/05/20 2049    Specimen:  Blood from Arm, Left Updated:  07/05/20 2100    CBC Auto Differential [[id number]]  (Abnormal) Collected:  07/05/20 2049    Specimen:  Blood Updated:  07/05/20 2106     WBC 7.84 10*3/mm3      RBC 3.21 10*6/mm3      Hemoglobin 10.7 g/dL      Hematocrit 31.6 %      MCV 98.4 fL      MCH 33.3 pg      MCHC 33.9 g/dL      RDW 12.0 %      RDW-SD 43.5 fl      MPV 9.5 fL      Platelets 167 10*3/mm3      Neutrophil % 59.2 %      Lymphocyte % 26.0 %      Monocyte % 9.1 %      Eosinophil % 4.6 %      Basophil % 0.8 %      Immature Grans % 0.3 %      Neutrophils, Absolute 4.65 10*3/mm3      Lymphocytes, Absolute 2.04 10*3/mm3      Monocytes, Absolute 0.71 10*3/mm3      Eosinophils, Absolute 0.36 10*3/mm3      Basophils, Absolute 0.06 10*3/mm3      Immature Grans, Absolute 0.02 10*3/mm3      nRBC 0.0 /100 WBC     COVID-19,[facility]-HOU[facility], NP SWAB IN TRANSPORT MEDIA 8-12 HR TAT - Swab, Nasopharynx [[id number]]  (Normal) Collected:  07/05/20 2051    Specimen:  Swab from Nasopharynx Updated:  07/05/20 2245     COVID19 Not Detected    Narrative:       Fact sheet for providers:  https://www.fda.gov/media/150646/download     Fact sheet for patients: https://www.fda.gov/media/146617/download    Urinalysis With Culture If Indicated - Urine, Catheter [[id number]]  (Abnormal) Collected:  07/05/20 2309    Specimen:  Urine, Catheter Updated:  07/05/20 2328     Color, UA Yellow     Appearance, UA Cloudy     pH, UA 6.0     Specific Gravity, UA 1.011     Glucose, UA Negative     Ketones, UA Trace     Bilirubin, UA Negative     Blood, UA Small (1+)     Protein, UA Negative     Leuk Esterase, UA Large (3+)     Nitrite, UA Negative     Urobilinogen, UA 0.2 E.U./dL    Urinalysis, Microscopic Only - Urine, Catheter [[id number]]  (Abnormal) Collected:  07/05/20 2309    Specimen:  Urine, Catheter Updated:  07/05/20 2341     RBC, UA 0-2 /HPF      WBC, UA 31-50 /HPF      Bacteria, UA Trace /HPF      Squamous Epithelial Cells, UA 0-2 /HPF      Hyaline Casts, UA None Seen /LPF      Methodology Manual Light Microscopy    Urine Culture - Urine, Urine, Catheter [[id number]] Collected:  07/05/20 2309    Specimen:  Urine, Catheter Updated:  07/05/20 2341    Troponin [[id number]] Collected:  07/05/20 2314    Specimen:  Blood Updated:  07/05/20 2320          I ordered the above labs and reviewed the results    RADIOLOGY  XR Chest AP   Final Result      XR Pelvis 1 or 2 View   Final Result      CT Head Without Contrast   Final Result   Negative CT scan of the head without contrast.       This report was finalized on 7/5/2020 9:48 PM by Dr. Eriberto Goode M.D.          CT Cervical Spine Without Contrast   Final Result   Degenerative changes as noted. There is no evidence of acute   cervical spine fracture.       This report was finalized on 7/5/2020 9:53 PM by Dr. Eriberto Goode M.D.               I ordered the above noted radiological studies. Interpreted by radiologist. Reviewed by me in PACS.       PROCEDURES  Procedures      PROGRESS AND CONSULTS  ED Course as of Jul 05 2347   Sun Jul 05, 2020   6991 I discussed the  case with Dr. Goode, radiology.  Patient CT the head and cervical spine showed no acute disease.    [DE]   2211 EKG          EKG time: 2137  Rhythm/Rate: Normal sinus rhythm, rate 79  P waves and DC: PVCs noted  QRS, axis: Left axis deviation, prolonged QT interval  ST and T waves: Normal    Interpreted Contemporaneously by me, independently viewed  changed compared to prior 02/26/2020      [DE]   2329 Patient appears to still have a urinary tract infection.  Will admit patient for further treatment of her UTI and metabolic encephalopathy.    [DE]   2339 I discussed the case with Dr. Harper with [facility].  He is going admit patient to the hospital for further evaluation and treatment.    [DE]      ED Course User Index  [DE] Ravindra Evans MD         MEDICAL DECISION MAKING  Results were reviewed/discussed with the patient and they were also made aware of online access. Pt also made aware that some labs, such as cultures, will not be resulted during ER visit and follow up with PMD is necessary.     MDM       DIAGNOSIS  Final diagnoses:   Acute UTI   Acute metabolic encephalopathy   ANDREW (acute kidney injury) (CMS/[facility])       DISPOSITION  ADMISSION    Discussed treatment plan and reason for admission with pt/family and admitting physician.  Pt/family voiced understanding of the plan for admission for further testing/treatment as needed.         Latest Documented Vital Signs:  As of 23:47  BP- 110/80 HR- 79 Temp- 98.9 °F (37.2 °C) (Oral) O2 sat- 97%    --  Dragon disclaimer:   Much of this encounter note is an electronic transcription/translation of spoken language to printed text.  The electronic translation of spoken language may permit erroneous, or at times, nonsensical words or phrases to be inadvertently transcribed.  Although I have reviewed the note for such areas, some may still exist.       Ravindra Evans MD  07/05/20 6068

## 2020-07-07 NOTE — PLAN OF CARE
Pt alert and oriented w/episodes of confusion. Pt swallowing medications with applesauce. Pt at times requested to forego skin care. Educated patient on the importance of allowing timely care. Pt denies pain this shift. Resp even and unlabored. Echymosis to upper extremities bilat from fall. Redness from yeast to breasts bilat and to groin and stomach folds. Antifungals applies. No difficulty swallowing observed.

## 2020-07-07 NOTE — PROGRESS NOTES
"Daily progress note    Chief complaint  Doing same  Sleepy lethargic  No new complaints    History of present illness  89-year-old white female who is well-known to our service with history of CVA seizure disorder hypertension chronic anemia depression degenerative disc disease sick sinus syndrome status post permanent pacemaker and gastroesophageal disease who is a nursing home resident brought to the emergency room after the fall with increasing weakness.  Patient fell last week hit her head.  Patient work-up has been negative but lately she is having hallucination secondary to pain medications and also found to have UTI in the ER admit for management.  Patient awake and alert answer all question but most of the history obtained from the family.  Patient is DNR per her wishes.     REVIEW OF SYSTEMS  Constitutional: Positive for fatigue. Negative for fever.   HENT: Negative for sore throat.    Eyes: Negative.    Respiratory: Negative for cough and chest tightness.    Cardiovascular: Negative for chest pain.   Gastrointestinal: Negative for abdominal distention, abdominal pain, diarrhea and vomiting.   Genitourinary: Negative for dysuria.   Musculoskeletal: Negative for neck pain.   Skin: Negative for rash.   Allergic/Immunologic: Negative.    Neurological: Positive for weakness. Negative for numbness and headaches.   Hematological: Negative.    Psychiatric/Behavioral: Positive for confusion and hallucinations.     PHYSICAL EXAM  Blood pressure 144/85, pulse 83, temperature 98.6 °F (37 °C), temperature source Oral, resp. rate 16, height 157.5 cm (62\"), weight 105 kg (231 lb 7.7 oz), SpO2 95 %, not currently breastfeeding.    Constitutional: She appears distressed (mild).   Head: Normocephalic and atraumatic.   Eyes: EOM are normal.   Neck: Neck supple. Spinous process tenderness and muscular tenderness (left) present. Decreased range of motion present.   Cardiovascular: Normal rate, regular rhythm, normal heart " sounds and intact distal pulses.   Pulmonary/Chest: She has decreased breath sounds in the right lower field and the left lower field. She has no wheezes.   Abdominal: Soft. Bowel sounds are normal. She exhibits no distension. There is no tenderness. There is no rebound and no guarding.   Musculoskeletal: She exhibits edema (2+ bilaterally).   Neurological: She is alert. She is not agitated. She displays weakness (GBW). No cranial nerve deficit.   Patient has general body weakness.     LAB RESULTS  Lab Results (last 24 hours)     Procedure Component Value Units Date/Time    TSH [[id number]]  (Normal) Collected:  07/07/20 0642    Specimen:  Blood Updated:  07/07/20 0741     TSH 1.610 uIU/mL     BNP [[id number]]  (Normal) Collected:  07/07/20 0642    Specimen:  Blood Updated:  07/07/20 0741     proBNP 1,058.0 pg/mL     Narrative:       Among patients with dyspnea, NT-proBNP is highly sensitive for the detection of acute congestive heart failure. In addition NT-proBNP of <300 pg/ml effectively rules out acute congestive heart failure with 99% negative predictive value.    Results may be falsely decreased if patient taking Biotin.      CBC & Differential [[id number]] Collected:  07/07/20 0642    Specimen:  Blood Updated:  07/07/20 0737    Narrative:       The following orders were created for panel order CBC & Differential.  Procedure                               Abnormality         Status                     ---------                               -----------         ------                     CBC Auto Differential[[id number]]        Abnormal            Final result                 Please view results for these tests on the individual orders.    CBC Auto Differential [[id number]]  (Abnormal) Collected:  07/07/20 0642    Specimen:  Blood Updated:  07/07/20 0737     WBC 5.67 10*3/mm3      RBC 3.02 10*6/mm3      Hemoglobin 9.9 g/dL      Hematocrit 30.4 %      .7 fL      MCH 32.8 pg      MCHC 32.6 g/dL      RDW 12.4 %        RDW-SD 45.7 fl      MPV 9.7 fL      Platelets 170 10*3/mm3      Neutrophil % 44.1 %      Lymphocyte % 39.5 %      Monocyte % 10.2 %      Eosinophil % 5.1 %      Basophil % 0.9 %      Immature Grans % 0.2 %      Neutrophils, Absolute 2.50 10*3/mm3      Lymphocytes, Absolute 2.24 10*3/mm3      Monocytes, Absolute 0.58 10*3/mm3      Eosinophils, Absolute 0.29 10*3/mm3      Basophils, Absolute 0.05 10*3/mm3      Immature Grans, Absolute 0.01 10*3/mm3      nRBC 0.0 /100 WBC     Comprehensive Metabolic Panel [[id number]]  (Abnormal) Collected:  07/07/20 0642    Specimen:  Blood Updated:  07/07/20 0732     Glucose 86 mg/dL      BUN 15 mg/dL      Creatinine 0.99 mg/dL      Sodium 142 mmol/L      Potassium 3.5 mmol/L      Chloride 106 mmol/L      CO2 27.0 mmol/L      Calcium 8.6 mg/dL      Total Protein 6.2 g/dL      Albumin 3.30 g/dL      ALT (SGPT) 11 U/L      AST (SGOT) 18 U/L      Alkaline Phosphatase 60 U/L      Total Bilirubin 0.4 mg/dL      eGFR Non African Amer 53 mL/min/1.73      Globulin 2.9 gm/dL      A/G Ratio 1.1 g/dL      BUN/Creatinine Ratio 15.2     Anion Gap 9.0 mmol/L     Narrative:       GFR Normal >60  Chronic Kidney Disease <60  Kidney Failure <15      Lipid Panel [[id number]]  (Abnormal) Collected:  07/07/20 0642    Specimen:  Blood Updated:  07/07/20 0732     Total Cholesterol 152 mg/dL      Triglycerides 90 mg/dL      HDL Cholesterol 35 mg/dL      LDL Cholesterol  99 mg/dL      VLDL Cholesterol 18 mg/dL      LDL/HDL Ratio 2.83    Narrative:       Cholesterol Reference Ranges  (U.[place] Department of Health and Human Services ATP III Classifications)    Desirable          <200 mg/dL  Borderline High    200-239 mg/dL  High Risk          >240 mg/dL      Triglyceride Reference Ranges  (U.S. Department of Health and Human Services ATP III Classifications)    Normal           <150 mg/dL  Borderline High  150-199 mg/dL  High             200-499 mg/dL  Very High        >500 mg/dL    HDL Reference Ranges  ([place]  Department of Health and Human Services ATP III Classifcations)    Low     <40 mg/dl (major risk factor for CHD)  High    >60 mg/dl ('negative' risk factor for CHD)        LDL Reference Ranges  (U.S. Department of Health and Human Services ATP III Classifcations)    Optimal          <100 mg/dL  Near Optimal     100-129 mg/dL  Borderline High  130-159 mg/dL  High             160-189 mg/dL  Very High        >189 mg/dL    Blood Culture - Blood, Arm, Right [[id number]] Collected:  07/05/20 2149    Specimen:  Blood from Arm, Right Updated:  07/07/20 0730     Blood Culture No growth at 24 hours    Hemoglobin A1c [[id number]]  (Abnormal) Collected:  07/07/20 0642    Specimen:  Blood Updated:  07/07/20 0715     Hemoglobin A1C 5.90 %     Narrative:       Hemoglobin A1C Ranges:    Increased Risk for Diabetes  5.7% to 6.4%  Diabetes                     >= 6.5%  Diabetic Goal                < 7.0%    Urine Culture - Urine, Urine, Catheter [[id number]]  (Normal) Collected:  07/05/20 2309    Specimen:  Urine, Catheter Updated:  07/07/20 0344     Urine Culture No growth    Blood Culture - Blood, Arm, Left [[id number]] Collected:  07/05/20 2049    Specimen:  Blood from Arm, Left Updated:  07/06/20 2115     Blood Culture No growth at 24 hours        Imaging Results (Last 24 Hours)     ** No results found for the last 24 hours. **        ECG 12 Lead        HEART RATE= 79  bpm  RR Interval= 752  ms  IL Interval= 82  ms  P Horizontal Axis=   deg  P Front Axis= 0  deg  QRSD Interval= 100  ms  QT Interval= 438  ms  QRS Axis= -29  deg  T Wave Axis= 12  deg  - ABNORMAL ECG -  Sinus rhythm  Ventricular premature complex  Prolonged IL interval   Borderline left axis deviation  Probable anterior infarct, age indeterminate  Prolonged QT interval  When compared with ECG of 26-Feb-2020 6:09:47,  PVCs and prolonged QT are new             Current Facility-Administered Medications:   •  budesonide-formoterol (SYMBICORT) 160-4.5 MCG/ACT inhaler 2 puff,  2 puff, Inhalation, BID - RT, Gaetano Harper MD, 2 puff at 07/07/20 0806  •  calcium-vitamin D 500-200 MG-UNIT tablet 500 mg, 500 mg, Oral, Daily, Gaetano Harper MD, 500 mg at 07/07/20 0858  •  cefTRIAXone (ROCEPHIN) IVPB 1 g, 1 g, Intravenous, Q24H, Gaetano Harper MD, Last Rate: 100 mL/hr at 07/06/20 1730, 1 g at 07/06/20 1730  •  citalopram (CeleXA) tablet 20 mg, 20 mg, Oral, Nightly, Gaetano Harper MD, 20 mg at 07/06/20 2127  •  ferrous sulfate tablet 325 mg, 325 mg, Oral, Daily, Gaetano Harper MD, 325 mg at 07/07/20 0858  •  gabapentin (NEURONTIN) capsule 600 mg, 600 mg, Oral, Nightly, Gaetano Harper MD, 600 mg at 07/06/20 2127  •  levETIRAcetam (KEPPRA) tablet 750 mg, 750 mg, Oral, BID, Gaetano Harper MD, 750 mg at 07/07/20 0858  •  mirtazapine (REMERON) tablet 15 mg, 15 mg, Oral, Nightly, Gaetano Harper MD, 15 mg at 07/06/20 2127  •  pantoprazole (PROTONIX) EC tablet 40 mg, 40 mg, Oral, Daily, Gaetano Harper MD, 40 mg at 07/07/20 0858  •  polyethylene glycol (MIRALAX) packet 17 g, 17 g, Oral, Daily, Gaetano Harper MD, 17 g at 07/07/20 0858  •  [COMPLETED] Insert peripheral IV, , , Once **AND** sodium chloride 0.9 % flush 10 mL, 10 mL, Intravenous, PRN, Ravindra Evans MD  •  sodium chloride 0.9 % with KCl 20 mEq/L infusion, 75 mL/hr, Intravenous, Continuous, Gaetano Harper MD, Last Rate: 75 mL/hr at 07/06/20 2131, 75 mL/hr at 07/06/20 2131     ASSESSMENT  Acute recurrent UTI  Dehydration  Status post fall  Hallucination  Metabolic encephalopathy  Hypertension  Elevated troponin with no chest pain  Chronic anemia  Seizure disorder  Depression  Degenerative disc disease  SSS status post permanent pacemaker  Gastroesophageal reflux disease    PLAN  CPM  IV fluids  IV antibiotics  Discontinue pain medications  Adjust nursing home medications  Stress ulcer DVT prophylaxis  Supportive care  DNR  PT/OT  Discussed with family and nursing staff  Follow closely further recommendation current hospital course    GAETANO HARPER  MD

## 2020-07-08 NOTE — PROGRESS NOTES
"Daily progress note    Chief complaint  Doing better  No new complaints    History of present illness  89-year-old white female who is well-known to our service with history of CVA seizure disorder hypertension chronic anemia depression degenerative disc disease sick sinus syndrome status post permanent pacemaker and gastroesophageal disease who is a nursing home resident brought to the emergency room after the fall with increasing weakness.  Patient fell last week hit her head.  Patient work-up has been negative but lately she is having hallucination secondary to pain medications and also found to have UTI in the ER admit for management.  Patient awake and alert answer all question but most of the history obtained from the family.  Patient is DNR per her wishes.     REVIEW OF SYSTEMS  Unremarkable except generalized weakness    PHYSICAL EXAM  Blood pressure 157/90, pulse 82, temperature 98.2 °F (36.8 °C), temperature source Oral, resp. rate 16, height 157.5 cm (62\"), weight 105 kg (231 lb 7.7 oz), SpO2 94 %, not currently breastfeeding.    Constitutional: She appears distressed (mild).   Head: Normocephalic and atraumatic.   Eyes: EOM are normal.   Neck: Neck supple. Spinous process tenderness and muscular tenderness (left) present. Decreased range of motion present.   Cardiovascular: Normal rate, regular rhythm, normal heart sounds and intact distal pulses.   Pulmonary/Chest: She has decreased breath sounds in the right lower field and the left lower field. She has no wheezes.   Abdominal: Soft. Bowel sounds are normal. She exhibits no distension. There is no tenderness. There is no rebound and no guarding.   Musculoskeletal: She exhibits edema (2+ bilaterally).   Neurological: She is alert. She is not agitated. She displays weakness (GBW). No cranial nerve deficit.   Patient has general body weakness.     LAB RESULTS  Lab Results (last 24 hours)     Procedure Component Value Units Date/Time    Basic Metabolic " Panel [[id number]]  (Normal) Collected:  07/08/20 0645    Specimen:  Blood Updated:  07/08/20 0735     Glucose 82 mg/dL      BUN 9 mg/dL      Creatinine 0.84 mg/dL      Sodium 141 mmol/L      Potassium 3.7 mmol/L      Chloride 107 mmol/L      CO2 26.2 mmol/L      Calcium 8.7 mg/dL      eGFR Non African Amer 64 mL/min/1.73      BUN/Creatinine Ratio 10.7     Anion Gap 7.8 mmol/L     Narrative:       GFR Normal >60  Chronic Kidney Disease <60  Kidney Failure <15      Blood Culture - Blood, Arm, Right [[id number]] Collected:  07/05/20 2149    Specimen:  Blood from Arm, Right Updated:  07/08/20 0730     Blood Culture No growth at 2 days    CBC & Differential [[id number]] Collected:  07/08/20 0645    Specimen:  Blood Updated:  07/08/20 0719    Narrative:       The following orders were created for panel order CBC & Differential.  Procedure                               Abnormality         Status                     ---------                               -----------         ------                     CBC Auto Differential[[id number]]        Abnormal            Final result                 Please view results for these tests on the individual orders.    CBC Auto Differential [[id number]]  (Abnormal) Collected:  07/08/20 0645    Specimen:  Blood Updated:  07/08/20 0719     WBC 6.50 10*3/mm3      RBC 3.08 10*6/mm3      Hemoglobin 10.2 g/dL      Hematocrit 31.1 %      .0 fL      MCH 33.1 pg      MCHC 32.8 g/dL      RDW 12.3 %      RDW-SD 45.3 fl      MPV 9.4 fL      Platelets 180 10*3/mm3      Neutrophil % 54.2 %      Lymphocyte % 30.6 %      Monocyte % 9.2 %      Eosinophil % 4.9 %      Basophil % 0.8 %      Immature Grans % 0.3 %      Neutrophils, Absolute 3.52 10*3/mm3      Lymphocytes, Absolute 1.99 10*3/mm3      Monocytes, Absolute 0.60 10*3/mm3      Eosinophils, Absolute 0.32 10*3/mm3      Basophils, Absolute 0.05 10*3/mm3      Immature Grans, Absolute 0.02 10*3/mm3      nRBC 0.0 /100 WBC     Blood Culture - Blood,  Arm, Left [[id number]] Collected:  07/05/20 2049    Specimen:  Blood from Arm, Left Updated:  07/07/20 2115     Blood Culture No growth at 2 days        Imaging Results (Last 24 Hours)     ** No results found for the last 24 hours. **        ECG 12 Lead        HEART RATE= 79  bpm  RR Interval= 752  ms  LA Interval= 82  ms  P Horizontal Axis=   deg  P Front Axis= 0  deg  QRSD Interval= 100  ms  QT Interval= 438  ms  QRS Axis= -29  deg  T Wave Axis= 12  deg  - ABNORMAL ECG -  Sinus rhythm  Ventricular premature complex  Prolonged LA interval   Borderline left axis deviation  Probable anterior infarct, age indeterminate  Prolonged QT interval  When compared with ECG of 26-Feb-2020 6:09:47,  PVCs and prolonged QT are new             Current Facility-Administered Medications:   •  acetaminophen (TYLENOL) tablet 650 mg, 650 mg, Oral, Q6H PRN, Juan Carlos Harper MD, 650 mg at 07/07/20 2017  •  budesonide-formoterol (SYMBICORT) 160-4.5 MCG/ACT inhaler 2 puff, 2 puff, Inhalation, BID - RT, Juan Carlos Harper MD, 2 puff at 07/08/20 0710  •  calcium-vitamin D 500-200 MG-UNIT tablet 500 mg, 500 mg, Oral, Daily, Juan Carlos Harper MD, 500 mg at 07/08/20 0839  •  cefTRIAXone (ROCEPHIN) IVPB 1 g, 1 g, Intravenous, Q24H, Juan Carlos Harper MD, Last Rate: 100 mL/hr at 07/07/20 1740, 1 g at 07/07/20 1740  •  citalopram (CeleXA) tablet 20 mg, 20 mg, Oral, Nightly, Juan Carlos Harper MD, 20 mg at 07/07/20 2017  •  ferrous sulfate tablet 325 mg, 325 mg, Oral, Daily, Juan Carlos Harper MD, 325 mg at 07/08/20 0839  •  gabapentin (NEURONTIN) capsule 600 mg, 600 mg, Oral, Nightly, Juan Carlos Harper MD, 600 mg at 07/07/20 2017  •  levETIRAcetam (KEPPRA) tablet 750 mg, 750 mg, Oral, BID, Juan Carlos Harper MD, 750 mg at 07/08/20 0839  •  mirtazapine (REMERON) tablet 15 mg, 15 mg, Oral, Nightly, Juan Carlos Harper MD, 15 mg at 07/07/20 2017  •  pantoprazole (PROTONIX) EC tablet 40 mg, 40 mg, Oral, Daily, Juan Carlos Harper MD, 40 mg at 07/08/20 0839  •  polyethylene glycol (MIRALAX) packet 17  g, 17 g, Oral, Daily, Gaetano Harper MD, 17 g at 07/08/20 0839  •  [COMPLETED] Insert peripheral IV, , , Once **AND** sodium chloride 0.9 % flush 10 mL, 10 mL, Intravenous, PRN, Ravindra Evans MD     ASSESSMENT  Acute recurrent UTI  Dehydration  Status post fall  Hallucination  Metabolic encephalopathy  Hypertension  Elevated troponin with no chest pain  Chronic anemia  Seizure disorder  Depression  Degenerative disc disease  SSS status post permanent pacemaker  Gastroesophageal reflux disease    PLAN  CPM  Discontinue IV fluids  IV antibiotics  Discontinue pain medications  Adjust nursing home medications  Stress ulcer DVT prophylaxis  Supportive care  DNR  PT/OT  Discussed with family and nursing staff  Discharge planning    GAETANO HARPER MD

## 2020-07-08 NOTE — THERAPY EVALUATION
Patient Name: Kim Feldman  : 3/13/1930    MRN: [id number]                              Today's Date: 2020       Admit Date: 2020    Visit Dx:     ICD-10-CM ICD-9-CM   1. Acute UTI N39.0 599.0   2. Acute metabolic encephalopathy G93.41 348.31   3. ANDREW (acute kidney injury) (CMS/[facility]) N17.9 584.9     Patient Active Problem List   Diagnosis   • Anemia   • Bulging lumbar disc   • Depression, endogenous (CMS/[facility])   • Gastroesophageal reflux disease   • Hyperlipidemia   • Intermittent claudication (CMS/HCC)   • Neuropathy   • Osteoarthritis of knee   • Syndrome of inappropriate secretion of antidiuretic hormone (CMS/[facility])   • Vitamin D deficiency   • History of sick sinus syndrome   • Intertrigo   • Generalized convulsive seizures (CMS/[facility])   • Change in bowel habits   • Zenker diverticulum   • History of anxiety   • 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[facility])   • Thrombocytopenia (CMS/[facility])   • B12 deficiency   • Cardiac pacemaker in situ   • Chronic venous insufficiency   • Arthritis   • S/P knee replacement   • Chronic bilateral low back pain without sciatica   • Essential hypertension   • Hiatal hernia   • Zenker's diverticulum   • Chronic idiopathic constipation   • Pressure sore on buttocks   • Somnolence   • Closed trimalleolar fracture of right ankle   • Surgical wound infection   • History of failed arthroplasty of ankle   • History of CVA in adulthood   • Pneumonia of both lower lobes due to infectious organism   • Acute UTI     Past Medical History:   Diagnosis Date   • Anemia    • At risk for falls    • At risk for sleep apnea 2018   • Atrial fibrillation (CMS/[facility])    • Bulging lumbar disc    • Cellulitis     BILATERAL LEGS   • Chronic back pain    • Chronic cough    • Chronic UTI    • Chronic venous insufficiency    • 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[facility])    • DDD (degenerative disc disease), lumbosacral    • Dementia without behavioral disturbance (CMS/[facility])     moderate   • Depression, endogenous  (CMS/HCC)    • Disc degeneration, lumbar    • Dysphagia    • GERD (gastroesophageal reflux disease)    • Hiatal hernia    • History of anxiety    • History of aspiration pneumonitis    • History of cerebral artery occlusion     CVA (following TIA), 10/10, treated with tPA   • History of CVA in adulthood 4/5/2019   • History of herpes zoster    • History of ingrowing nail    • History of osteoporosis    • History of poliomyelitis     child   • History of sciatica    • History of sick sinus syndrome     s/p PPM   • History of transient cerebral ischemia     followed by stroke in 10/2010. BIBI was normal.   • History of Zenker's diverticulum removal 2016   • HX: long term anticoagulant use    • Hyperlipidemia    • Hypertension     NO CURRENT MEDICATION   • Hyponatremia    • Intertrigo    • Lumbar radiculopathy    • Morbid obesity (CMS/HCC)    • MRSA (methicillin resistant Staphylococcus aureus)     LEFT FOOT SPREAD TO RIGHT ANKLE; DR TUBBS AWARE   • Neuralgia     with diplopia secondary to facial shingles   • Nonepileptic episode (CMS/HCC)    • Osteoarthritis of right knee    • Pacemaker    • Pneumonia    • Seeing double     secondary to shingles on the face also with neuralgia   • Seizures (CMS/HCC)    • 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)    • Spinal stenosis    • Vitamin D deficiency    • Zenker's diverticulum      Past Surgical History:   Procedure Laterality Date   • ANKLE OPEN REDUCTION INTERNAL FIXATION Right 11/17/2018    Procedure: ANKLE OPEN REDUCTION INTERNAL FIXATION;  Surgeon: Cristian Tubbs II, MD;  Location: [facility];  Service: Orthopedics   • CARDIAC PACEMAKER PLACEMENT      secondary to SSS, placed in 2004, with gen chng 2014; [employer] dual DOO   • CATARACT EXTRACTION W/ INTRAOCULAR LENS IMPLANT Bilateral    • COLONOSCOPY     • HAND SURGERY Right    • HARDWARE REMOVAL Right 2/19/2019    Procedure: RT ANKLE HARDWARE REMOVAL;  Surgeon: Cristian Tubbs II,  MD;  Location: [facility];  Service: Orthopedics   • KNEE ARTHROPLASTY Left    • LAMINECTOMY FOR IMPLANTATION / PLACEMENT NEUROSTIMULATOR ELECTRODES     • LUMBAR LAMINECTOMY      L-3 L4 L4 L5   • TONSILLECTOMY     • ZENKERS DIVERTICULECTOMY N/A 9/27/2016    Procedure: ENDOSCOPIC REMOVAL OF ZENKERS DIVERTICULUM W/ WERDASCOPE;  Surgeon: Oswald Barth MD;  Location: [facility] OR;  Service:    • ZENKERS DIVERTICULECTOMY N/A 4/14/2017    Procedure: ESOPHAGOSCOPY;  Surgeon: Oswald Barth MD;  Location: [facility] OR;  Service:      General Information     Row Name 07/08/20 1122          PT Evaluation Time/Intention    Document Type  evaluation  -EM     Mode of Treatment  individual therapy;physical therapy  -EM     Row Name 07/08/20 1122          General Information    Patient Profile Reviewed?  yes  -EM     Prior Level of Function  independent:;transfer;w/c or scooter  -EM     Existing Precautions/Restrictions  fall  -EM     Row Name 07/08/20 1122          Relationship/Environment    Lives With  facility resident  -EM     Row Name 07/08/20 1122          Resource/Environmental Concerns    Current Living Arrangements  extended care facility  -EM     Row Name 07/08/20 1122          Cognitive Assessment/Intervention- PT/OT    Orientation Status (Cognition)  oriented x 3  -EM     Row Name 07/08/20 1122          Safety Issues, Functional Mobility    Impairments Affecting Function (Mobility)  balance;endurance/activity tolerance;strength  -EM       User Key  (r) = Recorded By, (t) = Taken By, (c) = Cosigned By    Initials Name Provider Type    EM Paty Lock, PT Physical Therapist        Mobility     Row Name 07/08/20 1124          Bed Mobility Assessment/Treatment    Bed Mobility Assessment/Treatment  supine-sit  -EM     Supine-Sit [place] (Bed Mobility)  minimum assist (75% patient effort)  -EM     Assistive Device (Bed Mobility)  head of bed elevated;bed rails;draw sheet  -EM     Row Name 07/08/20  1124          Bed-Chair Transfer    Bed-Chair [place] (Transfers)  minimum assist (75% patient effort)  -     Row Name 07/08/20 1124          Gait/Stairs Assessment/Training    Comment (Gait/Stairs)  chair set up on R side for patient, patient performed stand pivot tsf with Srinath  -EM       User Key  (r) = Recorded By, (t) = Taken By, (c) = Cosigned By    Initials Name Provider Type    EM Paty Lock, PT Physical Therapist        Obj/Interventions     [facility] Name 07/08/20 1125          General ROM    GENERAL ROM COMMENTS  ROM WNL   -EM     [facility] Name 07/08/20 1125          MMT (Manual Muscle Testing)    General MMT Comments  no focal deficits identified   -[facility] Name 07/08/20 1125          Therapeutic Exercise    Lower Extremity (Therapeutic Exercise)  quad sets, bilateral;heel slides, bilateral  -EM     Lower Extremity Range of Motion (Therapeutic Exercise)  ankle dorsiflexion/plantar flexion, bilateral  -EM     Sets/Reps (Therapeutic Exercise)  1/10  -[facility] Name 07/08/20 1125          Static Sitting Balance    Level of [place] (Unsupported Sitting, Static Balance)  supervision  -EM     Sitting Position (Unsupported Sitting, Static Balance)  sitting on edge of bed  -[facility] Name 07/08/20 1125          Dynamic Sitting Balance    Level of [place], Reaches Outside Midline (Sitting, Dynamic Balance)  supervision  -EM     Sitting Position, Reaches Outside Midline (Sitting, Dynamic Balance)  sitting on edge of bed  -[facility] Name 07/08/20 1125          Static Standing Balance    Level of [place] (Supported Standing, Static Balance)  contact guard assist  -EM     Row Name 07/08/20 1125          Dynamic Standing Balance    Level of [place], Reaches Outside Midline (Standing, Dynamic Balance)  minimal assist, 75% patient effort  -[facility] Name 07/08/20 1125          Sensory Assessment/Intervention    Sensory General Assessment  no sensation deficits identified  -EM       User  Key  (r) = Recorded By, (t) = Taken By, (c) = Cosigned By    Initials Name Provider Type    Paty Dickens PT Physical Therapist        Goals/Plan     Row Name 07/08/20 1127          Bed Mobility Goal 1 (PT)    Activity/Assistive Device (Bed Mobility Goal 1, PT)  bed mobility activities, all  -EM     [place] Level/Cues Needed (Bed Mobility Goal 1, PT)  contact guard assist  -EM     Time Frame (Bed Mobility Goal 1, PT)  1 week  -EM     Row Name 07/08/20 1127          Transfer Goal 1 (PT)    Activity/Assistive Device (Transfer Goal 1, PT)  transfers, all  -EM     Broad Top Level/Cues Needed (Transfer Goal 1, PT)  contact guard assist  -EM     Time Frame (Transfer Goal 1, PT)  1 week  -EM       User Key  (r) = Recorded By, (t) = Taken By, (c) = Cosigned By    Initials Name Provider Type    Paty Dickens PT Physical Therapist        Clinical Impression     Row Name 07/08/20 1126          Pain Assessment    Additional Documentation  Pain Scale: Numbers Pre/Post-Treatment (Group)  -EM     Row Name 07/08/20 1126          Pain Scale: Numbers Pre/Post-Treatment    Pain Scale: Numbers, Pretreatment  0/10 - no pain  -EM     Row Name 07/08/20 1126          Plan of Care Review    Plan of Care Reviewed With  patient  -EM     [facility] Name 07/08/20 1126          Physical Therapy Clinical Impression    Patient/Family Goals Statement (PT Clinical Impression)  be able to tsf independently   -EM     Criteria for Skilled Interventions Met (PT Clinical Impression)  yes;treatment indicated  -EM     Rehab Potential (PT Clinical Summary)  good, to achieve stated therapy goals  -EM     Row Name 07/08/20 1126          Positioning and Restraints    Pre-Treatment Position  in bed  -EM     Post Treatment Position  chair  -EM     In Chair  reclined;call light within reach;exit alarm on;notified nsg  -EM       User Key  (r) = Recorded By, (t) = Taken By, (c) = Cosigned By    Initials Name Provider Type    ARNAUD Lock,  Paty GARCIA PT Physical Therapist        Outcome Measures     Row Name 07/08/20 1128          How much help from another person do you currently need...    Turning from your back to your side while in flat bed without using bedrails?  3  -EM     Moving from lying on back to sitting on the side of a flat bed without bedrails?  3  -EM     Moving to and from a bed to a chair (including a wheelchair)?  3  -EM     Standing up from a chair using your arms (e.g., wheelchair, bedside chair)?  3  -EM     Climbing 3-5 steps with a railing?  1  -EM     To walk in hospital room?  2  -EM     AM-PAC 6 Clicks Score (PT)  15  -EM     Row Name 07/08/20 1128          Functional Assessment    Outcome Measure Options  AM-PAC 6 Clicks Basic Mobility (PT)  -EM       User Key  (r) = Recorded By, (t) = Taken By, (c) = Cosigned By    Initials Name Provider Type    Paty Dickens PT Physical Therapist        Physical Therapy Education                 Title: PT OT SLP Therapies (In Progress)     Topic: Physical Therapy (In Progress)     Point: Mobility training (Done)     Description:   Instruct learner(s) on safety and technique for assisting patient out of bed, chair or wheelchair.  Instruct in the proper use of assistive devices, such as walker, crutches, cane or brace.              Patient Friendly Description:   It's important to get you on your feet again, but we need to do so in a way that is safe for you. Falling has serious consequences, and your personal safety is the most important thing of all.        When it's time to get out of bed, one of us or a family member will sit next to you on the bed to give you support.     If your doctor or nurse tells you to use a walker, crutches, a cane, or a brace, be sure you use it every time you get out of bed, even if you think you don't need it.    Learning Progress Summary           Patient AcceptanceZOË VU by ARNAUD at 7/8/2020 1128                   Point: Home exercise program  (Not Started)     Description:   Instruct learner(s) on appropriate technique for monitoring, assisting and/or progressing patient with therapeutic exercises and activities.              Learner Progress:   Not documented in this visit.                      User Key     Initials Effective Dates Name Provider Type Discipline     04/03/18 -  Paty Lock PT Physical Therapist PT              PT Recommendation and Plan  Planned Therapy Interventions (PT Eval): bed mobility training, home exercise program, transfer training, patient/family education  Outcome Summary/Treatment Plan (PT)  Anticipated Discharge Disposition (PT): extended care facility  Plan of Care Reviewed With: patient  Outcome Summary: Patient is a 89 y.o. female admitted to [facility] for UTI on 7/5/2020. PMHx includes HTN, seizures, depression. Patient is independent with transfers at baseline and lives in a NH. Patient reports she has fallen 3x in past year when transferring independently. Today, patient performed bed mobility with Srinath, required Srinath for transfer from bed to chair.  Patient demonstrates impairments consisting of generalized weakness, decreased activity tolerance. Patient may benefit from skilled PT services acutely to address functional deficits as well as improve level of independence prior to discharge. Anticipate return to NH upon DC.     Time Calculation:   PT Charges     Row Name 07/08/20 1130             Time Calculation    Start Time  0945  -EM      Stop Time  1005  -EM      Time Calculation (min)  20 min  -EM      PT Received On  07/08/20  -EM      PT - Next Appointment  07/10/20  -EM      PT Goal Re-Cert Due Date  07/15/20  -EM         Time Calculation- PT    Total Timed Code Minutes- PT  12 minute(s)  -EM        User Key  (r) = Recorded By, (t) = Taken By, (c) = Cosigned By    Initials Name Provider Type    EM Paty Lock PT Physical Therapist        Therapy Charges for Today     Code Description Service Date  Service Provider Modifiers Qty    33606496758 HC PT EVAL MOD COMPLEXITY 2 7/8/2020 Paty Lock, PT GP 1    09649265382 HC PT THER PROC EA 15 MIN 7/8/2020 Paty Lock, PT GP 1    94345565596 HC PT THER SUPP EA 15 MIN 7/8/2020 Paty Lock, PT GP 1          PT G-Codes  Outcome Measure Options: AM-PAC 6 Clicks Basic Mobility (PT)  AM-PAC 6 Clicks Score (PT): 15    Paty Lock, PT  7/8/2020

## 2020-07-08 NOTE — PLAN OF CARE
Problem: Patient Care Overview  Goal: Plan of Care Review  Flowsheets  Taken 7/8/2020 1126  Plan of Care Reviewed With: patient  Taken 7/8/2020 1128  Outcome Summary: Patient is a 89 y.o. female admitted to [facility] for UTI on 7/5/2020. PMHx includes HTN, seizures, depression. Patient is independent with transfers at baseline and lives in a NH. Patient reports she has fallen 3x in past year when transferring independently. Today, patient performed bed mobility with Srinath, required Srinath for transfer from bed to chair.  Patient demonstrates impairments consisting of generalized weakness, decreased activity tolerance. Patient may benefit from skilled PT services acutely to address functional deficits as well as improve level of independence prior to discharge. Anticipate return to NH upon DC.   Patient was wearing a face mask during this therapy encounter. Therapist used appropriate personal protective equipment including mask and gloves.  Mask used was standard procedure mask. Appropriate PPE was worn during the entire therapy session. Hand hygiene was completed before and after therapy session. Patient is not in enhanced droplet precautions.

## 2020-07-08 NOTE — THERAPY RE-EVALUATION
Acute Care - Speech Language Pathology   Swallow Re-Evaluation [facility]     Patient Name: Kim Feldman  : 3/13/1930  MRN: [id number]  Today's Date: 2020               Admit Date: 2020    Visit Dx:     ICD-10-CM ICD-9-CM   1. Acute UTI N39.0 599.0   2. Acute metabolic encephalopathy G93.41 348.31   3. ANDREW (acute kidney injury) (CMS/[facility]) N17.9 584.9     Patient Active Problem List   Diagnosis   • Anemia   • Bulging lumbar disc   • Depression, endogenous (CMS/[facility])   • Gastroesophageal reflux disease   • Hyperlipidemia   • Intermittent claudication (CMS/[facility])   • Neuropathy   • Osteoarthritis of knee   • Syndrome of inappropriate secretion of antidiuretic hormone (CMS/[facility])   • Vitamin D deficiency   • History of sick sinus syndrome   • Intertrigo   • Generalized convulsive seizures (CMS/[facility])   • Change in bowel habits   • Zenker diverticulum   • History of anxiety   • 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[facility])   • Thrombocytopenia (CMS/[facility])   • B12 deficiency   • Cardiac pacemaker in situ   • Chronic venous insufficiency   • Arthritis   • S/P knee replacement   • Chronic bilateral low back pain without sciatica   • Essential hypertension   • Hiatal hernia   • Zenker's diverticulum   • Chronic idiopathic constipation   • Pressure sore on buttocks   • Somnolence   • Closed trimalleolar fracture of right ankle   • Surgical wound infection   • History of failed arthroplasty of ankle   • History of CVA in adulthood   • Pneumonia of both lower lobes due to infectious organism   • Acute UTI     Past Medical History:   Diagnosis Date   • Anemia    • At risk for falls    • At risk for sleep apnea 2018   • Atrial fibrillation (CMS/[facility])    • Bulging lumbar disc    • Cellulitis     BILATERAL LEGS   • Chronic back pain    • Chronic cough    • Chronic UTI    • Chronic venous insufficiency    • 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[facility])    • DDD (degenerative disc disease), lumbosacral    • Dementia without behavioral  disturbance (CMS/HCC)     moderate   • Depression, endogenous (CMS/HCC)    • Disc degeneration, lumbar    • Dysphagia    • GERD (gastroesophageal reflux disease)    • Hiatal hernia    • History of anxiety    • History of aspiration pneumonitis    • History of cerebral artery occlusion     CVA (following TIA), 10/10, treated with tPA   • History of CVA in adulthood 4/5/2019   • History of herpes zoster    • History of ingrowing nail    • History of osteoporosis    • History of poliomyelitis     child   • History of sciatica    • History of sick sinus syndrome     s/p PPM   • History of transient cerebral ischemia     followed by stroke in 10/2010. BIBI was normal.   • History of Zenker's diverticulum removal 2016   • HX: long term anticoagulant use    • Hyperlipidemia    • Hypertension     NO CURRENT MEDICATION   • Hyponatremia    • Intertrigo    • Lumbar radiculopathy    • Morbid obesity (CMS/HCC)    • MRSA (methicillin resistant Staphylococcus aureus)     LEFT FOOT SPREAD TO RIGHT ANKLE; DR TUBBS AWARE   • Neuralgia     with diplopia secondary to facial shingles   • Nonepileptic episode (CMS/HCC)    • Osteoarthritis of right knee    • Pacemaker    • Pneumonia    • Seeing double     secondary to shingles on the face also with neuralgia   • Seizures (CMS/HCC)    • 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)    • Spinal stenosis    • Vitamin D deficiency    • Zenker's diverticulum      Past Surgical History:   Procedure Laterality Date   • ANKLE OPEN REDUCTION INTERNAL FIXATION Right 11/17/2018    Procedure: ANKLE OPEN REDUCTION INTERNAL FIXATION;  Surgeon: Cristian Tubbs II, MD;  Location: [facility];  Service: Orthopedics   • CARDIAC PACEMAKER PLACEMENT      secondary to SSS, placed in 2004, with gen chng 2014; [employer] dual DOO   • CATARACT EXTRACTION W/ INTRAOCULAR LENS IMPLANT Bilateral    • COLONOSCOPY     • HAND SURGERY Right    • HARDWARE REMOVAL Right 2/19/2019    Procedure: RT  ANKLE HARDWARE REMOVAL;  Surgeon: Cristian Hill II, MD;  Location: [facility] OR;  Service: Orthopedics   • KNEE ARTHROPLASTY Left    • LAMINECTOMY FOR IMPLANTATION / PLACEMENT NEUROSTIMULATOR ELECTRODES     • LUMBAR LAMINECTOMY      L-3 L4 L4 L5   • TONSILLECTOMY     • ZENKERS DIVERTICULECTOMY N/A 9/27/2016    Procedure: ENDOSCOPIC REMOVAL OF ZENKERS DIVERTICULUM W/ WERDASCOPE;  Surgeon: Oswald Barth MD;  Location: [facility] OR;  Service:    • ZENKERS DIVERTICULECTOMY N/A 4/14/2017    Procedure: ESOPHAGOSCOPY;  Surgeon: Oswald Barth MD;  Location: [facility] OR;  Service:         SWALLOW EVALUATION (last 72 hours)      SLP Adult Swallow Evaluation     Row Name 07/08/20 0821 07/06/20 0900                Rehab Evaluation    Document Type  re-evaluation  -LN  evaluation  -SH       Subjective Information  no complaints  -LN  --       Patient Observations  alert;cooperative  -LN  --       Patient Effort  good  -LN  excellent  -SH       Symptoms Noted During/After Treatment  none  -LN  --          General Information    Patient Profile Reviewed  yes  -LN  yes  -SH       Pertinent History Of Current Problem  Clinical bedside feeding evaluation performed on 7/06. Cough with thins via straw and throat clear with mixed. Pt recommended for mechanical soft, no mixed, diet with thin liquids. No straws.   -LN  Pt with hx of Zenker's (repair 2016) and esophageal diverticulum (repair 2017) s/p repair. Pt's daughter reports no asp pna since repair of esophageal diverticulum (completed at [facility]). Most recent esophagram revealed penetration without aspiration with thins. VFSS 3/18/17 revealed no penetration/aspiration, but presence of esophageal diverticulum. VFSS completed after acute L pontine stroke revealed a functional swallow 12/15/17. CXR clear this admit. Pt admitted with acute UTI.   -SH       Current Method of Nutrition  mechanical soft, no mixed consistencies;thin liquids  -LN  NPO  -SH        Precautions/Limitations, Vision  WFL with corrective lenses  -LN  WFL;for purposes of eval  -       Precautions/Limitations, Hearing  WFL;for purposes of eval  -LN  WFL;for purposes of eval  -       Prior Level of Function-Communication  WFL  -LN  WFL  -       Prior Level of Function-Swallowing  no diet consistency restrictions  -LN  no diet consistency restrictions  -       Plans/Goals Discussed with  patient;agreed upon  -  patient;agreed upon  -       Barriers to Rehab  medically complex  -LN  medically complex  -       Patient's Goals for Discharge  --  patient did not state  -          Pain Assessment    Additional Documentation  Pain Scale: Numbers Pre/Post-Treatment (Group)  -LN  --          Pain Scale: Numbers Pre/Post-Treatment    Pain Scale: Numbers, Pretreatment  0/10 - no pain  -LN  --       Pain Scale: Numbers, Post-Treatment  0/10 - no pain  -  --          Oral Motor and Function    Dentition Assessment  --  upper dentures/partial in place;other (see comments) NEW UPPER DENTURES-MILD ORAL DIFF PER PT  -SH       Secretion Management  --  WNL/WFL  -SH       Volitional Swallow  --  delayed  -       Volitional Cough  --  WFL  -          Oral Musculature and Cranial Nerve Assessment    Oral Motor General Assessment  oral labial or buccal impairment  -LN  oral labial or buccal impairment  -       Oral Motor, Comment  Slight droop on L  -LN  Slight droop on L  -SH          Clinical Swallow Eval    Clinical Swallow Evaluation Summary  A re-eval of the patient's swallow was completed this date. The patient appears to be tolerating the previously recommended diet, throat clearing (1/1 trial) continues to be present with mixed consistencies. Mastication appears adequate and laryngeal elevation was age appropriate, upon palpation. SLP recommends continuation of current diet (i.e. mechanical soft, no mixed, diet with thin liquids. No straws. Oral meds whole with puree. Pt reports PO intake  is reduced and appetite is low. Pt dislikes Boosts/Ensures, so recommend magic cup for pt. RN agreed.   -  Patient seen for clinical swallow assessment. Pt denies dysphagia. Pt with hx of zenker's (2016) and esophageal (2017) diverticulum repair. Pt is coughing with thins via straw this date, suspected mistiming. Throat clearing noted with mixed. Mild oral residue with solids. Laryngeal elevation appeared age appropriate. Pt able to feed herself this date. SLP recs thins, no straws, and mech soft, no mixed. Meds whole with puree.  -          Clinical Impression    SLP Swallowing Diagnosis  oral dysfunction;suspected pharyngeal dysfunction  -LN  oral dysfunction;suspected pharyngeal dysfunction  -       Functional Impact  risk of aspiration/pneumonia  -LN  risk of aspiration/pneumonia  -       Rehab Potential/Prognosis, Swallowing  good, to achieve stated therapy goals  -LN  good, to achieve stated therapy goals  -       Swallow Criteria for Skilled Therapeutic Interventions Met  demonstrates skilled criteria  -LN  demonstrates skilled criteria  -          Recommendations    Therapy Frequency (Swallow)  evaluation only  -LN  PRN  -       Predicted Duration Therapy Intervention (Days)  --  until discharge  -       SLP Diet Recommendation  mechanical soft with no mixed consistencies;thin liquids  -LN  mechanical soft with no mixed consistencies;thin liquids  -       Recommended Diagnostics  --  reassess via clinical swallow evaluation  -       Recommended Precautions and Strategies  upright posture during/after eating;small bites of food and sips of liquid;no straw  -  upright posture during/after eating;small bites of food and sips of liquid;no straw  -       SLP Rec. for Method of Medication Administration  meds whole;with pudding or applesauce;as tolerated  -LN  meds whole;with pudding or applesauce;as tolerated  -       Monitor for Signs of Aspiration  yes;notify SLP if any concerns  -   yes;notify SLP if any concerns  -       Anticipated Dischage Disposition (SLP)  --  unknown  -SH          Swallow Goals (SLP)    Oral Nutrition/Hydration Goal Selection (SLP)  oral nutrition/hydration, SLP goal 1  -LN  oral nutrition/hydration, SLP goal 1  -          Oral Nutrition/Hydration Goal 1 (SLP)    Oral Nutrition/Hydration Goal 1, SLP  Pt will damien PO without overt s/s of aspiration.  -LN  Pt will damien PO without overt s/s of aspiration.  -       Time Frame (Oral Nutrition/Hydration Goal 1, SLP)  by discharge  -LN  by discharge  -       Progress/Outcomes (Oral Nutrition/Hydration Goal 1, SLP)  good progress toward goal  -LN  --         User Key  (r) = Recorded By, (t) = Taken By, (c) = Cosigned By    Initials Name Effective Dates     Joann Espinoza, MS CCC-SLP 03/07/18 -     LN Bhumika Rueda 12/17/19 -           EDUCATION  The patient has been educated in the following areas:   Dysphagia (Swallowing Impairment) Oral Care/Hydration.    SLP Recommendation and Plan  SLP Swallowing Diagnosis: oral dysfunction, suspected pharyngeal dysfunction  SLP Diet Recommendation: mechanical soft with no mixed consistencies, thin liquids  Recommended Precautions and Strategies: upright posture during/after eating, small bites of food and sips of liquid, no straw  SLP Rec. for Method of Medication Administration: meds whole, with pudding or applesauce, as tolerated     Monitor for Signs of Aspiration: yes, notify SLP if any concerns     Swallow Criteria for Skilled Therapeutic Interventions Met: demonstrates skilled criteria     Rehab Potential/Prognosis, Swallowing: good, to achieve stated therapy goals  Therapy Frequency (Swallow): evaluation only               SLP GOALS     Row Name 07/08/20 0821 07/06/20 0900          Oral Nutrition/Hydration Goal 1 (SLP)    Oral Nutrition/Hydration Goal 1, SLP  Pt will damien PO without overt s/s of aspiration.  -LN  Pt will damien PO without overt s/s of aspiration.  -     Time  Frame (Oral Nutrition/Hydration Goal 1, SLP)  by discharge  -LN  by discharge  -     Progress/Outcomes (Oral Nutrition/Hydration Goal 1, SLP)  good progress toward goal  -LN  --       User Key  (r) = Recorded By, (t) = Taken By, (c) = Cosigned By    Initials Name Provider Type    Joann Prieto MS CCC-SLP Speech and Language Pathologist    Bhumika Husain Speech and Language Pathologist           SLP Outcome Measures (last 72 hours)      SLP Outcome Measures     Row Name 07/06/20 1000             SLP Outcome Measures    Outcome Measure Used?  Adult NOMS  -         Adult FCM Scores    FCM Chosen  Swallowing  -      Swallowing FCM Score  4  -        User Key  (r) = Recorded By, (t) = Taken By, (c) = Cosigned By    Initials Name Effective Dates    Joann Prieto MS CCC-SLP 03/07/18 -            Time Calculation:   Time Calculation- SLP     Row Name 07/08/20 1046             Time Calculation- SLP    SLP Start Time  0821  -LN      SLP Received On  07/08/20  -        User Key  (r) = Recorded By, (t) = Taken By, (c) = Cosigned By    Initials Name Provider Type    Bhumika Husain Speech and Language Pathologist          Therapy Charges for Today     Code Description Service Date Service Provider Modifiers Qty    [id number] HC ST TREATMENT SWALLOW 3 7/8/2020 Bhumika Rueda GN 1               Bhumika Rueda  7/8/2020

## 2020-07-08 NOTE — PLAN OF CARE
Problem: Patient Care Overview  Goal: Plan of Care Review  Outcome: Ongoing (interventions implemented as appropriate)  Flowsheets (Taken 7/8/2020 0665)  Progress: improving  Plan of Care Reviewed With: patient  Outcome Summary: aox3, slept most of the night. q2 turn. purewick. PT consult d/t increasing frequency of falls. Pt was intially able to transfer to w/c independently. plan is to go back to [facility]. iv abx. vss. will continue to monitor.     Problem: Fall Risk (Adult)  Goal: Identify Related Risk Factors and Signs and Symptoms  Description  Related risk factors and signs and symptoms are identified upon initiation of Human Response Clinical Practice Guideline (CPG).  Outcome: Ongoing (interventions implemented as appropriate)     Problem: Pain, Chronic (Adult)  Goal: Identify Related Risk Factors and Signs and Symptoms  Description  Related risk factors and signs and symptoms are identified upon initiation of Human Response Clinical Practice Guideline (CPG).  Outcome: Ongoing (interventions implemented as appropriate)

## 2020-07-08 NOTE — PROGRESS NOTES
Continued Stay Note  [facility]     Patient Name: Kim Feldman  MRN: [id number]  Today's Date: 7/8/2020    Admit Date: 7/5/2020    Discharge Plan     Row Name 07/08/20 0923       Plan    Plan  Plan is to return to LTC bed at [place]    Plan Comments  Confirmed w/ Irene/ Rambo that pt can return upon DC.        Discharge Codes    No documentation.             Kayla Mensah RN

## 2020-07-09 NOTE — PLAN OF CARE
Problem: Patient Care Overview  Goal: Plan of Care Review  Flowsheets (Taken 7/9/2020 5542)  Outcome Summary: VSS, AAOX3, Assist X2 to [facility] ,Going back to [facility] this pm. Will continue to monitor

## 2020-07-09 NOTE — DISCHARGE PLACEMENT REQUEST
"Kim Feldman (89 y.o. Female)     Date of Birth Social Security Number Address Home Phone MRN    03/13/1930  [address] [address] [phone] [id number]    M[employer]on Marital Status          Yarsani        Admission Date Admission Type Admitting Provider Attending Provider Department, Room/Bed    7/5/20 Emergency Juan Carlos Harper MD Ahmed, Aftab, MD [address], [address]    Discharge Date Discharge Disposition Discharge Destination         Skilled Nursing Facility (DC - External)              Attending Provider:  Juan Carlos Harper MD    Allergies:  Lipitor [Atorvastatin], Penicillins    Isolation:  None   Infection:  MRSA/History Only (06/30/20)   Code Status:  No CPR    Ht:  157.5 cm (62\")   Wt:  108 kg (237 lb 7 oz)    Admission Cmt:  None   Principal Problem:  None                Active Insurance as of 7/5/2020     Primary Coverage     Payor Plan Insurance Group Employer/Plan Group    MEDICARE MEDICARE A & B      Payor Plan Address Payor Plan Phone Number Payor Plan Fax Number Effective Dates    [address] [phone]  3/1/1995 - None Entered    [facility] [id number]       Subscriber Name Subscriber Birth Date Member ID       KIM FELDMAN 3/13/1930 [id number]           Secondary Coverage     Payor Plan Insurance Group Employer/Plan Group    AETNA COMMERCIAL AETNA  [employer] SU[employer]      Payor Plan Address Payor Plan Phone Number Payor Plan Fax Number Effective Dates    [address] [phone]  1/1/2020 - None Entered    [facility] [id number]       Subscriber Name Subscriber Birth Date Member ID       KIM FELDMAN 3/13/1930 [id number]                 Emergency Contacts      (Rel.) Home Phone Work Phone Mobile Phone    Leandra Feldman (Daughter) [phone] -- [phone]                 Discharge Summary      Juan Carlos Harper MD at 07/09/20 1327        Discharge summary    Date of admission 7/5/2020  Date of discharge " 7/9/2020    Final diagnoses  Acute recurrent UTI  Dehydration  Status post fall  Hallucination resolved  Metabolic encephalopathy  Hypertension  Elevated troponin with no chest pain  Chronic anemia  Seizure disorder  Depression  Degenerative disc disease  SSS status post permanent pacemaker  Gastroesophageal reflux disease    Discharge medications    Current Facility-Administered Medications:   •  acetaminophen (TYLENOL) tablet 650 mg, 650 mg, Oral, Q6H PRN, Juan Carlos Harper MD, 650 mg at 07/08/20 1654  •  budesonide-formoterol (SYMBICORT) 160-4.5 MCG/ACT inhaler 2 puff, 2 puff, Inhalation, BID - RT, Juan Carlos Harper MD, 2 puff at 07/09/20 0727  •  calcium-vitamin D 500-200 MG-UNIT tablet 500 mg, 500 mg, Oral, Daily, Juan Carlos Harper MD, 500 mg at 07/09/20 0833  •  cephalexin (KEFLEX) capsule 250 mg, 250 mg, Oral, Q8H, Juan Carlos Harper MD  •  citalopram (CeleXA) tablet 20 mg, 20 mg, Oral, Nightly, Juan Carlos Harper MD, 20 mg at 07/08/20 2006  •  ferrous sulfate tablet 325 mg, 325 mg, Oral, Daily, Juan Carlos Harper MD, 325 mg at 07/09/20 0833  •  gabapentin (NEURONTIN) capsule 600 mg, 600 mg, Oral, Nightly, Juan Carlos Harper MD, 600 mg at 07/08/20 2013  •  levETIRAcetam (KEPPRA) tablet 750 mg, 750 mg, Oral, BID, Juan Carlos Harper MD, 750 mg at 07/09/20 0833  •  mirtazapine (REMERON) tablet 15 mg, 15 mg, Oral, Nightly, Juan Carlos Harper MD, 15 mg at 07/08/20 2006  •  pantoprazole (PROTONIX) EC tablet 40 mg, 40 mg, Oral, Daily, Juan Carlos Harper MD, 40 mg at 07/09/20 0833  •  polyethylene glycol (MIRALAX) packet 17 g, 17 g, Oral, Daily, Juan Carlos Harper MD, 17 g at 07/09/20 0833  •  [COMPLETED] Insert peripheral IV, , , Once **AND** sodium chloride 0.9 % flush 10 mL, 10 mL, Intravenous, PRN, aRvindra Evans MD     Consults obtained  None    Procedures  None    Hospital course  89 white female with very complex past medical history and well-known to our service including old CVA seizure disorder hypertension chronic anemia degenerative disease sick sinus  syndrome status post permanent pacemaker who is a nursing home resident admitted to emergency room after the fall and generalized weakness.  Patient work-up revealed dehydration and UTI admit for management.  Patient also hallucinating with metabolic encephalopathy.  Patient admitted treated with IV fluid empiric antibiotics after obtaining the cultures.  Patient cultures remains negative but she responded to the treatment.  Patient also taking a lot of unnecessary medication which is discontinued.  Patient is back to baseline fully alert oriented and wants to go back to nursing home.  Patient remained DNR throughout hospital course and she can finish 3 more days of oral antibiotics at nursing home.    Discharge diet regular    Activity per PT OT    Medication as above    Further care per accepting physician at nursing home and take medication as directed    GAETANO ACOSTA MD      Electronically signed by Gaetano Acosta MD at 07/09/20 1325       Discharge Order (From admission, onward)     Start     Ordered    07/09/20 1325  Discharge patient  Once     Expected Discharge Date:  07/09/20    Discharge Disposition:  Skilled Nursing Facility (DC - External)    Physician of Record for Attribution - Please select from Treatment Team:  GAETANO ACOSTA [[id number]]    Review needed by CMO to determine Physician of Record:  No       Question Answer Comment   Physician of Record for Attribution - Please select from Treatment Team GAETANO ACOSTA    Review needed by CMO to determine Physician of Record No        07/09/20 2852

## 2020-07-09 NOTE — DISCHARGE SUMMARY
Discharge summary    Date of admission 7/5/2020  Date of discharge 7/9/2020    Final diagnoses  Acute recurrent UTI  Dehydration  Status post fall  Hallucination resolved  Metabolic encephalopathy  Hypertension  Elevated troponin with no chest pain  Chronic anemia  Seizure disorder  Depression  Degenerative disc disease  SSS status post permanent pacemaker  Gastroesophageal reflux disease    Discharge medications    Current Facility-Administered Medications:   •  acetaminophen (TYLENOL) tablet 650 mg, 650 mg, Oral, Q6H PRN, Juan Carlos Harper MD, 650 mg at 07/08/20 1654  •  budesonide-formoterol (SYMBICORT) 160-4.5 MCG/ACT inhaler 2 puff, 2 puff, Inhalation, BID - RT, Juan Carlos Harper MD, 2 puff at 07/09/20 0727  •  calcium-vitamin D 500-200 MG-UNIT tablet 500 mg, 500 mg, Oral, Daily, Juan Carlos Harper MD, 500 mg at 07/09/20 0833  •  cephalexin (KEFLEX) capsule 250 mg, 250 mg, Oral, Q8H, Juan Carlos Harper MD  •  citalopram (CeleXA) tablet 20 mg, 20 mg, Oral, Nightly, Juan Carlos Harper MD, 20 mg at 07/08/20 2006  •  ferrous sulfate tablet 325 mg, 325 mg, Oral, Daily, Juan Carlos Harper MD, 325 mg at 07/09/20 0833  •  gabapentin (NEURONTIN) capsule 600 mg, 600 mg, Oral, Nightly, Juan Carlos Harper MD, 600 mg at 07/08/20 2013  •  levETIRAcetam (KEPPRA) tablet 750 mg, 750 mg, Oral, BID, Juan Carlos Harper MD, 750 mg at 07/09/20 0833  •  mirtazapine (REMERON) tablet 15 mg, 15 mg, Oral, Nightly, Juan Carlos Harper MD, 15 mg at 07/08/20 2006  •  pantoprazole (PROTONIX) EC tablet 40 mg, 40 mg, Oral, Daily, Juan Carlos Harper MD, 40 mg at 07/09/20 0833  •  polyethylene glycol (MIRALAX) packet 17 g, 17 g, Oral, Daily, Juan Carlos Harper MD, 17 g at 07/09/20 0833  •  [COMPLETED] Insert peripheral IV, , , Once **AND** sodium chloride 0.9 % flush 10 mL, 10 mL, Intravenous, PRN, Ravindra Evans MD     Consults obtained  None    Procedures  None    Hospital course  89 white female with very complex past medical history and well-known to our service including old CVA seizure  disorder hypertension chronic anemia degenerative disease sick sinus syndrome status post permanent pacemaker who is a nursing home resident admitted to emergency room after the fall and generalized weakness.  Patient work-up revealed dehydration and UTI admit for management.  Patient also hallucinating with metabolic encephalopathy.  Patient admitted treated with IV fluid empiric antibiotics after obtaining the cultures.  Patient cultures remains negative but she responded to the treatment.  Patient also taking a lot of unnecessary medication which is discontinued.  Patient is back to baseline fully alert oriented and wants to go back to nursing home.  Patient remained DNR throughout hospital course and she can finish 3 more days of oral antibiotics at nursing home.    Discharge diet regular    Activity per PT OT    Medication as above    Further care per accepting physician at nursing home and take medication as directed    GAETANO ACOSTA MD

## 2020-07-09 NOTE — PROGRESS NOTES
"Daily progress note    Chief complaint  Doing better  No new complaints  Family at bedside    History of present illness  89-year-old white female who is well-known to our service with history of CVA seizure disorder hypertension chronic anemia depression degenerative disc disease sick sinus syndrome status post permanent pacemaker and gastroesophageal disease who is a nursing home resident brought to the emergency room after the fall with increasing weakness.  Patient fell last week hit her head.  Patient work-up has been negative but lately she is having hallucination secondary to pain medications and also found to have UTI in the ER admit for management.  Patient awake and alert answer all question but most of the history obtained from the family.  Patient is DNR per her wishes.     REVIEW OF SYSTEMS  Unremarkable     PHYSICAL EXAM  Blood pressure 158/81, pulse 71, temperature 97.2 °F (36.2 °C), temperature source Oral, resp. rate 16, height 157.5 cm (62\"), weight 108 kg (237 lb 7 oz), SpO2 94 %, not currently breastfeeding.    Constitutional: She appears distressed (mild).   Head: Normocephalic and atraumatic.   Eyes: EOM are normal.   Neck: Neck supple. Spinous process tenderness and muscular tenderness (left) present. Decreased range of motion present.   Cardiovascular: Normal rate, regular rhythm, normal heart sounds and intact distal pulses.   Pulmonary/Chest: She has decreased breath sounds in the right lower field and the left lower field. She has no wheezes.   Abdominal: Soft. Bowel sounds are normal. She exhibits no distension. There is no tenderness. There is no rebound and no guarding.   Musculoskeletal: She exhibits edema (2+ bilaterally).   Neurological: She is alert. She is not agitated. She displays weakness (GBW). No cranial nerve deficit.   Patient has general body weakness.     LAB RESULTS  Lab Results (last 24 hours)     Procedure Component Value Units Date/Time    Blood Culture - Blood, Arm, " Right [[id number]] Collected:  07/05/20 2149    Specimen:  Blood from Arm, Right Updated:  07/09/20 0730     Blood Culture No growth at 3 days    Basic Metabolic Panel [[id number]]  (Normal) Collected:  07/09/20 0638    Specimen:  Blood Updated:  07/09/20 0716     Glucose 94 mg/dL      BUN 9 mg/dL      Creatinine 0.86 mg/dL      Sodium 140 mmol/L      Potassium 3.7 mmol/L      Comment: Specimen hemolyzed.  Results may be affected.        Chloride 107 mmol/L      CO2 26.4 mmol/L      Calcium 8.8 mg/dL      eGFR Non African Amer 62 mL/min/1.73      BUN/Creatinine Ratio 10.5     Anion Gap 6.6 mmol/L     Narrative:       GFR Normal >60  Chronic Kidney Disease <60  Kidney Failure <15      CBC & Differential [[id number]] Collected:  07/09/20 0638    Specimen:  Blood Updated:  07/09/20 0656    Narrative:       The following orders were created for panel order CBC & Differential.  Procedure                               Abnormality         Status                     ---------                               -----------         ------                     CBC Auto Differential[[id number]]        Abnormal            Final result                 Please view results for these tests on the individual orders.    CBC Auto Differential [[id number]]  (Abnormal) Collected:  07/09/20 0638    Specimen:  Blood Updated:  07/09/20 0656     WBC 6.07 10*3/mm3      RBC 3.08 10*6/mm3      Hemoglobin 10.3 g/dL      Hematocrit 30.0 %      MCV 97.4 fL      MCH 33.4 pg      MCHC 34.3 g/dL      RDW 12.2 %      RDW-SD 43.3 fl      MPV 9.5 fL      Platelets 171 10*3/mm3      Neutrophil % 50.6 %      Lymphocyte % 33.9 %      Monocyte % 9.7 %      Eosinophil % 4.9 %      Basophil % 0.7 %      Immature Grans % 0.2 %      Neutrophils, Absolute 3.07 10*3/mm3      Lymphocytes, Absolute 2.06 10*3/mm3      Monocytes, Absolute 0.59 10*3/mm3      Eosinophils, Absolute 0.30 10*3/mm3      Basophils, Absolute 0.04 10*3/mm3      Immature Grans, Absolute 0.01 10*3/mm3       nRBC 0.0 /100 WBC     Blood Culture - Blood, Arm, Left [[id number]] Collected:  07/05/20 2049    Specimen:  Blood from Arm, Left Updated:  07/08/20 2115     Blood Culture No growth at 3 days        Imaging Results (Last 24 Hours)     ** No results found for the last 24 hours. **        ECG 12 Lead        HEART RATE= 79  bpm  RR Interval= 752  ms  DC Interval= 82  ms  P Horizontal Axis=   deg  P Front Axis= 0  deg  QRSD Interval= 100  ms  QT Interval= 438  ms  QRS Axis= -29  deg  T Wave Axis= 12  deg  - ABNORMAL ECG -  Sinus rhythm  Ventricular premature complex  Prolonged DC interval   Borderline left axis deviation  Probable anterior infarct, age indeterminate  Prolonged QT interval  When compared with ECG of 26-Feb-2020 6:09:47,  PVCs and prolonged QT are new             Current Facility-Administered Medications:   •  acetaminophen (TYLENOL) tablet 650 mg, 650 mg, Oral, Q6H PRN, Juan Carlos Harper MD, 650 mg at 07/08/20 1654  •  budesonide-formoterol (SYMBICORT) 160-4.5 MCG/ACT inhaler 2 puff, 2 puff, Inhalation, BID - RT, Juan Carlos Harper MD, 2 puff at 07/09/20 0727  •  calcium-vitamin D 500-200 MG-UNIT tablet 500 mg, 500 mg, Oral, Daily, Juan Carlos Harper MD, 500 mg at 07/09/20 0833  •  cefTRIAXone (ROCEPHIN) IVPB 1 g, 1 g, Intravenous, Q24H, Juan Carlos Harper MD, Last Rate: 100 mL/hr at 07/08/20 1743, 1 g at 07/08/20 1743  •  citalopram (CeleXA) tablet 20 mg, 20 mg, Oral, Nightly, Juan Carlos Harper MD, 20 mg at 07/08/20 2006  •  ferrous sulfate tablet 325 mg, 325 mg, Oral, Daily, Juan Carlos Harper MD, 325 mg at 07/09/20 0833  •  gabapentin (NEURONTIN) capsule 600 mg, 600 mg, Oral, Nightly, Juan Carlos Harper MD, 600 mg at 07/08/20 2013  •  levETIRAcetam (KEPPRA) tablet 750 mg, 750 mg, Oral, BID, Juan Carlos Harper MD, 750 mg at 07/09/20 0833  •  mirtazapine (REMERON) tablet 15 mg, 15 mg, Oral, Nightly, Juan Carlos Harper MD, 15 mg at 07/08/20 2006  •  pantoprazole (PROTONIX) EC tablet 40 mg, 40 mg, Oral, Daily, Juan Carlos Harper MD, 40 mg at 07/09/20  0833  •  polyethylene glycol (MIRALAX) packet 17 g, 17 g, Oral, Daily, Gaetano Harper MD, 17 g at 07/09/20 0833  •  [COMPLETED] Insert peripheral IV, , , Once **AND** sodium chloride 0.9 % flush 10 mL, 10 mL, Intravenous, PRN, Ravindra Evans MD     ASSESSMENT  Acute recurrent UTI  Dehydration  Status post fall  Hallucination  Metabolic encephalopathy  Hypertension  Elevated troponin with no chest pain  Chronic anemia  Seizure disorder  Depression  Degenerative disc disease  SSS status post permanent pacemaker  Gastroesophageal reflux disease    PLAN  Discharge back to nursing home on oral antibiotics for 5 more days  Discharge summary dictated    GAETANO HARPER MD

## 2020-07-09 NOTE — PROGRESS NOTES
Case Management Discharge Note      Final Note: Pt to be DC'd to skilled bed at [facility].      Provided Post Acute Provider List?: Refused  Refused Provider List Comment: wants to return to [facility]  Provided Post Acute Provider Quality & Resource List?: Refused  Refused Quality and Resource List Comment: declined wanted to return to [facility]    Destination - Selection Complete      Service Provider Request Status Selected Services Address Phone Number Fax Number    [facility] Selected Skilled Nursing [address] [address] [phone]      Durable Medical Equipment      No service has been selected for the patient.      Dialysis/Infusion      No service has been selected for the patient.      Home Medical Care      No service has been selected for the patient.      Therapy      No service has been selected for the patient.      Community Resources      No service has been selected for the patient.        Transportation Services  Private: Car    Final Discharge Disposition Code: 03 - skilled nursing facility (SNF)([facility]

## 2020-07-09 NOTE — PROGRESS NOTES
Continued Stay Note  [facility]     Patient Name: Kim Feldman  MRN: [id number]  Today's Date: 7/9/2020    Admit Date: 7/5/2020    Discharge Plan     Row Name 07/09/20 1623       Plan    Plan Comments  DC orders noted.  Spoke with Irene/ [facility] who states pt can return to a skilled bed.  Spoke with pt and her dtr Leandra who are in agreement with DC back to [facility] today.  Rx x1 copied and placed in packet.  DC summary and DC packet given to RN.  Pt's dtr will provide transport.    Final Note  Pt to be DC'd to skilled bed at [facility].          Discharge Codes    No documentation.       Expected Discharge Date and Time     Expected Discharge Date Expected Discharge Time    Jul 9, 2020             Kayla Mensah RN

## 2020-07-09 NOTE — PLAN OF CARE
Problem: Patient Care Overview  Goal: Plan of Care Review  Outcome: Ongoing (interventions implemented as appropriate)  Flowsheets (Taken 7/9/2020 3937)  Progress: no change  Plan of Care Reviewed With: patient  Outcome Summary: VSS overnight. turn q2hr. purewick in place. rested quietly most of night. no falls. skin care provided prn. ctm  Goal: Individualization and Mutuality  Outcome: Ongoing (interventions implemented as appropriate)  Goal: Discharge Needs Assessment  Outcome: Ongoing (interventions implemented as appropriate)  Goal: Interprofessional Rounds/Family Conf  Outcome: Ongoing (interventions implemented as appropriate)     Problem: Fall Risk (Adult)  Goal: Identify Related Risk Factors and Signs and Symptoms  Outcome: Ongoing (interventions implemented as appropriate)  Goal: Absence of Fall  Outcome: Ongoing (interventions implemented as appropriate)     Problem: Pain, Chronic (Adult)  Goal: Identify Related Risk Factors and Signs and Symptoms  Outcome: Ongoing (interventions implemented as appropriate)  Goal: Acceptable Pain/Comfort Level and Functional Ability  Outcome: Ongoing (interventions implemented as appropriate)     Problem: Skin Injury Risk (Adult)  Goal: Identify Related Risk Factors and Signs and Symptoms  Outcome: Ongoing (interventions implemented as appropriate)  Goal: Skin Health and Integrity  Outcome: Ongoing (interventions implemented as appropriate)     Problem: Urinary Tract Infection (Adult)  Goal: Signs and Symptoms of Listed Potential Problems Will be Absent, Minimized or Managed (Urinary Tract Infection)  Outcome: Ongoing (interventions implemented as appropriate)

## 2020-11-12 NOTE — PROGRESS NOTES
Date of Office Visit: 20  Encounter Provider: JONA Madrigal  Place of Service: [facility] CARDIOLOGY  Patient Name: Kim Feldman  :3/13/1930    Chief Complaint   Patient presents with   • History of sick sinus syndrome   :     HPI: Kim Feldman is a 89 y.o. female  with history of hypertension, hyperlipidemia, Zenker's diverticulum, sick sinus syndrome status post permanent pacemaker, recurrent UTI, GERD, and history of packed red blood cell transfusion.  She is chronic lower extremity edema.      She is followed by Dr. Ruiz. I will visit with her for the first time today and have reviewed her record.    She has history of sick sinus syndrome status post permanent pacemaker.  She was diagnosed with aspiration which was felt to be due to a Zenker's diverticulum (pharyngeal pouch), which was repaired in .  In 2018 she was admitted after a mechanical fall.  She required several surgeries.  She broke her nose.  She required 2 units of packed red blood cells.  In 2020 she had pneumonia.  We were consulted but it was not felt that she had any acute cardiac issues.        We visit today via telephone.  Patient is alone.  She reports having 2 falls this year.  She broke a bone in her right hand currently has a brace, patient physical therapy 3 times a week reportedly getting stronger.  She was at [facility].  She has occasional palpitations which are unchanged.  She denies shortness of breath, lightheadedness, chest pain tightness pressure.  She has baseline swelling in her feet and legs.  She has chronic fatigue unchanged.  She has occasional alcohol use.      Allergies   Allergen Reactions   • Lipitor [Atorvastatin] Confusion   • Penicillins Hives     Has previously tolerated ceftriaxone       Past Medical History:   Diagnosis Date   • Anemia    • At risk for falls    • At risk for sleep apnea 2018   • Atrial fibrillation  (CMS/HCC)    • Bulging lumbar disc    • Cellulitis     BILATERAL LEGS   • Chronic back pain    • Chronic cough    • Chronic UTI    • Chronic venous insufficiency    • 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HCC)    • DDD (degenerative disc disease), lumbosacral    • Dementia without behavioral disturbance (CMS/HCC)     moderate   • Depression, endogenous (CMS/HCC)    • Disc degeneration, lumbar    • Dysphagia    • GERD (gastroesophageal reflux disease)    • Hiatal hernia    • History of anxiety    • History of aspiration pneumonitis    • History of cerebral artery occlusion     CVA (following TIA), 10/10, treated with tPA   • History of CVA in adulthood 4/5/2019   • History of herpes zoster    • History of ingrowing nail    • History of osteoporosis    • History of poliomyelitis     child   • History of sciatica    • History of sick sinus syndrome     s/p PPM   • History of transient cerebral ischemia     followed by stroke in 10/2010. BIBI was normal.   • History of Zenker's diverticulum removal 2016   • HX: long term anticoagulant use    • Hyperlipidemia    • Hypertension     NO CURRENT MEDICATION   • Hyponatremia    • Intertrigo    • Lumbar radiculopathy    • Morbid obesity (CMS/HCC)    • MRSA (methicillin resistant Staphylococcus aureus)     LEFT FOOT SPREAD TO RIGHT ANKLE; DR TUBBS AWARE   • Neuralgia     with diplopia secondary to facial shingles   • Nonepileptic episode (CMS/HCC)    • Osteoarthritis of right knee    • Pacemaker    • Pneumonia    • Seeing double     secondary to shingles on the face also with neuralgia   • Seizures (CMS/HCC)    • 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)    • Spinal stenosis    • Vitamin D deficiency    • Zenker's diverticulum        Past Surgical History:   Procedure Laterality Date   • ANKLE OPEN REDUCTION INTERNAL FIXATION Right 11/17/2018    Procedure: ANKLE OPEN REDUCTION INTERNAL FIXATION;  Surgeon: Cristian Tubbs II, MD;  Location: [facility] OR;   Service: Orthopedics   • CARDIAC PACEMAKER PLACEMENT      secondary to SSS, placed in 2004, with gen chng 2014; [employer] dual DOO   • CATARACT EXTRACTION W/ INTRAOCULAR LENS IMPLANT Bilateral    • COLONOSCOPY     • HAND SURGERY Right    • HARDWARE REMOVAL Right 2/19/2019    Procedure: RT ANKLE HARDWARE REMOVAL;  Surgeon: Cristian Hill II, MD;  Location: [facility] OR;  Service: Orthopedics   • KNEE ARTHROPLASTY Left    • LAMINECTOMY FOR IMPLANTATION / PLACEMENT NEUROSTIMULATOR ELECTRODES     • LUMBAR LAMINECTOMY      L-3 L4 L4 L5   • TONSILLECTOMY     • ZENKERS DIVERTICULECTOMY N/A 9/27/2016    Procedure: ENDOSCOPIC REMOVAL OF ZENKERS DIVERTICULUM W/ WERDASCOPE;  Surgeon: Oswald Barth MD;  Location: [facility] OR;  Service:    • ZENKERS DIVERTICULECTOMY N/A 4/14/2017    Procedure: ESOPHAGOSCOPY;  Surgeon: Oswald Barth MD;  Location: [facility] OR;  Service:          Family and social history reviewed.     Review of Systems   Constitution: Positive for malaise/fatigue.   Cardiovascular: Positive for leg swelling and palpitations.     All other systems were reviewed and are negative          Objective:     There were no vitals filed for this visit.  There is no height or weight on file to calculate BMI.    PHYSICAL EXAM:  Physical Exam  Unable to assess  Procedures  Unable to assess  Current Outpatient Medications   Medication Sig Dispense Refill   • acetaminophen (TYLENOL) 325 MG tablet Take 650 mg by mouth 3 (Three) Times a Day.     • budesonide-formoterol (SYMBICORT) 160-4.5 MCG/ACT inhaler Inhale 2 puffs 2 (Two) Times a Day.     • Calcium-Vitamin D-Vitamin K (VIACTIV) 500-500-40 MG-UNT-MCG chewable tablet Chew 1 tablet Daily.     • citalopram (CeleXA) 20 MG tablet Take 20 mg by mouth Daily.     • ferrous sulfate 325 (65 FE) MG tablet Take 325 mg by mouth Daily.     • levETIRAcetam (KEPPRA) 750 MG tablet Take 1 tablet by mouth 2 (Two) Times a Day. 60 tablet 11   • mirtazapine (REMERON) 15 MG  tablet Take 15 mg by mouth Every Night.     • pantoprazole (PROTONIX) 40 MG EC tablet Take 40 mg by mouth Daily.     • polyethylene glycol (MIRALAX) packet Take 17 g by mouth Daily As Needed.       No current facility-administered medications for this visit.      Assessment:       Diagnosis Plan   1. SSS (sick sinus syndrome) (CMS/[facility])     2. Presence of permanent cardiac pacemaker     3. Recurrent falls          No orders of the defined types were placed in this encounter.        Plan:   1.  89-year-old female with sick sinus syndrome status post permanent pacemaker follows in device clinic  2.  History of hypertension but her blood pressure has steadily declined over the years she not on antihypertensive agents  3.  Chronic lower extremity edema maintained on furosemide 40 mg twice daily- reportedly stable  5.  History of Zenker's diverticulum repair in 2017  6.  History of falls- unfortunately she's fallen twice this year reportedly.lnow in PT and she has a brace on her right hand due to injury    This patient has consented to a telehealth visit via telephone. The visit was scheduled as a telephone visit to comply with patient safety concerns in accordance with CDC recommendations.  All vitals recorded within this visit are reported by the patient.  I spent  15 minutes in total including but not limited to the 5 minutes spent in direct conversation with this patient.             It has been a pleasure to participate in this patient's care.      Thank you,  JONA Madrigal      **I used Dragon to dictate this note:**

## 2020-12-16 PROBLEM — U07.1 COVID-19: Status: ACTIVE | Noted: 2020-01-01

## 2020-12-16 NOTE — ED PROVIDER NOTES
MD ATTESTATION NOTE    The MIKAELA and I have discussed this patient's history, physical exam, and treatment plan.  I have reviewed the documentation and personally had a face to face interaction with the patient. I affirm the documentation and agree with the treatment and plan.  The attached note describes my personal findings.      Kim Feldman is a 89 y.o. female who presents to the ED c/o redness of breath for the past 2 to 3 days.  She is COVID-19 positive.  She was sent here for increased confusion.      On exam:  Speaks in short phrases but does not appear to be in distress  Satting 93% on 2.5 L of nasal cannula oxygen  Pleasantly confused  Regular rate and rhythm    Labs  Recent Results (from the past 24 hour(s))   ECG 12 Lead    Collection Time: 12/16/20  1:09 PM   Result Value Ref Range    QT Interval 410 ms   Comprehensive Metabolic Panel    Collection Time: 12/16/20  1:48 PM    Specimen: Blood   Result Value Ref Range    Glucose 102 (H) 65 - 99 mg/dL    BUN 26 (H) 8 - 23 mg/dL    Creatinine 1.62 (H) 0.57 - 1.00 mg/dL    Sodium 147 (H) 136 - 145 mmol/L    Potassium 3.5 3.5 - 5.2 mmol/L    Chloride 103 98 - 107 mmol/L    CO2 30.6 (H) 22.0 - 29.0 mmol/L    Calcium 8.6 8.2 - 9.6 mg/dL    Total Protein 7.0 6.0 - 8.5 g/dL    Albumin 3.60 3.50 - 5.20 g/dL    ALT (SGPT) 13 1 - 33 U/L    AST (SGOT) 27 1 - 32 U/L    Alkaline Phosphatase 63 39 - 117 U/L    Total Bilirubin 0.5 0.0 - 1.2 mg/dL    eGFR Non African Amer 30 (L) >60 mL/min/1.73    Globulin 3.4 gm/dL    A/G Ratio 1.1 g/dL    BUN/Creatinine Ratio 16.0 7.0 - 25.0    Anion Gap 13.4 5.0 - 15.0 mmol/L   Lactic Acid, Plasma    Collection Time: 12/16/20  1:48 PM    Specimen: Blood   Result Value Ref Range    Lactate 1.2 0.5 - 2.0 mmol/L   Procalcitonin    Collection Time: 12/16/20  1:48 PM    Specimen: Blood   Result Value Ref Range    Procalcitonin 0.08 0.00 - 0.25 ng/mL   Troponin    Collection Time: 12/16/20  1:48 PM    Specimen: Blood   Result Value Ref  Range    Troponin T 0.038 (C) 0.000 - 0.030 ng/mL   CBC Auto Differential    Collection Time: 12/16/20  1:48 PM    Specimen: Blood   Result Value Ref Range    WBC 7.86 3.40 - 10.80 10*3/mm3    RBC 3.12 (L) 3.77 - 5.28 10*6/mm3    Hemoglobin 10.8 (L) 12.0 - 15.9 g/dL    Hematocrit 32.6 (L) 34.0 - 46.6 %    .5 (H) 79.0 - 97.0 fL    MCH 34.6 (H) 26.6 - 33.0 pg    MCHC 33.1 31.5 - 35.7 g/dL    RDW 12.2 (L) 12.3 - 15.4 %    RDW-SD 46.8 37.0 - 54.0 fl    MPV 9.7 6.0 - 12.0 fL    Platelets 97 (L) 140 - 450 10*3/mm3    Neutrophil % 71.4 42.7 - 76.0 %    Lymphocyte % 21.9 19.6 - 45.3 %    Monocyte % 6.0 5.0 - 12.0 %    Eosinophil % 0.3 0.3 - 6.2 %    Basophil % 0.1 0.0 - 1.5 %    Immature Grans % 0.3 0.0 - 0.5 %    Neutrophils, Absolute 5.62 1.70 - 7.00 10*3/mm3    Lymphocytes, Absolute 1.72 0.70 - 3.10 10*3/mm3    Monocytes, Absolute 0.47 0.10 - 0.90 10*3/mm3    Eosinophils, Absolute 0.02 0.00 - 0.40 10*3/mm3    Basophils, Absolute 0.01 0.00 - 0.20 10*3/mm3    Immature Grans, Absolute 0.02 0.00 - 0.05 10*3/mm3    nRBC 0.0 0.0 - 0.2 /100 WBC   Light Blue Top    Collection Time: 12/16/20  1:48 PM   Result Value Ref Range    Extra Tube hold for add-on    Green Top (Gel)    Collection Time: 12/16/20  1:48 PM   Result Value Ref Range    Extra Tube Hold for add-ons.    Lavender Top    Collection Time: 12/16/20  1:48 PM   Result Value Ref Range    Extra Tube hold for add-on    Gold Top - SST    Collection Time: 12/16/20  1:48 PM   Result Value Ref Range    Extra Tube Hold for add-ons.    D-dimer, Quantitative    Collection Time: 12/16/20  1:48 PM    Specimen: Blood   Result Value Ref Range    D-Dimer, Quantitative 1.18 (H) 0.00 - 0.49 MCGFEU/mL   Urinalysis With Microscopic If Indicated (No Culture) - Urine, Catheter    Collection Time: 12/16/20  1:50 PM    Specimen: Urine, Catheter   Result Value Ref Range    Color, UA Yellow Yellow, Straw    Appearance, UA Turbid (A) Clear    pH, UA 6.0 5.0 - 8.0    Specific Gravity, UA  1.014 1.005 - 1.030    Glucose, UA Negative Negative    Ketones, UA Trace (A) Negative    Bilirubin, UA Negative Negative    Blood, UA Small (1+) (A) Negative    Protein,  mg/dL (2+) (A) Negative    Leuk Esterase, UA Large (3+) (A) Negative    Nitrite, UA Negative Negative    Urobilinogen, UA 0.2 E.U./dL 0.2 - 1.0 E.U./dL   Urinalysis, Microscopic Only - Urine, Catheter    Collection Time: 12/16/20  1:50 PM    Specimen: Urine, Catheter   Result Value Ref Range    RBC, UA 3-5 (A) None Seen, 0-2 /HPF    WBC, UA Too Numerous to Count (A) None Seen, 0-2 /HPF    Bacteria, UA None Seen None Seen /HPF    Squamous Epithelial Cells, UA 0-2 None Seen, 0-2 /HPF    Hyaline Casts, UA 0-2 None Seen /LPF    Methodology Automated Microscopy    Respiratory Panel PCR w/COVID-19(SARS-CoV-2) [facility] In-House, NP Swab in UT/VTM, 3-4 HR TAT - Swab, Nasopharynx    Collection Time: 12/16/20  1:53 PM    Specimen: Nasopharynx; Swab   Result Value Ref Range    ADENOVIRUS, PCR Not Detected Not Detected    Coronavirus 229E Not Detected Not Detected    Coronavirus HKU1 Not Detected Not Detected    Coronavirus NL63 Not Detected Not Detected    Coronavirus OC43 Not Detected Not Detected    COVID19 Detected (C) Not Detected - Ref. Range    Human Metapneumovirus Not Detected Not Detected    Human Rhinovirus/Enterovirus Not Detected Not Detected    Influenza A PCR Not Detected Not Detected    Influenza B PCR Not Detected Not Detected    Parainfluenza Virus 1 Not Detected Not Detected    Parainfluenza Virus 2 Not Detected Not Detected    Parainfluenza Virus 3 Not Detected Not Detected    Parainfluenza Virus 4 Not Detected Not Detected    RSV, PCR Not Detected Not Detected    Bordetella pertussis pcr Not Detected Not Detected    Bordetella parapertussis PCR Not Detected Not Detected    Chlamydophila pneumoniae PCR Not Detected Not Detected    Mycoplasma pneumo by PCR Not Detected Not Detected       Radiology  Ct Head Without  Contrast    Result Date: 12/16/2020  CT OF THE BRAIN WITHOUT CONTRAST 12/16/2020  HISTORY: Altered mental status.  Axial images were obtained through the brain without intravenous contrast.  There is mild to moderate diffuse atrophy. There is mild decreased attenuation of the periventricular white matter bilaterally consistent with mild small vessel white matter ischemic disease. Tiny old lacunar infarction is seen in the head of the caudate nucleus on the right. This was also present on the 07/05/2020 study.  There is no evidence of acute infarction, hemorrhage, midline shift or mass effect.  No bony abnormalities are seen.      No acute process identified.  Radiation dose reduction techniques were utilized, including automated exposure control and exposure modulation based on body size.  This report was finalized on 12/16/2020 3:31 PM by Dr. Dony Patton M.D.      Xr Chest 1 View    Result Date: 12/16/2020  XR CHEST 1 VW-  HISTORY:  Altered mental status. Covid positive.  COMPARISON:  Chest radiographs 07/05/2020.  FINDINGS:  A single portable view of the chest was obtained. There is a left chest cardiac pacemaker, unchanged. There are partially imaged spinal electrodes. The cardiac silhouette and mediastinal and hilar contours are unchanged. The descending aorta is tortuous. There is calcific aortic atherosclerosis. There are no new focal consolidation. Pleural spaces are clear. There is multilevel degenerative disc disease.  This report was finalized on 12/16/2020 1:22 PM by Dr. Susannah Juan M.D.        Medical Decision Making:  ED Course as of Dec 16 2135   Wed Dec 16, 2020   1444 Creatinine(!): 1.62 [JS]   1444 Hemoglobin(!): 10.8 [JS]   1445 Troponin T(!!): 0.038 [JS]   1522 COVID19(!!): Detected [TD]   1538 Discontinued nasal cannula oxygen.  Room air sat decreased to 87%.  Replaced 2 L per nasal cannula.  Hospitalist consulted for admission.    [JS]   1539 Patient presents with hallucinations and  hypoxia onset yesterday after diagnosis of COVID-19 from the nursing home.  She is 87% on room air here.  There is no marked pneumonia on imaging or pleural effusions.  She was given Decadron given hypoxia.  She is maintaining oxygen saturation in the midline based on 2 L per nasal cannula.  She is not actively hallucinating here.  CT the head is negative.  White blood cell count is normal.  Creatinine 1.6 baseline per patient.  Thrombo-cytopenia is likely secondary to COVID-19.  Plan admission to the hospital for further evaluation.    [JS]   1540 Chronic for patient   Troponin(!!) [JS]   1554 I viewed chest x-ray on PACS system.  My findings are no infiltrate    [JS]   1557 Discussed patient with Dr. Harper he agrees admit patient to hospital.    [JS]      ED Course User Index  [JS] Dori De La Cruz APRN  [TD] Floyd Monterroso II, MD           PPE: Both the patient and I wore a surgical mask throughout the entire patient encounter. I wore protective goggles.     Diagnosis  Final diagnoses:   COVID-19   Hypoxia   Hallucination        Floyd Monterroso II, MD  12/16/20 0388

## 2020-12-16 NOTE — ED TRIAGE NOTES
Pt comes to ER from NH for increased weakness/confusion since yesterday. Pt was diagnosed with COVID yesterday and per NH pt has had increased weakness and confusion. Pt has hx of dementia at baseline. EMS states pt was low %90's on room air and was placed on 2L NC for supplementation. Pt and RN wearing mask upon triage.

## 2020-12-16 NOTE — PROGRESS NOTES
[facility]  Clinical Pharmacy Department     Remdesivir Review Note    Kim Feldman is a 89 y.o. female with confirmed COVID-19 infection on day 1 of hospitalization.     Consulting Provider:  Dr. Harper  Date of Confirmed SARS-CoV-2: 12/16/20  Date of Symptom Onset: 12/13/20  Planned Duration of Therapy: 5 days  Other Antimicrobials: Ceftriaxone 1 gram IV q24h  Hydroxychloroquine or chloroquine prior to arrival: No    Allergies  Allergies as of 12/16/2020 - Reviewed 12/16/2020   Allergen Reaction Noted   • Lipitor [atorvastatin] Confusion 03/08/2018   • Penicillins Hives 01/05/2016       Microbiology:  Microbiology Results (last 10 days)     Procedure Component Value - Date/Time    COVID PRE-OP / PRE-PROCEDURE SCREENING ORDER (NO ISOLATION) - Swab, Nasopharynx [[id number]]  (Abnormal) Collected: 12/16/20 9225    Lab Status: Final result Specimen: Swab from Nasopharynx Updated: 12/16/20 2966    Narrative:      The following orders were created for panel order COVID PRE-OP / PRE-PROCEDURE SCREENING ORDER (NO ISOLATION) - Swab, Nasopharynx.  Procedure                               Abnormality         Status                     ---------                               -----------         ------                     Respiratory Panel PCR w/...[[id number]]  Abnormal            Final result                 Please view results for these tests on the individual orders.    Respiratory Panel PCR w/COVID-19(SARS-CoV-2) [facility] In-House, NP Swab in UTM/VTM, 3-4 HR TAT - Swab, Nasopharynx [[id number]]  (Abnormal) Collected: 12/16/20 3853    Lab Status: Final result Specimen: Swab from Nasopharynx Updated: 12/16/20 9215     ADENOVIRUS, PCR Not Detected     Coronavirus 229E Not Detected     Coronavirus HKU1 Not Detected     Coronavirus NL63 Not Detected     Coronavirus OC43 Not Detected     COVID19 Detected     Human Metapneumovirus Not Detected     Human Rhinovirus/Enterovirus Not Detected     Influenza A  PCR Not Detected     Influenza B PCR Not Detected     Parainfluenza Virus 1 Not Detected     Parainfluenza Virus 2 Not Detected     Parainfluenza Virus 3 Not Detected     Parainfluenza Virus 4 Not Detected     RSV, PCR Not Detected     Bordetella pertussis pcr Not Detected     Bordetella parapertussis PCR Not Detected     Chlamydophila pneumoniae PCR Not Detected     Mycoplasma pneumo by PCR Not Detected    Narrative:      Fact sheet for providers: https://docs.[employer]/wp-content/uploads/ICX4042-6165-TZ7.1-EUA-Provider-Fact-Sheet-3.pdf    Fact sheet for patients: https://docs.[employer]/wp-content/uploads/VHV9842-2334-UE2.1-EUA-Patient-Fact-Sheet-1.pdf    Test performed by PCR.          Radiology/Imagin20: XR CHEST 1 VW-     HISTORY:  Altered mental status. Covid positive.     COMPARISON:  Chest radiographs 2020.     FINDINGS:    A single portable view of the chest was obtained. There is a left chest  cardiac pacemaker, unchanged. There are partially imaged spinal  electrodes. The cardiac silhouette and mediastinal and hilar contours  are unchanged. The descending aorta is tortuous. There is calcific  aortic atherosclerosis. There are no new focal consolidation. Pleural  spaces are clear. There is multilevel degenerative disc disease.     This report was finalized on 2020 1:22 PM by Dr. Susannah Juan M.D.    Vitals/Labs/I&O  Temp:  [98.2 °F (36.8 °C)] 98.2 °F (36.8 °C)  Heart Rate:  [] 80  Resp:  [18-19] 19  BP: (109-174)/(72-92) 109/72    Results from last 7 days   Lab Units 20  1348 20  1300   WBC 10*3/mm3 7.86 6.25     Results from last 7 days   Lab Units 20  1348   PROCALCITONIN ng/mL 0.08     Results from last 7 days   Lab Units 20  1348   AST (SGOT) U/L 27      Results from last 7 days   Lab Units 20  1348   ALT (SGPT) U/L 13       Estimated Creatinine Clearance: 26.7 mL/min (A) (by C-G formula based on SCr of 1.62 mg/dL (H)).  Results from  last 7 days   Lab Units 12/16/20  1348 12/12/20  1300   BUN mg/dL 26* 30*   CREATININE mg/dL 1.62* 1.71*     Intake & Output (last 3 days)     None          Assessment/Plan:    Patient is hospitalized with confirmed, severe COVID-19 infection and started on remdesivir 200 mg IV once followed by 100 mg IV daily for 4 days (5 day total duration). All inclusions, exclusions, and monitoring requirements listed below have been reviewed.    1. Patient is hospitalized with confirmed COVID-19 infection  2. Patient is requiring ?2 L of oxygen to maintain oxygen saturations of ?94%   3. Baseline and daily LFTs and Scr have been ordered prior to remdesivir initiation  4. ALT is not ? 5 times the upper limit of normal  5. Patient is not on concomitant hydroxychloroquine or chloroquine       Thank you for involving pharmacy in this patient's care. Please contact pharmacy with any questions or concerns.                           Noreen Norman, PharmD  Clinical Pharmacist  12/16/20 18:45 EST

## 2020-12-16 NOTE — H&P
History and physical    Primary care physician  Dr. Villeda    Chief complaint  Shortness of breath    History of present illness  89-year-old white female who is well-known to our service with history of hypertension seizure disorder depression chronic anemia and gastroesophageal reflux disease who is a nursing home resident brought to the emergency room multiple times in few days with shortness of breath but no fever cough abdominal pain nausea vomiting diarrhea.  Patient has some chest discomfort.  Patient was diagnosed with COVID-19 and was discharged back to nursing home sent back again with increased shortness of breath and this time she was found to be hypoxic admit for management.  Patient remained awake and alert and answer all questions appropriately.  Patient is DNR per her wishes.  Patient also found to have acute UTI     PAST MEDICAL HISTORY  • At risk for falls     • At risk for sleep apnea 11/17/2018   • Atrial fibrillation (CMS/HCC)     • Cellulitis     • Chronic back pain     • Chronic cough     • Chronic UTI     • DDD (degenerative disc disease), lumbosacral     • Dementia without behavioral disturbance (CMS/HCC)     • Disc degeneration, lumbar     • Dysphagia     • GERD (gastroesophageal reflux disease)     • History of cerebral artery occlusion     • History of herpes zoster     • History of ingrowing nail     • History of osteoporosis     • History of poliomyelitis     • History of sciatica     • History of transient cerebral ischemia     • History of Zenker's diverticulum removal 2016   • HX: long term anticoagulant use     • Hypertension     • Lumbar radiculopathy     • Morbid obesity (CMS/HCC)     • MRSA (methicillin resistant Staphylococcus aureus)     • Neuralgia     • Nonepileptic episode (CMS/HCC)     • Osteoarthritis of right knee     • Pacemaker     • Seeing double     • Seizures (CMS/HCC)     • 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)     • Spinal stenosis        PAST  SURGICAL HISTORY              Procedure Laterality Date   • ANKLE OPEN REDUCTION INTERNAL FIXATION Right 2018     Procedure: ANKLE OPEN REDUCTION INTERNAL FIXATION;  Surgeon: Cristian Hill II, MD;  Location: [facility] OR;  Service: Orthopedics   • CARDIAC PACEMAKER PLACEMENT         secondary to SSS, placed in , with gen chng ; [employer] dual DOO   • CATARACT EXTRACTION W/ INTRAOCULAR LENS IMPLANT Bilateral     • COLONOSCOPY       • HAND SURGERY Right     • HARDWARE REMOVAL Right 2019     Procedure: RT ANKLE HARDWARE REMOVAL;  Surgeon: Cristian Hill II, MD;  Location: [facility] OR;  Service: Orthopedics   • KNEE ARTHROPLASTY Left     • LAMINECTOMY FOR IMPLANTATION / PLACEMENT NEUROSTIMULATOR ELECTRODES       • LUMBAR LAMINECTOMY         L-3 L4 L4 L5   • TONSILLECTOMY       • ZENKERS DIVERTICULECTOMY N/A 2016     Procedure: ENDOSCOPIC REMOVAL OF ZENKERS DIVERTICULUM W/ WERDASCOPE;  Surgeon: Oswald Barth MD;  Location: [facility] OR;  Service:    • ZENKERS DIVERTICULECTOMY N/A 2017     Procedure: ESOPHAGOSCOPY;  Surgeon: Oswald Barth MD;  Location: [facility] OR;  Service:         FAMILY HISTORY           Problem Relation Age of Onset   • Cancer Daughter     • Malig Hyperthermia Neg Hx        SOCIAL HISTORY                 Socioeconomic History   • Marital status:        Spouse name: Not on file   • Number of children: 3   • Years of education: Not on file   • Highest education level: Not on file   Occupational History   • Occupation: Retired   Tobacco Use   • Smoking status: Former Smoker       Packs/day: 1.00       Years: 40.00       Pack years: 40.00       Types: Cigarettes       Quit date:        Years since quittin.9   • Smokeless tobacco: Never Used   • Tobacco comment: caffeine use: 2 cup of coffee in the morning and 1 in the afternoon.    Substance and Sexual Activity   • Alcohol use: Yes       Comment: 1 PER WEEK   • Drug use: No  "  • Sexual activity: Defer        ALLERGIES  Lipitor [atorvastatin] and Penicillins  Nursing home medications reviewed     REVIEW OF SYSTEMS  All systems reviewed and marked as negative except as listed in HPI      PHYSICAL EXAM  Blood pressure 142/80, pulse 91, temperature 98.2 °F (36.8 °C), temperature source Tympanic, resp. rate 19, height 157.5 cm (62\"), weight 108 kg (237 lb), SpO2 96 %, not currently breastfeeding.    GENERAL: Alert well developed, well nourished in no distress  HENT: NCAT, neck supple, trachea midline  EYES: no scleral icterus, PERRL, normal conjunctivae  CV: regular rhythm, regular rate, no murmur  RESPIRATORY: unlabored effort, scattered wheezes, rales in the bilateral bases, mild tachypnea  ABDOMEN: soft, nontender, nondistended, bowel sounds present  MUSCULOSKELETAL: no gross deformity  NEURO: alert,  sensory and motor function of extremities grossly intact, speech clear, mental status normal/baseline  SKIN: warm, dry, no rash  PSYCH:  Appropriate mood and affect    LAB RESULTS  Lab Results (last 24 hours)     Procedure Component Value Units Date/Time    D-dimer, Quantitative [[id number]] Collected: 12/16/20 1348    Specimen: Blood Updated: 12/16/20 1641    COVID PRE-OP / PRE-PROCEDURE SCREENING ORDER (NO ISOLATION) - Swab, Nasopharynx [[id number]]  (Abnormal) Collected: 12/16/20 1353    Specimen: Swab from Nasopharynx Updated: 12/16/20 1508    Narrative:      The following orders were created for panel order COVID PRE-OP / PRE-PROCEDURE SCREENING ORDER (NO ISOLATION) - Swab, Nasopharynx.  Procedure                               Abnormality         Status                     ---------                               -----------         ------                     Respiratory Panel PCR w/...[[id number]]  Abnormal            Final result                 Please view results for these tests on the individual orders.    Respiratory Panel PCR w/COVID-19(SARS-CoV-2) SACHIN/JENNIFER/TOMA/PAD/COR/MAD/SHANNAN " In-House, NP Swab in UTM/VTM, 3-4 HR TAT - Swab, Nasopharynx [[id number]]  (Abnormal) Collected: 12/16/20 1353    Specimen: Swab from Nasopharynx Updated: 12/16/20 1508     ADENOVIRUS, PCR Not Detected     Coronavirus 229E Not Detected     Coronavirus HKU1 Not Detected     Coronavirus NL63 Not Detected     Coronavirus OC43 Not Detected     COVID19 Detected     Human Metapneumovirus Not Detected     Human Rhinovirus/Enterovirus Not Detected     Influenza A PCR Not Detected     Influenza B PCR Not Detected     Parainfluenza Virus 1 Not Detected     Parainfluenza Virus 2 Not Detected     Parainfluenza Virus 3 Not Detected     Parainfluenza Virus 4 Not Detected     RSV, PCR Not Detected     Bordetella pertussis pcr Not Detected     Bordetella parapertussis PCR Not Detected     Chlamydophila pneumoniae PCR Not Detected     Mycoplasma pneumo by PCR Not Detected    Narrative:      Fact sheet for providers: https://docs.[employer]/wp-content/uploads/FRW3843-9684-TA6.1-EUA-Provider-Fact-Sheet-3.pdf    Fact sheet for patients: https://docs.[employer]/wp-content/uploads/RKJ0340-2286-UP9.1-EUA-Patient-Fact-Sheet-1.pdf    Test performed by PCR.    [place] Draw [[id number]] Collected: 12/16/20 1348    Specimen: Blood Updated: 12/16/20 1501    Narrative:      The following orders were created for panel order [place] Draw.  Procedure                               Abnormality         Status                     ---------                               -----------         ------                     Light Blue Top[[id number]]                                   Final result               Green Top (Gel)[[id number]]                                  Final result               Lavender Top[[id number]]                                     Final result               Gold Top - SST[[id number]]                                   Final result                 Please view results for these tests on the individual orders.    Light Blue Top [[id number]]  "Collected: 12/16/20 1348    Specimen: Blood Updated: 12/16/20 1501     Extra Tube hold for add-on     Comment: Auto resulted       Green Top (Gel) [[id number]] Collected: 12/16/20 1348    Specimen: Blood Updated: 12/16/20 1501     Extra Tube Hold for add-ons.     Comment: Auto resulted.       Lavender Top [[id number]] Collected: 12/16/20 1348    Specimen: Blood Updated: 12/16/20 1501     Extra Tube hold for add-on     Comment: Auto resulted       Gold Top - SST [[id number]] Collected: 12/16/20 1348    Specimen: Blood Updated: 12/16/20 1501     Extra Tube Hold for add-ons.     Comment: Auto resulted.       Troponin [[id number]]  (Abnormal) Collected: 12/16/20 1348    Specimen: Blood Updated: 12/16/20 1443     Troponin T 0.038 ng/mL     Narrative:      Troponin T Reference Range:  <= 0.03 ng/mL-   Negative for AMI  >0.03 ng/mL-     Abnormal for myocardial necrosis.  Clinicians would have to utilize clinical acumen, EKG, Troponin and serial changes to determine if it is an Acute Myocardial Infarction or myocardial injury due to an underlying chronic condition.       Results may be falsely decreased if patient taking Biotin.      Procalcitonin [[id number]]  (Normal) Collected: 12/16/20 1348    Specimen: Blood Updated: 12/16/20 1436     Procalcitonin 0.08 ng/mL     Narrative:      As a Marker for Sepsis (Non-Neonates):   1. <0.5 ng/mL represents a low risk of severe sepsis and/or septic shock.  1. >2 ng/mL represents a high risk of severe sepsis and/or septic shock.    As a Marker for Lower Respiratory Tract Infections that require antibiotic therapy:  PCT on Admission     Antibiotic Therapy             6-12 Hrs later  > 0.5                Strongly Recommended            >0.25 - <0.5         Recommended  0.1 - 0.25           Discouraged                   Remeasure/reassess PCT  <0.1                 Strongly Discouraged          Remeasure/reassess PCT      As 28 day mortality risk marker: \"Change in Procalcitonin Result\" (> 80 " % or <=80 %) if Day 0 (or Day 1) and Day 4 values are available. Refer to http://www.[facility]-pct-calculator.com/   Change in PCT <=80 %   A decrease of PCT levels below or equal to 80 % defines a positive change in PCT test result representing a higher risk for 28-day all-cause mortality of patients diagnosed with severe sepsis or septic shock.  Change in PCT > 80 %   A decrease of PCT levels of more than 80 % defines a negative change in PCT result representing a lower risk for 28-day all-cause mortality of patients diagnosed with severe sepsis or septic shock.                Results may be falsely decreased if patient taking Biotin.     Comprehensive Metabolic Panel [[id number]]  (Abnormal) Collected: 12/16/20 1348    Specimen: Blood Updated: 12/16/20 1430     Glucose 102 mg/dL      BUN 26 mg/dL      Creatinine 1.62 mg/dL      Sodium 147 mmol/L      Potassium 3.5 mmol/L      Chloride 103 mmol/L      CO2 30.6 mmol/L      Calcium 8.6 mg/dL      Total Protein 7.0 g/dL      Albumin 3.60 g/dL      ALT (SGPT) 13 U/L      AST (SGOT) 27 U/L      Alkaline Phosphatase 63 U/L      Total Bilirubin 0.5 mg/dL      eGFR Non African Amer 30 mL/min/1.73      Globulin 3.4 gm/dL      A/G Ratio 1.1 g/dL      BUN/Creatinine Ratio 16.0     Anion Gap 13.4 mmol/L     Narrative:      GFR Normal >60  Chronic Kidney Disease <60  Kidney Failure <15      CBC & Differential [[id number]]  (Abnormal) Collected: 12/16/20 1348    Specimen: Blood Updated: 12/16/20 1425    Narrative:      The following orders were created for panel order CBC & Differential.  Procedure                               Abnormality         Status                     ---------                               -----------         ------                     CBC Auto Differential[[id number]]        Abnormal            Final result                 Please view results for these tests on the individual orders.    CBC Auto Differential [[id number]]  (Abnormal) Collected: 12/16/20 1348     Specimen: Blood Updated: 12/16/20 1425     WBC 7.86 10*3/mm3      RBC 3.12 10*6/mm3      Hemoglobin 10.8 g/dL      Hematocrit 32.6 %      .5 fL      MCH 34.6 pg      MCHC 33.1 g/dL      RDW 12.2 %      RDW-SD 46.8 fl      MPV 9.7 fL      Platelets 97 10*3/mm3      Neutrophil % 71.4 %      Lymphocyte % 21.9 %      Monocyte % 6.0 %      Eosinophil % 0.3 %      Basophil % 0.1 %      Immature Grans % 0.3 %      Neutrophils, Absolute 5.62 10*3/mm3      Lymphocytes, Absolute 1.72 10*3/mm3      Monocytes, Absolute 0.47 10*3/mm3      Eosinophils, Absolute 0.02 10*3/mm3      Basophils, Absolute 0.01 10*3/mm3      Immature Grans, Absolute 0.02 10*3/mm3      nRBC 0.0 /100 WBC     Urinalysis With Microscopic If Indicated (No Culture) - Urine, Catheter [[id number]]  (Abnormal) Collected: 12/16/20 1350    Specimen: Urine, Catheter Updated: 12/16/20 1419     Color, UA Yellow     Appearance, UA Turbid     pH, UA 6.0     Specific Gravity, UA 1.014     Glucose, UA Negative     Ketones, UA Trace     Bilirubin, UA Negative     Blood, UA Small (1+)     Protein,  mg/dL (2+)     Leuk Esterase, UA Large (3+)     Nitrite, UA Negative     Urobilinogen, UA 0.2 E.U./dL    Urinalysis, Microscopic Only - Urine, Catheter [[id number]]  (Abnormal) Collected: 12/16/20 1350    Specimen: Urine, Catheter Updated: 12/16/20 1419     RBC, UA 3-5 /HPF      WBC, UA Too Numerous to Count /HPF      Bacteria, UA None Seen /HPF      Squamous Epithelial Cells, UA 0-2 /HPF      Hyaline Casts, UA 0-2 /LPF      Methodology Automated Microscopy    Lactic Acid, Plasma [[id number]]  (Normal) Collected: 12/16/20 1348    Specimen: Blood Updated: 12/16/20 1418     Lactate 1.2 mmol/L     Blood Culture - Blood, Arm, Left [[id number]] Collected: 12/16/20 1351    Specimen: Blood from Arm, Left Updated: 12/16/20 1359    Blood Culture - Blood, Arm, Right [[id number]] Collected: 12/16/20 135    Specimen: Blood from Arm, Right Updated: 12/16/20 0714        Imaging  Results (Last 24 Hours)     Procedure Component Value Units Date/Time    CT Head Without Contrast [[id number]] Collected: 12/16/20 1529     Updated: 12/16/20 1534    Narrative:      CT OF THE BRAIN WITHOUT CONTRAST 12/16/2020     HISTORY: Altered mental status.     Axial images were obtained through the brain without intravenous  contrast.     There is mild to moderate diffuse atrophy. There is mild decreased  attenuation of the periventricular white matter bilaterally consistent  with mild small vessel white matter ischemic disease. Tiny old lacunar  infarction is seen in the head of the caudate nucleus on the right. This  was also present on the 07/05/2020 study.     There is no evidence of acute infarction, hemorrhage, midline shift or  mass effect.     No bony abnormalities are seen.       Impression:      No acute process identified.     Radiation dose reduction techniques were utilized, including automated  exposure control and exposure modulation based on body size.     This report was finalized on 12/16/2020 3:31 PM by Dr. Dony Patton M.D.       XR Chest 1 View [[id number]] Collected: 12/16/20 1321     Updated: 12/16/20 1326    Narrative:      XR CHEST 1 VW-     HISTORY:  Altered mental status. Covid positive.     COMPARISON:  Chest radiographs 07/05/2020.     FINDINGS:    A single portable view of the chest was obtained. There is a left chest  cardiac pacemaker, unchanged. There are partially imaged spinal  electrodes. The cardiac silhouette and mediastinal and hilar contours  are unchanged. The descending aorta is tortuous. There is calcific  aortic atherosclerosis. There are no new focal consolidation. Pleural  spaces are clear. There is multilevel degenerative disc disease.     This report was finalized on 12/16/2020 1:22 PM by Dr. Susannah Juan M.D.              ECG 12 Lead               HEART RATE= 85  bpm  RR Interval= 708  ms  MT Interval= 219  ms  P Horizontal Axis= 23  deg  P Front Axis= 11   deg  QRSD Interval= 94  ms  QT Interval= 410  ms  QRS Axis= -46  deg  T Wave Axis= -23  deg  - ABNORMAL ECG -  Sinus rhythm  Borderline prolonged IN interval  LAD, consider left anterior fascicular block  Nonspecific T abnormalities, inferior leads  Borderline prolonged QT interval             Current Facility-Administered Medications:   •  ascorbic acid (VITAMIN C) tablet 500 mg, 500 mg, Oral, Daily, Juan Carlos Harper MD  •  budesonide-formoterol (SYMBICORT) 160-4.5 MCG/ACT inhaler 2 puff, 2 puff, Inhalation, BID - RT, Juan Carlos Harper MD  •  cetirizine (zyrTEC) tablet 5 mg, 5 mg, Oral, Daily, Juan Carlos Harper MD  •  cholecalciferol (VITAMIN D3) tablet 1,000 Units, 1,000 Units, Oral, Daily, Juan Carlos Harper MD  •  citalopram (CeleXA) tablet 20 mg, 20 mg, Oral, Daily, Juan Carlos Harper MD  •  dexamethasone sodium phosphate injection 6 mg, 6 mg, Intravenous, Daily, Juan Carlos Harper MD  •  enoxaparin (LOVENOX) syringe 30 mg, 30 mg, Subcutaneous, Q24H, Juan Carlos Harper MD  •  famotidine (PEPCID) tablet 20 mg, 20 mg, Oral, BID, Juan Carlos Harper MD  •  ferrous sulfate tablet 325 mg, 325 mg, Oral, Daily, Juan Carlos Harper MD  •  levETIRAcetam (KEPPRA) tablet 750 mg, 750 mg, Oral, BID, Juan Carlos Harper MD  •  mirtazapine (REMERON) tablet 15 mg, 15 mg, Oral, Nightly, Juan Carlos Harper MD  •  polyethylene glycol (MIRALAX) packet 17 g, 17 g, Oral, Daily, Juan Carlos Harper MD  •  sodium chloride 0.45 % infusion, 75 mL/hr, Intravenous, Continuous, Juan Carlos Harper MD  •  [COMPLETED] Insert peripheral IV, , , Once **AND** sodium chloride 0.9 % flush 10 mL, 10 mL, Intravenous, PRN, Dori De La Cruz APRN  •  zinc sulfate (ZINCATE) capsule 220 mg, 220 mg, Oral, Daily, Juan Carlos Harper MD    Current Outpatient Medications:   •  acetaminophen (TYLENOL) 325 MG tablet, Take 650 mg by mouth 3 (Three) Times a Day., Disp: , Rfl:   •  budesonide-formoterol (SYMBICORT) 160-4.5 MCG/ACT inhaler, Inhale 2 puffs 2 (Two) Times a Day., Disp: , Rfl:   •  Calcium-Vitamin D-Vitamin K  (VIACTIV) 500-500-40 MG-UNT-MCG chewable tablet, Chew 1 tablet Daily., Disp: , Rfl:   •  citalopram (CeleXA) 20 MG tablet, Take 20 mg by mouth Daily., Disp: , Rfl:   •  ferrous sulfate 325 (65 FE) MG tablet, Take 325 mg by mouth Daily., Disp: , Rfl:   •  levETIRAcetam (KEPPRA) 750 MG tablet, Take 1 tablet by mouth 2 (Two) Times a Day., Disp: 60 tablet, Rfl: 11  •  mirtazapine (REMERON) 15 MG tablet, Take 15 mg by mouth Every Night., Disp: , Rfl:   •  pantoprazole (PROTONIX) 40 MG EC tablet, Take 40 mg by mouth Daily., Disp: , Rfl:   •  polyethylene glycol (MIRALAX) packet, Take 17 g by mouth Daily As Needed., Disp: , Rfl:      ASSESSMENT  COVID-19 infection  Dehydration  Acute UTI  Acute hypoxia  Hypertension  Seizure disorder  Chronic anemia  Depression  Gastroesophageal reflux disease    PLAN  Admit  Supplement oxygen  IV fluid  IV antibiotics  Symptomatic treatment for COVID-19 infection  Adjust nursing home medications  Stress ulcer DVT prophylaxis  Infectious disease consult  Supportive care  DNR   Discussed with nursing staff  Follow closely further recommendation current hospital course    GAETANO ACOSTA MD

## 2020-12-16 NOTE — ED PROVIDER NOTES
EMERGENCY DEPARTMENT ENCOUNTER    Room Number:  01/01  Date of encounter:  12/16/2020  PCP: Grady Villeda MD  Historian: patient, EMS   Full history not obtainable due to: dementia     HPI:  Chief Complaint: SOA     Context: Kim Feldman is a 89 y.o. female who presents to the ED c/o shortness of breath onset 2 to 3 days.  Reports constant symptoms.  Symptoms are mild.  Does not report anything that worsens or improves the symptoms.  Associated chest pain.  States she is a former smoker.  When asked additional questions patient states I have trouble remembering things.  History is somewhat limited.  The nursing home where the patient resides at the patient for worsening shortness of breath and confusion onset yesterday.  The patient was diagnosed with COVID-19 yesterday.        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   • Gastroesophageal reflux disease 02/05/2016   • Hyperlipidemia 02/05/2016   • Intermittent claudication (CMS/[facility]) 02/05/2016   • Neuropathy 02/05/2016   • Osteoarthritis of knee 02/05/2016   • Syndrome of inappropriate secretion of antidiuretic hormone (CMS/[facility]) 02/05/2016   • Vitamin D deficiency 02/05/2016   • History of sick sinus syndrome    • Intertrigo 03/25/2016   • Generalized convulsive seizures (CMS/[facility]) 04/07/2016   • Change in bowel habits 05/20/2016   • 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   • History of anxiety 10/18/2016   • 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[facility]) 01/04/2017   • Thrombocytopenia (CMS/[facility]) 01/05/2017   • B12 deficiency 01/16/2017   • Cardiac pacemaker in situ 03/08/2017   • Chronic venous insufficiency 03/09/2017   • Arthritis 03/09/2017   • S/P knee replacement 03/09/2017   • Chronic bilateral low back pain without sciatica 03/09/2017   • Essential hypertension 03/09/2017   • Hiatal hernia 03/16/2017   • Zenker's diverticulum  04/14/2017   • Chronic idiopathic constipation 05/19/2017   • Pressure sore on buttocks 05/19/2017   • Somnolence 12/13/2017   • Closed trimalleolar fracture of right ankle 11/13/2018   • Surgical wound infection 01/29/2019   • History of failed arthroplasty of ankle 02/19/2019   • History of CVA in adulthood 04/05/2019   • Pneumonia of both lower lobes due to infectious organism 02/25/2020   • Acute UTI 07/05/2020     Resolved Ambulatory Problems     Diagnosis Date Noted   • Hyponatremia 02/05/2016   • Urine frequency 05/20/2016   • Urinary tract infection 07/08/2016   • Pneumonia 07/08/2016   • Aspiration pneumonia (CMS/HCC) 07/17/2016   • Urinary tract infection in female 01/03/2017   • History of aspiration pneumonitis 01/04/2017   • Pain of toe of right foot 01/16/2017   • Right lower lobe pneumonia 02/02/2017   • Sepsis due to pneumonia (CMS/HCC) 02/02/2017   • Weakness 02/03/2017   • UTI (urinary tract infection) 03/15/2017   • Acute kidney injury (CMS/HCC) 03/17/2017   • Acute vaginitis 03/23/2017   • Acute pain of left knee 05/19/2017     Past Medical History:   Diagnosis Date   • At risk for falls    • At risk for sleep apnea 11/17/2018   • Atrial fibrillation (CMS/[facility])    • Cellulitis    • Chronic back pain    • Chronic cough    • Chronic UTI    • DDD (degenerative disc disease), lumbosacral    • Dementia without behavioral disturbance (CMS/[facility])    • Disc degeneration, lumbar    • Dysphagia    • GERD (gastroesophageal reflux disease)    • History of cerebral artery occlusion    • History of herpes zoster    • History of ingrowing nail    • History of osteoporosis    • History of poliomyelitis    • History of sciatica    • History of transient cerebral ischemia    • History of Zenker's diverticulum removal 2016   • HX: long term anticoagulant use    • Hypertension    • Lumbar radiculopathy    • Morbid obesity (CMS/[facility])    • MRSA (methicillin resistant Staphylococcus aureus)    • Neuralgia    • Nonepileptic  episode (CMS/HCC)    • Osteoarthritis of right knee    • Pacemaker    • Seeing double    • Seizures (CMS/[facility])    • SIADH (syndrome of inappropriate ADH production) (CMS/[facility])    • Spinal stenosis          PAST SURGICAL HISTORY  Past Surgical History:   Procedure Laterality Date   • ANKLE OPEN REDUCTION INTERNAL FIXATION Right 2018    Procedure: ANKLE OPEN REDUCTION INTERNAL FIXATION;  Surgeon: Cristian Hill II, MD;  Location: [facility];  Service: Orthopedics   • CARDIAC PACEMAKER PLACEMENT      secondary to SSS, placed in , with gen chng ; [employer] dual DOO   • CATARACT EXTRACTION W/ INTRAOCULAR LENS IMPLANT Bilateral    • COLONOSCOPY     • HAND SURGERY Right    • HARDWARE REMOVAL Right 2019    Procedure: RT ANKLE HARDWARE REMOVAL;  Surgeon: Cristian Hill II, MD;  Location: [facility] OR;  Service: Orthopedics   • KNEE ARTHROPLASTY Left    • LAMINECTOMY FOR IMPLANTATION / PLACEMENT NEUROSTIMULATOR ELECTRODES     • LUMBAR LAMINECTOMY      L-3 L4 L4 L5   • TONSILLECTOMY     • ZENKERS DIVERTICULECTOMY N/A 2016    Procedure: ENDOSCOPIC REMOVAL OF ZENKERS DIVERTICULUM W/ WERDASCOPE;  Surgeon: Oswald Barth MD;  Location: [facility];  Service:    • ZENKERS DIVERTICULECTOMY N/A 2017    Procedure: ESOPHAGOSCOPY;  Surgeon: Oswald Barth MD;  Location: [facility];  Service:          FAMILY HISTORY  Family History   Problem Relation Age of Onset   • Cancer Daughter    • Malig Hyperthermia Neg Hx          SOCIAL HISTORY  Social History     Socioeconomic History   • Marital status:      Spouse name: Not on file   • Number of children: 3   • Years of education: Not on file   • Highest education level: Not on file   Occupational History   • Occupation: Retired   Tobacco Use   • Smoking status: Former Smoker     Packs/day: 1.00     Years: 40.00     Pack years: 40.00     Types: Cigarettes     Quit date:      Years since quittin.9   • Smokeless  tobacco: Never Used   • Tobacco comment: caffeine use: 2 cup of coffee in the morning and 1 in the afternoon.    Substance and Sexual Activity   • Alcohol use: Yes     Comment: 1 PER WEEK   • Drug use: No   • Sexual activity: Defer         ALLERGIES  Lipitor [atorvastatin] and Penicillins        REVIEW OF SYSTEMS  Review of Systems   All systems reviewed and marked as negative except as listed in HPI       PHYSICAL EXAM    I have reviewed the triage vital signs and nursing notes.    ED Triage Vitals [12/16/20 1232]   Temp Heart Rate Resp BP SpO2   -- 109 18 174/92 94 %      Temp src Heart Rate Source Patient Position BP Location FiO2 (%)   -- -- -- -- --       GENERAL: Alert well developed, well nourished in no distress  HENT: NCAT, neck supple, trachea midline  EYES: no scleral icterus, PERRL, normal conjunctivae  CV: regular rhythm, regular rate, no murmur  RESPIRATORY: unlabored effort, scattered wheezes, rales in the bilateral bases, mild tachypnea  ABDOMEN: soft, nontender, nondistended, bowel sounds present  MUSCULOSKELETAL: no gross deformity  NEURO: alert,  sensory and motor function of extremities grossly intact, speech clear, mental status normal/baseline  SKIN: warm, dry, no rash  PSYCH:  Appropriate mood and affect    Vital signs and nursing notes reviewed.          LAB RESULTS  Recent Results (from the past 24 hour(s))   ECG 12 Lead    Collection Time: 12/16/20  1:09 PM   Result Value Ref Range    QT Interval 410 ms   Comprehensive Metabolic Panel    Collection Time: 12/16/20  1:48 PM    Specimen: Blood   Result Value Ref Range    Glucose 102 (H) 65 - 99 mg/dL    BUN 26 (H) 8 - 23 mg/dL    Creatinine 1.62 (H) 0.57 - 1.00 mg/dL    Sodium 147 (H) 136 - 145 mmol/L    Potassium 3.5 3.5 - 5.2 mmol/L    Chloride 103 98 - 107 mmol/L    CO2 30.6 (H) 22.0 - 29.0 mmol/L    Calcium 8.6 8.2 - 9.6 mg/dL    Total Protein 7.0 6.0 - 8.5 g/dL    Albumin 3.60 3.50 - 5.20 g/dL    ALT (SGPT) 13 1 - 33 U/L    AST (SGOT) 27  1 - 32 U/L    Alkaline Phosphatase 63 39 - 117 U/L    Total Bilirubin 0.5 0.0 - 1.2 mg/dL    eGFR Non African Amer 30 (L) >60 mL/min/1.73    Globulin 3.4 gm/dL    A/G Ratio 1.1 g/dL    BUN/Creatinine Ratio 16.0 7.0 - 25.0    Anion Gap 13.4 5.0 - 15.0 mmol/L   Lactic Acid, Plasma    Collection Time: 12/16/20  1:48 PM    Specimen: Blood   Result Value Ref Range    Lactate 1.2 0.5 - 2.0 mmol/L   Procalcitonin    Collection Time: 12/16/20  1:48 PM    Specimen: Blood   Result Value Ref Range    Procalcitonin 0.08 0.00 - 0.25 ng/mL   Troponin    Collection Time: 12/16/20  1:48 PM    Specimen: Blood   Result Value Ref Range    Troponin T 0.038 (C) 0.000 - 0.030 ng/mL   CBC Auto Differential    Collection Time: 12/16/20  1:48 PM    Specimen: Blood   Result Value Ref Range    WBC 7.86 3.40 - 10.80 10*3/mm3    RBC 3.12 (L) 3.77 - 5.28 10*6/mm3    Hemoglobin 10.8 (L) 12.0 - 15.9 g/dL    Hematocrit 32.6 (L) 34.0 - 46.6 %    .5 (H) 79.0 - 97.0 fL    MCH 34.6 (H) 26.6 - 33.0 pg    MCHC 33.1 31.5 - 35.7 g/dL    RDW 12.2 (L) 12.3 - 15.4 %    RDW-SD 46.8 37.0 - 54.0 fl    MPV 9.7 6.0 - 12.0 fL    Platelets 97 (L) 140 - 450 10*3/mm3    Neutrophil % 71.4 42.7 - 76.0 %    Lymphocyte % 21.9 19.6 - 45.3 %    Monocyte % 6.0 5.0 - 12.0 %    Eosinophil % 0.3 0.3 - 6.2 %    Basophil % 0.1 0.0 - 1.5 %    Immature Grans % 0.3 0.0 - 0.5 %    Neutrophils, Absolute 5.62 1.70 - 7.00 10*3/mm3    Lymphocytes, Absolute 1.72 0.70 - 3.10 10*3/mm3    Monocytes, Absolute 0.47 0.10 - 0.90 10*3/mm3    Eosinophils, Absolute 0.02 0.00 - 0.40 10*3/mm3    Basophils, Absolute 0.01 0.00 - 0.20 10*3/mm3    Immature Grans, Absolute 0.02 0.00 - 0.05 10*3/mm3    nRBC 0.0 0.0 - 0.2 /100 WBC   Light Blue Top    Collection Time: 12/16/20  1:48 PM   Result Value Ref Range    Extra Tube hold for add-on    Green Top (Gel)    Collection Time: 12/16/20  1:48 PM   Result Value Ref Range    Extra Tube Hold for add-ons.    Lavender Top    Collection Time: 12/16/20  1:48  PM   Result Value Ref Range    Extra Tube hold for add-on    Gold Top - SST    Collection Time: 12/16/20  1:48 PM   Result Value Ref Range    Extra Tube Hold for add-ons.    Urinalysis With Microscopic If Indicated (No Culture) - Urine, Catheter    Collection Time: 12/16/20  1:50 PM    Specimen: Urine, Catheter   Result Value Ref Range    Color, UA Yellow Yellow, Straw    Appearance, UA Turbid (A) Clear    pH, UA 6.0 5.0 - 8.0    Specific Gravity, UA 1.014 1.005 - 1.030    Glucose, UA Negative Negative    Ketones, UA Trace (A) Negative    Bilirubin, UA Negative Negative    Blood, UA Small (1+) (A) Negative    Protein,  mg/dL (2+) (A) Negative    Leuk Esterase, UA Large (3+) (A) Negative    Nitrite, UA Negative Negative    Urobilinogen, UA 0.2 E.U./dL 0.2 - 1.0 E.U./dL   Urinalysis, Microscopic Only - Urine, Catheter    Collection Time: 12/16/20  1:50 PM    Specimen: Urine, Catheter   Result Value Ref Range    RBC, UA 3-5 (A) None Seen, 0-2 /HPF    WBC, UA Too Numerous to Count (A) None Seen, 0-2 /HPF    Bacteria, UA None Seen None Seen /HPF    Squamous Epithelial Cells, UA 0-2 None Seen, 0-2 /HPF    Hyaline Casts, UA 0-2 None Seen /LPF    Methodology Automated Microscopy    Respiratory Panel PCR w/COVID-19(SARS-CoV-2) SACHIN/JENNIFER/TOMA/PAD/COR/MAD/SHANNAN In-House, NP Swab in UTM/VTM, 3-4 HR TAT - Swab, Nasopharynx    Collection Time: 12/16/20  1:53 PM    Specimen: Nasopharynx; Swab   Result Value Ref Range    ADENOVIRUS, PCR Not Detected Not Detected    Coronavirus 229E Not Detected Not Detected    Coronavirus HKU1 Not Detected Not Detected    Coronavirus NL63 Not Detected Not Detected    Coronavirus OC43 Not Detected Not Detected    COVID19 Detected (C) Not Detected - Ref. Range    Human Metapneumovirus Not Detected Not Detected    Human Rhinovirus/Enterovirus Not Detected Not Detected    Influenza A PCR Not Detected Not Detected    Influenza B PCR Not Detected Not Detected    Parainfluenza Virus 1 Not Detected Not  Detected    Parainfluenza Virus 2 Not Detected Not Detected    Parainfluenza Virus 3 Not Detected Not Detected    Parainfluenza Virus 4 Not Detected Not Detected    RSV, PCR Not Detected Not Detected    Bordetella pertussis pcr Not Detected Not Detected    Bordetella parapertussis PCR Not Detected Not Detected    Chlamydophila pneumoniae PCR Not Detected Not Detected    Mycoplasma pneumo by PCR Not Detected Not Detected       Ordered the above labs and independently reviewed the results.        RADIOLOGY  Ct Head Without Contrast    Result Date: 12/16/2020  CT OF THE BRAIN WITHOUT CONTRAST 12/16/2020  HISTORY: Altered mental status.  Axial images were obtained through the brain without intravenous contrast.  There is mild to moderate diffuse atrophy. There is mild decreased attenuation of the periventricular white matter bilaterally consistent with mild small vessel white matter ischemic disease. Tiny old lacunar infarction is seen in the head of the caudate nucleus on the right. This was also present on the 07/05/2020 study.  There is no evidence of acute infarction, hemorrhage, midline shift or mass effect.  No bony abnormalities are seen.      No acute process identified.  Radiation dose reduction techniques were utilized, including automated exposure control and exposure modulation based on body size.  This report was finalized on 12/16/2020 3:31 PM by Dr. Dony Patton M.D.      Xr Chest 1 View    Result Date: 12/16/2020  XR CHEST 1 VW-  HISTORY:  Altered mental status. Covid positive.  COMPARISON:  Chest radiographs 07/05/2020.  FINDINGS:  A single portable view of the chest was obtained. There is a left chest cardiac pacemaker, unchanged. There are partially imaged spinal electrodes. The cardiac silhouette and mediastinal and hilar contours are unchanged. The descending aorta is tortuous. There is calcific aortic atherosclerosis. There are no new focal consolidation. Pleural spaces are clear. There is  multilevel degenerative disc disease.  This report was finalized on 12/16/2020 1:22 PM by Dr. Susannah Juan M.D.        I ordered the above noted radiological studies. Independently reviewed by me and discussed with radiologist.  See dictation above for official radiology interpretation.      PROCEDURES    Procedures        MEDICATIONS GIVEN IN ER    Medications   sodium chloride 0.9 % flush 10 mL (has no administration in time range)   dexamethasone sodium phosphate injection 6 mg (has no administration in time range)         PROGRESS, DATA ANALYSIS, CONSULTS, AND MEDICAL DECISION MAKING    All labs have been independently reviewed by me.  All radiology studies have been reviewed by me.   EKG's independently reviewed by me.  Discussion below represents my analysis of pertinent findings related to patient's condition, differential diagnosis, treatment plan and final disposition.    DIFFERENTIAL DIAGNOSIS INCLUDE BUT NOT LIMITED TO: Viral syndrome, COVID-19, URI, pneumonia, bronchitis, ARDS, respiratory failure with hypoxia, PE, congestive heart failure exacerbation, sepsis      ED Course as of Dec 16 1601   Wed Dec 16, 2020   1444 Creatinine(!): 1.62 [JS]   1444 Hemoglobin(!): 10.8 [JS]   1445 Troponin T(!!): 0.038 [JS]   1522 COVID19(!!): Detected [TD]   1538 Discontinued nasal cannula oxygen.  Room air sat decreased to 87%.  Replaced 2 L per nasal cannula.  Hospitalist consulted for admission.    [JS]   1539 Patient presents with hallucinations and hypoxia onset yesterday after diagnosis of COVID-19 from the nursing home.  She is 87% on room air here.  There is no marked pneumonia on imaging or pleural effusions.  She was given Decadron given hypoxia.  She is maintaining oxygen saturation in the midline based on 2 L per nasal cannula.  She is not actively hallucinating here.  CT the head is negative.  White blood cell count is normal.  Creatinine 1.6 baseline per patient.  Thrombo-cytopenia is likely secondary to  COVID-19.  Plan admission to the hospital for further evaluation.    [JS]   1540 Chronic for patient   Troponin(!!) [JS]   1554 I viewed chest x-ray on PACS system.  My findings are no infiltrate    [JS]   1557 Discussed patient with Dr. Harper he agrees admit patient to hospital.    [JS]      ED Course User Index  [JS] Dori De La Cruz APRN  [TD] Floyd Monterroso II, MD       AS OF 16:01 EST VITALS:        BP - 174/92  HR - 109  TEMP -    02 SATS - 94%          DIAGNOSIS  Final diagnoses:   COVID-19   Hypoxia   Hallucination         DISPOSITION  Admission     Pt masked in first look. I wore a surgical mask and protective eye wear throughout my encounters with the pt. I performed hand hygiene on entry into the pt room and upon exit.     Dictated utilizing Dragon dictation:  Much of this encounter note is an electronic transcription/translation of spoken language to printed text. The electronic translation of spoken language may permit erroneous, or at times, nonsensical words or phrases to be inadvertently transcribed; Although I have reviewed the note for such errors, some may still exist.       Dori De La Cruz APRN  12/16/20 1609

## 2020-12-17 NOTE — PROGRESS NOTES
Discharge Planning Assessment  Psychiatric     Patient Name: Kim Feldman  MRN: [id number]  Today's Date: 12/17/2020    Admit Date: 12/16/2020    Discharge Needs Assessment     Row Name 12/17/20 1014       Living Environment    Lives With  facility resident    Current Living Arrangements  extended care facility    Primary Care Provided by  other (see comments)    Provides Primary Care For  no one, unable/limited ability to care for self    Family Caregiver if Needed  child(mary), adult    Family Caregiver Names  Daughter Leandra Feldman [phone]    Quality of Family Relationships  helpful    Able to Return to Prior Arrangements  yes       Transition Planning    Patient/Family Anticipates Transition to  long-term care facility    Patient/Family Anticipated Services at Transition  rehabilitation services    Transportation Anticipated  health plan transportation       Discharge Needs Assessment    Readmission Within the Last 30 Days  no previous admission in last 30 days    Equipment Currently Used at Home  wheelchair;hospital bed    Concerns to be Addressed  denies needs/concerns at this time    Anticipated Changes Related to Illness  none    Equipment Needed After Discharge  none    Discharge Facility/Level of Care Needs  nursing facility, intermediate    Provided Post Acute Provider List?  N/A    N/A Provider List Comment  Has been at [facility] and will return        Discharge Plan     Row Name 12/17/20 1016       Plan    Plan  Return to [facility]    Patient/Family in Agreement with Plan  yes    Plan Comments  Spoke with amadou Feldman (POA/HCS - AD in [facility]) [phone] by telephone.  Patient is from [facility] and will return.  Spoke with Irene/[place], patient is IC with a paid bedhold.  Packet started and in cubbie.  CCP will follow.  BHumeniuk RN        Continued Care and Services - Admitted Since 12/16/2020     Destination     Service Provider Request Status Selected  Services Address Phone Fax Patient Preferred    [facility]  Pending - Request Sent N/A [address] [facility] [id number] [phone] --                Demographic Summary     Row Name 12/17/20 1013       General Information    Admission Type  inpatient    Arrived From  [facility]    Required Notices Provided  Important Message from Medicare    Referral Source  admission list    Reason for Consult  discharge planning    Preferred Language  English     Used During This Interaction  no        Functional Status     Row Name 12/17/20 1013       Functional Status    Usual Activity Tolerance  poor    Current Activity Tolerance  poor       Functional Status, IADL    Medications  completely dependent    Meal Preparation  completely dependent    Housekeeping  completely dependent    Laundry  completely dependent       Mental Status    General Appearance WDL  -- Unable to assess       Mental Status Summary    Recent Changes in Mental Status/Cognitive Functioning  unable to assess                Becky S. Humeniuk, RN

## 2020-12-17 NOTE — PLAN OF CARE
Goal Outcome Evaluation:  Plan of Care Reviewed With: patient  Progress: no change  Outcome Summary: Clinical swallow eval completed. Pt very weak, on dysphagia diet at NH. Pt had baseline coughing prior to eval. Swallow weak and delayed with ice chips. Pt toletated NTL (cup/straw) and pureed w/ no s/s. Mastication slow with soft solids w/ oral residue removed from mouth. Recommend pureed/fork mashed w/ NTL; meds whole or crushed in pureed; upright for meals and 30 min after; slow rate; small bites/sips. ST to follow for tolerance.

## 2020-12-17 NOTE — PLAN OF CARE
Goal Outcome Evaluation:  Plan of Care Reviewed With: patient  Progress: no change  Outcome Summary: VSS, no complaints of pain.  Speech evaluated and pt put on a pureed diet with nectar thick liquids.  Continue IV rocephin and Remdesivir.  Pt is alert to self and time only.  Will continue to monitor.

## 2020-12-17 NOTE — DISCHARGE PLACEMENT REQUEST
"Kim Feldman (89 y.o. Female)     Date of Birth Social Security Number Address Home Phone MRN    03/13/1930  [address] [address] [phone] [id number]    Orthodox Marital Status          Adventism        Admission Date Admission Type Admitting Provider Attending Provider Department, Room/Bed    12/16/20 Emergency Juan Carlos Harper MD Ahmed, Aftab, MD [address], [address]    Discharge Date Discharge Disposition Discharge Destination                       Attending Provider: Juan Carlos Harper MD    Allergies: Lipitor [Atorvastatin], Penicillins    Isolation: Enh Drop/Con   Infection: MRSA/History Only (06/30/20), COVID (confirmed) (12/16/20)   Code Status: No CPR    Ht: 157.5 cm (62\")   Wt: 108 kg (237 lb)    Admission Cmt: None   Principal Problem: None                Active Insurance as of 12/16/2020     Primary Coverage     Payor Plan Insurance Group Employer/Plan Group    MEDICARE MEDICARE A & B      Payor Plan Address Payor Plan Phone Number Payor Plan Fax Number Effective Dates    [address] [phone]  3/1/1995 - None Entered    [facility] [id number]       Subscriber Name Subscriber Birth Date Member ID       KIM FELDMAN 3/13/1930 [id number]           Secondary Coverage     Payor Plan Insurance Group Employer/Plan Group    AETNA COMMERCIAL AETNA  [id number]      Payor Plan Address Payor Plan Phone Number Payor Plan Fax Number Effective Dates    [address] [phone]  1/1/2020 - None Entered    [facility] [id number]       Subscriber Name Subscriber Birth Date Member ID       KIM FELDMAN 3/13/1930 [id number]                 Emergency Contacts      (Rel.) Home Phone Work Phone Mobile Phone    Leandra Feldman (Daughter) [phone] -- [phone]          "

## 2020-12-17 NOTE — THERAPY EVALUATION
Acute Care - Speech Language Pathology   Swallow Initial Evaluation [facility]     Patient Name: Kim Feldman  : 3/13/1930  MRN: [id number]  Today's Date: 2020               Admit Date: 2020    Visit Dx:     ICD-10-CM ICD-9-CM   1. COVID-19  U07.1 079.89   2. Hypoxia  R09.02 799.02   3. 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)   • Vitamin D deficiency   • History of sick sinus syndrome   • Intertrigo   • Generalized convulsive seizures (CMS/HCC)   • Change in bowel habits   • Zenker diverticulum   • History of anxiety   • 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HCC)   • Thrombocytopenia (CMS/HCC)   • B12 deficiency   • Cardiac pacemaker in situ   • Chronic venous insufficiency   • Arthritis   • S/P knee replacement   • Chronic bilateral low back pain without sciatica   • Essential hypertension   • Hiatal hernia   • Zenker's diverticulum   • Chronic idiopathic constipation   • Pressure sore on buttocks   • Somnolence   • Closed trimalleolar fracture of right ankle   • Surgical wound infection   • History of failed arthroplasty of ankle   • History of CVA in adulthood   • Pneumonia of both lower lobes due to infectious organism   • Acute UTI   • COVID-19     Past Medical History:   Diagnosis Date   • Anemia    • At risk for falls    • At risk for sleep apnea 2018   • Atrial fibrillation (CMS/HCC)    • Bulging lumbar disc    • Cellulitis     BILATERAL LEGS   • Chronic back pain    • Chronic cough    • Chronic UTI    • Chronic venous insufficiency    • 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HCC)    • DDD (degenerative disc disease), lumbosacral    • Dementia without behavioral disturbance (CMS/HCC)      moderate   • Depression, endogenous (CMS/HCC)    • Disc degeneration, lumbar    • Dysphagia    • GERD (gastroesophageal reflux disease)    • Hiatal hernia    • History of anxiety    • History of aspiration pneumonitis    • History of cerebral artery occlusion     CVA (following TIA), 10/10, treated with tPA   • History of CVA in adulthood 4/5/2019   • History of herpes zoster    • History of ingrowing nail    • History of osteoporosis    • History of poliomyelitis     child   • History of sciatica    • History of sick sinus syndrome     s/p PPM   • History of transient cerebral ischemia     followed by stroke in 10/2010. BIBI was normal.   • History of Zenker's diverticulum removal 2016   • HX: long term anticoagulant use    • Hyperlipidemia    • Hypertension     NO CURRENT MEDICATION   • Hyponatremia    • Intertrigo    • Lumbar radiculopathy    • Morbid obesity (CMS/HCC)    • MRSA (methicillin resistant Staphylococcus aureus)     LEFT FOOT SPREAD TO RIGHT ANKLE; DR TUBBS AWARE   • Neuralgia     with diplopia secondary to facial shingles   • Nonepileptic episode (CMS/HCC)    • Osteoarthritis of right knee    • Pacemaker    • Pneumonia    • Seeing double     secondary to shingles on the face also with neuralgia   • Seizures (CMS/HCC)    • 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)    • Spinal stenosis    • Vitamin D deficiency    • Zenker's diverticulum      Past Surgical History:   Procedure Laterality Date   • ANKLE OPEN REDUCTION INTERNAL FIXATION Right 11/17/2018    Procedure: ANKLE OPEN REDUCTION INTERNAL FIXATION;  Surgeon: Crsitian Tubbs II, MD;  Location: [facility];  Service: Orthopedics   • CARDIAC PACEMAKER PLACEMENT      secondary to SSS, placed in 2004, with gen chng 2014; [employer] dual DOO   • CATARACT EXTRACTION W/ INTRAOCULAR LENS IMPLANT Bilateral    • COLONOSCOPY     • HAND SURGERY Right    • HARDWARE REMOVAL Right 2/19/2019    Procedure: RT ANKLE HARDWARE REMOVAL;   Surgeon: Cristian Hill II, MD;  Location: [facility] OR;  Service: Orthopedics   • KNEE ARTHROPLASTY Left    • LAMINECTOMY FOR IMPLANTATION / PLACEMENT NEUROSTIMULATOR ELECTRODES     • LUMBAR LAMINECTOMY      L-3 L4 L4 L5   • TONSILLECTOMY     • ZENKERS DIVERTICULECTOMY N/A 9/27/2016    Procedure: ENDOSCOPIC REMOVAL OF ZENKERS DIVERTICULUM W/ WERDASCOPE;  Surgeon: Oswald Barth MD;  Location: [facility] MAIN OR;  Service:    • ZENKERS DIVERTICULECTOMY N/A 4/14/2017    Procedure: ESOPHAGOSCOPY;  Surgeon: Oswald Barth MD;  Location: [facility] OR;  Service:      Patient was not wearing a face mask during this therapy encounter. Therapist used appropriate personal protective equipment including gown, eye protection, mask and gloves.  Mask used was N95/duckbill. Appropriate PPE was worn during the entire therapy session. Hand hygiene was completed before and after therapy session. Patient is in enhanced droplet precautions.        SWALLOW EVALUATION (last 72 hours)      SLP Adult Swallow Evaluation     Row Name 12/17/20 1200                   Rehab Evaluation    Document Type  evaluation  -AW        Subjective Information  no complaints  -AW        Patient Observations  alert;cooperative;agree to therapy  -AW        Patient/Family/Caregiver Comments/Observations  Pt very weak, c/o poor appetite.  -AW        Care Plan Review  evaluation/treatment results reviewed;patient/other agree to care plan  -AW        Patient Effort  good  -AW        Symptoms Noted During/After Treatment  none  -AW           General Information    Patient Profile Reviewed  yes  -AW        Pertinent History Of Current Problem  Pt admitted from NH w/ Covid 19, UTI, and dehydration. Pt has a h/o a Zenker's adn an esophageal diverticulum repair in 2017.  -AW        Current Method of Nutrition  NPO  -AW        Precautions/Limitations, Vision  WFL with corrective lenses  -AW        Precautions/Limitations, Hearing  WFL;for purposes of  eval  -AW        Prior Level of Function-Communication  cognitive-linguistic impairment  -AW        Prior Level of Function-Swallowing  mechanical soft textures;nectar thick liquids  -AW        Plans/Goals Discussed with  patient;agreed upon  -AW        Barriers to Rehab  previous functional deficit  -AW        Patient's Goals for Discharge  patient did not state  -AW           Pain    Additional Documentation  Pain Scale: Numbers Pre/Post-Treatment (Group)  -AW           Pain Scale: Numbers Pre/Post-Treatment    Pretreatment Pain Rating  8/10  -AW        Posttreatment Pain Rating  8/10  -AW        Pain Location - Side  Bilateral  -AW        Pain Location  ankle  -AW           Oral Motor Structure and Function    Dentition Assessment  natural, present and adequate  -AW        Secretion Management  WNL/WFL  -AW        Mucosal Quality  moist, healthy  -AW        Volitional Swallow  weak  -AW        Volitional Cough  non-productive  -AW           Oral Musculature and Cranial Nerve Assessment    Oral Motor General Assessment  generalized oral motor weakness  -AW           General Eating/Swallowing Observations    Respiratory Support Currently in Use  nasal cannula  -AW        Eating/Swallowing Skills  fed by SLP  -AW        Positioning During Eating  upright in bed  -AW        Utensils Used  spoon;cup;straw  -AW        Consistencies Trialed  ice chips;nectar/syrup-thick liquids;pureed;soft textures  -AW        Pre SpO2 (%)  93  -AW        Post SpO2 (%)  94  -AW           Clinical Swallow Eval    Oral Prep Phase  impaired  -AW        Pharyngeal Phase  suspected pharyngeal impairment  -AW        Clinical Swallow Evaluation Summary  Clinical swallow eval completed. Pt very weak, on dysphagia diet at NH. Pt had baseline coughing prior to eval. Swallow weak and delayed with ice chips. Pt toletated NTL (cup/straw) and pureed w/ no s/s. Mastication slow with soft solids w/ oral residue removed from mouth. Recommend pureed/fork  mashed w/ NTL; meds whole or crushed in pureed; upright for meals and 30 min after; slow rate; small bites/sips. ST to follow for tolerance.   -AW           Clinical Impression    SLP Swallowing Diagnosis  oral dysphagia;suspected pharyngeal dysphagia  -AW        Functional Impact  risk of aspiration/pneumonia  -AW        Rehab Potential/Prognosis, Swallowing  good, to achieve stated therapy goals  -AW        Swallow Criteria for Skilled Therapeutic Interventions Met  demonstrates skilled criteria  -AW           Recommendations    Therapy Frequency (Swallow)  PRN  -AW        Predicted Duration Therapy Intervention (Days)  until discharge  -AW        SLP Diet Recommendation  puree with some mashed;nectar thick liquids  -AW        Recommended Precautions and Strategies  upright posture during/after eating;small bites of food and sips of liquid  -AW        Oral Care Recommendations  Oral Care before breakfast, after meals and PRN  -AW        SLP Rec. for Method of Medication Administration  meds crushed;meds whole;with pudding or applesauce;as tolerated  -AW        Monitor for Signs of Aspiration  yes;notify SLP if any concerns  -AW        Anticipated Discharge Disposition (SLP)  extended care facility  -AW           Swallow Goals (SLP)    Oral Nutrition/Hydration Goal Selection (SLP)  oral nutrition/hydration, SLP goal 1  -AW           Oral Nutrition/Hydration Goal 1 (SLP)    Oral Nutrition/Hydration Goal 1, SLP  Pt will safely tolerate the least restrictive diet with no s/s of aspiration.  -AW        Time Frame (Oral Nutrition/Hydration Goal 1, SLP)  by discharge  -AW          User Key  (r) = Recorded By, (t) = Taken By, (c) = Cosigned By    Initials Name Effective Dates    Mariluz Ott MS CCC-SLP 06/08/18 -           EDUCATION  The patient has been educated in the following areas:   Dysphagia (Swallowing Impairment) Oral Care/Hydration Modified Diet Instruction.    SLP Recommendation and Plan  SLP Swallowing  Diagnosis: oral dysphagia, suspected pharyngeal dysphagia  SLP Diet Recommendation: puree with some mashed, nectar thick liquids  Recommended Precautions and Strategies: upright posture during/after eating, small bites of food and sips of liquid  SLP Rec. for Method of Medication Administration: meds crushed, meds whole, with pudding or applesauce, as tolerated     Monitor for Signs of Aspiration: yes, notify SLP if any concerns     Swallow Criteria for Skilled Therapeutic Interventions Met: demonstrates skilled criteria  Anticipated Discharge Disposition (SLP): extended care facility  Rehab Potential/Prognosis, Swallowing: good, to achieve stated therapy goals  Therapy Frequency (Swallow): PRN  Predicted Duration Therapy Intervention (Days): until discharge                         Plan of Care Reviewed With: patient  Outcome Summary: Clinical swallow eval completed. Pt very weak, on dysphagia diet at NH. Pt had baseline coughing prior to eval. Swallow weak and delayed with ice chips. Pt toletated NTL (cup/straw) and pureed w/ no s/s. Mastication slow with soft solids w/ oral residue removed from mouth. Recommend pureed/fork mashed w/ NTL; meds whole or crushed in pureed; upright for meals and 30 min after; slow rate; small bites/sips. ST to follow for tolerance.    SLP GOALS     Row Name 12/17/20 1200             Oral Nutrition/Hydration Goal 1 (SLP)    Oral Nutrition/Hydration Goal 1, SLP  Pt will safely tolerate the least restrictive diet with no s/s of aspiration.  -AW      Time Frame (Oral Nutrition/Hydration Goal 1, SLP)  by discharge  -AW        User Key  (r) = Recorded By, (t) = Taken By, (c) = Cosigned By    Initials Name Provider Type    Mariluz Ott MS CCC-SLP Speech and Language Pathologist           SLP Outcome Measures (last 72 hours)      SLP Outcome Measures     Row Name 12/17/20 1200             SLP Outcome Measures    Outcome Measure Used?  Adult NOMS  -AW         Adult FCM Scores    FCM Chosen   Swallowing  -AW      Swallowing FCM Score  4  -AW        User Key  (r) = Recorded By, (t) = Taken By, (c) = Cosigned By    Initials Name Effective Dates    Mariluz Ott MS CCC-SLP 06/08/18 -            Time Calculation:   Time Calculation- SLP     Row Name 12/17/20 1225             Time Calculation- SLP    SLP Start Time  1030  -AW      SLP Received On  12/17/20  -AW        User Key  (r) = Recorded By, (t) = Taken By, (c) = Cosigned By    Initials Name Provider Type    Mariluz Ott MS CCC-SLP Speech and Language Pathologist          Therapy Charges for Today     Code Description Service Date Service Provider Modifiers Qty    [id number] HC ST EVAL ORAL PHARYNG SWALLOW 4 12/17/2020 Mariluz Disla MS CCC-SLP GN 1               Mariluz Disla MS CCC-GHASSAN  12/17/2020

## 2020-12-17 NOTE — ED NOTES
.  Nursing report ED to floor  Kim Feldman  89 y.o.  female    HPI (triage note):   Chief Complaint   Patient presents with   • Exposure To Known Illness   • Altered Mental Status       Admitting doctor:   Juan Carlos Harper MD    Admitting diagnosis:   The primary encounter diagnosis was COVID-19. Diagnoses of Hypoxia and Hallucination were also pertinent to this visit.    Code status:   Current Code Status     Date Active Code Status Order ID Comments User Context       12/16/2020 1640 No CPR [id number]  Juan Carlos Harper MD ED     Advance Care Planning Activity      Questions for Current Code Status     Question Answer Comment    Code Status No CPR     Medical Interventions (Level of Support Prior to Arrest) Limited     Limited Support to NOT Include Cardioversion/Defibrillation      Intubation           Allergies:   Lipitor [atorvastatin] and Penicillins    Weight:       12/16/20  1608   Weight: 108 kg (237 lb)       Most recent vitals:   Vitals:    12/16/20 1700 12/16/20 1850 12/16/20 1908 12/16/20 1911   BP:       Pulse: 80 76 72 76   Resp:   20    Temp:       TempSrc:       SpO2: 93%  95% 94%   Weight:       Height:           Active LDAs/IV Access:   Lines, Drains & Airways    Active LDAs     Name:   Placement date:   Placement time:   Site:   Days:    Peripheral IV 12/16/20 1348 Left Antecubital   12/16/20    1348    Antecubital   less than 1    External Urinary Catheter   07/06/20    0300    --   163                Labs (abnormal labs have a star):   Labs Reviewed   RESPIRATORY PANEL PCR W/ COVID-19 (SARS-COV-2) SACHIN/JENNIFER/TOMA/PAD/COR/MAD/SHANNAN IN-HOUSE, NP SWAB IN UTM/VTP, 3-4 HR TAT - Abnormal; Notable for the following components:       Result Value    COVID19 Detected (*)     All other components within normal limits    Narrative:     Fact sheet for providers: https://docs.[employer]/wp-content/uploads/AJH1094-2521-DC7.1-EUA-Provider-Fact-Sheet-3.pdf    Fact sheet for patients:  https://docs.[employer]/wp-content/uploads/OUN5108-7751-DN9.1-EUA-Patient-Fact-Sheet-1.pdf    Test performed by [employer].   COMPREHENSIVE METABOLIC PANEL - Abnormal; Notable for the following components:    Glucose 102 (*)     BUN 26 (*)     Creatinine 1.62 (*)     Sodium 147 (*)     CO2 30.6 (*)     eGFR Non  Amer 30 (*)     All other components within normal limits    Narrative:     GFR Normal >60  Chronic Kidney Disease <60  Kidney Failure <15     URINALYSIS W/ MICROSCOPIC IF INDICATED (NO CULTURE) - Abnormal; Notable for the following components:    Appearance, UA Turbid (*)     Ketones, UA Trace (*)     Blood, UA Small (1+) (*)     Protein,  mg/dL (2+) (*)     Leuk Esterase, UA Large (3+) (*)     All other components within normal limits   TROPONIN (IN-HOUSE) - Abnormal; Notable for the following components:    Troponin T 0.038 (*)     All other components within normal limits    Narrative:     Troponin T Reference Range:  <= 0.03 ng/mL-   Negative for AMI  >0.03 ng/mL-     Abnormal for myocardial necrosis.  Clinicians would have to utilize clinical acumen, EKG, Troponin and serial changes to determine if it is an Acute Myocardial Infarction or myocardial injury due to an underlying chronic condition.       Results may be falsely decreased if patient taking Biotin.     CBC WITH AUTO DIFFERENTIAL - Abnormal; Notable for the following components:    RBC 3.12 (*)     Hemoglobin 10.8 (*)     Hematocrit 32.6 (*)     .5 (*)     MCH 34.6 (*)     RDW 12.2 (*)     Platelets 97 (*)     All other components within normal limits   URINALYSIS, MICROSCOPIC ONLY - Abnormal; Notable for the following components:    RBC, UA 3-5 (*)     WBC, UA Too Numerous to Count (*)     All other components within normal limits   D-DIMER, QUANTITATIVE - Abnormal; Notable for the following components:    D-Dimer, Quantitative 1.18 (*)     All other components within normal limits    Narrative:     The [employer] D-Dimer test used  "in conjunction with a clinical pretest probability (PTP) assessment model, has been approved by the FDA to rule out the presence of venous thromboembolism (VTE) in outpatients suspected of deep venous thrombosis (DVT) or pulmonary embolism (PE). The cut-off for negative predictive value is <0.50 MCGFEU/mL.   LACTIC ACID, PLASMA - Normal   PROCALCITONIN - Normal    Narrative:     As a Marker for Sepsis (Non-Neonates):   1. <0.5 ng/mL represents a low risk of severe sepsis and/or septic shock.  1. >2 ng/mL represents a high risk of severe sepsis and/or septic shock.    As a Marker for Lower Respiratory Tract Infections that require antibiotic therapy:  PCT on Admission     Antibiotic Therapy             6-12 Hrs later  > 0.5                Strongly Recommended            >0.25 - <0.5         Recommended  0.1 - 0.25           Discouraged                   Remeasure/reassess PCT  <0.1                 Strongly Discouraged          Remeasure/reassess PCT      As 28 day mortality risk marker: \"Change in Procalcitonin Result\" (> 80 % or <=80 %) if Day 0 (or Day 1) and Day 4 values are available. Refer to http://www.[employer]-pct-calculator.com/   Change in PCT <=80 %   A decrease of PCT levels below or equal to 80 % defines a positive change in PCT test result representing a higher risk for 28-day all-cause mortality of patients diagnosed with severe sepsis or septic shock.  Change in PCT > 80 %   A decrease of PCT levels of more than 80 % defines a negative change in PCT result representing a lower risk for 28-day all-cause mortality of patients diagnosed with severe sepsis or septic shock.                Results may be falsely decreased if patient taking Biotin.    COVID PRE-OP / PRE-PROCEDURE SCREENING ORDER (NO ISOLATION)    Narrative:     The following orders were created for panel order COVID PRE-OP / PRE-PROCEDURE SCREENING ORDER (NO ISOLATION) - Swab, Nasopharynx.  Procedure                               Abnormality    "      Status                     ---------                               -----------         ------                     Respiratory Panel PCR w/...[[id number]]  Abnormal            Final result                 Please view results for these tests on the individual orders.   BLOOD CULTURE   BLOOD CULTURE   BLOOD CULTURE   BLOOD CULTURE   RAINBOW DRAW    Narrative:     The following orders were created for panel order [place] Draw.  Procedure                               Abnormality         Status                     ---------                               -----------         ------                     Light Blue Top[[id number]]                                   Final result               Green Top (Gel)[[id number]]                                  Final result               Lavender Top[[id number]]                                     Final result               Gold Top - SST[[id number]]                                   Final result                 Please view results for these tests on the individual orders.   LACTIC ACID, PLASMA   HEPATIC FUNCTION PANEL   CREATININE, SERUM   CBC AND DIFFERENTIAL    Narrative:     The following orders were created for panel order CBC & Differential.  Procedure                               Abnormality         Status                     ---------                               -----------         ------                     CBC Auto Differential[[id number]]        Abnormal            Final result                 Please view results for these tests on the individual orders.   LIGHT BLUE TOP   GREEN TOP   LAVENDER TOP   GOLD TOP - SST       EKG:   ECG 12 Lead   Final Result   HEART RATE= 85  bpm   RR Interval= 708  ms   WY Interval= 219  ms   P Horizontal Axis= 23  deg   P Front Axis= 11  deg   QRSD Interval= 94  ms   QT Interval= 410  ms   QRS Axis= -46  deg   T Wave Axis= -23  deg   - ABNORMAL ECG -   Sinus rhythm   Borderline prolonged WY interval   LAD, consider left anterior fascicular  block   Nonspecific T abnormalities, inferior leads   Borderline prolonged QT interval   Poor quality tracing repeat   Electronically Signed By: Torey CurtisZOË) ([facility]) 16-Dec-2020 18:55:41   Date and Time of Study: 2020-12-16 13:09:21          Meds given in ED:   Medications   sodium chloride 0.9 % flush 10 mL (has no administration in time range)   dexamethasone sodium phosphate injection 6 mg (has no administration in time range)   budesonide-formoterol (SYMBICORT) 160-4.5 MCG/ACT inhaler 2 puff (2 puffs Inhalation Given 12/16/20 1908)   ferrous sulfate tablet 325 mg (has no administration in time range)   levETIRAcetam (KEPPRA) tablet 750 mg (has no administration in time range)   mirtazapine (REMERON) tablet 15 mg (has no administration in time range)   polyethylene glycol (MIRALAX) packet 17 g (has no administration in time range)   sodium chloride 0.45 % infusion (has no administration in time range)   cholecalciferol (VITAMIN D3) tablet 1,000 Units (has no administration in time range)   ascorbic acid (VITAMIN C) tablet 500 mg (has no administration in time range)   zinc sulfate (ZINCATE) capsule 220 mg (has no administration in time range)   cetirizine (zyrTEC) tablet 5 mg (has no administration in time range)   enoxaparin (LOVENOX) syringe 30 mg (has no administration in time range)   cefTRIAXone (ROCEPHIN) IVPB 1 g (has no administration in time range)   citalopram (CeleXA) tablet 20 mg (has no administration in time range)   remdesivir 200 mg in sodium chloride 0.9 % 250 mL IVPB (powder vial) (has no administration in time range)     Followed by   remdesivir 100 mg in sodium chloride 0.9 % 250 mL IVPB (powder vial) (has no administration in time range)   Pharmacy Consult - Remdesivir (has no administration in time range)   famotidine (PEPCID) tablet 20 mg (has no administration in time range)   dexamethasone sodium phosphate injection 6 mg (6 mg Intravenous Given 12/16/20 1400)       Imaging  results:  Ct Head Without Contrast    Result Date: 2020  No acute process identified.  Radiation dose reduction techniques were utilized, including automated exposure control and exposure modulation based on body size.  This report was finalized on 2020 3:31 PM by Dr. Dony Patton M.D.        Ambulatory status:   -     Social issues:   Social History     Socioeconomic History   • Marital status:      Spouse name: Not on file   • Number of children: 3   • Years of education: Not on file   • Highest education level: Not on file   Occupational History   • Occupation: Retired   Tobacco Use   • Smoking status: Former Smoker     Packs/day: 1.00     Years: 40.00     Pack years: 40.00     Types: Cigarettes     Quit date:      Years since quittin.9   • Smokeless tobacco: Never Used   • Tobacco comment: caffeine use: 2 cup of coffee in the morning and 1 in the afternoon.    Substance and Sexual Activity   • Alcohol use: Yes     Comment: 1 PER WEEK   • Drug use: No   • Sexual activity: Defer    Nursing report ED to floor     Haritha Shaw RN  20 1937

## 2020-12-17 NOTE — PLAN OF CARE
Problem: Adult Inpatient Plan of Care  Goal: Plan of Care Review  Outcome: Ongoing, Progressing  Flowsheets (Taken 12/17/2020 0346)  Progress: no change  Plan of Care Reviewed With: patient  Outcome Summary: Patient admitted from ER for increased weakness and confusion at [facility]. Tested positive for COVID. Also being treated for UTI. IV fluids, and IV antibotics continued. Blood cultures pending. Trop and d-dimer elevated. CT of the head- negative for anything acute. Failed swallow-speech evaluation and NPO. VSS. Will continue to monitor.

## 2020-12-17 NOTE — CONSULTS
CONSULT NOTE    Infectious Diseases - Myra Vela MD  [facility]       Patient Identification:  Name: Kim Feldman  Age: 89 y.o.  Sex: female  :  3/13/1930  MRN: [id number]             Date of Consultation: 2020      Primary Care Physician: Grady Villeda MD                               Requesting Physician: Dr. Ocasio  Reason for Consultation: COVID-19 infection    Impression: Patient is a 89-year-old female who is a resident of nursing home was noted to have increasing shortness of breath and change in status along with confusion since day before yesterday.  Work-up at the facility revealed positive COVID-19 assay on 12/15/2020 resulting in her being transferred to the emergency room for further evaluation as she exhibited increasing shortness of breath and confusion.  Work-up in the emergency room confirmed COVID-19 status and abnormal urinalysis consistent with UTI.  Work-up in the emergency room revealed hypoxia requiring oxygen supplementation to keep the saturation greater than 93% along with confusion.  This presentation in this context is consistent with:  1-systemic COVID-19 infection with hypoxia requiring oxygen supplementation  2-toxic metabolic encephalopathy secondary to COVID-19 infection and associated  3-urinary tract infection  4-other diagnosis per internal medicine service.      Recommendations/Discussions:  At this juncture I agree with the care plan consisting of supportive care and antibiotic therapy for identified urinary tract infection will follow up on culture results to further de-escalate antibiotic therapy or optimize it based on the sensitivity.  Would recommend 7 days of antibiotic treatment.  From COVID-19 infection standpoint she exhibited functional impairment manifested as hypoxia requiring oxygen supplementation.  Based on this criteria patient is a candidate for Covid specific treatment and hence would recommend dexamethasone and remdesivir  with close monitoring of her oxygen requirement.  Rest of the supportive care per primary team.  Thank you Dr. Ocasio for letting me be the part of your patient care please see above impression and recommendation.      History of Present Illness:   Patient is a 89-year-old female who is a resident of nursing home was noted to have increasing shortness of breath and change in status along with confusion since day before yesterday.  Work-up at the facility revealed positive COVID-19 assay on 12/15/2020 resulting in her being transferred to the emergency room for further evaluation as she exhibited increasing shortness of breath and confusion.  Work-up in the emergency room confirmed COVID-19 status and abnormal urinalysis consistent with UTI.  Work-up in the emergency room revealed hypoxia requiring oxygen supplementation to keep the saturation greater than 93% along with confusion.      Past Medical History:  Past Medical History:   Diagnosis Date   • Anemia    • At risk for falls    • At risk for sleep apnea 11/17/2018   • Atrial fibrillation (CMS/HCC)    • Bulging lumbar disc    • Cellulitis     BILATERAL LEGS   • Chronic back pain    • Chronic cough    • Chronic UTI    • Chronic venous insufficiency    • 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HCC)    • DDD (degenerative disc disease), lumbosacral    • Dementia without behavioral disturbance (CMS/HCC)     moderate   • Depression, endogenous (CMS/HCC)    • Disc degeneration, lumbar    • Dysphagia    • GERD (gastroesophageal reflux disease)    • Hiatal hernia    • History of anxiety    • History of aspiration pneumonitis    • History of cerebral artery occlusion     CVA (following TIA), 10/10, treated with tPA   • History of CVA in adulthood 4/5/2019   • History of herpes zoster    • History of ingrowing nail    • History of osteoporosis    • History of poliomyelitis     child   • History of sciatica    • History of sick sinus syndrome     s/p PPM   • History of transient  cerebral ischemia     followed by stroke in 10/2010. BIBI was normal.   • History of Zenker's diverticulum removal 2016   • HX: long term anticoagulant use    • Hyperlipidemia    • Hypertension     NO CURRENT MEDICATION   • Hyponatremia    • Intertrigo    • Lumbar radiculopathy    • Morbid obesity (CMS/HCC)    • MRSA (methicillin resistant Staphylococcus aureus)     LEFT FOOT SPREAD TO RIGHT ANKLE; DR TUBBS AWARE   • Neuralgia     with diplopia secondary to facial shingles   • Nonepileptic episode (CMS/HCC)    • Osteoarthritis of right knee    • Pacemaker    • Pneumonia    • Seeing double     secondary to shingles on the face also with neuralgia   • Seizures (CMS/HCC)    • 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)    • Spinal stenosis    • Vitamin D deficiency    • Zenker's diverticulum      Past Surgical History:  Past Surgical History:   Procedure Laterality Date   • ANKLE OPEN REDUCTION INTERNAL FIXATION Right 11/17/2018    Procedure: ANKLE OPEN REDUCTION INTERNAL FIXATION;  Surgeon: Cristian Tubbs II, MD;  Location: [facility];  Service: Orthopedics   • CARDIAC PACEMAKER PLACEMENT      secondary to SSS, placed in 2004, with gen chng 2014; [employer] dual DOO   • CATARACT EXTRACTION W/ INTRAOCULAR LENS IMPLANT Bilateral    • COLONOSCOPY     • HAND SURGERY Right    • HARDWARE REMOVAL Right 2/19/2019    Procedure: RT ANKLE HARDWARE REMOVAL;  Surgeon: Cristian Tubbs II, MD;  Location: [facility] OR;  Service: Orthopedics   • KNEE ARTHROPLASTY Left    • LAMINECTOMY FOR IMPLANTATION / PLACEMENT NEUROSTIMULATOR ELECTRODES     • LUMBAR LAMINECTOMY      L-3 L4 L4 L5   • TONSILLECTOMY     • ZENKERS DIVERTICULECTOMY N/A 9/27/2016    Procedure: ENDOSCOPIC REMOVAL OF ZENKERS DIVERTICULUM W/ WERDASCOPE;  Surgeon: Oswald Barth MD;  Location: [facility];  Service:    • ZENKERS DIVERTICULECTOMY N/A 4/14/2017    Procedure: ESOPHAGOSCOPY;  Surgeon: Oswald Barth MD;  Location:   [facility];  Service:       Home Meds:  Medications Prior to Admission   Medication Sig Dispense Refill Last Dose   • furosemide (LASIX) 20 MG tablet Take 20 mg by mouth 2 (Two) Times a Day.   12/16/2020 at 8:30   • gabapentin (NEURONTIN) 600 MG tablet Take 600 mg by mouth 3 (Three) Times a Day.   12/16/2020 at 8:30   • acetaminophen (TYLENOL) 325 MG tablet Take 650 mg by mouth 3 (Three) Times a Day.      • budesonide-formoterol (SYMBICORT) 160-4.5 MCG/ACT inhaler Inhale 2 puffs 2 (Two) Times a Day.      • Calcium-Vitamin D-Vitamin K (VIACTIV) 500-500-40 MG-UNT-MCG chewable tablet Chew 1 tablet Daily.      • citalopram (CeleXA) 20 MG tablet Take 20 mg by mouth Daily.      • ferrous sulfate 325 (65 FE) MG tablet Take 325 mg by mouth Daily.      • levETIRAcetam (KEPPRA) 750 MG tablet Take 1 tablet by mouth 2 (Two) Times a Day. 60 tablet 11    • mirtazapine (REMERON) 15 MG tablet Take 15 mg by mouth Every Night.      • pantoprazole (PROTONIX) 40 MG EC tablet Take 40 mg by mouth Daily.      • polyethylene glycol (MIRALAX) packet Take 17 g by mouth Daily As Needed.        Current Meds:     Current Facility-Administered Medications:   •  ascorbic acid (VITAMIN C) tablet 500 mg, 500 mg, Oral, Daily, Juan Carlos Harper MD  •  budesonide-formoterol (SYMBICORT) 160-4.5 MCG/ACT inhaler 2 puff, 2 puff, Inhalation, BID - RT, Juan Carlos Harper MD, 2 puff at 12/16/20 1908  •  cefTRIAXone (ROCEPHIN) IVPB 1 g, 1 g, Intravenous, Q24H, Juan Carlos Harper MD, Last Rate: 100 mL/hr at 12/17/20 0119, 1 g at 12/17/20 0119  •  cetirizine (zyrTEC) tablet 5 mg, 5 mg, Oral, Nightly, Juan Carlos Harper MD  •  cholecalciferol (VITAMIN D3) tablet 1,000 Units, 1,000 Units, Oral, Daily, Juan Carlos Harper MD  •  citalopram (CeleXA) tablet 20 mg, 20 mg, Oral, Nightly, Juan Carlos Harper MD  •  dexamethasone sodium phosphate injection 6 mg, 6 mg, Intravenous, Daily, Juan Carlos Harper MD  •  enoxaparin (LOVENOX) syringe 30 mg, 30 mg, Subcutaneous, Q24H, Juan Carlos Harper MD, 30 mg at  20  •  famotidine (PEPCID) tablet 20 mg, 20 mg, Oral, Daily, Juan Carlos Harper MD  •  ferrous sulfate tablet 325 mg, 325 mg, Oral, Daily, Juan Carlos Harper MD  •  levETIRAcetam (KEPPRA) tablet 750 mg, 750 mg, Oral, BID, Juan Carlos Harper MD  •  mirtazapine (REMERON) tablet 15 mg, 15 mg, Oral, Nightly, Juan Carlos Harper MD  •  Pharmacy Consult - Remdesivir, , Does not apply, Continuous PRN, Myra Sheppard MD  •  polyethylene glycol (MIRALAX) packet 17 g, 17 g, Oral, Daily, Juan Carlos Harper MD  •  [COMPLETED] remdesivir 200 mg in sodium chloride 0.9 % 250 mL IVPB (powder vial), 200 mg, Intravenous, Q24H, 200 mg at 20 **FOLLOWED BY** remdesivir 100 mg in sodium chloride 0.9 % 250 mL IVPB (powder vial), 100 mg, Intravenous, Q24H, Myra Sheppard MD  •  sodium chloride 0.45 % infusion, 75 mL/hr, Intravenous, Continuous, Juan Carlos Harper MD, Last Rate: 75 mL/hr at 208, 75 mL/hr at 20  •  [COMPLETED] Insert peripheral IV, , , Once **AND** sodium chloride 0.9 % flush 10 mL, 10 mL, Intravenous, PRN, Dori De La Cruz APRN  •  zinc sulfate (ZINCATE) capsule 220 mg, 220 mg, Oral, Daily, Juan Carlos Harper MD  Allergies:  Allergies   Allergen Reactions   • Lipitor [Atorvastatin] Confusion   • Penicillins Hives     Has previously tolerated ceftriaxone     Social History:   Social History     Tobacco Use   • Smoking status: Former Smoker     Packs/day: 1.00     Years: 40.00     Pack years: 40.00     Types: Cigarettes     Quit date:      Years since quittin.   • Smokeless tobacco: Never Used   • Tobacco comment: caffeine use: 2 cup of coffee in the morning and 1 in the afternoon.    Substance Use Topics   • Alcohol use: Yes     Comment: 1 PER WEEK      Family History:  Family History   Problem Relation Age of Onset   • Cancer Daughter    • Malig Hyperthermia Neg Hx           Review of Systems  See history of present illness and past medical history.  Remainder of ROS is negative.  Limited because of  "dementia.    Vitals:   /73 (BP Location: Left arm, Patient Position: Lying)   Pulse 71   Temp 98 °F (36.7 °C) (Oral)   Resp 16   Ht 157.5 cm (62\")   Wt 108 kg (237 lb)   SpO2 96%   BMI 43.35 kg/m²   I/O:     Intake/Output Summary (Last 24 hours) at 12/17/2020 0613  Last data filed at 12/17/2020 0446  Gross per 24 hour   Intake --   Output 300 ml   Net -300 ml     Exam:  Patient is examined using the personal protective equipment as per guidelines from infection control for this particular patient as enacted.  Hand washing was performed before and after patient interaction.  General Appearance:   Awake interactive but pleasantly confused.  Does not appear to be toxic or septic.   Head:    Normocephalic, without obvious abnormality, atraumatic   Eyes:    PERRL, conjunctivae/corneas clear, EOM's intact, both eyes   Ears:    Normal external ear canals, both ears   Nose:   Nares normal, septum midline, mucosa normal, no drainage    or sinus tenderness   Throat:   Lips, tongue, gums normal; oral mucosa pink and moist   Neck:   Supple, symmetrical, trachea midline, no adenopathy;     thyroid:  no enlargement/tenderness/nodules; no carotid    bruit or JVD   Back:     Symmetric, no curvature, ROM normal, no CVA tenderness   Lungs:     Clear to auscultation bilaterally, respirations unlabored   Chest Wall:    No tenderness or deformity    Heart:    Regular rate and rhythm, S1 and S2 normal, no murmur, rub   or gallop   Abdomen:     Soft, non-tender, bowel sounds active all four quadrants,     no masses, no hepatomegaly, no splenomegaly   Extremities:  Discomfort in lower extremities.   Pulses:   Pulses palpable in all extremities; symmetric all extremities   Skin:   Skin color normal, Skin is warm and dry,  no rashes or palpable lesions   Neurologic:  Grossly normal       Data Review:    I reviewed the patient's new clinical results.  Results from last 7 days   Lab Units 12/16/20  1348 12/12/20  1300   WBC " 10*3/mm3 7.86 6.25   HEMOGLOBIN g/dL 10.8* 9.8*   PLATELETS 10*3/mm3 97* 148     Results from last 7 days   Lab Units 12/16/20  2133 12/16/20  1348 12/12/20  1300   SODIUM mmol/L  --  147* 146*   POTASSIUM mmol/L  --  3.5 4.0   CHLORIDE mmol/L  --  103 106   CO2 mmol/L  --  30.6* 26.6   BUN mg/dL  --  26* 30*   CREATININE mg/dL 1.35* 1.62* 1.71*   CALCIUM mg/dL  --  8.6 8.4   GLUCOSE mg/dL  --  102* 101*     Culture   Date Value Ref Range Status   07/08/2016 Normal Respiratory Jodie  Final   ]    Assessment:  Active Hospital Problems    Diagnosis  POA   • COVID-19 [U07.1]  Yes      Resolved Hospital Problems   No resolved problems to display.         Plan:  See above  Myra Sheppard MD   12/17/2020  06:13 EST    Much of this encounter note is an electronic transcription/translation of spoken language to printed text. The electronic translation of spoken language may permit erroneous, or at times, nonsensical words or phrases to be inadvertently transcribed; Although I have reviewed the note for such errors, some may still exist

## 2020-12-18 NOTE — NURSING NOTE
WOCN: BLE's shins dry scaly . Recommend moisture cream. Coccyx and heels red slow to kavita.  Recommend waffle overlay mattress. Discussed with unit RN

## 2020-12-18 NOTE — PROGRESS NOTES
"  Infectious Diseases Progress Note    Myra Sheppard MD     [facility]  Los: 2 days  Patient Identification:  Name: Kim Feldman  Age: 89 y.o.  Sex: female  :  3/13/1930  MRN: [id number]         Primary Care Physician: Grady Villeda MD            Subjective: Feeling somewhat better but still confused.  Interval History: See consultation note.  Oxygen requirement is improving now only on 1 L with 95% oxygen saturation.    Objective:    Scheduled Meds:vitamin C, 500 mg, Oral, Daily  budesonide-formoterol, 2 puff, Inhalation, BID - RT  cefTRIAXone, 1 g, Intravenous, Q24H  cetirizine, 5 mg, Oral, Nightly  cholecalciferol, 1,000 Units, Oral, Daily  citalopram, 20 mg, Oral, Nightly  dexamethasone, 6 mg, Intravenous, Daily  enoxaparin, 40 mg, Subcutaneous, BID  famotidine, 20 mg, Oral, BID  levETIRAcetam, 750 mg, Oral, Q12H  mirtazapine, 15 mg, Oral, Nightly  polyethylene glycol, 17 g, Oral, Daily  remdesivir, 100 mg, Intravenous, Q24H  zinc sulfate, 220 mg, Oral, Daily      Continuous Infusions:dextrose, 75 mL/hr, Last Rate: 75 mL/hr (20 1016)  Pharmacy Consult - Remdesivir,         Vital signs in last 24 hours:  Temp:  [96.5 °F (35.8 °C)-98.4 °F (36.9 °C)] 97.5 °F (36.4 °C)  Heart Rate:  [71-82] 73  Resp:  [16] 16  BP: (139-163)/(80-93) 158/87    Intake/Output:    Intake/Output Summary (Last 24 hours) at 2020 1411  Last data filed at 2020 1234  Gross per 24 hour   Intake --   Output 850 ml   Net -850 ml       Exam:  /87 (BP Location: Right arm, Patient Position: Lying)   Pulse 73   Temp 97.5 °F (36.4 °C) (Oral)   Resp 16   Ht 157.5 cm (62\")   Wt 108 kg (237 lb)   SpO2 95%   BMI 43.35 kg/m²   Patient is examined using the personal protective equipment as per guidelines from infection control for this particular patient as enacted.  Hand washing was performed before and after patient interaction.  General Appearance:  Comfortable in no acute distress                   "        Head:    Normocephalic, without obvious abnormality, atraumatic                           Eyes:    PERRL, conjunctivae/corneas clear, EOM's intact, both eyes                         Throat:   Lips, tongue, gums normal; oral mucosa pink and moist                           Neck:   Supple, symmetrical, trachea midline, no JVD                         Lungs:    Clear to auscultation bilaterally, respirations unlabored                 Chest Wall:    No tenderness or deformity                          Heart:    Regular rate and rhythm, S1 and S2 normal, no murmur, no rub                                         or gallop                  Abdomen:   Obese soft nontender                 extremities: Edematous upper extremity with ecchymosis and scaly changes in the lower extremities.                        Pulses:   Pulses palpable in all extremities                            Skin: Significant ecchymotic changes in the upper extremities and lower extremities noted bilateral lower extremities are tender but no erythema or cellulitis noted                  Neurologic: Grossly nonfocal       Data Review:    I reviewed the patient's new clinical results.  Results from last 7 days   Lab Units 12/18/20  0942 12/17/20  1008 12/16/20  1348 12/12/20  1300   WBC 10*3/mm3 4.33 4.48 7.86 6.25   HEMOGLOBIN g/dL 11.8* 11.0* 10.8* 9.8*   PLATELETS 10*3/mm3 95* 92* 97* 148     Results from last 7 days   Lab Units 12/18/20  0942 12/17/20  0644 12/16/20  2133 12/16/20  1348 12/12/20  1300   SODIUM mmol/L 144 148*  --  147* 146*   POTASSIUM mmol/L 3.6 3.7  --  3.5 4.0   CHLORIDE mmol/L 103 105  --  103 106   CO2 mmol/L 31.8* 28.8  --  30.6* 26.6   BUN mg/dL 25* 26*  --  26* 30*   CREATININE mg/dL 1.01* 1.25* 1.35* 1.62* 1.71*   CALCIUM mg/dL 8.5 8.3  --  8.6 8.4   GLUCOSE mg/dL 134* 84  --  102* 101*       Microbiology Results (last 10 days)     Procedure Component Value - Date/Time    Blood Culture - Blood, Arm, Left [[id number]]  Collected: 12/16/20 2133    Lab Status: Preliminary result Specimen: Blood from Arm, Left Updated: 12/17/20 2200     Blood Culture No growth at 24 hours    COVID PRE-OP / PRE-PROCEDURE SCREENING ORDER (NO ISOLATION) - Swab, Nasopharynx [[id number]]  (Abnormal) Collected: 12/16/20 1353    Lab Status: Final result Specimen: Swab from Nasopharynx Updated: 12/16/20 1508    Narrative:      The following orders were created for panel order COVID PRE-OP / PRE-PROCEDURE SCREENING ORDER (NO ISOLATION) - Swab, Nasopharynx.  Procedure                               Abnormality         Status                     ---------                               -----------         ------                     Respiratory Panel PCR w/...[[id number]]  Abnormal            Final result                 Please view results for these tests on the individual orders.    Respiratory Panel PCR w/COVID-19(SARS-CoV-2) [facility] In-House, NP Swab in UTM/VTM, 3-4 HR TAT - Swab, Nasopharynx [[id number]]  (Abnormal) Collected: 12/16/20 1353    Lab Status: Final result Specimen: Swab from Nasopharynx Updated: 12/16/20 1508     ADENOVIRUS, PCR Not Detected     Coronavirus 229E Not Detected     Coronavirus HKU1 Not Detected     Coronavirus NL63 Not Detected     Coronavirus OC43 Not Detected     COVID19 Detected     Human Metapneumovirus Not Detected     Human Rhinovirus/Enterovirus Not Detected     Influenza A PCR Not Detected     Influenza B PCR Not Detected     Parainfluenza Virus 1 Not Detected     Parainfluenza Virus 2 Not Detected     Parainfluenza Virus 3 Not Detected     Parainfluenza Virus 4 Not Detected     RSV, PCR Not Detected     Bordetella pertussis pcr Not Detected     Bordetella parapertussis PCR Not Detected     Chlamydophila pneumoniae PCR Not Detected     Mycoplasma pneumo by PCR Not Detected    Narrative:      Fact sheet for providers:  https://docs.[employer]/wp-content/uploads/ZVX1900-6539-MV7.1-EUA-Provider-Fact-Sheet-3.pdf    Fact sheet for patients: https://docs.[employer]/wp-content/uploads/ESW5659-0218-NL2.1-EUA-Patient-Fact-Sheet-1.pdf    Test performed by PCR.    Blood Culture - Blood, Arm, Right [[id number]]  (Abnormal) Collected: 12/16/20 1351    Lab Status: Preliminary result Specimen: Blood from Arm, Right Updated: 12/18/20 0804     Blood Culture Staphylococcus, coagulase negative     Isolated from Aerobic Bottle     Gram Stain Anaerobic Bottle Gram positive cocci in clusters      Aerobic Bottle Gram positive cocci in clusters    Blood Culture - Blood, Arm, Left [[id number]]  (Abnormal) Collected: 12/16/20 1351    Lab Status: Preliminary result Specimen: Blood from Arm, Left Updated: 12/18/20 0807     Blood Culture Staphylococcus, coagulase negative     Isolated from Anaerobic Bottle     Gram Stain Anaerobic Bottle Gram positive cocci in clusters    Blood Culture ID, PCR - Blood, Arm, Right [[id number]]  (Abnormal) Collected: 12/16/20 1351    Lab Status: Final result Specimen: Blood from Arm, Right Updated: 12/17/20 1144     BCID, PCR Staphylococcus spp, not aureus. Identification by BCID PCR.     BOTTLE TYPE Anaerobic Bottle          Assessment:    COVID-19  1-systemic COVID-19 infection with hypoxia requiring oxygen supplementation  2-toxic metabolic encephalopathy secondary to COVID-19 infection and associated  3-urinary tract infection  4-other diagnosis per internal medicine service.  5-coag negative staph bacteremia likely contaminant during the process of collection.        Recommendations/Discussions:  At this juncture I agree with the care plan consisting of supportive care and antibiotic therapy for identified urinary tract infection will follow up on culture results to further de-escalate antibiotic therapy or optimize it based on the sensitivity.  Would recommend 7 days of antibiotic treatment.  From COVID-19 infection  standpoint she exhibited functional impairment manifested as hypoxia requiring oxygen supplementation.  Based on this criteria patient is a candidate for Covid specific treatment and hence would recommend dexamethasone and remdesivir with close monitoring of her oxygen requirement.  Rest of the supportive care per primary team.    Myra Sheppard MD  12/18/2020  14:11 EST    Much of this encounter note is an electronic transcription/translation of spoken language to printed text. The electronic translation of spoken language may permit erroneous, or at times, nonsensical words or phrases to be inadvertently transcribed; Although I have reviewed the note for such errors, some may still exist

## 2020-12-18 NOTE — PROGRESS NOTES
"Daily progress note    Chief complaint  Doing same  No specific complaints  Still short of breath with exertion    History of present illness  89-year-old white female who is well-known to our service with history of hypertension seizure disorder depression chronic anemia and gastroesophageal reflux disease who is a nursing home resident brought to the emergency room multiple times in few days with shortness of breath but no fever cough abdominal pain nausea vomiting diarrhea.  Patient has some chest discomfort.  Patient was diagnosed with COVID-19 and was discharged back to nursing home sent back again with increased shortness of breath and this time she was found to be hypoxic admit for management.  Patient remained awake and alert and answer all questions appropriately.  Patient is DNR per her wishes.  Patient also found to have acute UTI     REVIEW OF SYSTEMS  All systems reviewed and marked as negative except as listed in HPI      PHYSICAL EXAM  Blood pressure 158/87, pulse 73, temperature 97.5 °F (36.4 °C), temperature source Oral, resp. rate 16, height 157.5 cm (62\"), weight 108 kg (237 lb), SpO2 95 %, not currently breastfeeding.    GENERAL: Alert well developed, well nourished in no distress  HENT: NCAT, neck supple, trachea midline  EYES: no scleral icterus, PERRL, normal conjunctivae  CV: regular rhythm, regular rate, no murmur  RESPIRATORY: unlabored effort, scattered wheezes, rales in the bilateral bases, mild tachypnea  ABDOMEN: soft, nontender, nondistended, bowel sounds present  MUSCULOSKELETAL: no gross deformity  NEURO: alert,  sensory and motor function of extremities grossly intact, speech clear, mental status normal/baseline  SKIN: warm, dry, no rash  PSYCH:  Appropriate mood and affect    LAB RESULTS  Lab Results (last 24 hours)     Procedure Component Value Units Date/Time    CBC & Differential [[id number]]  (Abnormal) Collected: 12/18/20 0942    Specimen: Blood Updated: 12/18/20 1040    " Narrative:      The following orders were created for panel order CBC & Differential.  Procedure                               Abnormality         Status                     ---------                               -----------         ------                     CBC Auto Differential[[id number]]        Abnormal            Final result                 Please view results for these tests on the individual orders.    CBC Auto Differential [[id number]]  (Abnormal) Collected: 12/18/20 0942    Specimen: Blood Updated: 12/18/20 1040     WBC 4.33 10*3/mm3      RBC 3.39 10*6/mm3      Hemoglobin 11.8 g/dL      Hematocrit 34.2 %      .9 fL      MCH 34.8 pg      MCHC 34.5 g/dL      RDW 12.1 %      RDW-SD 44.1 fl      MPV 10.0 fL      Platelets 95 10*3/mm3      Neutrophil % 50.2 %      Lymphocyte % 42.5 %      Monocyte % 6.9 %      Eosinophil % 0.0 %      Basophil % 0.2 %      Immature Grans % 0.2 %      Neutrophils, Absolute 2.17 10*3/mm3      Lymphocytes, Absolute 1.84 10*3/mm3      Monocytes, Absolute 0.30 10*3/mm3      Eosinophils, Absolute 0.00 10*3/mm3      Basophils, Absolute 0.01 10*3/mm3      Immature Grans, Absolute 0.01 10*3/mm3      nRBC 0.0 /100 WBC     Comprehensive Metabolic Panel [[id number]]  (Abnormal) Collected: 12/18/20 0942    Specimen: Blood Updated: 12/18/20 1037     Glucose 134 mg/dL      BUN 25 mg/dL      Creatinine 1.01 mg/dL      Sodium 144 mmol/L      Potassium 3.6 mmol/L      Chloride 103 mmol/L      CO2 31.8 mmol/L      Calcium 8.5 mg/dL      Total Protein 7.0 g/dL      Albumin 3.50 g/dL      ALT (SGPT) 14 U/L      AST (SGOT) 28 U/L      Alkaline Phosphatase 64 U/L      Total Bilirubin 0.4 mg/dL      eGFR Non African Amer 51 mL/min/1.73      Globulin 3.5 gm/dL      A/G Ratio 1.0 g/dL      BUN/Creatinine Ratio 24.8     Anion Gap 9.2 mmol/L     Narrative:      GFR Normal >60  Chronic Kidney Disease <60  Kidney Failure <15      C-reactive Protein [[id number]]  (Abnormal) Collected: 12/18/20 0942     Specimen: Blood Updated: 12/18/20 1037     C-Reactive Protein 4.36 mg/dL     Bilirubin, Direct [[id number]]  (Normal) Collected: 12/18/20 0942    Specimen: Blood Updated: 12/18/20 1037     Bilirubin, Direct 0.2 mg/dL     Blood Culture - Blood, Arm, Left [[id number]]  (Abnormal) Collected: 12/16/20 1351    Specimen: Blood from Arm, Left Updated: 12/18/20 0807     Blood Culture Staphylococcus, coagulase negative     Isolated from Anaerobic Bottle     Gram Stain Anaerobic Bottle Gram positive cocci in clusters    Blood Culture - Blood, Arm, Right [[id number]]  (Abnormal) Collected: 12/16/20 1351    Specimen: Blood from Arm, Right Updated: 12/18/20 0804     Blood Culture Staphylococcus, coagulase negative     Isolated from Aerobic Bottle     Gram Stain Anaerobic Bottle Gram positive cocci in clusters      Aerobic Bottle Gram positive cocci in clusters    Blood Culture - Blood, Arm, Left [[id number]] Collected: 12/16/20 2133    Specimen: Blood from Arm, Left Updated: 12/17/20 2200     Blood Culture No growth at 24 hours        Imaging Results (Last 24 Hours)     ** No results found for the last 24 hours. **           ECG 12 Lead               HEART RATE= 85  bpm  RR Interval= 708  ms  NJ Interval= 219  ms  P Horizontal Axis= 23  deg  P Front Axis= 11  deg  QRSD Interval= 94  ms  QT Interval= 410  ms  QRS Axis= -46  deg  T Wave Axis= -23  deg  - ABNORMAL ECG -  Sinus rhythm  Borderline prolonged NJ interval  LAD, consider left anterior fascicular block  Nonspecific T abnormalities, inferior leads  Borderline prolonged QT interval             Current Facility-Administered Medications:   •  ammonium lactate (AMLACTIN) cream, , Topical, Q12H PRN, Juan Carlos Harper MD  •  ascorbic acid (VITAMIN C) tablet 500 mg, 500 mg, Oral, Daily, Juan Carlos Harepr MD, 500 mg at 12/18/20 0818  •  budesonide-formoterol (SYMBICORT) 160-4.5 MCG/ACT inhaler 2 puff, 2 puff, Inhalation, BID - RT, Juan Carlos Harper MD, 2 puff at 12/18/20 0732  •  cefTRIAXone  (ROCEPHIN) IVPB 1 g, 1 g, Intravenous, Q24H, Juan Carlos Harper MD, Last Rate: 100 mL/hr at 12/17/20 1856, 1 g at 12/17/20 1856  •  cetirizine (zyrTEC) tablet 5 mg, 5 mg, Oral, Nightly, Juan Carlos Harper MD, 5 mg at 12/17/20 2115  •  cholecalciferol (VITAMIN D3) tablet 1,000 Units, 1,000 Units, Oral, Daily, Juan Carlos Harper MD, 1,000 Units at 12/18/20 0818  •  citalopram (CeleXA) tablet 20 mg, 20 mg, Oral, Nightly, Juan Carlos Harper MD, 20 mg at 12/17/20 2115  •  dexamethasone sodium phosphate injection 6 mg, 6 mg, Intravenous, Daily, Juan Carlos Harper MD, 6 mg at 12/18/20 0818  •  dextrose (D5W) 5 % infusion, 75 mL/hr, Intravenous, Continuous, Juan Carlos Harper MD, Last Rate: 75 mL/hr at 12/18/20 1016, 75 mL/hr at 12/18/20 1016  •  enoxaparin (LOVENOX) syringe 40 mg, 40 mg, Subcutaneous, BID, Juan Carlos Harper MD, 40 mg at 12/18/20 0816  •  famotidine (PEPCID) tablet 20 mg, 20 mg, Oral, BID, Juan Carlos Harper MD, 20 mg at 12/18/20 0818  •  levETIRAcetam (KEPPRA) 100 MG/ML solution 750 mg, 750 mg, Oral, Q12H, Juan Carlos Harper MD, 750 mg at 12/18/20 0816  •  mirtazapine (REMERON) tablet 15 mg, 15 mg, Oral, Nightly, Juan Carlos Harper MD, 15 mg at 12/17/20 2115  •  Pharmacy Consult - Remdesivir, , Does not apply, Continuous PRN, Myra Sheppard MD  •  polyethylene glycol (MIRALAX) packet 17 g, 17 g, Oral, Daily, Juan Carlos Harper MD, 17 g at 12/18/20 0818  •  [COMPLETED] remdesivir 200 mg in sodium chloride 0.9 % 250 mL IVPB (powder vial), 200 mg, Intravenous, Q24H, 200 mg at 12/16/20 2226 **FOLLOWED BY** remdesivir 100 mg in sodium chloride 0.9 % 250 mL IVPB (powder vial), 100 mg, Intravenous, Q24H, Myra Sheppard MD, 100 mg at 12/17/20 1545  •  [COMPLETED] Insert peripheral IV, , , Once **AND** sodium chloride 0.9 % flush 10 mL, 10 mL, Intravenous, PRN, Dori De La Cruz, JONA  •  zinc sulfate (ZINCATE) capsule 220 mg, 220 mg, Oral, Daily, Juan Carlos Harper MD, 220 mg at 12/18/20 0818     ASSESSMENT  COVID-19 infection  GPC bacteremia probably  contamination  Dehydration  Acute UTI  Acute hypoxia  Hypertension  Seizure disorder  Chronic anemia  Depression  Gastroesophageal reflux disease    PLAN  CPM  Supplement oxygen  IV fluid  IV antibiotics  Dexamethasone  Remdesivir  Symptomatic treatment for COVID-19 infection  Adjust nursing home medications  Stress ulcer DVT prophylaxis  Infectious disease consult pending  Supportive care  DNR   PT/OT  Discussed with nursing staff  Follow closely further recommendation current hospital course    GAETANO ACOSTA MD

## 2020-12-18 NOTE — PLAN OF CARE
Goal Outcome Evaluation:  Plan of Care Reviewed With: patient  Progress: no change  Outcome Summary: Pt is a 89 y.o. female admitted from LTC facility due to shortness of breath, found to have an acute UTI. Pt also COVID (+). Pt reports needing A with all ADL tasks PTA. Pt performed bed mobility with need for max A and VCs, tolerating <15 secs before requesting to lay down. Pt does appear to present with decreased functional activity tolerance, impaired strength, however pt reports being at baseline with level of A needed for completion of ADLs. No skilled acute OT services warranted at this time. Pt to return to LTC facility at d/c.    Patient was placed in a face mask during this therapy encounter. Therapist used appropriate personal protective equipment including isolation gown, surgical mask, eye shield and gloves during the entire therapy session. Hand hygiene was completed before and after therapy session. Patient is in enhanced droplet precautions.

## 2020-12-18 NOTE — THERAPY DISCHARGE NOTE
Acute Care - Occupational Therapy Discharge  [facility]    Patient Name: Kim Feldman  : 3/13/1930    MRN: [id number]                              Today's Date: 2020       Admit Date: 2020    Visit Dx:     ICD-10-CM ICD-9-CM   1. COVID-19  U07.1 079.89   2. Hypoxia  R09.02 799.02   3. 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)   • Vitamin D deficiency   • History of sick sinus syndrome   • Intertrigo   • Generalized convulsive seizures (CMS/HCC)   • Change in bowel habits   • Zenker diverticulum   • History of anxiety   • 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HCC)   • Thrombocytopenia (CMS/HCC)   • B12 deficiency   • Cardiac pacemaker in situ   • Chronic venous insufficiency   • Arthritis   • S/P knee replacement   • Chronic bilateral low back pain without sciatica   • Essential hypertension   • Hiatal hernia   • Zenker's diverticulum   • Chronic idiopathic constipation   • Pressure sore on buttocks   • Somnolence   • Closed trimalleolar fracture of right ankle   • Surgical wound infection   • History of failed arthroplasty of ankle   • History of CVA in adulthood   • Pneumonia of both lower lobes due to infectious organism   • Acute UTI   • COVID-19     Past Medical History:   Diagnosis Date   • Anemia    • At risk for falls    • At risk for sleep apnea 2018   • Atrial fibrillation (CMS/HCC)    • Bulging lumbar disc    • Cellulitis     BILATERAL LEGS   • Chronic back pain    • Chronic cough    • Chronic UTI    • Chronic venous insufficiency    • 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HCC)    • DDD (degenerative disc disease), lumbosacral    • Dementia without behavioral disturbance (CMS/HCC)      moderate   • Depression, endogenous (CMS/HCC)    • Disc degeneration, lumbar    • Dysphagia    • GERD (gastroesophageal reflux disease)    • Hiatal hernia    • History of anxiety    • History of aspiration pneumonitis    • History of cerebral artery occlusion     CVA (following TIA), 10/10, treated with tPA   • History of CVA in adulthood 4/5/2019   • History of herpes zoster    • History of ingrowing nail    • History of osteoporosis    • History of poliomyelitis     child   • History of sciatica    • History of sick sinus syndrome     s/p PPM   • History of transient cerebral ischemia     followed by stroke in 10/2010. BIBI was normal.   • History of Zenker's diverticulum removal 2016   • HX: long term anticoagulant use    • Hyperlipidemia    • Hypertension     NO CURRENT MEDICATION   • Hyponatremia    • Intertrigo    • Lumbar radiculopathy    • Morbid obesity (CMS/HCC)    • MRSA (methicillin resistant Staphylococcus aureus)     LEFT FOOT SPREAD TO RIGHT ANKLE; DR TUBBS AWARE   • Neuralgia     with diplopia secondary to facial shingles   • Nonepileptic episode (CMS/HCC)    • Osteoarthritis of right knee    • Pacemaker    • Pneumonia    • Seeing double     secondary to shingles on the face also with neuralgia   • Seizures (CMS/HCC)    • 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)    • Spinal stenosis    • Vitamin D deficiency    • Zenker's diverticulum      Past Surgical History:   Procedure Laterality Date   • ANKLE OPEN REDUCTION INTERNAL FIXATION Right 11/17/2018    Procedure: ANKLE OPEN REDUCTION INTERNAL FIXATION;  Surgeon: Cristian Tubbs II, MD;  Location: [facility];  Service: Orthopedics   • CARDIAC PACEMAKER PLACEMENT      secondary to SSS, placed in 2004, with gen chng 2014; [employer] dual DOO   • CATARACT EXTRACTION W/ INTRAOCULAR LENS IMPLANT Bilateral    • COLONOSCOPY     • HAND SURGERY Right    • HARDWARE REMOVAL Right 2/19/2019    Procedure: RT ANKLE HARDWARE REMOVAL;   Surgeon: Cristian Hill II, MD;  Location: [facility] MAIN OR;  Service: Orthopedics   • KNEE ARTHROPLASTY Left    • LAMINECTOMY FOR IMPLANTATION / PLACEMENT NEUROSTIMULATOR ELECTRODES     • LUMBAR LAMINECTOMY      L-3 L4 L4 L5   • TONSILLECTOMY     • ZENKERS DIVERTICULECTOMY N/A 9/27/2016    Procedure: ENDOSCOPIC REMOVAL OF ZENKERS DIVERTICULUM W/ WERDASCOPE;  Surgeon: Oswald Barth MD;  Location: [facility] MAIN OR;  Service:    • ZENKERS DIVERTICULECTOMY N/A 4/14/2017    Procedure: ESOPHAGOSCOPY;  Surgeon: Oswald Barth MD;  Location: [facility] MAIN OR;  Service:      General Information     Row Name 12/18/20 1325          OT Time and Intention    Document Type  discharge evaluation/summary  -DAVID     Mode of Treatment  individual therapy;occupational therapy  -DAVID     Row Name 12/18/20 1321          General Information    Patient Profile Reviewed  yes  -DAVID     Prior Level of Function  max assist:;ADL's pt reports needing A with UB/LB dressing and bathing, stating staff helps. Pt reports having rolling walker and wheelchair at facility  -DAVID     Existing Precautions/Restrictions  fall  -DAVID     Barriers to Rehab  previous functional deficit  -DAVID     Row Name 12/18/20 1321          Occupational Profile    Reason for Services/Referral (Occupational Profile)  89 y.o. female admitted from LTC facility due to shortness of breath, found to have an acute UTI. Pt's ADL performance below baseline, with decreased functional activity tolerance, impaired strength.  -DAVID     Row Name 12/18/20 1325          Living Environment    Lives With  facility resident  -DAVID     Row Name 12/18/20 1325          Cognition    Orientation Status (Cognition)  oriented to;person;verbal cues/prompts needed for orientation pt stating she does not know where she is  -DAVID     Row Name 12/18/20 1323          Safety Issues, Functional Mobility    Safety Issues Affecting Function (Mobility)  ability to follow commands;awareness of need for  assistance;insight into deficits/self-awareness;problem-solving;sequencing abilities  -DAVID     Impairments Affecting Function (Mobility)  balance;cognition;endurance/activity tolerance;postural/trunk control;strength  -DAVID     Cognitive Impairments, Mobility Safety/Performance  problem-solving/reasoning;sequencing abilities  -DAVID       User Key  (r) = Recorded By, (t) = Taken By, (c) = Cosigned By    Initials Name Provider Type    Mireya Burrell OT Occupational Therapist        Mobility/ADL's     Row Name 12/18/20 1329          Bed Mobility    Bed Mobility  supine-sit;sit-supine;rolling left;rolling right  -DAVID     Rolling Left [place] (Bed Mobility)  verbal cues;maximum assist (25% patient effort)  -DAVID     Rolling Right [place] (Bed Mobility)  maximum assist (25% patient effort);verbal cues  -DAVID     Supine-Sit [place] (Bed Mobility)  maximum assist (25% patient effort);verbal cues  -DAVID     Sit-Supine [place] (Bed Mobility)  maximum assist (25% patient effort);verbal cues  -DAVID     Assistive Device (Bed Mobility)  bed rails;head of bed elevated  -DAVID     Comment (Bed Mobility)  required A for BLEs and trunk support for supine > sit  -DAVID     Row Name 12/18/20 1329          Transfers    Comment (Transfers)  not attempted. pt unable to tolerate sitting at EOB  -DAVID     Row Name 12/18/20 1329          Activities of Daily Living    BADL Assessment/Intervention  grooming  -DAVID     Row Name 12/18/20 1329          Grooming Assessment/Training    [place] Level (Grooming)  wash face, hands;minimum assist (75% patient effort);verbal cues  -DAVID     Position (Grooming)  supine  -DAVID       User Key  (r) = Recorded By, (t) = Taken By, (c) = Cosigned By    Initials Name Provider Type    Mireya Burrell OT Occupational Therapist        Obj/Interventions     Row Name 12/18/20 1335          Sensory Assessment (Somatosensory)    Sensory Assessment (Somatosensory)  unable/difficult to assess  -DAVID     Row Name  12/18/20 1335          Range of Motion Comprehensive    Comment, General Range of Motion  B UE shoulder flexion 2/3, B elbow pronation/supination 2/3, able to make compact fist  -DAVID     Row Name 12/18/20 1335          Strength Comprehensive (MMT)    Comment, General Manual Muscle Testing (MMT) Assessment  B UE strength 3+/5  -DAVID     Row Name 12/18/20 1335          Balance    Balance Assessment  sitting static balance  -DAVID     Static Sitting Balance  moderate impairment;severe impairment;supported;sitting, edge of bed  -DAVID     Balance Interventions  sitting;supported;static;occupation based/functional task  -DAVID     Comment, Balance  required mod to max support for sitting at EOB, unable to tolerate > 15 secs  -DAVID       User Key  (r) = Recorded By, (t) = Taken By, (c) = Cosigned By    Initials Name Provider Type    Mireya Burrell OT Occupational Therapist        Goals/Plan     Row Name 12/18/20 1342          Grooming Goal 1 (OT)    Activity/Device (Grooming Goal 1, OT)  wash face, hands  -DAVID     [place] (Grooming Goal 1, OT)  minimum assist (75% or more patient effort);verbal cues required  -DAVID     Time Frame (Grooming Goal 1, OT)  1 day  -DAVID     Progress/Outcome (Grooming Goal 1, OT)  goal met  -DAVID       User Key  (r) = Recorded By, (t) = Taken By, (c) = Cosigned By    Initials Name Provider Type    Mireya Burrell OT Occupational Therapist        Clinical Impression     Row Name 12/18/20 1336          Pain Assessment    Additional Documentation  Pain Scale: FACES Pre/Post-Treatment (Group)  -DAVID     Row Name 12/18/20 1336          Pain Scale: Numbers Pre/Post-Treatment    Pain Intervention(s)  Repositioned;Rest  -DAVID     Row Name 12/18/20 1332          Pain Scale: FACES Pre/Post-Treatment    Pain: FACES Scale, Pretreatment  2-->hurts little bit  -DAVID     Posttreatment Pain Rating  2-->hurts little bit  -DAVID     Pre/Posttreatment Pain Comment  pt unable to communicate where specifically she was experiencing  pain  -DAVID     Row Name 12/18/20 6758          Plan of Care Review    Plan of Care Reviewed With  patient  -DAVID     Outcome Summary  Pt is a 89 y.o. female admitted from LTC facility due to shortness of breath, found to have an acute UTI. Pt also COVID (+). Pt reports needing A with all ADL tasks PTA. Pt performed bed mobility with need for max A and VCs, tolerating <15 secs before requesting to lay down. Pt does appear to present with decreased functional activity tolerance, impaired strength, however pt reports being at baseline with level of A needed for completion of ADLs. No skilled acute OT services warranted at this time. Pt to return to LTC facility at d/c.  -DAVID     Row Name 12/18/20 7684          Positioning and Restraints    Pre-Treatment Position  in bed  -DAVID     Post Treatment Position  bed  -DAVID     In Bed  supine;call light within reach;encouraged to call for assist;exit alarm on;legs elevated  -DAVID       User Key  (r) = Recorded By, (t) = Taken By, (c) = Cosigned By    Initials Name Provider Type    Mireya Burrell, OT Occupational Therapist        Outcome Measures     Row Name 12/18/20 5459          How much help from another is currently needed...    Putting on and taking off regular lower body clothing?  1  -DAVID     Bathing (including washing, rinsing, and drying)  1  -DAVID     Toileting (which includes using toilet bed pan or urinal)  1  -DAVID     Putting on and taking off regular upper body clothing  1  -DAVID     Taking care of personal grooming (such as brushing teeth)  2  -DAVID     Eating meals  2  -DAVID     AM-PAC 6 Clicks Score (OT)  8  -DAVID     Row Name 12/18/20 1349          Modified Nick Scale    Modified [place] Scale  4 - Moderately severe disability.  Unable to walk without assistance, and unable to attend to own bodily needs without assistance.  -DAVID     Row Name 12/18/20 1340          Functional Assessment    Outcome Measure Options  AM-PAC 6 Clicks Daily Activity (OT);Modified [place]  -DAVID        User Key  (r) = Recorded By, (t) = Taken By, (c) = Cosigned By    Initials Name Provider Type    Mireya Burrell, OT Occupational Therapist        Occupational Therapy Education                 Title: PT OT SLP Therapies (Done)     Topic: Occupational Therapy (Done)     Point: ADL training (Done)     Description:   Instruct learner(s) on proper safety adaptation and remediation techniques during self care or transfers.   Instruct in proper use of assistive devices.              Learning Progress Summary           Patient Acceptance, E, VU by DAVID at 12/18/2020 7404    Comment: Educated pt on OT role, OT plan of care, and safety for body techniques for completion of ADLs and bed mobility transitions.                               User Key     Initials Effective Dates Name Provider Type Discipline    DAVID 09/14/20 -  Mireya Lux, OT Occupational Therapist OT              OT Recommendation and Plan     Plan of Care Review  Plan of Care Reviewed With: patient  Outcome Summary: Pt is a 89 y.o. female admitted from LTC facility due to shortness of breath, found to have an acute UTI. Pt also COVID (+). Pt reports needing A with all ADL tasks PTA. Pt performed bed mobility with need for max A and VCs, tolerating <15 secs before requesting to lay down. Pt does appear to present with decreased functional activity tolerance, impaired strength, however pt reports being at baseline with level of A needed for completion of ADLs. No skilled acute OT services warranted at this time. Pt to return to LTC facility at d/c.  Plan of Care Reviewed With: patient  Outcome Summary: Pt is a 89 y.o. female admitted from LTC facility due to shortness of breath, found to have an acute UTI. Pt also COVID (+). Pt reports needing A with all ADL tasks PTA. Pt performed bed mobility with need for max A and VCs, tolerating <15 secs before requesting to lay down. Pt does appear to present with decreased functional activity tolerance, impaired  strength, however pt reports being at baseline with level of A needed for completion of ADLs. No skilled acute OT services warranted at this time. Pt to return to LTC facility at d/c.     Time Calculation:   Time Calculation- OT     Row Name 12/18/20 1346             Time Calculation- OT    OT Start Time  0918  -DAVID      OT Stop Time  0950  -DAVID      OT Time Calculation (min)  32 min  -DAVID      Total Timed Code Minutes- OT  23 minute(s)  -DAVID      OT Received On  12/18/20  -DAVID        User Key  (r) = Recorded By, (t) = Taken By, (c) = Cosigned By    Initials Name Provider Type    Mireya Burrell, OT Occupational Therapist        Therapy Charges for Today     Code Description Service Date Service Provider Modifiers Qty    [id number]  OT EVAL LOW COMPLEXITY 2 12/18/2020 Mireya Lux OT GO 1    [id number]  OT THERAPEUTIC ACT EA 15 MIN 12/18/2020 Mireya Lux OT GO 2               Mireya Lux OT  12/18/2020

## 2020-12-19 NOTE — PROGRESS NOTES
"  Infectious Diseases Progress Note    Myra Sheppard MD     [facility]  [place]: 3 days  Patient Identification:  Name: Kim Feldman  Age: 89 y.o.  Sex: female  :  3/13/1930  MRN: [id number]         Primary Care Physician: Grady Villeda MD            Subjective: Confused.  Interval History: See consultation note.  Oxygen requirement is improving now only on 1 L with 95% oxygen saturation.    Objective:    Scheduled Meds:budesonide-formoterol, 2 puff, Inhalation, BID - RT  cefTRIAXone, 1 g, Intravenous, Q24H  cholecalciferol, 1,000 Units, Oral, Daily  citalopram, 20 mg, Oral, Nightly  dexamethasone, 6 mg, Intravenous, Daily  enoxaparin, 40 mg, Subcutaneous, BID  famotidine, 20 mg, Oral, BID  levETIRAcetam, 750 mg, Oral, Q12H  mirtazapine, 15 mg, Oral, Nightly  polyethylene glycol, 17 g, Oral, Daily  remdesivir, 100 mg, Intravenous, Q24H      Continuous Infusions:Pharmacy Consult - Remdesivir,         Vital signs in last 24 hours:  Temp:  [96.2 °F (35.7 °C)-97.6 °F (36.4 °C)] 96.2 °F (35.7 °C)  Heart Rate:  [73-82] 76  Resp:  [16-18] 18  BP: (144-158)/() 149/99    Intake/Output:    Intake/Output Summary (Last 24 hours) at 2020 1036  Last data filed at 2020 0900  Gross per 24 hour   Intake 0 ml   Output 1200 ml   Net -1200 ml       Exam:  /99 (BP Location: Right arm, Patient Position: Lying)   Pulse 76   Temp 96.2 °F (35.7 °C) (Oral)   Resp 18   Ht 157.5 cm (62\")   Wt 108 kg (237 lb)   SpO2 95%   BMI 43.35 kg/m²   Patient is examined using the personal protective equipment as per guidelines from infection control for this particular patient as enacted.  Hand washing was performed before and after patient interaction.  General Appearance:  Comfortable in no acute distress                          Head:    Normocephalic, without obvious abnormality, atraumatic                           Eyes:    PERRL, conjunctivae/corneas clear, EOM's intact, both eyes                   "       Throat:   Lips, tongue, gums normal; oral mucosa pink and moist                           Neck:   Supple, symmetrical, trachea midline, no JVD                         Lungs:    Clear to auscultation bilaterally, respirations unlabored                 Chest Wall:    No tenderness or deformity                          Heart:    Regular rate and rhythm, S1 and S2 normal, no murmur, no rub                                         or gallop                  Abdomen:   Obese soft nontender                 extremities: Edematous upper extremity with ecchymosis and scaly changes in the lower extremities.                        Pulses:   Pulses palpable in all extremities                            Skin: Significant ecchymotic changes in the upper extremities and lower extremities noted bilateral lower extremities are tender but no erythema or cellulitis noted                  Neurologic: Grossly nonfocal       Data Review:    I reviewed the patient's new clinical results.  Results from last 7 days   Lab Units 12/19/20  0658 12/18/20  0942 12/17/20  1008 12/16/20  1348 12/12/20  1300   WBC 10*3/mm3 5.10 4.33 4.48 7.86 6.25   HEMOGLOBIN g/dL 13.1 11.8* 11.0* 10.8* 9.8*   PLATELETS 10*3/mm3 108* 95* 92* 97* 148     Results from last 7 days   Lab Units 12/19/20  0658 12/18/20  0942 12/17/20  0644 12/16/20  2133 12/16/20  1348 12/12/20  1300   SODIUM mmol/L 143 144 148*  --  147* 146*   POTASSIUM mmol/L 3.3* 3.6 3.7  --  3.5 4.0   CHLORIDE mmol/L 103 103 105  --  103 106   CO2 mmol/L 29.0 31.8* 28.8  --  30.6* 26.6   BUN mg/dL 25* 25* 26*  --  26* 30*   CREATININE mg/dL 0.91 1.01* 1.25* 1.35* 1.62* 1.71*   CALCIUM mg/dL 8.6 8.5 8.3  --  8.6 8.4   GLUCOSE mg/dL 102* 134* 84  --  102* 101*       Microbiology Results (last 10 days)     Procedure Component Value - Date/Time    Blood Culture - Blood, Arm, Left [[id number]] Collected: 12/16/20 2133    Lab Status: Preliminary result Specimen: Blood from Arm, Left Updated: 12/18/20  2200     Blood Culture No growth at 2 days    COVID PRE-OP / PRE-PROCEDURE SCREENING ORDER (NO ISOLATION) - Swab, Nasopharynx [[id number]]  (Abnormal) Collected: 12/16/20 1353    Lab Status: Final result Specimen: Swab from Nasopharynx Updated: 12/16/20 1508    Narrative:      The following orders were created for panel order COVID PRE-OP / PRE-PROCEDURE SCREENING ORDER (NO ISOLATION) - Swab, Nasopharynx.  Procedure                               Abnormality         Status                     ---------                               -----------         ------                     Respiratory Panel PCR w/...[[id number]]  Abnormal            Final result                 Please view results for these tests on the individual orders.    Respiratory Panel PCR w/COVID-19(SARS-CoV-2) SACHIN/JENNIFER/TOMA/PAD/COR/MAD/SHANNAN In-House, NP Swab in UTM/VTM, 3-4 HR TAT - Swab, Nasopharynx [[id number]]  (Abnormal) Collected: 12/16/20 1353    Lab Status: Final result Specimen: Swab from Nasopharynx Updated: 12/16/20 150     ADENOVIRUS, PCR Not Detected     Coronavirus 229E Not Detected     Coronavirus HKU1 Not Detected     Coronavirus NL63 Not Detected     Coronavirus OC43 Not Detected     COVID19 Detected     Human Metapneumovirus Not Detected     Human Rhinovirus/Enterovirus Not Detected     Influenza A PCR Not Detected     Influenza B PCR Not Detected     Parainfluenza Virus 1 Not Detected     Parainfluenza Virus 2 Not Detected     Parainfluenza Virus 3 Not Detected     Parainfluenza Virus 4 Not Detected     RSV, PCR Not Detected     Bordetella pertussis pcr Not Detected     Bordetella parapertussis PCR Not Detected     Chlamydophila pneumoniae PCR Not Detected     Mycoplasma pneumo by PCR Not Detected    Narrative:      Fact sheet for providers: https://docs.AnswerGo.com/wp-content/uploads/WYV9142-3643-YO6.1-EUA-Provider-Fact-Sheet-3.pdf    Fact sheet for patients:  https://docs.Eponym/wp-content/uploads/QEO7336-5530-JU8.1-EUA-Patient-Fact-Sheet-1.pdf    Test performed by PCR.    Blood Culture - Blood, Arm, Right [[id number]]  (Abnormal)  (Susceptibility) Collected: 12/16/20 1351    Lab Status: Edited Result - FINAL Specimen: Blood from Arm, Right Updated: 12/19/20 0639     Blood Culture Staphylococcus epidermidis     Gram Stain Anaerobic Bottle Gram positive cocci in clusters      Aerobic Bottle Gram positive cocci in clusters    Narrative:            Susceptibility      Staphylococcus epidermidis     UMANG     Oxacillin Resistant     Vancomycin Susceptible                Susceptibility Comments     Staphylococcus epidermidis    This isolate does not demonstrate inducible clindamycin resistance in vitro.               Blood Culture - Blood, Arm, Left [[id number]]  (Abnormal) Collected: 12/16/20 1351    Lab Status: Final result Specimen: Blood from Arm, Left Updated: 12/19/20 0639     Blood Culture Staphylococcus epidermidis     Gram Stain Anaerobic Bottle Gram positive cocci in clusters    Narrative:      Refer to previous blood culture collected on 12/16/2020 at 1351 for UMANG's.      Blood Culture ID, PCR - Blood, Arm, Right [[id number]]  (Abnormal) Collected: 12/16/20 1351    Lab Status: Final result Specimen: Blood from Arm, Right Updated: 12/17/20 1144     BCID, PCR Staphylococcus spp, not aureus. Identification by BCID PCR.     BOTTLE TYPE Anaerobic Bottle          Assessment:    COVID-19  1-systemic COVID-19 infection with hypoxia requiring oxygen supplementation  2-toxic metabolic encephalopathy secondary to COVID-19 infection and associated  3-urinary tract infection  4-other diagnosis per internal medicine service.  5-coag negative staph bacteremia likely contaminant during the process of collection.        Recommendations/Discussions:  At this juncture I agree with the care plan consisting of supportive care and antibiotic therapy for identified urinary tract  infection will follow up on culture results to further de-escalate antibiotic therapy or optimize it based on the sensitivity.  Would recommend 7 days of antibiotic treatment.  From COVID-19 infection standpoint she exhibited functional impairment manifested as hypoxia requiring oxygen supplementation.  Based on this criteria patient is a candidate for Covid specific treatment and hence would recommend dexamethasone and remdesivir with close monitoring of her oxygen requirement.  Rest of the supportive care per primary team.  Prognosis seems guarded    Myra Sheppard MD  12/19/2020  10:36 EST    Much of this encounter note is an electronic transcription/translation of spoken language to printed text. The electronic translation of spoken language may permit erroneous, or at times, nonsensical words or phrases to be inadvertently transcribed; Although I have reviewed the note for such errors, some may still exist

## 2020-12-19 NOTE — PROGRESS NOTES
"Daily progress note    Chief complaint  Doing little better  No specific complaints  On oxygen 2 L with O2 sats 94%    History of present illness  89-year-old white female who is well-known to our service with history of hypertension seizure disorder depression chronic anemia and gastroesophageal reflux disease who is a nursing home resident brought to the emergency room multiple times in few days with shortness of breath but no fever cough abdominal pain nausea vomiting diarrhea.  Patient has some chest discomfort.  Patient was diagnosed with COVID-19 and was discharged back to nursing home sent back again with increased shortness of breath and this time she was found to be hypoxic admit for management.  Patient remained awake and alert and answer all questions appropriately.  Patient is DNR per her wishes.  Patient also found to have acute UTI     REVIEW OF SYSTEMS  Unremarkable except for shortness of breath and generalized weakness     PHYSICAL EXAM  Blood pressure 148/88, pulse 71, temperature 96.2 °F (35.7 °C), temperature source Oral, resp. rate 18, height 157.5 cm (62\"), weight 108 kg (237 lb), SpO2 94 %, not currently breastfeeding.    GENERAL: Alert well developed, well nourished in no distress  HENT: NCAT, neck supple, trachea midline  EYES: no scleral icterus, PERRL, normal conjunctivae  CV: regular rhythm, regular rate, no murmur  RESPIRATORY: unlabored effort, scattered wheezes, rales in the bilateral bases, mild tachypnea  ABDOMEN: soft, nontender, nondistended, bowel sounds present  MUSCULOSKELETAL: no gross deformity  NEURO: alert,  sensory and motor function of extremities grossly intact, speech clear, mental status normal/baseline  SKIN: warm, dry, no rash  PSYCH:  Appropriate mood and affect    LAB RESULTS  Lab Results (last 24 hours)     Procedure Component Value Units Date/Time    Comprehensive Metabolic Panel [[id number]]  (Abnormal) Collected: 12/19/20 0658    Specimen: Blood Updated: 12/19/20 " 0808     Glucose 102 mg/dL      BUN 25 mg/dL      Creatinine 0.91 mg/dL      Sodium 143 mmol/L      Potassium 3.3 mmol/L      Chloride 103 mmol/L      CO2 29.0 mmol/L      Calcium 8.6 mg/dL      Total Protein 6.9 g/dL      Albumin 3.50 g/dL      ALT (SGPT) 19 U/L      AST (SGOT) 38 U/L      Alkaline Phosphatase 62 U/L      Total Bilirubin 0.5 mg/dL      eGFR Non African Amer 58 mL/min/1.73      Globulin 3.4 gm/dL      A/G Ratio 1.0 g/dL      BUN/Creatinine Ratio 27.5     Anion Gap 11.0 mmol/L     Narrative:      GFR Normal >60  Chronic Kidney Disease <60  Kidney Failure <15      C-reactive Protein [[id number]]  (Abnormal) Collected: 12/19/20 0658    Specimen: Blood Updated: 12/19/20 0808     C-Reactive Protein 2.51 mg/dL     Bilirubin, Direct [[id number]]  (Normal) Collected: 12/19/20 0658    Specimen: Blood Updated: 12/19/20 0808     Bilirubin, Direct 0.3 mg/dL     CBC & Differential [[id number]]  (Abnormal) Collected: 12/19/20 0658    Specimen: Blood Updated: 12/19/20 0805    Narrative:      The following orders were created for panel order CBC & Differential.  Procedure                               Abnormality         Status                     ---------                               -----------         ------                     CBC Auto Differential[[id number]]        Abnormal            Final result                 Please view results for these tests on the individual orders.    CBC Auto Differential [[id number]]  (Abnormal) Collected: 12/19/20 0658    Specimen: Blood Updated: 12/19/20 0805     WBC 5.10 10*3/mm3      RBC 3.87 10*6/mm3      Hemoglobin 13.1 g/dL      Hematocrit 38.6 %      MCV 99.7 fL      MCH 33.9 pg      MCHC 33.9 g/dL      RDW 12.1 %      RDW-SD 43.7 fl      MPV 10.4 fL      Platelets 108 10*3/mm3      Neutrophil % 53.3 %      Lymphocyte % 35.9 %      Monocyte % 10.6 %      Eosinophil % 0.0 %      Basophil % 0.0 %      Neutrophils, Absolute 2.72 10*3/mm3      Lymphocytes, Absolute 1.83 10*3/mm3        Monocytes, Absolute 0.54 10*3/mm3      Eosinophils, Absolute 0.00 10*3/mm3      Basophils, Absolute 0.00 10*3/mm3     Blood Culture - Blood, Arm, Left [[id number]]  (Abnormal) Collected: 12/16/20 1351    Specimen: Blood from Arm, Left Updated: 12/19/20 0639     Blood Culture Staphylococcus epidermidis     Gram Stain Anaerobic Bottle Gram positive cocci in clusters    Narrative:      Refer to previous blood culture collected on 12/16/2020 at 1351 for UMANG's.      Blood Culture - Blood, Arm, Right [[id number]]  (Abnormal)  (Susceptibility) Collected: 12/16/20 1351    Specimen: Blood from Arm, Right Updated: 12/19/20 0639     Blood Culture Staphylococcus epidermidis     Gram Stain Anaerobic Bottle Gram positive cocci in clusters      Aerobic Bottle Gram positive cocci in clusters    Narrative:            Susceptibility      Staphylococcus epidermidis     UMANG     Oxacillin Resistant     Vancomycin Susceptible                Susceptibility Comments     Staphylococcus epidermidis    This isolate does not demonstrate inducible clindamycin resistance in vitro.               Blood Culture - Blood, Arm, Left [[id number]] Collected: 12/16/20 2133    Specimen: Blood from Arm, Left Updated: 12/18/20 2200     Blood Culture No growth at 2 days        Imaging Results (Last 24 Hours)     ** No results found for the last 24 hours. **           ECG 12 Lead               HEART RATE= 85  bpm  RR Interval= 708  ms  AK Interval= 219  ms  P Horizontal Axis= 23  deg  P Front Axis= 11  deg  QRSD Interval= 94  ms  QT Interval= 410  ms  QRS Axis= -46  deg  T Wave Axis= -23  deg  - ABNORMAL ECG -  Sinus rhythm  Borderline prolonged AK interval  LAD, consider left anterior fascicular block  Nonspecific T abnormalities, inferior leads  Borderline prolonged QT interval             Current Facility-Administered Medications:   •  ammonium lactate (AMLACTIN) cream, , Topical, Q12H PRN, Juan Carlos Harper MD  •  budesonide-formoterol (SYMBICORT) 160-4.5  MCG/ACT inhaler 2 puff, 2 puff, Inhalation, BID - RT, Juan Carlos Harper MD, 2 puff at 12/19/20 0943  •  cefTRIAXone (ROCEPHIN) IVPB 1 g, 1 g, Intravenous, Q24H, Juan Carlos Harper MD, Last Rate: 100 mL/hr at 12/18/20 2203, 1 g at 12/18/20 2203  •  cholecalciferol (VITAMIN D3) tablet 1,000 Units, 1,000 Units, Oral, Daily, Juan Carlos Harper MD, 1,000 Units at 12/19/20 0804  •  citalopram (CeleXA) tablet 20 mg, 20 mg, Oral, Nightly, Juan Carlos Harper MD, 20 mg at 12/18/20 2203  •  dexamethasone sodium phosphate injection 6 mg, 6 mg, Intravenous, Daily, Juan Carlos Harper MD, 6 mg at 12/19/20 0804  •  enoxaparin (LOVENOX) syringe 40 mg, 40 mg, Subcutaneous, BID, Juan Carlos Harper MD, 40 mg at 12/19/20 0804  •  famotidine (PEPCID) tablet 20 mg, 20 mg, Oral, BID, Juan Carlos Harper MD, 20 mg at 12/19/20 0804  •  levETIRAcetam (KEPPRA) 100 MG/ML solution 750 mg, 750 mg, Oral, Q12H, Juan Carlos Harper MD, 750 mg at 12/19/20 0803  •  mirtazapine (REMERON) tablet 15 mg, 15 mg, Oral, Nightly, Juan Carlos Harper MD, 15 mg at 12/18/20 2203  •  Pharmacy Consult - Remdesivir, , Does not apply, Continuous PRN, Myra Sheppard MD  •  polyethylene glycol (MIRALAX) packet 17 g, 17 g, Oral, Daily, Juan Carlos Harper MD, 17 g at 12/19/20 0804  •  [COMPLETED] remdesivir 200 mg in sodium chloride 0.9 % 250 mL IVPB (powder vial), 200 mg, Intravenous, Q24H, 200 mg at 12/16/20 2226 **FOLLOWED BY** remdesivir 100 mg in sodium chloride 0.9 % 250 mL IVPB (powder vial), 100 mg, Intravenous, Q24H, Myra Sheppard MD, 100 mg at 12/18/20 3316  •  [COMPLETED] Insert peripheral IV, , , Once **AND** sodium chloride 0.9 % flush 10 mL, 10 mL, Intravenous, PRN, Dori De La Cruz, APRN     ASSESSMENT  COVID-19 infection  GPC bacteremia probably contamination  Dehydration  Acute UTI  Acute hypoxia  Hypertension  Seizure disorder  Chronic anemia  Depression  Gastroesophageal reflux disease    PLAN  CPM  Supplement oxygen and titrate  Discontinue IV fluid  IV antibiotics  Dexamethasone for 10  days  Remdesivir for 5 days  Symptomatic treatment for COVID-19 infection  Adjust nursing home medications  Stress ulcer DVT prophylaxis  Infectious disease consult pending  Supportive care  DNR   PT/OT  Discussed with nursing staff  Follow closely further recommendation current hospital course    GAETANO ACOSTA MD

## 2020-12-19 NOTE — PLAN OF CARE
Goal Outcome Evaluation:  Plan of Care Reviewed With: patient  Progress: no change  Outcome Summary: Patient requiring 2LNC. Poor oral intake, encouraged to eat and drink through out the day with little success. Difficult stick for IV therapy, now has a 24g in right forearm. CTM.

## 2020-12-20 NOTE — PLAN OF CARE
Goal Outcome Evaluation:  Plan of Care Reviewed With: patient, daughter  Progress: declining  Outcome Summary: Patient transferred to  today for comfort care. No signs of pain or anxiety. Purewick in place. Daughter visited and spoke to patient through window.

## 2020-12-20 NOTE — PROGRESS NOTES
"Daily progress note    Chief complaint  Sleepy lethargic  Not doing too well  Not eating drinking  Declining quickly    History of present illness  89-year-old white female who is well-known to our service with history of hypertension seizure disorder depression chronic anemia and gastroesophageal reflux disease who is a nursing home resident brought to the emergency room multiple times in few days with shortness of breath but no fever cough abdominal pain nausea vomiting diarrhea.  Patient has some chest discomfort.  Patient was diagnosed with COVID-19 and was discharged back to nursing home sent back again with increased shortness of breath and this time she was found to be hypoxic admit for management.  Patient remained awake and alert and answer all questions appropriately.  Patient is DNR per her wishes.  Patient also found to have acute UTI     REVIEW OF SYSTEMS  Unable to obtain     PHYSICAL EXAM  Blood pressure 147/85, pulse 80, temperature 97.6 °F (36.4 °C), temperature source Oral, resp. rate 18, height 157.5 cm (62\"), weight 108 kg (237 lb), SpO2 96 %, not currently breastfeeding.    GENERAL: In no distress  HENT: NCAT, neck supple, trachea midline  EYES: no scleral icterus, PERRL, normal conjunctivae  CV: regular rhythm, regular rate, no murmur  RESPIRATORY: unlabored effort, scattered wheezes, rales in the bilateral bases, mild tachypnea  ABDOMEN: soft, nontender, nondistended, bowel sounds present  MUSCULOSKELETAL: no gross deformity  NEURO: alert,  sensory and motor function of extremities grossly intact, speech clear, mental status normal/baseline  SKIN: warm, dry, no rash  PSYCH:  Appropriate mood and affect    LAB RESULTS  Lab Results (last 24 hours)     Procedure Component Value Units Date/Time    CBC & Differential [[id number]]  (Abnormal) Collected: 12/20/20 0633    Specimen: Blood Updated: 12/20/20 0731    Narrative:      The following orders were created for panel order CBC & " Differential.  Procedure                               Abnormality         Status                     ---------                               -----------         ------                     CBC Auto Differential[[id number]]        Abnormal            Final result                 Please view results for these tests on the individual orders.    CBC Auto Differential [[id number]]  (Abnormal) Collected: 12/20/20 0633    Specimen: Blood Updated: 12/20/20 0731     WBC 4.83 10*3/mm3      RBC 3.64 10*6/mm3      Hemoglobin 12.5 g/dL      Hematocrit 36.6 %      .5 fL      MCH 34.3 pg      MCHC 34.2 g/dL      RDW 12.2 %      RDW-SD 44.6 fl      MPV 10.0 fL      Platelets 89 10*3/mm3      Neutrophil % 51.1 %      Lymphocyte % 36.9 %      Monocyte % 11.6 %      Eosinophil % 0.0 %      Basophil % 0.2 %      Immature Grans % 0.2 %      Neutrophils, Absolute 2.47 10*3/mm3      Lymphocytes, Absolute 1.78 10*3/mm3      Monocytes, Absolute 0.56 10*3/mm3      Eosinophils, Absolute 0.00 10*3/mm3      Basophils, Absolute 0.01 10*3/mm3      Immature Grans, Absolute 0.01 10*3/mm3      nRBC 0.0 /100 WBC     Comprehensive Metabolic Panel [[id number]]  (Abnormal) Collected: 12/20/20 0633    Specimen: Blood Updated: 12/20/20 0729     Glucose 106 mg/dL      BUN 30 mg/dL      Creatinine 1.04 mg/dL      Sodium 147 mmol/L      Potassium 3.5 mmol/L      Chloride 105 mmol/L      CO2 28.4 mmol/L      Calcium 8.9 mg/dL      Total Protein 6.8 g/dL      Albumin 3.50 g/dL      ALT (SGPT) 23 U/L      AST (SGOT) 39 U/L      Alkaline Phosphatase 58 U/L      Total Bilirubin 0.5 mg/dL      eGFR Non African Amer 50 mL/min/1.73      Globulin 3.3 gm/dL      A/G Ratio 1.1 g/dL      BUN/Creatinine Ratio 28.8     Anion Gap 13.6 mmol/L     Narrative:      GFR Normal >60  Chronic Kidney Disease <60  Kidney Failure <15      C-reactive Protein [[id number]]  (Abnormal) Collected: 12/20/20 0633    Specimen: Blood Updated: 12/20/20 0729     C-Reactive Protein 1.61 mg/dL      Bilirubin, Direct [[id number]]  (Normal) Collected: 12/20/20 0633    Specimen: Blood Updated: 12/20/20 0729     Bilirubin, Direct 0.3 mg/dL     Blood Culture - Blood, Arm, Left [[id number]] Collected: 12/16/20 2133    Specimen: Blood from Arm, Left Updated: 12/19/20 2200     Blood Culture No growth at 3 days        Imaging Results (Last 24 Hours)     ** No results found for the last 24 hours. **           ECG 12 Lead               HEART RATE= 85  bpm  RR Interval= 708  ms  AR Interval= 219  ms  P Horizontal Axis= 23  deg  P Front Axis= 11  deg  QRSD Interval= 94  ms  QT Interval= 410  ms  QRS Axis= -46  deg  T Wave Axis= -23  deg  - ABNORMAL ECG -  Sinus rhythm  Borderline prolonged AR interval  LAD, consider left anterior fascicular block  Nonspecific T abnormalities, inferior leads  Borderline prolonged QT interval             Current Facility-Administered Medications:   •  acetaminophen (TYLENOL) tablet 650 mg, 650 mg, Oral, Q4H PRN **OR** acetaminophen (TYLENOL) 160 MG/5ML solution 650 mg, 650 mg, Oral, Q4H PRN **OR** acetaminophen (TYLENOL) suppository 650 mg, 650 mg, Rectal, Q4H PRN, Juan Carlos Harper MD  •  diphenhydrAMINE (BENADRYL) capsule 25 mg, 25 mg, Oral, Q6H PRN **OR** diphenhydrAMINE (BENADRYL) injection 25 mg, 25 mg, Intravenous, Q6H PRN, Juan Carlos Harper MD  •  diphenoxylate-atropine (LOMOTIL) 2.5-0.025 MG per tablet 1 tablet, 1 tablet, Oral, Q2H PRN, Juan Carlos Harper MD  •  Glycerin-Hypromellose- (ARTIFICIAL TEARS) 0.2-0.2-1 % ophthalmic solution solution 1 drop, 1 drop, Both Eyes, Q30 Min PRN, Juan Carlos Harper MD  •  glycopyrrolate PF (ROBINUL) injection 0.2 mg, 0.2 mg, Intravenous, Q2H PRN **OR** glycopyrrolate PF (ROBINUL) injection 0.2 mg, 0.2 mg, Subcutaneous, Q2H PRN **OR** glycopyrrolate PF (ROBINUL) injection 0.4 mg, 0.4 mg, Intravenous, Q2H PRN **OR** glycopyrrolate PF (ROBINUL) injection 0.4 mg, 0.4 mg, Subcutaneous, Q2H PRN, Juan Carlos Harper MD  •  haloperidol (HALDOL) tablet 1 mg, 1 mg, Oral,  Q4H PRN **OR** haloperidol (HALDOL) 2 MG/ML solution 1 mg, 1 mg, Oral, Q4H PRN **OR** haloperidol lactate (HALDOL) injection 1 mg, 1 mg, Subcutaneous, Q4H PRN, Juan Carlos Harper MD  •  HYDROmorphone (DILAUDID) injection 0.5 mg, 0.5 mg, Intravenous, Q1H PRN **OR** morphine injection 2 mg, 2 mg, Intravenous, Q1H PRN **OR** morphine concentrated solution solution 5 mg, 5 mg, Sublingual, Q1H PRN **OR** ketorolac (TORADOL) injection 15 mg, 15 mg, Intravenous, Q6H PRN, Juan Carlos Harper MD  •  HYDROmorphone (DILAUDID) injection 1 mg, 1 mg, Intravenous, Q1H PRN **OR** morphine injection 4 mg, 4 mg, Intravenous, Q1H PRN **OR** morphine concentrated solution solution 10 mg, 10 mg, Sublingual, Q1H PRN, Juan Carlos Harper MD  •  LORazepam (ATIVAN) injection 0.5 mg, 0.5 mg, Intravenous, Q1H PRN **OR** LORazepam (ATIVAN) injection 0.5 mg, 0.5 mg, Subcutaneous, Q1H PRN **OR** LORazepam (ATIVAN) 2 MG/ML concentrated solution 0.5 mg, 0.5 mg, Sublingual, Q1H PRN, Juan Carlos Harper MD  •  LORazepam (ATIVAN) injection 1 mg, 1 mg, Intravenous, Q1H PRN **OR** LORazepam (ATIVAN) injection 1 mg, 1 mg, Subcutaneous, Q1H PRN **OR** LORazepam (ATIVAN) 2 MG/ML concentrated solution 1 mg, 1 mg, Sublingual, Q1H PRN, Juan Carlos Harper MD  •  LORazepam (ATIVAN) injection 2 mg, 2 mg, Intravenous, Q1H PRN **OR** LORazepam (ATIVAN) injection 2 mg, 2 mg, Subcutaneous, Q1H PRN **OR** LORazepam (ATIVAN) 2 MG/ML concentrated solution 2 mg, 2 mg, Sublingual, Q1H PRN, Juan Carlos Harper MD  •  magic mouthwash oral supsension 5 mL, 5 mL, Swish & Spit, Q4H PRN, Juan Carlos Harper MD  •  promethazine (PHENERGAN) tablet 6.25 mg, 6.25 mg, Oral, Q4H PRN **OR** promethazine (PHENERGAN) 6.25 MG/5ML syrup 6.25 mg, 6.25 mg, Oral, Q4H PRN **OR** promethazine (PHENERGAN) suppository 6.25 mg, 6.25 mg, Rectal, Q4H PRN, Juan Carlos Harper MD     ASSESSMENT  COVID-19 infection  GPC bacteremia probably contamination  Dehydration  Acute UTI  Acute hypoxia  Hypertension  Seizure disorder  Chronic  anemia  Depression  Gastroesophageal reflux disease    PLAN  Long discussion with patient family about her current status with poor prognosis and they decided to withdraw the care and transfer patient to palliative care unit for comfort care only allow natural death and stop all scheduled medication and use routine palliative care orders.    GAETANO ACOSTA MD

## 2020-12-20 NOTE — NURSING NOTE
Attempted to place pierce catheter. Two attempts with a second RN assistance, unable to place. Patient cleaned up and purewick placed for incontinence care.

## 2020-12-20 NOTE — PLAN OF CARE
Goal Outcome Evaluation:  Plan of Care Reviewed With: patient, daughter  Progress: declining  Outcome Summary: patient was fairly unresponsive this morning. patient appears to have declined and made a turn towards end of life, Dr. Harper came and assessed patient and updated family this afternoon. Patient's family moved in the direction of comfort care and are excited to come see the patient. Transfered to palliative care.

## 2020-12-20 NOTE — PROGRESS NOTES
"  Infectious Diseases Progress Note    Myra Sheppard MD     [facility]  [place]: 4 days  Patient Identification:  Name: Kim Feldman  Age: 89 y.o.  Sex: female  :  3/13/1930  MRN: [id number]         Primary Care Physician: Grady Villeda MD            Subjective: Confused.  Interval History: See consultation note.  Oxygen requirement is improving now only on 1 L with 95% oxygen saturation.    Objective:    Scheduled Meds:budesonide-formoterol, 2 puff, Inhalation, BID - RT  cefTRIAXone, 1 g, Intravenous, Q24H  cholecalciferol, 1,000 Units, Oral, Daily  citalopram, 20 mg, Oral, Nightly  dexamethasone, 6 mg, Oral, Daily With Breakfast  famotidine, 20 mg, Oral, BID  levETIRAcetam, 750 mg, Oral, Q12H  mirtazapine, 15 mg, Oral, Nightly  polyethylene glycol, 17 g, Oral, Daily  remdesivir, 100 mg, Intravenous, Q24H      Continuous Infusions:dextrose, 75 mL/hr, Last Rate: 75 mL/hr (20 1019)  Pharmacy Consult - Remdesivir,         Vital signs in last 24 hours:  Temp:  [97.6 °F (36.4 °C)-97.9 °F (36.6 °C)] 97.6 °F (36.4 °C)  Heart Rate:  [71-83] 80  Resp:  [18] 18  BP: (118-151)/(85-94) 147/85    Intake/Output:    Intake/Output Summary (Last 24 hours) at 2020 1058  Last data filed at 2020 0615  Gross per 24 hour   Intake --   Output 600 ml   Net -600 ml       Exam:  /85 (BP Location: Right arm, Patient Position: Lying)   Pulse 80   Temp 97.6 °F (36.4 °C) (Oral)   Resp 18   Ht 157.5 cm (62\")   Wt 108 kg (237 lb)   SpO2 96%   BMI 43.35 kg/m²   Patient is examined using the personal protective equipment as per guidelines from infection control for this particular patient as enacted.  Hand washing was performed before and after patient interaction.  General Appearance:  Comfortable in no acute distress                          Head:    Normocephalic, without obvious abnormality, atraumatic                           Eyes:    PERRL, conjunctivae/corneas clear, EOM's intact, both " eyes                         Throat:   Lips, tongue, gums normal; oral mucosa pink and moist                           Neck:   Supple, symmetrical, trachea midline, no JVD                         Lungs:    Clear to auscultation bilaterally, respirations unlabored                 Chest Wall:    No tenderness or deformity                          Heart:    Regular rate and rhythm, S1 and S2 normal, no murmur, no rub                                         or gallop                  Abdomen:   Obese soft nontender                 extremities: Edematous upper extremity with ecchymosis and scaly changes in the lower extremities.                        Pulses:   Pulses palpable in all extremities                            Skin: Significant ecchymotic changes in the upper extremities and lower extremities noted bilateral lower extremities are tender but no erythema or cellulitis noted                  Neurologic: Grossly nonfocal       Data Review:    I reviewed the patient's new clinical results.  Results from last 7 days   Lab Units 12/20/20  0633 12/19/20  0658 12/18/20  0942 12/17/20  1008 12/16/20  1348   WBC 10*3/mm3 4.83 5.10 4.33 4.48 7.86   HEMOGLOBIN g/dL 12.5 13.1 11.8* 11.0* 10.8*   PLATELETS 10*3/mm3 89* 108* 95* 92* 97*     Results from last 7 days   Lab Units 12/20/20  0633 12/19/20  0658 12/18/20  0942 12/17/20  0644 12/16/20  2133 12/16/20  1348   SODIUM mmol/L 147* 143 144 148*  --  147*   POTASSIUM mmol/L 3.5 3.3* 3.6 3.7  --  3.5   CHLORIDE mmol/L 105 103 103 105  --  103   CO2 mmol/L 28.4 29.0 31.8* 28.8  --  30.6*   BUN mg/dL 30* 25* 25* 26*  --  26*   CREATININE mg/dL 1.04* 0.91 1.01* 1.25* 1.35* 1.62*   CALCIUM mg/dL 8.9 8.6 8.5 8.3  --  8.6   GLUCOSE mg/dL 106* 102* 134* 84  --  102*       Microbiology Results (last 10 days)     Procedure Component Value - Date/Time    Blood Culture - Blood, Arm, Left [[id number]] Collected: 12/16/20 2133    Lab Status: Preliminary result Specimen: Blood from Arm,  Left Updated: 12/19/20 2200     Blood Culture No growth at 3 days    COVID PRE-OP / PRE-PROCEDURE SCREENING ORDER (NO ISOLATION) - Swab, Nasopharynx [[id number]]  (Abnormal) Collected: 12/16/20 1353    Lab Status: Final result Specimen: Swab from Nasopharynx Updated: 12/16/20 1508    Narrative:      The following orders were created for panel order COVID PRE-OP / PRE-PROCEDURE SCREENING ORDER (NO ISOLATION) - Swab, Nasopharynx.  Procedure                               Abnormality         Status                     ---------                               -----------         ------                     Respiratory Panel PCR w/...[[id number]]  Abnormal            Final result                 Please view results for these tests on the individual orders.    Respiratory Panel PCR w/COVID-19(SARS-CoV-2) SACHIN/JENNIFER/TOMA/PAD/COR/MAD/SHANNAN In-House, NP Swab in UTM/VTM, 3-4 HR TAT - Swab, Nasopharynx [[id number]]  (Abnormal) Collected: 12/16/20 1353    Lab Status: Final result Specimen: Swab from Nasopharynx Updated: 12/16/20 1508     ADENOVIRUS, PCR Not Detected     Coronavirus 229E Not Detected     Coronavirus HKU1 Not Detected     Coronavirus NL63 Not Detected     Coronavirus OC43 Not Detected     COVID19 Detected     Human Metapneumovirus Not Detected     Human Rhinovirus/Enterovirus Not Detected     Influenza A PCR Not Detected     Influenza B PCR Not Detected     Parainfluenza Virus 1 Not Detected     Parainfluenza Virus 2 Not Detected     Parainfluenza Virus 3 Not Detected     Parainfluenza Virus 4 Not Detected     RSV, PCR Not Detected     Bordetella pertussis pcr Not Detected     Bordetella parapertussis PCR Not Detected     Chlamydophila pneumoniae PCR Not Detected     Mycoplasma pneumo by PCR Not Detected    Narrative:      Fact sheet for providers: https://docs.[employer]/wp-content/uploads/AMF9156-6536-PG2.1-EUA-Provider-Fact-Sheet-3.pdf    Fact sheet for patients:  https://docs.[employer]/wp-content/uploads/UQS1916-2439-SV5.1-EUA-Patient-Fact-Sheet-1.pdf    Test performed by PCR.    Blood Culture - Blood, Arm, Right [[id number]]  (Abnormal)  (Susceptibility) Collected: 12/16/20 1351    Lab Status: Edited Result - FINAL Specimen: Blood from Arm, Right Updated: 12/19/20 0639     Blood Culture Staphylococcus epidermidis     Gram Stain Anaerobic Bottle Gram positive cocci in clusters      Aerobic Bottle Gram positive cocci in clusters    Narrative:            Susceptibility      Staphylococcus epidermidis     UMANG     Oxacillin Resistant     Vancomycin Susceptible                Susceptibility Comments     Staphylococcus epidermidis    This isolate does not demonstrate inducible clindamycin resistance in vitro.               Blood Culture - Blood, Arm, Left [[id number]]  (Abnormal) Collected: 12/16/20 1351    Lab Status: Final result Specimen: Blood from Arm, Left Updated: 12/19/20 0639     Blood Culture Staphylococcus epidermidis     Gram Stain Anaerobic Bottle Gram positive cocci in clusters    Narrative:      Refer to previous blood culture collected on 12/16/2020 at 1351 for UMANG's.      Blood Culture ID, PCR - Blood, Arm, Right [[id number]]  (Abnormal) Collected: 12/16/20 1351    Lab Status: Final result Specimen: Blood from Arm, Right Updated: 12/17/20 1144     BCID, PCR Staphylococcus spp, not aureus. Identification by BCID PCR.     BOTTLE TYPE Anaerobic Bottle          Assessment:    COVID-19  1-systemic COVID-19 infection with hypoxia requiring oxygen supplementation  2-toxic metabolic encephalopathy secondary to COVID-19 infection and associated  3-urinary tract infection  4-other diagnosis per internal medicine service.  5-coag negative staph bacteremia likely contaminant during the process of collection.        Recommendations/Discussions:  Confused and restless.  Discussed with nursing staff discussion about palliative care is being raised which is not unreasonable.  In  the interim continue present care.  Myra Sheppard MD  12/20/2020  10:58 EST    Much of this encounter note is an electronic transcription/translation of spoken language to printed text. The electronic translation of spoken language may permit erroneous, or at times, nonsensical words or phrases to be inadvertently transcribed; Although I have reviewed the note for such errors, some may still exist

## 2020-12-21 NOTE — PLAN OF CARE
Goal Outcome Evaluation:  Plan of Care Reviewed With: patient  Progress: declining  Outcome Summary: Pt rested well. Medicated for comfort prior to Q4 turns. F/C placed for comfort. Oral care with turns. Continue comfort care.

## 2020-12-21 NOTE — PLAN OF CARE
Goal Outcome Evaluation:  Plan of Care Reviewed With: daughter  Progress: declining  Outcome Summary: Medciated prior to turns. Increased Ativan for comfort. Patient rested peacefully today. [facility] consulted. Daughter visited today.

## 2020-12-21 NOTE — PROGRESS NOTES
"Daily progress note    Chief complaint  Comfortable  No new problems    History of present illness  89-year-old white female who is well-known to our service with history of hypertension seizure disorder depression chronic anemia and gastroesophageal reflux disease who is a nursing home resident brought to the emergency room multiple times in few days with shortness of breath but no fever cough abdominal pain nausea vomiting diarrhea.  Patient has some chest discomfort.  Patient was diagnosed with COVID-19 and was discharged back to nursing home sent back again with increased shortness of breath and this time she was found to be hypoxic admit for management.  Patient remained awake and alert and answer all questions appropriately.  Patient is DNR per her wishes.  Patient also found to have acute UTI     REVIEW OF SYSTEMS  Unable to obtain     PHYSICAL EXAM  Blood pressure 94/62, pulse 71, temperature 97.4 °F (36.3 °C), temperature source Axillary, resp. rate 18, height 157.5 cm (62\"), weight 108 kg (237 lb), SpO2 92 %, not currently breastfeeding.    GENERAL: In no distress  HENT: NCAT, neck supple, trachea midline  EYES: no scleral icterus, PERRL, normal conjunctivae  CV: regular rhythm, regular rate, no murmur  RESPIRATORY: unlabored effort, scattered wheezes, rales in the bilateral bases, mild tachypnea  ABDOMEN: soft, nontender, nondistended, bowel sounds present  MUSCULOSKELETAL: no gross deformity  NEURO: alert,  sensory and motor function of extremities grossly intact, speech clear, mental status normal/baseline  SKIN: warm, dry, no rash  PSYCH:  Appropriate mood and affect    LAB RESULTS  Lab Results (last 24 hours)     Procedure Component Value Units Date/Time    Blood Culture - Blood, Arm, Left [[id number]] Collected: 12/16/20 2133    Specimen: Blood from Arm, Left Updated: 12/20/20 2200     Blood Culture No growth at 4 days        Imaging Results (Last 24 Hours)     ** No results found for the last 24 " hours. **           ECG 12 Lead               HEART RATE= 85  bpm  RR Interval= 708  ms  DC Interval= 219  ms  P Horizontal Axis= 23  deg  P Front Axis= 11  deg  QRSD Interval= 94  ms  QT Interval= 410  ms  QRS Axis= -46  deg  T Wave Axis= -23  deg  - ABNORMAL ECG -  Sinus rhythm  Borderline prolonged DC interval  LAD, consider left anterior fascicular block  Nonspecific T abnormalities, inferior leads  Borderline prolonged QT interval             Current Facility-Administered Medications:   •  acetaminophen (TYLENOL) tablet 650 mg, 650 mg, Oral, Q4H PRN **OR** acetaminophen (TYLENOL) 160 MG/5ML solution 650 mg, 650 mg, Oral, Q4H PRN **OR** acetaminophen (TYLENOL) suppository 650 mg, 650 mg, Rectal, Q4H PRN, Juan Carlos Harper MD  •  diphenhydrAMINE (BENADRYL) capsule 25 mg, 25 mg, Oral, Q6H PRN **OR** diphenhydrAMINE (BENADRYL) injection 25 mg, 25 mg, Intravenous, Q6H PRN, Juan Carlos Harper MD  •  diphenoxylate-atropine (LOMOTIL) 2.5-0.025 MG per tablet 1 tablet, 1 tablet, Oral, Q2H PRN, Juan Carlos Harper MD  •  Glycerin-Hypromellose- (ARTIFICIAL TEARS) 0.2-0.2-1 % ophthalmic solution solution 1 drop, 1 drop, Both Eyes, Q30 Min PRN, Juan Carlos Harper MD  •  glycopyrrolate PF (ROBINUL) injection 0.2 mg, 0.2 mg, Intravenous, Q2H PRN **OR** glycopyrrolate PF (ROBINUL) injection 0.2 mg, 0.2 mg, Subcutaneous, Q2H PRN **OR** glycopyrrolate PF (ROBINUL) injection 0.4 mg, 0.4 mg, Intravenous, Q2H PRN **OR** glycopyrrolate PF (ROBINUL) injection 0.4 mg, 0.4 mg, Subcutaneous, Q2H PRN, Juan Carlos Harper MD  •  haloperidol (HALDOL) tablet 1 mg, 1 mg, Oral, Q4H PRN **OR** haloperidol (HALDOL) 2 MG/ML solution 1 mg, 1 mg, Oral, Q4H PRN **OR** haloperidol lactate (HALDOL) injection 1 mg, 1 mg, Subcutaneous, Q4H PRN, Juan Carlos Harper MD  •  HYDROmorphone (DILAUDID) injection 0.5 mg, 0.5 mg, Intravenous, Q1H PRN **OR** morphine injection 2 mg, 2 mg, Intravenous, Q1H PRN, 2 mg at 12/20/20 1951 **OR** morphine concentrated solution solution 5 mg, 5  mg, Sublingual, Q1H PRN **OR** ketorolac (TORADOL) injection 15 mg, 15 mg, Intravenous, Q6H PRN, Gaetano Harper MD  •  HYDROmorphone (DILAUDID) injection 1 mg, 1 mg, Intravenous, Q1H PRN **OR** morphine injection 4 mg, 4 mg, Intravenous, Q1H PRN, 4 mg at 12/21/20 1411 **OR** morphine concentrated solution solution 10 mg, 10 mg, Sublingual, Q1H PRN, Gaetano Harper MD  •  LORazepam (ATIVAN) injection 0.5 mg, 0.5 mg, Intravenous, Q1H PRN, 0.5 mg at 12/21/20 1014 **OR** LORazepam (ATIVAN) injection 0.5 mg, 0.5 mg, Subcutaneous, Q1H PRN, 0.5 mg at 12/20/20 1951 **OR** LORazepam (ATIVAN) 2 MG/ML concentrated solution 0.5 mg, 0.5 mg, Sublingual, Q1H PRN, Gaetano Harper MD  •  LORazepam (ATIVAN) injection 1 mg, 1 mg, Intravenous, Q1H PRN, 1 mg at 12/21/20 1411 **OR** LORazepam (ATIVAN) injection 1 mg, 1 mg, Subcutaneous, Q1H PRN **OR** LORazepam (ATIVAN) 2 MG/ML concentrated solution 1 mg, 1 mg, Sublingual, Q1H PRN, Gaetano Harper MD  •  LORazepam (ATIVAN) injection 2 mg, 2 mg, Intravenous, Q1H PRN **OR** LORazepam (ATIVAN) injection 2 mg, 2 mg, Subcutaneous, Q1H PRN **OR** LORazepam (ATIVAN) 2 MG/ML concentrated solution 2 mg, 2 mg, Sublingual, Q1H PRN, Gaetano Harper MD  •  magic mouthwash oral supsension 5 mL, 5 mL, Swish & Spit, Q4H PRN, Gaetano Harper MD  •  promethazine (PHENERGAN) tablet 6.25 mg, 6.25 mg, Oral, Q4H PRN **OR** promethazine (PHENERGAN) 6.25 MG/5ML syrup 6.25 mg, 6.25 mg, Oral, Q4H PRN **OR** promethazine (PHENERGAN) suppository 6.25 mg, 6.25 mg, Rectal, Q4H PRN, Gaetano Harper MD     ASSESSMENT  COVID-19 infection  GPC bacteremia probably contamination  Dehydration  Acute UTI  Acute hypoxia  Hypertension  Seizure disorder  Chronic anemia  Depression  Gastroesophageal reflux disease    PLAN  Comfort care only  Allow natural death  Routine palliative care orders  Discussed with family  Discussed with nursing staff  Hospice scattered bed evaluation    GAETANO HARPER MD

## 2020-12-22 NOTE — DISCHARGE SUMMARY
Combined discharge summary and history and physical    Date of admission 12/16/2020  Date of discharge 12/22/2020  Date of admission to hospice scattered bed 12/22/2020    Final diagnosis  COVID-19 infection  GPC bacteremia contamination  Dehydration  Acute UTI  Acute hypoxia  Hypertension  Seizure disorder  Chronic anemia  Depression  Gastroesophageal reflux disease    Discharge medications    Current Facility-Administered Medications:   •  acetaminophen (TYLENOL) tablet 650 mg, 650 mg, Oral, Q4H PRN **OR** acetaminophen (TYLENOL) 160 MG/5ML solution 650 mg, 650 mg, Oral, Q4H PRN **OR** acetaminophen (TYLENOL) suppository 650 mg, 650 mg, Rectal, Q4H PRN, Juan Carlos Harper MD  •  diphenhydrAMINE (BENADRYL) capsule 25 mg, 25 mg, Oral, Q6H PRN **OR** diphenhydrAMINE (BENADRYL) injection 25 mg, 25 mg, Intravenous, Q6H PRN, Juan Carlos Harper MD  •  diphenoxylate-atropine (LOMOTIL) 2.5-0.025 MG per tablet 1 tablet, 1 tablet, Oral, Q2H PRN, Juan Carlos Harper MD  •  Glycerin-Hypromellose- (ARTIFICIAL TEARS) 0.2-0.2-1 % ophthalmic solution solution 1 drop, 1 drop, Both Eyes, Q30 Min PRN, Juan Carlos Harper MD  •  glycopyrrolate PF (ROBINUL) injection 0.2 mg, 0.2 mg, Intravenous, Q2H PRN **OR** glycopyrrolate PF (ROBINUL) injection 0.2 mg, 0.2 mg, Subcutaneous, Q2H PRN **OR** glycopyrrolate PF (ROBINUL) injection 0.4 mg, 0.4 mg, Intravenous, Q2H PRN, 0.4 mg at 12/22/20 1026 **OR** glycopyrrolate PF (ROBINUL) injection 0.4 mg, 0.4 mg, Subcutaneous, Q2H PRN, Juan Carlos Harper MD  •  haloperidol (HALDOL) tablet 1 mg, 1 mg, Oral, Q4H PRN **OR** haloperidol (HALDOL) 2 MG/ML solution 1 mg, 1 mg, Oral, Q4H PRN **OR** haloperidol lactate (HALDOL) injection 1 mg, 1 mg, Subcutaneous, Q4H PRN, Juan Carlos Harper MD  •  HYDROmorphone (DILAUDID) injection 0.5 mg, 0.5 mg, Intravenous, Q1H PRN **OR** morphine injection 2 mg, 2 mg, Intravenous, Q1H PRN, 2 mg at 12/20/20 1951 **OR** morphine concentrated solution solution 5 mg, 5 mg, Sublingual, Q1H PRN **OR**  ketorolac (TORADOL) injection 15 mg, 15 mg, Intravenous, Q6H PRN, Juan Carlos Harper MD  •  HYDROmorphone (DILAUDID) injection 1 mg, 1 mg, Intravenous, Q1H PRN **OR** morphine injection 4 mg, 4 mg, Intravenous, Q1H PRN, 4 mg at 12/22/20 1026 **OR** morphine concentrated solution solution 10 mg, 10 mg, Sublingual, Q1H PRN, Juan Carlos Harper MD  •  LORazepam (ATIVAN) injection 0.5 mg, 0.5 mg, Intravenous, Q1H PRN, 0.5 mg at 12/21/20 1014 **OR** LORazepam (ATIVAN) injection 0.5 mg, 0.5 mg, Subcutaneous, Q1H PRN, 0.5 mg at 12/20/20 1951 **OR** LORazepam (ATIVAN) 2 MG/ML concentrated solution 0.5 mg, 0.5 mg, Sublingual, Q1H PRN, Juan Carlos Harper MD  •  LORazepam (ATIVAN) injection 1 mg, 1 mg, Intravenous, Q1H PRN, 1 mg at 12/21/20 1411 **OR** LORazepam (ATIVAN) injection 1 mg, 1 mg, Subcutaneous, Q1H PRN **OR** LORazepam (ATIVAN) 2 MG/ML concentrated solution 1 mg, 1 mg, Sublingual, Q1H PRN, Juan Carlos Harper MD  •  LORazepam (ATIVAN) injection 2 mg, 2 mg, Intravenous, Q1H PRN, 2 mg at 12/22/20 1026 **OR** LORazepam (ATIVAN) injection 2 mg, 2 mg, Subcutaneous, Q1H PRN **OR** LORazepam (ATIVAN) 2 MG/ML concentrated solution 2 mg, 2 mg, Sublingual, Q1H PRN, Juan Carlos Harper MD  •  magic mouthwash oral supsension 5 mL, 5 mL, Swish & Spit, Q4H PRN, Juan Carlos Harper MD  •  promethazine (PHENERGAN) tablet 6.25 mg, 6.25 mg, Oral, Q4H PRN **OR** promethazine (PHENERGAN) 6.25 MG/5ML syrup 6.25 mg, 6.25 mg, Oral, Q4H PRN **OR** promethazine (PHENERGAN) suppository 6.25 mg, 6.25 mg, Rectal, Q4H PRN, Juan Carlos Harper MD     Consults obtained  Infectious disease  Palliative care  Hospice    Procedures  None    Hospital course  89-year-old white female with history of hypertension seizure disorder depression chronic anemia and gastroesophageal disease who is well-known to our service who is a nursing home resident admitted to emergency room with shortness of breath.  Patient was recently diagnosed with COVID-19 infection.  Patient work-up revealed acute  "hypoxia with persistent COVID-19 infection admit for management.  Patient admitted treated with supportive care and symptomatic treatment and also received Decadron and remdesivir.  Patient remained hypoxic and required higher dose of oxygen.  Patient was DNR and family decided to withdraw care and keep patient comfortable at palliative care unit due to worsening quality of life.  Patient transfer to palliative care unit for comfort care and today she qualify for hospice scattered bed for comfort care.    Physical exam  Blood pressure (!) 82/62, pulse 72, temperature 96 °F (35.6 °C), temperature source Oral, resp. rate 10, height 157.5 cm (62\"), weight 108 kg (237 lb), SpO2 92 %, not currently breastfeeding.    Comfortably sleeping  Lungs decreased breath sounds bilateral  Heart S1-S2  Abdomen soft hypoactive bowel sounds nontender  Extremities trace edema    LABS  Lab Results (last 24 hours)     Procedure Component Value Units Date/Time    Blood Culture - Blood, Arm, Left [[id number]] Collected: 12/16/20 2133    Specimen: Blood from Arm, Left Updated: 12/21/20 2200     Blood Culture No growth at 5 days        Imaging Results (Last 24 Hours)     ** No results found for the last 24 hours. **        PLAN  Hospice scattered bed  Comfort care only  Allow natural death  Routine palliative care orders  Discussed with family  Discussed with nursing staff    GAETANO ACOSTA MD  "

## 2020-12-22 NOTE — PROGRESS NOTES
"Daily progress note    Chief complaint  Comfortable  No new problems    History of present illness  89-year-old white female who is well-known to our service with history of hypertension seizure disorder depression chronic anemia and gastroesophageal reflux disease who is a nursing home resident brought to the emergency room multiple times in few days with shortness of breath but no fever cough abdominal pain nausea vomiting diarrhea.  Patient has some chest discomfort.  Patient was diagnosed with COVID-19 and was discharged back to nursing home sent back again with increased shortness of breath and this time she was found to be hypoxic admit for management.  Patient remained awake and alert and answer all questions appropriately.  Patient is DNR per her wishes.  Patient also found to have acute UTI     REVIEW OF SYSTEMS  Unable to obtain     PHYSICAL EXAM  Blood pressure (!) 82/62, pulse 72, temperature 96 °F (35.6 °C), temperature source Oral, resp. rate 10, height 157.5 cm (62\"), weight 108 kg (237 lb), SpO2 92 %, not currently breastfeeding.    Unchanged    LAB RESULTS  Lab Results (last 24 hours)     Procedure Component Value Units Date/Time    Blood Culture - Blood, Arm, Left [[id number]] Collected: 12/16/20 2133    Specimen: Blood from Arm, Left Updated: 12/21/20 2200     Blood Culture No growth at 5 days        Imaging Results (Last 24 Hours)     ** No results found for the last 24 hours. **            Current Facility-Administered Medications:   •  acetaminophen (TYLENOL) tablet 650 mg, 650 mg, Oral, Q4H PRN **OR** acetaminophen (TYLENOL) 160 MG/5ML solution 650 mg, 650 mg, Oral, Q4H PRN **OR** acetaminophen (TYLENOL) suppository 650 mg, 650 mg, Rectal, Q4H PRN, Juan Carlos Harper MD  •  diphenhydrAMINE (BENADRYL) capsule 25 mg, 25 mg, Oral, Q6H PRN **OR** diphenhydrAMINE (BENADRYL) injection 25 mg, 25 mg, Intravenous, Q6H PRN, Juan Carlos Harper MD  •  diphenoxylate-atropine (LOMOTIL) 2.5-0.025 MG per tablet 1 " tablet, 1 tablet, Oral, Q2H PRN, Juan Carlos Harper MD  •  Glycerin-Hypromellose- (ARTIFICIAL TEARS) 0.2-0.2-1 % ophthalmic solution solution 1 drop, 1 drop, Both Eyes, Q30 Min PRN, Juan Carlos Harper MD  •  glycopyrrolate PF (ROBINUL) injection 0.2 mg, 0.2 mg, Intravenous, Q2H PRN **OR** glycopyrrolate PF (ROBINUL) injection 0.2 mg, 0.2 mg, Subcutaneous, Q2H PRN **OR** glycopyrrolate PF (ROBINUL) injection 0.4 mg, 0.4 mg, Intravenous, Q2H PRN, 0.4 mg at 12/22/20 1026 **OR** glycopyrrolate PF (ROBINUL) injection 0.4 mg, 0.4 mg, Subcutaneous, Q2H PRN, Juan Carlos Harper MD  •  haloperidol (HALDOL) tablet 1 mg, 1 mg, Oral, Q4H PRN **OR** haloperidol (HALDOL) 2 MG/ML solution 1 mg, 1 mg, Oral, Q4H PRN **OR** haloperidol lactate (HALDOL) injection 1 mg, 1 mg, Subcutaneous, Q4H PRN, Juan Carlos Harper MD  •  HYDROmorphone (DILAUDID) injection 0.5 mg, 0.5 mg, Intravenous, Q1H PRN **OR** morphine injection 2 mg, 2 mg, Intravenous, Q1H PRN, 2 mg at 12/20/20 1951 **OR** morphine concentrated solution solution 5 mg, 5 mg, Sublingual, Q1H PRN **OR** ketorolac (TORADOL) injection 15 mg, 15 mg, Intravenous, Q6H PRN, Juan Carlos Harper MD  •  HYDROmorphone (DILAUDID) injection 1 mg, 1 mg, Intravenous, Q1H PRN **OR** morphine injection 4 mg, 4 mg, Intravenous, Q1H PRN, 4 mg at 12/22/20 1026 **OR** morphine concentrated solution solution 10 mg, 10 mg, Sublingual, Q1H PRN, Juan Carlos Harper MD  •  LORazepam (ATIVAN) injection 0.5 mg, 0.5 mg, Intravenous, Q1H PRN, 0.5 mg at 12/21/20 1014 **OR** LORazepam (ATIVAN) injection 0.5 mg, 0.5 mg, Subcutaneous, Q1H PRN, 0.5 mg at 12/20/20 1951 **OR** LORazepam (ATIVAN) 2 MG/ML concentrated solution 0.5 mg, 0.5 mg, Sublingual, Q1H PRN, Juan Carlos Harper MD  •  LORazepam (ATIVAN) injection 1 mg, 1 mg, Intravenous, Q1H PRN, 1 mg at 12/21/20 1411 **OR** LORazepam (ATIVAN) injection 1 mg, 1 mg, Subcutaneous, Q1H PRN **OR** LORazepam (ATIVAN) 2 MG/ML concentrated solution 1 mg, 1 mg, Sublingual, Q1H PRN, Juan Carlos Harper MD  •   LORazepam (ATIVAN) injection 2 mg, 2 mg, Intravenous, Q1H PRN, 2 mg at 12/22/20 1026 **OR** LORazepam (ATIVAN) injection 2 mg, 2 mg, Subcutaneous, Q1H PRN **OR** LORazepam (ATIVAN) 2 MG/ML concentrated solution 2 mg, 2 mg, Sublingual, Q1H PRN, Gaetano Harper MD  •  magic mouthwash oral supsension 5 mL, 5 mL, Swish & Spit, Q4H PRN, Gaetano Harper MD  •  promethazine (PHENERGAN) tablet 6.25 mg, 6.25 mg, Oral, Q4H PRN **OR** promethazine (PHENERGAN) 6.25 MG/5ML syrup 6.25 mg, 6.25 mg, Oral, Q4H PRN **OR** promethazine (PHENERGAN) suppository 6.25 mg, 6.25 mg, Rectal, Q4H PRN, Gaetano Harper MD     ASSESSMENT  COVID-19 infection  GPC bacteremia probably contamination  Dehydration  Acute UTI  Acute hypoxia  Hypertension  Seizure disorder  Chronic anemia  Depression  Gastroesophageal reflux disease    PLAN  Transfer to hospice scattered bed  Comfort care only  Allow natural death  Routine palliative care orders  Discussed with family  Discussed with nursing staff  Combined discharge summary and history and physical dictated    GAETANO HARPER MD

## 2020-12-22 NOTE — PROGRESS NOTES
Discharge Planning Assessment  [facility]     Patient Name: Kim Feldman  MRN: [id number]  Today's Date: 12/22/2020    Admit Date: 12/16/2020    Discharge Needs Assessment    No documentation.       Discharge Plan     Row Name 12/22/20 1301       Plan    Plan Comments  The patient transferred to [facility] from [address] [place] [address] on 12/20/20. The patient is palliative. [facility] to evaluate. The patient is from [facility] with a private pay bedhold and can return with [facility] if appropriate. KELLY Tolbert RN, CCP        Continued Care and Services - Admitted Since 12/16/2020     Destination Coordination complete    Service Provider Request Status Selected Services Address Phone Fax Patient Preferred    [facility]   Selected Intermediate Care [address] [facility] [address] [phone] --                Demographic Summary    No documentation.       Functional Status    No documentation.       Psychosocial    No documentation.       Abuse/Neglect    No documentation.       Legal    No documentation.       Substance Abuse    No documentation.       Patient Forms    No documentation.           Brenda Tolbert RN

## 2020-12-22 NOTE — PLAN OF CARE
Goal Outcome Evaluation:  Plan of Care Reviewed With: patient  Progress: declining  Outcome Summary: Appears calm and comfortable. Medicated q4 prior to turns, she tolerted well. Will continue palliative measures

## 2020-12-22 NOTE — PLAN OF CARE
Goal Outcome Evaluation:  Plan of Care Reviewed With: patient  Progress: declining  Outcome Summary: premedicated prior to turns. robinul started with good response. patient's daughter outside window and appreciative of the care. pierce in place for comfort. new IV per need. HSB today. will continue to monitor.

## 2020-12-23 PROBLEM — U07.1 CLINICAL DIAGNOSIS OF COVID-19: Status: ACTIVE | Noted: 2020-01-01

## 2020-12-23 NOTE — PROGRESS NOTES
Case Management Discharge Note      Final Note: admitted to a [facility] scattered bed on 12/22/20. KELLY Tolbert RN, CCP.    Provided Post Acute Provider List?: N/A  N/A Provider List Comment: Has been at [facility] and will return    Selected Continued Care - Discharged on 12/22/2020 Admission date: 12/16/2020 - Discharge disposition: Hospice/Medical Facility ([facility] - [facility])    Destination Coordination complete    Service Provider Selected Services Address Phone Fax Patient Preferred    [facility]  Inpatient Hospice [address], [place] [address] [phone] --          Durable Medical Equipment    No services have been selected for the patient.              Dialysis/Infusion    No services have been selected for the patient.              Home Medical Care    No services have been selected for the patient.              Therapy    No services have been selected for the patient.              Community Resources    No services have been selected for the patient.                       Final Discharge Disposition Code: 51 - hospice medical facility

## 2020-12-23 NOTE — PROGRESS NOTES
Case Management Discharge Note      Final Note: The patient  on 2020 @ 10:20. B. Didi RN, CCP.         Selected Continued Care - Admitted Since 2020     Destination Coordination complete    Service Provider Selected Services Address Phone Fax Patient Preferred    [facility]  Inpatient Hospice [address], [facility] [id number] [phone] --          Durable Medical Equipment    No services have been selected for the patient.              Dialysis/Infusion    No services have been selected for the patient.              Home Medical Care    No services have been selected for the patient.              Therapy    No services have been selected for the patient.              Community Resources    No services have been selected for the patient.                Selected Continued Care - Prior Encounters Includes selections from prior encounters from 2020 to 2020    Discharged on 2020 Admission date: 2020 - Discharge disposition: Hospice/Medical Facility ([facility])    Destination     Service Provider Selected Services Address Phone Fax Patient Preferred    [facility]  Inpatient Hospice [address], [facility] [address] [phone] --                         Final Discharge Disposition Code: 41 -  in medical facility

## 2020-12-23 NOTE — DISCHARGE SUMMARY
Discharge summary    Date of admission 2020  Date of death 2020    Discussion  89-year-old white female with multiple medical problem and well-known to our service who was a nursing home resident admitted to emergency with shortness of breath.  Patient work-up revealed persistent COVID-19 infection with acute hypoxia admit for management.  Patient admitted treated with supportive care and symptomatic treatment and also received Decadron remdesivir without any improvement.  As patient declined family decided to withdraw care and transfer patient to palliative care unit where she remained comfortable and moved to hospice scattered bed for comfort care where she remained comfortable and  this morning.  Patient body released with family.  For details see my discharge summary from acute care.  Patient was DNR and her CODE STATUS changed to allow natural death on this admission by POA.    Cause of death cardiopulmonary failure    GAETANO ACOSTA MD

## 2020-12-23 NOTE — PROGRESS NOTES
Daily progress note    Chief complaint  Comfortable  No new problems    History of present illness  89-year-old white female who is well-known to our service with history of hypertension seizure disorder depression chronic anemia and gastroesophageal reflux disease who is a nursing home resident brought to the emergency room multiple times in few days with shortness of breath but no fever cough abdominal pain nausea vomiting diarrhea.  Patient has some chest discomfort.  Patient was diagnosed with COVID-19 and was discharged back to nursing home sent back again with increased shortness of breath and this time she was found to be hypoxic admit for management.  Patient remained awake and alert and answer all questions appropriately.  Patient is DNR per her wishes.  Patient also found to have acute UTI     REVIEW OF SYSTEMS  Unable to obtain     PHYSICAL EXAM  Pulse (!) 0, temperature (!) 100.9 °F (38.3 °C), temperature source Axillary, resp. rate (!) 0, SpO2 (!) 0 %, not currently breastfeeding.    Unchanged    LAB RESULTS  Lab Results (last 24 hours)     ** No results found for the last 24 hours. **        Imaging Results (Last 24 Hours)     ** No results found for the last 24 hours. **            Current Facility-Administered Medications:   •  acetaminophen (TYLENOL) tablet 650 mg, 650 mg, Oral, Q4H PRN **OR** acetaminophen (TYLENOL) 160 MG/5ML solution 650 mg, 650 mg, Oral, Q4H PRN **OR** acetaminophen (TYLENOL) suppository 650 mg, 650 mg, Rectal, Q4H PRN, Juan Carlos Harper MD  •  diphenhydrAMINE (BENADRYL) capsule 25 mg, 25 mg, Oral, Q6H PRN **OR** diphenhydrAMINE (BENADRYL) injection 25 mg, 25 mg, Intravenous, Q6H PRN, Juan Carlos Harper MD  •  diphenoxylate-atropine (LOMOTIL) 2.5-0.025 MG per tablet 1 tablet, 1 tablet, Oral, Q2H PRN, Juan Carlos Harper MD  •  Glycerin-Hypromellose- (ARTIFICIAL TEARS) 0.2-0.2-1 % ophthalmic solution solution 1 drop, 1 drop, Both Eyes, Q30 Min PRN, Juan Carlos Harper MD  •  glycopyrrolate PF  (ROBINUL) injection 0.2 mg, 0.2 mg, Intravenous, Q2H PRN **OR** glycopyrrolate PF (ROBINUL) injection 0.2 mg, 0.2 mg, Subcutaneous, Q2H PRN **OR** glycopyrrolate PF (ROBINUL) injection 0.4 mg, 0.4 mg, Intravenous, Q2H PRN, 0.4 mg at 12/23/20 0906 **OR** glycopyrrolate PF (ROBINUL) injection 0.4 mg, 0.4 mg, Subcutaneous, Q2H PRN, Juan Carlos Harper MD  •  haloperidol (HALDOL) tablet 1 mg, 1 mg, Oral, Q4H PRN **OR** haloperidol (HALDOL) 2 MG/ML solution 1 mg, 1 mg, Oral, Q4H PRN **OR** haloperidol lactate (HALDOL) injection 1 mg, 1 mg, Subcutaneous, Q4H PRN, Juan Carlos Harper MD  •  HYDROmorphone (DILAUDID) injection 0.5 mg, 0.5 mg, Intravenous, Q1H PRN **OR** morphine injection 2 mg, 2 mg, Intravenous, Q1H PRN **OR** morphine concentrated solution solution 5 mg, 5 mg, Sublingual, Q1H PRN **OR** ketorolac (TORADOL) injection 15 mg, 15 mg, Intravenous, Q6H PRN, Juan Carlos Harper MD  •  HYDROmorphone (DILAUDID) injection 1 mg, 1 mg, Intravenous, Q1H PRN **OR** Morphine sulfate (PF) injection 4 mg, 4 mg, Intravenous, Q1H PRN, 4 mg at 12/23/20 0907 **OR** morphine concentrated solution solution 10 mg, 10 mg, Sublingual, Q1H PRN, Juan Carlos Harper MD  •  LORazepam (ATIVAN) injection 0.5 mg, 0.5 mg, Intravenous, Q1H PRN **OR** LORazepam (ATIVAN) injection 0.5 mg, 0.5 mg, Subcutaneous, Q1H PRN **OR** LORazepam (ATIVAN) 2 MG/ML concentrated solution 0.5 mg, 0.5 mg, Sublingual, Q1H PRN, Juan Carlos Harper MD  •  LORazepam (ATIVAN) injection 1 mg, 1 mg, Intravenous, Q1H PRN **OR** LORazepam (ATIVAN) injection 1 mg, 1 mg, Subcutaneous, Q1H PRN **OR** LORazepam (ATIVAN) 2 MG/ML concentrated solution 1 mg, 1 mg, Sublingual, Q1H PRN, Juan Carlos Harper MD  •  LORazepam (ATIVAN) injection 2 mg, 2 mg, Intravenous, Q1H PRN, 2 mg at 12/23/20 0907 **OR** LORazepam (ATIVAN) injection 2 mg, 2 mg, Subcutaneous, Q1H PRN **OR** LORazepam (ATIVAN) 2 MG/ML concentrated solution 2 mg, 2 mg, Sublingual, Q1H PRN, Juan Carlos Harper MD  •  magic mouthwash oral supsension 5 mL, 5  mL, Swish & Spit, Q4H PRN, Gaetano Harper MD  •  promethazine (PHENERGAN) tablet 6.25 mg, 6.25 mg, Oral, Q4H PRN **OR** promethazine (PHENERGAN) 6.25 MG/5ML syrup 6.25 mg, 6.25 mg, Oral, Q4H PRN **OR** promethazine (PHENERGAN) suppository 6.25 mg, 6.25 mg, Rectal, Q4H PRN, Gaetano Harper MD     ASSESSMENT  COVID-19 infection  GPC bacteremia probably contamination  Dehydration  Acute UTI  Acute hypoxia  Hypertension  Seizure disorder  Chronic anemia  Depression  Gastroesophageal reflux disease    PLAN  Hospice scattered bed  Comfort care only  Allow natural death  Routine palliative care orders  Discussed with family  Discussed with nursing staff  Use my discharge summary as history and physical    GAETANO HARPER MD

## 2020-12-23 NOTE — PLAN OF CARE
Goal Outcome Evaluation:  Plan of Care Reviewed With: patient  Progress: declining  Outcome Summary: Medicated q4 prior to turns with ativan, morphine, and robinul. Increasing secretions, suction at bedside. Appears comfortable although breathing is labored at times. No family present. Will continue palliative measures

## 2022-01-24 NOTE — PLAN OF CARE
Problem: Patient Care Overview (Adult)  Goal: Plan of Care Review    12/14/17 0927   Coping/Psychosocial Response Interventions   Plan Of Care Reviewed With patient   Patient Care Overview   Progress improving   Outcome Evaluation   Outcome Summary/Follow up Plan Bedside swallow completed. SLP rees mech soft and nectar, will complete VFSS next date.         Problem: Inpatient SLP  Goal: Dysphagia- Patient will safely consume diet as per recommendation with no signs/symptoms of aspiration    12/14/17 0927   Safely Consume Diet   Safely Consume Diet- SLP, Date Established 12/14/17   Safely Consume Diet- SLP, Time to Achieve by discharge            We received remote transmission from patient's monitor at home. Transmission shows normal sensing and pacing function. Current ECG AFBVP at 77 bpm.  Battery life 6.2 years  AP 1.5%  99.6%  %  No Ventricular Sensing episode noted. EP physician will review. See interrogation under cardiology tab in the [address] [place] [address] [address] field for more details. Appears Patient will be getting scheduled for a Cardioversion and Ablation. Please Advise.  Thanks

## (undated) DEVICE — IRRIGATOR BULB ASEPTO 60CC STRL

## (undated) DEVICE — DRSNG WND GZ CURAD OIL EMULSION 3X3IN STRL

## (undated) DEVICE — BNDG ELAS ELITE V/CLOSE 6IN 5YD LF STRL

## (undated) DEVICE — HARMONIC FOCUS SHEARS 9CM LENGTH + ADAPTIVE TISSUE TECHNOLOGY FOR USE WITH BLUE HAND PIECE ONLY: Brand: HARMONIC FOCUS

## (undated) DEVICE — SPNG DISECTOR KTNER XRAY COTN 1/4X9/16IN PK/5

## (undated) DEVICE — STPLR SKIN VISISTAT WD 35CT

## (undated) DEVICE — PK ORTHO MINOR 40

## (undated) DEVICE — SOL ISO/ALC RUB 70PCT 4OZ

## (undated) DEVICE — ELECTRD BLD BOVIE WECK NO INSULATION

## (undated) DEVICE — BOOT WALK INTEGRITY FX TALL LG

## (undated) DEVICE — PENCL E/S FTSWTCH HOLSTR SS 10FT

## (undated) DEVICE — GLV SURG SENSICARE MICRO PF LF 7 STRL

## (undated) DEVICE — GOWN,NON-REINFORCED,SIRUS,SET IN SLV,XL: Brand: MEDLINE

## (undated) DEVICE — APPL CHLORAPREP W/TINT 26ML ORNG

## (undated) DEVICE — ENCORE® LATEX ORTHO SIZE 8.5, STERILE LATEX POWDER-FREE SURGICAL GLOVE: Brand: ENCORE

## (undated) DEVICE — STCKNT IMPERV 9X36IN STRL

## (undated) DEVICE — UNDERCAST PADDING: Brand: DEROYAL

## (undated) DEVICE — GLV SURG SENSICARE GREEN W/ALOE PF LF 8.5 STRL

## (undated) DEVICE — GUIDE PIN TROCAR POINT 1.3MM X 140MM

## (undated) DEVICE — ELECTRD NDL TUNG/MICRO STR 2CM

## (undated) DEVICE — 2.7MM CANNULATED REAMER F/ 4.0MM                                    CANNULATED SCREWS: Brand: CANNULATED SCREWS

## (undated) DEVICE — SUT MNCRYL PLS ANTIB UD 4/0 PS2 18IN

## (undated) DEVICE — DISPOSABLE TOURNIQUET CUFF SINGLE BLADDER, SINGLE PORT AND QUICK CONNECT CONNECTOR: Brand: COLOR CUFF

## (undated) DEVICE — SUT VIC 0 CT1 36IN J946H

## (undated) DEVICE — DRSNG ADAPTIC 3X8

## (undated) DEVICE — BNDG ELAS ELITE V/CLOSE 4IN 5YD LF STRL

## (undated) DEVICE — EVOS SMALL 2.5MM DRILL W/AO QC SHORT: Brand: EVOS

## (undated) DEVICE — DRP C/ARMOR

## (undated) DEVICE — GOWN,PREVENTION PLUS,XLONG/XLARGE,STRL: Brand: MEDLINE

## (undated) DEVICE — PAD GRND REM POLYHESIVE A/ DISP

## (undated) DEVICE — IMPLANTABLE DEVICE
Type: IMPLANTABLE DEVICE | Site: ANKLE | Status: NON-FUNCTIONAL
Removed: 2018-11-17

## (undated) DEVICE — DRSNG SURESITE WNDW 4X4.5

## (undated) DEVICE — SUT VIC 2/0 CT2 27IN J269H

## (undated) DEVICE — SPNG LAP 18X18IN LF STRL PK/5

## (undated) DEVICE — GAUZE,SPONGE,FLUFF,6"X6.75",STRL,10/TRAY: Brand: MEDLINE

## (undated) DEVICE — PK ENT 40

## (undated) DEVICE — SPNG GZ WOVN 4X4IN 12PLY 10/BX STRL

## (undated) DEVICE — CAUTERY TIP POLISHER: Brand: DEVON